# Patient Record
Sex: FEMALE | Race: WHITE | NOT HISPANIC OR LATINO | Employment: OTHER | URBAN - METROPOLITAN AREA
[De-identification: names, ages, dates, MRNs, and addresses within clinical notes are randomized per-mention and may not be internally consistent; named-entity substitution may affect disease eponyms.]

---

## 2017-04-24 ENCOUNTER — APPOINTMENT (OUTPATIENT)
Dept: LAB | Facility: CLINIC | Age: 77
End: 2017-04-24
Payer: MEDICARE

## 2017-04-24 ENCOUNTER — ALLSCRIPTS OFFICE VISIT (OUTPATIENT)
Dept: OTHER | Facility: OTHER | Age: 77
End: 2017-04-24

## 2017-04-24 DIAGNOSIS — E11.65 TYPE 2 DIABETES MELLITUS WITH HYPERGLYCEMIA (HCC): ICD-10-CM

## 2017-04-24 LAB
ANION GAP SERPL CALCULATED.3IONS-SCNC: 6 MMOL/L (ref 4–13)
BUN SERPL-MCNC: 22 MG/DL (ref 5–25)
CALCIUM SERPL-MCNC: 9.6 MG/DL (ref 8.3–10.1)
CHLORIDE SERPL-SCNC: 106 MMOL/L (ref 100–108)
CO2 SERPL-SCNC: 30 MMOL/L (ref 21–32)
CREAT SERPL-MCNC: 0.86 MG/DL (ref 0.6–1.3)
CREAT UR-MCNC: 65.3 MG/DL
EST. AVERAGE GLUCOSE BLD GHB EST-MCNC: 157 MG/DL
GFR SERPL CREATININE-BSD FRML MDRD: >60 ML/MIN/1.73SQ M
GLUCOSE P FAST SERPL-MCNC: 133 MG/DL (ref 65–99)
HBA1C MFR BLD: 7.1 % (ref 4.2–6.3)
LDLC SERPL DIRECT ASSAY-MCNC: 76 MG/DL (ref 0–100)
MICROALBUMIN UR-MCNC: 19.7 MG/L (ref 0–20)
MICROALBUMIN/CREAT 24H UR: 30 MG/G CREATININE (ref 0–30)
POTASSIUM SERPL-SCNC: 4.4 MMOL/L (ref 3.5–5.3)
SODIUM SERPL-SCNC: 142 MMOL/L (ref 136–145)

## 2017-04-24 PROCEDURE — 82043 UR ALBUMIN QUANTITATIVE: CPT

## 2017-04-24 PROCEDURE — 36415 COLL VENOUS BLD VENIPUNCTURE: CPT

## 2017-04-24 PROCEDURE — 82570 ASSAY OF URINE CREATININE: CPT

## 2017-04-24 PROCEDURE — 80048 BASIC METABOLIC PNL TOTAL CA: CPT

## 2017-04-24 PROCEDURE — 83036 HEMOGLOBIN GLYCOSYLATED A1C: CPT

## 2017-04-24 PROCEDURE — 83721 ASSAY OF BLOOD LIPOPROTEIN: CPT

## 2017-10-23 ENCOUNTER — ALLSCRIPTS OFFICE VISIT (OUTPATIENT)
Dept: OTHER | Facility: OTHER | Age: 77
End: 2017-10-23

## 2017-10-23 ENCOUNTER — APPOINTMENT (OUTPATIENT)
Dept: LAB | Facility: CLINIC | Age: 77
End: 2017-10-23
Payer: MEDICARE

## 2017-10-23 DIAGNOSIS — E11.65 TYPE 2 DIABETES MELLITUS WITH HYPERGLYCEMIA (HCC): ICD-10-CM

## 2017-10-23 DIAGNOSIS — I65.29 OCCLUSION AND STENOSIS OF UNSPECIFIED CAROTID ARTERY: ICD-10-CM

## 2017-10-23 LAB
ALBUMIN SERPL BCP-MCNC: 3.6 G/DL (ref 3.5–5)
ALP SERPL-CCNC: 113 U/L (ref 46–116)
ALT SERPL W P-5'-P-CCNC: 22 U/L (ref 12–78)
ANION GAP SERPL CALCULATED.3IONS-SCNC: 5 MMOL/L (ref 4–13)
AST SERPL W P-5'-P-CCNC: 19 U/L (ref 5–45)
BASOPHILS # BLD AUTO: 0.04 THOUSANDS/ΜL (ref 0–0.1)
BASOPHILS NFR BLD AUTO: 0 % (ref 0–1)
BILIRUB SERPL-MCNC: 0.47 MG/DL (ref 0.2–1)
BUN SERPL-MCNC: 19 MG/DL (ref 5–25)
CALCIUM SERPL-MCNC: 9.5 MG/DL (ref 8.3–10.1)
CHLORIDE SERPL-SCNC: 105 MMOL/L (ref 100–108)
CHOLEST SERPL-MCNC: 148 MG/DL (ref 50–200)
CO2 SERPL-SCNC: 30 MMOL/L (ref 21–32)
CREAT SERPL-MCNC: 0.96 MG/DL (ref 0.6–1.3)
EOSINOPHIL # BLD AUTO: 0.44 THOUSAND/ΜL (ref 0–0.61)
EOSINOPHIL NFR BLD AUTO: 4 % (ref 0–6)
ERYTHROCYTE [DISTWIDTH] IN BLOOD BY AUTOMATED COUNT: 14.4 % (ref 11.6–15.1)
EST. AVERAGE GLUCOSE BLD GHB EST-MCNC: 166 MG/DL
GFR SERPL CREATININE-BSD FRML MDRD: 56 ML/MIN/1.73SQ M
GLUCOSE P FAST SERPL-MCNC: 153 MG/DL (ref 65–99)
HBA1C MFR BLD HPLC: 7.4 %
HBA1C MFR BLD: 7.4 % (ref 4.2–6.3)
HCT VFR BLD AUTO: 43 % (ref 34.8–46.1)
HDLC SERPL-MCNC: 69 MG/DL (ref 40–60)
HGB BLD-MCNC: 14.1 G/DL (ref 11.5–15.4)
LYMPHOCYTES # BLD AUTO: 2.76 THOUSANDS/ΜL (ref 0.6–4.47)
LYMPHOCYTES NFR BLD AUTO: 24 % (ref 14–44)
MCH RBC QN AUTO: 28.7 PG (ref 26.8–34.3)
MCHC RBC AUTO-ENTMCNC: 32.8 G/DL (ref 31.4–37.4)
MCV RBC AUTO: 88 FL (ref 82–98)
MONOCYTES # BLD AUTO: 0.85 THOUSAND/ΜL (ref 0.17–1.22)
MONOCYTES NFR BLD AUTO: 7 % (ref 4–12)
NEUTROPHILS # BLD AUTO: 7.64 THOUSANDS/ΜL (ref 1.85–7.62)
NEUTS SEG NFR BLD AUTO: 65 % (ref 43–75)
NONHDLC SERPL-MCNC: 79 MG/DL
NRBC BLD AUTO-RTO: 0 /100 WBCS
PLATELET # BLD AUTO: 318 THOUSANDS/UL (ref 149–390)
PMV BLD AUTO: 10.8 FL (ref 8.9–12.7)
POTASSIUM SERPL-SCNC: 4.6 MMOL/L (ref 3.5–5.3)
PROT SERPL-MCNC: 7.1 G/DL (ref 6.4–8.2)
RBC # BLD AUTO: 4.91 MILLION/UL (ref 3.81–5.12)
SODIUM SERPL-SCNC: 140 MMOL/L (ref 136–145)
TSH SERPL DL<=0.05 MIU/L-ACNC: 1.32 UIU/ML (ref 0.36–3.74)
WBC # BLD AUTO: 11.76 THOUSAND/UL (ref 4.31–10.16)

## 2017-10-23 PROCEDURE — 36415 COLL VENOUS BLD VENIPUNCTURE: CPT

## 2017-10-23 PROCEDURE — 83718 ASSAY OF LIPOPROTEIN: CPT

## 2017-10-23 PROCEDURE — 84443 ASSAY THYROID STIM HORMONE: CPT

## 2017-10-23 PROCEDURE — 82465 ASSAY BLD/SERUM CHOLESTEROL: CPT

## 2017-10-23 PROCEDURE — 85025 COMPLETE CBC W/AUTO DIFF WBC: CPT

## 2017-10-23 PROCEDURE — 83036 HEMOGLOBIN GLYCOSYLATED A1C: CPT

## 2017-10-23 PROCEDURE — 80053 COMPREHEN METABOLIC PANEL: CPT

## 2017-11-13 ENCOUNTER — TRANSCRIBE ORDERS (OUTPATIENT)
Dept: MRI IMAGING | Facility: CLINIC | Age: 77
End: 2017-11-13

## 2017-11-13 ENCOUNTER — APPOINTMENT (OUTPATIENT)
Dept: LAB | Facility: CLINIC | Age: 77
End: 2017-11-13
Payer: MEDICARE

## 2017-11-13 ENCOUNTER — HOSPITAL ENCOUNTER (OUTPATIENT)
Dept: NON INVASIVE DIAGNOSTICS | Facility: CLINIC | Age: 77
Discharge: HOME | End: 2017-11-13
Payer: MEDICARE

## 2017-11-13 DIAGNOSIS — E11.00 UNCONTROLLED TYPE 2 DIABETES MELLITUS WITH HYPEROSMOLARITY WITHOUT COMA, WITHOUT LONG-TERM CURRENT USE OF INSULIN (HCC): Primary | ICD-10-CM

## 2017-11-13 DIAGNOSIS — I65.29 OCCLUSION AND STENOSIS OF UNSPECIFIED CAROTID ARTERY: ICD-10-CM

## 2017-11-13 LAB
BACTERIA UR QL AUTO: ABNORMAL /HPF
BILIRUB UR QL STRIP: NEGATIVE
CLARITY UR: ABNORMAL
COLOR UR: YELLOW
CREAT UR-MCNC: 69.8 MG/DL
GLUCOSE UR STRIP-MCNC: NEGATIVE MG/DL
HGB UR QL STRIP.AUTO: NEGATIVE
HYALINE CASTS #/AREA URNS LPF: ABNORMAL /LPF
KETONES UR STRIP-MCNC: NEGATIVE MG/DL
LEUKOCYTE ESTERASE UR QL STRIP: ABNORMAL
MICROALBUMIN UR-MCNC: 13.3 MG/L (ref 0–20)
MICROALBUMIN/CREAT 24H UR: 19 MG/G CREATININE (ref 0–30)
NITRITE UR QL STRIP: POSITIVE
NON-SQ EPI CELLS URNS QL MICRO: ABNORMAL /HPF
PH UR STRIP.AUTO: 6 [PH] (ref 4.5–8)
PROT UR STRIP-MCNC: NEGATIVE MG/DL
RBC #/AREA URNS AUTO: ABNORMAL /HPF
SP GR UR STRIP.AUTO: 1.02 (ref 1–1.03)
UROBILINOGEN UR QL STRIP.AUTO: 0.2 E.U./DL
WBC #/AREA URNS AUTO: ABNORMAL /HPF

## 2017-11-13 PROCEDURE — 82043 UR ALBUMIN QUANTITATIVE: CPT | Performed by: INTERNAL MEDICINE

## 2017-11-13 PROCEDURE — 81001 URINALYSIS AUTO W/SCOPE: CPT | Performed by: INTERNAL MEDICINE

## 2017-11-13 PROCEDURE — 93880 EXTRACRANIAL BILAT STUDY: CPT

## 2017-11-13 PROCEDURE — 82570 ASSAY OF URINE CREATININE: CPT | Performed by: INTERNAL MEDICINE

## 2017-12-04 ENCOUNTER — GENERIC CONVERSION - ENCOUNTER (OUTPATIENT)
Dept: INTERNAL MEDICINE CLINIC | Facility: CLINIC | Age: 77
End: 2017-12-04

## 2017-12-04 ENCOUNTER — GENERIC CONVERSION - ENCOUNTER (OUTPATIENT)
Dept: OTHER | Facility: OTHER | Age: 77
End: 2017-12-04

## 2018-01-14 VITALS
HEART RATE: 71 BPM | BODY MASS INDEX: 35.6 KG/M2 | SYSTOLIC BLOOD PRESSURE: 148 MMHG | WEIGHT: 207.38 LBS | DIASTOLIC BLOOD PRESSURE: 84 MMHG | OXYGEN SATURATION: 95 %

## 2018-01-14 NOTE — PROGRESS NOTES
Assessment    1  Encounter for preventive health examination (V70 0) (Z00 00)    Plan  Benign essential hypertension    · EKG/ECG- POC; Status:Active - Perform Order; Requested [id number];   Carotid artery plaque    · VAS CAROTID COMPLETE STUDY; SIDE:Bilateral; Status:Hold For - Scheduling;  Requested MQE:[id number];   Diabetes mellitus type 2, uncontrolled    · (1) CBC/PLT/DIFF; Status:Active; Requested LAX:[id number];    · (1) COMPREHENSIVE METABOLIC PANEL; Status:Active; Requested HWB:[id number];    · (1) HEMOGLOBIN A1C; Status:Active; Requested DJN:[id number];    · (1) LIPID PANEL, NON FASTING W/O TRIG; Status:Active; Requested CVL:[id number];    · (1) MICROALBUMIN CREATININE RATIO, RANDOM URINE; Status:Active; Requested  for:2017;    · (1) TSH WITH FT4 REFLEX; Status:Active; Requested CMQ:[id number];    · (1) URINALYSIS (will reflex a microscopy if leukocytes, occult blood, protein or nitrites  are not within normal limits); Status:Active; Requested [id number];     Discussion/Summary  Impression: Subsequent Annual Wellness Visit, with preventive exam as well as age and risk appropriate counseling completed  Cardiovascular screening and counseling: screening is current and Dx - V81 2 Screen for CV Disorder  Diabetes screening and counseling: screening not indicated  Colorectal cancer screening and counseling: the patient declines screening and Dx - V76 51 Screen for CRC  Breast cancer screening and counseling: the risks and benefits of screening were discussed and the patient declines screening  Cervical cancer screening and counseling: screening not indicated  Abdominal aortic aneurysm screening and counseling: Dx - V81 2 Screen for CV Disorder  Glaucoma screening and counseling: screening is current and Dx - V80 1 Screen for Glaucoma  HIV screening and counseling: screening not indicated   Immunizations: the patient declines the influenza vaccination, the patient declines the pneumococcal vaccination, hepatitis B vaccination series is not indicated at this time due to the patient's low risk of jv the disease, the patient declines the Zostavax vaccine, the patient declines the Td vaccine and the patient declines the Tdap vaccine  Advance Directive Planning: not complete, she was encouraged to follow-up with me to discuss her questions and/or decisions  History of Present Illness  Welcome to Medicare and Wellness Visits: The patient is being seen for the subsequent annual wellness visit  Medicare Screening and Risk Factors   Hospitalizations: no previous hospitalizations  Once per lifetime medicare screening tests: AAA screening [place] has not yet been done  Medicare Screening Tests Risk Questions   Drug and Alcohol Use: The patient is a former cigarette smoker and quit smoking 17yrs ago  The patient reports occasional alcohol use and drinking 6 drinks per week  She has never used illicit drugs  Diet and Physical Activity: Current diet includes low salt food choices, 0 servings of fruit per day, 1 servings of vegetables per day, 2 servings of meat per day, 0 servings of whole grains per day, 2 servings of simple carbohydrates per day, 0 servings of dairy products per day, 1 cups of coffee per day, 0 cups of tea per day, 0 cans of regular soda per day and 1 cans of diet soda per day  She exercises infrequently  Mood Disorder and Cognitive Impairment Screening: PHQ-9 Depression Scale   Over the past 2 weeks, how often have you been bothered by the following problems? 1 ) Little interest or pleasure in doing things? Not at all    2 ) Feeling down, depressed or hopeless? Not at all    3 ) Trouble falling asleep or sleeping too much? Several days  4 ) Feeling tired or having little energy? Not at all    5 ) Poor appetite or overeating? Several days  6 ) Feeling bad about yourself, or that you are a failure, or have let yourself or your family down?  Not at all    7 ) Trouble concentrating on things, such as reading a newspaper or watching television? Not at all    8 ) Moving or speaking so slowly that other people could have noticed, or the opposite, moving or speaking faster than usual? Not at all    9 ) Thoughts that you would be off dead or of hurting yourself in some way? Not at all  TOTAL SCORE: 2  She denies feeling down, depressed, or hopeless over the past two weeks  She denies feeling little interest or pleasure in doing things over the past two weeks  Cognitive impairment screening: denies difficulty learning/retaining new information, denies difficulty handling complex tasks, denies difficulty with reasoning, denies difficulty with spatial ability and orientation, denies difficulty with language and denies difficulty with behavior  Advance Directives: Advance directives: no living will, no durable power of  for health care directives and no advance directives  Co-Managers and Medical Equipment/Suppliers: See Patient Care Team       Preventive Quality Program 65 and Older: Falls Risk: The patient fell 0 times in the past 12 months  Urinary Incontinence Symptoms includes: no urinary incontinence        Review of Systems    Over the past 2 weeks, how often have you been bothered by the following problems? 1 ) Little interest or pleasure in doing things? Not at all    3 ) Trouble falling asleep or sleeping too much? Several days  4 ) Feeling tired or having little energy? Not at all    5 ) Poor appetite or overeating? Several days  6 ) Feeling bad about yourself, or that you are a failure, or have let yourself or your family down? Not at all    7 ) Trouble concentrating on things, such as reading a newspaper or watching television?  Not at all    8 ) Moving or speaking so slowly that other people could have noticed, or the opposite, moving or speaking faster than usual? Not at all    9 ) Thoughts that you would be better off dead or of hurting yourself in some way? Not at all  Score 2      Active Problems    1  Benign essential hypertension (401 1) (I10)   2  Carotid artery plaque (433 10) (I65 29)   3  Chronic obstructive pulmonary disease (496) (J44 9)   4  Colon cancer screening (V76 51) (Z12 11)   5  Diabetes mellitus type 2, uncontrolled (250 02) (E11 65)   6  Flu vaccine need (V04 81) (Z23)   7  Hyperlipidemia (272 4) (E78 5)   8  Hypothyroidism (244 9) (E03 9)   9  No advance directive on file (V49 89) (Z78 9)   10  Osteoarthrosis (715 90) (M19 90)   11  Screening for genitourinary condition (V81 6) (Z13 89)    Past Medical History    · History of Acute maxillary sinusitis (461 0) (J01 00)   · History of Cellulitis of leg (682 6) (L03 119)   · History of Congestive heart failure (428 0) (I50 9)   · History of acute bronchitis (V12 69) (Z87 09)   · History of cataract (V12 49) (Z86 69)   · History of chest pain (V13 89) (J16 013)   · History of edema (V13 89) (A21 653)   · History of leukocytosis (V12 3) (Z86 2)   · History of shortness of breath (V13 89) (Z87 898)   · History of Limb pain (729 5) (M79 609)   · History of Meralgia paresthetica (355 1) (G57 10)   · History of Refusing Treatment   · History of Thyrotoxicosis (242 90)   · History of Urinary Tract Infection (V13 02)    Surgical History    · History of Anterior Colporrhaphy, Repair Of Cystocele   · History of Appendectomy   · History of Total Abdominal Hysterectomy   · History of Treatment Of Ankle Fracture    Family History  Mother    · No pertinent family history  Father    · No pertinent family history    Social History    · Denied: History of Alcohol Use (History)   · Former Smoker Cigarettes (V15 82)   · QUIT 2000   · Living Independently Alone (V60 3)   · Never Used Drugs   · No advance directive on file (V49 89) (Z78 9)   · Occupation:   ·     Current Meds   1  Atorvastatin Calcium 80 MG Oral Tablet; take 1 tablet every day;    Therapy: 00IVI7471 to (Evaluate:37Aog6417) Requested for: 99DFU2056; Last   Rx:01Mar2017 Ordered   2  BD Pen Needle Mini U/F 31G X 5 MM Miscellaneous; USE 1 TIMES DAILY; Therapy: 54APT5307 to (Last Rx:08Apr2015)  Requested for: 08Apr2015 Ordered   3  Combivent Respimat  MCG/ACT Inhalation Aerosol Solution; INHALE 1 PUFF 4   TIMES DAILY AS NEEDED; Therapy: 19YQK0760 to (Evaluate:19Jan2018)  Requested for: 06LFN9629; Last   Rx:31Kez0834 Ordered   4  Flovent  MCG/ACT Inhalation Aerosol; INHALE TWO PUFFS DAILY; Therapy: 16ZNW5769 to ([phone])  Requested for: 71XKY8744; Last   Rx:03Nov2014 Ordered   5  GlyBURIDE 5 MG Oral Tablet; TAKE 1 TABLET TWICE DAILY; Therapy: 13XQS5588 to (Percilla Babinski)  Requested for: 31Ywj4519; Last   Rx:73Nde5658 Ordered   6  Ibuprofen 600 MG Oral Tablet; TAKE 1 TABLET BY MOUTH 3 TIMES A DAY AS NEEDED   FOR PAIN;   Therapy: 23IDT3670 to (Sam Austin)  Requested for: 48JVS5643; Last   Rx:80Mij8655 Ordered   7  Levothyroxine Sodium 125 MCG Oral Tablet; TAKE 1 TABLET DAILY AS DIRECTED; Therapy: 46POC5802 to (EOTJKOUT:36GUQ2471)  Requested for: 26Jun2017; Last   FF:50KYA1260 Ordered   8  LORazepam 0 5 MG Oral Tablet; TAKE 1/2-1 TAB PO QD PRN; Therapy: 68SKF5387 to Recorded   9  MetFORMIN HCl  MG Oral Tablet Extended Release 24 Hour; take 2 tablet daily; Therapy: 77VFF8224 to (Evaluate:18Jio1158)  Requested for: 72UAF2607; Last   Rx:02Oct2017 Ordered   10  OneTouch Ultra Blue In Vitro Strip; TEST 3 TIMES DAILY; Therapy: 50POX3977 to (Evaluate:75Lug3149)  Requested for: 78EHW6714; Last    Rx:10Jun2016 Ordered   11  Valsartan-Hydrochlorothiazide 160-12 5 MG Oral Tablet; TAKE 1 TABLET DAILY; Therapy: 19OOZ9412 to ([phone])  Requested for: 75SLB1650; Last    ZJ:73OVT7308 Ordered    Allergies    1  Erythromycin Derivatives   2   Sulfa Drugs    Immunizations   1 2 3    Influenza  27-Oct-2008 19-Oct-2015 pt dont want    PCV  19-Oct-2015      Tdap  dont remember      Zoster never       Vitals  Signs    Heart Rate: 28  Systolic: 539  Diastolic: 68  Height: 5 ft 2 in  Weight: 202 lb 4 oz  BMI Calculated: 36 99  BSA Calculated: 1 92  O2 Saturation: 89    Results/Data  PHQ-9 Adult Depression Screening 82GQY8553 09:57AM User, [employer]     Test Name Result Flag Reference   PHQ-9 Adult Depression Score 0     Over the last two weeks, how often have you been bothered by any of the following problems? Little interest or pleasure in doing things: Not at all - 0  Feeling down, depressed, or hopeless: Not at all - 0  Trouble falling or staying asleep, or sleeping too much: Not at all - 0  Feeling tired or having little energy: Not at all - 0  Poor appetite or over eating: Not at all - 0  Feeling bad about yourself - or that you are a failure or have let yourself or your family down: Not at all - 0  Trouble concentrating on things, such as reading the newspaper or watching television: Not at all - 0  Moving or speaking so slowly that other people could have noticed   Or the opposite -  being so fidgety or restless that you have been moving around a lot more than usual: Not at all - 0  Thoughts that you would be better off dead, or of hurting yourself in some way: Not at all - 0   PHQ-9 Adult Depression Screening Negative     PHQ-9 Difficulty Level Not difficult at all     PHQ-9 Severity No Depression       Falls Risk Assessment (Dx Z13 89 Screen for Neurologic Disorder) 23Oct2017 09:57AM User, [employer]     Test Name Result Flag Reference   Falls Risk      No falls in the past year       Signatures   Electronically signed by : PETER Parker ; Oct 23 2017 10:39AM EST                       (Author)

## 2018-01-22 VITALS
HEART RATE: 28 BPM | DIASTOLIC BLOOD PRESSURE: 68 MMHG | OXYGEN SATURATION: 89 % | SYSTOLIC BLOOD PRESSURE: 144 MMHG | BODY MASS INDEX: 37.22 KG/M2 | WEIGHT: 202.25 LBS | HEIGHT: 62 IN

## 2018-04-19 RX ORDER — LORAZEPAM 0.5 MG/1
TABLET ORAL
COMMUNITY
Start: 2014-03-14 | End: 2021-01-01 | Stop reason: HOSPADM

## 2018-04-19 RX ORDER — GLYBURIDE 5 MG/1
1 TABLET ORAL 2 TIMES DAILY
COMMUNITY
Start: 2014-03-14 | End: 2018-04-23 | Stop reason: SDUPTHER

## 2018-04-19 RX ORDER — VALSARTAN AND HYDROCHLOROTHIAZIDE 160; 12.5 MG/1; MG/1
1 TABLET, FILM COATED ORAL DAILY
COMMUNITY
Start: 2014-03-14 | End: 2018-05-31 | Stop reason: SDUPTHER

## 2018-04-19 RX ORDER — FLUTICASONE PROPIONATE 110 UG/1
AEROSOL, METERED RESPIRATORY (INHALATION)
COMMUNITY
Start: 2014-03-10 | End: 2020-01-01 | Stop reason: ALTCHOICE

## 2018-04-19 RX ORDER — ATORVASTATIN CALCIUM 80 MG/1
1 TABLET, FILM COATED ORAL DAILY
COMMUNITY
Start: 2014-05-12 | End: 2018-08-17 | Stop reason: SDUPTHER

## 2018-04-19 RX ORDER — METFORMIN HYDROCHLORIDE 500 MG/1
2 TABLET, EXTENDED RELEASE ORAL DAILY
COMMUNITY
Start: 2014-03-14 | End: 2018-09-27 | Stop reason: SDUPTHER

## 2018-04-19 RX ORDER — LEVOTHYROXINE SODIUM 0.12 MG/1
1 TABLET ORAL DAILY
COMMUNITY
Start: 2014-03-14 | End: 2018-09-11 | Stop reason: SDUPTHER

## 2018-04-19 RX ORDER — IBUPROFEN 600 MG/1
TABLET ORAL EVERY 8 HOURS
COMMUNITY
Start: 2014-03-14 | End: 2018-04-23 | Stop reason: SDUPTHER

## 2018-04-23 ENCOUNTER — APPOINTMENT (OUTPATIENT)
Dept: LAB | Facility: CLINIC | Age: 78
End: 2018-04-23
Payer: MEDICARE

## 2018-04-23 ENCOUNTER — OFFICE VISIT (OUTPATIENT)
Dept: INTERNAL MEDICINE CLINIC | Facility: CLINIC | Age: 78
End: 2018-04-23
Payer: MEDICARE

## 2018-04-23 VITALS
HEIGHT: 64 IN | SYSTOLIC BLOOD PRESSURE: 138 MMHG | DIASTOLIC BLOOD PRESSURE: 82 MMHG | WEIGHT: 206 LBS | BODY MASS INDEX: 35.17 KG/M2 | RESPIRATION RATE: 18 BRPM

## 2018-04-23 DIAGNOSIS — E03.9 ACQUIRED HYPOTHYROIDISM: ICD-10-CM

## 2018-04-23 DIAGNOSIS — IMO0002 UNCONTROLLED TYPE 2 DIABETES MELLITUS WITH STAGE 3 CHRONIC KIDNEY DISEASE, WITHOUT LONG-TERM CURRENT USE OF INSULIN: ICD-10-CM

## 2018-04-23 DIAGNOSIS — M15.9 PRIMARY OSTEOARTHRITIS INVOLVING MULTIPLE JOINTS: ICD-10-CM

## 2018-04-23 DIAGNOSIS — J41.0 SIMPLE CHRONIC BRONCHITIS (HCC): ICD-10-CM

## 2018-04-23 DIAGNOSIS — I65.23 ATHEROSCLEROSIS OF BOTH CAROTID ARTERIES: ICD-10-CM

## 2018-04-23 DIAGNOSIS — IMO0002 UNCONTROLLED TYPE 2 DIABETES MELLITUS WITH STAGE 3 CHRONIC KIDNEY DISEASE, WITHOUT LONG-TERM CURRENT USE OF INSULIN: Primary | ICD-10-CM

## 2018-04-23 LAB
ANION GAP SERPL CALCULATED.3IONS-SCNC: 4 MMOL/L (ref 4–13)
BACTERIA UR QL AUTO: ABNORMAL /HPF
BASOPHILS # BLD AUTO: 0.04 THOUSANDS/ΜL (ref 0–0.1)
BASOPHILS NFR BLD AUTO: 0 % (ref 0–1)
BILIRUB UR QL STRIP: NEGATIVE
BUN SERPL-MCNC: 23 MG/DL (ref 5–25)
CALCIUM SERPL-MCNC: 9.6 MG/DL (ref 8.3–10.1)
CHLORIDE SERPL-SCNC: 106 MMOL/L (ref 100–108)
CLARITY UR: CLEAR
CO2 SERPL-SCNC: 30 MMOL/L (ref 21–32)
COLOR UR: YELLOW
CREAT SERPL-MCNC: 0.91 MG/DL (ref 0.6–1.3)
CREAT UR-MCNC: 36.9 MG/DL
EOSINOPHIL # BLD AUTO: 0.4 THOUSAND/ΜL (ref 0–0.61)
EOSINOPHIL NFR BLD AUTO: 4 % (ref 0–6)
ERYTHROCYTE [DISTWIDTH] IN BLOOD BY AUTOMATED COUNT: 14.5 % (ref 11.6–15.1)
EST. AVERAGE GLUCOSE BLD GHB EST-MCNC: 160 MG/DL
GFR SERPL CREATININE-BSD FRML MDRD: 60 ML/MIN/1.73SQ M
GLUCOSE P FAST SERPL-MCNC: 107 MG/DL (ref 65–99)
GLUCOSE UR STRIP-MCNC: NEGATIVE MG/DL
HBA1C MFR BLD: 7.2 % (ref 4.2–6.3)
HCT VFR BLD AUTO: 40.3 % (ref 34.8–46.1)
HGB BLD-MCNC: 13.2 G/DL (ref 11.5–15.4)
HGB UR QL STRIP.AUTO: NEGATIVE
HYALINE CASTS #/AREA URNS LPF: ABNORMAL /LPF
KETONES UR STRIP-MCNC: NEGATIVE MG/DL
LEUKOCYTE ESTERASE UR QL STRIP: ABNORMAL
LYMPHOCYTES # BLD AUTO: 3.47 THOUSANDS/ΜL (ref 0.6–4.47)
LYMPHOCYTES NFR BLD AUTO: 32 % (ref 14–44)
MCH RBC QN AUTO: 28.5 PG (ref 26.8–34.3)
MCHC RBC AUTO-ENTMCNC: 32.8 G/DL (ref 31.4–37.4)
MCV RBC AUTO: 87 FL (ref 82–98)
MICROALBUMIN UR-MCNC: 7.3 MG/L (ref 0–20)
MICROALBUMIN/CREAT 24H UR: 20 MG/G CREATININE (ref 0–30)
MONOCYTES # BLD AUTO: 0.65 THOUSAND/ΜL (ref 0.17–1.22)
MONOCYTES NFR BLD AUTO: 6 % (ref 4–12)
NEUTROPHILS # BLD AUTO: 6.43 THOUSANDS/ΜL (ref 1.85–7.62)
NEUTS SEG NFR BLD AUTO: 58 % (ref 43–75)
NITRITE UR QL STRIP: POSITIVE
NON-SQ EPI CELLS URNS QL MICRO: ABNORMAL /HPF
NRBC BLD AUTO-RTO: 0 /100 WBCS
PH UR STRIP.AUTO: 6 [PH] (ref 4.5–8)
PLATELET # BLD AUTO: 295 THOUSANDS/UL (ref 149–390)
PMV BLD AUTO: 10.5 FL (ref 8.9–12.7)
POTASSIUM SERPL-SCNC: 4.1 MMOL/L (ref 3.5–5.3)
PROT UR STRIP-MCNC: NEGATIVE MG/DL
RBC # BLD AUTO: 4.63 MILLION/UL (ref 3.81–5.12)
RBC #/AREA URNS AUTO: ABNORMAL /HPF
SODIUM SERPL-SCNC: 140 MMOL/L (ref 136–145)
SP GR UR STRIP.AUTO: 1.01 (ref 1–1.03)
UROBILINOGEN UR QL STRIP.AUTO: 0.2 E.U./DL
WBC # BLD AUTO: 11.02 THOUSAND/UL (ref 4.31–10.16)
WBC #/AREA URNS AUTO: ABNORMAL /HPF

## 2018-04-23 PROCEDURE — 81001 URINALYSIS AUTO W/SCOPE: CPT | Performed by: INTERNAL MEDICINE

## 2018-04-23 PROCEDURE — 82570 ASSAY OF URINE CREATININE: CPT | Performed by: INTERNAL MEDICINE

## 2018-04-23 PROCEDURE — 82043 UR ALBUMIN QUANTITATIVE: CPT | Performed by: INTERNAL MEDICINE

## 2018-04-23 PROCEDURE — 83036 HEMOGLOBIN GLYCOSYLATED A1C: CPT

## 2018-04-23 PROCEDURE — 36415 COLL VENOUS BLD VENIPUNCTURE: CPT

## 2018-04-23 PROCEDURE — 99214 OFFICE O/P EST MOD 30 MIN: CPT | Performed by: INTERNAL MEDICINE

## 2018-04-23 PROCEDURE — 85025 COMPLETE CBC W/AUTO DIFF WBC: CPT

## 2018-04-23 PROCEDURE — 80048 BASIC METABOLIC PNL TOTAL CA: CPT

## 2018-04-23 RX ORDER — GLYBURIDE 5 MG/1
5 TABLET ORAL 2 TIMES DAILY
Qty: 180 TABLET | Refills: 3 | Status: SHIPPED | OUTPATIENT
Start: 2018-04-23 | End: 2019-04-22 | Stop reason: SDUPTHER

## 2018-04-23 RX ORDER — OLOPATADINE HYDROCHLORIDE 7 MG/ML
SOLUTION OPHTHALMIC
Refills: 4 | COMMUNITY
Start: 2018-03-01

## 2018-04-23 RX ORDER — IBUPROFEN 600 MG/1
600 TABLET ORAL EVERY 8 HOURS PRN
Qty: 90 TABLET | Refills: 3 | Status: SHIPPED | OUTPATIENT
Start: 2018-04-23 | End: 2018-09-06 | Stop reason: SDUPTHER

## 2018-04-23 NOTE — PATIENT INSTRUCTIONS
Multiple chronic problems are noted but are stable  Please do some routine laboratory testing today  Plan to see me again in October; call if any problems develop

## 2018-04-23 NOTE — PROGRESS NOTES
Assessment/Plan:       Diagnoses and all orders for this visit:    Uncontrolled type 2 diabetes mellitus with stage 3 chronic kidney disease, without long-term current use of insulin (HCC)    Acquired hypothyroidism    Simple chronic bronchitis (HCC)    Atherosclerosis of both carotid arteries    Primary osteoarthritis involving multiple joints    Other orders  -     atorvastatin (LIPITOR) 80 mg tablet; Take 1 tablet by mouth daily  -     Insulin Pen Needle (B-D UF III MINI PEN NEEDLES) 31G X 5 MM MISC; by Does not apply route  -     ipratropium-albuterol (COMBIVENT RESPIMAT) inhaler; Inhale  -     fluticasone (FLOVENT HFA) 110 MCG/ACT inhaler; Inhale  -     glyBURIDE (DIABETA) 5 mg tablet; Take 1 tablet by mouth 2 (two) times a day  -     ibuprofen (MOTRIN) 600 mg tablet; Take by mouth every 8 (eight) hours  -     levothyroxine 125 mcg tablet; Take 1 tablet by mouth daily  -     LORazepam (ATIVAN) 0 5 mg tablet; Take by mouth  -     metFORMIN (GLUCOPHAGE-XR) 500 mg 24 hr tablet; Take 2 tablets by mouth daily  -     glucose blood (ONE TOUCH ULTRA TEST) test strip; by In Vitro route 3 (three) times a day  -     valsartan-hydrochlorothiazide (DIOVAN-HCT) 160-12 5 MG per tablet; Take 1 tablet by mouth daily  -     HEMOGLOBIN A1C W/ EAG ESTIMATION  -     PAZEO 0 7 % SOLN; INSTILL 1 DROP INTO BOTH EYES EVERY DAY              Subjective:      Patient ID: Ary Orr is a 76 y o  female  A patient with chronic obstructive lung disease  Heterogeneous carotid plaque with less than 50% stenosis documented on serial ultrasounds  Sinus rhythm with PACs noted on both pulse examination and electrocardiography  Essential hypertension treated with valsartan hydrochlorothiazide    Type 2 diabetes treated with metformin and glyburide; most recent hemoglobin A1c 7 4%    Hyperlipidemia treated with atorvastatin 80 mg daily    Rhinitis noted      The patient has moderate osteoarthrosis    She takes ibuprofen 600 mg tablets intermittently; the prescription is written 3 times a day but she never takes any where near that  Refuses both colonoscopy and mammogram          The following portions of the patient's history were reviewed and updated as appropriate:   She has a past medical history of Cataract; Chest pain; CHF (congestive heart failure) ([address] ); Leukocytosis; and Meralgia paresthetica  ,   does not have any pertinent problems on file  ,   has a past surgical history that includes Cystocele repair; Appendectomy; Total abdominal hysterectomy; and ORIF ankle fracture (Left, 1979)  ,  family history includes No Known Problems in her father and mother  ,   reports that she has quit smoking  She has never used smokeless tobacco  She reports that she does not drink alcohol or use drugs  ,  is allergic to erythromycin and sulfa antibiotics     Current Outpatient Prescriptions   Medication Sig Dispense Refill    atorvastatin (LIPITOR) 80 mg tablet Take 1 tablet by mouth daily      fluticasone (FLOVENT HFA) 110 MCG/ACT inhaler Inhale      glucose blood (ONE TOUCH ULTRA TEST) test strip by In Vitro route 3 (three) times a day      glyBURIDE (DIABETA) 5 mg tablet Take 1 tablet by mouth 2 (two) times a day      ibuprofen (MOTRIN) 600 mg tablet Take by mouth every 8 (eight) hours      Insulin Pen Needle (B-D UF III MINI PEN NEEDLES) 31G X 5 MM MISC by Does not apply route      ipratropium-albuterol (COMBIVENT RESPIMAT) inhaler Inhale      levothyroxine 125 mcg tablet Take 1 tablet by mouth daily      metFORMIN (GLUCOPHAGE-XR) 500 mg 24 hr tablet Take 2 tablets by mouth daily      PAZEO 0 7 % SOLN INSTILL 1 DROP INTO BOTH EYES EVERY DAY  4    valsartan-hydrochlorothiazide (DIOVAN-HCT) 160-12 5 MG per tablet Take 1 tablet by mouth daily      LORazepam (ATIVAN) 0 5 mg tablet Take by mouth       No current facility-administered medications for this visit          Review of Systems      Objective:  Vitals:    04/23/18 1027   BP: 138/82 Resp: 18      Physical Exam

## 2018-05-19 DIAGNOSIS — J45.909 UNCOMPLICATED ASTHMA, UNSPECIFIED ASTHMA SEVERITY, UNSPECIFIED WHETHER PERSISTENT: Primary | ICD-10-CM

## 2018-05-19 RX ORDER — IPRATROPIUM/ALBUTEROL SULFATE 20-100 MCG
MIST INHALER (GRAM) INHALATION
Qty: 1 INHALER | Refills: 3 | Status: SHIPPED | OUTPATIENT
Start: 2018-05-19 | End: 2018-09-12 | Stop reason: SDUPTHER

## 2018-05-31 DIAGNOSIS — I10 ESSENTIAL HYPERTENSION: Primary | ICD-10-CM

## 2018-05-31 RX ORDER — VALSARTAN AND HYDROCHLOROTHIAZIDE 160; 12.5 MG/1; MG/1
TABLET, FILM COATED ORAL
Qty: 90 TABLET | Refills: 3 | Status: SHIPPED | OUTPATIENT
Start: 2018-05-31 | End: 2019-03-01 | Stop reason: SDUPTHER

## 2018-08-17 DIAGNOSIS — E78.5 HYPERLIPIDEMIA, UNSPECIFIED HYPERLIPIDEMIA TYPE: Primary | ICD-10-CM

## 2018-08-17 RX ORDER — ATORVASTATIN CALCIUM 80 MG/1
TABLET, FILM COATED ORAL
Qty: 90 TABLET | Refills: 2 | Status: SHIPPED | OUTPATIENT
Start: 2018-08-17 | End: 2018-11-15 | Stop reason: SDUPTHER

## 2018-09-05 ENCOUNTER — TELEPHONE (OUTPATIENT)
Dept: INTERNAL MEDICINE CLINIC | Facility: CLINIC | Age: 78
End: 2018-09-05

## 2018-09-06 DIAGNOSIS — M15.9 PRIMARY OSTEOARTHRITIS INVOLVING MULTIPLE JOINTS: ICD-10-CM

## 2018-09-06 RX ORDER — IBUPROFEN 600 MG/1
600 TABLET ORAL EVERY 8 HOURS PRN
Qty: 90 TABLET | Refills: 0 | OUTPATIENT
Start: 2018-09-06 | End: 2018-10-06

## 2018-09-06 RX ORDER — IBUPROFEN 600 MG/1
600 TABLET ORAL EVERY 8 HOURS PRN
Qty: 90 TABLET | Refills: 0 | Status: SHIPPED | OUTPATIENT
Start: 2018-09-06 | End: 2018-10-13 | Stop reason: SDUPTHER

## 2018-09-11 DIAGNOSIS — E03.9 HYPOTHYROIDISM, UNSPECIFIED TYPE: Primary | ICD-10-CM

## 2018-09-11 RX ORDER — LEVOTHYROXINE SODIUM 0.12 MG/1
TABLET ORAL
Qty: 90 TABLET | Refills: 3 | Status: SHIPPED | OUTPATIENT
Start: 2018-09-11 | End: 2018-12-05 | Stop reason: SDUPTHER

## 2018-09-12 DIAGNOSIS — J45.909 UNCOMPLICATED ASTHMA, UNSPECIFIED ASTHMA SEVERITY, UNSPECIFIED WHETHER PERSISTENT: ICD-10-CM

## 2018-09-12 RX ORDER — IPRATROPIUM/ALBUTEROL SULFATE 20-100 MCG
MIST INHALER (GRAM) INHALATION
Qty: 1 INHALER | Refills: 3 | Status: SHIPPED | OUTPATIENT
Start: 2018-09-12 | End: 2019-01-09 | Stop reason: SDUPTHER

## 2018-09-27 DIAGNOSIS — E11.8 TYPE 2 DIABETES MELLITUS WITH COMPLICATION, UNSPECIFIED WHETHER LONG TERM INSULIN USE: Primary | ICD-10-CM

## 2018-09-27 RX ORDER — METFORMIN HYDROCHLORIDE 500 MG/1
TABLET, EXTENDED RELEASE ORAL
Qty: 180 TABLET | Refills: 0 | Status: SHIPPED | OUTPATIENT
Start: 2018-09-27 | End: 2018-12-20 | Stop reason: SDUPTHER

## 2018-10-13 DIAGNOSIS — M15.9 PRIMARY OSTEOARTHRITIS INVOLVING MULTIPLE JOINTS: ICD-10-CM

## 2018-10-15 RX ORDER — IBUPROFEN 600 MG/1
600 TABLET ORAL EVERY 8 HOURS PRN
Qty: 90 TABLET | Refills: 0 | Status: SHIPPED | OUTPATIENT
Start: 2018-10-15 | End: 2020-01-01

## 2018-10-19 ENCOUNTER — OFFICE VISIT (OUTPATIENT)
Dept: INTERNAL MEDICINE CLINIC | Facility: CLINIC | Age: 78
End: 2018-10-19
Payer: MEDICARE

## 2018-10-19 ENCOUNTER — TRANSCRIBE ORDERS (OUTPATIENT)
Dept: LAB | Facility: CLINIC | Age: 78
End: 2018-10-19

## 2018-10-19 ENCOUNTER — APPOINTMENT (OUTPATIENT)
Dept: LAB | Facility: CLINIC | Age: 78
End: 2018-10-19
Payer: MEDICARE

## 2018-10-19 VITALS
HEIGHT: 64 IN | BODY MASS INDEX: 34.21 KG/M2 | HEART RATE: 73 BPM | SYSTOLIC BLOOD PRESSURE: 128 MMHG | OXYGEN SATURATION: 91 % | WEIGHT: 200.4 LBS | DIASTOLIC BLOOD PRESSURE: 62 MMHG

## 2018-10-19 DIAGNOSIS — E03.9 ACQUIRED HYPOTHYROIDISM: ICD-10-CM

## 2018-10-19 DIAGNOSIS — J41.0 SIMPLE CHRONIC BRONCHITIS (HCC): ICD-10-CM

## 2018-10-19 DIAGNOSIS — IMO0002 UNCONTROLLED TYPE 2 DIABETES MELLITUS, WITHOUT LONG-TERM CURRENT USE OF INSULIN: ICD-10-CM

## 2018-10-19 DIAGNOSIS — I65.23 ATHEROSCLEROSIS OF BOTH CAROTID ARTERIES: ICD-10-CM

## 2018-10-19 DIAGNOSIS — E78.2 MIXED HYPERLIPIDEMIA: ICD-10-CM

## 2018-10-19 DIAGNOSIS — IMO0002 UNCONTROLLED TYPE 2 DIABETES MELLITUS, WITHOUT LONG-TERM CURRENT USE OF INSULIN: Primary | ICD-10-CM

## 2018-10-19 DIAGNOSIS — E11.649 UNCONTROLLED TYPE 2 DIABETES MELLITUS WITH HYPOGLYCEMIA, UNSPECIFIED HYPOGLYCEMIA COMA STATUS (HCC): Primary | ICD-10-CM

## 2018-10-19 LAB
ANION GAP SERPL CALCULATED.3IONS-SCNC: 5 MMOL/L (ref 4–13)
BACTERIA UR QL AUTO: ABNORMAL /HPF
BASOPHILS # BLD AUTO: 0.06 THOUSANDS/ΜL (ref 0–0.1)
BASOPHILS NFR BLD AUTO: 1 % (ref 0–1)
BILIRUB UR QL STRIP: NEGATIVE
BUN SERPL-MCNC: 26 MG/DL (ref 5–25)
CALCIUM SERPL-MCNC: 9.5 MG/DL (ref 8.3–10.1)
CHLORIDE SERPL-SCNC: 105 MMOL/L (ref 100–108)
CLARITY UR: ABNORMAL
CO2 SERPL-SCNC: 29 MMOL/L (ref 21–32)
COLOR UR: YELLOW
CREAT SERPL-MCNC: 1.28 MG/DL (ref 0.6–1.3)
CREAT UR-MCNC: 104 MG/DL
EOSINOPHIL # BLD AUTO: 0.58 THOUSAND/ΜL (ref 0–0.61)
EOSINOPHIL NFR BLD AUTO: 6 % (ref 0–6)
ERYTHROCYTE [DISTWIDTH] IN BLOOD BY AUTOMATED COUNT: 14.1 % (ref 11.6–15.1)
EST. AVERAGE GLUCOSE BLD GHB EST-MCNC: 160 MG/DL
GFR SERPL CREATININE-BSD FRML MDRD: 40 ML/MIN/1.73SQ M
GLUCOSE P FAST SERPL-MCNC: 117 MG/DL (ref 65–99)
GLUCOSE UR STRIP-MCNC: NEGATIVE MG/DL
HBA1C MFR BLD: 7.2 % (ref 4.2–6.3)
HCT VFR BLD AUTO: 41.1 % (ref 34.8–46.1)
HGB BLD-MCNC: 13 G/DL (ref 11.5–15.4)
HGB UR QL STRIP.AUTO: ABNORMAL
IMM GRANULOCYTES # BLD AUTO: 0.02 THOUSAND/UL (ref 0–0.2)
IMM GRANULOCYTES NFR BLD AUTO: 0 % (ref 0–2)
KETONES UR STRIP-MCNC: NEGATIVE MG/DL
LEUKOCYTE ESTERASE UR QL STRIP: ABNORMAL
LYMPHOCYTES # BLD AUTO: 2.89 THOUSANDS/ΜL (ref 0.6–4.47)
LYMPHOCYTES NFR BLD AUTO: 30 % (ref 14–44)
MCH RBC QN AUTO: 28 PG (ref 26.8–34.3)
MCHC RBC AUTO-ENTMCNC: 31.6 G/DL (ref 31.4–37.4)
MCV RBC AUTO: 89 FL (ref 82–98)
MICROALBUMIN UR-MCNC: 22.5 MG/L (ref 0–20)
MICROALBUMIN/CREAT 24H UR: 22 MG/G CREATININE (ref 0–30)
MONOCYTES # BLD AUTO: 0.75 THOUSAND/ΜL (ref 0.17–1.22)
MONOCYTES NFR BLD AUTO: 8 % (ref 4–12)
NEUTROPHILS # BLD AUTO: 5.49 THOUSANDS/ΜL (ref 1.85–7.62)
NEUTS SEG NFR BLD AUTO: 55 % (ref 43–75)
NITRITE UR QL STRIP: POSITIVE
NON-SQ EPI CELLS URNS QL MICRO: ABNORMAL /HPF
NRBC BLD AUTO-RTO: 0 /100 WBCS
PH UR STRIP.AUTO: 6 [PH] (ref 4.5–8)
PLATELET # BLD AUTO: 335 THOUSANDS/UL (ref 149–390)
PMV BLD AUTO: 10.5 FL (ref 8.9–12.7)
POTASSIUM SERPL-SCNC: 4.3 MMOL/L (ref 3.5–5.3)
PROT UR STRIP-MCNC: NEGATIVE MG/DL
RBC # BLD AUTO: 4.64 MILLION/UL (ref 3.81–5.12)
RBC #/AREA URNS AUTO: ABNORMAL /HPF
SODIUM SERPL-SCNC: 139 MMOL/L (ref 136–145)
SP GR UR STRIP.AUTO: 1.01 (ref 1–1.03)
TSH SERPL DL<=0.05 MIU/L-ACNC: 1.03 UIU/ML (ref 0.36–3.74)
UROBILINOGEN UR QL STRIP.AUTO: 0.2 E.U./DL
WBC # BLD AUTO: 9.79 THOUSAND/UL (ref 4.31–10.16)
WBC #/AREA URNS AUTO: ABNORMAL /HPF

## 2018-10-19 PROCEDURE — 36415 COLL VENOUS BLD VENIPUNCTURE: CPT

## 2018-10-19 PROCEDURE — 81001 URINALYSIS AUTO W/SCOPE: CPT

## 2018-10-19 PROCEDURE — 80048 BASIC METABOLIC PNL TOTAL CA: CPT

## 2018-10-19 PROCEDURE — 83036 HEMOGLOBIN GLYCOSYLATED A1C: CPT

## 2018-10-19 PROCEDURE — 82570 ASSAY OF URINE CREATININE: CPT

## 2018-10-19 PROCEDURE — 82043 UR ALBUMIN QUANTITATIVE: CPT

## 2018-10-19 PROCEDURE — 85025 COMPLETE CBC W/AUTO DIFF WBC: CPT

## 2018-10-19 PROCEDURE — 99214 OFFICE O/P EST MOD 30 MIN: CPT | Performed by: INTERNAL MEDICINE

## 2018-10-19 PROCEDURE — 84443 ASSAY THYROID STIM HORMONE: CPT

## 2018-10-19 NOTE — PATIENT INSTRUCTIONS
An [de-identified]year-old with above chronic diagnoses who was well and functional   Please recheck hemoglobin A1c and thyroid profile  Six-month follow-up  Call if problems develop

## 2018-10-19 NOTE — PROGRESS NOTES
Assessment/Plan:       Diagnoses and all orders for this visit:    Uncontrolled type 2 diabetes mellitus, without long-term current use of insulin (HCC)  -     TSH, 3rd generation with Free T4 reflex; Future  -     Hemoglobin A1C; Future  -     Basic metabolic panel; Future  -     CBC and differential; Future    Acquired hypothyroidism  -     TSH, 3rd generation with Free T4 reflex; Future  -     Hemoglobin A1C; Future  -     Basic metabolic panel; Future  -     CBC and differential; Future    Simple chronic bronchitis (HCC)    Atherosclerosis of both carotid arteries    Mixed hyperlipidemia          Patient Instructions   An [de-identified]year-old with above chronic diagnoses who was well and functional   Please recheck hemoglobin A1c and thyroid profile  Six-month follow-up  Call if problems develop  Subjective:      Patient ID: Amy Warren is a [de-identified] y o  female  A patient with chronic obstructive lung disease  Heterogeneous carotid plaque with less than 50% stenosis documented on serial ultrasounds  Sinus rhythm with PACs noted on both pulse examination and electrocardiography  Essential hypertension treated with valsartan hydrochlorothiazide    Type 2 diabetes treated with metformin and glyburide; most recent hemoglobin A1c 7 4%    Hyperlipidemia treated with atorvastatin 80 mg daily    Rhinitis noted      The patient has moderate osteoarthrosis  She takes ibuprofen 600 mg tablets intermittently; the prescription is written 3 times a day but she never takes any where near that  Refuses both colonoscopy and mammogram  Refuses further carotid studies        The following portions of the patient's history were reviewed and updated as appropriate:   She has a past medical history of Cataract; CHF (congestive heart failure) ([address] ); Leukocytosis; and Meralgia paresthetica  ,   does not have any pertinent problems on file  ,   has a past surgical history that includes Cystocele repair; Appendectomy;  Total abdominal hysterectomy; and ORIF ankle fracture (Left, 1979)  ,  family history includes No Known Problems in her father and mother  ,   reports that she has quit smoking  She has never used smokeless tobacco  She reports that she does not drink alcohol or use drugs  ,  is allergic to erythromycin and sulfa antibiotics     Current Outpatient Prescriptions   Medication Sig Dispense Refill    atorvastatin (LIPITOR) 80 mg tablet TAKE 1 TABLET EVERY DAY 90 tablet 2    COMBIVENT RESPIMAT inhaler INHALE 1 PUFF 4 TIMES DAILY AS NEEDED 1 Inhaler 3    fluticasone (FLOVENT HFA) 110 MCG/ACT inhaler Inhale      glucose blood (ONE TOUCH ULTRA TEST) test strip by In Vitro route 3 (three) times a day      glyBURIDE (DIABETA) 5 mg tablet Take 1 tablet (5 mg total) by mouth 2 (two) times a day 180 tablet 3    ibuprofen (MOTRIN) 600 mg tablet TAKE 1 TABLET (600 MG TOTAL) BY MOUTH EVERY 8 (EIGHT) HOURS AS NEEDED FOR MILD PAIN 90 tablet 0    Insulin Pen Needle (B-D UF III MINI PEN NEEDLES) 31G X 5 MM MISC by Does not apply route      levothyroxine 125 mcg tablet TAKE 1 TABLET DAILY AS DIRECTED  90 tablet 3    LORazepam (ATIVAN) 0 5 mg tablet Take by mouth      metFORMIN (GLUCOPHAGE-XR) 500 mg 24 hr tablet TAKE 2 TABLETS BY MOUTH EVERY  tablet 0    PAZEO 0 7 % SOLN INSTILL 1 DROP INTO BOTH EYES EVERY DAY  4    valsartan-hydrochlorothiazide (DIOVAN-HCT) 160-12 5 MG per tablet TAKE 1 TABLET DAILY  90 tablet 3     No current facility-administered medications for this visit  Review of Systems   Constitutional: Negative for chills and fever  HENT: Negative for sore throat and trouble swallowing  Eyes: Negative for pain  Respiratory: Positive for shortness of breath  Negative for cough and wheezing  Cardiovascular: Negative for chest pain and leg swelling  Gastrointestinal: Negative for abdominal pain, diarrhea, nausea and vomiting  Endocrine: Negative for cold intolerance and heat intolerance  Genitourinary: Negative for dysuria, frequency and pelvic pain  Musculoskeletal: Positive for arthralgias and back pain  Negative for joint swelling  Skin: Negative for rash and wound  Allergic/Immunologic: Negative for immunocompromised state  Neurological: Negative for dizziness, seizures, syncope and headaches  Psychiatric/Behavioral: Negative for dysphoric mood  The patient is not nervous/anxious  Objective:  Vitals:    10/19/18 1044   BP: 128/62   Pulse: 73   SpO2: 91%      Physical Exam   Constitutional: She is oriented to person, place, and time  She appears well-developed and well-nourished  No distress  A significantly overweight female patient who appears to be stated age but in no distress   HENT:   Head: Normocephalic and atraumatic  Eyes: Pupils are equal, round, and reactive to light  EOM are normal    Neck: Normal range of motion  Neck supple  No tracheal deviation present  No thyromegaly present  Cardiovascular: Normal rate, regular rhythm and normal heart sounds  Exam reveals no gallop  No murmur heard  Pulmonary/Chest: No respiratory distress  She has decreased breath sounds  She has no wheezes  She has rhonchi  She has no rales  Increased AP diameter  Globally diminished breath sounds  Minimal scattered rhonchi noted bilaterally through lung fields without respiratory distress   Abdominal: Soft  Bowel sounds are normal  There is no tenderness  Musculoskeletal: Normal range of motion  She exhibits no tenderness or deformity  Neurological: She is alert and oriented to person, place, and time  Coordination normal    Skin: Skin is warm  Rash noted  Brown discoloration of the lower 3rd left calf indicative of venous insufficiency and venous stasis  Some associated scaling    Right leg does not have brown discoloration; there is slight scale noted  Psychiatric: She has a normal mood and affect   Judgment normal

## 2018-10-22 ENCOUNTER — TELEPHONE (OUTPATIENT)
Dept: INTERNAL MEDICINE CLINIC | Facility: CLINIC | Age: 78
End: 2018-10-22

## 2018-10-22 DIAGNOSIS — R82.81 PYURIA: Primary | ICD-10-CM

## 2018-10-22 NOTE — TELEPHONE ENCOUNTER
----- Message from Bradley Glez MD sent at 10/22/2018  1:22 PM EDT -----  Abnormal urinalysis    Needs a culture plus kidney ultrasound

## 2018-10-26 ENCOUNTER — APPOINTMENT (OUTPATIENT)
Dept: LAB | Facility: CLINIC | Age: 78
End: 2018-10-26
Payer: MEDICARE

## 2018-10-26 DIAGNOSIS — R82.81 PYURIA: ICD-10-CM

## 2018-10-26 PROCEDURE — 87077 CULTURE AEROBIC IDENTIFY: CPT

## 2018-10-26 PROCEDURE — 87086 URINE CULTURE/COLONY COUNT: CPT

## 2018-10-26 PROCEDURE — 87186 SC STD MICRODIL/AGAR DIL: CPT

## 2018-10-29 LAB — BACTERIA UR CULT: ABNORMAL

## 2018-10-30 ENCOUNTER — TELEPHONE (OUTPATIENT)
Dept: INTERNAL MEDICINE CLINIC | Facility: CLINIC | Age: 78
End: 2018-10-30

## 2018-10-30 NOTE — TELEPHONE ENCOUNTER
----- Message from Maximino Piedra MD sent at 10/30/2018  7:40 AM EDT -----  Please call the patient to find out if she is having any symptoms that suggest a UTI

## 2018-11-07 ENCOUNTER — HOSPITAL ENCOUNTER (OUTPATIENT)
Dept: ULTRASOUND IMAGING | Facility: CLINIC | Age: 78
Discharge: HOME | End: 2018-11-07
Payer: MEDICARE

## 2018-11-07 DIAGNOSIS — R82.81 PYURIA: ICD-10-CM

## 2018-11-07 PROCEDURE — 76770 US EXAM ABDO BACK WALL COMP: CPT

## 2018-11-15 DIAGNOSIS — E78.5 HYPERLIPIDEMIA, UNSPECIFIED HYPERLIPIDEMIA TYPE: ICD-10-CM

## 2018-11-15 RX ORDER — ATORVASTATIN CALCIUM 80 MG/1
80 TABLET, FILM COATED ORAL DAILY
Qty: 90 TABLET | Refills: 3 | Status: SHIPPED | OUTPATIENT
Start: 2018-11-15 | End: 2020-02-06

## 2018-11-23 ENCOUNTER — TELEPHONE (OUTPATIENT)
Dept: INTERNAL MEDICINE CLINIC | Facility: CLINIC | Age: 78
End: 2018-11-23

## 2018-11-23 DIAGNOSIS — I10 ESSENTIAL HYPERTENSION: ICD-10-CM

## 2018-12-05 DIAGNOSIS — E03.9 HYPOTHYROIDISM, UNSPECIFIED TYPE: ICD-10-CM

## 2018-12-05 RX ORDER — LEVOTHYROXINE SODIUM 0.12 MG/1
125 TABLET ORAL DAILY
Qty: 90 TABLET | Refills: 3 | Status: SHIPPED | OUTPATIENT
Start: 2018-12-05 | End: 2019-04-22 | Stop reason: SDUPTHER

## 2018-12-20 DIAGNOSIS — E11.8 TYPE 2 DIABETES MELLITUS WITH COMPLICATION, UNSPECIFIED WHETHER LONG TERM INSULIN USE: ICD-10-CM

## 2018-12-20 RX ORDER — METFORMIN HYDROCHLORIDE 500 MG/1
1000 TABLET, EXTENDED RELEASE ORAL DAILY
Qty: 180 TABLET | Refills: 0 | Status: SHIPPED | OUTPATIENT
Start: 2018-12-20 | End: 2019-03-17 | Stop reason: SDUPTHER

## 2019-01-09 DIAGNOSIS — J45.909 UNCOMPLICATED ASTHMA, UNSPECIFIED ASTHMA SEVERITY, UNSPECIFIED WHETHER PERSISTENT: ICD-10-CM

## 2019-02-11 ENCOUNTER — OFFICE VISIT (OUTPATIENT)
Dept: INTERNAL MEDICINE CLINIC | Facility: CLINIC | Age: 79
End: 2019-02-11
Payer: MEDICARE

## 2019-02-11 ENCOUNTER — APPOINTMENT (OUTPATIENT)
Dept: LAB | Facility: CLINIC | Age: 79
End: 2019-02-11
Payer: MEDICARE

## 2019-02-11 VITALS
DIASTOLIC BLOOD PRESSURE: 70 MMHG | WEIGHT: 185.4 LBS | SYSTOLIC BLOOD PRESSURE: 140 MMHG | HEART RATE: 61 BPM | OXYGEN SATURATION: 93 % | BODY MASS INDEX: 31.82 KG/M2

## 2019-02-11 DIAGNOSIS — E03.9 ACQUIRED HYPOTHYROIDISM: ICD-10-CM

## 2019-02-11 DIAGNOSIS — I50.32 CHRONIC DIASTOLIC CONGESTIVE HEART FAILURE (HCC): ICD-10-CM

## 2019-02-11 DIAGNOSIS — E78.2 MIXED HYPERLIPIDEMIA: ICD-10-CM

## 2019-02-11 DIAGNOSIS — IMO0002 UNCONTROLLED TYPE 2 DIABETES MELLITUS, WITHOUT LONG-TERM CURRENT USE OF INSULIN: ICD-10-CM

## 2019-02-11 DIAGNOSIS — E11.8 TYPE 2 DIABETES MELLITUS WITH COMPLICATION, UNSPECIFIED WHETHER LONG TERM INSULIN USE: ICD-10-CM

## 2019-02-11 DIAGNOSIS — J41.0 SIMPLE CHRONIC BRONCHITIS (HCC): Primary | ICD-10-CM

## 2019-02-11 PROBLEM — I50.9 CHF (CONGESTIVE HEART FAILURE) (HCC): Status: ACTIVE | Noted: 2019-02-11

## 2019-02-11 LAB
ALBUMIN SERPL BCP-MCNC: 3.4 G/DL (ref 3.5–5)
ALP SERPL-CCNC: 91 U/L (ref 46–116)
ALT SERPL W P-5'-P-CCNC: 24 U/L (ref 12–78)
ANION GAP SERPL CALCULATED.3IONS-SCNC: 6 MMOL/L (ref 4–13)
ANISOCYTOSIS BLD QL SMEAR: PRESENT
AST SERPL W P-5'-P-CCNC: 12 U/L (ref 5–45)
BASOPHILS # BLD MANUAL: 0 THOUSAND/UL (ref 0–0.1)
BASOPHILS NFR MAR MANUAL: 0 % (ref 0–1)
BILIRUB SERPL-MCNC: 0.36 MG/DL (ref 0.2–1)
BUN SERPL-MCNC: 37 MG/DL (ref 5–25)
CALCIUM SERPL-MCNC: 9.7 MG/DL (ref 8.3–10.1)
CHLORIDE SERPL-SCNC: 106 MMOL/L (ref 100–108)
CO2 SERPL-SCNC: 26 MMOL/L (ref 21–32)
CREAT SERPL-MCNC: 1.3 MG/DL (ref 0.6–1.3)
EOSINOPHIL # BLD MANUAL: 0.2 THOUSAND/UL (ref 0–0.4)
EOSINOPHIL NFR BLD MANUAL: 1 % (ref 0–6)
ERYTHROCYTE [DISTWIDTH] IN BLOOD BY AUTOMATED COUNT: 14.6 % (ref 11.6–15.1)
EST. AVERAGE GLUCOSE BLD GHB EST-MCNC: 166 MG/DL
GFR SERPL CREATININE-BSD FRML MDRD: 39 ML/MIN/1.73SQ M
GLUCOSE SERPL-MCNC: 139 MG/DL (ref 65–140)
HBA1C MFR BLD: 7.4 % (ref 4.2–6.3)
HCT VFR BLD AUTO: 41.7 % (ref 34.8–46.1)
HGB BLD-MCNC: 13.5 G/DL (ref 11.5–15.4)
LYMPHOCYTES # BLD AUTO: 0.78 THOUSAND/UL (ref 0.6–4.47)
LYMPHOCYTES # BLD AUTO: 4 % (ref 14–44)
MCH RBC QN AUTO: 27.9 PG (ref 26.8–34.3)
MCHC RBC AUTO-ENTMCNC: 32.4 G/DL (ref 31.4–37.4)
MCV RBC AUTO: 86 FL (ref 82–98)
MICROCYTES BLD QL AUTO: PRESENT
MONOCYTES # BLD AUTO: 1.37 THOUSAND/UL (ref 0–1.22)
MONOCYTES NFR BLD: 7 % (ref 4–12)
NEUTROPHILS # BLD MANUAL: 15.84 THOUSAND/UL (ref 1.85–7.62)
NEUTS SEG NFR BLD AUTO: 81 % (ref 43–75)
NRBC BLD AUTO-RTO: 0 /100 WBCS
NT-PROBNP SERPL-MCNC: 999 PG/ML
PLATELET # BLD AUTO: 387 THOUSANDS/UL (ref 149–390)
PLATELET BLD QL SMEAR: ABNORMAL
PMV BLD AUTO: 10.4 FL (ref 8.9–12.7)
POIKILOCYTOSIS BLD QL SMEAR: PRESENT
POLYCHROMASIA BLD QL SMEAR: PRESENT
POTASSIUM SERPL-SCNC: 4.7 MMOL/L (ref 3.5–5.3)
PROT SERPL-MCNC: 6.7 G/DL (ref 6.4–8.2)
RBC # BLD AUTO: 4.84 MILLION/UL (ref 3.81–5.12)
RBC MORPH BLD: PRESENT
SODIUM SERPL-SCNC: 138 MMOL/L (ref 136–145)
TSH SERPL DL<=0.05 MIU/L-ACNC: 0.42 UIU/ML (ref 0.36–3.74)
VARIANT LYMPHS # BLD AUTO: 7 %
WBC # BLD AUTO: 19.55 THOUSAND/UL (ref 4.31–10.16)

## 2019-02-11 PROCEDURE — 36415 COLL VENOUS BLD VENIPUNCTURE: CPT

## 2019-02-11 PROCEDURE — 84443 ASSAY THYROID STIM HORMONE: CPT

## 2019-02-11 PROCEDURE — 80053 COMPREHEN METABOLIC PANEL: CPT

## 2019-02-11 PROCEDURE — 99213 OFFICE O/P EST LOW 20 MIN: CPT | Performed by: INTERNAL MEDICINE

## 2019-02-11 PROCEDURE — 83036 HEMOGLOBIN GLYCOSYLATED A1C: CPT

## 2019-02-11 PROCEDURE — 83880 ASSAY OF NATRIURETIC PEPTIDE: CPT

## 2019-02-11 PROCEDURE — 85007 BL SMEAR W/DIFF WBC COUNT: CPT

## 2019-02-11 PROCEDURE — 85027 COMPLETE CBC AUTOMATED: CPT

## 2019-02-11 RX ORDER — IPRATROPIUM BROMIDE AND ALBUTEROL SULFATE 2.5; .5 MG/3ML; MG/3ML
3 SOLUTION RESPIRATORY (INHALATION) 4 TIMES DAILY
Qty: 120 VIAL | Refills: 3 | Status: SHIPPED | OUTPATIENT
Start: 2019-02-11 | End: 2019-12-16 | Stop reason: SDUPTHER

## 2019-02-11 RX ORDER — BUDESONIDE 0.25 MG/2ML
0.25 INHALANT ORAL 2 TIMES DAILY
Qty: 125 VIAL | Refills: 3 | Status: SHIPPED | OUTPATIENT
Start: 2019-02-11 | End: 2019-03-01 | Stop reason: SDUPTHER

## 2019-02-11 RX ORDER — IPRATROPIUM BROMIDE AND ALBUTEROL SULFATE 2.5; .5 MG/3ML; MG/3ML
3 SOLUTION RESPIRATORY (INHALATION) 4 TIMES DAILY
Qty: 120 VIAL | Refills: 3 | Status: SHIPPED | OUTPATIENT
Start: 2019-02-11 | End: 2019-03-01 | Stop reason: CLARIF

## 2019-02-11 NOTE — PROGRESS NOTES
Assessment/Plan:       Diagnoses and all orders for this visit:    Simple chronic bronchitis ([facility] )    Type 2 diabetes mellitus with complication, unspecified whether long term insulin use (HCC)  -     CBC and differential; Future  -     Hemoglobin A1C; Future  -     Comprehensive metabolic panel; Future  -     TSH, 3rd generation with Free T4 reflex; Future  -     Urinalysis with reflex to microscopic  -     Echo complete with contrast if indicated; Future  -     NT-BNP PRO; Future    Uncontrolled type 2 diabetes mellitus, without long-term current use of insulin (HCC)    Acquired hypothyroidism  -     TSH, 3rd generation with Free T4 reflex; Future    Chronic diastolic congestive heart failure ([facility] )  -     Echo complete with contrast if indicated; Future  -     NT-BNP PRO; Future    Mixed hyperlipidemia          There are no Patient Instructions on file for this visit  Subjective:      Patient ID: Alicia Ferraro is a [de-identified] y o  female  Follows up after admission to [facility] for exacerbation of COPD  Went to the emergency room with persistent coughing  It admitted  Treated with antibiotic and steroid  Apparently had a PE workup which was negative  Discharged to home with Zithromax and3 a taper course of steroids along with the medication nebulizer  Returns today  Still coughing but not as severe  Furosemide was discontinued and HCT 25 initiated while glyburide 5 daily was continued so was levothyroxine 125 and simvastatin 80 mg  The following portions of the patient's history were reviewed and updated as appropriate:   She has a past medical history of Cataract, CHF (congestive heart failure) ([facility] ), Leukocytosis, and Meralgia paresthetica  ,  does not have any pertinent problems on file  ,   has a past surgical history that includes Cystocele repair; Appendectomy; Total abdominal hysterectomy; and ORIF ankle fracture (Left, 1979)  ,  family history includes No Known Problems in her father and mother  ,   reports that she has quit smoking  She has never used smokeless tobacco  She reports that she does not drink alcohol or use drugs  ,  is allergic to erythromycin and sulfa antibiotics     Current Outpatient Medications   Medication Sig Dispense Refill    atorvastatin (LIPITOR) 80 mg tablet Take 1 tablet (80 mg total) by mouth daily 90 tablet 3    fluticasone (FLOVENT HFA) 110 MCG/ACT inhaler Inhale      glucose blood (ONE TOUCH ULTRA TEST) test strip by In Vitro route 3 (three) times a day      glyBURIDE (DIABETA) 5 mg tablet Take 1 tablet (5 mg total) by mouth 2 (two) times a day 180 tablet 3    ibuprofen (MOTRIN) 600 mg tablet TAKE 1 TABLET (600 MG TOTAL) BY MOUTH EVERY 8 (EIGHT) HOURS AS NEEDED FOR MILD PAIN 90 tablet 0    Insulin Pen Needle (B-D UF III MINI PEN NEEDLES) 31G X 5 MM MISC by Does not apply route      ipratropium-albuterol (COMBIVENT RESPIMAT) inhaler Inhale 1 puff 3 (three) times a day 1 Inhaler 5    levothyroxine 125 mcg tablet Take 1 tablet (125 mcg total) by mouth daily 90 tablet 3    LORazepam (ATIVAN) 0 5 mg tablet Take by mouth      metFORMIN (GLUCOPHAGE-XR) 500 mg 24 hr tablet Take 2 tablets (1,000 mg total) by mouth daily 180 tablet 0    PAZEO 0 7 % SOLN INSTILL 1 DROP INTO BOTH EYES EVERY DAY  4    valsartan-hydrochlorothiazide (DIOVAN-HCT) 160-12 5 MG per tablet TAKE 1 TABLET DAILY  90 tablet 3     No current facility-administered medications for this visit  Review of Systems      Objective:  Vitals:    02/11/19 1510   BP: 140/70   Pulse: 61   SpO2: 93%      Physical Exam   Constitutional: She is oriented to person, place, and time  She appears well-developed and well-nourished  HENT:   Head: Normocephalic and atraumatic  Eyes: Pupils are equal, round, and reactive to light  EOM are normal    Neck: Normal range of motion  Neck supple  No tracheal deviation present  No thyromegaly present     Cardiovascular: Normal rate, regular rhythm and normal heart sounds  Exam reveals no gallop  No murmur heard  Pulmonary/Chest: No accessory muscle usage  No tachypnea  No respiratory distress  She has decreased breath sounds  She has no wheezes  She has rhonchi in the right lower field  She has rales in the right lower field  Abdominal: Soft  Bowel sounds are normal  There is no tenderness  Musculoskeletal: Normal range of motion  She exhibits no tenderness or deformity  Neurological: She is alert and oriented to person, place, and time  Coordination normal    Skin: Skin is warm  Psychiatric: She has a normal mood and affect   Judgment normal

## 2019-02-11 NOTE — PATIENT INSTRUCTIONS
I have sent in 2 different prescriptions tear pharmacy  They will be 3 medicines to go and a nebulizer as 2 different vials mixed together    Ipratropium and albuterol together in 1 vial 4 times a day  Budesonide as a vial in the nebulizer but this 1 only twice a day    See me back here again in 48 hours for recheck of your breathing status

## 2019-02-14 ENCOUNTER — APPOINTMENT (OUTPATIENT)
Dept: LAB | Facility: CLINIC | Age: 79
End: 2019-02-14
Payer: MEDICARE

## 2019-02-14 ENCOUNTER — OFFICE VISIT (OUTPATIENT)
Dept: INTERNAL MEDICINE CLINIC | Facility: CLINIC | Age: 79
End: 2019-02-14
Payer: MEDICARE

## 2019-02-14 VITALS
HEIGHT: 64 IN | BODY MASS INDEX: 30.87 KG/M2 | TEMPERATURE: 97.7 F | OXYGEN SATURATION: 91 % | DIASTOLIC BLOOD PRESSURE: 72 MMHG | WEIGHT: 180.8 LBS | HEART RATE: 75 BPM | SYSTOLIC BLOOD PRESSURE: 132 MMHG

## 2019-02-14 DIAGNOSIS — E11.8 TYPE 2 DIABETES MELLITUS WITH COMPLICATION, UNSPECIFIED WHETHER LONG TERM INSULIN USE: ICD-10-CM

## 2019-02-14 DIAGNOSIS — I10 ESSENTIAL HYPERTENSION: ICD-10-CM

## 2019-02-14 DIAGNOSIS — J41.0 SIMPLE CHRONIC BRONCHITIS (HCC): ICD-10-CM

## 2019-02-14 DIAGNOSIS — I50.32 CHRONIC DIASTOLIC CONGESTIVE HEART FAILURE (HCC): ICD-10-CM

## 2019-02-14 DIAGNOSIS — J41.0 SIMPLE CHRONIC BRONCHITIS (HCC): Primary | ICD-10-CM

## 2019-02-14 DIAGNOSIS — E78.5 HYPERLIPIDEMIA, UNSPECIFIED HYPERLIPIDEMIA TYPE: ICD-10-CM

## 2019-02-14 LAB
ANION GAP SERPL CALCULATED.3IONS-SCNC: 5 MMOL/L (ref 4–13)
BACTERIA UR QL AUTO: ABNORMAL /HPF
BASOPHILS # BLD AUTO: 0.05 THOUSANDS/ΜL (ref 0–0.1)
BASOPHILS NFR BLD AUTO: 0 % (ref 0–1)
BILIRUB UR QL STRIP: NEGATIVE
BUN SERPL-MCNC: 33 MG/DL (ref 5–25)
CALCIUM SERPL-MCNC: 9.6 MG/DL (ref 8.3–10.1)
CHLORIDE SERPL-SCNC: 106 MMOL/L (ref 100–108)
CLARITY UR: ABNORMAL
CO2 SERPL-SCNC: 28 MMOL/L (ref 21–32)
COLOR UR: YELLOW
CREAT SERPL-MCNC: 1.05 MG/DL (ref 0.6–1.3)
EOSINOPHIL # BLD AUTO: 0.02 THOUSAND/ΜL (ref 0–0.61)
EOSINOPHIL NFR BLD AUTO: 0 % (ref 0–6)
ERYTHROCYTE [DISTWIDTH] IN BLOOD BY AUTOMATED COUNT: 14.6 % (ref 11.6–15.1)
GFR SERPL CREATININE-BSD FRML MDRD: 50 ML/MIN/1.73SQ M
GLUCOSE P FAST SERPL-MCNC: 107 MG/DL (ref 65–99)
GLUCOSE UR STRIP-MCNC: NEGATIVE MG/DL
HCT VFR BLD AUTO: 44.2 % (ref 34.8–46.1)
HGB BLD-MCNC: 14 G/DL (ref 11.5–15.4)
HGB UR QL STRIP.AUTO: NEGATIVE
HYALINE CASTS #/AREA URNS LPF: ABNORMAL /LPF
IMM GRANULOCYTES # BLD AUTO: 0.33 THOUSAND/UL (ref 0–0.2)
IMM GRANULOCYTES NFR BLD AUTO: 1 % (ref 0–2)
KETONES UR STRIP-MCNC: NEGATIVE MG/DL
LEUKOCYTE ESTERASE UR QL STRIP: ABNORMAL
LYMPHOCYTES # BLD AUTO: 2.31 THOUSANDS/ΜL (ref 0.6–4.47)
LYMPHOCYTES NFR BLD AUTO: 9 % (ref 14–44)
MCH RBC QN AUTO: 27.6 PG (ref 26.8–34.3)
MCHC RBC AUTO-ENTMCNC: 31.7 G/DL (ref 31.4–37.4)
MCV RBC AUTO: 87 FL (ref 82–98)
MONOCYTES # BLD AUTO: 1.16 THOUSAND/ΜL (ref 0.17–1.22)
MONOCYTES NFR BLD AUTO: 4 % (ref 4–12)
NEUTROPHILS # BLD AUTO: 22.55 THOUSANDS/ΜL (ref 1.85–7.62)
NEUTS SEG NFR BLD AUTO: 86 % (ref 43–75)
NITRITE UR QL STRIP: POSITIVE
NON-SQ EPI CELLS URNS QL MICRO: ABNORMAL /HPF
NRBC BLD AUTO-RTO: 0 /100 WBCS
PH UR STRIP.AUTO: 6 [PH] (ref 4.5–8)
PLATELET # BLD AUTO: 354 THOUSANDS/UL (ref 149–390)
PMV BLD AUTO: 10.1 FL (ref 8.9–12.7)
POTASSIUM SERPL-SCNC: 4.9 MMOL/L (ref 3.5–5.3)
PROT UR STRIP-MCNC: NEGATIVE MG/DL
RBC # BLD AUTO: 5.08 MILLION/UL (ref 3.81–5.12)
RBC #/AREA URNS AUTO: ABNORMAL /HPF
SODIUM SERPL-SCNC: 139 MMOL/L (ref 136–145)
SP GR UR STRIP.AUTO: 1.02 (ref 1–1.03)
UROBILINOGEN UR QL STRIP.AUTO: 0.2 E.U./DL
WBC # BLD AUTO: 26.42 THOUSAND/UL (ref 4.31–10.16)
WBC #/AREA URNS AUTO: ABNORMAL /HPF

## 2019-02-14 PROCEDURE — 85025 COMPLETE CBC W/AUTO DIFF WBC: CPT

## 2019-02-14 PROCEDURE — 80048 BASIC METABOLIC PNL TOTAL CA: CPT

## 2019-02-14 PROCEDURE — 99214 OFFICE O/P EST MOD 30 MIN: CPT | Performed by: PHYSICIAN ASSISTANT

## 2019-02-14 PROCEDURE — 36415 COLL VENOUS BLD VENIPUNCTURE: CPT

## 2019-02-14 PROCEDURE — 81001 URINALYSIS AUTO W/SCOPE: CPT | Performed by: INTERNAL MEDICINE

## 2019-02-14 NOTE — PROGRESS NOTES
Assessment/Plan:  Still some coughing but overall she feels much better  She has lost 10 lb over the last 2 weeks  BNP is elevated  She has completed her antibiotics and steroids  Will leave her medications the same she will continue with her nebulizer and medications as listed will check chemistry CBC and follow-up in 2 weeks sooner if she worsens       Diagnoses and all orders for this visit:    Simple chronic bronchitis ([address] )  -     CBC and differential; Future  -     Basic metabolic panel; Future    Type 2 diabetes mellitus with complication, unspecified whether long term insulin use (HCC)  -     CBC and differential; Future  -     Basic metabolic panel; Future    Chronic diastolic congestive heart failure (HCC)  -     CBC and differential; Future  -     Basic metabolic panel; Future    Essential hypertension  -     CBC and differential; Future  -     Basic metabolic panel; Future    Hyperlipidemia, unspecified hyperlipidemia type        No problem-specific Assessment & Plan notes found for this encounter  Subjective:      Patient ID: Sandra Rae is a [de-identified] y o  female  She was hospitalized 2 weeks ago because of shortness of breath and weakness  She was treated for decompensated COPD and cor pulmonale  She was seen 4 days ago her diuretic was decreased  She is still coughing sometimes she feels like the nebulizer makes her cough more  She has a irritated tickly cough during the day which sounds wet very little phlegm production no fever chest pain palpitations dizziness  She denies orthopnea or PND  Known history of COPD her shortness of breath has been stable  BNP was 900 her echo is pending to be done at the end of March she would rather not move that appointment sooner        The following portions of the patient's history were reviewed and updated as appropriate:   She has a past medical history of Cataract, CHF (congestive heart failure) ([facility] ), Leukocytosis, and Meralgia paresthetica  ,  does not have any pertinent problems on file  ,   has a past surgical history that includes Cystocele repair; Appendectomy; Total abdominal hysterectomy; and ORIF ankle fracture (Left, 1979)  ,  family history includes No Known Problems in her father and mother  ,   reports that she has quit smoking  She has never used smokeless tobacco  She reports that she does not drink alcohol or use drugs  ,  is allergic to erythromycin and sulfa antibiotics     Current Outpatient Medications   Medication Sig Dispense Refill    atorvastatin (LIPITOR) 80 mg tablet Take 1 tablet (80 mg total) by mouth daily 90 tablet 3    budesonide (PULMICORT) 0 25 mg/2 mL nebulizer solution Take 1 vial (0 25 mg total) by nebulization 2 (two) times a day Rinse mouth after use   125 vial 3    fluticasone (FLOVENT HFA) 110 MCG/ACT inhaler Inhale      glucose blood (ONE TOUCH ULTRA TEST) test strip by In Vitro route 3 (three) times a day      glyBURIDE (DIABETA) 5 mg tablet Take 1 tablet (5 mg total) by mouth 2 (two) times a day 180 tablet 3    ibuprofen (MOTRIN) 600 mg tablet TAKE 1 TABLET (600 MG TOTAL) BY MOUTH EVERY 8 (EIGHT) HOURS AS NEEDED FOR MILD PAIN 90 tablet 0    Insulin Pen Needle (B-D UF III MINI PEN NEEDLES) 31G X 5 MM MISC by Does not apply route      ipratropium-albuterol (DUO-NEB) 0 5-2 5 mg/3 mL nebulizer solution Take 1 vial (3 mL total) by nebulization 4 (four) times a day 120 vial 3    ipratropium-albuterol (DUO-NEB) 0 5-2 5 mg/3 mL nebulizer solution Take 1 vial (3 mL total) by nebulization 4 (four) times a day 120 vial 3    levothyroxine 125 mcg tablet Take 1 tablet (125 mcg total) by mouth daily 90 tablet 3    LORazepam (ATIVAN) 0 5 mg tablet Take by mouth      metFORMIN (GLUCOPHAGE-XR) 500 mg 24 hr tablet Take 2 tablets (1,000 mg total) by mouth daily 180 tablet 0    PAZEO 0 7 % SOLN INSTILL 1 DROP INTO BOTH EYES EVERY DAY  4    valsartan-hydrochlorothiazide (DIOVAN-HCT) 160-12 5 MG per tablet TAKE 1 TABLET DAILY  90 tablet 3     No current facility-administered medications for this visit  Review of Systems   Constitutional: Negative for activity change, appetite change, chills, diaphoresis, fatigue, fever and unexpected weight change  HENT: Negative for congestion, dental problem, drooling, ear discharge, ear pain, facial swelling, hearing loss, nosebleeds, postnasal drip, rhinorrhea, sinus pressure, sinus pain, sneezing, sore throat, tinnitus, trouble swallowing and voice change  Eyes: Negative for photophobia, pain, discharge, redness, itching and visual disturbance  Respiratory: Positive for choking  Negative for apnea, cough, chest tightness, shortness of breath, wheezing and stridor  Cardiovascular: Negative for chest pain, palpitations and leg swelling  Gastrointestinal: Negative for abdominal distention, abdominal pain, anal bleeding, blood in stool, constipation, diarrhea, nausea, rectal pain and vomiting  Endocrine: Negative for cold intolerance, heat intolerance, polydipsia, polyphagia and polyuria  Genitourinary: Negative for decreased urine volume, difficulty urinating, dysuria, enuresis, flank pain, frequency, genital sores, hematuria and urgency  Musculoskeletal: Negative for arthralgias, back pain, gait problem, joint swelling, myalgias, neck pain and neck stiffness  Skin: Negative for color change, pallor, rash and wound  Neurological: Negative for dizziness, tremors, seizures, syncope, facial asymmetry, speech difficulty, weakness, light-headedness, numbness and headaches  Hematological: Negative for adenopathy  Does not bruise/bleed easily  Psychiatric/Behavioral: Negative for agitation, behavioral problems, confusion, decreased concentration, dysphoric mood, hallucinations, self-injury, sleep disturbance and suicidal ideas  The patient is not nervous/anxious and is not hyperactive            Objective:  Vitals:    02/14/19 1253   BP: 132/72   BP Location: Left arm   Patient Position: Sitting   Pulse: 75   Temp: 97 7 °F (36 5 °C)   TempSrc: Oral   SpO2: 91%   Weight: 82 kg (180 lb 12 8 oz)   Height: 5' 4" (1 626 m)     Body mass index is 31 03 kg/m²  Physical Exam   Constitutional: She is oriented to person, place, and time  She appears well-developed  HENT:   Head: Normocephalic  Right Ear: External ear normal    Left Ear: External ear normal    Mouth/Throat: Oropharynx is clear and moist    Eyes: Pupils are equal, round, and reactive to light  Conjunctivae are normal    Neck: Neck supple  No JVD present  No thyromegaly present  Cardiovascular: Normal rate, regular rhythm, normal heart sounds and intact distal pulses  Exam reveals no gallop and no friction rub  No murmur heard  Pulmonary/Chest: Effort normal and breath sounds normal  No stridor  No respiratory distress  She has no wheezes  She has no rales  She exhibits no tenderness  A wet sound a cough during the interview   Abdominal: Soft  Bowel sounds are normal    Musculoskeletal: Normal range of motion  She exhibits no edema  Neurological: She is alert and oriented to person, place, and time  She has normal reflexes  Skin: Skin is warm and dry

## 2019-03-01 ENCOUNTER — OFFICE VISIT (OUTPATIENT)
Dept: INTERNAL MEDICINE CLINIC | Facility: CLINIC | Age: 79
End: 2019-03-01
Payer: MEDICARE

## 2019-03-01 VITALS
HEART RATE: 76 BPM | BODY MASS INDEX: 31.38 KG/M2 | DIASTOLIC BLOOD PRESSURE: 70 MMHG | WEIGHT: 183.8 LBS | OXYGEN SATURATION: 96 % | SYSTOLIC BLOOD PRESSURE: 120 MMHG | TEMPERATURE: 97.9 F | HEIGHT: 64 IN

## 2019-03-01 DIAGNOSIS — I10 ESSENTIAL HYPERTENSION: ICD-10-CM

## 2019-03-01 DIAGNOSIS — J41.0 SIMPLE CHRONIC BRONCHITIS (HCC): Primary | ICD-10-CM

## 2019-03-01 DIAGNOSIS — E11.8 TYPE 2 DIABETES MELLITUS WITH COMPLICATION, UNSPECIFIED WHETHER LONG TERM INSULIN USE: ICD-10-CM

## 2019-03-01 DIAGNOSIS — E78.5 HYPERLIPIDEMIA, UNSPECIFIED HYPERLIPIDEMIA TYPE: ICD-10-CM

## 2019-03-01 DIAGNOSIS — E03.9 ACQUIRED HYPOTHYROIDISM: ICD-10-CM

## 2019-03-01 PROCEDURE — 99214 OFFICE O/P EST MOD 30 MIN: CPT | Performed by: PHYSICIAN ASSISTANT

## 2019-03-01 RX ORDER — BUDESONIDE 0.25 MG/2ML
0.25 INHALANT ORAL 2 TIMES DAILY
Qty: 125 VIAL | Refills: 3 | Status: SHIPPED | OUTPATIENT
Start: 2019-03-01 | End: 2020-01-01 | Stop reason: ALTCHOICE

## 2019-03-01 RX ORDER — VALSARTAN AND HYDROCHLOROTHIAZIDE 160; 12.5 MG/1; MG/1
1 TABLET, FILM COATED ORAL DAILY
Qty: 90 TABLET | Refills: 3 | Status: SHIPPED | OUTPATIENT
Start: 2019-03-01 | End: 2019-04-22 | Stop reason: SDUPTHER

## 2019-03-01 NOTE — PROGRESS NOTES
Assessment/Plan:  She is doing well she is back to work pulmonary status back to baseline her weight is stable  She would rather not do blood work today  Will follow up her leukocytosis and slight azotemia on her follow-up visit in April  Meanwhile will stay on the same medication and return if she worsens       Diagnoses and all orders for this visit:    [facility]  -     CBC and differential; Future  -     Basic metabolic panel; Future  -     budesonide (PULMICORT) 0 25 mg/2 mL nebulizer solution; Take 1 vial (0 25 mg total) by nebulization 2 (two) times a day Rinse mouth after use  Essential hypertension  -     valsartan-hydrochlorothiazide (DIOVAN-HCT) 160-12 5 MG per tablet; Take 1 tablet by mouth daily  -     CBC and differential; Future  -     Basic metabolic panel; Future    Type 2 diabetes mellitus with complication, unspecified whether long term insulin use (HCC)  -     CBC and differential; Future  -     Basic metabolic panel; Future    Hyperlipidemia, unspecified hyperlipidemia type  -     CBC and differential; Future  -     Basic metabolic panel; Future    Acquired hypothyroidism  -     CBC and differential; Future  -     Basic metabolic panel; Future        No problem-specific Assessment & Plan notes found for this encounter  Subjective:      Patient ID: Tiffany Hernandez is a [de-identified] y o  female     She is back for follow-up after being hospitalized with decompensated COPD bronchitis and cor pulmonale  She has feeling well she is back to work her weight is stable  She has a chronic cough back to the baseline  She is eating well sleeping well  Diabetic on oral agents controlled with medication      The following portions of the patient's history were reviewed and updated as appropriate:   She has a past medical history of Cataract, CHF (congestive heart failure) ([facility] ), Leukocytosis, and Meralgia paresthetica  ,  does not have any pertinent problems on file  ,   has a past surgical history that includes Cystocele repair; Appendectomy; Total abdominal hysterectomy; and ORIF ankle fracture (Left, 1979)  ,  family history includes No Known Problems in her father and mother  ,   reports that she quit smoking about 19 years ago  Her smoking use included cigarettes  She has a 96 00 pack-year smoking history  She has never used smokeless tobacco  She reports that she drinks about 2 4 oz of alcohol per week  She reports that she does not use drugs  ,  is allergic to erythromycin and sulfa antibiotics     Current Outpatient Medications   Medication Sig Dispense Refill    atorvastatin (LIPITOR) 80 mg tablet Take 1 tablet (80 mg total) by mouth daily 90 tablet 3    budesonide (PULMICORT) 0 25 mg/2 mL nebulizer solution Take 1 vial (0 25 mg total) by nebulization 2 (two) times a day Rinse mouth after use   125 vial 3    glucose blood (ONE TOUCH ULTRA TEST) test strip by In Vitro route 3 (three) times a day      glyBURIDE (DIABETA) 5 mg tablet Take 1 tablet (5 mg total) by mouth 2 (two) times a day 180 tablet 3    ibuprofen (MOTRIN) 600 mg tablet TAKE 1 TABLET (600 MG TOTAL) BY MOUTH EVERY 8 (EIGHT) HOURS AS NEEDED FOR MILD PAIN 90 tablet 0    ipratropium-albuterol (DUO-NEB) 0 5-2 5 mg/3 mL nebulizer solution Take 1 vial (3 mL total) by nebulization 4 (four) times a day 120 vial 3    levothyroxine 125 mcg tablet Take 1 tablet (125 mcg total) by mouth daily 90 tablet 3    metFORMIN (GLUCOPHAGE-XR) 500 mg 24 hr tablet Take 2 tablets (1,000 mg total) by mouth daily 180 tablet 0    PAZEO 0 7 % SOLN INSTILL 1 DROP INTO BOTH EYES EVERY DAY  4    valsartan-hydrochlorothiazide (DIOVAN-HCT) 160-12 5 MG per tablet Take 1 tablet by mouth daily 90 tablet 3    fluticasone (FLOVENT HFA) 110 MCG/ACT inhaler Inhale      Insulin Pen Needle (B-D UF III MINI PEN NEEDLES) 31G X 5 MM MISC by Does not apply route      LORazepam (ATIVAN) 0 5 mg tablet Take by mouth       No current facility-administered medications for this visit  Review of Systems   Constitutional: Negative for activity change, appetite change, chills, diaphoresis, fatigue, fever and unexpected weight change  HENT: Negative for congestion, dental problem, drooling, ear discharge, ear pain, facial swelling, hearing loss, nosebleeds, postnasal drip, rhinorrhea, sinus pressure, sinus pain, sneezing, sore throat, tinnitus, trouble swallowing and voice change  Eyes: Negative for photophobia, pain, discharge, redness, itching and visual disturbance  Respiratory: Positive for cough  Negative for apnea, choking, chest tightness, shortness of breath, wheezing and stridor  Cardiovascular: Negative for chest pain, palpitations and leg swelling  Gastrointestinal: Negative for abdominal distention, abdominal pain, anal bleeding, blood in stool, constipation, diarrhea, nausea, rectal pain and vomiting  Endocrine: Negative for cold intolerance, heat intolerance, polydipsia, polyphagia and polyuria  Genitourinary: Negative for decreased urine volume, difficulty urinating, dysuria, enuresis, flank pain, frequency, genital sores, hematuria and urgency  Musculoskeletal: Negative for arthralgias, back pain, gait problem, joint swelling, myalgias, neck pain and neck stiffness  Skin: Negative for color change, pallor, rash and wound  Neurological: Negative for dizziness, tremors, seizures, syncope, facial asymmetry, speech difficulty, weakness, light-headedness, numbness and headaches  Hematological: Negative for adenopathy  Does not bruise/bleed easily  Psychiatric/Behavioral: Negative for agitation, behavioral problems, confusion, decreased concentration, dysphoric mood, hallucinations, self-injury, sleep disturbance and suicidal ideas  The patient is not nervous/anxious and is not hyperactive            Objective:  Vitals:    03/01/19 1111   BP: 120/70   BP Location: Left arm   Patient Position: Sitting   Pulse: 76   Temp: 97 9 °F (36 6 °C)   SpO2: 96%   Weight: 83 4 kg (183 lb 12 8 oz)   Height: 5' 4" (1 626 m)     Body mass index is 31 55 kg/m²  Physical Exam   Constitutional: She is oriented to person, place, and time  She appears well-developed  HENT:   Head: Normocephalic  Right Ear: External ear normal    Left Ear: External ear normal    Mouth/Throat: Oropharynx is clear and moist    Eyes: Pupils are equal, round, and reactive to light  Conjunctivae are normal    Neck: Neck supple  No JVD present  No thyromegaly present  Cardiovascular: Normal rate, regular rhythm, normal heart sounds and intact distal pulses  Exam reveals no gallop and no friction rub  No murmur heard  Pulmonary/Chest: Effort normal  She has rhonchi  Abdominal: Soft  Bowel sounds are normal    Musculoskeletal: Normal range of motion  She exhibits no edema  Neurological: She is alert and oriented to person, place, and time  She has normal reflexes  Skin: Skin is warm and dry

## 2019-03-17 DIAGNOSIS — E11.8 TYPE 2 DIABETES MELLITUS WITH COMPLICATION, UNSPECIFIED WHETHER LONG TERM INSULIN USE: ICD-10-CM

## 2019-03-18 RX ORDER — METFORMIN HYDROCHLORIDE 500 MG/1
TABLET, EXTENDED RELEASE ORAL
Qty: 180 TABLET | Refills: 0 | Status: SHIPPED | OUTPATIENT
Start: 2019-03-18 | End: 2019-06-09 | Stop reason: SDUPTHER

## 2019-03-25 DIAGNOSIS — I10 ESSENTIAL HYPERTENSION: Primary | ICD-10-CM

## 2019-03-25 RX ORDER — HYDROCHLOROTHIAZIDE 25 MG/1
TABLET ORAL
Qty: 90 TABLET | Refills: 3 | Status: SHIPPED | OUTPATIENT
Start: 2019-03-25 | End: 2019-04-22

## 2019-03-27 ENCOUNTER — HOSPITAL ENCOUNTER (OUTPATIENT)
Dept: NON INVASIVE DIAGNOSTICS | Facility: CLINIC | Age: 79
Discharge: HOME | End: 2019-03-27
Payer: MEDICARE

## 2019-03-27 DIAGNOSIS — E11.8 TYPE 2 DIABETES MELLITUS WITH COMPLICATION, UNSPECIFIED WHETHER LONG TERM INSULIN USE: ICD-10-CM

## 2019-03-27 DIAGNOSIS — I50.32 CHRONIC DIASTOLIC CONGESTIVE HEART FAILURE (HCC): ICD-10-CM

## 2019-03-27 PROCEDURE — 93306 TTE W/DOPPLER COMPLETE: CPT

## 2019-03-27 PROCEDURE — 93306 TTE W/DOPPLER COMPLETE: CPT | Performed by: INTERNAL MEDICINE

## 2019-04-22 ENCOUNTER — OFFICE VISIT (OUTPATIENT)
Dept: INTERNAL MEDICINE CLINIC | Facility: CLINIC | Age: 79
End: 2019-04-22
Payer: MEDICARE

## 2019-04-22 VITALS
SYSTOLIC BLOOD PRESSURE: 140 MMHG | WEIGHT: 184.4 LBS | DIASTOLIC BLOOD PRESSURE: 70 MMHG | HEART RATE: 70 BPM | BODY MASS INDEX: 31.65 KG/M2 | OXYGEN SATURATION: 94 %

## 2019-04-22 DIAGNOSIS — I10 ESSENTIAL HYPERTENSION: ICD-10-CM

## 2019-04-22 DIAGNOSIS — IMO0002 UNCONTROLLED TYPE 2 DIABETES MELLITUS WITH STAGE 3 CHRONIC KIDNEY DISEASE, WITHOUT LONG-TERM CURRENT USE OF INSULIN: ICD-10-CM

## 2019-04-22 DIAGNOSIS — E03.9 HYPOTHYROIDISM, UNSPECIFIED TYPE: ICD-10-CM

## 2019-04-22 PROCEDURE — 99214 OFFICE O/P EST MOD 30 MIN: CPT | Performed by: INTERNAL MEDICINE

## 2019-04-22 RX ORDER — VALSARTAN AND HYDROCHLOROTHIAZIDE 160; 12.5 MG/1; MG/1
1 TABLET, FILM COATED ORAL DAILY
Qty: 90 TABLET | Refills: 3 | Status: SHIPPED | OUTPATIENT
Start: 2019-04-22 | End: 2020-01-01

## 2019-04-22 RX ORDER — GLYBURIDE 5 MG/1
5 TABLET ORAL 2 TIMES DAILY
Qty: 180 TABLET | Refills: 3 | Status: SHIPPED | OUTPATIENT
Start: 2019-04-22 | End: 2020-01-01

## 2019-04-22 RX ORDER — LEVOTHYROXINE SODIUM 0.12 MG/1
125 TABLET ORAL DAILY
Qty: 90 TABLET | Refills: 3 | Status: SHIPPED | OUTPATIENT
Start: 2019-04-22 | End: 2020-01-01

## 2019-06-09 DIAGNOSIS — E11.8 TYPE 2 DIABETES MELLITUS WITH COMPLICATION, UNSPECIFIED WHETHER LONG TERM INSULIN USE: ICD-10-CM

## 2019-06-10 RX ORDER — METFORMIN HYDROCHLORIDE 500 MG/1
TABLET, EXTENDED RELEASE ORAL
Qty: 180 TABLET | Refills: 0 | Status: SHIPPED | OUTPATIENT
Start: 2019-06-10 | End: 2019-09-07 | Stop reason: SDUPTHER

## 2019-06-21 DIAGNOSIS — M15.9 PRIMARY OSTEOARTHRITIS INVOLVING MULTIPLE JOINTS: ICD-10-CM

## 2019-06-22 RX ORDER — IBUPROFEN 600 MG/1
600 TABLET ORAL EVERY 8 HOURS PRN
Qty: 90 TABLET | Refills: 3 | Status: SHIPPED | OUTPATIENT
Start: 2019-06-22 | End: 2019-10-07 | Stop reason: SDUPTHER

## 2019-09-06 DIAGNOSIS — J45.909 UNCOMPLICATED ASTHMA, UNSPECIFIED ASTHMA SEVERITY, UNSPECIFIED WHETHER PERSISTENT: ICD-10-CM

## 2019-09-06 RX ORDER — IPRATROPIUM/ALBUTEROL SULFATE 20-100 MCG
MIST INHALER (GRAM) INHALATION
Qty: 1 INHALER | Refills: 1 | Status: SHIPPED | OUTPATIENT
Start: 2019-09-06 | End: 2019-11-06 | Stop reason: SDUPTHER

## 2019-09-07 DIAGNOSIS — E11.8 TYPE 2 DIABETES MELLITUS WITH COMPLICATION, UNSPECIFIED WHETHER LONG TERM INSULIN USE: ICD-10-CM

## 2019-09-09 RX ORDER — METFORMIN HYDROCHLORIDE 500 MG/1
TABLET, EXTENDED RELEASE ORAL
Qty: 180 TABLET | Refills: 0 | Status: SHIPPED | OUTPATIENT
Start: 2019-09-09 | End: 2019-09-11 | Stop reason: SDUPTHER

## 2019-09-11 DIAGNOSIS — E11.8 TYPE 2 DIABETES MELLITUS WITH COMPLICATION, UNSPECIFIED WHETHER LONG TERM INSULIN USE: ICD-10-CM

## 2019-09-11 RX ORDER — METFORMIN HYDROCHLORIDE 500 MG/1
TABLET, EXTENDED RELEASE ORAL
Qty: 180 TABLET | Refills: 0 | Status: SHIPPED | OUTPATIENT
Start: 2019-09-11 | End: 2020-01-01

## 2019-10-01 DIAGNOSIS — J45.909 UNCOMPLICATED ASTHMA, UNSPECIFIED ASTHMA SEVERITY, UNSPECIFIED WHETHER PERSISTENT: ICD-10-CM

## 2019-10-01 RX ORDER — IPRATROPIUM/ALBUTEROL SULFATE 20-100 MCG
MIST INHALER (GRAM) INHALATION
Refills: 1 | OUTPATIENT
Start: 2019-10-01

## 2019-10-07 ENCOUNTER — OFFICE VISIT (OUTPATIENT)
Dept: INTERNAL MEDICINE CLINIC | Facility: CLINIC | Age: 79
End: 2019-10-07
Payer: MEDICARE

## 2019-10-07 ENCOUNTER — APPOINTMENT (OUTPATIENT)
Dept: LAB | Facility: CLINIC | Age: 79
End: 2019-10-07
Payer: MEDICARE

## 2019-10-07 VITALS
OXYGEN SATURATION: 94 % | SYSTOLIC BLOOD PRESSURE: 138 MMHG | BODY MASS INDEX: 32.47 KG/M2 | HEIGHT: 64 IN | HEART RATE: 75 BPM | WEIGHT: 190.2 LBS | DIASTOLIC BLOOD PRESSURE: 72 MMHG

## 2019-10-07 DIAGNOSIS — E03.9 ACQUIRED HYPOTHYROIDISM: ICD-10-CM

## 2019-10-07 DIAGNOSIS — J41.0 SIMPLE CHRONIC BRONCHITIS (HCC): ICD-10-CM

## 2019-10-07 DIAGNOSIS — E78.5 HYPERLIPIDEMIA, UNSPECIFIED HYPERLIPIDEMIA TYPE: ICD-10-CM

## 2019-10-07 DIAGNOSIS — E11.8 TYPE 2 DIABETES MELLITUS WITH COMPLICATION, WITHOUT LONG-TERM CURRENT USE OF INSULIN (HCC): Primary | ICD-10-CM

## 2019-10-07 DIAGNOSIS — E11.8 TYPE 2 DIABETES MELLITUS WITH COMPLICATION (HCC): ICD-10-CM

## 2019-10-07 DIAGNOSIS — I50.32 CHRONIC DIASTOLIC CONGESTIVE HEART FAILURE (HCC): ICD-10-CM

## 2019-10-07 DIAGNOSIS — I65.23 ATHEROSCLEROSIS OF BOTH CAROTID ARTERIES: ICD-10-CM

## 2019-10-07 DIAGNOSIS — E11.8 TYPE 2 DIABETES MELLITUS WITH COMPLICATION, WITHOUT LONG-TERM CURRENT USE OF INSULIN (HCC): ICD-10-CM

## 2019-10-07 DIAGNOSIS — I10 ESSENTIAL HYPERTENSION: ICD-10-CM

## 2019-10-07 LAB
ALBUMIN SERPL BCP-MCNC: 3.7 G/DL (ref 3.5–5)
ALP SERPL-CCNC: 118 U/L (ref 46–116)
ALT SERPL W P-5'-P-CCNC: 23 U/L (ref 12–78)
ANION GAP SERPL CALCULATED.3IONS-SCNC: 3 MMOL/L (ref 4–13)
AST SERPL W P-5'-P-CCNC: 16 U/L (ref 5–45)
BACTERIA UR QL AUTO: ABNORMAL /HPF
BASOPHILS # BLD AUTO: 0.07 THOUSANDS/ΜL (ref 0–0.1)
BASOPHILS NFR BLD AUTO: 1 % (ref 0–1)
BILIRUB SERPL-MCNC: 0.56 MG/DL (ref 0.2–1)
BILIRUB UR QL STRIP: NEGATIVE
BUN SERPL-MCNC: 26 MG/DL (ref 5–25)
CALCIUM SERPL-MCNC: 10 MG/DL (ref 8.3–10.1)
CHLORIDE SERPL-SCNC: 110 MMOL/L (ref 100–108)
CHOLEST SERPL-MCNC: 147 MG/DL (ref 50–200)
CLARITY UR: ABNORMAL
CO2 SERPL-SCNC: 30 MMOL/L (ref 21–32)
COLOR UR: YELLOW
CREAT SERPL-MCNC: 0.9 MG/DL (ref 0.6–1.3)
CREAT UR-MCNC: 53.4 MG/DL
EOSINOPHIL # BLD AUTO: 0.54 THOUSAND/ΜL (ref 0–0.61)
EOSINOPHIL NFR BLD AUTO: 6 % (ref 0–6)
ERYTHROCYTE [DISTWIDTH] IN BLOOD BY AUTOMATED COUNT: 14.1 % (ref 11.6–15.1)
EST. AVERAGE GLUCOSE BLD GHB EST-MCNC: 134 MG/DL
GFR SERPL CREATININE-BSD FRML MDRD: 60 ML/MIN/1.73SQ M
GLUCOSE P FAST SERPL-MCNC: 110 MG/DL (ref 65–99)
GLUCOSE UR STRIP-MCNC: NEGATIVE MG/DL
HBA1C MFR BLD: 6.3 % (ref 4.2–6.3)
HCT VFR BLD AUTO: 39.1 % (ref 34.8–46.1)
HDLC SERPL-MCNC: 71 MG/DL (ref 40–60)
HGB BLD-MCNC: 12.1 G/DL (ref 11.5–15.4)
HGB UR QL STRIP.AUTO: NEGATIVE
HYALINE CASTS #/AREA URNS LPF: ABNORMAL /LPF
IMM GRANULOCYTES # BLD AUTO: 0.02 THOUSAND/UL (ref 0–0.2)
IMM GRANULOCYTES NFR BLD AUTO: 0 % (ref 0–2)
KETONES UR STRIP-MCNC: NEGATIVE MG/DL
LDLC SERPL CALC-MCNC: 56 MG/DL (ref 0–100)
LEUKOCYTE ESTERASE UR QL STRIP: ABNORMAL
LYMPHOCYTES # BLD AUTO: 2.37 THOUSANDS/ΜL (ref 0.6–4.47)
LYMPHOCYTES NFR BLD AUTO: 25 % (ref 14–44)
MCH RBC QN AUTO: 28.5 PG (ref 26.8–34.3)
MCHC RBC AUTO-ENTMCNC: 30.9 G/DL (ref 31.4–37.4)
MCV RBC AUTO: 92 FL (ref 82–98)
MICROALBUMIN UR-MCNC: 30.8 MG/L (ref 0–20)
MICROALBUMIN/CREAT 24H UR: 58 MG/G CREATININE (ref 0–30)
MONOCYTES # BLD AUTO: 0.61 THOUSAND/ΜL (ref 0.17–1.22)
MONOCYTES NFR BLD AUTO: 6 % (ref 4–12)
NEUTROPHILS # BLD AUTO: 5.95 THOUSANDS/ΜL (ref 1.85–7.62)
NEUTS SEG NFR BLD AUTO: 62 % (ref 43–75)
NITRITE UR QL STRIP: POSITIVE
NON-SQ EPI CELLS URNS QL MICRO: ABNORMAL /HPF
NRBC BLD AUTO-RTO: 0 /100 WBCS
PH UR STRIP.AUTO: 6 [PH]
PLATELET # BLD AUTO: 250 THOUSANDS/UL (ref 149–390)
PMV BLD AUTO: 10.9 FL (ref 8.9–12.7)
POTASSIUM SERPL-SCNC: 4.5 MMOL/L (ref 3.5–5.3)
PROT SERPL-MCNC: 6.7 G/DL (ref 6.4–8.2)
PROT UR STRIP-MCNC: NEGATIVE MG/DL
RBC # BLD AUTO: 4.25 MILLION/UL (ref 3.81–5.12)
RBC #/AREA URNS AUTO: ABNORMAL /HPF
SODIUM SERPL-SCNC: 143 MMOL/L (ref 136–145)
SP GR UR STRIP.AUTO: 1.02 (ref 1–1.03)
TRIGL SERPL-MCNC: 101 MG/DL
TSH SERPL DL<=0.05 MIU/L-ACNC: 0.45 UIU/ML (ref 0.36–3.74)
UROBILINOGEN UR QL STRIP.AUTO: 0.2 E.U./DL
WBC # BLD AUTO: 9.56 THOUSAND/UL (ref 4.31–10.16)
WBC #/AREA URNS AUTO: ABNORMAL /HPF

## 2019-10-07 PROCEDURE — 80053 COMPREHEN METABOLIC PANEL: CPT

## 2019-10-07 PROCEDURE — 85025 COMPLETE CBC W/AUTO DIFF WBC: CPT

## 2019-10-07 PROCEDURE — G0439 PPPS, SUBSEQ VISIT: HCPCS | Performed by: INTERNAL MEDICINE

## 2019-10-07 PROCEDURE — 81001 URINALYSIS AUTO W/SCOPE: CPT | Performed by: INTERNAL MEDICINE

## 2019-10-07 PROCEDURE — 83036 HEMOGLOBIN GLYCOSYLATED A1C: CPT

## 2019-10-07 PROCEDURE — 36415 COLL VENOUS BLD VENIPUNCTURE: CPT

## 2019-10-07 PROCEDURE — 82043 UR ALBUMIN QUANTITATIVE: CPT | Performed by: INTERNAL MEDICINE

## 2019-10-07 PROCEDURE — 80061 LIPID PANEL: CPT

## 2019-10-07 PROCEDURE — 84443 ASSAY THYROID STIM HORMONE: CPT

## 2019-10-07 PROCEDURE — 82570 ASSAY OF URINE CREATININE: CPT | Performed by: INTERNAL MEDICINE

## 2019-10-07 PROCEDURE — 99213 OFFICE O/P EST LOW 20 MIN: CPT | Performed by: INTERNAL MEDICINE

## 2019-10-07 NOTE — PROGRESS NOTES
Assessment and Plan:     Problem List Items Addressed This Visit     None        BMI Counseling: Body mass index is 32 65 kg/m²  Follow-up plan was not completed due to elderly patient (70 years old) where weight reduction/weight gain would complicate underlying health condition such as: illness or physical disability  Preventive health issues were discussed with patient, and age appropriate screening tests were ordered as noted in patient's After Visit Summary  Personalized health advice and appropriate referrals for health education or preventive services given if needed, as noted in patient's After Visit Summary  History of Present Illness:     Patient presents for Medicare Annual Wellness visit    Patient Care Team:  Angelica Langston MD as PCP - General     Problem List:     Patient Active Problem List   Diagnosis    Carotid artery plaque    Chronic obstructive pulmonary disease ([place] [address] )    Type 2 diabetes mellitus with complication, without long-term current use of insulin (HCC)    Hyperlipidemia    Hypothyroidism    Osteoarthrosis    CHF (congestive heart failure) ([place] [address] )      Past Medical and Surgical History:     Past Medical History:   Diagnosis Date    Cataract     CHF (congestive heart failure) ([place] [address] )     Leukocytosis      Past Surgical History:   Procedure Laterality Date    APPENDECTOMY      CYSTOCELE REPAIR      ANTERIOR COLPORRHAPHY     ORIF ANKLE FRACTURE Left 1979    TOTAL ABDOMINAL HYSTERECTOMY        Family History:     Family History   Problem Relation Age of Onset    No Known Problems Mother     No Known Problems Father       Social History:     Social History     Socioeconomic History    Marital status:       Spouse name: None    Number of children: None    Years of education: None    Highest education level: None   Occupational History    Occupation: [employer]    Social Needs    Financial resource strain: None    Food insecurity:     Worry: None Inability: None    Transportation needs:     Medical: None     Non-medical: None   Tobacco Use    Smoking status: Former Smoker     Packs/day: 2 00     Years: 48 00     Pack years: 96 00     Types: Cigarettes     Last attempt to quit: 1999     Years since quittin 0    Smokeless tobacco: Never Used   Substance and Sexual Activity    Alcohol use: Yes     Alcohol/week: 4 0 standard drinks     Types: 4 Cans of beer per week     Frequency: 2-3 times a week     Drinks per session: 1 or 2     Binge frequency: Never    Drug use: No    Sexual activity: Not Currently   Lifestyle    Physical activity:     Days per week: 3 days     Minutes per session: 150+ min    Stress: To some extent   Relationships    Social connections:     Talks on phone: None     Gets together: None     Attends Judaism service: None     Active member of club or organization: None     Attends meetings of clubs or organizations: None     Relationship status: None    Intimate partner violence:     Fear of current or ex partner: None     Emotionally abused: None     Physically abused: None     Forced sexual activity: None   Other Topics Concern    None   Social History Narrative    LIVING INDEPENDENTLY ALONE     NO ADVANCED DIRECTIVES ON FILE        Medications and Allergies:     Current Outpatient Medications   Medication Sig Dispense Refill    atorvastatin (LIPITOR) 80 mg tablet Take 1 tablet (80 mg total) by mouth daily 90 tablet 3    budesonide (PULMICORT) 0 25 mg/2 mL nebulizer solution Take 1 vial (0 25 mg total) by nebulization 2 (two) times a day Rinse mouth after use   125 vial 3    COMBIVENT RESPIMAT inhaler INHALE 1 PUFF 3 (THREE) TIMES A DAY 1 Inhaler 1    glucose blood (ONE TOUCH ULTRA TEST) test strip by In Vitro route 3 (three) times a day      glyBURIDE (DIABETA) 5 mg tablet Take 1 tablet (5 mg total) by mouth 2 (two) times a day 180 tablet 3    ibuprofen (MOTRIN) 600 mg tablet TAKE 1 TABLET (600 MG TOTAL) BY MOUTH EVERY 8 (EIGHT) HOURS AS NEEDED FOR MILD PAIN 90 tablet 0    Insulin Pen Needle (B-D UF III MINI PEN NEEDLES) 31G X 5 MM MISC by Does not apply route      levothyroxine 125 mcg tablet Take 1 tablet (125 mcg total) by mouth daily 90 tablet 3    metFORMIN (GLUCOPHAGE-XR) 500 mg 24 hr tablet TAKE 2 TABLETS BY MOUTH EVERY  tablet 0    PAZEO 0 7 % SOLN INSTILL 1 DROP INTO BOTH EYES EVERY DAY  4    valsartan-hydrochlorothiazide (DIOVAN-HCT) 160-12 5 MG per tablet Take 1 tablet by mouth daily 90 tablet 3    fluticasone (FLOVENT HFA) 110 MCG/ACT inhaler Inhale      ipratropium-albuterol (DUO-NEB) 0 5-2 5 mg/3 mL nebulizer solution Take 1 vial (3 mL total) by nebulization 4 (four) times a day (Patient not taking: Reported on 10/7/2019) 120 vial 3    LORazepam (ATIVAN) 0 5 mg tablet Take by mouth       No current facility-administered medications for this visit  Allergies   Allergen Reactions    Erythromycin     Sulfa Antibiotics       Immunizations:     Immunization History   Administered Date(s) Administered    INFLUENZA 10/27/2008, 10/19/2015    Influenza Split High Dose Preservative Free IM 10/19/2015    Influenza TIV (IM) 1938, 10/27/2008    Pneumococcal Conjugate 13-Valent 10/19/2015    Tdap 1938    Zoster 1938      Health Maintenance: There are no preventive care reminders to display for this patient  Topic Date Due    HEPATITIS B VACCINES (1 of 3 - Risk 3-dose series) [birth year]/1957    DTaP,Tdap,and Td Vaccines (1 - Tdap) 08/03/1959    Pneumococcal Vaccine: 65+ Years (2 of 2 - PPSV23) 10/19/2016    INFLUENZA VACCINE  07/01/2019      Medicare Health Risk Assessment:     /72 (BP Location: Left arm, Patient Position: Sitting, Cuff Size: Large)   Pulse 75   Ht 5' 4" (1 626 m)   Wt 86 3 kg (190 lb 3 2 oz)   SpO2 94%   BMI 32 65 kg/m²      Arnel Chavez is here for her Subsequent Wellness visit  Health Risk Assessment:   Patient rates overall health as good  Patient feels that their physical health rating is same  Eyesight was rated as same  Hearing was rated as same  Patient feels that their emotional and mental health rating is same  Pain experienced in the last 7 days has been some  Patient's pain rating has been 6/10  Patient states that she has experienced no weight loss or gain in last 6 months  Fall Risk Screening: In the past year, patient has experienced: no history of falling in past year      Urinary Incontinence Screening:   Patient has not leaked urine accidently in the last six months  Home Safety:  Patient has trouble with stairs inside or outside of their home  Patient has working smoke alarms and has no working carbon monoxide detector  Home safety hazards include: none  Nutrition:   Current diet is Diabetic and Regular  Medications:   Patient is currently taking over-the-counter supplements  OTC medications include: see medication list  Patient is able to manage medications  Activities of Daily Living (ADLs)/Instrumental Activities of Daily Living (IADLs):   Walk and transfer into and out of bed and chair?: Yes  Dress and groom yourself?: Yes    Bathe or shower yourself?: Yes    Feed yourself?  Yes  Do your laundry/housekeeping?: Yes  Manage your money, pay your bills and track your expenses?: Yes  Make your own meals?: Yes    Do your own shopping?: Yes    Previous Hospitalizations:   Any hospitalizations or ED visits within the last 12 months?: Yes    How many hospitalizations have you had in the last year?: 1-2    Hospitalization Comments: Shortness of breath    Advance Care Planning:   Living will: No    Durable POA for healthcare: No    Advanced directive: No    Advanced directive counseling given: Yes    Five wishes given: Yes    Patient declined ACP directive: Yes    End of Life Decisions reviewed with patient: Yes    Provider agrees with end of life decisions: Yes      PREVENTIVE SCREENINGS      Cardiovascular Screening: General: Screening Not Indicated and History Lipid Disorder      Diabetes Screening:     General: Screening Not Indicated and History Diabetes      Cervical Cancer Screening:    General: Screening Not Indicated      Lung Cancer Screening:     General: Screening Not Indicated      Isis Lozano MD

## 2019-10-07 NOTE — PROGRESS NOTES
Assessment/Plan:       Diagnoses and all orders for this visit:    Type 2 diabetes mellitus with complication, without long-term current use of insulin (HCC)  -     CBC and differential; Future  -     Lipid Panel with Direct LDL reflex; Future  -     Comprehensive metabolic panel; Future  -     TSH, 3rd generation with Free T4 reflex; Future  -     Urinalysis with reflex to microscopic  -     Hemoglobin A1C; Future  -     Microalbumin / creatinine urine ratio    Simple chronic bronchitis (HCC)    Acquired hypothyroidism  -     CBC and differential; Future  -     Lipid Panel with Direct LDL reflex; Future  -     Comprehensive metabolic panel; Future  -     TSH, 3rd generation with Free T4 reflex; Future  -     Urinalysis with reflex to microscopic  -     Hemoglobin A1C; Future  -     Microalbumin / creatinine urine ratio    Atherosclerosis of both carotid arteries    Chronic diastolic congestive heart failure (HCC)                Subjective:      Patient ID: Lorraine Montes De Oca is a 78 y o  female  Follow-up visit of an 40-year-old female patient who has diabetes, COPD  She feels fairly well  She does have chronic shortness of breath on exertion but she has had no recent exacerbations  She does want a Zithromax pack and a Medrol Dosepak keep in the house      refuses most "wellness "parameters  Refuses colonoscopy, mammogram, influenza vaccine  Complaint of occasional symptoms suggesting restless legs but I think in her case to treatment this still worse than disease  The following portions of the patient's history were reviewed and updated as appropriate:   She has a past medical history of Cataract, CHF (congestive heart failure) ([address] ), and Leukocytosis  ,  does not have any pertinent problems on file  ,   has a past surgical history that includes Cystocele repair; Appendectomy; Total abdominal hysterectomy; and ORIF ankle fracture (Left, 1979)  ,  family history includes No Known Problems in her father and mother  ,   reports that she quit smoking about 20 years ago  Her smoking use included cigarettes  She has a 96 00 pack-year smoking history  She has never used smokeless tobacco  She reports that she drinks about 4 0 standard drinks of alcohol per week  She reports that she does not use drugs  ,  is allergic to erythromycin and sulfa antibiotics     Current Outpatient Medications   Medication Sig Dispense Refill    atorvastatin (LIPITOR) 80 mg tablet Take 1 tablet (80 mg total) by mouth daily 90 tablet 3    budesonide (PULMICORT) 0 25 mg/2 mL nebulizer solution Take 1 vial (0 25 mg total) by nebulization 2 (two) times a day Rinse mouth after use  125 vial 3    COMBIVENT RESPIMAT inhaler INHALE 1 PUFF 3 (THREE) TIMES A DAY 1 Inhaler 1    glucose blood (ONE TOUCH ULTRA TEST) test strip by In Vitro route 3 (three) times a day      glyBURIDE (DIABETA) 5 mg tablet Take 1 tablet (5 mg total) by mouth 2 (two) times a day 180 tablet 3    ibuprofen (MOTRIN) 600 mg tablet TAKE 1 TABLET (600 MG TOTAL) BY MOUTH EVERY 8 (EIGHT) HOURS AS NEEDED FOR MILD PAIN 90 tablet 0    Insulin Pen Needle (B-D UF III MINI PEN NEEDLES) 31G X 5 MM MISC by Does not apply route      levothyroxine 125 mcg tablet Take 1 tablet (125 mcg total) by mouth daily 90 tablet 3    metFORMIN (GLUCOPHAGE-XR) 500 mg 24 hr tablet TAKE 2 TABLETS BY MOUTH EVERY  tablet 0    PAZEO 0 7 % SOLN INSTILL 1 DROP INTO BOTH EYES EVERY DAY  4    valsartan-hydrochlorothiazide (DIOVAN-HCT) 160-12 5 MG per tablet Take 1 tablet by mouth daily 90 tablet 3    fluticasone (FLOVENT HFA) 110 MCG/ACT inhaler Inhale      ipratropium-albuterol (DUO-NEB) 0 5-2 5 mg/3 mL nebulizer solution Take 1 vial (3 mL total) by nebulization 4 (four) times a day (Patient not taking: Reported on 10/7/2019) 120 vial 3    LORazepam (ATIVAN) 0 5 mg tablet Take by mouth       No current facility-administered medications for this visit          Review of Systems Constitutional: Negative for chills and fever  HENT: Negative for sore throat and trouble swallowing  Eyes: Negative for pain  Respiratory: Positive for shortness of breath  Negative for cough and wheezing  Cardiovascular: Negative for chest pain and leg swelling  Gastrointestinal: Negative for abdominal pain, diarrhea, nausea and vomiting  Endocrine: Negative for cold intolerance and heat intolerance  Genitourinary: Negative for dysuria, frequency and pelvic pain  Musculoskeletal: Positive for arthralgias and back pain  Negative for joint swelling  Skin: Negative for rash and wound  Allergic/Immunologic: Negative for immunocompromised state  Neurological: Negative for dizziness, seizures, syncope and headaches  Psychiatric/Behavioral: Negative for dysphoric mood  The patient is not nervous/anxious  Objective:  Vitals:    10/07/19 0953   BP: 138/72   Pulse: 75   SpO2: 94%      Physical Exam   Constitutional: She is oriented to person, place, and time  She appears well-developed and well-nourished  No distress  A significantly overweight female patient who appears to be stated age but in no distress   HENT:   Head: Normocephalic and atraumatic  Eyes: Pupils are equal, round, and reactive to light  EOM are normal    Neck: Normal range of motion  Neck supple  No tracheal deviation present  No thyromegaly present  Cardiovascular: Normal rate, regular rhythm and normal heart sounds  Exam reveals no gallop  No murmur heard  Pulmonary/Chest: No respiratory distress  She has decreased breath sounds  She has no wheezes  She has rhonchi  She has no rales  Increased AP diameter  Globally diminished breath sounds  Minimal scattered rhonchi noted bilaterally through lung fields without respiratory distress   Abdominal: Soft  Bowel sounds are normal  There is no tenderness  Musculoskeletal: Normal range of motion  She exhibits no tenderness or deformity     Neurological: She is alert and oriented to person, place, and time  Coordination normal    Skin: Skin is warm  Rash noted  Brown discoloration of the lower 3rd left calf indicative of venous insufficiency and venous stasis  Some associated scaling    Right leg does not have brown discoloration; there is slight scale noted  Psychiatric: She has a normal mood and affect  Judgment normal          Patient Instructions     A patient who although she appears stated age and has chronic diagnoses is at a stable baseline  Recheck A1c and other metabolic parameters  Refuses vaccines  Refuses mammogram   Refuses colon screening    Six-month follow-up

## 2019-10-07 NOTE — PATIENT INSTRUCTIONS
A patient who although she appears stated age and has chronic diagnoses is at a stable baseline  Recheck A1c and other metabolic parameters  Refuses vaccines  Refuses mammogram   Refuses colon screening    Six-month follow-up

## 2019-10-31 DIAGNOSIS — M15.9 PRIMARY OSTEOARTHRITIS INVOLVING MULTIPLE JOINTS: ICD-10-CM

## 2019-10-31 RX ORDER — IBUPROFEN 600 MG/1
600 TABLET ORAL EVERY 8 HOURS PRN
Qty: 270 TABLET | Refills: 1 | OUTPATIENT
Start: 2019-10-31

## 2019-11-05 DIAGNOSIS — J45.909 UNCOMPLICATED ASTHMA, UNSPECIFIED ASTHMA SEVERITY, UNSPECIFIED WHETHER PERSISTENT: ICD-10-CM

## 2019-11-05 RX ORDER — IPRATROPIUM/ALBUTEROL SULFATE 20-100 MCG
MIST INHALER (GRAM) INHALATION
Refills: 1 | OUTPATIENT
Start: 2019-11-05

## 2019-11-06 DIAGNOSIS — J45.909 UNCOMPLICATED ASTHMA, UNSPECIFIED ASTHMA SEVERITY, UNSPECIFIED WHETHER PERSISTENT: ICD-10-CM

## 2019-12-16 DIAGNOSIS — J41.0 SIMPLE CHRONIC BRONCHITIS (HCC): ICD-10-CM

## 2019-12-16 RX ORDER — IPRATROPIUM BROMIDE AND ALBUTEROL SULFATE 2.5; .5 MG/3ML; MG/3ML
3 SOLUTION RESPIRATORY (INHALATION) 4 TIMES DAILY
Qty: 360 ML | Refills: 3 | Status: SHIPPED | OUTPATIENT
Start: 2019-12-16 | End: 2020-01-01

## 2020-01-01 ENCOUNTER — TELEPHONE (OUTPATIENT)
Dept: PULMONOLOGY | Facility: CLINIC | Age: 80
End: 2020-01-01

## 2020-01-01 ENCOUNTER — OFFICE VISIT (OUTPATIENT)
Dept: INTERNAL MEDICINE CLINIC | Facility: CLINIC | Age: 80
End: 2020-01-01
Payer: MEDICARE

## 2020-01-01 ENCOUNTER — TELEPHONE (OUTPATIENT)
Dept: INTERNAL MEDICINE CLINIC | Facility: CLINIC | Age: 80
End: 2020-01-01

## 2020-01-01 ENCOUNTER — CONSULT (OUTPATIENT)
Dept: PULMONOLOGY | Facility: CLINIC | Age: 80
End: 2020-01-01
Payer: MEDICARE

## 2020-01-01 ENCOUNTER — APPOINTMENT (OUTPATIENT)
Dept: RADIOLOGY | Facility: CLINIC | Age: 80
End: 2020-01-01
Payer: MEDICARE

## 2020-01-01 ENCOUNTER — TELEMEDICINE (OUTPATIENT)
Dept: INTERNAL MEDICINE CLINIC | Facility: CLINIC | Age: 80
End: 2020-01-01
Payer: MEDICARE

## 2020-01-01 ENCOUNTER — LAB (OUTPATIENT)
Dept: LAB | Facility: CLINIC | Age: 80
End: 2020-01-01
Payer: MEDICARE

## 2020-01-01 ENCOUNTER — OFFICE VISIT (OUTPATIENT)
Dept: PULMONOLOGY | Facility: CLINIC | Age: 80
End: 2020-01-01
Payer: MEDICARE

## 2020-01-01 ENCOUNTER — HOSPITAL ENCOUNTER (OUTPATIENT)
Dept: NON INVASIVE DIAGNOSTICS | Facility: CLINIC | Age: 80
Discharge: HOME | End: 2020-10-27
Payer: MEDICARE

## 2020-01-01 VITALS
TEMPERATURE: 97.5 F | OXYGEN SATURATION: 93 % | WEIGHT: 184.8 LBS | HEART RATE: 84 BPM | SYSTOLIC BLOOD PRESSURE: 122 MMHG | DIASTOLIC BLOOD PRESSURE: 62 MMHG | BODY MASS INDEX: 34.01 KG/M2 | HEIGHT: 62 IN

## 2020-01-01 VITALS
WEIGHT: 182.4 LBS | BODY MASS INDEX: 33.57 KG/M2 | TEMPERATURE: 97.6 F | OXYGEN SATURATION: 92 % | HEIGHT: 62 IN | DIASTOLIC BLOOD PRESSURE: 80 MMHG | SYSTOLIC BLOOD PRESSURE: 128 MMHG | HEART RATE: 91 BPM

## 2020-01-01 VITALS
TEMPERATURE: 97.8 F | DIASTOLIC BLOOD PRESSURE: 84 MMHG | WEIGHT: 179 LBS | HEART RATE: 80 BPM | BODY MASS INDEX: 32.94 KG/M2 | HEIGHT: 62 IN | OXYGEN SATURATION: 90 % | SYSTOLIC BLOOD PRESSURE: 120 MMHG

## 2020-01-01 VITALS
DIASTOLIC BLOOD PRESSURE: 84 MMHG | BODY MASS INDEX: 32.74 KG/M2 | TEMPERATURE: 97.7 F | HEART RATE: 97 BPM | OXYGEN SATURATION: 95 % | HEIGHT: 62 IN | SYSTOLIC BLOOD PRESSURE: 120 MMHG

## 2020-01-01 VITALS
OXYGEN SATURATION: 95 % | DIASTOLIC BLOOD PRESSURE: 76 MMHG | SYSTOLIC BLOOD PRESSURE: 124 MMHG | HEART RATE: 80 BPM | HEIGHT: 62 IN | BODY MASS INDEX: 32.74 KG/M2 | TEMPERATURE: 97.5 F

## 2020-01-01 VITALS — WEIGHT: 183 LBS | BODY MASS INDEX: 31.41 KG/M2

## 2020-01-01 VITALS
DIASTOLIC BLOOD PRESSURE: 70 MMHG | BODY MASS INDEX: 35.33 KG/M2 | OXYGEN SATURATION: 97 % | WEIGHT: 192 LBS | TEMPERATURE: 98 F | SYSTOLIC BLOOD PRESSURE: 148 MMHG | HEIGHT: 62 IN | HEART RATE: 86 BPM

## 2020-01-01 DIAGNOSIS — E03.9 ACQUIRED HYPOTHYROIDISM: ICD-10-CM

## 2020-01-01 DIAGNOSIS — E78.2 MIXED HYPERLIPIDEMIA: Primary | ICD-10-CM

## 2020-01-01 DIAGNOSIS — R06.2 WHEEZING: ICD-10-CM

## 2020-01-01 DIAGNOSIS — E78.2 MIXED HYPERLIPIDEMIA: ICD-10-CM

## 2020-01-01 DIAGNOSIS — J41.0 SIMPLE CHRONIC BRONCHITIS (HCC): ICD-10-CM

## 2020-01-01 DIAGNOSIS — R09.02 HYPOXIA: ICD-10-CM

## 2020-01-01 DIAGNOSIS — E11.8 TYPE 2 DIABETES MELLITUS WITH COMPLICATION, WITHOUT LONG-TERM CURRENT USE OF INSULIN (HCC): ICD-10-CM

## 2020-01-01 DIAGNOSIS — R82.81 PYURIA: ICD-10-CM

## 2020-01-01 DIAGNOSIS — I10 ESSENTIAL HYPERTENSION: ICD-10-CM

## 2020-01-01 DIAGNOSIS — J40 BRONCHITIS: ICD-10-CM

## 2020-01-01 DIAGNOSIS — J41.0 SIMPLE CHRONIC BRONCHITIS (HCC): Primary | ICD-10-CM

## 2020-01-01 DIAGNOSIS — E03.9 HYPOTHYROIDISM, UNSPECIFIED TYPE: ICD-10-CM

## 2020-01-01 DIAGNOSIS — I50.32 CHRONIC DIASTOLIC CONGESTIVE HEART FAILURE (HCC): Primary | ICD-10-CM

## 2020-01-01 DIAGNOSIS — I70.0 ATHEROSCLEROSIS OF ABDOMINAL AORTA (HCC): ICD-10-CM

## 2020-01-01 DIAGNOSIS — E78.5 HYPERLIPIDEMIA, UNSPECIFIED HYPERLIPIDEMIA TYPE: ICD-10-CM

## 2020-01-01 DIAGNOSIS — J45.909 UNCOMPLICATED ASTHMA, UNSPECIFIED ASTHMA SEVERITY, UNSPECIFIED WHETHER PERSISTENT: ICD-10-CM

## 2020-01-01 DIAGNOSIS — J45.41 MODERATE PERSISTENT ASTHMA WITH ACUTE EXACERBATION: Primary | ICD-10-CM

## 2020-01-01 DIAGNOSIS — E11.8 TYPE 2 DIABETES MELLITUS WITH COMPLICATION, WITHOUT LONG-TERM CURRENT USE OF INSULIN (HCC): Primary | ICD-10-CM

## 2020-01-01 DIAGNOSIS — I50.32 CHRONIC DIASTOLIC CONGESTIVE HEART FAILURE (HCC): ICD-10-CM

## 2020-01-01 DIAGNOSIS — IMO0002 UNCONTROLLED TYPE 2 DIABETES MELLITUS WITH STAGE 3 CHRONIC KIDNEY DISEASE, WITHOUT LONG-TERM CURRENT USE OF INSULIN: ICD-10-CM

## 2020-01-01 DIAGNOSIS — N17.9 ACUTE RENAL FAILURE, UNSPECIFIED ACUTE RENAL FAILURE TYPE (HCC): ICD-10-CM

## 2020-01-01 DIAGNOSIS — J45.41 MODERATE PERSISTENT ASTHMA WITH ACUTE EXACERBATION: ICD-10-CM

## 2020-01-01 LAB
ANION GAP SERPL CALCULATED.3IONS-SCNC: 0 MMOL/L (ref 4–13)
ANION GAP SERPL CALCULATED.3IONS-SCNC: 2 MMOL/L (ref 4–13)
BACTERIA UR CULT: ABNORMAL
BACTERIA UR QL AUTO: ABNORMAL /HPF
BACTERIA UR QL AUTO: ABNORMAL /HPF
BASOPHILS # BLD AUTO: 0.08 THOUSANDS/ΜL (ref 0–0.1)
BASOPHILS # BLD AUTO: 0.08 THOUSANDS/ΜL (ref 0–0.1)
BASOPHILS NFR BLD AUTO: 1 % (ref 0–1)
BASOPHILS NFR BLD AUTO: 1 % (ref 0–1)
BILIRUB UR QL STRIP: NEGATIVE
BILIRUB UR QL STRIP: NEGATIVE
BUN SERPL-MCNC: 15 MG/DL (ref 5–25)
BUN SERPL-MCNC: 24 MG/DL (ref 5–25)
CALCIUM SERPL-MCNC: 10 MG/DL (ref 8.3–10.1)
CALCIUM SERPL-MCNC: 9.6 MG/DL (ref 8.3–10.1)
CHLORIDE SERPL-SCNC: 105 MMOL/L (ref 100–108)
CHLORIDE SERPL-SCNC: 110 MMOL/L (ref 100–108)
CLARITY UR: ABNORMAL
CLARITY UR: ABNORMAL
CO2 SERPL-SCNC: 32 MMOL/L (ref 21–32)
CO2 SERPL-SCNC: 33 MMOL/L (ref 21–32)
COLOR UR: YELLOW
COLOR UR: YELLOW
CREAT SERPL-MCNC: 1 MG/DL (ref 0.6–1.3)
CREAT SERPL-MCNC: 1.04 MG/DL (ref 0.6–1.3)
EOSINOPHIL # BLD AUTO: 0.78 THOUSAND/ΜL (ref 0–0.61)
EOSINOPHIL # BLD AUTO: 0.99 THOUSAND/ΜL (ref 0–0.61)
EOSINOPHIL NFR BLD AUTO: 8 % (ref 0–6)
EOSINOPHIL NFR BLD AUTO: 9 % (ref 0–6)
ERYTHROCYTE [DISTWIDTH] IN BLOOD BY AUTOMATED COUNT: 13.6 % (ref 11.6–15.1)
ERYTHROCYTE [DISTWIDTH] IN BLOOD BY AUTOMATED COUNT: 14.1 % (ref 11.6–15.1)
EST. AVERAGE GLUCOSE BLD GHB EST-MCNC: 157 MG/DL
GFR SERPL CREATININE-BSD FRML MDRD: 50 ML/MIN/1.73SQ M
GFR SERPL CREATININE-BSD FRML MDRD: 53 ML/MIN/1.73SQ M
GLUCOSE P FAST SERPL-MCNC: 158 MG/DL (ref 65–99)
GLUCOSE P FAST SERPL-MCNC: 223 MG/DL (ref 65–99)
GLUCOSE UR STRIP-MCNC: NEGATIVE MG/DL
GLUCOSE UR STRIP-MCNC: NEGATIVE MG/DL
HBA1C MFR BLD: 7.1 %
HCT VFR BLD AUTO: 43.2 % (ref 34.8–46.1)
HCT VFR BLD AUTO: 44 % (ref 34.8–46.1)
HGB BLD-MCNC: 13.2 G/DL (ref 11.5–15.4)
HGB BLD-MCNC: 13.6 G/DL (ref 11.5–15.4)
HGB UR QL STRIP.AUTO: NEGATIVE
HGB UR QL STRIP.AUTO: NEGATIVE
HYALINE CASTS #/AREA URNS LPF: ABNORMAL /LPF
HYALINE CASTS #/AREA URNS LPF: ABNORMAL /LPF
IMM GRANULOCYTES # BLD AUTO: 0.03 THOUSAND/UL (ref 0–0.2)
IMM GRANULOCYTES # BLD AUTO: 0.04 THOUSAND/UL (ref 0–0.2)
IMM GRANULOCYTES NFR BLD AUTO: 0 % (ref 0–2)
IMM GRANULOCYTES NFR BLD AUTO: 0 % (ref 0–2)
KETONES UR STRIP-MCNC: NEGATIVE MG/DL
KETONES UR STRIP-MCNC: NEGATIVE MG/DL
LEFT EYE DIABETIC RETINOPATHY: NORMAL
LEUKOCYTE ESTERASE UR QL STRIP: ABNORMAL
LEUKOCYTE ESTERASE UR QL STRIP: ABNORMAL
LYMPHOCYTES # BLD AUTO: 2.17 THOUSANDS/ΜL (ref 0.6–4.47)
LYMPHOCYTES # BLD AUTO: 3.06 THOUSANDS/ΜL (ref 0.6–4.47)
LYMPHOCYTES NFR BLD AUTO: 23 % (ref 14–44)
LYMPHOCYTES NFR BLD AUTO: 27 % (ref 14–44)
MCH RBC QN AUTO: 27.9 PG (ref 26.8–34.3)
MCH RBC QN AUTO: 28.6 PG (ref 26.8–34.3)
MCHC RBC AUTO-ENTMCNC: 30.6 G/DL (ref 31.4–37.4)
MCHC RBC AUTO-ENTMCNC: 30.9 G/DL (ref 31.4–37.4)
MCV RBC AUTO: 91 FL (ref 82–98)
MCV RBC AUTO: 93 FL (ref 82–98)
MONOCYTES # BLD AUTO: 0.46 THOUSAND/ΜL (ref 0.17–1.22)
MONOCYTES # BLD AUTO: 0.68 THOUSAND/ΜL (ref 0.17–1.22)
MONOCYTES NFR BLD AUTO: 5 % (ref 4–12)
MONOCYTES NFR BLD AUTO: 6 % (ref 4–12)
NEUTROPHILS # BLD AUTO: 5.8 THOUSANDS/ΜL (ref 1.85–7.62)
NEUTROPHILS # BLD AUTO: 6.7 THOUSANDS/ΜL (ref 1.85–7.62)
NEUTS SEG NFR BLD AUTO: 57 % (ref 43–75)
NEUTS SEG NFR BLD AUTO: 63 % (ref 43–75)
NITRITE UR QL STRIP: NEGATIVE
NITRITE UR QL STRIP: POSITIVE
NON-SQ EPI CELLS URNS QL MICRO: ABNORMAL /HPF
NON-SQ EPI CELLS URNS QL MICRO: ABNORMAL /HPF
NRBC BLD AUTO-RTO: 0 /100 WBCS
NRBC BLD AUTO-RTO: 0 /100 WBCS
NT-PROBNP SERPL-MCNC: 315 PG/ML
PH UR STRIP.AUTO: 6 [PH]
PH UR STRIP.AUTO: 7 [PH]
PLATELET # BLD AUTO: 241 THOUSANDS/UL (ref 149–390)
PLATELET # BLD AUTO: 255 THOUSANDS/UL (ref 149–390)
PMV BLD AUTO: 10.4 FL (ref 8.9–12.7)
PMV BLD AUTO: 10.7 FL (ref 8.9–12.7)
POTASSIUM SERPL-SCNC: 4.6 MMOL/L (ref 3.5–5.3)
POTASSIUM SERPL-SCNC: 5.2 MMOL/L (ref 3.5–5.3)
PROT UR STRIP-MCNC: NEGATIVE MG/DL
PROT UR STRIP-MCNC: NEGATIVE MG/DL
RBC # BLD AUTO: 4.73 MILLION/UL (ref 3.81–5.12)
RBC # BLD AUTO: 4.75 MILLION/UL (ref 3.81–5.12)
RBC #/AREA URNS AUTO: ABNORMAL /HPF
RBC #/AREA URNS AUTO: ABNORMAL /HPF
RIGHT EYE DIABETIC RETINOPATHY: NORMAL
SODIUM SERPL-SCNC: 140 MMOL/L (ref 136–145)
SODIUM SERPL-SCNC: 142 MMOL/L (ref 136–145)
SP GR UR STRIP.AUTO: 1.01 (ref 1–1.03)
SP GR UR STRIP.AUTO: 1.02 (ref 1–1.03)
TSH SERPL DL<=0.05 MIU/L-ACNC: 0.62 UIU/ML (ref 0.36–3.74)
UROBILINOGEN UR QL STRIP.AUTO: 0.2 E.U./DL
UROBILINOGEN UR QL STRIP.AUTO: 0.2 E.U./DL
WBC # BLD AUTO: 11.55 THOUSAND/UL (ref 4.31–10.16)
WBC # BLD AUTO: 9.32 THOUSAND/UL (ref 4.31–10.16)
WBC #/AREA URNS AUTO: ABNORMAL /HPF
WBC #/AREA URNS AUTO: ABNORMAL /HPF

## 2020-01-01 PROCEDURE — 99441 PR PHYS/QHP TELEPHONE EVALUATION 5-10 MIN: CPT | Performed by: INTERNAL MEDICINE

## 2020-01-01 PROCEDURE — 99204 OFFICE O/P NEW MOD 45 MIN: CPT | Performed by: INTERNAL MEDICINE

## 2020-01-01 PROCEDURE — 99214 OFFICE O/P EST MOD 30 MIN: CPT | Performed by: PHYSICIAN ASSISTANT

## 2020-01-01 PROCEDURE — 85025 COMPLETE CBC W/AUTO DIFF WBC: CPT

## 2020-01-01 PROCEDURE — 93306 TTE W/DOPPLER COMPLETE: CPT

## 2020-01-01 PROCEDURE — 36415 COLL VENOUS BLD VENIPUNCTURE: CPT

## 2020-01-01 PROCEDURE — 99214 OFFICE O/P EST MOD 30 MIN: CPT | Performed by: INTERNAL MEDICINE

## 2020-01-01 PROCEDURE — 87186 SC STD MICRODIL/AGAR DIL: CPT | Performed by: PHYSICIAN ASSISTANT

## 2020-01-01 PROCEDURE — 87077 CULTURE AEROBIC IDENTIFY: CPT | Performed by: PHYSICIAN ASSISTANT

## 2020-01-01 PROCEDURE — 80048 BASIC METABOLIC PNL TOTAL CA: CPT

## 2020-01-01 PROCEDURE — 83036 HEMOGLOBIN GLYCOSYLATED A1C: CPT

## 2020-01-01 PROCEDURE — 83880 ASSAY OF NATRIURETIC PEPTIDE: CPT

## 2020-01-01 PROCEDURE — 99213 OFFICE O/P EST LOW 20 MIN: CPT | Performed by: INTERNAL MEDICINE

## 2020-01-01 PROCEDURE — 81001 URINALYSIS AUTO W/SCOPE: CPT | Performed by: PHYSICIAN ASSISTANT

## 2020-01-01 PROCEDURE — G0439 PPPS, SUBSEQ VISIT: HCPCS | Performed by: PHYSICIAN ASSISTANT

## 2020-01-01 PROCEDURE — 93306 TTE W/DOPPLER COMPLETE: CPT | Performed by: INTERNAL MEDICINE

## 2020-01-01 PROCEDURE — 81001 URINALYSIS AUTO W/SCOPE: CPT | Performed by: INTERNAL MEDICINE

## 2020-01-01 PROCEDURE — 71046 X-RAY EXAM CHEST 2 VIEWS: CPT

## 2020-01-01 PROCEDURE — 87086 URINE CULTURE/COLONY COUNT: CPT | Performed by: PHYSICIAN ASSISTANT

## 2020-01-01 PROCEDURE — 84443 ASSAY THYROID STIM HORMONE: CPT

## 2020-01-01 RX ORDER — IPRATROPIUM BROMIDE AND ALBUTEROL SULFATE 2.5; .5 MG/3ML; MG/3ML
3 SOLUTION RESPIRATORY (INHALATION) 4 TIMES DAILY
Qty: 120 VIAL | Refills: 3 | Status: SHIPPED | OUTPATIENT
Start: 2020-01-01 | End: 2020-01-01 | Stop reason: ALTCHOICE

## 2020-01-01 RX ORDER — TORSEMIDE 10 MG/1
10 TABLET ORAL DAILY
Qty: 30 TABLET | Refills: 3 | Status: ON HOLD | OUTPATIENT
Start: 2020-01-01 | End: 2021-01-01

## 2020-01-01 RX ORDER — FLUOROMETHOLONE 2.5 MG/ML
SUSPENSION/ DROPS OPHTHALMIC
COMMUNITY
Start: 2020-03-25

## 2020-01-01 RX ORDER — IPRATROPIUM BROMIDE AND ALBUTEROL SULFATE 2.5; .5 MG/3ML; MG/3ML
3 SOLUTION RESPIRATORY (INHALATION) 4 TIMES DAILY
Qty: 360 ML | Refills: 3 | Status: SHIPPED | OUTPATIENT
Start: 2020-01-01 | End: 2020-01-01

## 2020-01-01 RX ORDER — GLYBURIDE 5 MG/1
TABLET ORAL
Qty: 60 TABLET | Refills: 11 | Status: SHIPPED | OUTPATIENT
Start: 2020-01-01 | End: 2020-01-01

## 2020-01-01 RX ORDER — ATORVASTATIN CALCIUM 80 MG/1
TABLET, FILM COATED ORAL
Qty: 90 TABLET | Refills: 0 | Status: SHIPPED | OUTPATIENT
Start: 2020-01-01 | End: 2021-01-01 | Stop reason: HOSPADM

## 2020-01-01 RX ORDER — IPRATROPIUM/ALBUTEROL SULFATE 20-100 MCG
MIST INHALER (GRAM) INHALATION
Qty: 4 G | Refills: 0 | OUTPATIENT
Start: 2020-01-01

## 2020-01-01 RX ORDER — ATORVASTATIN CALCIUM 80 MG/1
TABLET, FILM COATED ORAL
Qty: 90 TABLET | Refills: 0 | Status: SHIPPED | OUTPATIENT
Start: 2020-01-01 | End: 2020-01-01

## 2020-01-01 RX ORDER — IPRATROPIUM BROMIDE AND ALBUTEROL SULFATE 2.5; .5 MG/3ML; MG/3ML
3 SOLUTION RESPIRATORY (INHALATION)
Qty: 150 VIAL | Refills: 1 | Status: SHIPPED | OUTPATIENT
Start: 2020-01-01 | End: 2021-01-01 | Stop reason: HOSPADM

## 2020-01-01 RX ORDER — LEVOTHYROXINE SODIUM 0.12 MG/1
TABLET ORAL
Qty: 90 TABLET | Refills: 3 | Status: SHIPPED | OUTPATIENT
Start: 2020-01-01 | End: 2021-01-01 | Stop reason: HOSPADM

## 2020-01-01 RX ORDER — IPRATROPIUM/ALBUTEROL SULFATE 20-100 MCG
MIST INHALER (GRAM) INHALATION
Qty: 4 G | Refills: 0 | Status: SHIPPED | OUTPATIENT
Start: 2020-01-01 | End: 2020-01-01 | Stop reason: SDUPTHER

## 2020-01-01 RX ORDER — IPRATROPIUM/ALBUTEROL SULFATE 20-100 MCG
1 MIST INHALER (GRAM) INHALATION 3 TIMES DAILY
Qty: 4 G | Refills: 0 | Status: SHIPPED | OUTPATIENT
Start: 2020-01-01 | End: 2020-01-01

## 2020-01-01 RX ORDER — AMOXICILLIN 500 MG/1
CAPSULE ORAL
COMMUNITY
Start: 1998-10-02 | End: 2020-01-01

## 2020-01-01 RX ORDER — VALSARTAN 160 MG/1
160 TABLET ORAL DAILY
Qty: 30 TABLET | Refills: 3 | Status: SHIPPED | OUTPATIENT
Start: 2020-01-01 | End: 2021-01-01

## 2020-01-01 RX ORDER — FLUTICASONE FUROATE AND VILANTEROL 100; 25 UG/1; UG/1
1 POWDER RESPIRATORY (INHALATION) DAILY
Qty: 1 INHALER | Refills: 0 | Status: SHIPPED | COMMUNITY
Start: 2020-01-01 | End: 2021-01-01 | Stop reason: SDUPTHER

## 2020-01-01 RX ORDER — IPRATROPIUM BROMIDE AND ALBUTEROL SULFATE 2.5; .5 MG/3ML; MG/3ML
3 SOLUTION RESPIRATORY (INHALATION) 4 TIMES DAILY
Qty: 360 ML | Refills: 3 | Status: SHIPPED | OUTPATIENT
Start: 2020-01-01 | End: 2020-01-01 | Stop reason: SDUPTHER

## 2020-01-01 RX ORDER — TORSEMIDE 20 MG/1
20 TABLET ORAL DAILY
Qty: 30 TABLET | Refills: 3 | Status: SHIPPED | OUTPATIENT
Start: 2020-01-01 | End: 2020-01-01

## 2020-01-01 RX ORDER — GLYBURIDE 5 MG/1
TABLET ORAL
Qty: 60 TABLET | Refills: 11 | Status: SHIPPED | OUTPATIENT
Start: 2020-01-01 | End: 2021-01-01 | Stop reason: HOSPADM

## 2020-01-01 RX ORDER — ALBUTEROL SULFATE 90 UG/1
2 AEROSOL, METERED RESPIRATORY (INHALATION) EVERY 6 HOURS PRN
Qty: 18 G | Refills: 1 | Status: SHIPPED | OUTPATIENT
Start: 2020-01-01 | End: 2021-01-01 | Stop reason: HOSPADM

## 2020-01-01 RX ORDER — VALSARTAN AND HYDROCHLOROTHIAZIDE 160; 12.5 MG/1; MG/1
TABLET, FILM COATED ORAL
Qty: 90 TABLET | Refills: 3 | Status: SHIPPED | OUTPATIENT
Start: 2020-01-01 | End: 2020-01-01

## 2020-01-01 RX ORDER — PREDNISONE 20 MG/1
20 TABLET ORAL DAILY
Qty: 18 TABLET | Refills: 0 | Status: SHIPPED | OUTPATIENT
Start: 2020-01-01 | End: 2020-01-01 | Stop reason: SDUPTHER

## 2020-01-01 RX ORDER — CEPHALEXIN 250 MG/1
250 CAPSULE ORAL EVERY 12 HOURS SCHEDULED
Qty: 14 CAPSULE | Refills: 0 | Status: SHIPPED | OUTPATIENT
Start: 2020-01-01 | End: 2020-01-01 | Stop reason: ALTCHOICE

## 2020-01-01 RX ORDER — IPRATROPIUM BROMIDE AND ALBUTEROL SULFATE 2.5; .5 MG/3ML; MG/3ML
3 SOLUTION RESPIRATORY (INHALATION) 4 TIMES DAILY
Qty: 360 ML | Refills: 3 | Status: SHIPPED | OUTPATIENT
Start: 2020-01-01 | End: 2020-01-01 | Stop reason: ALTCHOICE

## 2020-01-01 RX ORDER — IPRATROPIUM/ALBUTEROL SULFATE 20-100 MCG
1 MIST INHALER (GRAM) INHALATION 3 TIMES DAILY
Qty: 4 G | Refills: 6 | Status: SHIPPED | OUTPATIENT
Start: 2020-01-01 | End: 2021-01-01 | Stop reason: HOSPADM

## 2020-01-01 RX ORDER — PREDNISONE 20 MG/1
20 TABLET ORAL DAILY
Qty: 18 TABLET | Refills: 0 | Status: SHIPPED | OUTPATIENT
Start: 2020-01-01 | End: 2021-01-01

## 2020-01-07 ENCOUNTER — TELEPHONE (OUTPATIENT)
Dept: INTERNAL MEDICINE CLINIC | Facility: CLINIC | Age: 80
End: 2020-01-07

## 2020-01-07 DIAGNOSIS — J45.909 UNCOMPLICATED ASTHMA, UNSPECIFIED ASTHMA SEVERITY, UNSPECIFIED WHETHER PERSISTENT: ICD-10-CM

## 2020-01-20 LAB
LEFT EYE DIABETIC RETINOPATHY: NORMAL
RIGHT EYE DIABETIC RETINOPATHY: NORMAL

## 2020-02-06 DIAGNOSIS — E78.5 HYPERLIPIDEMIA, UNSPECIFIED HYPERLIPIDEMIA TYPE: ICD-10-CM

## 2020-02-06 RX ORDER — ATORVASTATIN CALCIUM 80 MG/1
TABLET, FILM COATED ORAL
Qty: 90 TABLET | Refills: 0 | Status: SHIPPED | OUTPATIENT
Start: 2020-02-06 | End: 2020-01-01

## 2020-03-03 DIAGNOSIS — J45.909 UNCOMPLICATED ASTHMA, UNSPECIFIED ASTHMA SEVERITY, UNSPECIFIED WHETHER PERSISTENT: ICD-10-CM

## 2020-03-24 DIAGNOSIS — J45.909 UNCOMPLICATED ASTHMA, UNSPECIFIED ASTHMA SEVERITY, UNSPECIFIED WHETHER PERSISTENT: ICD-10-CM

## 2020-07-17 NOTE — TELEPHONE ENCOUNTER
ipratropium-albuterol (COMBIVENT RESPIMAT) inhaler     Please send to [facility] in [place] home     Patient has only one day left   3 month supply

## 2020-12-01 PROBLEM — I70.0 ATHEROSCLEROSIS OF ABDOMINAL AORTA (HCC): Status: ACTIVE | Noted: 2020-01-01

## 2021-01-01 ENCOUNTER — TELEPHONE (OUTPATIENT)
Dept: INTERNAL MEDICINE CLINIC | Facility: CLINIC | Age: 81
End: 2021-01-01

## 2021-01-01 ENCOUNTER — APPOINTMENT (OUTPATIENT)
Dept: LAB | Facility: CLINIC | Age: 81
End: 2021-01-01
Payer: MEDICARE

## 2021-01-01 ENCOUNTER — DOCUMENTATION (OUTPATIENT)
Dept: PULMONOLOGY | Facility: CLINIC | Age: 81
End: 2021-01-01
Payer: MEDICARE

## 2021-01-01 ENCOUNTER — APPOINTMENT (EMERGENCY)
Dept: RADIOLOGY | Facility: HOSPITAL | Age: 81
DRG: 190 | End: 2021-01-01
Payer: MEDICARE

## 2021-01-01 ENCOUNTER — HOSPITAL ENCOUNTER (OUTPATIENT)
Dept: PULMONOLOGY | Facility: HOSPITAL | Age: 81
Discharge: HOME | End: 2021-01-25
Payer: MEDICARE

## 2021-01-01 ENCOUNTER — NURSING HOME VISIT (OUTPATIENT)
Dept: GERIATRICS | Facility: OTHER | Age: 81
End: 2021-01-01
Payer: MEDICARE

## 2021-01-01 ENCOUNTER — HOSPITAL ENCOUNTER (OUTPATIENT)
Dept: RADIOLOGY | Facility: HOSPITAL | Age: 81
Discharge: HOME | End: 2021-01-29
Payer: MEDICARE

## 2021-01-01 ENCOUNTER — TRANSITIONAL CARE MANAGEMENT (OUTPATIENT)
Dept: INTERNAL MEDICINE CLINIC | Facility: CLINIC | Age: 81
End: 2021-01-01

## 2021-01-01 ENCOUNTER — TELEPHONE (OUTPATIENT)
Dept: OBGYN CLINIC | Facility: HOSPITAL | Age: 81
End: 2021-01-01

## 2021-01-01 ENCOUNTER — HOSPITAL ENCOUNTER (INPATIENT)
Facility: HOSPITAL | Age: 81
LOS: 6 days | DRG: 551 | End: 2021-02-24
Attending: EMERGENCY MEDICINE | Admitting: INTERNAL MEDICINE
Payer: MEDICARE

## 2021-01-01 ENCOUNTER — APPOINTMENT (INPATIENT)
Dept: RADIOLOGY | Facility: HOSPITAL | Age: 81
DRG: 190 | End: 2021-01-01
Payer: MEDICARE

## 2021-01-01 ENCOUNTER — OFFICE VISIT (OUTPATIENT)
Dept: INTERNAL MEDICINE CLINIC | Facility: CLINIC | Age: 81
End: 2021-01-01
Payer: MEDICARE

## 2021-01-01 ENCOUNTER — APPOINTMENT (EMERGENCY)
Dept: RADIOLOGY | Facility: HOSPITAL | Age: 81
DRG: 551 | End: 2021-01-01
Payer: MEDICARE

## 2021-01-01 ENCOUNTER — OFFICE VISIT (OUTPATIENT)
Dept: PULMONOLOGY | Facility: CLINIC | Age: 81
End: 2021-01-01
Payer: MEDICARE

## 2021-01-01 ENCOUNTER — APPOINTMENT (INPATIENT)
Dept: MRI IMAGING | Facility: HOSPITAL | Age: 81
DRG: 551 | End: 2021-01-01
Payer: MEDICARE

## 2021-01-01 ENCOUNTER — APPOINTMENT (INPATIENT)
Dept: CT IMAGING | Facility: HOSPITAL | Age: 81
DRG: 551 | End: 2021-01-01
Payer: MEDICARE

## 2021-01-01 ENCOUNTER — HOSPITAL ENCOUNTER (INPATIENT)
Facility: HOSPITAL | Age: 81
LOS: 2 days | DRG: 190 | End: 2021-03-30
Admitting: INTERNAL MEDICINE
Payer: MEDICARE

## 2021-01-01 ENCOUNTER — APPOINTMENT (INPATIENT)
Dept: NON INVASIVE DIAGNOSTICS | Facility: HOSPITAL | Age: 81
DRG: 190 | End: 2021-01-01
Payer: MEDICARE

## 2021-01-01 ENCOUNTER — TELEPHONE (OUTPATIENT)
Dept: PULMONOLOGY | Facility: CLINIC | Age: 81
End: 2021-01-01

## 2021-01-01 ENCOUNTER — OFFICE VISIT (OUTPATIENT)
Dept: CARDIOLOGY CLINIC | Facility: CLINIC | Age: 81
End: 2021-01-01
Payer: MEDICARE

## 2021-01-01 ENCOUNTER — HOSPITAL ENCOUNTER (INPATIENT)
Facility: HOSPITAL | Age: 81
LOS: 15 days | Discharge: NURSING, REHAB OR LONG-TERM CARE | DRG: 559 | End: 2021-03-11
Attending: PHYSICAL MEDICINE & REHABILITATION | Admitting: PHYSICAL MEDICINE & REHABILITATION
Payer: MEDICARE

## 2021-01-01 ENCOUNTER — APPOINTMENT (INPATIENT)
Dept: RADIOLOGY | Facility: HOSPITAL | Age: 81
DRG: 559 | End: 2021-01-01
Payer: MEDICARE

## 2021-01-01 ENCOUNTER — APPOINTMENT (EMERGENCY)
Dept: CT IMAGING | Facility: HOSPITAL | Age: 81
DRG: 551 | End: 2021-01-01
Payer: MEDICARE

## 2021-01-01 ENCOUNTER — VBI (OUTPATIENT)
Dept: ADMINISTRATIVE | Facility: OTHER | Age: 81
End: 2021-01-01

## 2021-01-01 ENCOUNTER — TELEPHONE (OUTPATIENT)
Dept: NEPHROLOGY | Facility: CLINIC | Age: 81
End: 2021-01-01

## 2021-01-01 ENCOUNTER — TELEPHONE (OUTPATIENT)
Dept: OTHER | Facility: OTHER | Age: 81
End: 2021-01-01

## 2021-01-01 VITALS
SYSTOLIC BLOOD PRESSURE: 100 MMHG | OXYGEN SATURATION: 99 % | BODY MASS INDEX: 31.43 KG/M2 | HEIGHT: 62 IN | DIASTOLIC BLOOD PRESSURE: 64 MMHG | WEIGHT: 170.8 LBS | TEMPERATURE: 97.4 F | HEART RATE: 78 BPM

## 2021-01-01 VITALS
HEIGHT: 62 IN | DIASTOLIC BLOOD PRESSURE: 51 MMHG | SYSTOLIC BLOOD PRESSURE: 106 MMHG | OXYGEN SATURATION: 93 % | RESPIRATION RATE: 20 BRPM | WEIGHT: 139.4 LBS | HEART RATE: 84 BPM | BODY MASS INDEX: 25.65 KG/M2 | TEMPERATURE: 98.2 F

## 2021-01-01 VITALS
HEART RATE: 88 BPM | OXYGEN SATURATION: 94 % | WEIGHT: 152.4 LBS | BODY MASS INDEX: 28.05 KG/M2 | TEMPERATURE: 98.4 F | RESPIRATION RATE: 19 BRPM | HEIGHT: 62 IN | DIASTOLIC BLOOD PRESSURE: 58 MMHG | SYSTOLIC BLOOD PRESSURE: 122 MMHG

## 2021-01-01 VITALS
TEMPERATURE: 97.5 F | OXYGEN SATURATION: 96 % | RESPIRATION RATE: 18 BRPM | WEIGHT: 152 LBS | HEART RATE: 80 BPM | SYSTOLIC BLOOD PRESSURE: 134 MMHG | DIASTOLIC BLOOD PRESSURE: 74 MMHG | BODY MASS INDEX: 27.8 KG/M2

## 2021-01-01 VITALS
OXYGEN SATURATION: 97 % | HEART RATE: 71 BPM | DIASTOLIC BLOOD PRESSURE: 64 MMHG | HEIGHT: 62 IN | BODY MASS INDEX: 31.47 KG/M2 | WEIGHT: 171 LBS | TEMPERATURE: 97.7 F | SYSTOLIC BLOOD PRESSURE: 124 MMHG

## 2021-01-01 VITALS
OXYGEN SATURATION: 94 % | RESPIRATION RATE: 24 BRPM | BODY MASS INDEX: 28.03 KG/M2 | DIASTOLIC BLOOD PRESSURE: 56 MMHG | SYSTOLIC BLOOD PRESSURE: 126 MMHG | HEIGHT: 62 IN | HEART RATE: 95 BPM | TEMPERATURE: 99.3 F | WEIGHT: 152.34 LBS

## 2021-01-01 VITALS
OXYGEN SATURATION: 99 % | DIASTOLIC BLOOD PRESSURE: 70 MMHG | BODY MASS INDEX: 31.28 KG/M2 | WEIGHT: 170 LBS | SYSTOLIC BLOOD PRESSURE: 122 MMHG | HEART RATE: 74 BPM | HEIGHT: 62 IN

## 2021-01-01 VITALS
SYSTOLIC BLOOD PRESSURE: 138 MMHG | BODY MASS INDEX: 27.44 KG/M2 | OXYGEN SATURATION: 96 % | DIASTOLIC BLOOD PRESSURE: 74 MMHG | TEMPERATURE: 97.2 F | WEIGHT: 150 LBS | RESPIRATION RATE: 18 BRPM | HEART RATE: 86 BPM

## 2021-01-01 VITALS
RESPIRATION RATE: 16 BRPM | OXYGEN SATURATION: 94 % | HEART RATE: 88 BPM | TEMPERATURE: 97.8 F | DIASTOLIC BLOOD PRESSURE: 64 MMHG | WEIGHT: 182.98 LBS | BODY MASS INDEX: 33.67 KG/M2 | SYSTOLIC BLOOD PRESSURE: 136 MMHG | HEIGHT: 62 IN

## 2021-01-01 DIAGNOSIS — Z23 ENCOUNTER FOR IMMUNIZATION: ICD-10-CM

## 2021-01-01 DIAGNOSIS — I50.9 CONGESTIVE HEART FAILURE, UNSPECIFIED HF CHRONICITY, UNSPECIFIED HEART FAILURE TYPE (HCC): ICD-10-CM

## 2021-01-01 DIAGNOSIS — E03.9 ACQUIRED HYPOTHYROIDISM: ICD-10-CM

## 2021-01-01 DIAGNOSIS — E87.5 HYPERKALEMIA: ICD-10-CM

## 2021-01-01 DIAGNOSIS — J44.1 COPD EXACERBATION (HCC): ICD-10-CM

## 2021-01-01 DIAGNOSIS — E11.8 TYPE 2 DIABETES MELLITUS WITH COMPLICATION, WITHOUT LONG-TERM CURRENT USE OF INSULIN (HCC): ICD-10-CM

## 2021-01-01 DIAGNOSIS — R52 ACUTE PAIN: ICD-10-CM

## 2021-01-01 DIAGNOSIS — I50.20 SYSTOLIC CONGESTIVE HEART FAILURE, UNSPECIFIED HF CHRONICITY (HCC): ICD-10-CM

## 2021-01-01 DIAGNOSIS — S22.000A COMPRESSION FRACTURE OF BODY OF THORACIC VERTEBRA (HCC): ICD-10-CM

## 2021-01-01 DIAGNOSIS — M54.6 CHRONIC BILATERAL THORACIC BACK PAIN: ICD-10-CM

## 2021-01-01 DIAGNOSIS — R21 RASH: ICD-10-CM

## 2021-01-01 DIAGNOSIS — E78.2 MIXED HYPERLIPIDEMIA: ICD-10-CM

## 2021-01-01 DIAGNOSIS — N18.31 STAGE 3A CHRONIC KIDNEY DISEASE (HCC): ICD-10-CM

## 2021-01-01 DIAGNOSIS — E11.8 TYPE 2 DIABETES MELLITUS WITH COMPLICATION, WITHOUT LONG-TERM CURRENT USE OF INSULIN (HCC): Primary | ICD-10-CM

## 2021-01-01 DIAGNOSIS — E78.5 HYPERLIPIDEMIA, UNSPECIFIED HYPERLIPIDEMIA TYPE: ICD-10-CM

## 2021-01-01 DIAGNOSIS — E44.0 MODERATE PROTEIN-CALORIE MALNUTRITION (HCC): ICD-10-CM

## 2021-01-01 DIAGNOSIS — J96.11 CHRONIC RESPIRATORY FAILURE WITH HYPOXIA (HCC): ICD-10-CM

## 2021-01-01 DIAGNOSIS — J41.0 SIMPLE CHRONIC BRONCHITIS (HCC): ICD-10-CM

## 2021-01-01 DIAGNOSIS — J96.11 CHRONIC HYPOXEMIC RESPIRATORY FAILURE (HCC): Primary | ICD-10-CM

## 2021-01-01 DIAGNOSIS — E83.52 HYPERCALCEMIA: ICD-10-CM

## 2021-01-01 DIAGNOSIS — J44.9 ASTHMA WITH COPD (HCC): ICD-10-CM

## 2021-01-01 DIAGNOSIS — S22.000A THORACIC COMPRESSION FRACTURE (HCC): ICD-10-CM

## 2021-01-01 DIAGNOSIS — R09.02 HYPOXIA: ICD-10-CM

## 2021-01-01 DIAGNOSIS — N39.0 URINARY TRACT INFECTION WITHOUT HEMATURIA, SITE UNSPECIFIED: ICD-10-CM

## 2021-01-01 DIAGNOSIS — K12.1 MOUTH ULCER: ICD-10-CM

## 2021-01-01 DIAGNOSIS — E86.0 DEHYDRATION: ICD-10-CM

## 2021-01-01 DIAGNOSIS — I48.91 RAPID ATRIAL FIBRILLATION (HCC): Primary | ICD-10-CM

## 2021-01-01 DIAGNOSIS — R06.02 SOB (SHORTNESS OF BREATH): Primary | ICD-10-CM

## 2021-01-01 DIAGNOSIS — R06.02 SOB (SHORTNESS OF BREATH): ICD-10-CM

## 2021-01-01 DIAGNOSIS — Z29.9 DVT PROPHYLAXIS: ICD-10-CM

## 2021-01-01 DIAGNOSIS — L89.90 PRESSURE ULCER: ICD-10-CM

## 2021-01-01 DIAGNOSIS — E03.9 HYPOTHYROIDISM, UNSPECIFIED TYPE: ICD-10-CM

## 2021-01-01 DIAGNOSIS — S22.050A COMPRESSION FRACTURE OF T5 VERTEBRA, INITIAL ENCOUNTER (HCC): ICD-10-CM

## 2021-01-01 DIAGNOSIS — I50.9 CHF (CONGESTIVE HEART FAILURE) (HCC): ICD-10-CM

## 2021-01-01 DIAGNOSIS — L89.93 PRESSURE INJURY, STAGE 3, UNSPECIFIED LOCATION (HCC): ICD-10-CM

## 2021-01-01 DIAGNOSIS — J44.1 COPD EXACERBATION (HCC): Primary | ICD-10-CM

## 2021-01-01 DIAGNOSIS — I50.32 CHRONIC DIASTOLIC CONGESTIVE HEART FAILURE (HCC): Primary | ICD-10-CM

## 2021-01-01 DIAGNOSIS — R06.02 SHORTNESS OF BREATH ON EXERTION: Primary | ICD-10-CM

## 2021-01-01 DIAGNOSIS — G89.29 CHRONIC BILATERAL THORACIC BACK PAIN: ICD-10-CM

## 2021-01-01 DIAGNOSIS — N17.9 AKI (ACUTE KIDNEY INJURY) (HCC): ICD-10-CM

## 2021-01-01 DIAGNOSIS — E03.9 HYPOTHYROIDISM: ICD-10-CM

## 2021-01-01 DIAGNOSIS — I50.32 CHRONIC DIASTOLIC CONGESTIVE HEART FAILURE (HCC): ICD-10-CM

## 2021-01-01 DIAGNOSIS — IMO0002 UNCONTROLLED TYPE 2 DIABETES MELLITUS WITH STAGE 3 CHRONIC KIDNEY DISEASE, WITHOUT LONG-TERM CURRENT USE OF INSULIN: ICD-10-CM

## 2021-01-01 DIAGNOSIS — E78.5 HYPERLIPIDEMIA: ICD-10-CM

## 2021-01-01 DIAGNOSIS — I51.89 DIASTOLIC DYSFUNCTION: ICD-10-CM

## 2021-01-01 DIAGNOSIS — R06.02 SHORTNESS OF BREATH ON EXERTION: ICD-10-CM

## 2021-01-01 DIAGNOSIS — R06.00 DYSPNEA: Primary | ICD-10-CM

## 2021-01-01 DIAGNOSIS — I10 ESSENTIAL HYPERTENSION: ICD-10-CM

## 2021-01-01 DIAGNOSIS — I48.91 ATRIAL FIBRILLATION WITH RAPID VENTRICULAR RESPONSE (HCC): ICD-10-CM

## 2021-01-01 DIAGNOSIS — M54.9 BACK PAIN: ICD-10-CM

## 2021-01-01 LAB
25(OH)D2 SERPL-MCNC: <1 NG/ML
25(OH)D3 SERPL-MCNC: 13 NG/ML
25(OH)D3 SERPL-MCNC: 14.6 NG/ML (ref 30–100)
25(OH)D3+25(OH)D2 SERPL-MCNC: 13 NG/ML
ACE SERPL-CCNC: 20 U/L (ref 14–82)
ALBUMIN SERPL BCP-MCNC: 2.7 G/DL (ref 3.5–5)
ALBUMIN SERPL BCP-MCNC: 2.9 G/DL (ref 3.5–5)
ALBUMIN SERPL BCP-MCNC: 3.4 G/DL (ref 3.5–5)
ALBUMIN SERPL BCP-MCNC: 3.6 G/DL (ref 3.4–4.8)
ALBUMIN SERPL ELPH-MCNC: 3.2 G/DL (ref 3.5–5)
ALBUMIN SERPL ELPH-MCNC: 58.2 % (ref 52–65)
ALBUMIN UR ELPH-MCNC: 100 %
ALP SERPL-CCNC: 133 U/L (ref 46–116)
ALP SERPL-CCNC: 184 U/L (ref 46–116)
ALP SERPL-CCNC: 189.3 U/L (ref 35–140)
ALP SERPL-CCNC: 213 U/L (ref 46–116)
ALPHA1 GLOB MFR UR ELPH: 0 %
ALPHA1 GLOB SERPL ELPH-MCNC: 0.42 G/DL (ref 0.1–0.4)
ALPHA1 GLOB SERPL ELPH-MCNC: 7.7 % (ref 2.5–5)
ALPHA2 GLOB MFR UR ELPH: 0 %
ALPHA2 GLOB SERPL ELPH-MCNC: 1.06 G/DL (ref 0.4–1.2)
ALPHA2 GLOB SERPL ELPH-MCNC: 19.2 % (ref 7–13)
ALT SERPL W P-5'-P-CCNC: 16 U/L (ref 5–54)
ALT SERPL W P-5'-P-CCNC: 26 U/L (ref 12–78)
ALT SERPL W P-5'-P-CCNC: 35 U/L (ref 12–78)
ALT SERPL W P-5'-P-CCNC: 52 U/L (ref 12–78)
ANION GAP SERPL CALCULATED.3IONS-SCNC: -1 MMOL/L (ref 4–13)
ANION GAP SERPL CALCULATED.3IONS-SCNC: -2 MMOL/L (ref 4–13)
ANION GAP SERPL CALCULATED.3IONS-SCNC: -2 MMOL/L (ref 4–13)
ANION GAP SERPL CALCULATED.3IONS-SCNC: 0 MMOL/L (ref 4–13)
ANION GAP SERPL CALCULATED.3IONS-SCNC: 0 MMOL/L (ref 4–13)
ANION GAP SERPL CALCULATED.3IONS-SCNC: 1 MMOL/L (ref 4–13)
ANION GAP SERPL CALCULATED.3IONS-SCNC: 10 MMOL/L (ref 4–13)
ANION GAP SERPL CALCULATED.3IONS-SCNC: 3 MMOL/L (ref 4–13)
ANION GAP SERPL CALCULATED.3IONS-SCNC: 3 MMOL/L (ref 4–13)
ANION GAP SERPL CALCULATED.3IONS-SCNC: 4 MMOL/L (ref 4–13)
ANION GAP SERPL CALCULATED.3IONS-SCNC: 4 MMOL/L (ref 4–13)
ANION GAP SERPL CALCULATED.3IONS-SCNC: 5 MMOL/L (ref 4–13)
ANION GAP SERPL CALCULATED.3IONS-SCNC: 5 MMOL/L (ref 4–13)
ANION GAP SERPL CALCULATED.3IONS-SCNC: 6 MMOL/L (ref 4–13)
ANION GAP SERPL CALCULATED.3IONS-SCNC: 7 MMOL/L (ref 4–13)
ANION GAP SERPL CALCULATED.3IONS-SCNC: 8 MMOL/L (ref 4–13)
ANISOCYTOSIS BLD QL SMEAR: PRESENT
APTT PPP: 34 SECONDS (ref 23–31)
ARTERIAL PATENCY WRIST A: YES
AST SERPL W P-5'-P-CCNC: 14 U/L (ref 5–45)
AST SERPL W P-5'-P-CCNC: 17 U/L (ref 15–41)
AST SERPL W P-5'-P-CCNC: 29 U/L (ref 5–45)
AST SERPL W P-5'-P-CCNC: 33 U/L (ref 5–45)
ATRIAL RATE: 83 BPM
B-GLOBULIN MFR UR ELPH: 0 %
BACTERIA UR CULT: ABNORMAL
BACTERIA UR QL AUTO: ABNORMAL /HPF
BASE EX.OXY STD BLDV CALC-SCNC: 95.2 % (ref 60–80)
BASE EX.OXY STD BLDV CALC-SCNC: 96.6 % (ref 60–80)
BASE EX.OXY STD BLDV CALC-SCNC: 96.7 % (ref 60–80)
BASE EX.OXY STD BLDV CALC-SCNC: 96.9 % (ref 60–80)
BASE EX.OXY STD BLDV CALC-SCNC: 97.3 % (ref 60–80)
BASE EXCESS BLDA CALC-SCNC: 10 MMOL/L (ref -2–3)
BASE EXCESS BLDA CALC-SCNC: 12 MMOL/L (ref -2–3)
BASE EXCESS BLDA CALC-SCNC: 3.7 MMOL/L
BASE EXCESS BLDV CALC-SCNC: 2.8 MMOL/L
BASE EXCESS BLDV CALC-SCNC: 4.2 MMOL/L
BASE EXCESS BLDV CALC-SCNC: 4.7 MMOL/L
BASE EXCESS BLDV CALC-SCNC: 5.5 MMOL/L
BASE EXCESS BLDV CALC-SCNC: 7.2 MMOL/L
BASOPHILS # BLD AUTO: 0.01 THOUSANDS/ΜL (ref 0–0.1)
BASOPHILS # BLD AUTO: 0.01 THOUSANDS/ΜL (ref 0–0.1)
BASOPHILS # BLD AUTO: 0.02 THOUSANDS/ΜL (ref 0–0.1)
BASOPHILS # BLD AUTO: 0.02 THOUSANDS/ΜL (ref 0–0.1)
BASOPHILS # BLD AUTO: 0.03 THOUSANDS/ΜL (ref 0–0.1)
BASOPHILS # BLD AUTO: 0.03 THOUSANDS/ΜL (ref 0–0.1)
BASOPHILS # BLD AUTO: 0.04 THOUSANDS/ΜL (ref 0–0.1)
BASOPHILS # BLD AUTO: 0.05 THOUSANDS/ΜL (ref 0–0.1)
BASOPHILS # BLD AUTO: 0.05 THOUSANDS/ΜL (ref 0–0.1)
BASOPHILS # BLD MANUAL: 0 THOUSAND/UL (ref 0–0.1)
BASOPHILS NFR BLD AUTO: 0 % (ref 0–1)
BASOPHILS NFR MAR MANUAL: 0 % (ref 0–1)
BETA GLOB ABNORMAL SERPL ELPH-MCNC: 0.29 G/DL (ref 0.4–0.8)
BETA1 GLOB SERPL ELPH-MCNC: 5.2 % (ref 5–13)
BETA2 GLOB SERPL ELPH-MCNC: 4.7 % (ref 2–8)
BETA2+GAMMA GLOB SERPL ELPH-MCNC: 0.26 G/DL (ref 0.2–0.5)
BILIRUB DIRECT SERPL-MCNC: 0.11 MG/DL (ref 0–0.2)
BILIRUB SERPL-MCNC: 0.3 MG/DL (ref 0.2–1)
BILIRUB SERPL-MCNC: 0.32 MG/DL (ref 0.2–1)
BILIRUB SERPL-MCNC: 0.4 MG/DL (ref 0.2–1)
BILIRUB SERPL-MCNC: 0.41 MG/DL (ref 0.3–1.2)
BILIRUB UR QL STRIP: NEGATIVE
BNP SERPL-MCNC: 431.1 PG/ML (ref 1–100)
BODY TEMPERATURE: 98.1 DEGREES FEHRENHEIT
BUN SERPL-MCNC: 25 MG/DL (ref 5–25)
BUN SERPL-MCNC: 27 MG/DL (ref 5–25)
BUN SERPL-MCNC: 28 MG/DL (ref 5–25)
BUN SERPL-MCNC: 30 MG/DL (ref 5–25)
BUN SERPL-MCNC: 33 MG/DL (ref 5–25)
BUN SERPL-MCNC: 33 MG/DL (ref 5–25)
BUN SERPL-MCNC: 35 MG/DL (ref 5–25)
BUN SERPL-MCNC: 39 MG/DL (ref 5–25)
BUN SERPL-MCNC: 41 MG/DL (ref 5–25)
BUN SERPL-MCNC: 43 MG/DL (ref 5–25)
BUN SERPL-MCNC: 45 MG/DL (ref 5–25)
BUN SERPL-MCNC: 54 MG/DL (ref 5–25)
BUN SERPL-MCNC: 56 MG/DL (ref 6–20)
BUN SERPL-MCNC: 59 MG/DL (ref 6–20)
BUN SERPL-MCNC: 61 MG/DL (ref 6–20)
BUN SERPL-MCNC: 67 MG/DL (ref 6–20)
CA-I BLD-SCNC: 1.27 MMOL/L (ref 1.12–1.32)
CA-I BLD-SCNC: 1.39 MMOL/L (ref 1.12–1.32)
CA-I BLD-SCNC: 1.42 MMOL/L (ref 1.12–1.32)
CALCIUM ALBUM COR SERPL-MCNC: 11.8 MG/DL (ref 8.3–10.1)
CALCIUM ALBUM COR SERPL-MCNC: 11.9 MG/DL (ref 8.3–10.1)
CALCIUM SERPL-MCNC: 10.2 MG/DL (ref 8.3–10.1)
CALCIUM SERPL-MCNC: 10.3 MG/DL (ref 8.3–10.1)
CALCIUM SERPL-MCNC: 10.3 MG/DL (ref 8.3–10.1)
CALCIUM SERPL-MCNC: 10.4 MG/DL (ref 8.3–10.1)
CALCIUM SERPL-MCNC: 10.6 MG/DL (ref 8.3–10.1)
CALCIUM SERPL-MCNC: 10.8 MG/DL (ref 8.3–10.1)
CALCIUM SERPL-MCNC: 10.8 MG/DL (ref 8.3–10.1)
CALCIUM SERPL-MCNC: 10.9 MG/DL (ref 8.3–10.1)
CALCIUM SERPL-MCNC: 11 MG/DL (ref 8.3–10.1)
CALCIUM SERPL-MCNC: 11.1 MG/DL (ref 8.3–10.1)
CALCIUM SERPL-MCNC: 11.3 MG/DL (ref 8.3–10.1)
CALCIUM SERPL-MCNC: 11.8 MG/DL (ref 8.3–10.1)
CALCIUM SERPL-MCNC: 13.7 MG/DL (ref 8.4–10.2)
CALCIUM SERPL-MCNC: 13.8 MG/DL (ref 8.4–10.2)
CALCIUM SERPL-MCNC: 13.8 MG/DL (ref 8.4–10.2)
CALCIUM SERPL-MCNC: 14.1 MG/DL (ref 8.4–10.2)
CHLORIDE SERPL-SCNC: 100 MMOL/L (ref 100–108)
CHLORIDE SERPL-SCNC: 100 MMOL/L (ref 96–108)
CHLORIDE SERPL-SCNC: 102 MMOL/L (ref 100–108)
CHLORIDE SERPL-SCNC: 102 MMOL/L (ref 100–108)
CHLORIDE SERPL-SCNC: 103 MMOL/L (ref 96–108)
CHLORIDE SERPL-SCNC: 103 MMOL/L (ref 96–108)
CHLORIDE SERPL-SCNC: 104 MMOL/L (ref 100–108)
CHLORIDE SERPL-SCNC: 104 MMOL/L (ref 100–108)
CHLORIDE SERPL-SCNC: 105 MMOL/L (ref 96–108)
CHLORIDE SERPL-SCNC: 94 MMOL/L (ref 100–108)
CHLORIDE SERPL-SCNC: 94 MMOL/L (ref 100–108)
CHLORIDE SERPL-SCNC: 95 MMOL/L (ref 100–108)
CHLORIDE SERPL-SCNC: 95 MMOL/L (ref 100–108)
CHLORIDE SERPL-SCNC: 96 MMOL/L (ref 100–108)
CHLORIDE SERPL-SCNC: 97 MMOL/L (ref 100–108)
CHLORIDE SERPL-SCNC: 99 MMOL/L (ref 100–108)
CHLORIDE SERPL-SCNC: 99 MMOL/L (ref 100–108)
CLARITY UR: ABNORMAL
CO2 SERPL-SCNC: 31 MMOL/L (ref 21–32)
CO2 SERPL-SCNC: 32 MMOL/L (ref 21–32)
CO2 SERPL-SCNC: 32 MMOL/L (ref 21–32)
CO2 SERPL-SCNC: 33 MMOL/L (ref 21–32)
CO2 SERPL-SCNC: 34 MMOL/L (ref 21–32)
CO2 SERPL-SCNC: 34 MMOL/L (ref 22–33)
CO2 SERPL-SCNC: 34 MMOL/L (ref 22–33)
CO2 SERPL-SCNC: 35 MMOL/L (ref 21–32)
CO2 SERPL-SCNC: 35 MMOL/L (ref 21–32)
CO2 SERPL-SCNC: 36 MMOL/L (ref 21–32)
CO2 SERPL-SCNC: 36 MMOL/L (ref 22–33)
CO2 SERPL-SCNC: 37 MMOL/L (ref 21–32)
CO2 SERPL-SCNC: 37 MMOL/L (ref 21–32)
CO2 SERPL-SCNC: 37 MMOL/L (ref 22–33)
CO2 SERPL-SCNC: 39 MMOL/L (ref 21–32)
CO2 SERPL-SCNC: 40 MMOL/L (ref 21–32)
COLOR UR: YELLOW
CREAT SERPL-MCNC: 0.86 MG/DL (ref 0.6–1.3)
CREAT SERPL-MCNC: 0.9 MG/DL (ref 0.6–1.3)
CREAT SERPL-MCNC: 0.9 MG/DL (ref 0.6–1.3)
CREAT SERPL-MCNC: 0.91 MG/DL (ref 0.6–1.3)
CREAT SERPL-MCNC: 0.94 MG/DL (ref 0.6–1.3)
CREAT SERPL-MCNC: 0.94 MG/DL (ref 0.6–1.3)
CREAT SERPL-MCNC: 0.95 MG/DL (ref 0.4–1.1)
CREAT SERPL-MCNC: 0.96 MG/DL (ref 0.4–1.1)
CREAT SERPL-MCNC: 0.96 MG/DL (ref 0.6–1.3)
CREAT SERPL-MCNC: 0.99 MG/DL (ref 0.6–1.3)
CREAT SERPL-MCNC: 1 MG/DL (ref 0.6–1.3)
CREAT SERPL-MCNC: 1 MG/DL (ref 0.6–1.3)
CREAT SERPL-MCNC: 1.03 MG/DL (ref 0.4–1.1)
CREAT SERPL-MCNC: 1.09 MG/DL (ref 0.6–1.3)
CREAT SERPL-MCNC: 1.1 MG/DL (ref 0.6–1.3)
CREAT SERPL-MCNC: 1.1 MG/DL (ref 0.6–1.3)
CREAT SERPL-MCNC: 1.14 MG/DL (ref 0.4–1.1)
CREAT SERPL-MCNC: 1.3 MG/DL (ref 0.6–1.3)
CREAT SERPL-MCNC: 1.36 MG/DL (ref 0.6–1.3)
CREAT SERPL-MCNC: 1.39 MG/DL (ref 0.6–1.3)
D DIMER PPP FEU-MCNC: 2.42 UG/ML FEU
EOSINOPHIL # BLD AUTO: 0.01 THOUSAND/ΜL (ref 0–0.61)
EOSINOPHIL # BLD AUTO: 0.02 THOUSAND/ΜL (ref 0–0.61)
EOSINOPHIL # BLD AUTO: 0.02 THOUSAND/ΜL (ref 0–0.61)
EOSINOPHIL # BLD AUTO: 0.15 THOUSAND/ΜL (ref 0–0.61)
EOSINOPHIL # BLD AUTO: 0.19 THOUSAND/ΜL (ref 0–0.61)
EOSINOPHIL # BLD AUTO: 0.2 THOUSAND/ΜL (ref 0–0.61)
EOSINOPHIL # BLD AUTO: 0.21 THOUSAND/ΜL (ref 0–0.61)
EOSINOPHIL # BLD AUTO: 0.22 THOUSAND/ΜL (ref 0–0.61)
EOSINOPHIL # BLD AUTO: 0.24 THOUSAND/ΜL (ref 0–0.61)
EOSINOPHIL # BLD MANUAL: 0 THOUSAND/UL (ref 0–0.4)
EOSINOPHIL NFR BLD AUTO: 0 % (ref 0–6)
EOSINOPHIL NFR BLD AUTO: 2 % (ref 0–6)
EOSINOPHIL NFR BLD MANUAL: 0 % (ref 0–6)
ERYTHROCYTE [DISTWIDTH] IN BLOOD BY AUTOMATED COUNT: 16.6 % (ref 11.6–15.1)
ERYTHROCYTE [DISTWIDTH] IN BLOOD BY AUTOMATED COUNT: 16.8 % (ref 11.6–15.1)
ERYTHROCYTE [DISTWIDTH] IN BLOOD BY AUTOMATED COUNT: 16.9 % (ref 11.6–15.1)
ERYTHROCYTE [DISTWIDTH] IN BLOOD BY AUTOMATED COUNT: 17.1 % (ref 11.6–15.1)
ERYTHROCYTE [DISTWIDTH] IN BLOOD BY AUTOMATED COUNT: 17.2 % (ref 11.6–15.1)
ERYTHROCYTE [DISTWIDTH] IN BLOOD BY AUTOMATED COUNT: 17.4 % (ref 11.6–15.1)
ERYTHROCYTE [DISTWIDTH] IN BLOOD BY AUTOMATED COUNT: 17.7 % (ref 11.6–15.1)
ERYTHROCYTE [DISTWIDTH] IN BLOOD BY AUTOMATED COUNT: 18.5 % (ref 11.6–15.1)
ERYTHROCYTE [DISTWIDTH] IN BLOOD BY AUTOMATED COUNT: 18.5 % (ref 11.6–15.1)
EST. AVERAGE GLUCOSE BLD GHB EST-MCNC: 140 MG/DL
FIO2 GAS DIL.REBREATH: 45 L
FIO2 GAS DIL.REBREATH: 50 L
FLUAV RNA RESP QL NAA+PROBE: NEGATIVE
FLUBV RNA RESP QL NAA+PROBE: NEGATIVE
GAMMA GLOB ABNORMAL SERPL ELPH-MCNC: 0.28 G/DL (ref 0.5–1.6)
GAMMA GLOB MFR UR ELPH: 0 %
GAMMA GLOB SERPL ELPH-MCNC: 5 % (ref 12–22)
GFR SERPL CREATININE-BSD FRML MDRD: 35 ML/MIN/1.73SQ M
GFR SERPL CREATININE-BSD FRML MDRD: 36 ML/MIN/1.73SQ M
GFR SERPL CREATININE-BSD FRML MDRD: 38 ML/MIN/1.73SQ M
GFR SERPL CREATININE-BSD FRML MDRD: 45 ML/MIN/1.73SQ M
GFR SERPL CREATININE-BSD FRML MDRD: 47 ML/MIN/1.73SQ M
GFR SERPL CREATININE-BSD FRML MDRD: 51 ML/MIN/1.73SQ M
GFR SERPL CREATININE-BSD FRML MDRD: 53 ML/MIN/1.73SQ M
GFR SERPL CREATININE-BSD FRML MDRD: 55 ML/MIN/1.73SQ M
GFR SERPL CREATININE-BSD FRML MDRD: 55 ML/MIN/1.73SQ M
GFR SERPL CREATININE-BSD FRML MDRD: 56 ML/MIN/1.73SQ M
GFR SERPL CREATININE-BSD FRML MDRD: 57 ML/MIN/1.73SQ M
GFR SERPL CREATININE-BSD FRML MDRD: 57 ML/MIN/1.73SQ M
GFR SERPL CREATININE-BSD FRML MDRD: 59 ML/MIN/1.73SQ M
GFR SERPL CREATININE-BSD FRML MDRD: 60 ML/MIN/1.73SQ M
GFR SERPL CREATININE-BSD FRML MDRD: 60 ML/MIN/1.73SQ M
GFR SERPL CREATININE-BSD FRML MDRD: 63 ML/MIN/1.73SQ M
GLUCOSE P FAST SERPL-MCNC: 138 MG/DL (ref 65–99)
GLUCOSE P FAST SERPL-MCNC: 171 MG/DL (ref 65–99)
GLUCOSE SERPL-MCNC: 100 MG/DL (ref 65–140)
GLUCOSE SERPL-MCNC: 100 MG/DL (ref 65–140)
GLUCOSE SERPL-MCNC: 103 MG/DL (ref 65–140)
GLUCOSE SERPL-MCNC: 104 MG/DL (ref 65–140)
GLUCOSE SERPL-MCNC: 105 MG/DL (ref 65–140)
GLUCOSE SERPL-MCNC: 107 MG/DL (ref 65–140)
GLUCOSE SERPL-MCNC: 110 MG/DL (ref 65–140)
GLUCOSE SERPL-MCNC: 110 MG/DL (ref 65–140)
GLUCOSE SERPL-MCNC: 111 MG/DL (ref 65–140)
GLUCOSE SERPL-MCNC: 112 MG/DL (ref 65–140)
GLUCOSE SERPL-MCNC: 114 MG/DL (ref 65–140)
GLUCOSE SERPL-MCNC: 115 MG/DL (ref 65–140)
GLUCOSE SERPL-MCNC: 118 MG/DL (ref 65–140)
GLUCOSE SERPL-MCNC: 123 MG/DL (ref 65–140)
GLUCOSE SERPL-MCNC: 124 MG/DL (ref 65–140)
GLUCOSE SERPL-MCNC: 126 MG/DL (ref 65–140)
GLUCOSE SERPL-MCNC: 127 MG/DL (ref 65–140)
GLUCOSE SERPL-MCNC: 127 MG/DL (ref 65–140)
GLUCOSE SERPL-MCNC: 128 MG/DL (ref 65–140)
GLUCOSE SERPL-MCNC: 131 MG/DL (ref 65–140)
GLUCOSE SERPL-MCNC: 133 MG/DL (ref 65–140)
GLUCOSE SERPL-MCNC: 135 MG/DL (ref 65–140)
GLUCOSE SERPL-MCNC: 136 MG/DL (ref 65–140)
GLUCOSE SERPL-MCNC: 138 MG/DL (ref 65–140)
GLUCOSE SERPL-MCNC: 138 MG/DL (ref 65–140)
GLUCOSE SERPL-MCNC: 142 MG/DL (ref 65–140)
GLUCOSE SERPL-MCNC: 143 MG/DL (ref 65–140)
GLUCOSE SERPL-MCNC: 144 MG/DL (ref 65–140)
GLUCOSE SERPL-MCNC: 144 MG/DL (ref 65–140)
GLUCOSE SERPL-MCNC: 156 MG/DL (ref 65–140)
GLUCOSE SERPL-MCNC: 157 MG/DL (ref 65–140)
GLUCOSE SERPL-MCNC: 161 MG/DL (ref 65–140)
GLUCOSE SERPL-MCNC: 161 MG/DL (ref 65–140)
GLUCOSE SERPL-MCNC: 162 MG/DL (ref 65–140)
GLUCOSE SERPL-MCNC: 162 MG/DL (ref 65–140)
GLUCOSE SERPL-MCNC: 165 MG/DL (ref 65–140)
GLUCOSE SERPL-MCNC: 168 MG/DL (ref 65–140)
GLUCOSE SERPL-MCNC: 171 MG/DL (ref 65–140)
GLUCOSE SERPL-MCNC: 171 MG/DL (ref 65–140)
GLUCOSE SERPL-MCNC: 174 MG/DL (ref 65–140)
GLUCOSE SERPL-MCNC: 175 MG/DL (ref 65–140)
GLUCOSE SERPL-MCNC: 176 MG/DL (ref 65–140)
GLUCOSE SERPL-MCNC: 176 MG/DL (ref 65–140)
GLUCOSE SERPL-MCNC: 180 MG/DL (ref 65–140)
GLUCOSE SERPL-MCNC: 183 MG/DL (ref 65–140)
GLUCOSE SERPL-MCNC: 184 MG/DL (ref 65–140)
GLUCOSE SERPL-MCNC: 185 MG/DL (ref 65–140)
GLUCOSE SERPL-MCNC: 187 MG/DL (ref 65–140)
GLUCOSE SERPL-MCNC: 190 MG/DL (ref 65–140)
GLUCOSE SERPL-MCNC: 191 MG/DL (ref 65–140)
GLUCOSE SERPL-MCNC: 192 MG/DL (ref 65–140)
GLUCOSE SERPL-MCNC: 193 MG/DL (ref 65–140)
GLUCOSE SERPL-MCNC: 194 MG/DL (ref 65–140)
GLUCOSE SERPL-MCNC: 194 MG/DL (ref 65–140)
GLUCOSE SERPL-MCNC: 195 MG/DL (ref 65–140)
GLUCOSE SERPL-MCNC: 197 MG/DL (ref 65–140)
GLUCOSE SERPL-MCNC: 203 MG/DL (ref 65–140)
GLUCOSE SERPL-MCNC: 206 MG/DL (ref 65–140)
GLUCOSE SERPL-MCNC: 208 MG/DL (ref 65–140)
GLUCOSE SERPL-MCNC: 211 MG/DL (ref 65–140)
GLUCOSE SERPL-MCNC: 214 MG/DL (ref 65–140)
GLUCOSE SERPL-MCNC: 216 MG/DL (ref 65–140)
GLUCOSE SERPL-MCNC: 217 MG/DL (ref 65–140)
GLUCOSE SERPL-MCNC: 218 MG/DL (ref 65–140)
GLUCOSE SERPL-MCNC: 220 MG/DL (ref 65–140)
GLUCOSE SERPL-MCNC: 224 MG/DL (ref 65–140)
GLUCOSE SERPL-MCNC: 225 MG/DL (ref 65–140)
GLUCOSE SERPL-MCNC: 227 MG/DL (ref 65–140)
GLUCOSE SERPL-MCNC: 228 MG/DL (ref 65–140)
GLUCOSE SERPL-MCNC: 233 MG/DL (ref 65–140)
GLUCOSE SERPL-MCNC: 233 MG/DL (ref 65–140)
GLUCOSE SERPL-MCNC: 235 MG/DL (ref 65–140)
GLUCOSE SERPL-MCNC: 252 MG/DL (ref 65–140)
GLUCOSE SERPL-MCNC: 254 MG/DL (ref 65–140)
GLUCOSE SERPL-MCNC: 255 MG/DL (ref 65–140)
GLUCOSE SERPL-MCNC: 258 MG/DL (ref 65–140)
GLUCOSE SERPL-MCNC: 266 MG/DL (ref 65–140)
GLUCOSE SERPL-MCNC: 268 MG/DL (ref 65–140)
GLUCOSE SERPL-MCNC: 283 MG/DL (ref 65–140)
GLUCOSE SERPL-MCNC: 336 MG/DL (ref 65–140)
GLUCOSE SERPL-MCNC: 40 MG/DL (ref 65–140)
GLUCOSE SERPL-MCNC: 52 MG/DL (ref 65–140)
GLUCOSE SERPL-MCNC: 53 MG/DL (ref 65–140)
GLUCOSE SERPL-MCNC: 54 MG/DL (ref 65–140)
GLUCOSE SERPL-MCNC: 56 MG/DL (ref 65–140)
GLUCOSE SERPL-MCNC: 58 MG/DL (ref 65–140)
GLUCOSE SERPL-MCNC: 59 MG/DL (ref 65–140)
GLUCOSE SERPL-MCNC: 72 MG/DL (ref 65–140)
GLUCOSE SERPL-MCNC: 74 MG/DL (ref 65–140)
GLUCOSE SERPL-MCNC: 75 MG/DL (ref 65–140)
GLUCOSE SERPL-MCNC: 80 MG/DL (ref 65–140)
GLUCOSE SERPL-MCNC: 81 MG/DL (ref 65–140)
GLUCOSE SERPL-MCNC: 81 MG/DL (ref 65–140)
GLUCOSE SERPL-MCNC: 83 MG/DL (ref 65–140)
GLUCOSE SERPL-MCNC: 83 MG/DL (ref 65–140)
GLUCOSE SERPL-MCNC: 84 MG/DL (ref 65–140)
GLUCOSE SERPL-MCNC: 85 MG/DL (ref 65–140)
GLUCOSE SERPL-MCNC: 85 MG/DL (ref 65–140)
GLUCOSE SERPL-MCNC: 87 MG/DL (ref 65–140)
GLUCOSE SERPL-MCNC: 89 MG/DL (ref 65–140)
GLUCOSE SERPL-MCNC: 89 MG/DL (ref 65–140)
GLUCOSE SERPL-MCNC: 90 MG/DL (ref 65–140)
GLUCOSE SERPL-MCNC: 92 MG/DL (ref 65–140)
GLUCOSE SERPL-MCNC: 93 MG/DL (ref 65–140)
GLUCOSE SERPL-MCNC: 94 MG/DL (ref 65–140)
GLUCOSE SERPL-MCNC: 96 MG/DL (ref 65–140)
GLUCOSE SERPL-MCNC: 96 MG/DL (ref 65–140)
GLUCOSE SERPL-MCNC: 98 MG/DL (ref 65–140)
GLUCOSE SERPL-MCNC: 99 MG/DL (ref 65–140)
GLUCOSE UR STRIP-MCNC: NEGATIVE MG/DL
HBA1C MFR BLD: 6.5 %
HCO3 BLDA-SCNC: 32.4 MMOL/L (ref 22–28)
HCO3 BLDA-SCNC: 38.3 MMOL/L (ref 22–28)
HCO3 BLDA-SCNC: 39.6 MMOL/L (ref 22–28)
HCO3 BLDV-SCNC: 30.4 MMOL/L (ref 24–30)
HCO3 BLDV-SCNC: 30.5 MMOL/L (ref 24–30)
HCO3 BLDV-SCNC: 30.8 MMOL/L (ref 24–30)
HCO3 BLDV-SCNC: 33.5 MMOL/L (ref 24–30)
HCO3 BLDV-SCNC: 35.5 MMOL/L (ref 24–30)
HCT VFR BLD AUTO: 30 % (ref 34.8–46.1)
HCT VFR BLD AUTO: 30.9 % (ref 34.8–46.1)
HCT VFR BLD AUTO: 31.2 % (ref 34.8–46.1)
HCT VFR BLD AUTO: 31.8 % (ref 34.8–46.1)
HCT VFR BLD AUTO: 32.4 % (ref 34.8–46.1)
HCT VFR BLD AUTO: 32.6 % (ref 34.8–46.1)
HCT VFR BLD AUTO: 32.9 % (ref 34.8–46.1)
HCT VFR BLD AUTO: 33.8 % (ref 34.8–46.1)
HCT VFR BLD AUTO: 34.7 % (ref 34.8–46.1)
HCT VFR BLD AUTO: 36.6 % (ref 34.8–46.1)
HCT VFR BLD AUTO: 37.4 % (ref 34.8–46.1)
HCT VFR BLD AUTO: 38.2 % (ref 34.8–46.1)
HCT VFR BLD AUTO: 38.2 % (ref 34.8–46.1)
HCT VFR BLD CALC: 32 % (ref 34.8–46.1)
HCT VFR BLD CALC: 33 % (ref 34.8–46.1)
HGB BLD-MCNC: 10 G/DL (ref 11.5–15.4)
HGB BLD-MCNC: 10.5 G/DL (ref 11.5–15.4)
HGB BLD-MCNC: 10.5 G/DL (ref 11.5–15.4)
HGB BLD-MCNC: 10.6 G/DL (ref 11.5–15.4)
HGB BLD-MCNC: 10.7 G/DL (ref 11.5–15.4)
HGB BLD-MCNC: 10.9 G/DL (ref 11.5–15.4)
HGB BLD-MCNC: 11.3 G/DL (ref 11.5–15.4)
HGB BLD-MCNC: 11.9 G/DL (ref 11.5–15.4)
HGB BLD-MCNC: 12.2 G/DL (ref 11.5–15.4)
HGB BLD-MCNC: 12.3 G/DL (ref 11.5–15.4)
HGB BLD-MCNC: 9.6 G/DL (ref 11.5–15.4)
HGB BLD-MCNC: 9.7 G/DL (ref 11.5–15.4)
HGB BLD-MCNC: 9.9 G/DL (ref 11.5–15.4)
HGB BLDA-MCNC: 10.9 G/DL (ref 11.5–15.4)
HGB BLDA-MCNC: 11.2 G/DL (ref 11.5–15.4)
HGB UR QL STRIP.AUTO: NEGATIVE
IGG/ALB SER: 1.39 {RATIO} (ref 1.1–1.8)
IMM GRANULOCYTES # BLD AUTO: 0.05 THOUSAND/UL (ref 0–0.2)
IMM GRANULOCYTES # BLD AUTO: 0.07 THOUSAND/UL (ref 0–0.2)
IMM GRANULOCYTES # BLD AUTO: 0.08 THOUSAND/UL (ref 0–0.2)
IMM GRANULOCYTES # BLD AUTO: 0.08 THOUSAND/UL (ref 0–0.2)
IMM GRANULOCYTES # BLD AUTO: 0.12 THOUSAND/UL (ref 0–0.2)
IMM GRANULOCYTES # BLD AUTO: 0.13 THOUSAND/UL (ref 0–0.2)
IMM GRANULOCYTES # BLD AUTO: 0.14 THOUSAND/UL (ref 0–0.2)
IMM GRANULOCYTES # BLD AUTO: 0.14 THOUSAND/UL (ref 0–0.2)
IMM GRANULOCYTES # BLD AUTO: 0.17 THOUSAND/UL (ref 0–0.2)
IMM GRANULOCYTES # BLD AUTO: 0.17 THOUSAND/UL (ref 0–0.2)
IMM GRANULOCYTES # BLD AUTO: 0.19 THOUSAND/UL (ref 0–0.2)
IMM GRANULOCYTES NFR BLD AUTO: 1 % (ref 0–2)
IMM GRANULOCYTES NFR BLD AUTO: 2 % (ref 0–2)
INR PPP: 1.01 (ref 0.9–1.1)
INTERPRETATION UR IFE-IMP: NORMAL
KETONES UR STRIP-MCNC: NEGATIVE MG/DL
LACTATE SERPL-SCNC: 1.9 MMOL/L (ref 0–2)
LEUKOCYTE ESTERASE UR QL STRIP: ABNORMAL
LYMPHOCYTES # BLD AUTO: 2.34 THOUSANDS/ΜL (ref 0.6–4.47)
LYMPHOCYTES # BLD AUTO: 2.5 THOUSANDS/ΜL (ref 0.6–4.47)
LYMPHOCYTES # BLD AUTO: 2.63 THOUSANDS/ΜL (ref 0.6–4.47)
LYMPHOCYTES # BLD AUTO: 2.64 THOUSANDS/ΜL (ref 0.6–4.47)
LYMPHOCYTES # BLD AUTO: 2.91 THOUSANDS/ΜL (ref 0.6–4.47)
LYMPHOCYTES # BLD AUTO: 25 % (ref 14–44)
LYMPHOCYTES # BLD AUTO: 3.03 THOUSANDS/ΜL (ref 0.6–4.47)
LYMPHOCYTES # BLD AUTO: 3.2 THOUSANDS/ΜL (ref 0.6–4.47)
LYMPHOCYTES # BLD AUTO: 3.4 THOUSANDS/ΜL (ref 0.6–4.47)
LYMPHOCYTES # BLD AUTO: 3.5 THOUSANDS/ΜL (ref 0.6–4.47)
LYMPHOCYTES # BLD AUTO: 3.52 THOUSANDS/ΜL (ref 0.6–4.47)
LYMPHOCYTES # BLD AUTO: 3.81 THOUSANDS/ΜL (ref 0.6–4.47)
LYMPHOCYTES # BLD AUTO: 5.07 THOUSAND/UL (ref 0.6–4.47)
LYMPHOCYTES NFR BLD AUTO: 22 % (ref 14–44)
LYMPHOCYTES NFR BLD AUTO: 23 % (ref 14–44)
LYMPHOCYTES NFR BLD AUTO: 24 % (ref 14–44)
LYMPHOCYTES NFR BLD AUTO: 25 % (ref 14–44)
LYMPHOCYTES NFR BLD AUTO: 25 % (ref 14–44)
LYMPHOCYTES NFR BLD AUTO: 26 % (ref 14–44)
LYMPHOCYTES NFR BLD AUTO: 28 % (ref 14–44)
LYMPHOCYTES NFR BLD AUTO: 29 % (ref 14–44)
LYMPHOCYTES NFR BLD AUTO: 31 % (ref 14–44)
LYMPHOCYTES NFR BLD AUTO: 32 % (ref 14–44)
LYMPHOCYTES NFR BLD AUTO: 33 % (ref 14–44)
MAGNESIUM SERPL-MCNC: 1.6 MG/DL (ref 1.6–2.6)
MAGNESIUM SERPL-MCNC: 1.7 MG/DL (ref 1.6–2.6)
MAGNESIUM SERPL-MCNC: 1.9 MG/DL (ref 1.6–2.6)
MCH RBC QN AUTO: 28.6 PG (ref 26.8–34.3)
MCH RBC QN AUTO: 29 PG (ref 26.8–34.3)
MCH RBC QN AUTO: 29.2 PG (ref 26.8–34.3)
MCH RBC QN AUTO: 29.3 PG (ref 26.8–34.3)
MCH RBC QN AUTO: 29.6 PG (ref 26.8–34.3)
MCH RBC QN AUTO: 29.7 PG (ref 26.8–34.3)
MCH RBC QN AUTO: 29.9 PG (ref 26.8–34.3)
MCH RBC QN AUTO: 29.9 PG (ref 26.8–34.3)
MCH RBC QN AUTO: 30.2 PG (ref 26.8–34.3)
MCH RBC QN AUTO: 30.6 PG (ref 26.8–34.3)
MCH RBC QN AUTO: 30.6 PG (ref 26.8–34.3)
MCHC RBC AUTO-ENTMCNC: 29.9 G/DL (ref 31.4–37.4)
MCHC RBC AUTO-ENTMCNC: 30.9 G/DL (ref 31.4–37.4)
MCHC RBC AUTO-ENTMCNC: 31.4 G/DL (ref 31.4–37.4)
MCHC RBC AUTO-ENTMCNC: 31.7 G/DL (ref 31.4–37.4)
MCHC RBC AUTO-ENTMCNC: 31.8 G/DL (ref 31.4–37.4)
MCHC RBC AUTO-ENTMCNC: 31.9 G/DL (ref 31.4–37.4)
MCHC RBC AUTO-ENTMCNC: 31.9 G/DL (ref 31.4–37.4)
MCHC RBC AUTO-ENTMCNC: 32 G/DL (ref 31.4–37.4)
MCHC RBC AUTO-ENTMCNC: 32 G/DL (ref 31.4–37.4)
MCHC RBC AUTO-ENTMCNC: 32.1 G/DL (ref 31.4–37.4)
MCHC RBC AUTO-ENTMCNC: 32.2 G/DL (ref 31.4–37.4)
MCHC RBC AUTO-ENTMCNC: 32.5 G/DL (ref 31.4–37.4)
MCHC RBC AUTO-ENTMCNC: 33 G/DL (ref 31.4–37.4)
MCV RBC AUTO: 102 FL (ref 82–98)
MCV RBC AUTO: 90 FL (ref 82–98)
MCV RBC AUTO: 90 FL (ref 82–98)
MCV RBC AUTO: 91 FL (ref 82–98)
MCV RBC AUTO: 92 FL (ref 82–98)
MCV RBC AUTO: 93 FL (ref 82–98)
MCV RBC AUTO: 94 FL (ref 82–98)
MCV RBC AUTO: 99 FL (ref 82–98)
METAMYELOCYTES NFR BLD MANUAL: 2 % (ref 0–1)
MONOCYTES # BLD AUTO: 0.61 THOUSAND/UL (ref 0–1.22)
MONOCYTES # BLD AUTO: 0.78 THOUSAND/ΜL (ref 0.17–1.22)
MONOCYTES # BLD AUTO: 0.97 THOUSAND/ΜL (ref 0.17–1.22)
MONOCYTES # BLD AUTO: 1.01 THOUSAND/ΜL (ref 0.17–1.22)
MONOCYTES # BLD AUTO: 1.07 THOUSAND/ΜL (ref 0.17–1.22)
MONOCYTES # BLD AUTO: 1.18 THOUSAND/ΜL (ref 0.17–1.22)
MONOCYTES # BLD AUTO: 1.21 THOUSAND/ΜL (ref 0.17–1.22)
MONOCYTES # BLD AUTO: 1.22 THOUSAND/ΜL (ref 0.17–1.22)
MONOCYTES # BLD AUTO: 1.24 THOUSAND/ΜL (ref 0.17–1.22)
MONOCYTES # BLD AUTO: 1.28 THOUSAND/ΜL (ref 0.17–1.22)
MONOCYTES # BLD AUTO: 1.3 THOUSAND/ΜL (ref 0.17–1.22)
MONOCYTES # BLD AUTO: 1.38 THOUSAND/ΜL (ref 0.17–1.22)
MONOCYTES NFR BLD AUTO: 10 % (ref 4–12)
MONOCYTES NFR BLD AUTO: 12 % (ref 4–12)
MONOCYTES NFR BLD AUTO: 8 % (ref 4–12)
MONOCYTES NFR BLD AUTO: 8 % (ref 4–12)
MONOCYTES NFR BLD AUTO: 9 % (ref 4–12)
MONOCYTES NFR BLD: 3 % (ref 4–12)
NEUTROPHILS # BLD AUTO: 5.66 THOUSANDS/ΜL (ref 1.85–7.62)
NEUTROPHILS # BLD AUTO: 5.94 THOUSANDS/ΜL (ref 1.85–7.62)
NEUTROPHILS # BLD AUTO: 5.95 THOUSANDS/ΜL (ref 1.85–7.62)
NEUTROPHILS # BLD AUTO: 6.12 THOUSANDS/ΜL (ref 1.85–7.62)
NEUTROPHILS # BLD AUTO: 6.21 THOUSANDS/ΜL (ref 1.85–7.62)
NEUTROPHILS # BLD AUTO: 6.83 THOUSANDS/ΜL (ref 1.85–7.62)
NEUTROPHILS # BLD AUTO: 7.16 THOUSANDS/ΜL (ref 1.85–7.62)
NEUTROPHILS # BLD AUTO: 7.3 THOUSANDS/ΜL (ref 1.85–7.62)
NEUTROPHILS # BLD AUTO: 7.35 THOUSANDS/ΜL (ref 1.85–7.62)
NEUTROPHILS # BLD AUTO: 7.59 THOUSANDS/ΜL (ref 1.85–7.62)
NEUTROPHILS # BLD AUTO: 8.75 THOUSANDS/ΜL (ref 1.85–7.62)
NEUTROPHILS # BLD MANUAL: 14.2 THOUSAND/UL (ref 1.85–7.62)
NEUTS BAND NFR BLD MANUAL: 8 % (ref 0–8)
NEUTS SEG NFR BLD AUTO: 53 % (ref 43–75)
NEUTS SEG NFR BLD AUTO: 53 % (ref 43–75)
NEUTS SEG NFR BLD AUTO: 54 % (ref 43–75)
NEUTS SEG NFR BLD AUTO: 57 % (ref 43–75)
NEUTS SEG NFR BLD AUTO: 58 % (ref 43–75)
NEUTS SEG NFR BLD AUTO: 61 % (ref 43–75)
NEUTS SEG NFR BLD AUTO: 62 % (ref 43–75)
NEUTS SEG NFR BLD AUTO: 64 % (ref 43–75)
NEUTS SEG NFR BLD AUTO: 64 % (ref 43–75)
NEUTS SEG NFR BLD AUTO: 65 % (ref 43–75)
NEUTS SEG NFR BLD AUTO: 65 % (ref 43–75)
NEUTS SEG NFR BLD AUTO: 67 % (ref 43–75)
NITRITE UR QL STRIP: NEGATIVE
NON-SQ EPI CELLS URNS QL MICRO: ABNORMAL /HPF
NRBC BLD AUTO-RTO: 0 /100 WBCS
NT-PROBNP SERPL-MCNC: 1204 PG/ML
NT-PROBNP SERPL-MCNC: 1836 PG/ML
NT-PROBNP SERPL-MCNC: 668 PG/ML
O2 CT BLDA-SCNC: 16.3 ML/DL (ref 16–23)
O2 CT BLDV-SCNC: 14.6 ML/DL
O2 CT BLDV-SCNC: 14.7 ML/DL
O2 CT BLDV-SCNC: 14.9 ML/DL
O2 CT BLDV-SCNC: 15.6 ML/DL
O2 CT BLDV-SCNC: 8.8 ML/DL
OVALOCYTES BLD QL SMEAR: PRESENT
OXYHGB MFR BLDA: 96.4 % (ref 94–97)
P AXIS: 70 DEGREES
PCO2 BLD: 41 MMOL/L (ref 21–32)
PCO2 BLD: 42 MMOL/L (ref 21–32)
PCO2 BLD: 66.2 MM HG (ref 36–44)
PCO2 BLD: 76.9 MM HG (ref 36–44)
PCO2 BLD: 83 MM HG
PCO2 BLDA: 66.3 MM HG
PCO2 BLDA: 71.3 MM HG (ref 36–44)
PCO2 BLDV: 52.6 MM HG (ref 42–50)
PCO2 BLDV: 56.9 MM HG (ref 42–50)
PCO2 BLDV: 64 MM HG (ref 42–50)
PCO2 BLDV: 69.1 MM HG (ref 42–50)
PCO2 BLDV: 72.8 MM HG (ref 42–50)
PH BLD: 7.3 [PH] (ref 7.35–7.45)
PH BLD: 7.38 [PH]
PH BLD: 7.38 [PH] (ref 7.35–7.45)
PH BLDA: 7.28 [PH] (ref 7.35–7.45)
PH BLDV: 7.3 [PH] (ref 7.3–7.4)
PH BLDV: 7.3 [PH] (ref 7.3–7.4)
PH BLDV: 7.31 [PH] (ref 7.3–7.4)
PH BLDV: 7.34 [PH] (ref 7.3–7.4)
PH BLDV: 7.38 [PH] (ref 7.3–7.4)
PH UR STRIP.AUTO: 5.5 [PH]
PHOSPHATE SERPL-MCNC: 2.6 MG/DL (ref 2.3–4.1)
PHOSPHATE SERPL-MCNC: 3.3 MG/DL (ref 2.5–5)
PLATELET # BLD AUTO: 165 THOUSANDS/UL (ref 149–390)
PLATELET # BLD AUTO: 173 THOUSANDS/UL (ref 149–390)
PLATELET # BLD AUTO: 177 THOUSANDS/UL (ref 149–390)
PLATELET # BLD AUTO: 178 THOUSANDS/UL (ref 149–390)
PLATELET # BLD AUTO: 183 THOUSANDS/UL (ref 149–390)
PLATELET # BLD AUTO: 183 THOUSANDS/UL (ref 149–390)
PLATELET # BLD AUTO: 186 THOUSANDS/UL (ref 149–390)
PLATELET # BLD AUTO: 188 THOUSANDS/UL (ref 149–390)
PLATELET # BLD AUTO: 191 THOUSANDS/UL (ref 149–390)
PLATELET # BLD AUTO: 204 THOUSANDS/UL (ref 149–390)
PLATELET # BLD AUTO: 208 THOUSANDS/UL (ref 149–390)
PLATELET # BLD AUTO: 215 THOUSANDS/UL (ref 149–390)
PLATELET # BLD AUTO: 223 THOUSANDS/UL (ref 149–390)
PLATELET # BLD AUTO: 268 THOUSANDS/UL (ref 149–390)
PLATELET BLD QL SMEAR: ADEQUATE
PMV BLD AUTO: 10 FL (ref 8.9–12.7)
PMV BLD AUTO: 10.1 FL (ref 8.9–12.7)
PMV BLD AUTO: 10.4 FL (ref 8.9–12.7)
PMV BLD AUTO: 9.3 FL (ref 8.9–12.7)
PMV BLD AUTO: 9.4 FL (ref 8.9–12.7)
PMV BLD AUTO: 9.5 FL (ref 8.9–12.7)
PMV BLD AUTO: 9.5 FL (ref 8.9–12.7)
PMV BLD AUTO: 9.6 FL (ref 8.9–12.7)
PMV BLD AUTO: 9.6 FL (ref 8.9–12.7)
PMV BLD AUTO: 9.7 FL (ref 8.9–12.7)
PMV BLD AUTO: 9.7 FL (ref 8.9–12.7)
PMV BLD AUTO: 9.8 FL (ref 8.9–12.7)
PMV BLD AUTO: 9.8 FL (ref 8.9–12.7)
PMV BLD AUTO: 9.9 FL (ref 8.9–12.7)
PO2 BLD: 83 MM HG (ref 75–129)
PO2 BLD: 98 MM HG (ref 75–129)
PO2 BLDA: 100.9 MM HG (ref 75–129)
PO2 BLDV: 136.2 MM HG (ref 35–45)
PO2 BLDV: 145.5 MM HG (ref 35–45)
PO2 BLDV: 148.4 MM HG (ref 35–45)
PO2 BLDV: 174.9 MM HG (ref 35–45)
PO2 BLDV: 97 MM HG (ref 35–45)
POIKILOCYTOSIS BLD QL SMEAR: PRESENT
POLYCHROMASIA BLD QL SMEAR: PRESENT
POTASSIUM BLD-SCNC: 4 MMOL/L (ref 3.5–5.3)
POTASSIUM BLD-SCNC: 4.4 MMOL/L (ref 3.5–5.3)
POTASSIUM SERPL-SCNC: 4 MMOL/L (ref 3.5–5)
POTASSIUM SERPL-SCNC: 4.1 MMOL/L (ref 3.5–5)
POTASSIUM SERPL-SCNC: 4.1 MMOL/L (ref 3.5–5.3)
POTASSIUM SERPL-SCNC: 4.2 MMOL/L (ref 3.5–5.3)
POTASSIUM SERPL-SCNC: 4.4 MMOL/L (ref 3.5–5)
POTASSIUM SERPL-SCNC: 4.4 MMOL/L (ref 3.5–5.3)
POTASSIUM SERPL-SCNC: 4.4 MMOL/L (ref 3.5–5.3)
POTASSIUM SERPL-SCNC: 4.5 MMOL/L (ref 3.5–5)
POTASSIUM SERPL-SCNC: 4.6 MMOL/L (ref 3.5–5.3)
POTASSIUM SERPL-SCNC: 4.6 MMOL/L (ref 3.5–5.3)
POTASSIUM SERPL-SCNC: 4.8 MMOL/L (ref 3.5–5.3)
POTASSIUM SERPL-SCNC: 5 MMOL/L (ref 3.5–5.3)
POTASSIUM SERPL-SCNC: 5.2 MMOL/L (ref 3.5–5.3)
POTASSIUM SERPL-SCNC: 5.3 MMOL/L (ref 3.5–5.3)
POTASSIUM SERPL-SCNC: 5.4 MMOL/L (ref 3.5–5.3)
POTASSIUM SERPL-SCNC: 5.7 MMOL/L (ref 3.5–5.3)
PR INTERVAL: 160 MS
PROCALCITONIN SERPL-MCNC: 0.18 NG/ML
PROCALCITONIN SERPL-MCNC: 0.22 NG/ML
PROT PATTERN SERPL ELPH-IMP: ABNORMAL
PROT PATTERN UR ELPH-IMP: NORMAL
PROT SERPL-MCNC: 5.5 G/DL (ref 6.4–8.2)
PROT SERPL-MCNC: 5.5 G/DL (ref 6.4–8.2)
PROT SERPL-MCNC: 5.7 G/DL (ref 6.4–8.2)
PROT SERPL-MCNC: 6.7 G/DL (ref 6.4–8.2)
PROT SERPL-MCNC: 6.7 G/DL (ref 6.4–8.3)
PROT UR STRIP-MCNC: NEGATIVE MG/DL
PROT UR-MCNC: 8 MG/DL
PROTHROMBIN TIME: 11.4 SECONDS (ref 9.5–12.1)
PTH RELATED PROT SERPL-SCNC: <2 PMOL/L
PTH-INTACT SERPL-MCNC: 36.9 PG/ML (ref 18.4–80.1)
PTH-INTACT SERPL-MCNC: 55.7 PG/ML (ref 18.4–80.1)
QRS AXIS: 58 DEGREES
QRSD INTERVAL: 72 MS
QT INTERVAL: 352 MS
QTC INTERVAL: 413 MS
RBC # BLD AUTO: 3.17 MILLION/UL (ref 3.81–5.12)
RBC # BLD AUTO: 3.18 MILLION/UL (ref 3.81–5.12)
RBC # BLD AUTO: 3.33 MILLION/UL (ref 3.81–5.12)
RBC # BLD AUTO: 3.35 MILLION/UL (ref 3.81–5.12)
RBC # BLD AUTO: 3.54 MILLION/UL (ref 3.81–5.12)
RBC # BLD AUTO: 3.54 MILLION/UL (ref 3.81–5.12)
RBC # BLD AUTO: 3.62 MILLION/UL (ref 3.81–5.12)
RBC # BLD AUTO: 3.66 MILLION/UL (ref 3.81–5.12)
RBC # BLD AUTO: 3.69 MILLION/UL (ref 3.81–5.12)
RBC # BLD AUTO: 3.72 MILLION/UL (ref 3.81–5.12)
RBC # BLD AUTO: 4.02 MILLION/UL (ref 3.81–5.12)
RBC # BLD AUTO: 4.14 MILLION/UL (ref 3.81–5.12)
RBC # BLD AUTO: 4.26 MILLION/UL (ref 3.81–5.12)
RBC #/AREA URNS AUTO: ABNORMAL /HPF
RBC MORPH BLD: PRESENT
RSV RNA RESP QL NAA+PROBE: NEGATIVE
SAO2 % BLD FROM PO2: 95 % (ref 60–85)
SAO2 % BLD FROM PO2: 96 % (ref 60–85)
SARS-COV-2 RNA RESP QL NAA+PROBE: NEGATIVE
SODIUM BLD-SCNC: 141 MMOL/L (ref 136–145)
SODIUM BLD-SCNC: 142 MMOL/L (ref 136–145)
SODIUM SERPL-SCNC: 129 MMOL/L (ref 136–145)
SODIUM SERPL-SCNC: 132 MMOL/L (ref 136–145)
SODIUM SERPL-SCNC: 132 MMOL/L (ref 136–145)
SODIUM SERPL-SCNC: 133 MMOL/L (ref 136–145)
SODIUM SERPL-SCNC: 134 MMOL/L (ref 136–145)
SODIUM SERPL-SCNC: 135 MMOL/L (ref 136–145)
SODIUM SERPL-SCNC: 136 MMOL/L (ref 136–145)
SODIUM SERPL-SCNC: 137 MMOL/L (ref 136–145)
SODIUM SERPL-SCNC: 138 MMOL/L (ref 136–145)
SODIUM SERPL-SCNC: 138 MMOL/L (ref 136–145)
SODIUM SERPL-SCNC: 139 MMOL/L (ref 136–145)
SODIUM SERPL-SCNC: 140 MMOL/L (ref 136–145)
SODIUM SERPL-SCNC: 141 MMOL/L (ref 136–145)
SODIUM SERPL-SCNC: 141 MMOL/L (ref 136–145)
SODIUM SERPL-SCNC: 144 MMOL/L (ref 133–145)
SODIUM SERPL-SCNC: 145 MMOL/L (ref 133–145)
SODIUM SERPL-SCNC: 147 MMOL/L (ref 133–145)
SODIUM SERPL-SCNC: 148 MMOL/L (ref 133–145)
SP GR UR STRIP.AUTO: 1.02 (ref 1–1.03)
SPECIMEN SOURCE: ABNORMAL
T WAVE AXIS: 75 DEGREES
T4 FREE SERPL-MCNC: 1.85 NG/DL (ref 0.76–1.46)
TOTAL CELLS COUNTED SPEC: 100
TROPONIN I SERPL-MCNC: 0.03 NG/ML
TROPONIN I SERPL-MCNC: 0.08 NG/ML (ref 0–0.07)
TROPONIN I SERPL-MCNC: 0.09 NG/ML (ref 0–0.07)
TROPONIN I SERPL-MCNC: 0.09 NG/ML (ref 0–0.07)
TROPONIN I SERPL-MCNC: 0.1 NG/ML (ref 0–0.07)
TSH SERPL DL<=0.05 MIU/L-ACNC: 0.28 UIU/ML (ref 0.34–5.6)
UROBILINOGEN UR QL STRIP.AUTO: 0.2 E.U./DL
VENTRICULAR RATE: 83 BPM
WBC # BLD AUTO: 10.15 THOUSAND/UL (ref 4.31–10.16)
WBC # BLD AUTO: 10.34 THOUSAND/UL (ref 4.31–10.16)
WBC # BLD AUTO: 10.4 THOUSAND/UL (ref 4.31–10.16)
WBC # BLD AUTO: 10.49 THOUSAND/UL (ref 4.31–10.16)
WBC # BLD AUTO: 10.67 THOUSAND/UL (ref 4.31–10.16)
WBC # BLD AUTO: 11.12 THOUSAND/UL (ref 4.31–10.16)
WBC # BLD AUTO: 11.55 THOUSAND/UL (ref 4.31–10.16)
WBC # BLD AUTO: 11.6 THOUSAND/UL (ref 4.31–10.16)
WBC # BLD AUTO: 11.61 THOUSAND/UL (ref 4.31–10.16)
WBC # BLD AUTO: 12.44 THOUSAND/UL (ref 4.31–10.16)
WBC # BLD AUTO: 13.57 THOUSAND/UL (ref 4.31–10.16)
WBC # BLD AUTO: 17.48 THOUSAND/UL (ref 4.31–10.16)
WBC # BLD AUTO: 20.28 THOUSAND/UL (ref 4.31–10.16)
WBC #/AREA URNS AUTO: ABNORMAL /HPF

## 2021-01-01 PROCEDURE — 94618 PULMONARY STRESS TESTING: CPT | Performed by: INTERNAL MEDICINE

## 2021-01-01 PROCEDURE — 94761 N-INVAS EAR/PLS OXIMETRY MLT: CPT

## 2021-01-01 PROCEDURE — 97163 PT EVAL HIGH COMPLEX 45 MIN: CPT

## 2021-01-01 PROCEDURE — 97530 THERAPEUTIC ACTIVITIES: CPT

## 2021-01-01 PROCEDURE — 97760 ORTHOTIC MGMT&TRAING 1ST ENC: CPT

## 2021-01-01 PROCEDURE — 83735 ASSAY OF MAGNESIUM: CPT | Performed by: STUDENT IN AN ORGANIZED HEALTH CARE EDUCATION/TRAINING PROGRAM

## 2021-01-01 PROCEDURE — 94760 N-INVAS EAR/PLS OXIMETRY 1: CPT

## 2021-01-01 PROCEDURE — 36600 WITHDRAWAL OF ARTERIAL BLOOD: CPT

## 2021-01-01 PROCEDURE — 97110 THERAPEUTIC EXERCISES: CPT

## 2021-01-01 PROCEDURE — 92526 ORAL FUNCTION THERAPY: CPT

## 2021-01-01 PROCEDURE — 82948 REAGENT STRIP/BLOOD GLUCOSE: CPT

## 2021-01-01 PROCEDURE — 80076 HEPATIC FUNCTION PANEL: CPT | Performed by: EMERGENCY MEDICINE

## 2021-01-01 PROCEDURE — 94664 DEMO&/EVAL PT USE INHALER: CPT

## 2021-01-01 PROCEDURE — 94640 AIRWAY INHALATION TREATMENT: CPT

## 2021-01-01 PROCEDURE — 99233 SBSQ HOSP IP/OBS HIGH 50: CPT | Performed by: STUDENT IN AN ORGANIZED HEALTH CARE EDUCATION/TRAINING PROGRAM

## 2021-01-01 PROCEDURE — 82397 CHEMILUMINESCENT ASSAY: CPT | Performed by: INTERNAL MEDICINE

## 2021-01-01 PROCEDURE — 71275 CT ANGIOGRAPHY CHEST: CPT

## 2021-01-01 PROCEDURE — 82805 BLOOD GASES W/O2 SATURATION: CPT | Performed by: INTERNAL MEDICINE

## 2021-01-01 PROCEDURE — 80048 BASIC METABOLIC PNL TOTAL CA: CPT | Performed by: PHYSICIAN ASSISTANT

## 2021-01-01 PROCEDURE — 94060 EVALUATION OF WHEEZING: CPT

## 2021-01-01 PROCEDURE — NC001 PR NO CHARGE

## 2021-01-01 PROCEDURE — 80048 BASIC METABOLIC PNL TOTAL CA: CPT | Performed by: INTERNAL MEDICINE

## 2021-01-01 PROCEDURE — 84100 ASSAY OF PHOSPHORUS: CPT | Performed by: INTERNAL MEDICINE

## 2021-01-01 PROCEDURE — 85025 COMPLETE CBC W/AUTO DIFF WBC: CPT | Performed by: INTERNAL MEDICINE

## 2021-01-01 PROCEDURE — 99223 1ST HOSP IP/OBS HIGH 75: CPT | Performed by: PHYSICAL MEDICINE & REHABILITATION

## 2021-01-01 PROCEDURE — 99222 1ST HOSP IP/OBS MODERATE 55: CPT | Performed by: INTERNAL MEDICINE

## 2021-01-01 PROCEDURE — 82947 ASSAY GLUCOSE BLOOD QUANT: CPT

## 2021-01-01 PROCEDURE — 92610 EVALUATE SWALLOWING FUNCTION: CPT

## 2021-01-01 PROCEDURE — 99232 SBSQ HOSP IP/OBS MODERATE 35: CPT | Performed by: PHYSICAL MEDICINE & REHABILITATION

## 2021-01-01 PROCEDURE — 99239 HOSP IP/OBS DSCHRG MGMT >30: CPT | Performed by: STUDENT IN AN ORGANIZED HEALTH CARE EDUCATION/TRAINING PROGRAM

## 2021-01-01 PROCEDURE — 82306 VITAMIN D 25 HYDROXY: CPT | Performed by: INTERNAL MEDICINE

## 2021-01-01 PROCEDURE — 85610 PROTHROMBIN TIME: CPT

## 2021-01-01 PROCEDURE — 99304 1ST NF CARE SF/LOW MDM 25: CPT | Performed by: INTERNAL MEDICINE

## 2021-01-01 PROCEDURE — 99220 PR INITIAL OBSERVATION CARE/DAY 70 MINUTES: CPT | Performed by: PHYSICIAN ASSISTANT

## 2021-01-01 PROCEDURE — 84484 ASSAY OF TROPONIN QUANT: CPT | Performed by: INTERNAL MEDICINE

## 2021-01-01 PROCEDURE — 71045 X-RAY EXAM CHEST 1 VIEW: CPT

## 2021-01-01 PROCEDURE — 85007 BL SMEAR W/DIFF WBC COUNT: CPT

## 2021-01-01 PROCEDURE — 96374 THER/PROPH/DIAG INJ IV PUSH: CPT

## 2021-01-01 PROCEDURE — 94668 MNPJ CHEST WALL SBSQ: CPT

## 2021-01-01 PROCEDURE — 83735 ASSAY OF MAGNESIUM: CPT | Performed by: INTERNAL MEDICINE

## 2021-01-01 PROCEDURE — 97116 GAIT TRAINING THERAPY: CPT

## 2021-01-01 PROCEDURE — 93321 DOPPLER ECHO F-UP/LMTD STD: CPT | Performed by: INTERNAL MEDICINE

## 2021-01-01 PROCEDURE — 97535 SELF CARE MNGMENT TRAINING: CPT

## 2021-01-01 PROCEDURE — 99222 1ST HOSP IP/OBS MODERATE 55: CPT | Performed by: NURSE PRACTITIONER

## 2021-01-01 PROCEDURE — 0241U HB NFCT DS VIR RESP RNA 4 TRGT: CPT

## 2021-01-01 PROCEDURE — 85025 COMPLETE CBC W/AUTO DIFF WBC: CPT | Performed by: EMERGENCY MEDICINE

## 2021-01-01 PROCEDURE — 87086 URINE CULTURE/COLONY COUNT: CPT | Performed by: INTERNAL MEDICINE

## 2021-01-01 PROCEDURE — 0241U HB NFCT DS VIR RESP RNA 4 TRGT: CPT | Performed by: PHYSICAL MEDICINE & REHABILITATION

## 2021-01-01 PROCEDURE — 99232 SBSQ HOSP IP/OBS MODERATE 35: CPT | Performed by: INTERNAL MEDICINE

## 2021-01-01 PROCEDURE — 80053 COMPREHEN METABOLIC PANEL: CPT | Performed by: INTERNAL MEDICINE

## 2021-01-01 PROCEDURE — 84439 ASSAY OF FREE THYROXINE: CPT | Performed by: INTERNAL MEDICINE

## 2021-01-01 PROCEDURE — 99233 SBSQ HOSP IP/OBS HIGH 50: CPT | Performed by: INTERNAL MEDICINE

## 2021-01-01 PROCEDURE — G1004 CDSM NDSC: HCPCS

## 2021-01-01 PROCEDURE — 93005 ELECTROCARDIOGRAM TRACING: CPT

## 2021-01-01 PROCEDURE — 94003 VENT MGMT INPAT SUBQ DAY: CPT

## 2021-01-01 PROCEDURE — 85027 COMPLETE CBC AUTOMATED: CPT

## 2021-01-01 PROCEDURE — 99309 SBSQ NF CARE MODERATE MDM 30: CPT | Performed by: INTERNAL MEDICINE

## 2021-01-01 PROCEDURE — 99231 SBSQ HOSP IP/OBS SF/LOW 25: CPT | Performed by: PHYSICAL MEDICINE & REHABILITATION

## 2021-01-01 PROCEDURE — 84132 ASSAY OF SERUM POTASSIUM: CPT

## 2021-01-01 PROCEDURE — 83880 ASSAY OF NATRIURETIC PEPTIDE: CPT

## 2021-01-01 PROCEDURE — 81001 URINALYSIS AUTO W/SCOPE: CPT | Performed by: EMERGENCY MEDICINE

## 2021-01-01 PROCEDURE — 80053 COMPREHEN METABOLIC PANEL: CPT | Performed by: STUDENT IN AN ORGANIZED HEALTH CARE EDUCATION/TRAINING PROGRAM

## 2021-01-01 PROCEDURE — 94729 DIFFUSING CAPACITY: CPT

## 2021-01-01 PROCEDURE — 97530 THERAPEUTIC ACTIVITIES: CPT | Performed by: PHYSICAL THERAPIST

## 2021-01-01 PROCEDURE — 82803 BLOOD GASES ANY COMBINATION: CPT

## 2021-01-01 PROCEDURE — 85730 THROMBOPLASTIN TIME PARTIAL: CPT

## 2021-01-01 PROCEDURE — 94640 AIRWAY INHALATION TREATMENT: CPT | Performed by: SOCIAL WORKER

## 2021-01-01 PROCEDURE — 99233 SBSQ HOSP IP/OBS HIGH 50: CPT | Performed by: PHYSICAL MEDICINE & REHABILITATION

## 2021-01-01 PROCEDURE — 93308 TTE F-UP OR LMTD: CPT | Performed by: INTERNAL MEDICINE

## 2021-01-01 PROCEDURE — 99233 SBSQ HOSP IP/OBS HIGH 50: CPT | Performed by: PHYSICIAN ASSISTANT

## 2021-01-01 PROCEDURE — 36415 COLL VENOUS BLD VENIPUNCTURE: CPT | Performed by: EMERGENCY MEDICINE

## 2021-01-01 PROCEDURE — 0241U HB NFCT DS VIR RESP RNA 4 TRGT: CPT | Performed by: STUDENT IN AN ORGANIZED HEALTH CARE EDUCATION/TRAINING PROGRAM

## 2021-01-01 PROCEDURE — 85049 AUTOMATED PLATELET COUNT: CPT | Performed by: PHYSICIAN ASSISTANT

## 2021-01-01 PROCEDURE — 85025 COMPLETE CBC W/AUTO DIFF WBC: CPT | Performed by: NURSE PRACTITIONER

## 2021-01-01 PROCEDURE — 99305 1ST NF CARE MODERATE MDM 35: CPT | Performed by: INTERNAL MEDICINE

## 2021-01-01 PROCEDURE — 72128 CT CHEST SPINE W/O DYE: CPT

## 2021-01-01 PROCEDURE — 87040 BLOOD CULTURE FOR BACTERIA: CPT

## 2021-01-01 PROCEDURE — 74018 RADEX ABDOMEN 1 VIEW: CPT

## 2021-01-01 PROCEDURE — 84443 ASSAY THYROID STIM HORMONE: CPT | Performed by: INTERNAL MEDICINE

## 2021-01-01 PROCEDURE — 99285 EMERGENCY DEPT VISIT HI MDM: CPT | Performed by: EMERGENCY MEDICINE

## 2021-01-01 PROCEDURE — 80048 BASIC METABOLIC PNL TOTAL CA: CPT | Performed by: NURSE PRACTITIONER

## 2021-01-01 PROCEDURE — 99223 1ST HOSP IP/OBS HIGH 75: CPT | Performed by: INTERNAL MEDICINE

## 2021-01-01 PROCEDURE — 85379 FIBRIN DEGRADATION QUANT: CPT | Performed by: EMERGENCY MEDICINE

## 2021-01-01 PROCEDURE — 99222 1ST HOSP IP/OBS MODERATE 55: CPT | Performed by: ORTHOPAEDIC SURGERY

## 2021-01-01 PROCEDURE — 83605 ASSAY OF LACTIC ACID: CPT

## 2021-01-01 PROCEDURE — 84484 ASSAY OF TROPONIN QUANT: CPT | Performed by: EMERGENCY MEDICINE

## 2021-01-01 PROCEDURE — 97116 GAIT TRAINING THERAPY: CPT | Performed by: PHYSICAL THERAPIST

## 2021-01-01 PROCEDURE — 96376 TX/PRO/DX INJ SAME DRUG ADON: CPT

## 2021-01-01 PROCEDURE — 86334 IMMUNOFIX E-PHORESIS SERUM: CPT | Performed by: PATHOLOGY

## 2021-01-01 PROCEDURE — 99231 SBSQ HOSP IP/OBS SF/LOW 25: CPT | Performed by: PHYSICIAN ASSISTANT

## 2021-01-01 PROCEDURE — 99214 OFFICE O/P EST MOD 30 MIN: CPT | Performed by: INTERNAL MEDICINE

## 2021-01-01 PROCEDURE — 82164 ANGIOTENSIN I ENZYME TEST: CPT | Performed by: INTERNAL MEDICINE

## 2021-01-01 PROCEDURE — 74177 CT ABD & PELVIS W/CONTRAST: CPT

## 2021-01-01 PROCEDURE — 99239 HOSP IP/OBS DSCHRG MGMT >30: CPT | Performed by: PHYSICAL MEDICINE & REHABILITATION

## 2021-01-01 PROCEDURE — 80048 BASIC METABOLIC PNL TOTAL CA: CPT | Performed by: EMERGENCY MEDICINE

## 2021-01-01 PROCEDURE — 36415 COLL VENOUS BLD VENIPUNCTURE: CPT

## 2021-01-01 PROCEDURE — 83970 ASSAY OF PARATHORMONE: CPT | Performed by: INTERNAL MEDICINE

## 2021-01-01 PROCEDURE — 94727 GAS DIL/WSHOT DETER LNG VOL: CPT | Performed by: INTERNAL MEDICINE

## 2021-01-01 PROCEDURE — 97167 OT EVAL HIGH COMPLEX 60 MIN: CPT

## 2021-01-01 PROCEDURE — 82330 ASSAY OF CALCIUM: CPT | Performed by: INTERNAL MEDICINE

## 2021-01-01 PROCEDURE — 99285 EMERGENCY DEPT VISIT HI MDM: CPT

## 2021-01-01 PROCEDURE — 96361 HYDRATE IV INFUSION ADD-ON: CPT

## 2021-01-01 PROCEDURE — 83880 ASSAY OF NATRIURETIC PEPTIDE: CPT | Performed by: INTERNAL MEDICINE

## 2021-01-01 PROCEDURE — 83036 HEMOGLOBIN GLYCOSYLATED A1C: CPT | Performed by: INTERNAL MEDICINE

## 2021-01-01 PROCEDURE — 84165 PROTEIN E-PHORESIS SERUM: CPT | Performed by: INTERNAL MEDICINE

## 2021-01-01 PROCEDURE — 72146 MRI CHEST SPINE W/O DYE: CPT

## 2021-01-01 PROCEDURE — 99308 SBSQ NF CARE LOW MDM 20: CPT | Performed by: INTERNAL MEDICINE

## 2021-01-01 PROCEDURE — 80053 COMPREHEN METABOLIC PANEL: CPT

## 2021-01-01 PROCEDURE — 71046 X-RAY EXAM CHEST 2 VIEWS: CPT

## 2021-01-01 PROCEDURE — 93010 ELECTROCARDIOGRAM REPORT: CPT | Performed by: INTERNAL MEDICINE

## 2021-01-01 PROCEDURE — 84484 ASSAY OF TROPONIN QUANT: CPT

## 2021-01-01 PROCEDURE — 96375 TX/PRO/DX INJ NEW DRUG ADDON: CPT

## 2021-01-01 PROCEDURE — 84166 PROTEIN E-PHORESIS/URINE/CSF: CPT | Performed by: INTERNAL MEDICINE

## 2021-01-01 PROCEDURE — 85025 COMPLETE CBC W/AUTO DIFF WBC: CPT | Performed by: STUDENT IN AN ORGANIZED HEALTH CARE EDUCATION/TRAINING PROGRAM

## 2021-01-01 PROCEDURE — 84295 ASSAY OF SERUM SODIUM: CPT

## 2021-01-01 PROCEDURE — 87077 CULTURE AEROBIC IDENTIFY: CPT | Performed by: INTERNAL MEDICINE

## 2021-01-01 PROCEDURE — 84145 PROCALCITONIN (PCT): CPT | Performed by: PHYSICIAN ASSISTANT

## 2021-01-01 PROCEDURE — 0241U HB NFCT DS VIR RESP RNA 4 TRGT: CPT | Performed by: EMERGENCY MEDICINE

## 2021-01-01 PROCEDURE — 94644 CONT INHLJ TX 1ST HOUR: CPT

## 2021-01-01 PROCEDURE — 84166 PROTEIN E-PHORESIS/URINE/CSF: CPT | Performed by: PATHOLOGY

## 2021-01-01 PROCEDURE — 99214 OFFICE O/P EST MOD 30 MIN: CPT | Performed by: PHYSICIAN ASSISTANT

## 2021-01-01 PROCEDURE — 94729 DIFFUSING CAPACITY: CPT | Performed by: INTERNAL MEDICINE

## 2021-01-01 PROCEDURE — 94002 VENT MGMT INPAT INIT DAY: CPT

## 2021-01-01 PROCEDURE — 86334 IMMUNOFIX E-PHORESIS SERUM: CPT | Performed by: INTERNAL MEDICINE

## 2021-01-01 PROCEDURE — 93306 TTE W/DOPPLER COMPLETE: CPT

## 2021-01-01 PROCEDURE — 94060 EVALUATION OF WHEEZING: CPT | Performed by: INTERNAL MEDICINE

## 2021-01-01 PROCEDURE — 93325 DOPPLER ECHO COLOR FLOW MAPG: CPT | Performed by: INTERNAL MEDICINE

## 2021-01-01 PROCEDURE — 94727 GAS DIL/WSHOT DETER LNG VOL: CPT

## 2021-01-01 PROCEDURE — 85014 HEMATOCRIT: CPT

## 2021-01-01 PROCEDURE — 97129 THER IVNTJ 1ST 15 MIN: CPT

## 2021-01-01 PROCEDURE — 84165 PROTEIN E-PHORESIS SERUM: CPT | Performed by: PATHOLOGY

## 2021-01-01 PROCEDURE — 87186 SC STD MICRODIL/AGAR DIL: CPT | Performed by: INTERNAL MEDICINE

## 2021-01-01 PROCEDURE — 72131 CT LUMBAR SPINE W/O DYE: CPT

## 2021-01-01 PROCEDURE — NC001 PR NO CHARGE: Performed by: INTERNAL MEDICINE

## 2021-01-01 PROCEDURE — 85027 COMPLETE CBC AUTOMATED: CPT | Performed by: INTERNAL MEDICINE

## 2021-01-01 RX ORDER — SODIUM CHLORIDE 9 MG/ML
75 INJECTION, SOLUTION INTRAVENOUS CONTINUOUS
Status: DISCONTINUED | OUTPATIENT
Start: 2021-01-01 | End: 2021-01-01

## 2021-01-01 RX ORDER — GLYBURIDE 1.25 MG/1
1.25 TABLET ORAL
Status: DISCONTINUED | OUTPATIENT
Start: 2021-01-01 | End: 2021-01-01

## 2021-01-01 RX ORDER — SODIUM CHLORIDE 9 MG/ML
500 INJECTION, SOLUTION INTRAVENOUS ONCE
Status: COMPLETED | OUTPATIENT
Start: 2021-01-01 | End: 2021-01-01

## 2021-01-01 RX ORDER — IPRATROPIUM BROMIDE AND ALBUTEROL SULFATE 2.5; .5 MG/3ML; MG/3ML
3 SOLUTION RESPIRATORY (INHALATION)
Status: DISCONTINUED | OUTPATIENT
Start: 2021-01-01 | End: 2021-01-01

## 2021-01-01 RX ORDER — LORAZEPAM 2 MG/ML
0.5 INJECTION INTRAMUSCULAR
Status: DISCONTINUED | OUTPATIENT
Start: 2021-01-01 | End: 2021-03-30 | Stop reason: HOSPADM

## 2021-01-01 RX ORDER — METHYLPREDNISOLONE SODIUM SUCCINATE 40 MG/ML
40 INJECTION, POWDER, LYOPHILIZED, FOR SOLUTION INTRAMUSCULAR; INTRAVENOUS DAILY
Status: CANCELLED | OUTPATIENT
Start: 2021-01-01

## 2021-01-01 RX ORDER — METHOCARBAMOL 500 MG/1
500 TABLET, FILM COATED ORAL EVERY 6 HOURS PRN
Status: DISCONTINUED | OUTPATIENT
Start: 2021-01-01 | End: 2021-01-01 | Stop reason: HOSPADM

## 2021-01-01 RX ORDER — ALBUMIN, HUMAN INJ 5% 5 %
25 SOLUTION INTRAVENOUS ONCE
Status: COMPLETED | OUTPATIENT
Start: 2021-01-01 | End: 2021-01-01

## 2021-01-01 RX ORDER — MAGNESIUM HYDROXIDE/ALUMINUM HYDROXICE/SIMETHICONE 120; 1200; 1200 MG/30ML; MG/30ML; MG/30ML
30 SUSPENSION ORAL EVERY 6 HOURS PRN
Status: DISCONTINUED | OUTPATIENT
Start: 2021-01-01 | End: 2021-01-01 | Stop reason: HOSPADM

## 2021-01-01 RX ORDER — HEPARIN SODIUM 5000 [USP'U]/ML
5000 INJECTION, SOLUTION INTRAVENOUS; SUBCUTANEOUS EVERY 8 HOURS SCHEDULED
Qty: 1 ML | Refills: 0
Start: 2021-01-01

## 2021-01-01 RX ORDER — GLYBURIDE 1.25 MG/1
2.5 TABLET ORAL
Status: DISCONTINUED | OUTPATIENT
Start: 2021-01-01 | End: 2021-01-01 | Stop reason: HOSPADM

## 2021-01-01 RX ORDER — MAGNESIUM SULFATE 1 G/100ML
1 INJECTION INTRAVENOUS ONCE
Status: COMPLETED | OUTPATIENT
Start: 2021-01-01 | End: 2021-01-01

## 2021-01-01 RX ORDER — PREDNISONE 20 MG/1
40 TABLET ORAL DAILY
Status: CANCELLED | OUTPATIENT
Start: 2021-01-01 | End: 2021-01-01

## 2021-01-01 RX ORDER — ALBUTEROL SULFATE 90 UG/1
2 AEROSOL, METERED RESPIRATORY (INHALATION) EVERY 6 HOURS PRN
Status: DISCONTINUED | OUTPATIENT
Start: 2021-01-01 | End: 2021-01-01

## 2021-01-01 RX ORDER — MONTELUKAST SODIUM 10 MG/1
10 TABLET ORAL
Status: DISCONTINUED | OUTPATIENT
Start: 2021-01-01 | End: 2021-01-01 | Stop reason: HOSPADM

## 2021-01-01 RX ORDER — GUAIFENESIN 600 MG
600 TABLET, EXTENDED RELEASE 12 HR ORAL EVERY 12 HOURS SCHEDULED
Status: DISCONTINUED | OUTPATIENT
Start: 2021-01-01 | End: 2021-01-01 | Stop reason: HOSPADM

## 2021-01-01 RX ORDER — LIDOCAINE 50 MG/G
2 PATCH TOPICAL DAILY
Status: DISCONTINUED | OUTPATIENT
Start: 2021-01-01 | End: 2021-03-30 | Stop reason: HOSPADM

## 2021-01-01 RX ORDER — GLYBURIDE 5 MG/1
5 TABLET ORAL 2 TIMES DAILY WITH MEALS
Status: DISCONTINUED | OUTPATIENT
Start: 2021-01-01 | End: 2021-01-01

## 2021-01-01 RX ORDER — TRAMADOL HYDROCHLORIDE 50 MG/1
50 TABLET ORAL EVERY 6 HOURS PRN
Status: DISCONTINUED | OUTPATIENT
Start: 2021-01-01 | End: 2021-01-01

## 2021-01-01 RX ORDER — GUAIFENESIN 600 MG
600 TABLET, EXTENDED RELEASE 12 HR ORAL EVERY 12 HOURS SCHEDULED
Refills: 0
Start: 2021-01-01 | End: 2021-01-01

## 2021-01-01 RX ORDER — SACCHAROMYCES BOULARDII 250 MG
250 CAPSULE ORAL 2 TIMES DAILY
Status: DISCONTINUED | OUTPATIENT
Start: 2021-01-01 | End: 2021-01-01 | Stop reason: HOSPADM

## 2021-01-01 RX ORDER — ALBUTEROL SULFATE 90 UG/1
2 AEROSOL, METERED RESPIRATORY (INHALATION) EVERY 6 HOURS PRN
Status: CANCELLED | OUTPATIENT
Start: 2021-01-01

## 2021-01-01 RX ORDER — ATORVASTATIN CALCIUM 40 MG/1
80 TABLET, FILM COATED ORAL
Status: DISCONTINUED | OUTPATIENT
Start: 2021-01-01 | End: 2021-01-01

## 2021-01-01 RX ORDER — LIDOCAINE 50 MG/G
2 PATCH TOPICAL DAILY
Status: DISCONTINUED | OUTPATIENT
Start: 2021-01-01 | End: 2021-01-01 | Stop reason: HOSPADM

## 2021-01-01 RX ORDER — NYSTATIN 100000 U/G
CREAM TOPICAL 2 TIMES DAILY
Status: DISCONTINUED | OUTPATIENT
Start: 2021-01-01 | End: 2021-03-30 | Stop reason: HOSPADM

## 2021-01-01 RX ORDER — PREDNISONE 20 MG/1
40 TABLET ORAL DAILY
Status: DISCONTINUED | OUTPATIENT
Start: 2021-01-01 | End: 2021-01-01

## 2021-01-01 RX ORDER — FLUTICASONE FUROATE AND VILANTEROL 100; 25 UG/1; UG/1
1 POWDER RESPIRATORY (INHALATION) DAILY
Status: DISCONTINUED | OUTPATIENT
Start: 2021-01-01 | End: 2021-01-01

## 2021-01-01 RX ORDER — IPRATROPIUM BROMIDE AND ALBUTEROL SULFATE 2.5; .5 MG/3ML; MG/3ML
3 SOLUTION RESPIRATORY (INHALATION)
Status: CANCELLED | OUTPATIENT
Start: 2021-01-01

## 2021-01-01 RX ORDER — MONTELUKAST SODIUM 10 MG/1
10 TABLET ORAL
Refills: 0
Start: 2021-01-01

## 2021-01-01 RX ORDER — FLUTICASONE FUROATE AND VILANTEROL 100; 25 UG/1; UG/1
1 POWDER RESPIRATORY (INHALATION) DAILY
Qty: 1 INHALER | Refills: 3 | Status: SHIPPED | OUTPATIENT
Start: 2021-01-01 | End: 2021-01-01 | Stop reason: HOSPADM

## 2021-01-01 RX ORDER — METOPROLOL TARTRATE 5 MG/5ML
2.5 INJECTION INTRAVENOUS ONCE
Status: COMPLETED | OUTPATIENT
Start: 2021-01-01 | End: 2021-01-01

## 2021-01-01 RX ORDER — BENZONATATE 100 MG/1
100 CAPSULE ORAL 3 TIMES DAILY PRN
Status: CANCELLED | OUTPATIENT
Start: 2021-01-01

## 2021-01-01 RX ORDER — METHYLPREDNISOLONE SODIUM SUCCINATE 40 MG/ML
40 INJECTION, POWDER, LYOPHILIZED, FOR SOLUTION INTRAMUSCULAR; INTRAVENOUS DAILY
Status: DISCONTINUED | OUTPATIENT
Start: 2021-01-01 | End: 2021-01-01

## 2021-01-01 RX ORDER — ONDANSETRON 4 MG/1
4 TABLET, ORALLY DISINTEGRATING ORAL EVERY 6 HOURS PRN
Status: DISCONTINUED | OUTPATIENT
Start: 2021-01-01 | End: 2021-01-01 | Stop reason: HOSPADM

## 2021-01-01 RX ORDER — LANOLIN ALCOHOL/MO/W.PET/CERES
6 CREAM (GRAM) TOPICAL
Status: DISCONTINUED | OUTPATIENT
Start: 2021-01-01 | End: 2021-01-01

## 2021-01-01 RX ORDER — LANOLIN ALCOHOL/MO/W.PET/CERES
6 CREAM (GRAM) TOPICAL
Refills: 0
Start: 2021-01-01

## 2021-01-01 RX ORDER — FLUTICASONE FUROATE AND VILANTEROL 100; 25 UG/1; UG/1
1 POWDER RESPIRATORY (INHALATION) DAILY
Status: DISCONTINUED | OUTPATIENT
Start: 2021-01-01 | End: 2021-01-01 | Stop reason: HOSPADM

## 2021-01-01 RX ORDER — GLYBURIDE 2.5 MG/1
2.5 TABLET ORAL
Refills: 0
Start: 2021-01-01 | End: 2021-01-01

## 2021-01-01 RX ORDER — PANTOPRAZOLE SODIUM 40 MG/1
40 TABLET, DELAYED RELEASE ORAL
Status: CANCELLED | OUTPATIENT
Start: 2021-01-01

## 2021-01-01 RX ORDER — OXYCODONE HYDROCHLORIDE 5 MG/1
5 TABLET ORAL EVERY 6 HOURS PRN
Status: CANCELLED | OUTPATIENT
Start: 2021-01-01

## 2021-01-01 RX ORDER — MONTELUKAST SODIUM 10 MG/1
10 TABLET ORAL
Status: DISCONTINUED | OUTPATIENT
Start: 2021-01-01 | End: 2021-01-01

## 2021-01-01 RX ORDER — SENNOSIDES 8.6 MG
1 TABLET ORAL
Status: CANCELLED | OUTPATIENT
Start: 2021-01-01

## 2021-01-01 RX ORDER — FLUTICASONE FUROATE AND VILANTEROL 100; 25 UG/1; UG/1
1 POWDER RESPIRATORY (INHALATION) DAILY
Refills: 0
Start: 2021-01-01

## 2021-01-01 RX ORDER — FUROSEMIDE 10 MG/ML
20 INJECTION INTRAMUSCULAR; INTRAVENOUS ONCE
Status: COMPLETED | OUTPATIENT
Start: 2021-01-01 | End: 2021-01-01

## 2021-01-01 RX ORDER — ACETAMINOPHEN 325 MG/1
650 TABLET ORAL EVERY 6 HOURS SCHEDULED
Status: DISCONTINUED | OUTPATIENT
Start: 2021-01-01 | End: 2021-01-01

## 2021-01-01 RX ORDER — TORSEMIDE 10 MG/1
TABLET ORAL
Qty: 90 TABLET | Refills: 1 | Status: SHIPPED | OUTPATIENT
Start: 2021-01-01 | End: 2021-01-01 | Stop reason: HOSPADM

## 2021-01-01 RX ORDER — FUROSEMIDE 20 MG/1
20 TABLET ORAL DAILY
Status: DISCONTINUED | OUTPATIENT
Start: 2021-01-01 | End: 2021-01-01 | Stop reason: HOSPADM

## 2021-01-01 RX ORDER — AZITHROMYCIN 250 MG/1
500 TABLET, FILM COATED ORAL EVERY 24 HOURS
Status: DISCONTINUED | OUTPATIENT
Start: 2021-01-01 | End: 2021-01-01

## 2021-01-01 RX ORDER — POLYETHYLENE GLYCOL 3350 17 G/17G
17 POWDER, FOR SOLUTION ORAL DAILY
Status: DISCONTINUED | OUTPATIENT
Start: 2021-01-01 | End: 2021-01-01 | Stop reason: HOSPADM

## 2021-01-01 RX ORDER — METOPROLOL TARTRATE 5 MG/5ML
5 INJECTION INTRAVENOUS EVERY 6 HOURS PRN
Status: DISCONTINUED | OUTPATIENT
Start: 2021-01-01 | End: 2021-01-01

## 2021-01-01 RX ORDER — HYDROMORPHONE HCL/PF 1 MG/ML
0.5 SYRINGE (ML) INJECTION EVERY 4 HOURS PRN
Status: DISCONTINUED | OUTPATIENT
Start: 2021-01-01 | End: 2021-01-01 | Stop reason: HOSPADM

## 2021-01-01 RX ORDER — PREDNISONE 20 MG/1
20 TABLET ORAL DAILY
Status: COMPLETED | OUTPATIENT
Start: 2021-01-01 | End: 2021-01-01

## 2021-01-01 RX ORDER — PREDNISONE 20 MG/1
40 TABLET ORAL DAILY
Status: DISCONTINUED | OUTPATIENT
Start: 2021-01-01 | End: 2021-01-01 | Stop reason: HOSPADM

## 2021-01-01 RX ORDER — ALPRAZOLAM 0.25 MG/1
0.25 TABLET ORAL 3 TIMES DAILY PRN
Status: DISCONTINUED | OUTPATIENT
Start: 2021-01-01 | End: 2021-01-01 | Stop reason: HOSPADM

## 2021-01-01 RX ORDER — ALPRAZOLAM 0.25 MG/1
0.25 TABLET ORAL 3 TIMES DAILY PRN
Status: CANCELLED | OUTPATIENT
Start: 2021-01-01

## 2021-01-01 RX ORDER — PREDNISONE 10 MG/1
10 TABLET ORAL DAILY
Status: COMPLETED | OUTPATIENT
Start: 2021-01-01 | End: 2021-01-01

## 2021-01-01 RX ORDER — FUROSEMIDE 10 MG/ML
40 INJECTION INTRAMUSCULAR; INTRAVENOUS ONCE
Status: DISCONTINUED | OUTPATIENT
Start: 2021-01-01 | End: 2021-01-01

## 2021-01-01 RX ORDER — LEVOTHYROXINE SODIUM 0.12 MG/1
125 TABLET ORAL
Refills: 0
Start: 2021-01-01

## 2021-01-01 RX ORDER — ALBUTEROL SULFATE 90 UG/1
2 AEROSOL, METERED RESPIRATORY (INHALATION) 3 TIMES DAILY
Status: DISCONTINUED | OUTPATIENT
Start: 2021-01-01 | End: 2021-01-01

## 2021-01-01 RX ORDER — TRAMADOL HYDROCHLORIDE 50 MG/1
50 TABLET ORAL EVERY 6 HOURS PRN
Qty: 20 TABLET | Refills: 0 | Status: SHIPPED | OUTPATIENT
Start: 2021-01-01 | End: 2021-01-01

## 2021-01-01 RX ORDER — LANOLIN ALCOHOL/MO/W.PET/CERES
CREAM (GRAM) TOPICAL 3 TIMES DAILY PRN
Status: DISCONTINUED | OUTPATIENT
Start: 2021-01-01 | End: 2021-01-01 | Stop reason: HOSPADM

## 2021-01-01 RX ORDER — TRAMADOL HYDROCHLORIDE 50 MG/1
50 TABLET ORAL EVERY 6 HOURS PRN
Status: CANCELLED | OUTPATIENT
Start: 2021-01-01

## 2021-01-01 RX ORDER — LEVOTHYROXINE SODIUM 0.1 MG/1
100 TABLET ORAL
Status: DISCONTINUED | OUTPATIENT
Start: 2021-01-01 | End: 2021-01-01

## 2021-01-01 RX ORDER — ALBUTEROL SULFATE 90 UG/1
2 AEROSOL, METERED RESPIRATORY (INHALATION) EVERY 6 HOURS PRN
Status: DISCONTINUED | OUTPATIENT
Start: 2021-01-01 | End: 2021-01-01 | Stop reason: HOSPADM

## 2021-01-01 RX ORDER — ALBUTEROL SULFATE 2.5 MG/3ML
2.5 SOLUTION RESPIRATORY (INHALATION) EVERY 6 HOURS PRN
Status: DISCONTINUED | OUTPATIENT
Start: 2021-01-01 | End: 2021-01-01

## 2021-01-01 RX ORDER — NYSTATIN 100000 [USP'U]/G
POWDER TOPICAL 2 TIMES DAILY
Qty: 15 G | Refills: 0
Start: 2021-01-01

## 2021-01-01 RX ORDER — LANOLIN ALCOHOL/MO/W.PET/CERES
6 CREAM (GRAM) TOPICAL
Status: DISCONTINUED | OUTPATIENT
Start: 2021-01-01 | End: 2021-01-01 | Stop reason: HOSPADM

## 2021-01-01 RX ORDER — OXYCODONE HYDROCHLORIDE 5 MG/1
5 TABLET ORAL EVERY 6 HOURS PRN
Status: DISCONTINUED | OUTPATIENT
Start: 2021-01-01 | End: 2021-01-01 | Stop reason: HOSPADM

## 2021-01-01 RX ORDER — CEFTRIAXONE 1 G/50ML
1000 INJECTION, SOLUTION INTRAVENOUS EVERY 24 HOURS
Status: DISCONTINUED | OUTPATIENT
Start: 2021-01-01 | End: 2021-01-01

## 2021-01-01 RX ORDER — CALCITONIN SALMON 200 [USP'U]/ML
4 INJECTION, SOLUTION INTRAMUSCULAR; SUBCUTANEOUS ONCE
Status: COMPLETED | OUTPATIENT
Start: 2021-01-01 | End: 2021-01-01

## 2021-01-01 RX ORDER — TORSEMIDE 10 MG/1
10 TABLET ORAL DAILY
Status: DISCONTINUED | OUTPATIENT
Start: 2021-01-01 | End: 2021-01-01

## 2021-01-01 RX ORDER — LEVOTHYROXINE SODIUM 0.12 MG/1
125 TABLET ORAL DAILY
Status: DISCONTINUED | OUTPATIENT
Start: 2021-01-01 | End: 2021-01-01 | Stop reason: HOSPADM

## 2021-01-01 RX ORDER — DILTIAZEM HYDROCHLORIDE 5 MG/ML
10 INJECTION INTRAVENOUS EVERY 6 HOURS
Status: DISCONTINUED | OUTPATIENT
Start: 2021-01-01 | End: 2021-01-01

## 2021-01-01 RX ORDER — PREDNISONE 20 MG/1
40 TABLET ORAL DAILY
Status: COMPLETED | OUTPATIENT
Start: 2021-01-01 | End: 2021-01-01

## 2021-01-01 RX ORDER — LEVOTHYROXINE SODIUM 0.12 MG/1
125 TABLET ORAL DAILY
Status: CANCELLED | OUTPATIENT
Start: 2021-01-01

## 2021-01-01 RX ORDER — IPRATROPIUM BROMIDE AND ALBUTEROL SULFATE 2.5; .5 MG/3ML; MG/3ML
SOLUTION RESPIRATORY (INHALATION)
Status: COMPLETED
Start: 2021-01-01 | End: 2021-01-01

## 2021-01-01 RX ORDER — LEVALBUTEROL 1.25 MG/.5ML
1.25 SOLUTION, CONCENTRATE RESPIRATORY (INHALATION)
Status: DISCONTINUED | OUTPATIENT
Start: 2021-01-01 | End: 2021-01-01

## 2021-01-01 RX ORDER — SENNOSIDES 8.6 MG
1 TABLET ORAL
Status: DISCONTINUED | OUTPATIENT
Start: 2021-01-01 | End: 2021-01-01 | Stop reason: HOSPADM

## 2021-01-01 RX ORDER — FUROSEMIDE 10 MG/ML
40 INJECTION INTRAMUSCULAR; INTRAVENOUS ONCE
Status: COMPLETED | OUTPATIENT
Start: 2021-01-01 | End: 2021-01-01

## 2021-01-01 RX ORDER — FENTANYL CITRATE 50 UG/ML
50 INJECTION, SOLUTION INTRAMUSCULAR; INTRAVENOUS ONCE
Status: COMPLETED | OUTPATIENT
Start: 2021-01-01 | End: 2021-01-01

## 2021-01-01 RX ORDER — FUROSEMIDE 20 MG/1
20 TABLET ORAL DAILY
Status: CANCELLED | OUTPATIENT
Start: 2021-01-01

## 2021-01-01 RX ORDER — ATORVASTATIN CALCIUM 40 MG/1
80 TABLET, FILM COATED ORAL DAILY
Status: DISCONTINUED | OUTPATIENT
Start: 2021-01-01 | End: 2021-01-01 | Stop reason: HOSPADM

## 2021-01-01 RX ORDER — METHYLPREDNISOLONE SODIUM SUCCINATE 125 MG/2ML
125 INJECTION, POWDER, LYOPHILIZED, FOR SOLUTION INTRAMUSCULAR; INTRAVENOUS ONCE
Status: COMPLETED | OUTPATIENT
Start: 2021-01-01 | End: 2021-01-01

## 2021-01-01 RX ORDER — ACETAMINOPHEN 325 MG/1
650 TABLET ORAL EVERY 6 HOURS SCHEDULED
Refills: 0
Start: 2021-01-01

## 2021-01-01 RX ORDER — METHYLPREDNISOLONE SODIUM SUCCINATE 40 MG/ML
40 INJECTION, POWDER, LYOPHILIZED, FOR SOLUTION INTRAMUSCULAR; INTRAVENOUS EVERY 8 HOURS
Status: DISCONTINUED | OUTPATIENT
Start: 2021-01-01 | End: 2021-01-01

## 2021-01-01 RX ORDER — PANTOPRAZOLE SODIUM 40 MG/1
40 TABLET, DELAYED RELEASE ORAL
Refills: 0
Start: 2021-01-01

## 2021-01-01 RX ORDER — GLIPIZIDE 10 MG/1
2.5 TABLET ORAL DAILY
COMMUNITY

## 2021-01-01 RX ORDER — BENZONATATE 100 MG/1
100 CAPSULE ORAL 3 TIMES DAILY PRN
Status: DISCONTINUED | OUTPATIENT
Start: 2021-01-01 | End: 2021-01-01 | Stop reason: HOSPADM

## 2021-01-01 RX ORDER — HEPARIN SODIUM 5000 [USP'U]/ML
5000 INJECTION, SOLUTION INTRAVENOUS; SUBCUTANEOUS EVERY 8 HOURS SCHEDULED
Status: DISCONTINUED | OUTPATIENT
Start: 2021-01-01 | End: 2021-01-01 | Stop reason: HOSPADM

## 2021-01-01 RX ORDER — ATORVASTATIN CALCIUM 80 MG/1
80 TABLET, FILM COATED ORAL
Refills: 0
Start: 2021-01-01

## 2021-01-01 RX ORDER — BENZONATATE 100 MG/1
100 CAPSULE ORAL 3 TIMES DAILY PRN
Status: DISCONTINUED | OUTPATIENT
Start: 2021-01-01 | End: 2021-01-01

## 2021-01-01 RX ORDER — IPRATROPIUM BROMIDE AND ALBUTEROL SULFATE 2.5; .5 MG/3ML; MG/3ML
3 SOLUTION RESPIRATORY (INHALATION)
Status: DISCONTINUED | OUTPATIENT
Start: 2021-01-01 | End: 2021-01-01 | Stop reason: HOSPADM

## 2021-01-01 RX ORDER — ATORVASTATIN CALCIUM 80 MG/1
80 TABLET, FILM COATED ORAL
Status: DISCONTINUED | OUTPATIENT
Start: 2021-01-01 | End: 2021-01-01 | Stop reason: HOSPADM

## 2021-01-01 RX ORDER — ALBUTEROL SULFATE 90 UG/1
2 AEROSOL, METERED RESPIRATORY (INHALATION)
Status: DISCONTINUED | OUTPATIENT
Start: 2021-01-01 | End: 2021-01-01

## 2021-01-01 RX ORDER — BENZONATATE 100 MG/1
100 CAPSULE ORAL 3 TIMES DAILY PRN
Qty: 20 CAPSULE | Refills: 0
Start: 2021-01-01

## 2021-01-01 RX ORDER — LACTULOSE 20 G/30ML
20 SOLUTION ORAL ONCE
Status: COMPLETED | OUTPATIENT
Start: 2021-01-01 | End: 2021-01-01

## 2021-01-01 RX ORDER — SACCHAROMYCES BOULARDII 250 MG
250 CAPSULE ORAL 2 TIMES DAILY
Status: CANCELLED | OUTPATIENT
Start: 2021-01-01

## 2021-01-01 RX ORDER — MAGNESIUM HYDROXIDE/ALUMINUM HYDROXICE/SIMETHICONE 120; 1200; 1200 MG/30ML; MG/30ML; MG/30ML
30 SUSPENSION ORAL EVERY 6 HOURS PRN
Status: CANCELLED | OUTPATIENT
Start: 2021-01-01

## 2021-01-01 RX ORDER — METHOCARBAMOL 500 MG/1
500 TABLET, FILM COATED ORAL EVERY 6 HOURS PRN
Status: CANCELLED | OUTPATIENT
Start: 2021-01-01

## 2021-01-01 RX ORDER — METOPROLOL TARTRATE 5 MG/5ML
5 INJECTION INTRAVENOUS ONCE
Status: DISCONTINUED | OUTPATIENT
Start: 2021-01-01 | End: 2021-01-01

## 2021-01-01 RX ORDER — LEVALBUTEROL 1.25 MG/.5ML
1.25 SOLUTION, CONCENTRATE RESPIRATORY (INHALATION)
Status: CANCELLED | OUTPATIENT
Start: 2021-01-01

## 2021-01-01 RX ORDER — NYSTATIN 100000 [USP'U]/G
POWDER TOPICAL 2 TIMES DAILY
Status: DISCONTINUED | OUTPATIENT
Start: 2021-01-01 | End: 2021-01-01 | Stop reason: HOSPADM

## 2021-01-01 RX ORDER — MONTELUKAST SODIUM 10 MG/1
10 TABLET ORAL
Status: CANCELLED | OUTPATIENT
Start: 2021-01-01

## 2021-01-01 RX ORDER — LIDOCAINE 50 MG/G
2 PATCH TOPICAL DAILY
Refills: 0
Start: 2021-01-01

## 2021-01-01 RX ORDER — LEVOTHYROXINE SODIUM 0.12 MG/1
125 TABLET ORAL
Status: DISCONTINUED | OUTPATIENT
Start: 2021-01-01 | End: 2021-01-01 | Stop reason: HOSPADM

## 2021-01-01 RX ORDER — IPRATROPIUM BROMIDE AND ALBUTEROL SULFATE 2.5; .5 MG/3ML; MG/3ML
3 SOLUTION RESPIRATORY (INHALATION) 3 TIMES DAILY
Status: DISCONTINUED | OUTPATIENT
Start: 2021-01-01 | End: 2021-01-01

## 2021-01-01 RX ORDER — NYSTATIN 100000 U/G
CREAM TOPICAL 2 TIMES DAILY
Qty: 30 G | Refills: 0
Start: 2021-01-01

## 2021-01-01 RX ORDER — ACETAMINOPHEN 325 MG/1
650 TABLET ORAL EVERY 6 HOURS SCHEDULED
Status: DISCONTINUED | OUTPATIENT
Start: 2021-01-01 | End: 2021-01-01 | Stop reason: HOSPADM

## 2021-01-01 RX ORDER — ALBUTEROL SULFATE 90 UG/1
2 AEROSOL, METERED RESPIRATORY (INHALATION) EVERY 6 HOURS PRN
Refills: 0
Start: 2021-01-01

## 2021-01-01 RX ORDER — METHYLPREDNISOLONE SODIUM SUCCINATE 40 MG/ML
40 INJECTION, POWDER, LYOPHILIZED, FOR SOLUTION INTRAMUSCULAR; INTRAVENOUS EVERY 12 HOURS SCHEDULED
Status: DISCONTINUED | OUTPATIENT
Start: 2021-01-01 | End: 2021-01-01

## 2021-01-01 RX ORDER — LIDOCAINE 50 MG/G
2 PATCH TOPICAL DAILY
Status: CANCELLED | OUTPATIENT
Start: 2021-01-01

## 2021-01-01 RX ORDER — HEPARIN SODIUM 5000 [USP'U]/ML
5000 INJECTION, SOLUTION INTRAVENOUS; SUBCUTANEOUS EVERY 8 HOURS SCHEDULED
Status: CANCELLED | OUTPATIENT
Start: 2021-01-01

## 2021-01-01 RX ORDER — TRAMADOL HYDROCHLORIDE 50 MG/1
50 TABLET ORAL EVERY 6 HOURS PRN
Status: DISCONTINUED | OUTPATIENT
Start: 2021-01-01 | End: 2021-01-01 | Stop reason: HOSPADM

## 2021-01-01 RX ORDER — ALBUTEROL SULFATE 2.5 MG/3ML
2.5 SOLUTION RESPIRATORY (INHALATION) ONCE
Status: COMPLETED | OUTPATIENT
Start: 2021-01-01 | End: 2021-01-01

## 2021-01-01 RX ORDER — NYSTATIN 100000 [USP'U]/G
POWDER TOPICAL 2 TIMES DAILY
Status: DISCONTINUED | OUTPATIENT
Start: 2021-01-01 | End: 2021-03-30 | Stop reason: HOSPADM

## 2021-01-01 RX ORDER — PANTOPRAZOLE SODIUM 40 MG/1
40 TABLET, DELAYED RELEASE ORAL
Status: DISCONTINUED | OUTPATIENT
Start: 2021-01-01 | End: 2021-01-01 | Stop reason: HOSPADM

## 2021-01-01 RX ORDER — LEVOTHYROXINE SODIUM 0.12 MG/1
125 TABLET ORAL
Status: DISCONTINUED | OUTPATIENT
Start: 2021-01-01 | End: 2021-01-01

## 2021-01-01 RX ORDER — PANTOPRAZOLE SODIUM 40 MG/1
40 TABLET, DELAYED RELEASE ORAL
Status: DISCONTINUED | OUTPATIENT
Start: 2021-01-01 | End: 2021-01-01

## 2021-01-01 RX ORDER — ATORVASTATIN CALCIUM 40 MG/1
80 TABLET, FILM COATED ORAL DAILY
Status: CANCELLED | OUTPATIENT
Start: 2021-01-01

## 2021-01-01 RX ORDER — MORPHINE SULFATE 4 MG/ML
4 INJECTION, SOLUTION INTRAMUSCULAR; INTRAVENOUS ONCE
Status: COMPLETED | OUTPATIENT
Start: 2021-01-01 | End: 2021-01-01

## 2021-01-01 RX ORDER — METHOCARBAMOL 500 MG/1
500 TABLET, FILM COATED ORAL EVERY 6 HOURS PRN
Status: DISCONTINUED | OUTPATIENT
Start: 2021-01-01 | End: 2021-01-01

## 2021-01-01 RX ORDER — IPRATROPIUM BROMIDE AND ALBUTEROL SULFATE 2.5; .5 MG/3ML; MG/3ML
3 SOLUTION RESPIRATORY (INHALATION) 4 TIMES DAILY
Status: DISCONTINUED | OUTPATIENT
Start: 2021-01-01 | End: 2021-01-01

## 2021-01-01 RX ORDER — TRAMADOL HYDROCHLORIDE 50 MG/1
TABLET ORAL
COMMUNITY
Start: 2021-01-01 | End: 2021-01-01 | Stop reason: HOSPADM

## 2021-01-01 RX ORDER — FLUTICASONE FUROATE AND VILANTEROL 100; 25 UG/1; UG/1
1 POWDER RESPIRATORY (INHALATION) DAILY
Status: CANCELLED | OUTPATIENT
Start: 2021-01-01

## 2021-01-01 RX ORDER — HYDROCODONE BITARTRATE AND ACETAMINOPHEN 7.5; 325 MG/1; MG/1
1 TABLET ORAL EVERY 8 HOURS PRN
COMMUNITY
Start: 2021-01-01

## 2021-01-01 RX ORDER — LANOLIN ALCOHOL/MO/W.PET/CERES
6 CREAM (GRAM) TOPICAL
Status: CANCELLED | OUTPATIENT
Start: 2021-01-01

## 2021-01-01 RX ORDER — MUSCLE RUB CREAM 100; 150 MG/G; MG/G
CREAM TOPICAL 3 TIMES DAILY
Status: DISCONTINUED | OUTPATIENT
Start: 2021-01-01 | End: 2021-01-01

## 2021-01-01 RX ORDER — ACETAMINOPHEN 325 MG/1
975 TABLET ORAL EVERY 6 HOURS SCHEDULED
Status: DISCONTINUED | OUTPATIENT
Start: 2021-01-01 | End: 2021-01-01

## 2021-01-01 RX ORDER — HYDROMORPHONE HCL/PF 1 MG/ML
0.5 SYRINGE (ML) INJECTION EVERY 4 HOURS PRN
Status: CANCELLED | OUTPATIENT
Start: 2021-01-01

## 2021-01-01 RX ORDER — IPRATROPIUM BROMIDE AND ALBUTEROL SULFATE 2.5; .5 MG/3ML; MG/3ML
3 SOLUTION RESPIRATORY (INHALATION)
Refills: 0
Start: 2021-01-01

## 2021-01-01 RX ORDER — ONDANSETRON 2 MG/ML
4 INJECTION INTRAMUSCULAR; INTRAVENOUS EVERY 6 HOURS PRN
Status: DISCONTINUED | OUTPATIENT
Start: 2021-01-01 | End: 2021-01-01

## 2021-01-01 RX ORDER — METHOCARBAMOL 500 MG/1
500 TABLET, FILM COATED ORAL EVERY 6 HOURS PRN
Refills: 0
Start: 2021-01-01

## 2021-01-01 RX ORDER — DOCUSATE SODIUM 100 MG/1
100 CAPSULE, LIQUID FILLED ORAL 2 TIMES DAILY
Status: DISCONTINUED | OUTPATIENT
Start: 2021-01-01 | End: 2021-01-01

## 2021-01-01 RX ORDER — SODIUM CHLORIDE 9 MG/ML
75 INJECTION, SOLUTION INTRAVENOUS CONTINUOUS
Status: DISPENSED | OUTPATIENT
Start: 2021-01-01 | End: 2021-01-01

## 2021-01-01 RX ORDER — ACETAMINOPHEN 325 MG/1
975 TABLET ORAL EVERY 6 HOURS SCHEDULED
Status: CANCELLED | OUTPATIENT
Start: 2021-01-01

## 2021-01-01 RX ORDER — METOPROLOL TARTRATE 5 MG/5ML
2.5 INJECTION INTRAVENOUS ONCE
Status: DISCONTINUED | OUTPATIENT
Start: 2021-01-01 | End: 2021-01-01

## 2021-01-01 RX ORDER — SODIUM CHLORIDE 9 MG/ML
50 INJECTION, SOLUTION INTRAVENOUS CONTINUOUS
Status: DISCONTINUED | OUTPATIENT
Start: 2021-01-01 | End: 2021-01-01

## 2021-01-01 RX ORDER — ACETAMINOPHEN 325 MG/1
975 TABLET ORAL EVERY 6 HOURS SCHEDULED
Status: DISCONTINUED | OUTPATIENT
Start: 2021-01-01 | End: 2021-01-01 | Stop reason: HOSPADM

## 2021-01-01 RX ORDER — FUROSEMIDE 20 MG/1
20 TABLET ORAL DAILY
Refills: 0
Start: 2021-01-01

## 2021-01-01 RX ORDER — POLYETHYLENE GLYCOL 3350 17 G/17G
17 POWDER, FOR SOLUTION ORAL DAILY
Status: CANCELLED | OUTPATIENT
Start: 2021-01-01

## 2021-01-01 RX ORDER — BISACODYL 10 MG
10 SUPPOSITORY, RECTAL RECTAL DAILY PRN
Status: DISCONTINUED | OUTPATIENT
Start: 2021-01-01 | End: 2021-01-01 | Stop reason: HOSPADM

## 2021-01-01 RX ORDER — LEVALBUTEROL 1.25 MG/.5ML
1.25 SOLUTION, CONCENTRATE RESPIRATORY (INHALATION) EVERY 6 HOURS
Status: DISCONTINUED | OUTPATIENT
Start: 2021-01-01 | End: 2021-01-01

## 2021-01-01 RX ORDER — VALSARTAN 160 MG/1
TABLET ORAL
Qty: 90 TABLET | Refills: 1 | Status: SHIPPED | OUTPATIENT
Start: 2021-01-01 | End: 2021-01-01 | Stop reason: HOSPADM

## 2021-01-01 RX ORDER — LACTULOSE 20 G/30ML
20 SOLUTION ORAL DAILY
Status: DISCONTINUED | OUTPATIENT
Start: 2021-01-01 | End: 2021-01-01

## 2021-01-01 RX ORDER — GUAIFENESIN 600 MG
600 TABLET, EXTENDED RELEASE 12 HR ORAL EVERY 12 HOURS SCHEDULED
Status: CANCELLED | OUTPATIENT
Start: 2021-01-01

## 2021-01-01 RX ORDER — HYDROMORPHONE HCL/PF 1 MG/ML
0.2 SYRINGE (ML) INJECTION
Status: DISCONTINUED | OUTPATIENT
Start: 2021-01-01 | End: 2021-01-01

## 2021-01-01 RX ORDER — SODIUM CHLORIDE 450 MG/100ML
75 INJECTION, SOLUTION INTRAVENOUS CONTINUOUS
Status: DISCONTINUED | OUTPATIENT
Start: 2021-01-01 | End: 2021-01-01

## 2021-01-01 RX ORDER — NYSTATIN 100000 U/G
CREAM TOPICAL 2 TIMES DAILY
Status: DISCONTINUED | OUTPATIENT
Start: 2021-01-01 | End: 2021-01-01 | Stop reason: HOSPADM

## 2021-01-01 RX ADMIN — STANDARDIZED SENNA CONCENTRATE 8.6 MG: 8.6 TABLET ORAL at 21:25

## 2021-01-01 RX ADMIN — INSULIN LISPRO 1 UNITS: 100 INJECTION, SOLUTION INTRAVENOUS; SUBCUTANEOUS at 21:26

## 2021-01-01 RX ADMIN — MELATONIN 6 MG: at 22:50

## 2021-01-01 RX ADMIN — Medication: at 12:43

## 2021-01-01 RX ADMIN — ACETAMINOPHEN 650 MG: 325 TABLET ORAL at 11:15

## 2021-01-01 RX ADMIN — METOPROLOL TARTRATE 25 MG: 25 TABLET, FILM COATED ORAL at 17:44

## 2021-01-01 RX ADMIN — TRAMADOL HYDROCHLORIDE 50 MG: 50 TABLET, FILM COATED ORAL at 05:54

## 2021-01-01 RX ADMIN — INSULIN LISPRO 3 UNITS: 100 INJECTION, SOLUTION INTRAVENOUS; SUBCUTANEOUS at 17:39

## 2021-01-01 RX ADMIN — CEFTRIAXONE 1000 MG: 1 INJECTION, SOLUTION INTRAVENOUS at 08:08

## 2021-01-01 RX ADMIN — NYSTATIN 1 APPLICATION: 100000 POWDER TOPICAL at 09:03

## 2021-01-01 RX ADMIN — Medication 250 MG: at 09:18

## 2021-01-01 RX ADMIN — TRAMADOL HYDROCHLORIDE 50 MG: 50 TABLET, FILM COATED ORAL at 07:43

## 2021-01-01 RX ADMIN — FUROSEMIDE 20 MG: 20 TABLET ORAL at 08:39

## 2021-01-01 RX ADMIN — HEPARIN SODIUM 5000 UNITS: 5000 INJECTION INTRAVENOUS; SUBCUTANEOUS at 16:57

## 2021-01-01 RX ADMIN — HEPARIN SODIUM 5000 UNITS: 5000 INJECTION INTRAVENOUS; SUBCUTANEOUS at 21:25

## 2021-01-01 RX ADMIN — NYSTATIN 1 APPLICATION: 100000 CREAM TOPICAL at 18:11

## 2021-01-01 RX ADMIN — IPRATROPIUM BROMIDE AND ALBUTEROL SULFATE 3 ML: 2.5; .5 SOLUTION RESPIRATORY (INHALATION) at 19:55

## 2021-01-01 RX ADMIN — LEVALBUTEROL HYDROCHLORIDE 1.25 MG: 1.25 SOLUTION, CONCENTRATE RESPIRATORY (INHALATION) at 19:20

## 2021-01-01 RX ADMIN — HEPARIN SODIUM 5000 UNITS: 5000 INJECTION INTRAVENOUS; SUBCUTANEOUS at 05:52

## 2021-01-01 RX ADMIN — LEVOTHYROXINE SODIUM 125 MCG: 125 TABLET ORAL at 05:21

## 2021-01-01 RX ADMIN — Medication: at 22:18

## 2021-01-01 RX ADMIN — LIDOCAINE 2 PATCH: 50 PATCH TOPICAL at 08:07

## 2021-01-01 RX ADMIN — ALBUTEROL SULFATE 2 PUFF: 90 AEROSOL, METERED RESPIRATORY (INHALATION) at 18:31

## 2021-01-01 RX ADMIN — POLYETHYLENE GLYCOL 3350 17 G: 17 POWDER, FOR SOLUTION ORAL at 08:41

## 2021-01-01 RX ADMIN — NYSTATIN 1 APPLICATION: 100000 CREAM TOPICAL at 09:15

## 2021-01-01 RX ADMIN — ALBUTEROL SULFATE 2 PUFF: 90 AEROSOL, METERED RESPIRATORY (INHALATION) at 16:46

## 2021-01-01 RX ADMIN — LEVALBUTEROL HYDROCHLORIDE 1.25 MG: 1.25 SOLUTION, CONCENTRATE RESPIRATORY (INHALATION) at 13:08

## 2021-01-01 RX ADMIN — HEPARIN SODIUM 5000 UNITS: 5000 INJECTION INTRAVENOUS; SUBCUTANEOUS at 13:45

## 2021-01-01 RX ADMIN — GUAIFENESIN 600 MG: 600 TABLET, EXTENDED RELEASE ORAL at 21:55

## 2021-01-01 RX ADMIN — GUAIFENESIN 600 MG: 600 TABLET, EXTENDED RELEASE ORAL at 22:31

## 2021-01-01 RX ADMIN — INSULIN LISPRO 1 UNITS: 100 INJECTION, SOLUTION INTRAVENOUS; SUBCUTANEOUS at 11:54

## 2021-01-01 RX ADMIN — SENNOSIDES 8.6 MG: 8.6 TABLET, FILM COATED ORAL at 21:24

## 2021-01-01 RX ADMIN — GUAIFENESIN 600 MG: 600 TABLET, EXTENDED RELEASE ORAL at 21:49

## 2021-01-01 RX ADMIN — MONTELUKAST SODIUM 10 MG: 10 TABLET, FILM COATED ORAL at 21:25

## 2021-01-01 RX ADMIN — GUAIFENESIN 600 MG: 600 TABLET, EXTENDED RELEASE ORAL at 08:41

## 2021-01-01 RX ADMIN — LEVALBUTEROL HYDROCHLORIDE 1.25 MG: 1.25 SOLUTION, CONCENTRATE RESPIRATORY (INHALATION) at 14:19

## 2021-01-01 RX ADMIN — HEPARIN SODIUM 5000 UNITS: 5000 INJECTION INTRAVENOUS; SUBCUTANEOUS at 07:08

## 2021-01-01 RX ADMIN — LEVOTHYROXINE SODIUM 125 MCG: 125 TABLET ORAL at 05:54

## 2021-01-01 RX ADMIN — IPRATROPIUM BROMIDE 0.5 MG: 0.5 SOLUTION RESPIRATORY (INHALATION) at 01:35

## 2021-01-01 RX ADMIN — LEVOTHYROXINE SODIUM 125 MCG: 125 TABLET ORAL at 06:39

## 2021-01-01 RX ADMIN — Medication 250 MG: at 17:22

## 2021-01-01 RX ADMIN — GUAIFENESIN 600 MG: 600 TABLET ORAL at 09:09

## 2021-01-01 RX ADMIN — HEPARIN SODIUM 5000 UNITS: 5000 INJECTION INTRAVENOUS; SUBCUTANEOUS at 05:45

## 2021-01-01 RX ADMIN — ALPRAZOLAM 0.25 MG: 0.25 TABLET ORAL at 21:59

## 2021-01-01 RX ADMIN — GLYBURIDE 2.5 MG: 1.25 TABLET ORAL at 08:12

## 2021-01-01 RX ADMIN — TRAMADOL HYDROCHLORIDE 50 MG: 50 TABLET, FILM COATED ORAL at 09:08

## 2021-01-01 RX ADMIN — LIDOCAINE HYDROCHLORIDE 10 ML: 20 SOLUTION ORAL; TOPICAL at 11:27

## 2021-01-01 RX ADMIN — NYSTATIN: 100000 CREAM TOPICAL at 16:37

## 2021-01-01 RX ADMIN — INSULIN LISPRO 1 UNITS: 100 INJECTION, SOLUTION INTRAVENOUS; SUBCUTANEOUS at 11:39

## 2021-01-01 RX ADMIN — Medication 250 MG: at 09:38

## 2021-01-01 RX ADMIN — OXYCODONE HYDROCHLORIDE 5 MG: 5 TABLET ORAL at 21:57

## 2021-01-01 RX ADMIN — INSULIN LISPRO 1 UNITS: 100 INJECTION, SOLUTION INTRAVENOUS; SUBCUTANEOUS at 11:13

## 2021-01-01 RX ADMIN — ACETAMINOPHEN 650 MG: 325 TABLET ORAL at 05:27

## 2021-01-01 RX ADMIN — IPRATROPIUM BROMIDE AND ALBUTEROL SULFATE 3 ML: 2.5; .5 SOLUTION RESPIRATORY (INHALATION) at 08:20

## 2021-01-01 RX ADMIN — INSULIN LISPRO 3 UNITS: 100 INJECTION, SOLUTION INTRAVENOUS; SUBCUTANEOUS at 16:57

## 2021-01-01 RX ADMIN — Medication 250 MG: at 09:37

## 2021-01-01 RX ADMIN — FUROSEMIDE 20 MG: 10 INJECTION, SOLUTION INTRAVENOUS at 14:36

## 2021-01-01 RX ADMIN — MONTELUKAST SODIUM 10 MG: 10 TABLET, FILM COATED ORAL at 21:38

## 2021-01-01 RX ADMIN — IPRATROPIUM BROMIDE 0.5 MG: 0.5 SOLUTION RESPIRATORY (INHALATION) at 08:39

## 2021-01-01 RX ADMIN — ATORVASTATIN CALCIUM 80 MG: 40 TABLET, FILM COATED ORAL at 17:46

## 2021-01-01 RX ADMIN — LIDOCAINE HYDROCHLORIDE 10 ML: 20 SOLUTION ORAL; TOPICAL at 16:09

## 2021-01-01 RX ADMIN — OXYCODONE HYDROCHLORIDE 5 MG: 5 TABLET ORAL at 09:29

## 2021-01-01 RX ADMIN — IPRATROPIUM BROMIDE 0.5 MG: 0.5 SOLUTION RESPIRATORY (INHALATION) at 19:20

## 2021-01-01 RX ADMIN — POLYETHYLENE GLYCOL 3350 17 G: 17 POWDER, FOR SOLUTION ORAL at 08:34

## 2021-01-01 RX ADMIN — INSULIN LISPRO 3 UNITS: 100 INJECTION, SOLUTION INTRAVENOUS; SUBCUTANEOUS at 16:12

## 2021-01-01 RX ADMIN — ACETAMINOPHEN 650 MG: 325 TABLET ORAL at 05:38

## 2021-01-01 RX ADMIN — TRAMADOL HYDROCHLORIDE 50 MG: 50 TABLET, FILM COATED ORAL at 13:46

## 2021-01-01 RX ADMIN — INSULIN LISPRO 4 UNITS: 100 INJECTION, SOLUTION INTRAVENOUS; SUBCUTANEOUS at 17:47

## 2021-01-01 RX ADMIN — STANDARDIZED SENNA CONCENTRATE 8.6 MG: 8.6 TABLET ORAL at 21:32

## 2021-01-01 RX ADMIN — PANTOPRAZOLE SODIUM 40 MG: 40 TABLET, DELAYED RELEASE ORAL at 05:48

## 2021-01-01 RX ADMIN — TRAMADOL HYDROCHLORIDE 50 MG: 50 TABLET, FILM COATED ORAL at 09:19

## 2021-01-01 RX ADMIN — ATORVASTATIN CALCIUM 80 MG: 40 TABLET, FILM COATED ORAL at 18:03

## 2021-01-01 RX ADMIN — ACETAMINOPHEN 975 MG: 325 TABLET ORAL at 06:05

## 2021-01-01 RX ADMIN — ACETAMINOPHEN 650 MG: 325 TABLET ORAL at 23:06

## 2021-01-01 RX ADMIN — ACETAMINOPHEN 650 MG: 325 TABLET ORAL at 11:30

## 2021-01-01 RX ADMIN — INSULIN LISPRO 1 UNITS: 100 INJECTION, SOLUTION INTRAVENOUS; SUBCUTANEOUS at 11:55

## 2021-01-01 RX ADMIN — ALPRAZOLAM 0.25 MG: 0.25 TABLET ORAL at 21:29

## 2021-01-01 RX ADMIN — TRAMADOL HYDROCHLORIDE 50 MG: 50 TABLET, FILM COATED ORAL at 08:22

## 2021-01-01 RX ADMIN — NYSTATIN: 100000 CREAM TOPICAL at 17:45

## 2021-01-01 RX ADMIN — ALPRAZOLAM 0.25 MG: 0.25 TABLET ORAL at 13:41

## 2021-01-01 RX ADMIN — NYSTATIN: 100000 POWDER TOPICAL at 22:36

## 2021-01-01 RX ADMIN — IPRATROPIUM BROMIDE AND ALBUTEROL SULFATE 3 ML: 2.5; .5 SOLUTION RESPIRATORY (INHALATION) at 19:13

## 2021-01-01 RX ADMIN — LIDOCAINE 5% 2 PATCH: 700 PATCH TOPICAL at 08:57

## 2021-01-01 RX ADMIN — LEVOTHYROXINE SODIUM 125 MCG: 125 TABLET ORAL at 06:26

## 2021-01-01 RX ADMIN — MENTHOL, METHYL SALICYLATE 1 APPLICATION: 10; 15 CREAM TOPICAL at 17:41

## 2021-01-01 RX ADMIN — ACETAMINOPHEN 650 MG: 325 TABLET ORAL at 05:54

## 2021-01-01 RX ADMIN — GLYBURIDE 5 MG: 5 TABLET ORAL at 20:19

## 2021-01-01 RX ADMIN — Medication 250 MG: at 19:30

## 2021-01-01 RX ADMIN — GUAIFENESIN 600 MG: 600 TABLET ORAL at 09:38

## 2021-01-01 RX ADMIN — ACETAMINOPHEN 650 MG: 325 TABLET ORAL at 18:33

## 2021-01-01 RX ADMIN — POLYETHYLENE GLYCOL 3350 17 G: 17 POWDER, FOR SOLUTION ORAL at 08:11

## 2021-01-01 RX ADMIN — NYSTATIN 1 APPLICATION: 100000 POWDER TOPICAL at 08:18

## 2021-01-01 RX ADMIN — MONTELUKAST SODIUM 10 MG: 10 TABLET, FILM COATED ORAL at 21:49

## 2021-01-01 RX ADMIN — MONTELUKAST SODIUM 10 MG: 10 TABLET, FILM COATED ORAL at 21:48

## 2021-01-01 RX ADMIN — ATORVASTATIN CALCIUM 80 MG: 80 TABLET, FILM COATED ORAL at 16:56

## 2021-01-01 RX ADMIN — INSULIN LISPRO 2 UNITS: 100 INJECTION, SOLUTION INTRAVENOUS; SUBCUTANEOUS at 22:18

## 2021-01-01 RX ADMIN — ACETAMINOPHEN 975 MG: 325 TABLET ORAL at 13:35

## 2021-01-01 RX ADMIN — ATORVASTATIN CALCIUM 80 MG: 80 TABLET, FILM COATED ORAL at 17:14

## 2021-01-01 RX ADMIN — ACETAMINOPHEN 650 MG: 325 TABLET ORAL at 17:23

## 2021-01-01 RX ADMIN — CEFTRIAXONE SODIUM 1000 MG: 10 INJECTION, POWDER, FOR SOLUTION INTRAVENOUS at 09:38

## 2021-01-01 RX ADMIN — IPRATROPIUM BROMIDE 0.5 MG: 0.5 SOLUTION RESPIRATORY (INHALATION) at 19:33

## 2021-01-01 RX ADMIN — INSULIN LISPRO 1 UNITS: 100 INJECTION, SOLUTION INTRAVENOUS; SUBCUTANEOUS at 11:14

## 2021-01-01 RX ADMIN — ACETAMINOPHEN 975 MG: 325 TABLET ORAL at 18:04

## 2021-01-01 RX ADMIN — SENNOSIDES 8.6 MG: 8.6 TABLET, FILM COATED ORAL at 21:38

## 2021-01-01 RX ADMIN — TRAMADOL HYDROCHLORIDE 50 MG: 50 TABLET, FILM COATED ORAL at 14:42

## 2021-01-01 RX ADMIN — STANDARDIZED SENNA CONCENTRATE 8.6 MG: 8.6 TABLET ORAL at 21:51

## 2021-01-01 RX ADMIN — TRAMADOL HYDROCHLORIDE 50 MG: 50 TABLET, FILM COATED ORAL at 16:56

## 2021-01-01 RX ADMIN — OXYCODONE HYDROCHLORIDE 5 MG: 5 TABLET ORAL at 22:17

## 2021-01-01 RX ADMIN — PREDNISONE 20 MG: 20 TABLET ORAL at 08:58

## 2021-01-01 RX ADMIN — LEVALBUTEROL HYDROCHLORIDE 1.25 MG: 1.25 SOLUTION, CONCENTRATE RESPIRATORY (INHALATION) at 13:26

## 2021-01-01 RX ADMIN — MONTELUKAST SODIUM 10 MG: 10 TABLET, FILM COATED ORAL at 21:24

## 2021-01-01 RX ADMIN — HEPARIN SODIUM 5000 UNITS: 5000 INJECTION INTRAVENOUS; SUBCUTANEOUS at 09:39

## 2021-01-01 RX ADMIN — HEPARIN SODIUM 5000 UNITS: 5000 INJECTION INTRAVENOUS; SUBCUTANEOUS at 13:41

## 2021-01-01 RX ADMIN — GUAIFENESIN 600 MG: 600 TABLET, EXTENDED RELEASE ORAL at 08:16

## 2021-01-01 RX ADMIN — Medication 250 MG: at 08:35

## 2021-01-01 RX ADMIN — HEPARIN SODIUM 5000 UNITS: 5000 INJECTION INTRAVENOUS; SUBCUTANEOUS at 21:57

## 2021-01-01 RX ADMIN — HEPARIN SODIUM 5000 UNITS: 5000 INJECTION INTRAVENOUS; SUBCUTANEOUS at 14:11

## 2021-01-01 RX ADMIN — IPRATROPIUM BROMIDE AND ALBUTEROL SULFATE 3 ML: 2.5; .5 SOLUTION RESPIRATORY (INHALATION) at 08:22

## 2021-01-01 RX ADMIN — GLYBURIDE 2.5 MG: 1.25 TABLET ORAL at 07:28

## 2021-01-01 RX ADMIN — LEVALBUTEROL HYDROCHLORIDE 1.25 MG: 1.25 SOLUTION, CONCENTRATE RESPIRATORY (INHALATION) at 19:33

## 2021-01-01 RX ADMIN — IPRATROPIUM BROMIDE 0.5 MG: 0.5 SOLUTION RESPIRATORY (INHALATION) at 07:33

## 2021-01-01 RX ADMIN — HEPARIN SODIUM 5000 UNITS: 5000 INJECTION INTRAVENOUS; SUBCUTANEOUS at 13:32

## 2021-01-01 RX ADMIN — HEPARIN SODIUM 5000 UNITS: 5000 INJECTION INTRAVENOUS; SUBCUTANEOUS at 06:27

## 2021-01-01 RX ADMIN — GUAIFENESIN 600 MG: 600 TABLET, EXTENDED RELEASE ORAL at 21:25

## 2021-01-01 RX ADMIN — ACETAMINOPHEN 650 MG: 325 TABLET ORAL at 23:38

## 2021-01-01 RX ADMIN — NYSTATIN 1 APPLICATION: 100000 CREAM TOPICAL at 08:51

## 2021-01-01 RX ADMIN — NYSTATIN 1 APPLICATION: 100000 POWDER TOPICAL at 08:39

## 2021-01-01 RX ADMIN — FLUTICASONE FUROATE AND VILANTEROL TRIFENATATE 1 PUFF: 100; 25 POWDER RESPIRATORY (INHALATION) at 08:38

## 2021-01-01 RX ADMIN — NYSTATIN 1 APPLICATION: 100000 POWDER TOPICAL at 09:15

## 2021-01-01 RX ADMIN — HEPARIN SODIUM 5000 UNITS: 5000 INJECTION INTRAVENOUS; SUBCUTANEOUS at 13:12

## 2021-01-01 RX ADMIN — POLYETHYLENE GLYCOL 3350 17 G: 17 POWDER, FOR SOLUTION ORAL at 08:08

## 2021-01-01 RX ADMIN — ALBUTEROL SULFATE 2 PUFF: 90 AEROSOL, METERED RESPIRATORY (INHALATION) at 08:40

## 2021-01-01 RX ADMIN — ALPRAZOLAM 0.25 MG: 0.25 TABLET ORAL at 22:57

## 2021-01-01 RX ADMIN — IPRATROPIUM BROMIDE AND ALBUTEROL SULFATE 3 ML: 2.5; .5 SOLUTION RESPIRATORY (INHALATION) at 07:00

## 2021-01-01 RX ADMIN — ENOXAPARIN SODIUM 70 MG: 80 INJECTION SUBCUTANEOUS at 21:05

## 2021-01-01 RX ADMIN — IPRATROPIUM BROMIDE 0.5 MG: 0.5 SOLUTION RESPIRATORY (INHALATION) at 19:37

## 2021-01-01 RX ADMIN — IPRATROPIUM BROMIDE AND ALBUTEROL SULFATE 3 ML: 2.5; .5 SOLUTION RESPIRATORY (INHALATION) at 14:31

## 2021-01-01 RX ADMIN — ACETAMINOPHEN 975 MG: 325 TABLET ORAL at 09:33

## 2021-01-01 RX ADMIN — GUAIFENESIN 600 MG: 600 TABLET ORAL at 08:14

## 2021-01-01 RX ADMIN — HEPARIN SODIUM 5000 UNITS: 5000 INJECTION INTRAVENOUS; SUBCUTANEOUS at 13:56

## 2021-01-01 RX ADMIN — GUAIFENESIN 600 MG: 600 TABLET, EXTENDED RELEASE ORAL at 21:47

## 2021-01-01 RX ADMIN — HEPARIN SODIUM 5000 UNITS: 5000 INJECTION INTRAVENOUS; SUBCUTANEOUS at 13:44

## 2021-01-01 RX ADMIN — FLUTICASONE FUROATE AND VILANTEROL TRIFENATATE 1 PUFF: 100; 25 POWDER RESPIRATORY (INHALATION) at 08:58

## 2021-01-01 RX ADMIN — CEFTRIAXONE SODIUM 1000 MG: 10 INJECTION, POWDER, FOR SOLUTION INTRAVENOUS at 08:57

## 2021-01-01 RX ADMIN — OXYCODONE HYDROCHLORIDE 5 MG: 5 TABLET ORAL at 12:03

## 2021-01-01 RX ADMIN — GUAIFENESIN 600 MG: 600 TABLET, EXTENDED RELEASE ORAL at 21:48

## 2021-01-01 RX ADMIN — GUAIFENESIN 600 MG: 600 TABLET ORAL at 21:37

## 2021-01-01 RX ADMIN — TRAMADOL HYDROCHLORIDE 50 MG: 50 TABLET, FILM COATED ORAL at 18:14

## 2021-01-01 RX ADMIN — TORSEMIDE 10 MG: 10 TABLET ORAL at 08:20

## 2021-01-01 RX ADMIN — HEPARIN SODIUM 5000 UNITS: 5000 INJECTION INTRAVENOUS; SUBCUTANEOUS at 06:40

## 2021-01-01 RX ADMIN — HEPARIN SODIUM 5000 UNITS: 5000 INJECTION INTRAVENOUS; SUBCUTANEOUS at 05:44

## 2021-01-01 RX ADMIN — ALBUMIN (HUMAN) 12.5 G: 12.5 INJECTION, SOLUTION INTRAVENOUS at 20:38

## 2021-01-01 RX ADMIN — ACETAMINOPHEN 975 MG: 325 TABLET ORAL at 00:08

## 2021-01-01 RX ADMIN — HEPARIN SODIUM 5000 UNITS: 5000 INJECTION INTRAVENOUS; SUBCUTANEOUS at 21:49

## 2021-01-01 RX ADMIN — IPRATROPIUM BROMIDE AND ALBUTEROL SULFATE 3 ML: 2.5; .5 SOLUTION RESPIRATORY (INHALATION) at 13:42

## 2021-01-01 RX ADMIN — METOPROLOL TARTRATE 2.5 MG: 5 INJECTION INTRAVENOUS at 14:03

## 2021-01-01 RX ADMIN — STANDARDIZED SENNA CONCENTRATE 8.6 MG: 8.6 TABLET ORAL at 22:30

## 2021-01-01 RX ADMIN — FUROSEMIDE 20 MG: 20 TABLET ORAL at 08:35

## 2021-01-01 RX ADMIN — ACETAMINOPHEN 975 MG: 325 TABLET ORAL at 17:36

## 2021-01-01 RX ADMIN — IPRATROPIUM BROMIDE AND ALBUTEROL SULFATE 3 ML: 2.5; .5 SOLUTION RESPIRATORY (INHALATION) at 14:03

## 2021-01-01 RX ADMIN — LIDOCAINE 5% 2 PATCH: 700 PATCH TOPICAL at 12:01

## 2021-01-01 RX ADMIN — Medication 250 MG: at 08:20

## 2021-01-01 RX ADMIN — Medication 250 MG: at 17:46

## 2021-01-01 RX ADMIN — TRAMADOL HYDROCHLORIDE 50 MG: 50 TABLET, FILM COATED ORAL at 17:56

## 2021-01-01 RX ADMIN — IPRATROPIUM BROMIDE AND ALBUTEROL SULFATE 3 ML: 2.5; .5 SOLUTION RESPIRATORY (INHALATION) at 07:03

## 2021-01-01 RX ADMIN — ACETAMINOPHEN 650 MG: 325 TABLET ORAL at 17:11

## 2021-01-01 RX ADMIN — Medication 250 MG: at 08:16

## 2021-01-01 RX ADMIN — MAGNESIUM SULFATE HEPTAHYDRATE 1 G: 1 INJECTION, SOLUTION INTRAVENOUS at 09:04

## 2021-01-01 RX ADMIN — ACETAMINOPHEN 650 MG: 325 TABLET ORAL at 11:52

## 2021-01-01 RX ADMIN — INSULIN LISPRO 1 UNITS: 100 INJECTION, SOLUTION INTRAVENOUS; SUBCUTANEOUS at 11:58

## 2021-01-01 RX ADMIN — FENTANYL CITRATE 50 MCG: 50 INJECTION, SOLUTION INTRAMUSCULAR; INTRAVENOUS at 01:34

## 2021-01-01 RX ADMIN — POLYETHYLENE GLYCOL 3350 17 G: 17 POWDER, FOR SOLUTION ORAL at 08:58

## 2021-01-01 RX ADMIN — PANTOPRAZOLE SODIUM 40 MG: 40 TABLET, DELAYED RELEASE ORAL at 05:45

## 2021-01-01 RX ADMIN — NYSTATIN: 100000 POWDER TOPICAL at 17:11

## 2021-01-01 RX ADMIN — FUROSEMIDE 20 MG: 20 TABLET ORAL at 08:41

## 2021-01-01 RX ADMIN — FLUTICASONE FUROATE AND VILANTEROL TRIFENATATE 1 PUFF: 100; 25 POWDER RESPIRATORY (INHALATION) at 08:13

## 2021-01-01 RX ADMIN — NYSTATIN: 100000 CREAM TOPICAL at 19:32

## 2021-01-01 RX ADMIN — ALBUTEROL SULFATE 2 PUFF: 90 AEROSOL, METERED RESPIRATORY (INHALATION) at 08:34

## 2021-01-01 RX ADMIN — OXYCODONE HYDROCHLORIDE 5 MG: 5 TABLET ORAL at 23:11

## 2021-01-01 RX ADMIN — Medication 250 MG: at 18:14

## 2021-01-01 RX ADMIN — Medication 250 MG: at 09:02

## 2021-01-01 RX ADMIN — OXYCODONE HYDROCHLORIDE 5 MG: 5 TABLET ORAL at 09:17

## 2021-01-01 RX ADMIN — METHYLPREDNISOLONE SODIUM SUCCINATE 40 MG: 40 INJECTION, POWDER, FOR SOLUTION INTRAMUSCULAR; INTRAVENOUS at 08:20

## 2021-01-01 RX ADMIN — IPRATROPIUM BROMIDE AND ALBUTEROL SULFATE 3 ML: 2.5; .5 SOLUTION RESPIRATORY (INHALATION) at 11:45

## 2021-01-01 RX ADMIN — ALPRAZOLAM 0.25 MG: 0.25 TABLET ORAL at 22:15

## 2021-01-01 RX ADMIN — GUAIFENESIN 600 MG: 600 TABLET, EXTENDED RELEASE ORAL at 21:51

## 2021-01-01 RX ADMIN — STANDARDIZED SENNA CONCENTRATE 8.6 MG: 8.6 TABLET ORAL at 22:15

## 2021-01-01 RX ADMIN — NYSTATIN 1 APPLICATION: 100000 POWDER TOPICAL at 17:20

## 2021-01-01 RX ADMIN — Medication 250 MG: at 08:11

## 2021-01-01 RX ADMIN — NYSTATIN 1 APPLICATION: 100000 CREAM TOPICAL at 17:59

## 2021-01-01 RX ADMIN — IPRATROPIUM BROMIDE AND ALBUTEROL SULFATE 3 ML: 2.5; .5 SOLUTION RESPIRATORY (INHALATION) at 19:09

## 2021-01-01 RX ADMIN — LIDOCAINE 2 PATCH: 50 PATCH TOPICAL at 08:35

## 2021-01-01 RX ADMIN — GUAIFENESIN 600 MG: 600 TABLET, EXTENDED RELEASE ORAL at 20:59

## 2021-01-01 RX ADMIN — ACETAMINOPHEN 975 MG: 325 TABLET ORAL at 05:22

## 2021-01-01 RX ADMIN — Medication 250 MG: at 17:43

## 2021-01-01 RX ADMIN — IPRATROPIUM BROMIDE AND ALBUTEROL SULFATE 3 ML: 2.5; .5 SOLUTION RESPIRATORY (INHALATION) at 19:51

## 2021-01-01 RX ADMIN — NYSTATIN 1 APPLICATION: 100000 CREAM TOPICAL at 08:39

## 2021-01-01 RX ADMIN — AZITHROMYCIN MONOHYDRATE 500 MG: 250 TABLET ORAL at 06:27

## 2021-01-01 RX ADMIN — HEPARIN SODIUM 5000 UNITS: 5000 INJECTION INTRAVENOUS; SUBCUTANEOUS at 06:06

## 2021-01-01 RX ADMIN — ALPRAZOLAM 0.25 MG: 0.25 TABLET ORAL at 21:51

## 2021-01-01 RX ADMIN — GUAIFENESIN 600 MG: 600 TABLET ORAL at 08:20

## 2021-01-01 RX ADMIN — FUROSEMIDE 20 MG: 20 TABLET ORAL at 08:17

## 2021-01-01 RX ADMIN — GUAIFENESIN 600 MG: 600 TABLET, EXTENDED RELEASE ORAL at 08:35

## 2021-01-01 RX ADMIN — IPRATROPIUM BROMIDE 0.5 MG: 0.5 SOLUTION RESPIRATORY (INHALATION) at 07:43

## 2021-01-01 RX ADMIN — INSULIN LISPRO 2 UNITS: 100 INJECTION, SOLUTION INTRAVENOUS; SUBCUTANEOUS at 22:16

## 2021-01-01 RX ADMIN — STANDARDIZED SENNA CONCENTRATE 8.6 MG: 8.6 TABLET ORAL at 21:23

## 2021-01-01 RX ADMIN — NYSTATIN 1 APPLICATION: 100000 CREAM TOPICAL at 08:18

## 2021-01-01 RX ADMIN — ALBUTEROL SULFATE 2 PUFF: 90 AEROSOL, METERED RESPIRATORY (INHALATION) at 20:58

## 2021-01-01 RX ADMIN — ATORVASTATIN CALCIUM 80 MG: 40 TABLET, FILM COATED ORAL at 17:37

## 2021-01-01 RX ADMIN — LEVOTHYROXINE SODIUM 125 MCG: 125 TABLET ORAL at 05:14

## 2021-01-01 RX ADMIN — GUAIFENESIN 600 MG: 600 TABLET ORAL at 09:34

## 2021-01-01 RX ADMIN — FUROSEMIDE 20 MG: 20 TABLET ORAL at 12:00

## 2021-01-01 RX ADMIN — LEVOTHYROXINE SODIUM 125 MCG: 125 TABLET ORAL at 09:34

## 2021-01-01 RX ADMIN — IPRATROPIUM BROMIDE AND ALBUTEROL SULFATE 3 ML: 2.5; .5 SOLUTION RESPIRATORY (INHALATION) at 16:10

## 2021-01-01 RX ADMIN — LEVALBUTEROL HYDROCHLORIDE 1.25 MG: 1.25 SOLUTION, CONCENTRATE RESPIRATORY (INHALATION) at 14:26

## 2021-01-01 RX ADMIN — ACETAMINOPHEN 975 MG: 325 TABLET ORAL at 05:14

## 2021-01-01 RX ADMIN — IPRATROPIUM BROMIDE AND ALBUTEROL SULFATE 3 ML: 2.5; .5 SOLUTION RESPIRATORY (INHALATION) at 07:09

## 2021-01-01 RX ADMIN — OXYCODONE HYDROCHLORIDE 5 MG: 5 TABLET ORAL at 21:17

## 2021-01-01 RX ADMIN — GUAIFENESIN 600 MG: 600 TABLET ORAL at 08:56

## 2021-01-01 RX ADMIN — TRAMADOL HYDROCHLORIDE 50 MG: 50 TABLET, FILM COATED ORAL at 09:49

## 2021-01-01 RX ADMIN — Medication 250 MG: at 17:11

## 2021-01-01 RX ADMIN — ACETAMINOPHEN 650 MG: 325 TABLET ORAL at 05:52

## 2021-01-01 RX ADMIN — NYSTATIN: 100000 POWDER TOPICAL at 08:21

## 2021-01-01 RX ADMIN — ACETAMINOPHEN 975 MG: 325 TABLET ORAL at 17:46

## 2021-01-01 RX ADMIN — IPRATROPIUM BROMIDE 0.5 MG: 0.5 SOLUTION RESPIRATORY (INHALATION) at 14:41

## 2021-01-01 RX ADMIN — PANTOPRAZOLE SODIUM 40 MG: 40 TABLET, DELAYED RELEASE ORAL at 05:27

## 2021-01-01 RX ADMIN — ACETAMINOPHEN 975 MG: 325 TABLET ORAL at 11:32

## 2021-01-01 RX ADMIN — METHOCARBAMOL 500 MG: 500 TABLET, FILM COATED ORAL at 14:41

## 2021-01-01 RX ADMIN — FLUTICASONE FUROATE AND VILANTEROL TRIFENATATE 1 PUFF: 100; 25 POWDER RESPIRATORY (INHALATION) at 09:03

## 2021-01-01 RX ADMIN — ACETAMINOPHEN 650 MG: 325 TABLET ORAL at 12:07

## 2021-01-01 RX ADMIN — HEPARIN SODIUM 5000 UNITS: 5000 INJECTION INTRAVENOUS; SUBCUTANEOUS at 13:30

## 2021-01-01 RX ADMIN — PREDNISONE 10 MG: 10 TABLET ORAL at 08:35

## 2021-01-01 RX ADMIN — SODIUM CHLORIDE 50 ML/HR: 0.9 INJECTION, SOLUTION INTRAVENOUS at 01:30

## 2021-01-01 RX ADMIN — NYSTATIN 1 APPLICATION: 100000 POWDER TOPICAL at 17:49

## 2021-01-01 RX ADMIN — LIDOCAINE HYDROCHLORIDE 10 ML: 20 SOLUTION ORAL; TOPICAL at 09:14

## 2021-01-01 RX ADMIN — LIDOCAINE HYDROCHLORIDE 10 ML: 20 SOLUTION ORAL; TOPICAL at 16:54

## 2021-01-01 RX ADMIN — NYSTATIN: 100000 POWDER TOPICAL at 18:27

## 2021-01-01 RX ADMIN — IPRATROPIUM BROMIDE AND ALBUTEROL SULFATE 3 ML: 2.5; .5 SOLUTION RESPIRATORY (INHALATION) at 08:32

## 2021-01-01 RX ADMIN — IPRATROPIUM BROMIDE AND ALBUTEROL SULFATE 3 ML: 2.5; .5 SOLUTION RESPIRATORY (INHALATION) at 19:54

## 2021-01-01 RX ADMIN — Medication 250 MG: at 17:45

## 2021-01-01 RX ADMIN — Medication 250 MG: at 18:33

## 2021-01-01 RX ADMIN — ACETAMINOPHEN 650 MG: 325 TABLET ORAL at 17:50

## 2021-01-01 RX ADMIN — LEVALBUTEROL HYDROCHLORIDE 1.25 MG: 1.25 SOLUTION, CONCENTRATE RESPIRATORY (INHALATION) at 20:10

## 2021-01-01 RX ADMIN — FLUTICASONE FUROATE AND VILANTEROL TRIFENATATE 1 PUFF: 100; 25 POWDER RESPIRATORY (INHALATION) at 08:19

## 2021-01-01 RX ADMIN — IPRATROPIUM BROMIDE AND ALBUTEROL SULFATE 3 ML: 2.5; .5 SOLUTION RESPIRATORY (INHALATION) at 07:57

## 2021-01-01 RX ADMIN — NYSTATIN 1 APPLICATION: 100000 POWDER TOPICAL at 09:05

## 2021-01-01 RX ADMIN — METHOCARBAMOL TABLETS 500 MG: 500 TABLET, COATED ORAL at 17:22

## 2021-01-01 RX ADMIN — ALBUTEROL SULFATE 10 MG: 2.5 SOLUTION RESPIRATORY (INHALATION) at 18:13

## 2021-01-01 RX ADMIN — Medication 250 MG: at 21:49

## 2021-01-01 RX ADMIN — MELATONIN TAB 3 MG 6 MG: 3 TAB at 21:49

## 2021-01-01 RX ADMIN — LEVOTHYROXINE SODIUM 125 MCG: 125 TABLET ORAL at 05:45

## 2021-01-01 RX ADMIN — MELATONIN TAB 3 MG 6 MG: 3 TAB at 21:17

## 2021-01-01 RX ADMIN — ATORVASTATIN CALCIUM 80 MG: 80 TABLET, FILM COATED ORAL at 16:45

## 2021-01-01 RX ADMIN — NYSTATIN: 100000 POWDER TOPICAL at 17:51

## 2021-01-01 RX ADMIN — LIDOCAINE HYDROCHLORIDE 10 ML: 20 SOLUTION ORAL; TOPICAL at 17:19

## 2021-01-01 RX ADMIN — NYSTATIN 1 APPLICATION: 100000 POWDER TOPICAL at 08:07

## 2021-01-01 RX ADMIN — INSULIN LISPRO 3 UNITS: 100 INJECTION, SOLUTION INTRAVENOUS; SUBCUTANEOUS at 21:38

## 2021-01-01 RX ADMIN — ACETAMINOPHEN 650 MG: 325 TABLET ORAL at 11:11

## 2021-01-01 RX ADMIN — ATORVASTATIN CALCIUM 80 MG: 80 TABLET, FILM COATED ORAL at 17:48

## 2021-01-01 RX ADMIN — ALPRAZOLAM 0.25 MG: 0.25 TABLET ORAL at 20:58

## 2021-01-01 RX ADMIN — ALBUTEROL SULFATE 2 PUFF: 90 AEROSOL, METERED RESPIRATORY (INHALATION) at 08:31

## 2021-01-01 RX ADMIN — IPRATROPIUM BROMIDE AND ALBUTEROL SULFATE 3 ML: 2.5; .5 SOLUTION RESPIRATORY (INHALATION) at 21:27

## 2021-01-01 RX ADMIN — ACETAMINOPHEN 650 MG: 325 TABLET ORAL at 11:49

## 2021-01-01 RX ADMIN — OXYCODONE HYDROCHLORIDE 5 MG: 5 TABLET ORAL at 22:31

## 2021-01-01 RX ADMIN — HEPARIN SODIUM 5000 UNITS: 5000 INJECTION INTRAVENOUS; SUBCUTANEOUS at 21:03

## 2021-01-01 RX ADMIN — STANDARDIZED SENNA CONCENTRATE 8.6 MG: 8.6 TABLET ORAL at 21:04

## 2021-01-01 RX ADMIN — HEPARIN SODIUM 5000 UNITS: 5000 INJECTION INTRAVENOUS; SUBCUTANEOUS at 05:30

## 2021-01-01 RX ADMIN — IPRATROPIUM BROMIDE 0.5 MG: 0.5 SOLUTION RESPIRATORY (INHALATION) at 07:29

## 2021-01-01 RX ADMIN — ALPRAZOLAM 0.25 MG: 0.25 TABLET ORAL at 21:49

## 2021-01-01 RX ADMIN — ACETAMINOPHEN 650 MG: 325 TABLET ORAL at 18:10

## 2021-01-01 RX ADMIN — OXYCODONE HYDROCHLORIDE 5 MG: 5 TABLET ORAL at 22:57

## 2021-01-01 RX ADMIN — PANTOPRAZOLE SODIUM 40 MG: 40 TABLET, DELAYED RELEASE ORAL at 05:52

## 2021-01-01 RX ADMIN — LIDOCAINE 5% 2 PATCH: 700 PATCH TOPICAL at 11:33

## 2021-01-01 RX ADMIN — IPRATROPIUM BROMIDE AND ALBUTEROL SULFATE 3 ML: 2.5; .5 SOLUTION RESPIRATORY (INHALATION) at 22:15

## 2021-01-01 RX ADMIN — IPRATROPIUM BROMIDE 0.5 MG: 0.5 SOLUTION RESPIRATORY (INHALATION) at 14:26

## 2021-01-01 RX ADMIN — INSULIN LISPRO 1 UNITS: 100 INJECTION, SOLUTION INTRAVENOUS; SUBCUTANEOUS at 17:05

## 2021-01-01 RX ADMIN — IPRATROPIUM BROMIDE 0.5 MG: 0.5 SOLUTION RESPIRATORY (INHALATION) at 13:02

## 2021-01-01 RX ADMIN — ACETAMINOPHEN 650 MG: 325 TABLET ORAL at 18:14

## 2021-01-01 RX ADMIN — IPRATROPIUM BROMIDE 0.5 MG: 0.5 SOLUTION RESPIRATORY (INHALATION) at 07:44

## 2021-01-01 RX ADMIN — HEPARIN SODIUM 5000 UNITS: 5000 INJECTION INTRAVENOUS; SUBCUTANEOUS at 14:56

## 2021-01-01 RX ADMIN — Medication 250 MG: at 08:14

## 2021-01-01 RX ADMIN — OXYCODONE HYDROCHLORIDE 5 MG: 5 TABLET ORAL at 21:51

## 2021-01-01 RX ADMIN — ACETAMINOPHEN 650 MG: 325 TABLET ORAL at 00:08

## 2021-01-01 RX ADMIN — ACETAMINOPHEN 650 MG: 325 TABLET ORAL at 23:02

## 2021-01-01 RX ADMIN — OXYCODONE HYDROCHLORIDE 5 MG: 5 TABLET ORAL at 14:07

## 2021-01-01 RX ADMIN — HEPARIN SODIUM 5000 UNITS: 5000 INJECTION INTRAVENOUS; SUBCUTANEOUS at 21:35

## 2021-01-01 RX ADMIN — HEPARIN SODIUM 5000 UNITS: 5000 INJECTION INTRAVENOUS; SUBCUTANEOUS at 05:33

## 2021-01-01 RX ADMIN — PREDNISONE 40 MG: 20 TABLET ORAL at 09:15

## 2021-01-01 RX ADMIN — Medication 250 MG: at 17:51

## 2021-01-01 RX ADMIN — DILTIAZEM HYDROCHLORIDE 10 MG: 5 INJECTION, SOLUTION INTRAVENOUS at 08:08

## 2021-01-01 RX ADMIN — METHYLPREDNISOLONE SODIUM SUCCINATE 125 MG: 125 INJECTION, POWDER, FOR SOLUTION INTRAMUSCULAR; INTRAVENOUS at 17:44

## 2021-01-01 RX ADMIN — FLUTICASONE FUROATE AND VILANTEROL TRIFENATATE 1 PUFF: 100; 25 POWDER RESPIRATORY (INHALATION) at 13:06

## 2021-01-01 RX ADMIN — Medication 250 MG: at 08:36

## 2021-01-01 RX ADMIN — NYSTATIN: 100000 CREAM TOPICAL at 08:21

## 2021-01-01 RX ADMIN — LEVALBUTEROL HYDROCHLORIDE 1.25 MG: 1.25 SOLUTION, CONCENTRATE RESPIRATORY (INHALATION) at 08:39

## 2021-01-01 RX ADMIN — GUAIFENESIN 600 MG: 600 TABLET, EXTENDED RELEASE ORAL at 09:03

## 2021-01-01 RX ADMIN — ACETAMINOPHEN 650 MG: 325 TABLET ORAL at 23:32

## 2021-01-01 RX ADMIN — LEVALBUTEROL HYDROCHLORIDE 1.25 MG: 1.25 SOLUTION, CONCENTRATE RESPIRATORY (INHALATION) at 07:32

## 2021-01-01 RX ADMIN — GUAIFENESIN 600 MG: 600 TABLET, EXTENDED RELEASE ORAL at 09:38

## 2021-01-01 RX ADMIN — ACETAMINOPHEN 650 MG: 325 TABLET ORAL at 05:16

## 2021-01-01 RX ADMIN — MELATONIN TAB 3 MG 6 MG: 3 TAB at 22:05

## 2021-01-01 RX ADMIN — FLUTICASONE FUROATE AND VILANTEROL TRIFENATATE 1 PUFF: 100; 25 POWDER RESPIRATORY (INHALATION) at 08:35

## 2021-01-01 RX ADMIN — IPRATROPIUM BROMIDE AND ALBUTEROL SULFATE 3 ML: 2.5; .5 SOLUTION RESPIRATORY (INHALATION) at 16:03

## 2021-01-01 RX ADMIN — POLYETHYLENE GLYCOL 3350 17 G: 17 POWDER, FOR SOLUTION ORAL at 11:43

## 2021-01-01 RX ADMIN — GUAIFENESIN 600 MG: 600 TABLET ORAL at 20:36

## 2021-01-01 RX ADMIN — Medication 250 MG: at 17:00

## 2021-01-01 RX ADMIN — ACETAMINOPHEN 650 MG: 325 TABLET ORAL at 17:57

## 2021-01-01 RX ADMIN — LEVOTHYROXINE SODIUM 125 MCG: 125 TABLET ORAL at 07:07

## 2021-01-01 RX ADMIN — IPRATROPIUM BROMIDE AND ALBUTEROL SULFATE 3 ML: 2.5; .5 SOLUTION RESPIRATORY (INHALATION) at 07:49

## 2021-01-01 RX ADMIN — SENNOSIDES 8.6 MG: 8.6 TABLET, FILM COATED ORAL at 21:57

## 2021-01-01 RX ADMIN — METHYLPREDNISOLONE SODIUM SUCCINATE 40 MG: 40 INJECTION, POWDER, FOR SOLUTION INTRAMUSCULAR; INTRAVENOUS at 16:29

## 2021-01-01 RX ADMIN — IPRATROPIUM BROMIDE 0.5 MG: 0.5 SOLUTION RESPIRATORY (INHALATION) at 20:08

## 2021-01-01 RX ADMIN — MONTELUKAST SODIUM 10 MG: 10 TABLET, FILM COATED ORAL at 21:57

## 2021-01-01 RX ADMIN — GUAIFENESIN 600 MG: 600 TABLET, EXTENDED RELEASE ORAL at 08:19

## 2021-01-01 RX ADMIN — FUROSEMIDE 20 MG: 20 TABLET ORAL at 09:11

## 2021-01-01 RX ADMIN — Medication 250 MG: at 17:48

## 2021-01-01 RX ADMIN — ACETAMINOPHEN 975 MG: 325 TABLET ORAL at 23:48

## 2021-01-01 RX ADMIN — HEPARIN SODIUM 5000 UNITS: 5000 INJECTION INTRAVENOUS; SUBCUTANEOUS at 21:32

## 2021-01-01 RX ADMIN — NYSTATIN: 100000 POWDER TOPICAL at 18:46

## 2021-01-01 RX ADMIN — IPRATROPIUM BROMIDE AND ALBUTEROL SULFATE 3 ML: 2.5; .5 SOLUTION RESPIRATORY (INHALATION) at 12:01

## 2021-01-01 RX ADMIN — ACETAMINOPHEN 650 MG: 325 TABLET ORAL at 00:02

## 2021-01-01 RX ADMIN — HEPARIN SODIUM 5000 UNITS: 5000 INJECTION INTRAVENOUS; SUBCUTANEOUS at 06:05

## 2021-01-01 RX ADMIN — MONTELUKAST SODIUM 10 MG: 10 TABLET, FILM COATED ORAL at 21:35

## 2021-01-01 RX ADMIN — HEPARIN SODIUM 5000 UNITS: 5000 INJECTION INTRAVENOUS; SUBCUTANEOUS at 13:59

## 2021-01-01 RX ADMIN — LEVOTHYROXINE SODIUM 125 MCG: 125 TABLET ORAL at 05:55

## 2021-01-01 RX ADMIN — ACETAMINOPHEN 650 MG: 325 TABLET ORAL at 23:33

## 2021-01-01 RX ADMIN — LIDOCAINE HYDROCHLORIDE 10 ML: 20 SOLUTION ORAL; TOPICAL at 11:18

## 2021-01-01 RX ADMIN — Medication 250 MG: at 17:57

## 2021-01-01 RX ADMIN — DILTIAZEM HYDROCHLORIDE 10 MG: 5 INJECTION, SOLUTION INTRAVENOUS at 21:05

## 2021-01-01 RX ADMIN — PREDNISONE 40 MG: 20 TABLET ORAL at 08:35

## 2021-01-01 RX ADMIN — NYSTATIN: 100000 CREAM TOPICAL at 08:35

## 2021-01-01 RX ADMIN — NYSTATIN: 100000 CREAM TOPICAL at 18:27

## 2021-01-01 RX ADMIN — Medication 250 MG: at 18:04

## 2021-01-01 RX ADMIN — NYSTATIN 1 APPLICATION: 100000 POWDER TOPICAL at 22:05

## 2021-01-01 RX ADMIN — Medication 250 MG: at 17:36

## 2021-01-01 RX ADMIN — OXYCODONE HYDROCHLORIDE 5 MG: 5 TABLET ORAL at 10:05

## 2021-01-01 RX ADMIN — IPRATROPIUM BROMIDE AND ALBUTEROL SULFATE 3 ML: 2.5; .5 SOLUTION RESPIRATORY (INHALATION) at 13:10

## 2021-01-01 RX ADMIN — TRAMADOL HYDROCHLORIDE 50 MG: 50 TABLET, FILM COATED ORAL at 08:42

## 2021-01-01 RX ADMIN — MORPHINE SULFATE 2 MG: 2 INJECTION, SOLUTION INTRAMUSCULAR; INTRAVENOUS at 16:29

## 2021-01-01 RX ADMIN — SENNOSIDES 8.6 MG: 8.6 TABLET, FILM COATED ORAL at 21:50

## 2021-01-01 RX ADMIN — DILTIAZEM HYDROCHLORIDE 10 MG: 5 INJECTION, SOLUTION INTRAVENOUS at 13:57

## 2021-01-01 RX ADMIN — ALPRAZOLAM 0.25 MG: 0.25 TABLET ORAL at 21:54

## 2021-01-01 RX ADMIN — ACETAMINOPHEN 650 MG: 325 TABLET ORAL at 11:29

## 2021-01-01 RX ADMIN — IPRATROPIUM BROMIDE 0.5 MG: 0.5 SOLUTION RESPIRATORY (INHALATION) at 13:05

## 2021-01-01 RX ADMIN — ATORVASTATIN CALCIUM 80 MG: 40 TABLET, FILM COATED ORAL at 18:21

## 2021-01-01 RX ADMIN — STANDARDIZED SENNA CONCENTRATE 8.6 MG: 8.6 TABLET ORAL at 21:48

## 2021-01-01 RX ADMIN — ACETAMINOPHEN 650 MG: 325 TABLET ORAL at 05:53

## 2021-01-01 RX ADMIN — HEPARIN SODIUM 5000 UNITS: 5000 INJECTION INTRAVENOUS; SUBCUTANEOUS at 06:39

## 2021-01-01 RX ADMIN — PANTOPRAZOLE SODIUM 40 MG: 40 TABLET, DELAYED RELEASE ORAL at 05:44

## 2021-01-01 RX ADMIN — Medication 250 MG: at 09:09

## 2021-01-01 RX ADMIN — Medication 250 MG: at 09:16

## 2021-01-01 RX ADMIN — ACETAMINOPHEN 975 MG: 325 TABLET ORAL at 18:20

## 2021-01-01 RX ADMIN — ATORVASTATIN CALCIUM 80 MG: 40 TABLET, FILM COATED ORAL at 17:36

## 2021-01-01 RX ADMIN — ACETAMINOPHEN 975 MG: 325 TABLET ORAL at 18:39

## 2021-01-01 RX ADMIN — NYSTATIN 1 APPLICATION: 100000 CREAM TOPICAL at 09:40

## 2021-01-01 RX ADMIN — PREDNISONE 10 MG: 10 TABLET ORAL at 09:02

## 2021-01-01 RX ADMIN — LEVOTHYROXINE SODIUM 125 MCG: 125 TABLET ORAL at 06:08

## 2021-01-01 RX ADMIN — LIDOCAINE HYDROCHLORIDE 10 ML: 20 SOLUTION ORAL; TOPICAL at 07:11

## 2021-01-01 RX ADMIN — FLUTICASONE FUROATE AND VILANTEROL TRIFENATATE 1 PUFF: 100; 25 POWDER RESPIRATORY (INHALATION) at 09:05

## 2021-01-01 RX ADMIN — TRAMADOL HYDROCHLORIDE 50 MG: 50 TABLET, FILM COATED ORAL at 09:37

## 2021-01-01 RX ADMIN — SODIUM CHLORIDE: 234 INJECTION, SOLUTION, CONCENTRATE INTRAVENOUS at 15:54

## 2021-01-01 RX ADMIN — LIDOCAINE 5% 2 PATCH: 700 PATCH TOPICAL at 08:20

## 2021-01-01 RX ADMIN — NYSTATIN: 100000 POWDER TOPICAL at 17:44

## 2021-01-01 RX ADMIN — FLUTICASONE FUROATE AND VILANTEROL TRIFENATATE 1 PUFF: 100; 25 POWDER RESPIRATORY (INHALATION) at 09:16

## 2021-01-01 RX ADMIN — ACETAMINOPHEN 650 MG: 325 TABLET ORAL at 00:17

## 2021-01-01 RX ADMIN — CALCITONIN SALMON 276 UNITS: 200 INJECTION, SOLUTION INTRAMUSCULAR; SUBCUTANEOUS at 16:44

## 2021-01-01 RX ADMIN — PREDNISONE 40 MG: 20 TABLET ORAL at 09:34

## 2021-01-01 RX ADMIN — HEPARIN SODIUM 5000 UNITS: 5000 INJECTION INTRAVENOUS; SUBCUTANEOUS at 14:23

## 2021-01-01 RX ADMIN — Medication 250 MG: at 08:56

## 2021-01-01 RX ADMIN — ALBUTEROL SULFATE 2 PUFF: 90 AEROSOL, METERED RESPIRATORY (INHALATION) at 07:06

## 2021-01-01 RX ADMIN — HEPARIN SODIUM 5000 UNITS: 5000 INJECTION INTRAVENOUS; SUBCUTANEOUS at 13:31

## 2021-01-01 RX ADMIN — FUROSEMIDE 20 MG: 20 TABLET ORAL at 09:16

## 2021-01-01 RX ADMIN — POLYETHYLENE GLYCOL 3350 17 G: 17 POWDER, FOR SOLUTION ORAL at 09:03

## 2021-01-01 RX ADMIN — ATORVASTATIN CALCIUM 80 MG: 40 TABLET, FILM COATED ORAL at 17:22

## 2021-01-01 RX ADMIN — TRAMADOL HYDROCHLORIDE 50 MG: 50 TABLET, FILM COATED ORAL at 17:24

## 2021-01-01 RX ADMIN — IPRATROPIUM BROMIDE 0.5 MG: 0.5 SOLUTION RESPIRATORY (INHALATION) at 14:19

## 2021-01-01 RX ADMIN — INSULIN LISPRO 2 UNITS: 100 INJECTION, SOLUTION INTRAVENOUS; SUBCUTANEOUS at 16:27

## 2021-01-01 RX ADMIN — LIDOCAINE 2 PATCH: 50 PATCH TOPICAL at 08:58

## 2021-01-01 RX ADMIN — MENTHOL, METHYL SALICYLATE 1 APPLICATION: 10; 15 CREAM TOPICAL at 16:08

## 2021-01-01 RX ADMIN — ALBUTEROL SULFATE 2 PUFF: 90 AEROSOL, METERED RESPIRATORY (INHALATION) at 00:32

## 2021-01-01 RX ADMIN — ACETAMINOPHEN 975 MG: 325 TABLET ORAL at 00:03

## 2021-01-01 RX ADMIN — FLUTICASONE FUROATE AND VILANTEROL TRIFENATATE 1 PUFF: 100; 25 POWDER RESPIRATORY (INHALATION) at 09:15

## 2021-01-01 RX ADMIN — ACETAMINOPHEN 650 MG: 325 TABLET ORAL at 05:44

## 2021-01-01 RX ADMIN — IPRATROPIUM BROMIDE AND ALBUTEROL SULFATE 3 ML: 2.5; .5 SOLUTION RESPIRATORY (INHALATION) at 13:41

## 2021-01-01 RX ADMIN — PANTOPRAZOLE SODIUM 40 MG: 40 TABLET, DELAYED RELEASE ORAL at 05:16

## 2021-01-01 RX ADMIN — METHYLPREDNISOLONE SODIUM SUCCINATE 40 MG: 40 INJECTION, POWDER, FOR SOLUTION INTRAMUSCULAR; INTRAVENOUS at 08:08

## 2021-01-01 RX ADMIN — FUROSEMIDE 20 MG: 20 TABLET ORAL at 09:03

## 2021-01-01 RX ADMIN — ACETAMINOPHEN 650 MG: 325 TABLET ORAL at 17:49

## 2021-01-01 RX ADMIN — SENNOSIDES 8.6 MG: 8.6 TABLET, FILM COATED ORAL at 21:29

## 2021-01-01 RX ADMIN — GUAIFENESIN 600 MG: 600 TABLET, EXTENDED RELEASE ORAL at 08:06

## 2021-01-01 RX ADMIN — MELATONIN 6 MG: at 21:29

## 2021-01-01 RX ADMIN — NYSTATIN: 100000 POWDER TOPICAL at 08:13

## 2021-01-01 RX ADMIN — ALPRAZOLAM 0.25 MG: 0.25 TABLET ORAL at 09:13

## 2021-01-01 RX ADMIN — PANTOPRAZOLE SODIUM 40 MG: 40 TABLET, DELAYED RELEASE ORAL at 05:21

## 2021-01-01 RX ADMIN — LEVALBUTEROL HYDROCHLORIDE 1.25 MG: 1.25 SOLUTION, CONCENTRATE RESPIRATORY (INHALATION) at 07:33

## 2021-01-01 RX ADMIN — ALPRAZOLAM 0.25 MG: 0.25 TABLET ORAL at 21:47

## 2021-01-01 RX ADMIN — INSULIN LISPRO 1 UNITS: 100 INJECTION, SOLUTION INTRAVENOUS; SUBCUTANEOUS at 16:07

## 2021-01-01 RX ADMIN — HEPARIN SODIUM 5000 UNITS: 5000 INJECTION INTRAVENOUS; SUBCUTANEOUS at 13:40

## 2021-01-01 RX ADMIN — GUAIFENESIN 600 MG: 600 TABLET, EXTENDED RELEASE ORAL at 09:02

## 2021-01-01 RX ADMIN — ACETAMINOPHEN 975 MG: 325 TABLET ORAL at 00:54

## 2021-01-01 RX ADMIN — ACETAMINOPHEN 650 MG: 325 TABLET ORAL at 06:06

## 2021-01-01 RX ADMIN — GUAIFENESIN 600 MG: 600 TABLET ORAL at 21:57

## 2021-01-01 RX ADMIN — TRAMADOL HYDROCHLORIDE 50 MG: 50 TABLET, FILM COATED ORAL at 14:14

## 2021-01-01 RX ADMIN — FLUTICASONE FUROATE AND VILANTEROL TRIFENATATE 1 PUFF: 100; 25 POWDER RESPIRATORY (INHALATION) at 08:48

## 2021-01-01 RX ADMIN — MONTELUKAST SODIUM 10 MG: 10 TABLET, FILM COATED ORAL at 22:15

## 2021-01-01 RX ADMIN — LIDOCAINE 2 PATCH: 50 PATCH TOPICAL at 09:04

## 2021-01-01 RX ADMIN — ONDANSETRON 4 MG: 2 INJECTION INTRAMUSCULAR; INTRAVENOUS at 10:11

## 2021-01-01 RX ADMIN — METOPROLOL TARTRATE 2.5 MG: 5 INJECTION INTRAVENOUS at 13:26

## 2021-01-01 RX ADMIN — LIDOCAINE 2 PATCH: 50 PATCH TOPICAL at 08:21

## 2021-01-01 RX ADMIN — LIDOCAINE HYDROCHLORIDE 10 ML: 20 SOLUTION ORAL; TOPICAL at 11:06

## 2021-01-01 RX ADMIN — TRAMADOL HYDROCHLORIDE 50 MG: 50 TABLET, FILM COATED ORAL at 17:50

## 2021-01-01 RX ADMIN — MONTELUKAST SODIUM 10 MG: 10 TABLET, FILM COATED ORAL at 21:51

## 2021-01-01 RX ADMIN — HEPARIN SODIUM 5000 UNITS: 5000 INJECTION INTRAVENOUS; SUBCUTANEOUS at 05:14

## 2021-01-01 RX ADMIN — GUAIFENESIN 600 MG: 600 TABLET, EXTENDED RELEASE ORAL at 22:15

## 2021-01-01 RX ADMIN — ACETAMINOPHEN 650 MG: 325 TABLET ORAL at 17:01

## 2021-01-01 RX ADMIN — POLYETHYLENE GLYCOL 3350 17 G: 17 POWDER, FOR SOLUTION ORAL at 09:01

## 2021-01-01 RX ADMIN — HEPARIN SODIUM 5000 UNITS: 5000 INJECTION INTRAVENOUS; SUBCUTANEOUS at 14:33

## 2021-01-01 RX ADMIN — LEVOTHYROXINE SODIUM 125 MCG: 125 TABLET ORAL at 05:44

## 2021-01-01 RX ADMIN — GUAIFENESIN 600 MG: 600 TABLET, EXTENDED RELEASE ORAL at 08:58

## 2021-01-01 RX ADMIN — FUROSEMIDE 40 MG: 10 INJECTION, SOLUTION INTRAVENOUS at 08:40

## 2021-01-01 RX ADMIN — NYSTATIN: 100000 CREAM TOPICAL at 08:13

## 2021-01-01 RX ADMIN — Medication 250 MG: at 17:04

## 2021-01-01 RX ADMIN — ACETAMINOPHEN 650 MG: 325 TABLET ORAL at 11:55

## 2021-01-01 RX ADMIN — IPRATROPIUM BROMIDE 0.5 MG: 0.5 SOLUTION RESPIRATORY (INHALATION) at 07:37

## 2021-01-01 RX ADMIN — IPRATROPIUM BROMIDE AND ALBUTEROL SULFATE 3 ML: 2.5; .5 SOLUTION RESPIRATORY (INHALATION) at 19:04

## 2021-01-01 RX ADMIN — LEVOTHYROXINE SODIUM 125 MCG: 125 TABLET ORAL at 06:06

## 2021-01-01 RX ADMIN — NYSTATIN: 100000 CREAM TOPICAL at 10:09

## 2021-01-01 RX ADMIN — GLYBURIDE 1.25 MG: 1.25 TABLET ORAL at 08:34

## 2021-01-01 RX ADMIN — NYSTATIN 1 APPLICATION: 100000 CREAM TOPICAL at 08:07

## 2021-01-01 RX ADMIN — NYSTATIN 1 APPLICATION: 100000 POWDER TOPICAL at 17:59

## 2021-01-01 RX ADMIN — INSULIN LISPRO 1 UNITS: 100 INJECTION, SOLUTION INTRAVENOUS; SUBCUTANEOUS at 17:45

## 2021-01-01 RX ADMIN — HEPARIN SODIUM 5000 UNITS: 5000 INJECTION INTRAVENOUS; SUBCUTANEOUS at 18:06

## 2021-01-01 RX ADMIN — IPRATROPIUM BROMIDE AND ALBUTEROL SULFATE 3 ML: 2.5; .5 SOLUTION RESPIRATORY (INHALATION) at 08:14

## 2021-01-01 RX ADMIN — INSULIN LISPRO 1 UNITS: 100 INJECTION, SOLUTION INTRAVENOUS; SUBCUTANEOUS at 22:24

## 2021-01-01 RX ADMIN — HEPARIN SODIUM 5000 UNITS: 5000 INJECTION INTRAVENOUS; SUBCUTANEOUS at 21:23

## 2021-01-01 RX ADMIN — INSULIN LISPRO 1 UNITS: 100 INJECTION, SOLUTION INTRAVENOUS; SUBCUTANEOUS at 11:30

## 2021-01-01 RX ADMIN — MONTELUKAST SODIUM 10 MG: 10 TABLET, FILM COATED ORAL at 22:31

## 2021-01-01 RX ADMIN — ACETAMINOPHEN 975 MG: 325 TABLET ORAL at 02:25

## 2021-01-01 RX ADMIN — Medication 250 MG: at 08:41

## 2021-01-01 RX ADMIN — HEPARIN SODIUM 5000 UNITS: 5000 INJECTION INTRAVENOUS; SUBCUTANEOUS at 22:33

## 2021-01-01 RX ADMIN — HEPARIN SODIUM 5000 UNITS: 5000 INJECTION INTRAVENOUS; SUBCUTANEOUS at 13:42

## 2021-01-01 RX ADMIN — TRAMADOL HYDROCHLORIDE 50 MG: 50 TABLET, FILM COATED ORAL at 06:45

## 2021-01-01 RX ADMIN — GUAIFENESIN 600 MG: 600 TABLET ORAL at 21:24

## 2021-01-01 RX ADMIN — OXYCODONE HYDROCHLORIDE 5 MG: 5 TABLET ORAL at 21:29

## 2021-01-01 RX ADMIN — POLYETHYLENE GLYCOL 3350 17 G: 17 POWDER, FOR SOLUTION ORAL at 08:19

## 2021-01-01 RX ADMIN — ACETAMINOPHEN 650 MG: 325 TABLET ORAL at 11:27

## 2021-01-01 RX ADMIN — HEPARIN SODIUM 5000 UNITS: 5000 INJECTION INTRAVENOUS; SUBCUTANEOUS at 06:44

## 2021-01-01 RX ADMIN — IPRATROPIUM BROMIDE AND ALBUTEROL SULFATE 3 ML: 2.5; .5 SOLUTION RESPIRATORY (INHALATION) at 08:29

## 2021-01-01 RX ADMIN — NYSTATIN 1 APPLICATION: 100000 POWDER TOPICAL at 08:51

## 2021-01-01 RX ADMIN — MONTELUKAST SODIUM 10 MG: 10 TABLET, FILM COATED ORAL at 00:08

## 2021-01-01 RX ADMIN — GUAIFENESIN 600 MG: 600 TABLET, EXTENDED RELEASE ORAL at 22:00

## 2021-01-01 RX ADMIN — NYSTATIN 1 APPLICATION: 100000 CREAM TOPICAL at 08:53

## 2021-01-01 RX ADMIN — ALPRAZOLAM 0.25 MG: 0.25 TABLET ORAL at 00:08

## 2021-01-01 RX ADMIN — HEPARIN SODIUM 5000 UNITS: 5000 INJECTION INTRAVENOUS; SUBCUTANEOUS at 13:54

## 2021-01-01 RX ADMIN — ALBUTEROL SULFATE 2 PUFF: 90 AEROSOL, METERED RESPIRATORY (INHALATION) at 13:38

## 2021-01-01 RX ADMIN — NYSTATIN 1 APPLICATION: 100000 POWDER TOPICAL at 09:40

## 2021-01-01 RX ADMIN — POLYETHYLENE GLYCOL 3350 17 G: 17 POWDER, FOR SOLUTION ORAL at 08:35

## 2021-01-01 RX ADMIN — PANTOPRAZOLE SODIUM 40 MG: 40 TABLET, DELAYED RELEASE ORAL at 06:40

## 2021-01-01 RX ADMIN — PANTOPRAZOLE SODIUM 40 MG: 40 TABLET, DELAYED RELEASE ORAL at 05:30

## 2021-01-01 RX ADMIN — FLUTICASONE FUROATE AND VILANTEROL TRIFENATATE 1 PUFF: 100; 25 POWDER RESPIRATORY (INHALATION) at 09:10

## 2021-01-01 RX ADMIN — LEVALBUTEROL HYDROCHLORIDE 1.25 MG: 1.25 SOLUTION, CONCENTRATE RESPIRATORY (INHALATION) at 13:40

## 2021-01-01 RX ADMIN — CEFTRIAXONE SODIUM 1000 MG: 10 INJECTION, POWDER, FOR SOLUTION INTRAVENOUS at 12:45

## 2021-01-01 RX ADMIN — IPRATROPIUM BROMIDE AND ALBUTEROL SULFATE 3 ML: 2.5; .5 SOLUTION RESPIRATORY (INHALATION) at 19:14

## 2021-01-01 RX ADMIN — FUROSEMIDE 20 MG: 20 TABLET ORAL at 08:06

## 2021-01-01 RX ADMIN — ATORVASTATIN CALCIUM 80 MG: 40 TABLET, FILM COATED ORAL at 16:55

## 2021-01-01 RX ADMIN — IPRATROPIUM BROMIDE 0.5 MG: 0.5 SOLUTION RESPIRATORY (INHALATION) at 13:40

## 2021-01-01 RX ADMIN — TRAMADOL HYDROCHLORIDE 50 MG: 50 TABLET, FILM COATED ORAL at 08:56

## 2021-01-01 RX ADMIN — ALBUTEROL SULFATE 2.5 MG: 2.5 SOLUTION RESPIRATORY (INHALATION) at 16:59

## 2021-01-01 RX ADMIN — AZITHROMYCIN MONOHYDRATE 500 MG: 250 TABLET ORAL at 09:34

## 2021-01-01 RX ADMIN — IPRATROPIUM BROMIDE 0.5 MG: 0.5 SOLUTION RESPIRATORY (INHALATION) at 19:47

## 2021-01-01 RX ADMIN — ACETAMINOPHEN 975 MG: 325 TABLET ORAL at 23:11

## 2021-01-01 RX ADMIN — MELATONIN TAB 3 MG 6 MG: 3 TAB at 21:38

## 2021-01-01 RX ADMIN — METHOCARBAMOL 500 MG: 500 TABLET, FILM COATED ORAL at 21:59

## 2021-01-01 RX ADMIN — HEPARIN SODIUM 5000 UNITS: 5000 INJECTION INTRAVENOUS; SUBCUTANEOUS at 21:47

## 2021-01-01 RX ADMIN — HEPARIN SODIUM 5000 UNITS: 5000 INJECTION INTRAVENOUS; SUBCUTANEOUS at 22:00

## 2021-01-01 RX ADMIN — INSULIN LISPRO 1 UNITS: 100 INJECTION, SOLUTION INTRAVENOUS; SUBCUTANEOUS at 21:45

## 2021-01-01 RX ADMIN — OXYCODONE HYDROCHLORIDE 5 MG: 5 TABLET ORAL at 18:06

## 2021-01-01 RX ADMIN — HEPARIN SODIUM 5000 UNITS: 5000 INJECTION INTRAVENOUS; SUBCUTANEOUS at 21:55

## 2021-01-01 RX ADMIN — FUROSEMIDE 20 MG: 20 TABLET ORAL at 08:18

## 2021-01-01 RX ADMIN — LEVOTHYROXINE SODIUM 125 MCG: 125 TABLET ORAL at 06:34

## 2021-01-01 RX ADMIN — LIDOCAINE 2 PATCH: 50 PATCH TOPICAL at 09:02

## 2021-01-01 RX ADMIN — NYSTATIN 1 APPLICATION: 100000 POWDER TOPICAL at 08:20

## 2021-01-01 RX ADMIN — HEPARIN SODIUM 5000 UNITS: 5000 INJECTION INTRAVENOUS; SUBCUTANEOUS at 06:35

## 2021-01-01 RX ADMIN — INSULIN LISPRO 2 UNITS: 100 INJECTION, SOLUTION INTRAVENOUS; SUBCUTANEOUS at 17:53

## 2021-01-01 RX ADMIN — ACETAMINOPHEN 650 MG: 325 TABLET ORAL at 11:10

## 2021-01-01 RX ADMIN — TRAMADOL HYDROCHLORIDE 50 MG: 50 TABLET, FILM COATED ORAL at 03:17

## 2021-01-01 RX ADMIN — OXYCODONE HYDROCHLORIDE 5 MG: 5 TABLET ORAL at 20:36

## 2021-01-01 RX ADMIN — NYSTATIN: 100000 CREAM TOPICAL at 22:35

## 2021-01-01 RX ADMIN — POLYETHYLENE GLYCOL 3350 17 G: 17 POWDER, FOR SOLUTION ORAL at 09:38

## 2021-01-01 RX ADMIN — LEVALBUTEROL HYDROCHLORIDE 1.25 MG: 1.25 SOLUTION, CONCENTRATE RESPIRATORY (INHALATION) at 07:18

## 2021-01-01 RX ADMIN — NYSTATIN 1 APPLICATION: 100000 CREAM TOPICAL at 08:20

## 2021-01-01 RX ADMIN — LEVALBUTEROL HYDROCHLORIDE 1.25 MG: 1.25 SOLUTION, CONCENTRATE RESPIRATORY (INHALATION) at 07:43

## 2021-01-01 RX ADMIN — IPRATROPIUM BROMIDE AND ALBUTEROL SULFATE 3 ML: 2.5; .5 SOLUTION RESPIRATORY (INHALATION) at 16:11

## 2021-01-01 RX ADMIN — TRAMADOL HYDROCHLORIDE 50 MG: 50 TABLET, FILM COATED ORAL at 21:37

## 2021-01-01 RX ADMIN — MORPHINE SULFATE 2 MG: 2 INJECTION, SOLUTION INTRAMUSCULAR; INTRAVENOUS at 23:03

## 2021-01-01 RX ADMIN — PANTOPRAZOLE SODIUM 40 MG: 40 TABLET, DELAYED RELEASE ORAL at 12:07

## 2021-01-01 RX ADMIN — GUAIFENESIN 600 MG: 600 TABLET, EXTENDED RELEASE ORAL at 10:01

## 2021-01-01 RX ADMIN — METHYLPREDNISOLONE SODIUM SUCCINATE 40 MG: 40 INJECTION, POWDER, FOR SOLUTION INTRAMUSCULAR; INTRAVENOUS at 08:14

## 2021-01-01 RX ADMIN — NYSTATIN: 100000 CREAM TOPICAL at 09:16

## 2021-01-01 RX ADMIN — MELATONIN TAB 3 MG 6 MG: 3 TAB at 21:03

## 2021-01-01 RX ADMIN — LEVOTHYROXINE SODIUM 125 MCG: 125 TABLET ORAL at 05:16

## 2021-01-01 RX ADMIN — ALBUTEROL SULFATE 2 PUFF: 90 AEROSOL, METERED RESPIRATORY (INHALATION) at 21:47

## 2021-01-01 RX ADMIN — NYSTATIN 1 APPLICATION: 100000 CREAM TOPICAL at 22:04

## 2021-01-01 RX ADMIN — PANTOPRAZOLE SODIUM 40 MG: 40 TABLET, DELAYED RELEASE ORAL at 05:54

## 2021-01-01 RX ADMIN — MENTHOL, METHYL SALICYLATE: 10; 15 CREAM TOPICAL at 22:34

## 2021-01-01 RX ADMIN — LEVOTHYROXINE SODIUM 125 MCG: 125 TABLET ORAL at 05:53

## 2021-01-01 RX ADMIN — PANTOPRAZOLE SODIUM 40 MG: 40 TABLET, DELAYED RELEASE ORAL at 05:38

## 2021-01-01 RX ADMIN — INSULIN LISPRO 3 UNITS: 100 INJECTION, SOLUTION INTRAVENOUS; SUBCUTANEOUS at 21:58

## 2021-01-01 RX ADMIN — HEPARIN SODIUM 5000 UNITS: 5000 INJECTION INTRAVENOUS; SUBCUTANEOUS at 00:09

## 2021-01-01 RX ADMIN — OXYCODONE HYDROCHLORIDE 5 MG: 5 TABLET ORAL at 21:27

## 2021-01-01 RX ADMIN — MENTHOL, METHYL SALICYLATE 1 APPLICATION: 10; 15 CREAM TOPICAL at 22:04

## 2021-01-01 RX ADMIN — ACETAMINOPHEN 650 MG: 325 TABLET ORAL at 05:29

## 2021-01-01 RX ADMIN — ALBUTEROL SULFATE 2 PUFF: 90 AEROSOL, METERED RESPIRATORY (INHALATION) at 05:24

## 2021-01-01 RX ADMIN — FUROSEMIDE 20 MG: 10 INJECTION, SOLUTION INTRAVENOUS at 20:00

## 2021-01-01 RX ADMIN — HEPARIN SODIUM 5000 UNITS: 5000 INJECTION INTRAVENOUS; SUBCUTANEOUS at 21:29

## 2021-01-01 RX ADMIN — NYSTATIN 1 APPLICATION: 100000 CREAM TOPICAL at 20:19

## 2021-01-01 RX ADMIN — ACETAMINOPHEN 650 MG: 325 TABLET ORAL at 23:42

## 2021-01-01 RX ADMIN — LEVALBUTEROL HYDROCHLORIDE 1.25 MG: 1.25 SOLUTION, CONCENTRATE RESPIRATORY (INHALATION) at 20:03

## 2021-01-01 RX ADMIN — HEPARIN SODIUM 5000 UNITS: 5000 INJECTION INTRAVENOUS; SUBCUTANEOUS at 05:27

## 2021-01-01 RX ADMIN — MONTELUKAST SODIUM 10 MG: 10 TABLET, FILM COATED ORAL at 22:02

## 2021-01-01 RX ADMIN — LIDOCAINE 2 PATCH: 50 PATCH TOPICAL at 09:38

## 2021-01-01 RX ADMIN — ACETAMINOPHEN 975 MG: 325 TABLET ORAL at 13:06

## 2021-01-01 RX ADMIN — HEPARIN SODIUM 5000 UNITS: 5000 INJECTION INTRAVENOUS; SUBCUTANEOUS at 05:48

## 2021-01-01 RX ADMIN — GUAIFENESIN 600 MG: 600 TABLET ORAL at 09:18

## 2021-01-01 RX ADMIN — INSULIN LISPRO 2 UNITS: 100 INJECTION, SOLUTION INTRAVENOUS; SUBCUTANEOUS at 16:55

## 2021-01-01 RX ADMIN — IPRATROPIUM BROMIDE AND ALBUTEROL SULFATE 3 ML: 2.5; .5 SOLUTION RESPIRATORY (INHALATION) at 19:10

## 2021-01-01 RX ADMIN — LEVALBUTEROL HYDROCHLORIDE 1.25 MG: 1.25 SOLUTION, CONCENTRATE RESPIRATORY (INHALATION) at 19:34

## 2021-01-01 RX ADMIN — INSULIN LISPRO 1 UNITS: 100 INJECTION, SOLUTION INTRAVENOUS; SUBCUTANEOUS at 12:01

## 2021-01-01 RX ADMIN — HEPARIN SODIUM 5000 UNITS: 5000 INJECTION INTRAVENOUS; SUBCUTANEOUS at 21:28

## 2021-01-01 RX ADMIN — OXYCODONE HYDROCHLORIDE 5 MG: 5 TABLET ORAL at 04:32

## 2021-01-01 RX ADMIN — NYSTATIN 1 APPLICATION: 100000 POWDER TOPICAL at 08:41

## 2021-01-01 RX ADMIN — OXYCODONE HYDROCHLORIDE 5 MG: 5 TABLET ORAL at 21:32

## 2021-01-01 RX ADMIN — NYSTATIN 1 APPLICATION: 100000 CREAM TOPICAL at 09:05

## 2021-01-01 RX ADMIN — METOPROLOL TARTRATE 25 MG: 25 TABLET, FILM COATED ORAL at 22:18

## 2021-01-01 RX ADMIN — ACETAMINOPHEN 650 MG: 325 TABLET ORAL at 17:48

## 2021-01-01 RX ADMIN — NYSTATIN: 100000 CREAM TOPICAL at 17:11

## 2021-01-01 RX ADMIN — LIDOCAINE HYDROCHLORIDE 10 ML: 20 SOLUTION ORAL; TOPICAL at 06:39

## 2021-01-01 RX ADMIN — GLYBURIDE 2.5 MG: 1.25 TABLET ORAL at 08:42

## 2021-01-01 RX ADMIN — LEVALBUTEROL HYDROCHLORIDE 1.25 MG: 1.25 SOLUTION, CONCENTRATE RESPIRATORY (INHALATION) at 07:29

## 2021-01-01 RX ADMIN — LEVOTHYROXINE SODIUM 125 MCG: 125 TABLET ORAL at 06:05

## 2021-01-01 RX ADMIN — INSULIN LISPRO 1 UNITS: 100 INJECTION, SOLUTION INTRAVENOUS; SUBCUTANEOUS at 16:12

## 2021-01-01 RX ADMIN — ACETAMINOPHEN 975 MG: 325 TABLET ORAL at 06:26

## 2021-01-01 RX ADMIN — ATORVASTATIN CALCIUM 80 MG: 80 TABLET, FILM COATED ORAL at 16:57

## 2021-01-01 RX ADMIN — Medication 250 MG: at 17:49

## 2021-01-01 RX ADMIN — Medication 250 MG: at 08:05

## 2021-01-01 RX ADMIN — IPRATROPIUM BROMIDE 0.5 MG: 0.5 SOLUTION RESPIRATORY (INHALATION) at 07:18

## 2021-01-01 RX ADMIN — IPRATROPIUM BROMIDE 0.5 MG: 0.5 SOLUTION RESPIRATORY (INHALATION) at 20:10

## 2021-01-01 RX ADMIN — ALBUTEROL SULFATE 2 PUFF: 90 AEROSOL, METERED RESPIRATORY (INHALATION) at 17:44

## 2021-01-01 RX ADMIN — FUROSEMIDE 20 MG: 20 TABLET ORAL at 10:01

## 2021-01-01 RX ADMIN — ACETAMINOPHEN 975 MG: 325 TABLET ORAL at 18:48

## 2021-01-01 RX ADMIN — LEVALBUTEROL HYDROCHLORIDE 1.25 MG: 1.25 SOLUTION, CONCENTRATE RESPIRATORY (INHALATION) at 07:44

## 2021-01-01 RX ADMIN — IPRATROPIUM BROMIDE AND ALBUTEROL SULFATE 3 ML: 2.5; .5 SOLUTION RESPIRATORY (INHALATION) at 13:47

## 2021-01-01 RX ADMIN — OXYCODONE HYDROCHLORIDE 5 MG: 5 TABLET ORAL at 21:49

## 2021-01-01 RX ADMIN — HEPARIN SODIUM 5000 UNITS: 5000 INJECTION INTRAVENOUS; SUBCUTANEOUS at 05:55

## 2021-01-01 RX ADMIN — NYSTATIN 1 APPLICATION: 100000 CREAM TOPICAL at 09:03

## 2021-01-01 RX ADMIN — SODIUM CHLORIDE 75 ML/HR: 0.45 INJECTION, SOLUTION INTRAVENOUS at 10:31

## 2021-01-01 RX ADMIN — STANDARDIZED SENNA CONCENTRATE 8.6 MG: 8.6 TABLET ORAL at 21:47

## 2021-01-01 RX ADMIN — ALPRAZOLAM 0.25 MG: 0.25 TABLET ORAL at 22:05

## 2021-01-01 RX ADMIN — FLUTICASONE FUROATE AND VILANTEROL TRIFENATATE 1 PUFF: 100; 25 POWDER RESPIRATORY (INHALATION) at 09:41

## 2021-01-01 RX ADMIN — HEPARIN SODIUM 5000 UNITS: 5000 INJECTION INTRAVENOUS; SUBCUTANEOUS at 13:37

## 2021-01-01 RX ADMIN — TRAMADOL HYDROCHLORIDE 50 MG: 50 TABLET, FILM COATED ORAL at 14:12

## 2021-01-01 RX ADMIN — MONTELUKAST SODIUM 10 MG: 10 TABLET, FILM COATED ORAL at 21:04

## 2021-01-01 RX ADMIN — ALBUTEROL SULFATE 2 PUFF: 90 AEROSOL, METERED RESPIRATORY (INHALATION) at 12:55

## 2021-01-01 RX ADMIN — NYSTATIN: 100000 POWDER TOPICAL at 09:16

## 2021-01-01 RX ADMIN — GLYBURIDE 2.5 MG: 1.25 TABLET ORAL at 07:51

## 2021-01-01 RX ADMIN — GUAIFENESIN 600 MG: 600 TABLET, EXTENDED RELEASE ORAL at 21:23

## 2021-01-01 RX ADMIN — ACETAMINOPHEN 650 MG: 325 TABLET ORAL at 05:48

## 2021-01-01 RX ADMIN — NYSTATIN: 100000 POWDER TOPICAL at 08:35

## 2021-01-01 RX ADMIN — NYSTATIN: 100000 CREAM TOPICAL at 17:20

## 2021-01-01 RX ADMIN — GUAIFENESIN 600 MG: 600 TABLET, EXTENDED RELEASE ORAL at 21:35

## 2021-01-01 RX ADMIN — LEVOTHYROXINE SODIUM 125 MCG: 125 TABLET ORAL at 05:52

## 2021-01-01 RX ADMIN — SODIUM CHLORIDE 75 ML/HR: 0.9 INJECTION, SOLUTION INTRAVENOUS at 17:54

## 2021-01-01 RX ADMIN — NYSTATIN 1 APPLICATION: 100000 CREAM TOPICAL at 17:49

## 2021-01-01 RX ADMIN — ALBUTEROL SULFATE 2 PUFF: 90 AEROSOL, METERED RESPIRATORY (INHALATION) at 09:15

## 2021-01-01 RX ADMIN — INSULIN LISPRO 3 UNITS: 100 INJECTION, SOLUTION INTRAVENOUS; SUBCUTANEOUS at 16:48

## 2021-01-01 RX ADMIN — SODIUM CHLORIDE 500 ML/HR: 0.9 INJECTION, SOLUTION INTRAVENOUS at 14:36

## 2021-01-01 RX ADMIN — FUROSEMIDE 20 MG: 20 TABLET ORAL at 09:40

## 2021-01-01 RX ADMIN — IPRATROPIUM BROMIDE AND ALBUTEROL SULFATE 3 ML: .5; 3 SOLUTION RESPIRATORY (INHALATION) at 13:46

## 2021-01-01 RX ADMIN — BISACODYL 10 MG: 10 SUPPOSITORY RECTAL at 18:02

## 2021-01-01 RX ADMIN — TRAMADOL HYDROCHLORIDE 50 MG: 50 TABLET, FILM COATED ORAL at 08:52

## 2021-01-01 RX ADMIN — GUAIFENESIN 600 MG: 600 TABLET, EXTENDED RELEASE ORAL at 09:10

## 2021-01-01 RX ADMIN — TRAMADOL HYDROCHLORIDE 50 MG: 50 TABLET, FILM COATED ORAL at 08:55

## 2021-01-01 RX ADMIN — GLYBURIDE 2.5 MG: 1.25 TABLET ORAL at 08:13

## 2021-01-01 RX ADMIN — GUAIFENESIN 600 MG: 600 TABLET, EXTENDED RELEASE ORAL at 21:33

## 2021-01-01 RX ADMIN — IPRATROPIUM BROMIDE 0.5 MG: 0.5 SOLUTION RESPIRATORY (INHALATION) at 19:34

## 2021-01-01 RX ADMIN — ATORVASTATIN CALCIUM 80 MG: 80 TABLET, FILM COATED ORAL at 15:26

## 2021-01-01 RX ADMIN — TRAMADOL HYDROCHLORIDE 50 MG: 50 TABLET, FILM COATED ORAL at 17:26

## 2021-01-01 RX ADMIN — LIDOCAINE 2 PATCH: 50 PATCH TOPICAL at 08:39

## 2021-01-01 RX ADMIN — POLYETHYLENE GLYCOL 3350 17 G: 17 POWDER, FOR SOLUTION ORAL at 11:34

## 2021-01-01 RX ADMIN — GUAIFENESIN 600 MG: 600 TABLET ORAL at 21:50

## 2021-01-01 RX ADMIN — FUROSEMIDE 20 MG: 20 TABLET ORAL at 08:12

## 2021-01-01 RX ADMIN — INSULIN LISPRO 2 UNITS: 100 INJECTION, SOLUTION INTRAVENOUS; SUBCUTANEOUS at 11:21

## 2021-01-01 RX ADMIN — GUAIFENESIN 600 MG: 600 TABLET, EXTENDED RELEASE ORAL at 09:16

## 2021-01-01 RX ADMIN — LACTULOSE 20 G: 20 SOLUTION ORAL at 15:28

## 2021-01-01 RX ADMIN — LEVOTHYROXINE SODIUM 125 MCG: 125 TABLET ORAL at 05:30

## 2021-01-01 RX ADMIN — NYSTATIN 1 APPLICATION: 100000 POWDER TOPICAL at 18:11

## 2021-01-01 RX ADMIN — Medication 250 MG: at 18:10

## 2021-01-01 RX ADMIN — STANDARDIZED SENNA CONCENTRATE 8.6 MG: 8.6 TABLET ORAL at 21:55

## 2021-01-01 RX ADMIN — ACETAMINOPHEN 975 MG: 325 TABLET ORAL at 12:03

## 2021-01-01 RX ADMIN — IPRATROPIUM BROMIDE 0.5 MG: 0.5 SOLUTION RESPIRATORY (INHALATION) at 13:26

## 2021-01-01 RX ADMIN — TORSEMIDE 10 MG: 10 TABLET ORAL at 15:02

## 2021-01-01 RX ADMIN — ALPRAZOLAM 0.25 MG: 0.25 TABLET ORAL at 21:35

## 2021-01-01 RX ADMIN — INSULIN LISPRO 3 UNITS: 100 INJECTION, SOLUTION INTRAVENOUS; SUBCUTANEOUS at 14:12

## 2021-01-01 RX ADMIN — ACETAMINOPHEN 650 MG: 325 TABLET ORAL at 11:46

## 2021-01-01 RX ADMIN — GLYBURIDE 2.5 MG: 1.25 TABLET ORAL at 08:35

## 2021-01-01 RX ADMIN — INSULIN LISPRO 1 UNITS: 100 INJECTION, SOLUTION INTRAVENOUS; SUBCUTANEOUS at 22:30

## 2021-01-01 RX ADMIN — METHYLPREDNISOLONE SODIUM SUCCINATE 40 MG: 40 INJECTION, POWDER, FOR SOLUTION INTRAMUSCULAR; INTRAVENOUS at 12:08

## 2021-01-01 RX ADMIN — LEVOTHYROXINE SODIUM 125 MCG: 125 TABLET ORAL at 05:48

## 2021-01-01 RX ADMIN — ATORVASTATIN CALCIUM 80 MG: 80 TABLET, FILM COATED ORAL at 17:11

## 2021-01-01 RX ADMIN — LEVALBUTEROL HYDROCHLORIDE 1.25 MG: 1.25 SOLUTION, CONCENTRATE RESPIRATORY (INHALATION) at 19:47

## 2021-01-01 RX ADMIN — ATORVASTATIN CALCIUM 80 MG: 80 TABLET, FILM COATED ORAL at 15:22

## 2021-01-01 RX ADMIN — ACETAMINOPHEN 650 MG: 325 TABLET ORAL at 17:45

## 2021-01-01 RX ADMIN — FLUTICASONE FUROATE AND VILANTEROL TRIFENATATE 1 PUFF: 100; 25 POWDER RESPIRATORY (INHALATION) at 08:07

## 2021-01-01 RX ADMIN — SODIUM CHLORIDE 75 ML/HR: 0.9 INJECTION, SOLUTION INTRAVENOUS at 00:18

## 2021-01-01 RX ADMIN — LIDOCAINE 5% 2 PATCH: 700 PATCH TOPICAL at 08:14

## 2021-01-01 RX ADMIN — MONTELUKAST SODIUM 10 MG: 10 TABLET, FILM COATED ORAL at 21:55

## 2021-01-01 RX ADMIN — FLUTICASONE FUROATE AND VILANTEROL TRIFENATATE 1 PUFF: 100; 25 POWDER RESPIRATORY (INHALATION) at 10:02

## 2021-01-01 RX ADMIN — IPRATROPIUM BROMIDE AND ALBUTEROL SULFATE 3 ML: 2.5; .5 SOLUTION RESPIRATORY (INHALATION) at 07:21

## 2021-01-01 RX ADMIN — GLYBURIDE 2.5 MG: 1.25 TABLET ORAL at 07:25

## 2021-01-01 RX ADMIN — MONTELUKAST SODIUM 10 MG: 10 TABLET, FILM COATED ORAL at 22:18

## 2021-01-01 RX ADMIN — IPRATROPIUM BROMIDE AND ALBUTEROL SULFATE 3 ML: 2.5; .5 SOLUTION RESPIRATORY (INHALATION) at 13:51

## 2021-01-01 RX ADMIN — HEPARIN SODIUM 5000 UNITS: 5000 INJECTION INTRAVENOUS; SUBCUTANEOUS at 13:08

## 2021-01-01 RX ADMIN — ACETAMINOPHEN 650 MG: 325 TABLET ORAL at 11:21

## 2021-01-01 RX ADMIN — ENOXAPARIN SODIUM 70 MG: 80 INJECTION SUBCUTANEOUS at 17:55

## 2021-01-01 RX ADMIN — OXYCODONE HYDROCHLORIDE 5 MG: 5 TABLET ORAL at 21:58

## 2021-01-01 RX ADMIN — NYSTATIN: 100000 POWDER TOPICAL at 16:37

## 2021-01-01 RX ADMIN — FUROSEMIDE 20 MG: 20 TABLET ORAL at 09:17

## 2021-01-01 RX ADMIN — METHYLPREDNISOLONE SODIUM SUCCINATE 40 MG: 40 INJECTION, POWDER, FOR SOLUTION INTRAMUSCULAR; INTRAVENOUS at 09:18

## 2021-01-01 RX ADMIN — SENNOSIDES 8.6 MG: 8.6 TABLET, FILM COATED ORAL at 00:09

## 2021-01-01 RX ADMIN — NYSTATIN: 100000 POWDER TOPICAL at 10:08

## 2021-01-01 RX ADMIN — STANDARDIZED SENNA CONCENTRATE 8.6 MG: 8.6 TABLET ORAL at 21:13

## 2021-01-01 RX ADMIN — ALPRAZOLAM 0.25 MG: 0.25 TABLET ORAL at 21:32

## 2021-01-01 RX ADMIN — ACETAMINOPHEN 975 MG: 325 TABLET ORAL at 12:58

## 2021-01-01 RX ADMIN — LEVOTHYROXINE SODIUM 125 MCG: 125 TABLET ORAL at 05:38

## 2021-01-01 RX ADMIN — HEPARIN SODIUM 5000 UNITS: 5000 INJECTION INTRAVENOUS; SUBCUTANEOUS at 14:09

## 2021-01-01 RX ADMIN — MELATONIN 6 MG: at 00:09

## 2021-01-01 RX ADMIN — NYSTATIN: 100000 CREAM TOPICAL at 17:05

## 2021-01-01 RX ADMIN — Medication 250 MG: at 18:20

## 2021-01-01 RX ADMIN — ACETAMINOPHEN 650 MG: 325 TABLET ORAL at 23:00

## 2021-01-01 RX ADMIN — MONTELUKAST SODIUM 10 MG: 10 TABLET, FILM COATED ORAL at 21:23

## 2021-01-01 RX ADMIN — SENNOSIDES 8.6 MG: 8.6 TABLET, FILM COATED ORAL at 22:02

## 2021-01-01 RX ADMIN — ACETAMINOPHEN 650 MG: 325 TABLET ORAL at 06:40

## 2021-01-01 RX ADMIN — ACETAMINOPHEN 650 MG: 325 TABLET ORAL at 06:42

## 2021-01-01 RX ADMIN — ACETAMINOPHEN 650 MG: 325 TABLET ORAL at 17:43

## 2021-01-01 RX ADMIN — LIDOCAINE 2 PATCH: 50 PATCH TOPICAL at 08:11

## 2021-01-01 RX ADMIN — LEVOTHYROXINE SODIUM 125 MCG: 125 TABLET ORAL at 05:27

## 2021-01-01 RX ADMIN — GUAIFENESIN 600 MG: 600 TABLET ORAL at 22:02

## 2021-01-01 RX ADMIN — IPRATROPIUM BROMIDE AND ALBUTEROL SULFATE 3 ML: 2.5; .5 SOLUTION RESPIRATORY (INHALATION) at 20:02

## 2021-01-01 RX ADMIN — ACETAMINOPHEN 650 MG: 325 TABLET ORAL at 23:09

## 2021-01-01 RX ADMIN — PANTOPRAZOLE SODIUM 40 MG: 40 TABLET, DELAYED RELEASE ORAL at 06:05

## 2021-01-01 RX ADMIN — ACETAMINOPHEN 650 MG: 325 TABLET ORAL at 05:34

## 2021-01-01 RX ADMIN — ACETAMINOPHEN 650 MG: 325 TABLET ORAL at 11:41

## 2021-01-01 RX ADMIN — FUROSEMIDE 20 MG: 20 TABLET ORAL at 08:58

## 2021-01-01 RX ADMIN — INSULIN LISPRO 2 UNITS: 100 INJECTION, SOLUTION INTRAVENOUS; SUBCUTANEOUS at 17:07

## 2021-01-01 RX ADMIN — INSULIN LISPRO 3 UNITS: 100 INJECTION, SOLUTION INTRAVENOUS; SUBCUTANEOUS at 21:04

## 2021-01-01 RX ADMIN — MONTELUKAST SODIUM 10 MG: 10 TABLET, FILM COATED ORAL at 21:33

## 2021-01-01 RX ADMIN — ALPRAZOLAM 0.25 MG: 0.25 TABLET ORAL at 21:27

## 2021-01-01 RX ADMIN — GUAIFENESIN 600 MG: 600 TABLET ORAL at 20:49

## 2021-01-01 RX ADMIN — NYSTATIN 1 APPLICATION: 100000 POWDER TOPICAL at 09:04

## 2021-01-01 RX ADMIN — POLYETHYLENE GLYCOL 3350 17 G: 17 POWDER, FOR SOLUTION ORAL at 08:16

## 2021-01-01 RX ADMIN — ATORVASTATIN CALCIUM 80 MG: 80 TABLET, FILM COATED ORAL at 16:59

## 2021-01-01 RX ADMIN — IPRATROPIUM BROMIDE AND ALBUTEROL SULFATE 3 ML: 2.5; .5 SOLUTION RESPIRATORY (INHALATION) at 12:08

## 2021-01-01 RX ADMIN — MORPHINE SULFATE 4 MG: 4 INJECTION INTRAVENOUS at 00:56

## 2021-01-01 RX ADMIN — SODIUM CHLORIDE 75 ML/HR: 0.9 INJECTION, SOLUTION INTRAVENOUS at 11:34

## 2021-01-01 RX ADMIN — IPRATROPIUM BROMIDE AND ALBUTEROL SULFATE 3 ML: 2.5; .5 SOLUTION RESPIRATORY (INHALATION) at 13:03

## 2021-01-01 RX ADMIN — ALBUTEROL SULFATE 2.5 MG: 2.5 SOLUTION RESPIRATORY (INHALATION) at 12:16

## 2021-01-01 RX ADMIN — NYSTATIN: 100000 CREAM TOPICAL at 18:46

## 2021-01-01 RX ADMIN — MONTELUKAST SODIUM 10 MG: 10 TABLET, FILM COATED ORAL at 21:13

## 2021-01-01 RX ADMIN — ATORVASTATIN CALCIUM 80 MG: 80 TABLET, FILM COATED ORAL at 16:10

## 2021-01-01 RX ADMIN — TORSEMIDE 10 MG: 10 TABLET ORAL at 09:09

## 2021-01-01 RX ADMIN — ACETAMINOPHEN 650 MG: 325 TABLET ORAL at 11:22

## 2021-01-01 RX ADMIN — FLUTICASONE FUROATE AND VILANTEROL TRIFENATATE 1 PUFF: 100; 25 POWDER RESPIRATORY (INHALATION) at 09:38

## 2021-01-01 RX ADMIN — IPRATROPIUM BROMIDE AND ALBUTEROL SULFATE 3 ML: 2.5; .5 SOLUTION RESPIRATORY (INHALATION) at 14:07

## 2021-01-01 RX ADMIN — TRAMADOL HYDROCHLORIDE 50 MG: 50 TABLET, FILM COATED ORAL at 11:46

## 2021-01-01 RX ADMIN — HEPARIN SODIUM 5000 UNITS: 5000 INJECTION INTRAVENOUS; SUBCUTANEOUS at 05:53

## 2021-01-01 RX ADMIN — HEPARIN SODIUM 5000 UNITS: 5000 INJECTION INTRAVENOUS; SUBCUTANEOUS at 22:15

## 2021-01-01 RX ADMIN — NYSTATIN: 100000 CREAM TOPICAL at 17:51

## 2021-01-01 RX ADMIN — ACETAMINOPHEN 650 MG: 325 TABLET ORAL at 17:04

## 2021-01-01 RX ADMIN — ACETAMINOPHEN 975 MG: 325 TABLET ORAL at 11:58

## 2021-01-01 RX ADMIN — Medication 250 MG: at 08:58

## 2021-01-01 RX ADMIN — TRAMADOL HYDROCHLORIDE 50 MG: 50 TABLET, FILM COATED ORAL at 08:00

## 2021-01-01 RX ADMIN — ACETAMINOPHEN 650 MG: 325 TABLET ORAL at 17:14

## 2021-01-01 RX ADMIN — TRAMADOL HYDROCHLORIDE 50 MG: 50 TABLET, FILM COATED ORAL at 05:57

## 2021-01-01 RX ADMIN — ALBUTEROL SULFATE 2 PUFF: 90 AEROSOL, METERED RESPIRATORY (INHALATION) at 21:46

## 2021-01-01 RX ADMIN — IPRATROPIUM BROMIDE 0.5 MG: 0.5 SOLUTION RESPIRATORY (INHALATION) at 13:46

## 2021-01-01 RX ADMIN — GLYBURIDE 2.5 MG: 1.25 TABLET ORAL at 08:20

## 2021-01-01 RX ADMIN — LIDOCAINE HYDROCHLORIDE 10 ML: 20 SOLUTION ORAL; TOPICAL at 11:43

## 2021-01-01 RX ADMIN — ACETAMINOPHEN 975 MG: 325 TABLET ORAL at 12:50

## 2021-01-01 RX ADMIN — OXYCODONE HYDROCHLORIDE 5 MG: 5 TABLET ORAL at 20:58

## 2021-01-01 RX ADMIN — LEVALBUTEROL HYDROCHLORIDE 1.25 MG: 1.25 SOLUTION, CONCENTRATE RESPIRATORY (INHALATION) at 20:08

## 2021-01-01 RX ADMIN — MENTHOL, METHYL SALICYLATE: 10; 15 CREAM TOPICAL at 22:00

## 2021-01-01 RX ADMIN — LEVALBUTEROL HYDROCHLORIDE 1.25 MG: 1.25 SOLUTION, CONCENTRATE RESPIRATORY (INHALATION) at 13:05

## 2021-01-01 RX ADMIN — ACETAMINOPHEN 975 MG: 325 TABLET ORAL at 12:00

## 2021-01-01 RX ADMIN — FLUTICASONE FUROATE AND VILANTEROL TRIFENATATE 1 PUFF: 100; 25 POWDER RESPIRATORY (INHALATION) at 08:37

## 2021-01-01 RX ADMIN — TRAMADOL HYDROCHLORIDE 50 MG: 50 TABLET, FILM COATED ORAL at 08:28

## 2021-01-01 RX ADMIN — HEPARIN SODIUM 5000 UNITS: 5000 INJECTION INTRAVENOUS; SUBCUTANEOUS at 21:13

## 2021-01-01 RX ADMIN — LIDOCAINE 5% 2 PATCH: 700 PATCH TOPICAL at 09:34

## 2021-01-01 RX ADMIN — ATORVASTATIN CALCIUM 80 MG: 80 TABLET, FILM COATED ORAL at 17:00

## 2021-01-01 RX ADMIN — PANTOPRAZOLE SODIUM 40 MG: 40 TABLET, DELAYED RELEASE ORAL at 06:39

## 2021-01-01 RX ADMIN — ACETAMINOPHEN 650 MG: 325 TABLET ORAL at 17:00

## 2021-01-01 RX ADMIN — ALBUTEROL SULFATE 2 PUFF: 90 AEROSOL, METERED RESPIRATORY (INHALATION) at 03:52

## 2021-01-01 RX ADMIN — ALBUTEROL SULFATE 2 PUFF: 90 AEROSOL, METERED RESPIRATORY (INHALATION) at 08:51

## 2021-01-01 RX ADMIN — FLUTICASONE FUROATE AND VILANTEROL TRIFENATATE 1 PUFF: 100; 25 POWDER RESPIRATORY (INHALATION) at 09:39

## 2021-01-01 RX ADMIN — OXYCODONE HYDROCHLORIDE 5 MG: 5 TABLET ORAL at 09:18

## 2021-01-01 RX ADMIN — LIDOCAINE 2 PATCH: 50 PATCH TOPICAL at 09:11

## 2021-01-01 RX ADMIN — PANTOPRAZOLE SODIUM 40 MG: 40 TABLET, DELAYED RELEASE ORAL at 06:08

## 2021-01-01 RX ADMIN — ALPRAZOLAM 0.25 MG: 0.25 TABLET ORAL at 23:11

## 2021-01-01 RX ADMIN — FLUTICASONE FUROATE AND VILANTEROL TRIFENATATE 1 PUFF: 100; 25 POWDER RESPIRATORY (INHALATION) at 09:04

## 2021-01-01 RX ADMIN — INSULIN LISPRO 2 UNITS: 100 INJECTION, SOLUTION INTRAVENOUS; SUBCUTANEOUS at 21:34

## 2021-01-01 RX ADMIN — LEVALBUTEROL HYDROCHLORIDE 1.25 MG: 1.25 SOLUTION, CONCENTRATE RESPIRATORY (INHALATION) at 13:02

## 2021-01-01 RX ADMIN — FLUTICASONE FUROATE AND VILANTEROL TRIFENATATE 1 PUFF: 100; 25 POWDER RESPIRATORY (INHALATION) at 08:21

## 2021-01-01 RX ADMIN — GUAIFENESIN 600 MG: 600 TABLET, EXTENDED RELEASE ORAL at 08:12

## 2021-01-01 RX ADMIN — ACETAMINOPHEN 650 MG: 325 TABLET ORAL at 23:08

## 2021-01-01 RX ADMIN — PANTOPRAZOLE SODIUM 40 MG: 40 TABLET, DELAYED RELEASE ORAL at 05:55

## 2021-01-01 RX ADMIN — LIDOCAINE HYDROCHLORIDE 10 ML: 20 SOLUTION ORAL; TOPICAL at 11:30

## 2021-01-01 RX ADMIN — HEPARIN SODIUM 5000 UNITS: 5000 INJECTION INTRAVENOUS; SUBCUTANEOUS at 05:16

## 2021-01-01 RX ADMIN — MENTHOL, METHYL SALICYLATE: 10; 15 CREAM TOPICAL at 21:53

## 2021-01-01 RX ADMIN — TRAMADOL HYDROCHLORIDE 50 MG: 50 TABLET, FILM COATED ORAL at 15:44

## 2021-01-01 RX ADMIN — LIDOCAINE HYDROCHLORIDE 10 ML: 20 SOLUTION ORAL; TOPICAL at 06:20

## 2021-01-01 RX ADMIN — INSULIN LISPRO 2 UNITS: 100 INJECTION, SOLUTION INTRAVENOUS; SUBCUTANEOUS at 21:24

## 2021-01-01 RX ADMIN — GUAIFENESIN 600 MG: 600 TABLET, EXTENDED RELEASE ORAL at 21:13

## 2021-01-01 RX ADMIN — LIDOCAINE HYDROCHLORIDE 10 ML: 20 SOLUTION ORAL; TOPICAL at 15:26

## 2021-01-01 RX ADMIN — LIDOCAINE HYDROCHLORIDE 10 ML: 20 SOLUTION ORAL; TOPICAL at 15:47

## 2021-01-01 RX ADMIN — Medication 250 MG: at 17:14

## 2021-01-01 RX ADMIN — ALPRAZOLAM 0.25 MG: 0.25 TABLET ORAL at 11:17

## 2021-01-01 RX ADMIN — MELATONIN 6 MG: at 21:57

## 2021-01-01 RX ADMIN — FLUTICASONE FUROATE AND VILANTEROL TRIFENATATE 1 PUFF: 100; 25 POWDER RESPIRATORY (INHALATION) at 13:43

## 2021-01-01 RX ADMIN — Medication 250 MG: at 16:55

## 2021-01-01 RX ADMIN — LIDOCAINE 2 PATCH: 50 PATCH TOPICAL at 08:20

## 2021-01-01 RX ADMIN — PANTOPRAZOLE SODIUM 40 MG: 40 TABLET, DELAYED RELEASE ORAL at 06:06

## 2021-01-01 RX ADMIN — FLUTICASONE FUROATE AND VILANTEROL TRIFENATATE 1 PUFF: 100; 25 POWDER RESPIRATORY (INHALATION) at 08:05

## 2021-01-01 RX ADMIN — INSULIN LISPRO 2 UNITS: 100 INJECTION, SOLUTION INTRAVENOUS; SUBCUTANEOUS at 21:48

## 2021-01-01 RX ADMIN — HEPARIN SODIUM 5000 UNITS: 5000 INJECTION INTRAVENOUS; SUBCUTANEOUS at 21:51

## 2021-01-01 RX ADMIN — PREDNISONE 20 MG: 20 TABLET ORAL at 09:10

## 2021-01-01 RX ADMIN — METHYLPREDNISOLONE SODIUM SUCCINATE 40 MG: 40 INJECTION, POWDER, FOR SOLUTION INTRAMUSCULAR; INTRAVENOUS at 22:18

## 2021-01-01 RX ADMIN — IPRATROPIUM BROMIDE AND ALBUTEROL SULFATE 3 ML: 2.5; .5 SOLUTION RESPIRATORY (INHALATION) at 20:30

## 2021-01-01 RX ADMIN — INSULIN LISPRO 1 UNITS: 100 INJECTION, SOLUTION INTRAVENOUS; SUBCUTANEOUS at 11:41

## 2021-01-01 RX ADMIN — SODIUM CHLORIDE 75 ML/HR: 0.9 INJECTION, SOLUTION INTRAVENOUS at 07:44

## 2021-01-01 RX ADMIN — TORSEMIDE 10 MG: 10 TABLET ORAL at 08:14

## 2021-01-01 RX ADMIN — NYSTATIN 1 APPLICATION: 100000 POWDER TOPICAL at 20:19

## 2021-01-01 RX ADMIN — ALPRAZOLAM 0.25 MG: 0.25 TABLET ORAL at 21:17

## 2021-01-01 RX ADMIN — LIDOCAINE 2 PATCH: 50 PATCH TOPICAL at 10:01

## 2021-01-01 RX ADMIN — LEVALBUTEROL HYDROCHLORIDE 1.25 MG: 1.25 SOLUTION, CONCENTRATE RESPIRATORY (INHALATION) at 13:46

## 2021-01-01 RX ADMIN — HEPARIN SODIUM 5000 UNITS: 5000 INJECTION INTRAVENOUS; SUBCUTANEOUS at 22:02

## 2021-01-01 RX ADMIN — LIDOCAINE 2 PATCH: 50 PATCH TOPICAL at 09:13

## 2021-01-01 RX ADMIN — GLYBURIDE 2.5 MG: 1.25 TABLET ORAL at 07:26

## 2021-01-01 RX ADMIN — HEPARIN SODIUM 5000 UNITS: 5000 INJECTION INTRAVENOUS; SUBCUTANEOUS at 05:38

## 2021-01-01 RX ADMIN — ACETAMINOPHEN 975 MG: 325 TABLET ORAL at 20:49

## 2021-01-01 RX ADMIN — LEVOTHYROXINE SODIUM 125 MCG: 125 TABLET ORAL at 05:34

## 2021-01-01 RX ADMIN — ATORVASTATIN CALCIUM 80 MG: 80 TABLET, FILM COATED ORAL at 16:35

## 2021-01-01 RX ADMIN — NYSTATIN: 100000 POWDER TOPICAL at 19:31

## 2021-01-01 RX ADMIN — MONTELUKAST SODIUM 10 MG: 10 TABLET, FILM COATED ORAL at 21:29

## 2021-01-01 RX ADMIN — IOHEXOL 100 ML: 350 INJECTION, SOLUTION INTRAVENOUS at 02:28

## 2021-01-01 RX ADMIN — OXYCODONE HYDROCHLORIDE 5 MG: 5 TABLET ORAL at 22:05

## 2021-01-01 RX ADMIN — ONDANSETRON 4 MG: 2 INJECTION INTRAMUSCULAR; INTRAVENOUS at 19:01

## 2021-01-01 RX ADMIN — ACETAMINOPHEN 975 MG: 325 TABLET ORAL at 06:07

## 2021-01-01 RX ADMIN — TRAMADOL HYDROCHLORIDE 50 MG: 50 TABLET, FILM COATED ORAL at 16:57

## 2021-01-01 RX ADMIN — Medication 250 MG: at 09:10

## 2021-01-01 RX ADMIN — OXYCODONE HYDROCHLORIDE 5 MG: 5 TABLET ORAL at 21:35

## 2021-01-01 RX ADMIN — Medication 250 MG: at 10:01

## 2021-01-01 RX ADMIN — ALBUTEROL SULFATE 2 PUFF: 90 AEROSOL, METERED RESPIRATORY (INHALATION) at 21:33

## 2021-01-01 RX ADMIN — ENOXAPARIN SODIUM 70 MG: 80 INJECTION SUBCUTANEOUS at 08:08

## 2021-01-01 RX ADMIN — LIDOCAINE 5% 2 PATCH: 700 PATCH TOPICAL at 09:07

## 2021-01-01 RX ADMIN — IPRATROPIUM BROMIDE 0.5 MG: 0.5 SOLUTION RESPIRATORY (INHALATION) at 13:08

## 2021-01-01 RX ADMIN — LORAZEPAM 0.5 MG: 2 INJECTION INTRAMUSCULAR; INTRAVENOUS at 21:36

## 2021-01-01 RX ADMIN — ALPRAZOLAM 0.25 MG: 0.25 TABLET ORAL at 22:31

## 2021-01-01 RX ADMIN — LIDOCAINE 2 PATCH: 50 PATCH TOPICAL at 08:41

## 2021-01-01 RX ADMIN — MELATONIN TAB 3 MG 6 MG: 3 TAB at 21:53

## 2021-01-01 RX ADMIN — PANTOPRAZOLE SODIUM 40 MG: 40 TABLET, DELAYED RELEASE ORAL at 05:34

## 2021-01-01 RX ADMIN — HEPARIN SODIUM 5000 UNITS: 5000 INJECTION INTRAVENOUS; SUBCUTANEOUS at 05:22

## 2021-01-01 RX ADMIN — GUAIFENESIN 600 MG: 600 TABLET, EXTENDED RELEASE ORAL at 08:36

## 2021-01-01 RX ADMIN — IPRATROPIUM BROMIDE 0.5 MG: 0.5 SOLUTION RESPIRATORY (INHALATION) at 20:03

## 2021-01-01 RX ADMIN — HEPARIN SODIUM 5000 UNITS: 5000 INJECTION INTRAVENOUS; SUBCUTANEOUS at 15:14

## 2021-01-01 RX ADMIN — TRAMADOL HYDROCHLORIDE 50 MG: 50 TABLET, FILM COATED ORAL at 09:32

## 2021-01-01 RX ADMIN — ACETAMINOPHEN 975 MG: 325 TABLET ORAL at 06:34

## 2021-01-01 RX ADMIN — ATORVASTATIN CALCIUM 80 MG: 80 TABLET, FILM COATED ORAL at 17:49

## 2021-01-01 RX ADMIN — IPRATROPIUM BROMIDE AND ALBUTEROL SULFATE 3 ML: 2.5; .5 SOLUTION RESPIRATORY (INHALATION) at 16:31

## 2021-01-01 RX ADMIN — ALBUTEROL SULFATE 2 PUFF: 90 AEROSOL, METERED RESPIRATORY (INHALATION) at 08:04

## 2021-01-01 RX ADMIN — TRAMADOL HYDROCHLORIDE 50 MG: 50 TABLET, FILM COATED ORAL at 09:13

## 2021-01-01 RX ADMIN — LIDOCAINE 5% 2 PATCH: 700 PATCH TOPICAL at 08:56

## 2021-01-01 RX ADMIN — SODIUM CHLORIDE 1000 ML: 0.9 INJECTION, SOLUTION INTRAVENOUS at 01:04

## 2021-01-01 RX ADMIN — ACETAMINOPHEN 975 MG: 325 TABLET ORAL at 07:06

## 2021-01-07 NOTE — TELEPHONE ENCOUNTER
Could she just schedule the PFT for the afternoon instead? To get an accurate lung function she would need to follow those instructions

## 2021-01-07 NOTE — PROGRESS NOTES
Assessment/Plan:   Diagnoses and all orders for this visit:    Chronic hypoxemic respiratory failure (HCC)    Simple chronic bronchitis (HCC)  -     Complete PFT with post Bronchodilator and Six Minute walk; Future    Asthma with COPD ([address] )  -     fluticasone-vilanterol (BREO ELLIPTA) 100-25 mcg/inh inhaler; Inhale 1 puff daily Rinse mouth after use  Patient is here today for follow-up  She has improved from her last visit during which she was given a prednisone taper  She does continue to have some dyspnea on exertion and does need to take breaks with significant activity  She continues with her Gabby Narayan, uses her nebulizer with DuoNeb at least once per day  Did tell her she can use it up to 4 times per day as needed, she also has Combivent to use outside of the house  She had a recent chest x-ray which was clear  Will order her a complete PFT along with a 6 minutes walk  She does use 2 L continuous which she has been on for some time  She does feel that sometimes her O2 sats drop with exertion  She will follow-up with us in 3 months or sooner if necessary  Return in about 3 months (around 4/7/2021)  All questions are answered to the patient's satisfaction and understanding  She verbalizes understanding  She is encouraged to call with any further questions or concerns  Portions of the record may have been created with voice recognition software  Occasional wrong word or "sound a like" substitutions may have occurred due to the inherent limitations of voice recognition software  Read the chart carefully and recognize, using context, where substitutions have occurred      Electronically Signed by Dara Gatica PA-C    ______________________________________________________________________    Chief Complaint:   Chief Complaint   Patient presents with    Follow-up       Patient ID: Fidel Gonzalez is a 78 y o  y o  female has a past medical history of Cataract, CHF (congestive heart failure) ([facility] [address] ), and Leukocytosis  1/7/2021  Patient presents today for follow-up visit  Patient is an 78-year-old female with former smoker with significant smoking history with past medical history of asthma/COPD  She is here today for follow-up  She continues on Breo, uses her nebulizer every morning, sometimes will use it later on in the day if she feels she needs it  Does find that the weather changes do affect her breathing especially the cold air  She was given a prednisone taper at her last visit which she completed  She continues to have some dyspnea on exertion, does note that she needs to take breaks when she is walking or doing a lot of physical exertion  Review of Systems   Constitutional: Negative  HENT: Negative  Respiratory: Positive for shortness of breath  Cardiovascular: Negative  Gastrointestinal: Negative  Genitourinary: Negative  Musculoskeletal: Negative  Skin: Negative  Allergic/Immunologic: Negative  Neurological: Negative  Psychiatric/Behavioral: Negative  Smoking history: She reports that she quit smoking about 21 years ago  Her smoking use included cigarettes  She has a 96 00 pack-year smoking history   She has never used smokeless tobacco     The following portions of the patient's history were reviewed and updated as appropriate: allergies, current medications, past family history, past medical history, past social history, past surgical history and problem list     Immunization History   Administered Date(s) Administered    INFLUENZA 10/27/2008, 10/19/2015    Influenza Split High Dose Preservative Free IM 10/19/2015    Influenza, seasonal, injectable 1938, 10/27/2008    Pneumococcal Conjugate 13-Valent 10/19/2015    Tdap 1938    Zoster 1938     Current Outpatient Medications   Medication Sig Dispense Refill    albuterol (Ventolin HFA) 90 mcg/act inhaler Inhale 2 puffs every 6 (six) hours as needed for wheezing 18 g 1    atorvastatin (LIPITOR) 80 mg tablet TAKE 1 TABLET BY MOUTH EVERY DAY 90 tablet 0    Combivent Respimat inhaler INHALE 1 PUFF 3 (THREE) TIMES A DAY 4 g 6    fluticasone-vilanterol (BREO ELLIPTA) 100-25 mcg/inh inhaler Inhale 1 puff daily Rinse mouth after use  1 Inhaler 3    FML FORTE 0 25 % ophthalmic suspension INSTILL 1 DROP INTO BOTH EYES EVERY DAY      glucose blood (ONE TOUCH ULTRA TEST) test strip by In Vitro route 3 (three) times a day      glyBURIDE (DIABETA) 5 mg tablet TAKE 1 TABLET BY MOUTH TWICE A DAY 60 tablet 11    Insulin Pen Needle (B-D UF III MINI PEN NEEDLES) 31G X 5 MM MISC by Does not apply route      ipratropium-albuterol (DUO-NEB) 0 5-2 5 mg/3 mL nebulizer solution Take 1 vial (3 mL total) by nebulization 5 (five) times a day 150 vial 1    levothyroxine 125 mcg tablet TAKE 1 TABLET BY MOUTH EVERY DAY 90 tablet 3    LORazepam (ATIVAN) 0 5 mg tablet Take by mouth      PAZEO 0 7 % SOLN INSTILL 1 DROP INTO BOTH EYES EVERY DAY  4    valsartan (DIOVAN) 160 mg tablet TAKE 1 TABLET BY MOUTH EVERY DAY 90 tablet 1    predniSONE 20 mg tablet Take 1 tablet (20 mg total) by mouth daily Use 2 tab for 5 days and one tab for 5 dasy and half a tab for 5 days (Patient not taking: Reported on 1/7/2021) 18 tablet 0    torsemide (DEMADEX) 10 mg tablet Take 1 tablet (10 mg total) by mouth daily 30 tablet 3     No current facility-administered medications for this visit  Allergies: Erythromycin and Sulfa antibiotics    Objective:  Vitals:    01/07/21 0925   BP: 124/64   Pulse: 71   Temp: 97 7 °F (36 5 °C)   SpO2: 97%   Weight: 77 6 kg (171 lb)   Height: 5' 2" (1 575 m)   Oxygen Therapy  SpO2: 97 %(with 2 lts of oxygen)    Wt Readings from Last 3 Encounters:   01/07/21 77 6 kg (171 lb)   12/01/20 81 2 kg (179 lb)   11/13/20 83 8 kg (184 lb 12 8 oz)     Body mass index is 31 28 kg/m²  Physical Exam  Constitutional:       General: She is not in acute distress  Appearance: Normal appearance  She is well-developed  HENT:      Head: Normocephalic and atraumatic  Eyes:      Pupils: Pupils are equal, round, and reactive to light  Neck:      Musculoskeletal: Normal range of motion  Cardiovascular:      Rate and Rhythm: Normal rate and regular rhythm  Heart sounds: Normal heart sounds  No murmur  Pulmonary:      Effort: Pulmonary effort is normal  No accessory muscle usage or respiratory distress  Breath sounds: Normal breath sounds  No decreased breath sounds, wheezing, rhonchi or rales  Abdominal:      Palpations: Abdomen is soft  Tenderness: There is no abdominal tenderness  Musculoskeletal: Normal range of motion  Right lower leg: Edema (trace) present  Left lower leg: Edema (trace) present  Skin:     General: Skin is warm and dry  Comments: Venous stasis changes bilateral LEs   Neurological:      Mental Status: She is alert and oriented to person, place, and time  Psychiatric:         Mood and Affect: Mood normal          Behavior: Behavior normal          Lab Review:   Lab Results   Component Value Date     10/19/2015    K 4 6 11/04/2020    K 4 1 10/19/2015     (H) 11/04/2020     10/19/2015    CO2 32 11/04/2020    CO2 27 10/19/2015    BUN 15 11/04/2020    BUN 24 10/19/2015    CREATININE 1 00 11/04/2020    CREATININE 0 92 10/19/2015    GLUCOSE 145 (H) 10/19/2015    CALCIUM 10 0 11/04/2020    CALCIUM 9 6 10/19/2015     Lab Results   Component Value Date    WBC 9 32 11/04/2020    WBC 13 86 (H) 10/19/2015    HGB 13 2 11/04/2020    HGB 13 4 10/19/2015    HCT 43 2 11/04/2020    HCT 41 7 10/19/2015    MCV 91 11/04/2020    MCV 87 10/19/2015     11/04/2020     10/19/2015       Diagnostics:  I have personally reviewed pertinent reports  Reviewed prior chest x-ray  Office Spirometry Results:     ESS:    No results found

## 2021-01-07 NOTE — TELEPHONE ENCOUNTER
Patients daughter went to schedule the pft and they told her they want her free of inhalers 6 hours prior to her 9:45 appointment  She states she needs the inhaler has soon as she gets up so she is not sure how this is going to be possible to have the test done  Can you please advise and I will inform the patients daughter? Thank you

## 2021-01-12 NOTE — PROGRESS NOTES
Assessment/Plan: she is stable doing well I reviewed her recent labs with her  She will continue with her medication as listed below follow-up in April       Diagnoses and all orders for this visit:    Chronic diastolic congestive heart failure ([address] )    Uncontrolled type 2 diabetes mellitus with stage 3 chronic kidney disease, without long-term current use of insulin (HCC)    Mixed hyperlipidemia    Type 2 diabetes mellitus with complication, without long-term current use of insulin (HCC)    Hypoxia    Hypothyroidism, unspecified type    Hyperlipidemia, unspecified hyperlipidemia type        No problem-specific Assessment & Plan notes found for this encounter  Subjective:      Patient ID: Rosalva Morales is a 78 y o  female  She is here for follow-up  Diuretics increase 2 months ago because of swelling increasing shortness of breath  She has been followed by Pulmonary she has had a 10 lb weight loss and her respiratory status has improved chronically on oxygen  Renal function has been stable with a change in medicine  History of severe COPD following with Pulmonary  History of diabetes she is being careful with her diet  Her diabetes has been pretty well controlled A1c 3 months ago was 7  Hypertension hypothyroid well controlled with meds      The following portions of the patient's history were reviewed and updated as appropriate:   She has a past medical history of Cataract, CHF (congestive heart failure) ([facility] ), and Leukocytosis  ,  does not have any pertinent problems on file  ,   has a past surgical history that includes Cystocele repair; Appendectomy; Total abdominal hysterectomy; and ORIF ankle fracture (Left, 1979)  ,  family history includes No Known Problems in her father and mother  ,   reports that she quit smoking about 21 years ago  Her smoking use included cigarettes  She has a 96 00 pack-year smoking history   She has never used smokeless tobacco  She reports current alcohol use of about 4 0 standard drinks of alcohol per week  She reports that she does not use drugs  ,  is allergic to erythromycin and sulfa antibiotics     Current Outpatient Medications   Medication Sig Dispense Refill    albuterol (Ventolin HFA) 90 mcg/act inhaler Inhale 2 puffs every 6 (six) hours as needed for wheezing 18 g 1    atorvastatin (LIPITOR) 80 mg tablet TAKE 1 TABLET BY MOUTH EVERY DAY 90 tablet 0    Combivent Respimat inhaler INHALE 1 PUFF 3 (THREE) TIMES A DAY 4 g 6    fluticasone-vilanterol (BREO ELLIPTA) 100-25 mcg/inh inhaler Inhale 1 puff daily Rinse mouth after use  1 Inhaler 3    FML FORTE 0 25 % ophthalmic suspension INSTILL 1 DROP INTO BOTH EYES EVERY DAY      glucose blood (ONE TOUCH ULTRA TEST) test strip by In Vitro route 3 (three) times a day      glyBURIDE (DIABETA) 5 mg tablet TAKE 1 TABLET BY MOUTH TWICE A DAY 60 tablet 11    Insulin Pen Needle (B-D UF III MINI PEN NEEDLES) 31G X 5 MM MISC by Does not apply route      ipratropium-albuterol (DUO-NEB) 0 5-2 5 mg/3 mL nebulizer solution Take 1 vial (3 mL total) by nebulization 5 (five) times a day 150 vial 1    levothyroxine 125 mcg tablet TAKE 1 TABLET BY MOUTH EVERY DAY 90 tablet 3    LORazepam (ATIVAN) 0 5 mg tablet Take by mouth      PAZEO 0 7 % SOLN INSTILL 1 DROP INTO BOTH EYES EVERY DAY  4    valsartan (DIOVAN) 160 mg tablet TAKE 1 TABLET BY MOUTH EVERY DAY 90 tablet 1    torsemide (DEMADEX) 10 mg tablet Take 1 tablet (10 mg total) by mouth daily 30 tablet 3     No current facility-administered medications for this visit  Review of Systems   Constitutional: Negative for chills and fever  HENT: Negative for ear pain and sore throat  Eyes: Negative for pain and visual disturbance  Respiratory: Positive for shortness of breath  Negative for cough  Cardiovascular: Negative for chest pain and palpitations  Gastrointestinal: Negative for abdominal pain and vomiting     Genitourinary: Negative for dysuria and hematuria  Musculoskeletal: Negative for arthralgias and back pain  Skin: Negative for color change and rash  Neurological: Positive for weakness  Negative for seizures and syncope  All other systems reviewed and are negative  Objective:  Vitals:    01/12/21 0937   BP: 100/64   Pulse: 78   Temp: (!) 97 4 °F (36 3 °C)   SpO2: 99%   Weight: 77 5 kg (170 lb 12 8 oz)   Height: 5' 2" (1 575 m)     Body mass index is 31 24 kg/m²  Physical Exam  Vitals signs reviewed  Constitutional:       Appearance: She is well-developed  She is obese  Comments: On nasal oxygen   HENT:      Head: Normocephalic  Right Ear: Tympanic membrane and external ear normal       Left Ear: Tympanic membrane and external ear normal       Nose: Nose normal       Mouth/Throat:      Mouth: Mucous membranes are moist    Eyes:      Extraocular Movements: Extraocular movements intact  Conjunctiva/sclera: Conjunctivae normal       Pupils: Pupils are equal, round, and reactive to light  Neck:      Musculoskeletal: Normal range of motion and neck supple  Thyroid: No thyromegaly  Cardiovascular:      Rate and Rhythm: Normal rate and regular rhythm  Pulses: Normal pulses  Heart sounds: Normal heart sounds  No murmur  Pulmonary:      Effort: Pulmonary effort is normal  No respiratory distress  Breath sounds: No stridor  No wheezing or rhonchi  Rales:  scattered coarse  Comments:  Markedly decreased breath sounds over  Abdominal:      General: Abdomen is flat  Bowel sounds are normal       Palpations: Abdomen is soft  Musculoskeletal: Normal range of motion  General: No swelling or deformity  Skin:     General: Skin is warm and dry  Neurological:      General: No focal deficit present  Mental Status: She is alert and oriented to person, place, and time  Cranial Nerves: No cranial nerve deficit  Motor: No weakness        Gait: Gait normal       Deep Tendon Reflexes: Reflexes are normal and symmetric  Psychiatric:         Mood and Affect: Mood normal          Thought Content:  Thought content normal          Judgment: Judgment normal

## 2021-01-27 NOTE — RESPIRATORY THERAPY NOTE
Pt  Arrived for an outpatient Complete PFT with post bronchodilator  Instructed pt  on the use of a spacer with her inhalers for home use  Pt  performed technique well and understood use

## 2021-01-29 NOTE — TELEPHONE ENCOUNTER
Brittnee-Wingate    Pt has been complaining of thoracic pain and shortness of breath  Her breathing sounds decreased, can hear a little wheezing when checked her lungs  After walking, she became very short of breath, O2 sat dropped to 82  Would like a chest xray ordered  Her daughter can take her today to have done        Call Lynnette Segovia when order in  [phone]

## 2021-01-30 NOTE — TELEPHONE ENCOUNTER
Laurel Hughes called about Alva's x-ray results   No final result in chart  Don't know if Eliane Pfeiffer is on the pt's communication consent form    Please call Genny Vivar; when the results are in

## 2021-02-08 NOTE — TELEPHONE ENCOUNTER
She can take more of the senna but she should not use the anti-inflammatory drug due to kidney problem  Actually, she was to come in to be seen by me at some point in the relatively near future

## 2021-02-08 NOTE — TELEPHONE ENCOUNTER
Patient has seen orthopedic specialist for pain  She has been given injection for the pain and is on Tramadol  Patient is complaining of constipation and needs to know if she can take more of the Senna and use Ibuprofen to help with the pain  Corrinne Coaster is her daughter-in-law who helps her with all of her medications and appointments  Please call her and let her know what Josh Kerr can take    # [phone]

## 2021-02-11 NOTE — PROGRESS NOTES
CARDIOLOGY OFFICE VISIT  [facility]  [address], [facility] [place]  Tel: [phone]      NAME: Halle Adjutant  AGE: 78 y o  SEX: female  : 1940   MRN: [id number]      Chief Complaint:  CHF    History of Present Illness:   Patient referred for shortness of breath with diastolic CHF  78-year-old female with hypertension, diastolic dysfunction who has been short of breath for a while  She is on inhalers which she takes regularly  2D echocardiogram showed diastolic dysfunction  Chest x-ray (21)  showed no acute cardiopulmonary disease  NT proBNP in 2020 was normal   Will repeat again today  Pt denies chest pain, palpitations, lightheadedness, syncope, swelling feet, orthopnea, PND  Patient is wheelchair bound    HTN -  Has been hypertensive for many years  Taking medications regularly  Denies lightheadedness, headache, medication side effects  Past Medical History:  Past Medical History:   Diagnosis Date    Cataract     CHF (congestive heart failure) (HCC)     Leukocytosis          Past Surgical History:  Past Surgical History:   Procedure Laterality Date    APPENDECTOMY      CYSTOCELE REPAIR      ANTERIOR COLPORRHAPHY     ORIF ANKLE FRACTURE Left     TOTAL ABDOMINAL HYSTERECTOMY           Family History:  Family History   Problem Relation Age of Onset    No Known Problems Mother     No Known Problems Father          Social History:  Social History     Socioeconomic History    Marital status:       Spouse name: None    Number of children: None    Years of education: None    Highest education level: None   Occupational History    Occupation:     Social Needs    Financial resource strain: None    Food insecurity     Worry: None     Inability: None    Transportation needs     Medical: None     Non-medical: None   Tobacco Use    Smoking status: Former Smoker     Packs/day: 2 00     Years: 48 00     Pack years: 96 00     Types: Cigarettes     Quit date: 1999     Years since quittin 3    Smokeless tobacco: Never Used   Substance and Sexual Activity    Alcohol use: Yes     Alcohol/week: 4 0 standard drinks     Types: 4 Cans of beer per week     Frequency: 2-3 times a week     Drinks per session: 1 or 2     Binge frequency: Never    Drug use: No    Sexual activity: Not Currently   Lifestyle    Physical activity     Days per week: 0 days     Minutes per session: 0 min    Stress: To some extent   Relationships    Social connections     Talks on phone: None     Gets together: None     Attends [employer] service: None     Active member of club or organization: None     Attends meetings of clubs or organizations: None     Relationship status: None    Intimate partner violence     Fear of current or ex partner: None     Emotionally abused: None     Physically abused: None     Forced sexual activity: None   Other Topics Concern    None   Social History Narrative    LIVING INDEPENDENTLY ALONE     NO ADVANCED DIRECTIVES ON FILE          Active Problems:  Patient Active Problem List   Diagnosis    Carotid artery plaque    Chronic obstructive pulmonary disease ([facility] )    Type 2 diabetes mellitus with complication, without long-term current use of insulin ([facility] )    Hyperlipidemia    Hypothyroidism    Osteoarthrosis    CHF (congestive heart failure) ([facility] )    Atherosclerosis of abdominal aorta ([facility] )         The following portions of the patient's history were reviewed and updated as appropriate: past medical history, past surgical history, past family history,  past social history, current medications, allergies and problem list       Review of Systems:  Back pain+  12 system Review of Systems was essentially negative other than what is mentioned above        Vitals:  Vitals:    21 1539   BP: 122/70   Pulse: 74   SpO2: 99%       Body mass index is 31 09 kg/m²  Weight (last 2 days)     Date/Time   Weight    02/11/21 1539   77 1 (170)                Physical Examination:  General:  Examined in a wheelchair  Mildly dyspneic  Awake, alert  Responding to commands  Head: Normocephalic  Atraumatic  Eyes: Both pupils normal sized, round  Nonicteric  ENT: Normal external ear canals  Neck: Supple  JVP not raised  Trachea central  No thyromegaly  Lungs: Bilateral minimal wheezing  Chest wall: No tenderness  Cardiovascular: RRR  S1 and S2 normal  No murmur, rub or gallop  Gastrointestinal: Abdomen soft, nontender  No guarding or rigidity  Liver and spleen not palpable  Bowel sounds present  Neurologic: Patient is awake, alert  Responding to command  Moving all extremities  Integumentary:  No skin rash  Lymphatic: No cervical lymphadenopathy  Back: Symmetric   No CVA tenderness  Extremities: No clubbing, cyanosis or edema      Laboratory Results:  CBC with diff:   Lab Results   Component Value Date    WBC 9 32 11/04/2020    WBC 13 86 (H) 10/19/2015    RBC 4 73 11/04/2020    RBC 4 77 10/19/2015    HGB 13 2 11/04/2020    HGB 13 4 10/19/2015    HCT 43 2 11/04/2020    HCT 41 7 10/19/2015    MCV 91 11/04/2020    MCV 87 10/19/2015    MCH 27 9 11/04/2020    MCH 28 1 10/19/2015    RDW 14 1 11/04/2020    RDW 13 9 10/19/2015     11/04/2020     10/19/2015       CMP:  Lab Results   Component Value Date    CREATININE 1 00 11/04/2020    CREATININE 0 92 10/19/2015    BUN 15 11/04/2020    BUN 24 10/19/2015     10/19/2015    K 4 6 11/04/2020    K 4 1 10/19/2015     (H) 11/04/2020     10/19/2015    CO2 32 11/04/2020    CO2 27 10/19/2015    GLUCOSE 145 (H) 10/19/2015    PROT 7 0 10/19/2015    ALKPHOS 118 (H) 10/07/2019    ALKPHOS 114 10/19/2015    ALT 23 10/07/2019    ALT 27 10/19/2015    AST 16 10/07/2019    AST 18 10/19/2015       Lab Results   Component Value Date    HGBA1C 7 1 (H) 10/12/2020    HGBA1C 7 5 (H) 10/19/2015    MG 1 8 10/20/2014       No results found for: TROPONINI, CKMB, CKTOTAL    Lipid Profile:   Lab Results   Component Value Date    CHOL 206 10/20/2014    CHOL 182 2014     Lab Results   Component Value Date    HDL 71 (H) 10/07/2019    HDL 69 (H) 10/23/2017    HDL 62 (H) 10/17/2016     Lab Results   Component Value Date    LDLCALC 56 10/07/2019    LDLCALC 107 (H) 10/20/2014    LDLCALC 99 2014     Lab Results   Component Value Date    TRIG 101 10/07/2019    TRIG 151 (H) 10/17/2016    TRIG 177 10/20/2014       Cardiac testing:   Results for orders placed during the hospital encounter of 10/27/20   Echo complete with contrast if indicated    Narrative [place] [address], [address] [facility]  [phone]    Transthoracic Echocardiogram  2D, M-mode, Doppler, and Color Doppler    Study date:  27-Oct-2020    Patient: Tee Valdez  MR number: [id number]  Account number: [de-identified]  : 1940  Age: 78 years  Gender: Female  Status: Outpatient  Location: [facility]  Height: 62 in  Weight: 191 6 lb  BP: 148/ 70 mmHg    Indications: Congestive heart failure  Diagnoses: I50 32 - Chronic diastolic (congestive) heart failure    Sonographer:  CAREY Frederick  Primary Physician:  Dory Faustin MD  Referring Physician:  Dory Faustin MD  Group:  [facility]  Interpreting Physician:  Jeannine Pastrana MD    SUMMARY    LEFT VENTRICLE:  Ejection fraction was estimated to be 60 %  There were no regional wall motion abnormalities  There was mild concentric hypertrophy  Doppler parameters were consistent with abnormal left ventricular relaxation (grade 1 diastolic dysfunction)  RIGHT VENTRICLE:  Estimated peak pressure was at least 40 mmHg  MITRAL VALVE:  There was mild to moderate annular calcification  There was trace regurgitation  TRICUSPID VALVE:  There was mild regurgitation      HISTORY: PRIOR HISTORY: Bilateral leg edema, CHF, Hyperlipidemia, Diabetes Mellitus II, COPD, former smoker; quit more than a month ago  PROCEDURE: The study was performed in the [address] [place] [address]  This was a routine study  The transthoracic approach was used  The study included complete 2D imaging, M-mode, complete spectral Doppler, and color Doppler  The  heart rate was 85 bpm, at the start of the study  Images were obtained from the parasternal, apical, subcostal, and suprasternal notch acoustic windows  Image quality was adequate  LEFT VENTRICLE: Size was normal  Ejection fraction was estimated to be 60 %  There were no regional wall motion abnormalities  There was mild concentric hypertrophy  DOPPLER: Doppler parameters were consistent with abnormal left  ventricular relaxation (grade 1 diastolic dysfunction)  RIGHT VENTRICLE: The size was normal  Systolic function was normal  Wall thickness was normal  DOPPLER: Estimated peak pressure was at least 40 mmHg  LEFT ATRIUM: Size was normal     RIGHT ATRIUM: Size was normal     MITRAL VALVE: There was mild to moderate annular calcification  DOPPLER: There was trace regurgitation  AORTIC VALVE: The valve was not well visualized  DOPPLER: There was no evidence for stenosis  TRICUSPID VALVE: The valve structure was normal  There was normal leaflet separation  DOPPLER: The transtricuspid velocity was within the normal range  There was no evidence for stenosis  There was mild regurgitation  PULMONIC VALVE: Leaflets exhibited normal thickness, no calcification, and normal cuspal separation  DOPPLER: The transpulmonic velocity was within the normal range  There was no regurgitation  PERICARDIUM: There was no pericardial effusion  The pericardium was normal in appearance  AORTA: The root exhibited normal size      SYSTEM MEASUREMENT TABLES    2D  %FS: 36 73 %  Ao Diam: 3 58 cm  EDV(Teich): 79 66 ml  EF(Teich): 66 92 %  ESV(Teich): 26 35 ml  IVSd: 1 16 cm  LA Area: 11 34 cm2  LA Diam: 2 99 cm  LVEDV MOD A4C: 67 58 ml  LVEF MOD A4C: 65 09 %  LVESV MOD A4C: 23 59 ml  LVIDd: 4 22 cm  LVIDs: 2 67 cm  LVLd A4C: 7 33 cm  LVLs A4C: 5 92 cm  LVPWd: 0 88 cm  RA Area: 9 55 cm2  RVIDd: 2 88 cm  SV MOD A4C: 43 99 ml  SV(Teich): 53 31 ml    CW  AV Env  Ti: 270 22 ms  AV MaxP 75 mmHg  AV VTI: 31 99 cm  AV Vmax: 1 79 m/s  AV Vmean: 1 18 m/s  AV meanP 38 mmHg  PV Vmax: 1 66 m/s  PV maxP 06 mmHg  TR MaxP 32 mmHg  TR Vmax: 3 17 m/s    MM  TAPSE: 2 33 cm    PW  LVOT Env  Ti: 281 64 ms  LVOT VTI: 32 31 cm  LVOT Vmax: 1 66 m/s  LVOT Vmean: 1 15 m/s  LVOT maxPG: 10 98 mmHg  LVOT meanP 06 mmHg  MV A Jamal: 0 93 m/s  MV Dec Hans[place]: 3 88 m/s2  MV DecT: 156 66 ms  MV E Jamal: 0 61 m/s  MV E/A Ratio: 0 66  MV PHT: 45 43 ms  MVA By PHT: 4 84 cm2    Inters\[facility]\"" Commission Accredited Echocardiography Laboratory    Prepared and electronically signed by    Nia Stephens MD  Signed 27-Oct-2020 15:47:17         Medications:    Current Outpatient Medications:     albuterol (Ventolin HFA) 90 mcg/act inhaler, Inhale 2 puffs every 6 (six) hours as needed for wheezing, Disp: 18 g, Rfl: 1    atorvastatin (LIPITOR) 80 mg tablet, TAKE 1 TABLET BY MOUTH EVERY DAY, Disp: 90 tablet, Rfl: 0    Combivent Respimat inhaler, INHALE 1 PUFF 3 (THREE) TIMES A DAY, Disp: 4 g, Rfl: 6    fluticasone-vilanterol (BREO ELLIPTA) 100-25 mcg/inh inhaler, Inhale 1 puff daily Rinse mouth after use , Disp: 1 Inhaler, Rfl: 3    FML FORTE 0 25 % ophthalmic suspension, INSTILL 1 DROP INTO BOTH EYES EVERY DAY, Disp: , Rfl:     glyBURIDE (DIABETA) 5 mg tablet, TAKE 1 TABLET BY MOUTH TWICE A DAY, Disp: 60 tablet, Rfl: 11    ipratropium-albuterol (DUO-NEB) 0 5-2 5 mg/3 mL nebulizer solution, Take 1 vial (3 mL total) by nebulization 5 (five) times a day, Disp: 150 vial, Rfl: 1    levothyroxine 125 mcg tablet, TAKE 1 TABLET BY MOUTH EVERY DAY, Disp: 90 tablet, Rfl: 3    LORazepam (ATIVAN) 0 5 mg tablet, Take by mouth, Disp: , Rfl:     PAZEO 0 7 % SOLN, INSTILL 1 DROP INTO BOTH EYES EVERY DAY, Disp: , Rfl: 4    torsemide (DEMADEX) 10 mg tablet, Take 1 tablet (10 mg total) by mouth daily, Disp: 30 tablet, Rfl: 3    traMADol (ULTRAM) 50 mg tablet, , Disp: , Rfl:     valsartan (DIOVAN) 160 mg tablet, TAKE 1 TABLET BY MOUTH EVERY DAY, Disp: 90 tablet, Rfl: 1    glucose blood (ONE TOUCH ULTRA TEST) test strip, by In Vitro route 3 (three) times a day, Disp: , Rfl:     Insulin Pen Needle (B-D UF III MINI PEN NEEDLES) 31G X 5 MM MISC, by Does not apply route, Disp: , Rfl:       Allergies: Allergies   Allergen Reactions    Erythromycin     Sulfa Antibiotics          Assessment and Plan:  1  Shortness of breath on exertion - acute diastolic HF   Will check NT proBNP to confirm  will increase torsemide to 20 mg daily  Continue ARB   low-salt diet  Daily weights  2  Essential hypertension   BP stable  Continue current medications  Recommend aggressive risk factor modification and therapeutic lifestyle changes  Low-salt, low-calorie, low-fat, low-cholesterol diet with regular exercise and to optimize weight  I will defer the ordering and monitoring of necessity lab studies to you, but I am available and happy to review and manage any of the data at your request in the future  Discussed concepts of atherosclerosis, including signs and symptoms of cardiac disease  Previous studies were reviewed  Safety measures were reviewed  Questions were entertained and answered  Patient was advised to report any problems requiring medical attention  Follow-up with PCP and appropriate specialist and lab work as discussed  Return for follow up visit as scheduled or earlier, if needed  Thank you for allowing me to participate in the care and evaluation of your patient  Should you have any questions, please feel free to contact me        Eugenio Erazo MD  2/63/3875,3:86 PM

## 2021-02-15 NOTE — TELEPHONE ENCOUNTER
I spoke to the daughter  I am not changing her pain pills    If her pain is out of control she needs to get in touch with her pain management doctor or go to the ER

## 2021-02-15 NOTE — TELEPHONE ENCOUNTER
Shaq Palmer from [facility] calling  Ruy García is in extreme back pain  8 on scale of 10 for pain  traMADol (ULTRAM) 50 mg tablet    Is not working  Should she increase the tramadol? ?Please advise  Call her daughter in law Jaswinder Spencer [phone]  Cell [phone]

## 2021-02-17 PROBLEM — M54.6 CHRONIC BILATERAL THORACIC BACK PAIN: Status: ACTIVE | Noted: 2021-01-01

## 2021-02-17 PROBLEM — R06.02 SOB (SHORTNESS OF BREATH): Status: ACTIVE | Noted: 2021-01-01

## 2021-02-17 PROBLEM — G89.29 CHRONIC BILATERAL THORACIC BACK PAIN: Status: ACTIVE | Noted: 2021-01-01

## 2021-02-17 PROBLEM — N17.9 AKI (ACUTE KIDNEY INJURY) (HCC): Status: ACTIVE | Noted: 2021-01-01

## 2021-02-17 PROBLEM — I50.32 CHRONIC DIASTOLIC HEART FAILURE (HCC): Status: ACTIVE | Noted: 2021-01-01

## 2021-02-17 NOTE — PLAN OF CARE
Problem: PAIN - ADULT  Goal: Verbalizes/displays adequate comfort level or baseline comfort level  Description: Interventions:  - Encourage patient to monitor pain and request assistance  - Assess pain using appropriate pain scale  - Administer analgesics based on type and severity of pain and evaluate response  - Implement non-pharmacological measures as appropriate and evaluate response  - Consider cultural and social influences on pain and pain management  - Notify physician/advanced practitioner if interventions unsuccessful or patient reports new pain  Outcome: Progressing     Problem: INFECTION - ADULT  Goal: Absence or prevention of progression during hospitalization  Description: INTERVENTIONS:  - Assess and monitor for signs and symptoms of infection  - Monitor lab/diagnostic results  - Monitor all insertion sites, i e  indwelling lines, tubes, and drains  - Monitor endotracheal if appropriate and nasal secretions for changes in amount and color  - [place] appropriate cooling/warming therapies per order  - Administer medications as ordered  - Instruct and encourage patient and family to use good hand hygiene technique  - Identify and instruct in appropriate isolation precautions for identified infection/condition  Outcome: Progressing  Goal: Absence of fever/infection during neutropenic period  Description: INTERVENTIONS:  - Monitor WBC    Outcome: Progressing     Problem: SAFETY ADULT  Goal: Patient will remain free of falls  Description: INTERVENTIONS:  - Assess patient frequently for physical needs  -  Identify cognitive and physical deficits and behaviors that affect risk of falls    -  [place] fall precautions as indicated by assessment   - Educate patient/family on patient safety including physical limitations  - Instruct patient to call for assistance with activity based on assessment  - Modify environment to reduce risk of injury  - Consider OT/PT consult to assist with strengthening/mobility  Outcome: Progressing  Goal: Maintain or return to baseline ADL function  Description: INTERVENTIONS:  -  Assess patient's ability to carry out ADLs; assess patient's baseline for ADL function and identify physical deficits which impact ability to perform ADLs (bathing, care of mouth/teeth, toileting, grooming, dressing, etc )  - Assess/evaluate cause of self-care deficits   - Assess range of motion  - Assess patient's mobility; develop plan if impaired  - Assess patient's need for assistive devices and provide as appropriate  - Encourage maximum independence but intervene and supervise when necessary  - Involve family in performance of ADLs  - Assess for home care needs following discharge   - Consider OT consult to assist with ADL evaluation and planning for discharge  - Provide patient education as appropriate  Outcome: Progressing  Goal: Maintain or return mobility status to optimal level  Description: INTERVENTIONS:  - Assess patient's baseline mobility status (ambulation, transfers, stairs, etc )    - Identify cognitive and physical deficits and behaviors that affect mobility  - Identify mobility aids required to assist with transfers and/or ambulation (gait belt, sit-to-stand, lift, walker, cane, etc )  - [place] fall precautions as indicated by assessment  - Record patient progress and toleration of activity level on Mobility SBAR; progress patient to next Phase/Stage  - Instruct patient to call for assistance with activity based on assessment  - Consider rehabilitation consult to assist with strengthening/weightbearing, etc   Outcome: Progressing     Problem: DISCHARGE PLANNING  Goal: Discharge to home or other facility with appropriate resources  Description: INTERVENTIONS:  - Identify barriers to discharge w/patient and caregiver  - Arrange for needed discharge resources and transportation as appropriate  - Identify discharge learning needs (meds, wound care, etc )  - Arrange for interpretive services to assist at discharge as needed  - Refer to Case Management Department for coordinating discharge planning if the patient needs post-hospital services based on physician/advanced practitioner order or complex needs related to functional status, cognitive ability, or social support system  Outcome: Progressing     Problem: Knowledge Deficit  Goal: Patient/family/caregiver demonstrates understanding of disease process, treatment plan, medications, and discharge instructions  Description: Complete learning assessment and assess knowledge base  Interventions:  - Provide teaching at level of understanding  - Provide teaching via preferred learning methods  Outcome: Progressing     Problem: Nutrition/Hydration-ADULT  Goal: Nutrient/Hydration intake appropriate for improving, restoring or maintaining nutritional needs  Description: Monitor and assess patient's nutrition/hydration status for malnutrition  Collaborate with interdisciplinary team and initiate plan and interventions as ordered  Monitor patient's weight and dietary intake as ordered or per policy  Utilize nutrition screening tool and intervene as necessary  Determine patient's food preferences and provide high-protein, high-caloric foods as appropriate       INTERVENTIONS:  - Monitor oral intake, urinary output, labs, and treatment plans  - Assess nutrition and hydration status and recommend course of action  - Evaluate amount of meals eaten  - Assist patient with eating if necessary   - Allow adequate time for meals  - Recommend/ encourage appropriate diets, oral nutritional supplements, and vitamin/mineral supplements  - Order, calculate, and assess calorie counts as needed  - Recommend, monitor, and adjust tube feedings and TPN/PPN based on assessed needs  - Assess need for intravenous fluids  - Provide specific nutrition/hydration education as appropriate  - Include patient/family/caregiver in decisions related to nutrition  Outcome: Progressing

## 2021-02-17 NOTE — OCCUPATIONAL THERAPY NOTE
Occupational Therapy Evaluation        Patient Name: Krystle Hewitt  IOSEQ'J Date: 2/17/2021 02/17/21 1014   OT Last Visit   OT Visit Date 02/17/21   Note Type   Note type Evaluation   Restrictions/Precautions   Weight Bearing Precautions Per Order No   Other Precautions Chair Alarm; Bed Alarm; Fall Risk;Pain  (O2 at home )   Pain Assessment   Pain Assessment Tool 0-10   Pain Score 6   Pain Location/Orientation Orientation: Lower; Location: Back;Orientation: Bilateral   Hospital Pain Intervention(s) Repositioned; Ambulation/increased activity   Multiple Pain Sites No   Home Living   Type of [address] One level;Stairs to enter with rails  (5 [address]; sleeps in recliner chair)   Bathroom Shower/Tub Tub/shower unit   Bathroom Toilet Raised  (commode frame over toilet)   Bathroom Equipment Grab bars in shower; Shower chair   Bathroom Accessibility Accessible   Home Equipment Walker;Cane   Additional Comments ambulatory with no AD, walks with RW occasionally , but always outdoors   Prior Function   Level of [place] Independent with ADLs and functional mobility; Needs assistance with IADLs   Lives With Alone   Receives Help From Family   ADL Assistance Independent   IADLs Needs assistance   Falls in the last 6 months 0   Vocational Retired   [address] [place]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/ [facility] 1x week   Lifestyle   Autonomy Patient was independnet with ADLs, ambulatory with RW, does not drive  Patient lives alone in a one story house, [address], family lives close by and provide assistance  Patient has a personal care aid to assist with showers 1x week, family assists with transportation and shopping      Reciprocal Relationships Supportive Family   Service to Others Retired  at [employer] week until March 2020   Intrinsic Gratification crochetting, watching TV   Psychosocial   Psychosocial (WDL) WDL   ADL   Eating Assistance 7  Independent   Grooming Assistance 5 Supervision/Setup   UB Bathing Assistance 4  Minimal Assistance   LB Bathing Assistance 3  Moderate Assistance   UB Dressing Assistance 4  Minimal [address] 3  Moderate Assistance   Toileting Assistance  3  Moderate Assistance   Functional Assistance 3  Moderate Assistance   Bed Mobility   Rolling R 5  Supervision   Additional items Assist x 1; Increased time required;Verbal cues;HOB elevated; Bedrails  (log roll technique)   Supine to Sit 4  Minimal assistance   Additional items Assist x 1;HOB elevated;Leg ;Verbal cues   Transfers   Sit to Stand 4  Minimal assistance   Additional items Assist x 1; Increased time required;Verbal cues   Stand to Sit 4  Minimal assistance   Additional items Assist x 1; Increased time required;Verbal cues   Functional Mobility   Functional Mobility 4  Minimal assistance   Additional Comments increased exertion/ short distances/ frequent rest periods   Additional items Rolling walker   Balance   Static Sitting Fair +   Dynamic Sitting Fair   Static Standing Fair   Dynamic Standing Fair -   Activity Tolerance   Activity Tolerance Patient limited by fatigue   Nurse Made Aware DORA Escamilla verbalized patient appropriate for therapy  Patient was positioned OOB to [address], chair pad alarm applied and working  RUE Assessment   RUE Assessment X  (AROM WFL, strength deficit)   RUE Strength   RUE Overall Strength Deficits  (3+/5)   LUE Assessment   LUE Assessment X  (AROM WFL, strength deficit)   LUE Strength   LUE Overall Strength Deficits  (3+/5)   Hand Function   Gross Motor Coordination Impaired   Fine Motor Coordination Functional   Sensation   Light Touch No apparent deficits  (BUEs)   Vision-Basic Assessment   Current Vision Wears glasses all the time   Patient Visual Report Other (Comment)  (No significant chnages reported)   Cognition   Overall Cognitive Status WFL   Arousal/Participation Alert; Responsive; Cooperative   Attention Within functional limits   Orientation Level Oriented X4   Memory Within functional limits   Following Commands Follows all commands and directions without difficulty   Assessment   Limitation Decreased ADL status; Decreased UE strength;Decreased endurance;Decreased self-care trans;Decreased high-level ADLs   Prognosis Good   Assessment Patient is a 78 y o  female seen for OT evaluation s/p admit to [id number] [facility] on 2/17/2021 w/SOB (shortness of breath)  Commorbidities affecting patient's functional performance at time of assessment include: VICKY (acute kidney injury), Chronic diastolic heart failure, chronic bilateral thoracic back pain, COPD exacerbation  Orders placed for OT evaluation and treatment  Performed at least two patient identifiers during session including name and wristband  Prior to admission, Patient was independnet with ADLs, ambulatory with RW, does not drive  Patient lives alone in a one story house, [address], family lives close by and provide assistance  Patient has a personal care aid to assist with showers 1x week, family assists with transportation and shopping  Personal factors affecting patient at time of initial evaluation include: steps to enter, decreased initiation and engagement, difficulty performing ADLs and difficulty performing IADLs  Upon evaluation, patient requires minimal  assist for UB ADLs, moderate and maximal assist for LB ADLs, transfers and functional ambulation in room and bathroom with minimal  assist, with the use of Rolling Walker  Patient is alert and oriented x 4  Occupational performance is affected by the following deficits: decreased muscle strength, dynamic sit/ stand balance deficit with poor standing tolerance time for self care and functional mobility and decreased activity tolerance    Patient to benefit from continued Occupational Therapy treatment while in the hospital to address deficits as defined above and maximize level of functional independence with ADLs and functional mobility  Occupational Performance areas to address include: bathing/ shower, dressing, toilet hygiene, transfer to all surfaces, functional mobility, emergency response, health maintenance, IADLs: safety procedures and Leisure Participation  From OT standpoint, recommendation at time of d/c would be Home with family support, [address] and Home OT  Goals   Patient Goals to return home independently   Plan   Treatment Interventions ADL retraining;Functional transfer training;UE strengthening/ROM; Endurance training;Equipment evaluation/education;Patient/family training; Compensatory technique education;Continued evaluation; Energy conservation; Activityengagement   OT Frequency 3-5x/wk   Recommendation   OT Discharge Recommendation Home with skilled therapy   AM-PAC Daily Activity Inpatient   Lower Body Dressing 2   Bathing 2   Toileting 3   Upper Body Dressing 3   Grooming 4   Eating 4   Daily Activity Raw Score 18   Daily Activity Standardized Score (Calc for Raw Score >=11) 38 66   AM-PAC Applied Cognition Inpatient   Following a Speech/Presentation 4   Understanding Ordinary Conversation 4   Taking Medications 4   Remembering Where Things Are Placed or Put Away 4   Remembering List of 4-5 Errands 4   Taking Care of Complicated Tasks 4   Applied Cognition Raw Score 24   Applied Cognition Standardized Score 62 21   Barthel Index   Feeding 10   Bathing 0   Grooming Score 5   Dressing Score 5   Bladder Score 10   Bowels Score 10   Toilet Use Score 5   Transfers (Bed/Chair) Score 10   Mobility (Level Surface) Score 0   Stairs Score 5   Barthel Index Score 60   Modified John Scale   Modified John Scale 3       1 - Patient will verbalize and demonstrate use of energy conservation/ deep breathing technique and work simplification skills during functional activity with no verbal cues      2 - Patient will verbalize and demonstrate good body mechanics and joint protection techniques during  ADLs/ IADLs with no verbal cues  3 - Patient will increase OOB/ sitting tolerance to 2-4 hours per day for increased participation in self care and leisure tasks with no s/s of exertion  4 - Patient will increase standing tolerance time to 5  minutes with unilateral UE support to complete sink level ADLs@ mod I level  5 - Patient will increase sitting tolerance at edge of bed to 20 minutes to complete UB ADLs @ set up assist level  6 - Patient will transfer bed to Chair / toilet at Set up assist level with AD as indicated  7 - Patient will complete UB ADLs with set up assist      8 - Patient will complete LB ADLs with min assist with the use of adaptive equipment       9 - Patient will complete toileting hygiene with set up assist/ supervision for thoroughness    10 - Patient/ Family  will demonstrate competency with UE Home Exercise Program

## 2021-02-17 NOTE — ED PROVIDER NOTES
History  Chief Complaint   Patient presents with    Shortness of Breath     Been bothering her for months, worsened yesterday into today  68-year-old female presents complaining of acute on chronic shortness of breath and severe thoracic and lumbar back pain  She has had some similar symptoms for the past 2 to 3 months but they have been getting much worse over the past two days  Patient lives alone and states that she now [address] not breathe and has severe pain with inspiration  She apparently has also been losing weight unintentionally over the past 1 to 2 months as well  She reports losing 10 lb in the last one month  History provided by:  Patient   used: No    Shortness of Breath  Severity:  Moderate  Onset quality:  Gradual  Timing:  Constant  Progression:  Worsening  Chronicity:  New  Context: activity    Relieved by:  Rest  Worsened by:  Exertion  Ineffective treatments:  Oxygen  Associated symptoms: chest pain    Associated symptoms: no abdominal pain, no cough, no ear pain, no fever, no neck pain, no rash, no sore throat and no vomiting        Prior to Admission Medications   Prescriptions Last Dose Informant Patient Reported? Taking?    Combivent Respimat inhaler Past Week at Unknown time Self No Yes   Sig: INHALE 1 PUFF 3 (THREE) TIMES A DAY   FML FORTE 0 25 % ophthalmic suspension Unknown at Unknown time Self Yes No   Sig: INSTILL 1 DROP INTO BOTH EYES EVERY DAY   Insulin Pen Needle (B-D UF III MINI PEN NEEDLES) 31G X 5 MM MISC Unknown at Unknown time Self Yes No   Sig: by Does not apply route   LORazepam (ATIVAN) 0 5 mg tablet Past Month at Unknown time Self Yes Yes   Sig: Take by mouth   PAZEO 0 7 % SOLN Past Week at Unknown time Self Yes Yes   Sig: INSTILL 1 DROP INTO BOTH EYES EVERY DAY   albuterol (Ventolin HFA) 90 mcg/act inhaler Past Week at Unknown time Self No Yes   Sig: Inhale 2 puffs every 6 (six) hours as needed for wheezing   atorvastatin (LIPITOR) 80 mg tablet 2021 at Unknown time Self No Yes   Sig: TAKE 1 TABLET BY MOUTH EVERY DAY   fluticasone-vilanterol (BREO ELLIPTA) 100-25 mcg/inh inhaler 2021 at Unknown time Self No Yes   Sig: Inhale 1 puff daily Rinse mouth after use  glucose blood (ONE TOUCH ULTRA TEST) test strip Unknown at Unknown time Self Yes No   Sig: by In Vitro route 3 (three) times a day   glyBURIDE (DIABETA) 5 mg tablet 2021 at Unknown time Self No Yes   Sig: TAKE 1 TABLET BY MOUTH TWICE A DAY   ipratropium-albuterol (DUO-NEB) 0 5-2 5 mg/3 mL nebulizer solution Unknown at Unknown time Self No No   Sig: Take 1 vial (3 mL total) by nebulization 5 (five) times a day   levothyroxine 125 mcg tablet 2021 at Unknown time Self No Yes   Sig: TAKE 1 TABLET BY MOUTH EVERY DAY   torsemide (DEMADEX) 10 mg tablet   No No   Sig: TAKE 1 TABLET BY MOUTH EVERY DAY   traMADol (ULTRAM) 50 mg tablet Past Week at Unknown time  Yes Yes   valsartan (DIOVAN) 160 mg tablet Past Week at Unknown time Self No Yes   Sig: TAKE 1 TABLET BY MOUTH EVERY DAY      Facility-Administered Medications: None       Past Medical History:   Diagnosis Date    Cataract     CHF (congestive heart failure) (HCC)     Leukocytosis        Past Surgical History:   Procedure Laterality Date    APPENDECTOMY      CYSTOCELE REPAIR      ANTERIOR COLPORRHAPHY     ORIF ANKLE FRACTURE Left     TOTAL ABDOMINAL HYSTERECTOMY         Family History   Problem Relation Age of Onset    No Known Problems Mother     No Known Problems Father      I have reviewed and agree with the history as documented      E-Cigarette/Vaping    E-Cigarette Use Never User      E-Cigarette/Vaping Substances    Nicotine No     THC No     CBD No     Flavoring No     Other No     Unknown No      Social History     Tobacco Use    Smoking status: Former Smoker     Packs/day: 2 00     Years: 48 00     Pack years: 96 00     Types: Cigarettes     Quit date: 1999     Years since quittin 4    Smokeless tobacco: Never Used   Substance Use Topics    Alcohol use: Not Currently     Alcohol/week: 1 0 standard drinks     Types: 1 Cans of beer per week     Frequency: Monthly or less     Drinks per session: 1 or 2     Binge frequency: Never    Drug use: No       Review of Systems   Constitutional: Negative for chills and fever  HENT: Negative for ear pain, sore throat and voice change  Eyes: Negative for pain and visual disturbance  Respiratory: Positive for shortness of breath  Negative for cough  Cardiovascular: Positive for chest pain  Negative for palpitations  Gastrointestinal: Negative for abdominal pain, nausea and vomiting  Genitourinary: Negative for dysuria and hematuria  Musculoskeletal: Positive for back pain and gait problem  Negative for arthralgias, neck pain and neck stiffness  Skin: Negative for color change and rash  Neurological: Negative for seizures and syncope  All other systems reviewed and are negative  Physical Exam  Physical Exam  Constitutional:       Appearance: Normal appearance  She is not toxic-appearing  HENT:      Head: Normocephalic and atraumatic  Nose: Nose normal       Mouth/Throat:      Mouth: Mucous membranes are moist       Pharynx: Oropharynx is clear  Eyes:      Extraocular Movements: Extraocular movements intact  Conjunctiva/sclera: Conjunctivae normal       Pupils: Pupils are equal, round, and reactive to light  Neck:      Musculoskeletal: Normal range of motion and neck supple  No neck rigidity  Cardiovascular:      Rate and Rhythm: Normal rate and regular rhythm  Pulses: Normal pulses  Pulmonary:      Effort: Pulmonary effort is normal  No respiratory distress  Breath sounds: Normal breath sounds  Abdominal:      General: There is no distension  Palpations: Abdomen is soft  Tenderness: There is no abdominal tenderness  Musculoskeletal: Normal range of motion           General: No swelling, tenderness or signs of injury  Skin:     General: Skin is warm and dry  Capillary Refill: Capillary refill takes less than 2 seconds  Findings: No rash  Neurological:      General: No focal deficit present  Mental Status: She is alert and oriented to person, place, and time  Psychiatric:         Mood and Affect: Mood normal          Thought Content:  Thought content normal          Vital Signs  ED Triage Vitals   Temperature Pulse Respirations Blood Pressure SpO2   02/17/21 0035 02/17/21 0035 02/17/21 0035 02/17/21 0035 02/17/21 0035   97 8 °F (36 6 °C) (!) 107 20 144/77 95 %      Temp Source Heart Rate Source Patient Position - Orthostatic VS BP Location FiO2 (%)   02/17/21 0035 02/17/21 0035 02/17/21 0725 02/17/21 0035 --   Oral Monitor Lying Right arm       Pain Score       02/17/21 0056       Worst Possible Pain           Vitals:    02/22/21 2352 02/23/21 0727 02/23/21 2203 02/24/21 0742   BP: 143/65 130/53 133/54 136/64   Pulse: 88      Patient Position - Orthostatic VS: Lying            Visual Acuity      ED Medications  Medications   sodium chloride 0 9 % infusion (0 mL/hr Intravenous Stopped 2/17/21 2358)   sodium chloride 0 9 % infusion (0 mL/hr Intravenous Stopped 2/23/21 2330)   sodium chloride 0 9 % bolus 1,000 mL (0 mL Intravenous Stopped 2/17/21 0430)   morphine (PF) 4 mg/mL injection 4 mg (4 mg Intravenous Given 2/17/21 0056)   fentanyl citrate (PF) 100 MCG/2ML 50 mcg (50 mcg Intravenous Given 2/17/21 0134)   iohexol (OMNIPAQUE) 350 MG/ML injection (MULTI-DOSE) 100 mL (100 mL Intravenous Given 2/17/21 0228)   cefTRIAXone (ROCEPHIN) 1,000 mg in dextrose 5 % 50 mL IVPB (0 mg Intravenous Stopped 2/19/21 2149)       Diagnostic Studies  Results Reviewed     Procedure Component Value Units Date/Time    NT-BNP PRO [[id number]]  (Abnormal) Collected: 02/17/21 0056    Lab Status: Final result Specimen: Blood from Arm, Left Updated: 02/17/21 1137     NT-proBNP 1,204 pg/mL     COVID19, Influenza A/B, RSV PCR, [facility] [[id number]]  (Normal) Collected: 02/17/21 0109    Lab Status: Final result Specimen: Nares from Nose Updated: 02/17/21 0159     SARS-CoV-2 Negative     INFLUENZA A PCR Negative     INFLUENZA B PCR Negative     RSV PCR Negative    Narrative: This test has been authorized by FDA under an EUA (Emergency Use Assay) for use by authorized laboratories  Clinical caution and judgement should be used with the interpretation of these results with consideration of the clinical impression and other laboratory testing  Testing reported as "Positive" or "Negative" has been proven to be accurate according to standard laboratory validation requirements  All testing is performed with control materials showing appropriate reactivity at standard intervals      Urine Microscopic [[id number]]  (Abnormal) Collected: 02/17/21 0127    Lab Status: Final result Specimen: Urine, Other Updated: 02/17/21 0141     RBC, UA 1-2 /hpf      WBC, UA 30-50 /hpf      Epithelial Cells Occasional /hpf      Bacteria, UA Innumerable /hpf     UA (URINE) with reflex to Scope [[id number]]  (Abnormal) Collected: 02/17/21 0127    Lab Status: Final result Specimen: Urine, Other Updated: 02/17/21 0135     Color, UA Yellow     Clarity, UA Slightly Cloudy     Specific [place], UA 1 020     pH, UA 5 5     Leukocytes, UA Moderate     Nitrite, UA Negative     Protein, UA Negative mg/dl      Glucose, UA Negative mg/dl      Ketones, UA Negative mg/dl      Urobilinogen, UA 0 2 E U /dl      Bilirubin, UA Negative     Blood, UA Negative    Troponin I [[id number]]  (Normal) Collected: 02/17/21 0056    Lab Status: Final result Specimen: Blood from Arm, Left Updated: 02/17/21 0121     Troponin I 0 03 ng/mL     Basic metabolic panel [[id number]]  (Abnormal) Collected: 02/17/21 0056    Lab Status: Final result Specimen: Blood from Arm, Left Updated: 02/17/21 0119     Sodium 137 mmol/L      Potassium 4 2 mmol/L      Chloride 95 mmol/L      CO2 34 mmol/L      ANION GAP 8 mmol/L      BUN 30 mg/dL      Creatinine 1 39 mg/dL      Glucose 112 mg/dL      Calcium 10 6 mg/dL      eGFR 35 ml/min/1 73sq m     Narrative:      [place] guidelines for Chronic Kidney Disease (CKD):     Stage 1 with normal or high GFR (GFR > 90 mL/min/1 73 square meters)    Stage 2 Mild CKD (GFR = 60-89 mL/min/1 73 square meters)    Stage 3A Moderate CKD (GFR = 45-59 mL/min/1 73 square meters)    Stage 3B Moderate CKD (GFR = 30-44 mL/min/1 73 square meters)    Stage 4 Severe CKD (GFR = 15-29 mL/min/1 73 square meters)    Stage 5 End Stage CKD (GFR <15 mL/min/1 73 square meters)  Note: GFR calculation is accurate only with a steady state creatinine    Hepatic function panel [[id number]]  (Abnormal) Collected: 02/17/21 0056    Lab Status: Final result Specimen: Blood from Arm, Left Updated: 02/17/21 0119     Total Bilirubin 0 40 mg/dL      Bilirubin, Direct 0 11 mg/dL      Alkaline Phosphatase 133 U/L      AST 29 U/L      ALT 26 U/L      Total Protein 6 7 g/dL      Albumin 3 4 g/dL     D-Dimer [[id number]]  (Abnormal) Collected: 02/17/21 0056    Lab Status: Final result Specimen: Blood from Arm, Left Updated: 02/17/21 0117     D-Dimer, Quant 2 42 ug/ml FEU     CBC and differential [[id number]]  (Abnormal) Collected: 02/17/21 0056    Lab Status: Final result Specimen: Blood from Arm, Left Updated: 02/17/21 0103     WBC 11 61 Thousand/uL      RBC 4 26 Million/uL      Hemoglobin 12 2 g/dL      Hematocrit 38 2 %      MCV 90 fL      MCH 28 6 pg      MCHC 31 9 g/dL      RDW 16 9 %      MPV 9 9 fL      Platelets 087 Thousands/uL      nRBC 0 /100 WBCs      Neutrophils Relative 65 %      Immat GRANS % 1 %      Lymphocytes Relative 23 %      Monocytes Relative 9 %      Eosinophils Relative 2 %      Basophils Relative 0 %      Neutrophils Absolute 7 59 Thousands/µL      Immature Grans Absolute 0 08 Thousand/uL      Lymphocytes Absolute 2 64 Thousands/µL      Monocytes Absolute 1 07 Thousand/µL      Eosinophils Absolute 0 19 Thousand/µL      Basophils Absolute 0 04 Thousands/µL                  MRI thoracic spine wo contrast   Final Result by Mi Reynoso MD (02/20 2012)      Compression fracture of T5 (33% height loss approximately) and T6 (approximately 25% height loss  )  There is also superior endplate T2 compression fracture with approximately 10%-20 percent height loss at the superior endplate  These were previously    identified on recent CT February 17, 2021  No evidence of bony retropulsion  Multilevel thoracolumbar degenerative changes without obvious spinal canal stenosis  Workstation performed: [id number]         CT spine thoracic & lumbar wo contrast   Final Result by Nathan Ellsworth MD (02/18 1204)   Thoracic compression fractures from T5 to T7 without bony retropulsion  Fractures could be acute/subacute  Degenerative spondylosis as below  Workstation performed: [id number]         PE Study with CT Abdomen and Pelvis with contrast   Final Result by Dharmesh Sharpe DO (02/17 0310)      Moderate pulmonary emphysematous changes are noted bilaterally  No pulmonary embolus or acute pulmonary process is seen  Prominence of the pulmonary arteries bilaterally may suggest a component of pulmonary arterial hypertension  Age-indeterminate compression fracture of T5 with approximately 33% loss of height and of T6 with approximately 25% loss of height  Spinal alignment appears maintained  Correlation with the patient's symptoms recommended  Coronary atherosclerosis, left adrenal nodule, right renal cyst, duplicated left renal collecting system, colonic diverticulosis without evidence of acute diverticulitis, and other findings as above  Workstation performed: [id number]         XR chest 1 view portable   Final Result by Adrian Hernandez MD (02/17 8961)      No acute cardiopulmonary disease        Continued mild elevation of the right hemidiaphragm  Mild relative lucency of the right lung may be related to technique  There is no pneumothorax evident  Workstation performed: [id number]                    Procedures  Procedures         ED Course                             SBIRT 20yo+      Most Recent Value   SBIRT (25 yo +)   In order to provide better care to our patients, we are screening all of our patients for alcohol and drug use  Would it be okay to ask you these screening questions? No Filed at: 02/17/2021 0143   Initial Alcohol Screen: US AUDIT-C    3b  FEMALE Any Age, or MALE 65+: How often do you have 4 or more drinks on one occassion? 0 Filed at: 02/17/2021 0143   Audit-C Score  0 Filed at: 02/17/2021 8868   BLANCA: How many times in the past year have you    Used an illegal drug or used a prescription medication for non-medical reasons? Never Filed at: 02/17/2021 0143                    MDM  Number of Diagnoses or Management Options  Back pain: new and requires workup  Dyspnea: new and requires workup  Diagnosis management comments: Her 64-year-old female complaining of shortness of breath and back pain  Initial laboratory imaging studies are largely unremarkable, however, patient has multiple comorbidities is having difficulty executing activities of daily living  She will require admission for PT/OT eval and possible placement at the very least   Patient was signed out to the oncoming provider pending results of the CT PE study         Amount and/or Complexity of Data Reviewed  Clinical lab tests: reviewed and ordered  Tests in the radiology section of CPT®: reviewed and ordered  Review and summarize past medical records: yes  Independent visualization of images, tracings, or specimens: yes        Disposition  Final diagnoses:   Dyspnea   Back pain     Time reflects when diagnosis was documented in both MDM as applicable and the Disposition within this note     Time User Action Codes Description Comment    2/17/2021  1:08 AM Johnathan SILVERIO Add [R06 00] Dyspnea     2/17/2021  1:09 AM Christian Sosa Add [M54 9] Back pain     2/17/2021  4:54 AM Radu GREEN Add [J44 1] COPD exacerbation ([facility] )     2/18/2021 12:17 PM Kelsi Bear Add [S22 000A] Compression fracture of body of thoracic vertebra ([facility] )     2/22/2021  8:02 AM Halina Cobb Add [E83 52] Hypercalcemia       ED Disposition     ED Disposition Condition Date/Time Comment    Admit Stable Wed Feb 17, 2021  3:21 AM Case was discussed with preeti and the patient's admission status was agreed to be Admission Status: observation status to the service of Dr Aishwarya Juarez MD Documentation      Most Recent Value   Accepting Facility Name, [facility]      RN Documentation      Most Recent Value   Accepting Facility Name, [address] [place] [address]      Follow-up Information     Follow up With Specialties Details Why Contact Info    Anali Mcgowan MD Internal Medicine Schedule an appointment as soon as possible for a visit  [address] [place] [address] [place] [facility]  Follow up RN please fax DCI to [phone] [address]  [phone]          Discharge Medication List as of 2/24/2021  2:59 PM      START taking these medications    Details   torsemide (DEMADEX) 10 mg tablet TAKE 1 TABLET BY MOUTH EVERY DAY, Normal         CONTINUE these medications which have NOT CHANGED    Details   albuterol (Ventolin HFA) 90 mcg/act inhaler Inhale 2 puffs every 6 (six) hours as needed for wheezing, Starting Tue 12/15/2020, Normal      atorvastatin (LIPITOR) 80 mg tablet TAKE 1 TABLET BY MOUTH EVERY DAY, Normal      Combivent Respimat inhaler INHALE 1 PUFF 3 (THREE) TIMES A DAY, Starting Thu 11/5/2020, Normal      fluticasone-vilanterol (BREO ELLIPTA) 100-25 mcg/inh inhaler Inhale 1 puff daily Rinse mouth after use , Starting Thu 1/7/2021, Normal      glyBURIDE (DIABETA) 5 mg tablet TAKE 1 TABLET BY MOUTH TWICE A DAY, Normal      levothyroxine 125 mcg tablet TAKE 1 TABLET BY MOUTH EVERY DAY, Normal      LORazepam (ATIVAN) 0 5 mg tablet Take by mouth, Starting Fri 3/14/2014, Historical Med      PAZEO 0 7 % SOLN INSTILL 1 DROP INTO BOTH EYES EVERY DAY, Historical Med      traMADol (ULTRAM) 50 mg tablet Starting Fri 2/5/2021, Historical Med      valsartan (DIOVAN) 160 mg tablet TAKE 1 TABLET BY MOUTH EVERY DAY, Normal      FML FORTE 0 25 % ophthalmic suspension INSTILL 1 DROP INTO BOTH EYES EVERY DAY, Historical Med      glucose blood (ONE TOUCH ULTRA TEST) test strip by In Vitro route 3 (three) times a day, Starting Fri 5/29/2015, Historical Med      Insulin Pen Needle (B-D UF III MINI PEN NEEDLES) 31G X 5 MM MISC by Does not apply route, Starting Wed 4/8/2015, Historical Med      ipratropium-albuterol (DUO-NEB) 0 5-2 5 mg/3 mL nebulizer solution Take 1 vial (3 mL total) by nebulization 5 (five) times a day, Starting Tue 12/15/2020, Normal           No discharge procedures on file      PDMP Review     None          ED Provider  Electronically Signed by           Donald Perkins MD  03/03/21 0932

## 2021-02-17 NOTE — ASSESSMENT & PLAN NOTE
· Creatinine 1 39 on admission   Baseline 0 9-1 05  · Likely pre-renal 2/2 reported poor oral intake/dehydration  · Avoid hypotension and nephrotoxic agents  · Hold home dose Demadex and Valsartan  · Received 1L NS in ED  · NS @ 75mL/hr x 500mL  · Check BMP at 1100

## 2021-02-17 NOTE — PLAN OF CARE
Problem: OCCUPATIONAL THERAPY ADULT  Goal: Performs self-care activities at highest level of function for planned discharge setting  See evaluation for individualized goals  Description: Treatment Interventions: ADL retraining, Functional transfer training, UE strengthening/ROM, Endurance training, Equipment evaluation/education, Patient/family training, Compensatory technique education, Continued evaluation, Energy conservation, Activityengagement          See flowsheet documentation for full assessment, interventions and recommendations  Note: Limitation: Decreased ADL status, Decreased UE strength, Decreased endurance, Decreased self-care trans, Decreased high-level ADLs  Prognosis: Good  Assessment: Patient is a 78 y o  female seen for OT evaluation s/p admit to [id number] [place] [address] on 2/17/2021 w/SOB (shortness of breath)  Commorbidities affecting patient's functional performance at time of assessment include: VICKY (acute kidney injury), Chronic diastolic heart failure, chronic bilateral thoracic back pain, COPD exacerbation  Orders placed for OT evaluation and treatment  Performed at least two patient identifiers during session including name and wristband  Prior to admission, Patient was independnet with ADLs, ambulatory with RW, does not drive  Patient lives alone in a one story house, [address], family lives close by and provide assistance  Patient has a personal care aid to assist with showers 1x week, family assists with transportation and shopping  Personal factors affecting patient at time of initial evaluation include: steps to enter, decreased initiation and engagement, difficulty performing ADLs and difficulty performing IADLs  Upon evaluation, patient requires minimal  assist for UB ADLs, moderate and maximal assist for LB ADLs, transfers and functional ambulation in room and bathroom with minimal  assist, with the use of Rolling Walker  Patient is alert and oriented x 4    Occupational performance is affected by the following deficits: decreased muscle strength, dynamic sit/ stand balance deficit with poor standing tolerance time for self care and functional mobility and decreased activity tolerance  Patient to benefit from continued Occupational Therapy treatment while in the hospital to address deficits as defined above and maximize level of functional independence with ADLs and functional mobility  Occupational Performance areas to address include: bathing/ shower, dressing, toilet hygiene, transfer to all surfaces, functional mobility, emergency response, health maintenance, IADLs: safety procedures and Leisure Participation  From OT standpoint, recommendation at time of d/c would be Home with family support, [address] and Home OT       OT Discharge Recommendation: Home with skilled therapy

## 2021-02-17 NOTE — PLAN OF CARE
Problem: PAIN - ADULT  Goal: Verbalizes/displays adequate comfort level or baseline comfort level  Description: Interventions:  - Encourage patient to monitor pain and request assistance  - Assess pain using appropriate pain scale  - Administer analgesics based on type and severity of pain and evaluate response  - Implement non-pharmacological measures as appropriate and evaluate response  - Consider cultural and social influences on pain and pain management  - Notify physician/advanced practitioner if interventions unsuccessful or patient reports new pain  Outcome: Progressing     Problem: INFECTION - ADULT  Goal: Absence or prevention of progression during hospitalization  Description: INTERVENTIONS:  - Assess and monitor for signs and symptoms of infection  - Monitor lab/diagnostic results  - Monitor all insertion sites, i e  indwelling lines, tubes, and drains  - Monitor endotracheal if appropriate and nasal secretions for changes in amount and color  - [place] appropriate cooling/warming therapies per order  - Administer medications as ordered  - Instruct and encourage patient and family to use good hand hygiene technique  - Identify and instruct in appropriate isolation precautions for identified infection/condition  Outcome: Progressing     Problem: SAFETY ADULT  Goal: Patient will remain free of falls  Description: INTERVENTIONS:  - Assess patient frequently for physical needs  -  Identify cognitive and physical deficits and behaviors that affect risk of falls    -  [place] fall precautions as indicated by assessment   - Educate patient/family on patient safety including physical limitations  - Instruct patient to call for assistance with activity based on assessment  - Modify environment to reduce risk of injury  - Consider OT/PT consult to assist with strengthening/mobility  Outcome: Progressing

## 2021-02-17 NOTE — ASSESSMENT & PLAN NOTE
· Reports back pain that migrates  States it is mostly in mid thoracic area with some lower back pain at times  Movement causes pain to increase  Currently seeing pain management and did receive an injection in the area of the Rt scapula a couple weeks ago with (+) relief  States due to have injection on Lt side on the 19th  · CT chest revealed age-in determinant compression fracture of T5 and T6   · Pain control: Tylenol OTC, Lidoderm patch, Aqua K   Continue home dose Ultram PRN  · Has home PT currently

## 2021-02-17 NOTE — PHYSICAL THERAPY NOTE
Physical Therapy Evaluation     Patient's Name: Jg Erickson    Admitting Diagnosis  Back pain [M54 9]  Dyspnea [R06 00]  SOB (shortness of breath) [R06 02]  COPD exacerbation ([facility] ) [J44 1]    Problem List  Patient Active Problem List   Diagnosis    Carotid artery plaque    COPD exacerbation (HCC)    Type 2 diabetes mellitus with complication, without long-term current use of insulin (HCC)    Hyperlipidemia    Hypothyroidism    Osteoarthrosis    [facility]    Atherosclerosis of abdominal aorta ([facility])    SOB (shortness of breath)    Chronic bilateral thoracic back pain    Chronic diastolic heart failure ([facility])    VICKY (acute kidney injury) ([facility] )       Past Medical History  Past Medical History:   Diagnosis Date    Cataract     CHF (congestive heart failure) ([facility])     Leukocytosis        Past Surgical History  Past Surgical History:   Procedure Laterality Date    APPENDECTOMY      CYSTOCELE REPAIR      ANTERIOR COLPORRHAPHY     ORIF ANKLE FRACTURE Left 1979    TOTAL ABDOMINAL HYSTERECTOMY            02/17/21 1044   PT Last Visit   PT Visit Date 02/17/21   Note Type   Note type Evaluation   Pain Assessment   Pain Assessment Tool 0-10   Pain Score 6   Pain Location/Orientation Orientation: Lower; Location: Back;Orientation: Bilateral   Hospital Pain Intervention(s) Repositioned; Ambulation/increased activity   Multiple Pain Sites No   Home Living   Type of [address] One level;Stairs to enter with rails  (5 ALBINO; sleeps in recliner chair)   Bathroom Shower/Tub Tub/shower unit   Bathroom Toilet Raised  (commode frame over toilet)   Bathroom Equipment Grab bars in shower; Shower chair   Bathroom Accessibility Accessible via walker   Home Equipment Walker;Cane   Prior Function   Level of [place] Independent with ADLs and functional mobility; Needs assistance with IADLs  (Needs assistance with ADLs)   Lives With Alone   Receives Help From Family;Home health;Personal care attendant  (family or home health aide assists with stair navigation)   ADL Assistance Needs assistance  (home health aide 1x/wk for showers)   IADLs Needs assistance   Falls in the last 6 months 0   Vocational Retired   Restrictions/Precautions   [place] Bearing Precautions Per Order No   Other Precautions Chair Alarm; Bed Alarm; Fall Risk;Pain;O2;Multiple lines  (3L O2 NC; back safety precautions; log roll)   General   Family/Caregiver Present No   Cognition   Overall Cognitive Status WFL   Arousal/Participation Cooperative   Orientation Level Oriented X4   Memory Within functional limits   Following Commands Follows all commands and directions without difficulty   Comments pt agreeable to PT eval   RUE Assessment   RUE Assessment WFL  (based on functional assessment)   LUE Assessment   LUE Assessment WFL  (based on functional assessment)   RLE Assessment   RLE Assessment WFL  (grossly assessed with functional mobility: at least 3+/5)   LLE Assessment   LLE Assessment WFL  (grossly assessed with functional mobility: at least 3+/5)   Coordination   Movements are Fluid and Coordinated 1   Sensation [facility] [place]   Light Touch   RLE Light Touch Grossly intact   LLE Light Touch Grossly intact   Bed Mobility   Rolling R 5  Supervision  (SBA)   Additional items Assist x 1; Increased time required;Verbal cues;HOB elevated; Bedrails  (log roll technique)   Supine to Sit 4  Minimal assistance  (CGA)   Additional items Assist x 1; Increased time required;Verbal cues;HOB elevated  (log roll technique)   Transfers   Sit to Stand 4  Minimal assistance  (CGA)   Additional items Assist x 1; Increased time required;Verbal cues   Stand to Sit 4  Minimal assistance  (CGA)   Additional items Assist x 1; Increased time required;Verbal cues   Ambulation/Elevation   Gait pattern Excessively slow; Short stride;Decreased foot clearance   Gait Assistance 5  Supervision  (SBA; no LOB identified)   Additional items Assist x 1;Verbal cues Assistive Device Rolling walker   Distance 60 ft     SpO2 maintained 93-95% on 3L O2 NC during ambulation trial   Stair Management Assistance 4  Minimal assist  (CGA)   Additional items Assist x 1;Verbal cues; Increased time required   Stair Management Technique Step to pattern; With walker   Number of Stairs 5  (at 6" practice step with B UE support on walker)   Balance   Static Sitting Fair +   Dynamic Sitting Fair   Static Standing Fair   Dynamic Standing Fair -   Ambulatory Fair -   Endurance Deficit   Endurance Deficit Yes   Activity Tolerance   Activity Tolerance Patient limited by fatigue   Medical Staff Made Aware ALESSANDRO Maldonado   Nurse Made Aware DORA Masterson   Assessment   Prognosis Good   Problem List Decreased strength;Decreased endurance; Impaired balance;Decreased mobility;Pain   Assessment Pt is 78 y o  female seen for PT evaluation s/p admit to [facility] on 2021 w/ SOB (shortness of breath)  PT consulted to assess pt's functional mobility and d/c needs  Order placed for PT eval and tx, w/ up w/ A order  Performed at least 2 patient identifiers during session: Name and   Comorbidities affecting pt's physical performance at time of assessment include: VICKY (acute kidney injury), Chronic diastolic heart failure, chronic bilateral thoracic back pain, COPD exacerbation  PTA, pt was independent w/ all functional mobility w/ RW (on occasion), requires assistance for ADLs and IADLs, ambulates household distances, has 5 ALBINO, lives alone in one level house and retired  Personal factors affecting pt at time of IE include: ambulating w/ assistive device, stairs to enter home, inability to perform IADLs and inability to perform ADLs   Please find objective findings from PT assessment regarding body systems outlined above with impairments and limitations including weakness, impaired balance, decreased endurance, gait deviations, decreased activity tolerance, decreased functional mobility tolerance and fall risk  The following objective measures performed on IE also reveal limitations: Barthel Index: 60/100 and Modified John: 3 (moderate disability)  Pt's clinical presentation is currently unstable/unpredictable seen in pt's presentation of ongoing medical management/monitoring, fatigue impacting overall mobility status and need for input for mobility technique/safety  Pt to benefit from continued PT tx to address deficits as defined above and maximize level of functional independent mobility and consistency  From PT/mobility standpoint, recommendation at time of d/c would be Home PT with family support pending progress in order to facilitate return to PLOF  Barriers to Discharge None   Goals   Patient Goals to return home independently   STG Expiration Date 02/27/21   Short Term Goal #1 In 7-10 days: Increase bilateral LE strength 1/2 grade to facilitate independent mobility, Perform all bed mobility tasks modified independent to decrease caregiver burden, Perform all transfers modified independent to improve independence, Ambulate > 50 ft  X 2 trials with RW modified independent w/o LOB and w/ normalized gait pattern 100% of the time, Navigate 5 stairs with SBA with unilateral handrail to facilitate return to previous living environment, Increase all balance 1 grade to decrease risk for falls and Complete exercise program independently   PT Treatment Day 0   Plan   Treatment/Interventions Functional transfer training;LE strengthening/ROM; Elevations; Therapeutic exercise; Endurance training;Patient/family training;Equipment eval/education; Bed mobility;Gait training;Spoke to nursing;OT   PT Frequency Other (Comment)  (3-5x/wk)   Recommendation   PT Discharge Recommendation Home with skilled therapy  (Home PT with family support)   PT - OK to Discharge Yes  (when medically cleared)   AM-PAC Basic Mobility Inpatient   Turning in Bed Without Bedrails 3   Lying on Back to Sitting on Edge of Flat Bed 3   Moving Bed to Chair 3   Standing Up From Chair 3   Walk in Room 3   Climb 3-5 Stairs 3   Basic Mobility Inpatient Raw Score 18   Basic Mobility Standardized Score 41 05   Modified [place] Scale   Modified John Scale 3   Barthel Index   Feeding 10   Bathing 0   Grooming Score 5   Dressing Score 5   Bladder Score 10   Bowels Score 10   Toilet Use Score 5   Transfers (Bed/Chair) Score 10   Mobility (Level Surface) Score 0   Stairs Score 5   Barthel Index Score 60         Urvashi Aas, PT, DPT

## 2021-02-17 NOTE — ASSESSMENT & PLAN NOTE
· Reports increasing SOB over the past 2-3 months  Has seen both PCP and cardiology, but reports continues to worsen  States now SOB at rest as well  Sleeps in recliner but states has been doing so for > 1 year  Denies significant cough  Denies fever/chills  Does have acute/chronic thoracic back pain  Wears O2 2L NC at home  · Currently O2 sat 94% on 2L NC  Occasional wheezes noted on exam  · CT chest revealed moderate emphysema  No PE or acute pulmonary process  Finding consistent with possible pulmonary artery hypertension  · Echocardiogram completed on 10/27/20 revealed LVEF 60% with grade 1 diastolic dysfunction  No aortic stenosis  No evidence of pulmonary artery HTN    · Continue home dose duoneb QID, Breo ellipta and PRN Albuterol MDI  · Prednisone 40 PO daily  · Zithromax PO daily  · Mucinex BID  · Consult pulmonology  · Incentive spirometry

## 2021-02-17 NOTE — ASSESSMENT & PLAN NOTE
Lab Results   Component Value Date    HGBA1C 7 1 (H) 10/12/2020       No results for input(s): POCGLU in the last 72 hours      Blood Sugar Average: Last 72 hrs:     · Hold home dose Glyburide while inpt  · FSBS AC & HS w/ SSI  · Diabetic diet  · A1C pending

## 2021-02-17 NOTE — RESPIRATORY THERAPY NOTE
RT Protocol Note  Ramsey Miller 78 y o  female MRN: [id number]  Unit/Bed#: MS 36-1 Encounter: [id number]    Assessment    Principal Problem:    SOB (shortness of breath)  Active Problems:    COPD exacerbation (HCC)    Type 2 diabetes mellitus with complication, without long-term current use of insulin (HCC)    Hyperlipidemia    Hypothyroidism    Chronic bilateral thoracic back pain    Chronic diastolic heart failure (HCC)    VICKY (acute kidney injury) ([facility] )      Home Pulmonary Medications:  Duoneb 5 times/day  Combivent TID  Breo    Home Devices/Therapy: (P) Home O2    Past Medical History:   Diagnosis Date    Cataract     [facility]     Leukocytosis      Social History     Socioeconomic History    Marital status:      Spouse name: None    Number of children: None    Years of education: None    Highest education level: None   Occupational History    Occupation: [employer]    Social Needs    Financial resource strain: None    Food insecurity     Worry: None     Inability: None    Transportation needs     Medical: None     Non-medical: None   Tobacco Use    Smoking status: Former Smoker     Packs/day: 2 00     Years: 48 00     Pack years: 96 00     Types: Cigarettes     Quit date: 1999     Years since quittin 4    Smokeless tobacco: Never Used   Substance and Sexual Activity    Alcohol use: Not Currently     Alcohol/week: 4 0 standard drinks     Types: 4 Cans of beer per week     Frequency: 2-3 times a week     Drinks per session: 1 or 2     Binge frequency: Never    Drug use: No    Sexual activity: Not Currently   Lifestyle    Physical activity     Days per week: 0 days     Minutes per session: 0 min    Stress:  To some extent   Relationships    Social connections     Talks on phone: None     Gets together: None     Attends Jew service: None     Active member of club or organization: None     Attends meetings of clubs or organizations: None Relationship status: None    Intimate partner violence     Fear of current or ex partner: None     Emotionally abused: None     Physically abused: None     Forced sexual activity: None   Other Topics Concern    None   Social History Narrative    LIVING INDEPENDENTLY ALONE     NO ADVANCED DIRECTIVES ON FILE        Subjective         Objective    Physical Exam:   Assessment Type: (P) Pre-treatment  General Appearance: (P) Alert, Awake  Respiratory Pattern: (P) Labored, Grunting  Chest Assessment: (P) Chest expansion symmetrical  Bilateral Breath Sounds: (P) Coarse    Vitals:  Blood pressure 147/99, pulse 75, temperature 98 8 °F (37 1 °C), temperature source Oral, resp  rate 19, height 5' 2" (1 575 m), weight 77 1 kg (169 lb 15 6 oz), SpO2 90 %  Imaging and other studies: I have personally reviewed pertinent reports  Plan    Respiratory Plan: (P) Home Bronchodilator Patient pathway        Resp Comments: (P) Pt entered due to SOB, diagnosed w/COPD  Uses home 02 24/7  At home Duoneb q6 along w/inhaler  Also uses Breo  Breath sounds course, 90% saturation on 2L  Given X/A TID, albuterol inhaler Q4  Allyson Dinh (MD)  Cardiovascular Disease  [address], [address]  [place] [address]  Phone: [phone]  Fax: [phone]  Follow Up Time:

## 2021-02-17 NOTE — H&P
H&P- Jerry Vee 1940, 78 y o  female MRN: [id number]    Unit/Bed#: -01 Encounter: [id number]    Primary Care Provider: Brandy Krause MD   Date and time admitted to hospital: 2/17/2021 12:29 AM        * SOB (shortness of breath)  Assessment & Plan  · Reports increasing SOB over the past 2-3 months  Has seen both PCP and cardiology, but reports continues to worsen  States now SOB at rest as well  Sleeps in recliner but states has been doing so for > 1 year  Denies significant cough  Denies fever/chills  Does have acute/chronic thoracic back pain  Wears O2 2L NC at home  · Currently O2 sat 94% on 2L NC  Occasional wheezes noted on exam  · CT chest revealed moderate emphysema  No PE or acute pulmonary process  Finding consistent with possible pulmonary artery hypertension  · Echocardiogram completed on 10/27/20 revealed LVEF 60% with grade 1 diastolic dysfunction  No aortic stenosis  No evidence of pulmonary artery HTN  · Continue home dose duoneb QID, Breo ellipta and PRN Albuterol MDI  · Prednisone 40 PO daily  · Zithromax PO daily  · Mucinex BID  · Consult pulmonology  · Incentive spirometry    Chronic bilateral thoracic back pain  Assessment & Plan  · Reports back pain that migrates  States it is mostly in mid thoracic area with some lower back pain at times  Movement causes pain to increase  Currently seeing pain management and did receive an injection in the area of the Rt scapula a couple weeks ago with (+) relief  States due to have injection on Lt side on the 19th  · CT chest revealed age-in determinant compression fracture of T5 and T6   · Pain control: Tylenol OTC, Lidoderm patch, Aqua K  Continue home dose Ultram PRN  · Has home PT currently    VICKY (acute kidney injury) ([facility] [address] )  Assessment & Plan  · Creatinine 1 39 on admission   Baseline 0 9-1 05  · Likely pre-renal 2/2 reported poor oral intake/dehydration  · Avoid hypotension and nephrotoxic agents  · Hold home dose Demadex and Valsartan  · Received 1L NS in ED  · NS @ 75mL/hr x 500mL  · Check BMP at 1100    COPD exacerbation (HCC)  Assessment & Plan  · See AP above    Type 2 diabetes mellitus with complication, without long-term current use of insulin (HCC)  Assessment & Plan  Lab Results   Component Value Date    HGBA1C 7 1 (H) 10/12/2020       No results for input(s): POCGLU in the last 72 hours  Blood Sugar Average: Last 72 hrs:     · Hold home dose Glyburide while inpt  · FSBS AC & HS w/ SSI  · Diabetic diet  · A1C pending    Chronic diastolic heart failure (HCC)  Assessment & Plan  Wt Readings from Last 3 Encounters:   02/17/21 77 1 kg (169 lb 15 6 oz)   02/11/21 77 1 kg (170 lb)   01/12/21 77 5 kg (170 lb 12 8 oz)     · Does not appear acutely volume overloaded on exam  · Home dose Demadex on hold 2/2 VICKY as above  · Daily weight and I/O        Hypothyroidism  Assessment & Plan  · Continue home dose Levothyroxine    Hyperlipidemia  Assessment & Plan  · Continue home dose Lipitor      VTE Prophylaxis: Heparin  / sequential compression device   Code Status: Level 1 Full Code  POLST: POLST form is not discussed and not completed at this time  Discussion with family: Son at bedside    Anticipated Length of Stay:  Patient will be admitted on an Observation basis with an anticipated length of stay of  Less than 2 midnights  Justification for Hospital Stay: See AP above    Total Time for Visit, including Counseling / Coordination of Care: 45 minutes  Greater than 50% of this total time spent on direct patient counseling and coordination of care  Chief Complaint:   SOB and back pain    History of Present Illness:    Mary Ndiaye is a 78 y o  female who presents with increasing SOB over the past 2-3 months  Has seen both PCP and cardiology, but reports continues to worsen  States now SOB at rest as well  Sleeps in recliner but states has been doing so for > 1 year  Denies significant cough  Denies fever/chills   Does have acute/chronic thoracic back pain  Wears O2 2L NC at home  Also reports back pain that migrates  States it is mostly in mid thoracic area with some lower back pain at times  Movement causes pain to increase  Currently seeing pain management and did receive an injection in the area of the Rt scapula a couple weeks ago with (+) relief  States due to have injection on Lt side on the 19th  Review of Systems:    Review of Systems   Constitutional: Positive for appetite change (decreased appetite)  Negative for chills and fever  HENT: Negative for congestion, ear pain, sinus pressure and sore throat  Eyes: Negative for visual disturbance  Respiratory: Positive for shortness of breath and wheezing  Negative for cough  Gastrointestinal: Positive for constipation and nausea  Negative for diarrhea and vomiting  Genitourinary: Negative for difficulty urinating, dysuria and frequency  Musculoskeletal: Positive for back pain and gait problem  Negative for neck pain and neck stiffness  Neurological: Negative for dizziness, syncope, light-headedness and headaches  All other systems reviewed and are negative  Past Medical and Surgical History:     Past Medical History:   Diagnosis Date    Cataract     CHF (congestive heart failure) (HCC)     Leukocytosis        Past Surgical History:   Procedure Laterality Date    APPENDECTOMY      CYSTOCELE REPAIR      ANTERIOR COLPORRHAPHY     ORIF ANKLE FRACTURE Left 1979    TOTAL ABDOMINAL HYSTERECTOMY         Meds/Allergies:    Prior to Admission medications    Medication Sig Start Date End Date Taking?  Authorizing Provider   albuterol (Ventolin HFA) 90 mcg/act inhaler Inhale 2 puffs every 6 (six) hours as needed for wheezing 12/15/20  Yes Andre Perez MD   atorvastatin (LIPITOR) 80 mg tablet TAKE 1 TABLET BY MOUTH EVERY DAY 10/21/20  Yes Froy Burris PA-C   Combivent Respimat inhaler INHALE 1 PUFF 3 (THREE) TIMES A DAY 11/5/20  Yes Froy Burris PA-C fluticasone-vilanterol (BREO ELLIPTA) 100-25 mcg/inh inhaler Inhale 1 puff daily Rinse mouth after use  1/7/21  Yes Blanco Jacobson PA-C   glyBURIDE (DIABETA) 5 mg tablet TAKE 1 TABLET BY MOUTH TWICE A DAY 12/8/20  Yes Lissa Lira MD   levothyroxine 125 mcg tablet TAKE 1 TABLET BY MOUTH EVERY DAY 7/13/20  Yes Lissa Lira MD   LORazepam (ATIVAN) 0 5 mg tablet Take by mouth 3/14/14  Yes Historical Provider, MD   PAZEO 0 7 % SOLN INSTILL 1 DROP INTO BOTH EYES EVERY DAY 3/1/18  Yes Historical Provider, MD   traMADol Robyn Pump) 50 mg tablet  2/5/21  Yes Historical Provider, MD   valsartan (DIOVAN) 160 mg tablet TAKE 1 TABLET BY MOUTH EVERY DAY 1/5/21  Yes Lissa Lira MD   FML FORTE 0 25 % ophthalmic suspension INSTILL 1 DROP INTO BOTH EYES EVERY DAY 3/25/20   Historical Provider, MD   glucose blood (ONE TOUCH ULTRA TEST) test strip by In Vitro route 3 (three) times a day 5/29/15   Historical Provider, MD   Insulin Pen Needle (B-D UF III MINI PEN NEEDLES) 31G X 5 MM MISC by Does not apply route 4/8/15   Historical Provider, MD   ipratropium-albuterol (DUO-NEB) 0 5-2 5 mg/3 mL nebulizer solution Take 1 vial (3 mL total) by nebulization 5 (five) times a day 12/15/20   Oriana Jain MD   torsemide (DEMADEX) 10 mg tablet Take 1 tablet (10 mg total) by mouth daily 12/1/20 2/11/21  Ernie Mccrary PA-C     I have reviewed home medications with patient personally  Allergies: Allergies   Allergen Reactions    Erythromycin     Sulfa Antibiotics        Social History:     Marital Status:    Patient Pre-hospital Living Situation: Lives by self  Patient Pre-hospital Level of Mobility: Ambulatory w cane  Patient Pre-hospital Diet Restrictions: Diabetic   Low salt  Substance Use History:   Social History     Substance and Sexual Activity   Alcohol Use Not Currently    Alcohol/week: 4 0 standard drinks    Types: 4 Cans of beer per week    Frequency: 2-3 times a week    Drinks per session: 1 or 2    Binge frequency: Never     Social History     Tobacco Use   Smoking Status Former Smoker    Packs/day: 2 00    Years: 48 00    Pack years: 96 00    Types: Cigarettes    Quit date: 1999    Years since quittin 4   Smokeless Tobacco Never Used     Social History     Substance and Sexual Activity   Drug Use No       Family History:    Family History   Problem Relation Age of Onset    No Known Problems Mother     No Known Problems Father        Physical Exam:     Vitals:   Blood Pressure: 134/61 (21 0400)  Pulse: 75 (21)  Temperature: 98 °F (36 7 °C) (21)  Temp Source: Oral (21)  Respirations: 20 (21)  Height: 5' 2" (157 5 cm) (21)  Weight - Scale: 77 1 kg (169 lb 15 6 oz) (21)  SpO2: 97 % (21)    Physical Exam  Vitals signs reviewed  Constitutional:       General: She is not in acute distress  Appearance: She is ill-appearing  HENT:      Head: Normocephalic and atraumatic  Mouth/Throat:      Mouth: Mucous membranes are dry  Eyes:      Extraocular Movements: Extraocular movements intact  Neck:      Musculoskeletal: Normal range of motion and neck supple  Cardiovascular:      Rate and Rhythm: Normal rate and regular rhythm  Pulses: Normal pulses  Heart sounds: Normal heart sounds  Pulmonary:      Breath sounds: Wheezing present  No rhonchi or rales  Comments: Mildly increased WOB  Abdominal:      Palpations: Abdomen is soft  Tenderness: There is no abdominal tenderness  There is no guarding or rebound  Musculoskeletal: Normal range of motion  General: No swelling or tenderness  Comments: Back pain increases with movement   Skin:     General: Skin is warm and dry  Neurological:      General: No focal deficit present  Mental Status: She is alert and oriented to person, place, and time     Psychiatric:         Mood and Affect: Mood normal          Behavior: Behavior normal          Thought Content: Thought content normal          Additional Data:     Lab Results: I have personally reviewed pertinent reports  Results from last 7 days   Lab Units 02/17/21  0056   WBC Thousand/uL 11 61*   HEMOGLOBIN g/dL 12 2   HEMATOCRIT % 38 2   PLATELETS Thousands/uL 204   NEUTROS PCT % 65   LYMPHS PCT % 23   MONOS PCT % 9   EOS PCT % 2     Results from last 7 days   Lab Units 02/17/21  0056   SODIUM mmol/L 137   POTASSIUM mmol/L 4 2   CHLORIDE mmol/L 95*   CO2 mmol/L 34*   BUN mg/dL 30*   CREATININE mg/dL 1 39*   ANION GAP mmol/L 8   CALCIUM mg/dL 10 6*   ALBUMIN g/dL 3 4*   TOTAL BILIRUBIN mg/dL 0 40   ALK PHOS U/L 133*   ALT U/L 26   AST U/L 29   GLUCOSE RANDOM mg/dL 112                       Imaging: I have personally reviewed pertinent reports  PE Study with CT Abdomen and Pelvis with contrast   Final Result by Isha Juarez DO (02/17 0310)      Moderate pulmonary emphysematous changes are noted bilaterally  No pulmonary embolus or acute pulmonary process is seen  Prominence of the pulmonary arteries bilaterally may suggest a component of pulmonary arterial hypertension  Age-indeterminate compression fracture of T5 with approximately 33% loss of height and of T6 with approximately 25% loss of height  Spinal alignment appears maintained  Correlation with the patient's symptoms recommended  Coronary atherosclerosis, left adrenal nodule, right renal cyst, duplicated left renal collecting system, colonic diverticulosis without evidence of acute diverticulitis, and other findings as above  Workstation performed: [id number]         XR chest 1 view portable    (Results Pending)       EKG, Pathology, and Other Studies Reviewed on Admission:   · EKG: NA    Allscripts / Epic Records Reviewed: Yes     ** Please Note: This note has been constructed using a voice recognition system   **

## 2021-02-17 NOTE — CASE MANAGEMENT
LOS 14 HOURS  RISK OF UNPLANNED READMISSION SCORE N/A  30 DAY READMISSION: NO  BUNDLE: NO    CM and CM alexandra met with patient bedside  Patient reports living alone in a 1-story home with 3 ALBINO  Patient states that she uses a walker for ambulation and has access to a SPC if needed  Patient wears O2 chronically as supplied through [employer] who she states her son works for  Patient states she is able to complete all ADLs independently at baseline, however states that her DIL provides assistance if needed  [facility] [place]  Patient stating she is currently receiving services with them, and would like to resume services with them upon discharge  CM opened [facility] referral for [facility] via [address]  No Hx STR identified  Patient states she previously worked through anxiety and depression after her 's death, however no IPBHU stays and no longer takes medication for these [address]  Patient states she quit smoking in 1999  No current SA identified  No formal POA documentation at this time, however patient states that her son Tanisha Mosley (from [facility] [place]) and her daughter, Karissa Hannah (from [place]) would make health care decisions for her if she were unable to do so  Patient states that her son or DIL will provide transportation when cleared for discharge  No other at this time

## 2021-02-17 NOTE — ASSESSMENT & PLAN NOTE
Message  PT AWARE TO CALL AND TO SCHEDULE APPOINTMENT FOR RESULTS AFTER MRA IS COMPLETED . ORDER WAS CHANGED TO MRA WO CONTRAST NO LABS NEEDED. Plan   1.  MRA HEAD WO CON; Status:Active - Perform Order; Requested CIX:[id number];     Signatures   Electronically signed by : Mike Son, ; Oct  2 2017  3:52PM CST · See AP above

## 2021-02-17 NOTE — PLAN OF CARE
Problem: PHYSICAL THERAPY ADULT  Goal: Performs mobility at highest level of function for planned discharge setting  See evaluation for individualized goals  Description: Treatment/Interventions: Functional transfer training, LE strengthening/ROM, Elevations, Therapeutic exercise, Endurance training, Patient/family training, Equipment eval/education, Bed mobility, Gait training, Spoke to nursing, OT          See flowsheet documentation for full assessment, interventions and recommendations  Note: Prognosis: Good  Problem List: Decreased strength, Decreased endurance, Impaired balance, Decreased mobility, Pain  Assessment: Pt is 78 y o  female seen for PT evaluation s/p admit to [facility] on 2021 w/ SOB (shortness of breath)  PT consulted to assess pt's functional mobility and d/c needs  Order placed for PT eval and tx, w/ up w/ A order  Performed at least 2 patient identifiers during session: Name and   Comorbidities affecting pt's physical performance at time of assessment include: VICKY (acute kidney injury), Chronic diastolic heart failure, chronic bilateral thoracic back pain, COPD exacerbation  PTA, pt was independent w/ all functional mobility w/ RW (on occasion), requires assistance for ADLs and IADLs, ambulates household distances, has 5 ALBINO, lives alone in one level house and retired  Personal factors affecting pt at time of IE include: ambulating w/ assistive device, stairs to enter home, inability to perform IADLs and inability to perform ADLs  Please find objective findings from PT assessment regarding body systems outlined above with impairments and limitations including weakness, impaired balance, decreased endurance, gait deviations, decreased activity tolerance, decreased functional mobility tolerance and fall risk  The following objective measures performed on IE also reveal limitations: Barthel Index: 60/100 and Modified John: 3 (moderate disability)   Pt's clinical presentation is currently unstable/unpredictable seen in pt's presentation of ongoing medical management/monitoring, fatigue impacting overall mobility status and need for input for mobility technique/safety  Pt to benefit from continued PT tx to address deficits as defined above and maximize level of functional independent mobility and consistency  From PT/mobility standpoint, recommendation at time of d/c would be Home PT with family support pending progress in order to facilitate return to PLOF  Barriers to Discharge: None     PT Discharge Recommendation: Home with skilled therapy(Home PT with family support)     PT - OK to Discharge: Yes(when medically cleared)    See flowsheet documentation for full assessment

## 2021-02-17 NOTE — RESPIRATORY THERAPY NOTE
Patient unable to meet goal of 500 on  IS  Patient given therapep along with flutter  Pt  Able to give 10 breaths while exhaling w/7L of flow thru the therapep  Patient then increased her IS over previous best   Continue to use therapep w/flutter

## 2021-02-17 NOTE — ASSESSMENT & PLAN NOTE
Wt Readings from Last 3 Encounters:   02/17/21 77 1 kg (169 lb 15 6 oz)   02/11/21 77 1 kg (170 lb)   01/12/21 77 5 kg (170 lb 12 8 oz)     · Does not appear acutely volume overloaded on exam  · Home dose Demadex on hold 2/2 VICKY as above  · Daily weight and I/O

## 2021-02-18 PROBLEM — S22.000A THORACIC COMPRESSION FRACTURE (HCC): Status: ACTIVE | Noted: 2021-01-01

## 2021-02-18 PROBLEM — E44.0 MODERATE PROTEIN-CALORIE MALNUTRITION (HCC): Status: ACTIVE | Noted: 2021-01-01

## 2021-02-18 PROBLEM — J96.11 CHRONIC RESPIRATORY FAILURE WITH HYPOXIA (HCC): Status: ACTIVE | Noted: 2021-01-01

## 2021-02-18 PROBLEM — N39.0 UTI (URINARY TRACT INFECTION): Status: ACTIVE | Noted: 2021-01-01

## 2021-02-18 NOTE — CASE MANAGEMENT
Cm met with patient at bedside to provide patient with HHA resources including [employer], At [facility], [place], and [employer]  Cm also provided information on [facility] and [facility] and explained both services  Cm apologized for the confusion, but reported that patient was not admitted as an inpatient, so the IMM isn't necessary  Patient understanding and reported that her granddaughter will be in later today  Patient reported that she is just concerned about having to go to rehab and would like to prevent that  Cm encouraged patient to utilize the resources provided to prevent readmission  Patient agreeable to give this information to her DIL to review  Cm called patient's DIL to report  DIL reported that she is concerned about patient's current health status and would like to speak with RIDGE for an update  DIL requested a call from SLIM and a call back from Justice with [facility]  Cm department will continue to follow patient through discharge

## 2021-02-18 NOTE — ASSESSMENT & PLAN NOTE
CT thoracolumbar spine   IMPRESSION:  Thoracic compression fractures from T5 to T7 without bony retropulsion  Fractures could be acute/subacute      Degenerative spondylosis as below  No focal neurological deficit noted  Discussed with orthopedics and neurosurgery team   Recommended brace and PT/OT      Pain management

## 2021-02-18 NOTE — ASSESSMENT & PLAN NOTE
Malnutrition Findings:           BMI Findings: Body mass index is 31 09 kg/m²  Moderate protein-calorie malnutrition, in the setting of severe back pain, COPD, and VICKY d/t poor po intake, as evidenced by 15 05 lb/8 14% weight loss in three months, requiring Carb control diet with glue Phillips supplements, monitoring of PO intake, and assistance with meals as needed    Nutritionist consult

## 2021-02-18 NOTE — ASSESSMENT & PLAN NOTE
Ongoing  Continue supplemental oxygen  At home she is on 3-4 L oxygen at baseline    Secondary to COPD

## 2021-02-18 NOTE — ASSESSMENT & PLAN NOTE
Lab Results   Component Value Date    HGBA1C 7 1 (H) 10/12/2020       Recent Labs     02/17/21  1635 02/17/21 2059 02/18/21  0714 02/18/21  1126   POCGLU 192* 193* 126 114       Blood Sugar Average: Last 72 hrs:  (P) 565 [id number]   · Hold home dose Glyburide while inpt  · FSBS AC & HS w/ SSI  · Diabetic diet  · A1C pending

## 2021-02-18 NOTE — ASSESSMENT & PLAN NOTE
· Patient was given IV Solu-Medrol initially  Continue nebulization  · Pulmonary evaluation appreciated    Solu-Medrol discontinued now

## 2021-02-18 NOTE — ASSESSMENT & PLAN NOTE
· Patient was evaluated by Pulmonary  · Lungs clear  Unlikely COPD exacerbation  · Initially started on IV Solu-Medrol which discontinued  · Monitor off steroid  · Now patient has acute thoracic vertebral compression fracture  Management for fracture and pain control    · Patient at her baseline oxygen requirement

## 2021-02-18 NOTE — CASE MANAGEMENT
Per [employer], [facility] is able to accept patient with the [facility] date of 2/19 pending the weather  Cm attempted to meet with patient at bedside  However, RN was present and patient was in the bathroom  CM left IMM at bedside and asked RN to make sure she received the IMM  Cm placed a copy in medical records and provided the patient with a copy  Cm met with patient at bedside to review IMM  Patient reported that she is not ready to be discharged today, because she doesn't feel well and going home alone makes her "depressed " Cm reported that [facility] will be able to start care tomorrow  Patient asked Cm to call her DIL to further discuss IMM and [facility]  Patient also requested HHA information after Cm reviewed these services  Cm met with RIDGE and RN to report  SLIM will see patient later today and determine if she is discharge ready  Cm to call patient's DIL and provide HHA resources  Cm department will continue to follow patient through discharge

## 2021-02-18 NOTE — UTILIZATION REVIEW
Initial Clinical Review    OBS order 2/17 0321 converted to IP on 2/18 @ 1054    Level of Care Med Surg    Estimated length of stay More than 2 Midnights    Certification I certify that inpatient services are medically necessary for this patient for a duration of greater than two midnights  See H&P and MD Progress Notes for additional information about the patient's course of treatment  ED Arrival Information     Expected Arrival Acuity Means of Arrival Escorted By Service Admission Type    - 2/17/2021 00:26 Emergent Wheelchair Family Member General Medicine Emergency    Arrival Complaint    sob         Chief Complaint   Patient presents with    Shortness of Breath     Been bothering her for months, worsened yesterday into today  Assessment/Plan:  78 y o  female who presents with increasing SOB over the past 2-3 months  Has seen both PCP and cardiology, but reports continues to worsen  States now SOB at rest as well  Sleeps in recliner but states has been doing so for > 1 year  Denies significant cough  Denies fever/chills  Does have acute/chronic thoracic back pain  Wears O2 2L NC at home  Also reports back pain that migrates  States it is mostly in mid thoracic area with some lower back pain at times  Movement causes pain to increase  Currently seeing pain management and did receive an injection in the area of the Rt scapula a couple weeks ago with (+) relief  States due to have injection on Lt side on the 19th      * SOB (shortness of breath)  Assessment & Plan  · Reports increasing SOB over the past 2-3 months  Has seen both PCP and cardiology, but reports continues to worsen  States now SOB at rest as well  Sleeps in recliner but states has been doing so for > 1 year  Denies significant cough  Denies fever/chills  Does have acute/chronic thoracic back pain  Wears O2 2L NC at home  · Currently O2 sat 94% on 2L NC  Occasional wheezes noted on exam  · CT chest revealed moderate emphysema   No PE or acute pulmonary process  Finding consistent with possible pulmonary artery hypertension  · Echocardiogram completed on 10/27/20 revealed LVEF 60% with grade 1 diastolic dysfunction  No aortic stenosis  No evidence of pulmonary artery HTN  · Continue home dose duoneb QID, Breo ellipta and PRN Albuterol MDI  · Prednisone 40 PO daily  · Zithromax PO daily  · Mucinex BID  · Consult pulmonology  · Incentive spirometry     Chronic bilateral thoracic back pain  Assessment & Plan  · Reports back pain that migrates  States it is mostly in mid thoracic area with some lower back pain at times  Movement causes pain to increase  Currently seeing pain management and did receive an injection in the area of the Rt scapula a couple weeks ago with (+) relief  States due to have injection on Lt side on the 19th  · CT chest revealed age-in determinant compression fracture of T5 and T6   · Pain control: Tylenol OTC, Lidoderm patch, Aqua K  Continue home dose Ultram PRN  · Has home PT currently     VICKY (acute kidney injury) ([facility] [address] )  Assessment & Plan  · Creatinine 1 39 on admission   Baseline 0 9-1 05  · Likely pre-renal 2/2 reported poor oral intake/dehydration  · Avoid hypotension and nephrotoxic agents  · Hold home dose Demadex and Valsartan  · Received 1L NS in ED  · NS @ 75mL/hr x 500mL  · Check BMP at 1100     COPD exacerbation (HCC)  Assessment & Plan  · See AP above     Type 2 diabetes mellitus with complication, without long-term current use of insulin (HCC)  Assessment & Plan        Lab Results   Component Value Date     HGBA1C 7 1 (H) 10/12/2020         No results for input(s): POCGLU in the last 72 hours      Blood Sugar Average: Last 72 hrs:     · Hold home dose Glyburide while inpt  · FSBS AC & HS w/ SSI  · Diabetic diet  · A1C pending     Chronic diastolic heart failure (HCC)  Assessment & Plan      Wt Readings from Last 3 Encounters:   02/17/21 77 1 kg (169 lb 15 6 oz)   02/11/21 77 1 kg (170 lb)   01/12/21 77 5 kg (170 lb 12 8 oz)      · Does not appear acutely volume overloaded on exam  · Home dose Demadex on hold 2/2 VICKY as above  · Daily weight and I/O           Hypothyroidism  Assessment & Plan  · Continue home dose Levothyroxine     Hyperlipidemia  Assessment & Plan  · Continue home dose Lipitor        VTE Prophylaxis: Heparin  / sequential compression device   Code Status: Level 1 Full Code  POLST: POLST form is not discussed and not completed at this time  Discussion with family: Son at bedside     Anticipated Length of Stay:  Patient will be admitted on an Observation basis with an anticipated length of stay of  Less than 2 midnights     Justification for Hospital Stay: See AP above       ED Triage Vitals   Temperature Pulse Respirations Blood Pressure SpO2   02/17/21 0035 02/17/21 0035 02/17/21 0035 02/17/21 0035 02/17/21 0035   97 8 °F (36 6 °C) (!) 107 20 144/77 95 %      Temp Source Heart Rate Source Patient Position - Orthostatic VS BP Location FiO2 (%)   02/17/21 0035 02/17/21 0035 02/17/21 0725 02/17/21 0035 --   Oral Monitor Lying Right arm       Pain Score       02/17/21 0056       Worst Possible Pain          Wt Readings from Last 1 Encounters:   02/17/21 77 1 kg (169 lb 15 6 oz)     Additional Vital Signs:   02/17/21 22:56:01  97 7 °F (36 5 °C)  77  18  113/50  71  94 %  --  --  Nasal cannula  Lying   02/17/21 2006  --  --  --  --  --  99 %  28  2 L/min  Nasal cannula  --   02/17/21 1951  --  --  --  --  --  98 %  28  2 L/min  Nasal cannula  --   02/17/21 1948  --  --  --  --  --  98 %  --  --  --  --   02/17/21 1630  97 8 °F (36 6 °C)  84  18  146/66  --  92 %  --  --  Nasal cannula  Lying   02/17/21 1432  --  --  --  --  --  90 %  28  2 L/min  Nasal cannula  --   02/17/21 0930  --  --  --  --  --  --  28  2 L/min  Nasal cannula  --   02/17/21 0842  --  --  --  --  --  90 %  28  2 L/min  Nasal cannula  --   02/17/21 0725  98 8 °F (37 1 °C)  --  19  147/99  --  --  --  --  --  Lying   02/17/21 0632  --  -- --  --  --  97 %  28  2 L/min  Nasal cannula  --   02/17/21 0400  98 °F (36 7 °C)  75  20  134/61  88  97 %  36  4 L/min  Nasal cannula  --   02/17/21 0200  --  62  19  138/62  89  100 %  --  --  --  --   02/17/21 0145  --  66  20  132/59  82  99 %  36  4 L/min  Nasal cannula  --   02/17/21 0143  --  --  --  --  --  --  --  --  --   --   O2 Device: 4 L at 02/17/21 0143   02/17/21 0115  --  77  20  128/68  89  97 %  --             Pertinent Labs/Diagnostic Test Results:   2/17 EKG NSR   2/17 PCXR No acute cardiopulmonary disease      Continued mild elevation of the right hemidiaphragm      Mild relative lucency of the right lung may be related to technique      There is no pneumothorax evident      2/17 CTA Moderate pulmonary emphysematous changes are noted bilaterally      No pulmonary embolus or acute pulmonary process is seen  Prominence of the pulmonary arteries bilaterally may suggest a component of pulmonary arterial hypertension      Age-indeterminate compression fracture of T5 with approximately 33% loss of height and of T6 with approximately 25% loss of height  Spinal alignment appears maintained    Correlation with the patient's symptoms recommended      Coronary atherosclerosis, left adrenal nodule, right renal cyst, duplicated left renal collecting system, colonic diverticulosis without evidence of acute diverticulitis, and other findings as above      Results from last 7 days   Lab Units 02/17/21  0109   SARS-COV-2  Negative     Results from last 7 days   Lab Units 02/17/21  1159 02/17/21  0056   WBC Thousand/uL  --  11 61*   HEMOGLOBIN g/dL  --  12 2   HEMATOCRIT %  --  38 2   PLATELETS Thousands/uL 183 204   NEUTROS ABS Thousands/µL  --  7 59     Results from last 7 days   Lab Units 02/17/21  1159 02/17/21  0056   SODIUM mmol/L 138 137   POTASSIUM mmol/L 4 8 4 2   CHLORIDE mmol/L 97* 95*   CO2 mmol/L 34* 34*   ANION GAP mmol/L 7 8   BUN mg/dL 28* 30*   CREATININE mg/dL 1 30 1 39*   EGFR ml/min/1 73sq m 38 35   CALCIUM mg/dL 10 3* 10 6*     Results from last 7 days   Lab Units 02/17/21  0056   AST U/L 29   ALT U/L 26   ALK PHOS U/L 133*   TOTAL PROTEIN g/dL 6 7   ALBUMIN g/dL 3 4*   TOTAL BILIRUBIN mg/dL 0 40   BILIRUBIN DIRECT mg/dL 0 11     Results from last 7 days   Lab Units 02/17/21  2059 02/17/21  1635 02/17/21  1057 02/17/21  0719   POC GLUCOSE mg/dl 193* 192* 104 74     Results from last 7 days   Lab Units 02/17/21  1159 02/17/21  0056   GLUCOSE RANDOM mg/dL 171* 112     Results from last 7 days   Lab Units 02/17/21  0056   TROPONIN I ng/mL 0 03     Results from last 7 days   Lab Units 02/17/21  0056   D-DIMER QUANTITATIVE ug/ml FEU 2 42*     Results from last 7 days   Lab Units 02/17/21  1159   PROCALCITONIN ng/ml 0 22     Results from last 7 days   Lab Units 02/17/21  0056 02/11/21  1645   NT-PRO BNP pg/mL 1,204* 1,836*     Results from last 7 days   Lab Units 02/17/21  0127   CLARITY UA  Slightly Cloudy   COLOR UA  Yellow   SPEC GRAV UA  1 020   PH UA  5 5   GLUCOSE UA mg/dl Negative   KETONES UA mg/dl Negative   BLOOD UA  Negative   PROTEIN UA mg/dl Negative   NITRITE UA  Negative   BILIRUBIN UA  Negative   UROBILINOGEN UA E U /dl 0 2   LEUKOCYTES UA  Moderate*   WBC UA /hpf 30-50*   RBC UA /hpf 1-2   BACTERIA UA /hpf Innumerable*   EPITHELIAL CELLS WET PREP /hpf Occasional     Results from last 7 days   Lab Units 02/17/21  0109   INFLUENZA A PCR  Negative   INFLUENZA B PCR  Negative   RSV PCR  Negative     ED Treatment:   Medication Administration from 02/17/2021 0026 to 02/17/2021 0505       Date/Time Order Dose Route Action     02/17/2021 0104 sodium chloride 0 9 % bolus 1,000 mL 1,000 mL Intravenous New Bag     02/17/2021 0056 morphine (PF) 4 mg/mL injection 4 mg 4 mg Intravenous Given     02/17/2021 0134 fentanyl citrate (PF) 100 MCG/2ML 50 mcg 50 mcg Intravenous Given        Past Medical History:   Diagnosis Date    Cataract     CHF (congestive heart failure) (HCC)     Leukocytosis Present on Admission:   SOB (shortness of breath)   Chronic bilateral thoracic back pain   Type 2 diabetes mellitus with complication, without long-term current use of insulin (HCC)   Hyperlipidemia   Hypothyroidism   Chronic diastolic heart failure (HCC)   VICKY (acute kidney injury) ([facility] [address] )   COPD exacerbation ([facility])      Admitting Diagnosis: Back pain [M54 9]  Dyspnea [R06 00]  SOB (shortness of breath) [R06 02]  COPD exacerbation ([facility]) [J44 1]  Age/Sex: 78 y o  female  Admission Orders:  Scheduled Medications:  acetaminophen, 975 mg, Oral, Q6H [address]  atorvastatin, 80 mg, Oral, Daily  azithromycin, 500 mg, Oral, Q24H  cefTRIAXone, 1,000 mg, Intravenous, Q24H  fluticasone-vilanterol, 1 puff, Inhalation, Daily  guaiFENesin, 600 mg, Oral, Q12H VIC  heparin (porcine), 5,000 Units, Subcutaneous, Q8H VIC  insulin lispro, 1-6 Units, Subcutaneous, TID AC  insulin lispro, 1-6 Units, Subcutaneous, HS  ipratropium, 0 5 mg, Nebulization, TID  levalbuterol, 1 25 mg, Nebulization, TID  levothyroxine, 125 mcg, Oral, Daily  lidocaine, 2 patch, Topical, Daily  montelukast, 10 mg, Oral, HS  polyethylene glycol, 17 g, Oral, Daily  saccharomyces boulardii, 250 mg, Oral, BID  senna, 1 tablet, Oral, HS      Continuous IV Infusions:     PRN Meds:  albuterol, 2 puff, Inhalation, Q6H PRN  aluminum-magnesium hydroxide-simethicone, 30 mL, Oral, Q6H PRN  benzonatate, 100 mg, Oral, TID PRN  HYDROmorphone, 0 2 mg, Intravenous, Q3H PRN  melatonin, 6 mg, Oral, HS PRN  methocarbamol, 500 mg, Oral, Q6H PRN  traMADol, 50 mg, Oral, Q6H PRN        IP CONSULT TO PULMONOLOGY    Network Utilization Review Department  ATTENTION: Please call with any questions or concerns to [phone] and carefully listen to the prompts so that you are directed to the right person   All voicemails are confidential   Paula Coates all requests for admission clinical reviews, approved or denied determinations and any other requests to dedicated fax number below belonging to the campus where the patient is receiving treatment   List of dedicated fax numbers for the Facilities:  [address] [place] [address] DENIALS (Administrative/Medical Necessity) [phone]   [address] (Maternity/NICU/Pediatrics) [phone] [address] Magdi Spencer 4947 (  Tammy Casas "Irene" 103) [address] [place] [address] [place] [address] [place] [address] [phone]

## 2021-02-18 NOTE — ASSESSMENT & PLAN NOTE
· Creatinine 1 39 on admission   Baseline 0 9-1 05  · Likely pre-renal 2/2 reported poor oral intake/dehydration  · Avoid hypotension and nephrotoxic agents  · Hold home dose Demadex and Valsartan  · Now improving, 1 36 today

## 2021-02-18 NOTE — PROGRESS NOTES
Progress Note - Bonita Garland 1940, 78 y o  female MRN: [id number]    Unit/Bed#: -01 Encounter: [id number]    Primary Care Provider: Garrison Berumen MD   Date and time admitted to hospital: 2/17/2021 12:29 AM        * Thoracic compression fracture [facility])  Assessment & Plan  CT thoracolumbar spine   IMPRESSION:  Thoracic compression fractures from T5 to T7 without bony retropulsion  Fractures could be acute/subacute      Degenerative spondylosis as below  No focal neurological deficit noted  Discussed with orthopedics and neurosurgery team   Recommended brace and PT/OT  Pain management    UTI (urinary tract infection)  Assessment & Plan  Continue IV antibiotics  Follow-up final urine culture  Moderate protein-calorie malnutrition ([facility] [address] )  Assessment & Plan  Malnutrition Findings:           BMI Findings: Body mass index is 31 09 kg/m²  Moderate protein-calorie malnutrition, in the setting of severe back pain, COPD, and VICKY d/t poor po intake, as evidenced by 15 05 lb/8 14% weight loss in three months, requiring Carb control diet with glue Phillips supplements, monitoring of PO intake, and assistance with meals as needed  Nutritionist consult    Chronic respiratory failure with hypoxia [facility])  Assessment & Plan  Ongoing  Continue supplemental oxygen  At home she is on 3-4 L oxygen at baseline  Secondary to COPD    VICKY (acute kidney injury) ([facility] [address] )  Assessment & Plan  · Creatinine 1 39 on admission   Baseline 0 9-1 05  · Likely pre-renal 2/2 reported poor oral intake/dehydration  · Avoid hypotension and nephrotoxic agents  · Hold home dose Demadex and Valsartan  · Now improving, 1 36 today    Chronic diastolic heart failure (HCC)  Assessment & Plan  Wt Readings from Last 3 Encounters:   02/17/21 77 1 kg (169 lb 15 6 oz)   02/11/21 77 1 kg (170 lb)   01/12/21 77 5 kg (170 lb 12 8 oz)     · Does not appear acutely volume overloaded on exam  · Home dose Demadex on hold 2/2 VICKY as above  · Daily weight and I/O        Chronic bilateral thoracic back pain  Assessment & Plan  · Now has acute fracture  See above    SOB (shortness of breath)  Assessment & Plan  · Patient was evaluated by Pulmonary  · Lungs clear  Unlikely COPD exacerbation  · Initially started on IV Solu-Medrol which discontinued  · Monitor off steroid  · Now patient has acute thoracic vertebral compression fracture  Management for fracture and pain control  · Patient at her baseline oxygen requirement    Hypothyroidism  Assessment & Plan  · Continue home dose Levothyroxine    Hyperlipidemia  Assessment & Plan  · Continue home dose Lipitor    Type 2 diabetes mellitus with complication, without long-term current use of insulin [facility])  Assessment & Plan  Lab Results   Component Value Date    HGBA1C 7 1 (H) 10/12/2020       Recent Labs     02/17/21  1635 02/17/21  2059 02/18/21  0714 02/18/21  1126   POCGLU 192* 193* 126 114       Blood Sugar Average: Last 72 hrs:  (P) 954 [id number]   · Hold home dose Glyburide while inpt  · FSBS AC & HS w/ SSI  · Diabetic diet  · A1C pending    COPD exacerbation ([facility] [address] )  Assessment & Plan  · Patient was given IV Solu-Medrol initially  Continue nebulization  · Pulmonary evaluation appreciated  Solu-Medrol discontinued now      VTE Pharmacologic Prophylaxis:   Pharmacologic: Heparin  Mechanical VTE Prophylaxis in Place: Yes    Patient Centered Rounds: I have performed bedside rounds with nursing staff today  Discussions with Specialists or Other Care Team Provider:  Case management    Education and Discussions with Family / Patient:  Patient    Time Spent for Care: More than 50% of total time spent on counseling and coordination of care as described above      Current Length of Stay: 0 day(s)    Current Patient Status: Inpatient   Certification Statement: The patient will continue to require additional inpatient hospital stay due to See above    Discharge Plan:  48 hours    Code Status: Level 1 - Full Code      Subjective:   I have seen and examined the patient bedside this morning  Patient complaining about severe back pain  No chest pain or palpitation  Shortness of breath at baseline  Afebrile  Objective:     Vitals:   Temp (24hrs), Av 7 °F (36 5 °C), Min:97 5 °F (36 4 °C), Max:97 8 °F (36 6 °C)    Temp:  [97 5 °F (36 4 °C)-97 8 °F (36 6 °C)] 97 5 °F (36 4 °C)  HR:  [77-84] 77  Resp:  [17-18] 17  BP: (109-146)/(50-77) 109/51  SpO2:  [92 %-99 %] 93 %  Body mass index is 31 09 kg/m²  Input and Output Summary (last 24 hours): Intake/Output Summary (Last 24 hours) at 2021 1537  Last data filed at 2021 1327  Gross per 24 hour   Intake 1450 ml   Output --   Net 1450 ml       Physical Exam:     Physical Exam  General- Awake, alert and oriented x 3, looks uncomfortable due to pain  HEENT- Normocephalic, atraumatic  CVS- Normal S1/ S2, Regular rate and rhythm  Respiratory system- B/L clear breath sounds  Abdomen- Soft, Non distended, no tenderness, Bowel sound- present  Musculoskeletal- severe back pain at thoracic area  CNS- No acute focal neurologic deficit noted    Additional Data:     Labs:    Results from last 7 days   Lab Units 21  0859   WBC Thousand/uL 13 57*   HEMOGLOBIN g/dL 10 5*   HEMATOCRIT % 31 8*   PLATELETS Thousands/uL 188   NEUTROS PCT % 64   LYMPHS PCT % 25   MONOS PCT % 10   EOS PCT % 0     Results from last 7 days   Lab Units 21  0859  21  0056   SODIUM mmol/L 139   < > 137   POTASSIUM mmol/L 4 4   < > 4 2   CHLORIDE mmol/L 100   < > 95*   CO2 mmol/L 31   < > 34*   BUN mg/dL 33*   < > 30*   CREATININE mg/dL 1 36*   < > 1 39*   ANION GAP mmol/L 8   < > 8   CALCIUM mg/dL 10 2*   < > 10 6*   ALBUMIN g/dL  --   --  3 4*   TOTAL BILIRUBIN mg/dL  --   --  0 40   ALK PHOS U/L  --   --  133*   ALT U/L  --   --  26   AST U/L  --   --  29   GLUCOSE RANDOM mg/dL 138   < > 112    < > = values in this interval not displayed           Results from last 7 days   Lab Units 02/18/21  1126 02/18/21  0714 02/17/21  2059 02/17/21  1635 02/17/21  1057 02/17/21  0719   POC GLUCOSE mg/dl 114 126 193* 192* 104 74         Results from last 7 days   Lab Units 02/17/21  1159   PROCALCITONIN ng/ml 0 22           * I Have Reviewed All Lab Data Listed Above  * Additional Pertinent Lab Tests Reviewed: All Labs Within Last 24 Hours Reviewed    Imaging:    Imaging Reports Reviewed Today Include:   CT thoracolumbar spine    IMPRESSION:  Thoracic compression fractures from T5 to T7 without bony retropulsion  Fractures could be acute/subacute      Degenerative spondylosis as below        Imaging Personally Reviewed by Myself Includes:      Recent Cultures (last 7 days):     Results from last 7 days   Lab Units 02/17/21  1421   URINE CULTURE  >100,000 cfu/ml Gram Negative Aime Enteric Like*       Last 24 Hours Medication List:   Current Facility-Administered Medications   Medication Dose Route Frequency Provider Last Rate    acetaminophen  975 mg Oral Q6H [facility]      albuterol  2 puff Inhalation Q6H PRN Gretchen Matthew PA-C      aluminum-magnesium hydroxide-simethicone  30 mL Oral Q6H PRN Gretchen Matthew PA-C      atorvastatin  80 mg Oral Daily Salas Hawley      benzonatate  100 mg Oral TID PRN Gretchen Matthew PA-C      cefTRIAXone  1,000 mg Intravenous Q24H John Abdi MD 1,000 mg (02/18/21 0857)   Marti fluticasone-vilanterol  1 puff Inhalation Daily Gretchen Matthew PA-C      guaiFENesin  600 mg Oral Q12H [facility] Gretchen Matthew PA-C      heparin (porcine)  5,000 Units Subcutaneous [facility] Salas Hawley      HYDROmorphone  0 5 mg Intravenous Q4H PRN John Abdi MD      insulin lispro  1-6 Units Subcutaneous TID AC Mariluz Dangelo PA-C      insulin lispro  1-6 Units Subcutaneous HS Mariluz Dangelo PA-C      ipratropium  0 5 mg Nebulization TID John Abdi MD      levalbuterol  1 25 mg Nebulization TID Ramendra Peter Beal MD      levothyroxine  125 mcg Oral Daily Baylee Oas, [place]      lidocaine  2 patch Topical Daily Baylee Oas, [place]      melatonin  6 mg Oral HS PRN Baylee Oas, PA-C      methocarbamol  500 mg Oral Q6H PRN Baylee Oas, PA-C      montelukast  10 mg Oral HS Baylee Oas, [place]      polyethylene glycol  17 g Oral Daily Baylee Oas, [place]      saccharomyces boulardii  250 mg Oral BID Milton Chance MD      senna  1 tablet Oral HS Baylee Oas, PA-C      traMADol  50 mg Oral Q6H PRN Baylee Oas, PA-C          Today, Patient Was Seen By: Daniela Gant MD    ** Please Note: Dictation voice to text software may have been used in the creation of this document   **

## 2021-02-18 NOTE — PLAN OF CARE
Problem: PAIN - ADULT  Goal: Verbalizes/displays adequate comfort level or baseline comfort level  Description: Interventions:  - Encourage patient to monitor pain and request assistance  - Assess pain using appropriate pain scale  - Administer analgesics based on type and severity of pain and evaluate response  - Implement non-pharmacological measures as appropriate and evaluate response  - Consider cultural and social influences on pain and pain management  - Notify physician/advanced practitioner if interventions unsuccessful or patient reports new pain  Outcome: Progressing     Problem: INFECTION - ADULT  Goal: Absence or prevention of progression during hospitalization  Description: INTERVENTIONS:  - Assess and monitor for signs and symptoms of infection  - Monitor lab/diagnostic results  - Monitor all insertion sites, i e  indwelling lines, tubes, and drains  - Monitor endotracheal if appropriate and nasal secretions for changes in amount and color  - [place] appropriate cooling/warming therapies per order  - Administer medications as ordered  - Instruct and encourage patient and family to use good hand hygiene technique  - Identify and instruct in appropriate isolation precautions for identified infection/condition  Outcome: Progressing  Goal: Absence of fever/infection during neutropenic period  Description: INTERVENTIONS:  - Monitor WBC    Outcome: Progressing     Problem: Nutrition/Hydration-ADULT  Goal: Nutrient/Hydration intake appropriate for improving, restoring or maintaining nutritional needs  Description: Monitor and assess patient's nutrition/hydration status for malnutrition  Collaborate with interdisciplinary team and initiate plan and interventions as ordered  Monitor patient's weight and dietary intake as ordered or per policy  Utilize nutrition screening tool and intervene as necessary  Determine patient's food preferences and provide high-protein, high-caloric foods as appropriate  INTERVENTIONS:  - Monitor oral intake, urinary output, labs, and treatment plans  - Assess nutrition and hydration status and recommend course of action  - Evaluate amount of meals eaten  - Assist patient with eating if necessary   - Allow adequate time for meals  - Recommend/ encourage appropriate diets, oral nutritional supplements, and vitamin/mineral supplements  - Order, calculate, and assess calorie counts as needed  - Recommend, monitor, and adjust tube feedings and TPN/PPN based on assessed needs  - Assess need for intravenous fluids  - Provide specific nutrition/hydration education as appropriate  - Include patient/family/caregiver in decisions related to nutrition  Outcome: Progressing

## 2021-02-18 NOTE — PLAN OF CARE
Problem: PAIN - ADULT  Goal: Verbalizes/displays adequate comfort level or baseline comfort level  Description: Interventions:  - Encourage patient to monitor pain and request assistance  - Assess pain using appropriate pain scale  - Administer analgesics based on type and severity of pain and evaluate response  - Implement non-pharmacological measures as appropriate and evaluate response  - Consider cultural and social influences on pain and pain management  - Notify physician/advanced practitioner if interventions unsuccessful or patient reports new pain  Outcome: Progressing     Problem: INFECTION - ADULT  Goal: Absence or prevention of progression during hospitalization  Description: INTERVENTIONS:  - Assess and monitor for signs and symptoms of infection  - Monitor lab/diagnostic results  - Monitor all insertion sites, i e  indwelling lines, tubes, and drains  - Monitor endotracheal if appropriate and nasal secretions for changes in amount and color  - [place] appropriate cooling/warming therapies per order  - Administer medications as ordered  - Instruct and encourage patient and family to use good hand hygiene technique  - Identify and instruct in appropriate isolation precautions for identified infection/condition  Outcome: Progressing  Goal: Absence of fever/infection during neutropenic period  Description: INTERVENTIONS:  - Monitor WBC    Outcome: Progressing     Problem: SAFETY ADULT  Goal: Patient will remain free of falls  Description: INTERVENTIONS:  - Assess patient frequently for physical needs  -  Identify cognitive and physical deficits and behaviors that affect risk of falls    -  [place] fall precautions as indicated by assessment   - Educate patient/family on patient safety including physical limitations  - Instruct patient to call for assistance with activity based on assessment  - Modify environment to reduce risk of injury  - Consider OT/PT consult to assist with strengthening/mobility  Outcome: Progressing  Goal: Maintain or return to baseline ADL function  Description: INTERVENTIONS:  -  Assess patient's ability to carry out ADLs; assess patient's baseline for ADL function and identify physical deficits which impact ability to perform ADLs (bathing, care of mouth/teeth, toileting, grooming, dressing, etc )  - Assess/evaluate cause of self-care deficits   - Assess range of motion  - Assess patient's mobility; develop plan if impaired  - Assess patient's need for assistive devices and provide as appropriate  - Encourage maximum independence but intervene and supervise when necessary  - Involve family in performance of ADLs  - Assess for home care needs following discharge   - Consider OT consult to assist with ADL evaluation and planning for discharge  - Provide patient education as appropriate  Outcome: Progressing  Goal: Maintain or return mobility status to optimal level  Description: INTERVENTIONS:  - Assess patient's baseline mobility status (ambulation, transfers, stairs, etc )    - Identify cognitive and physical deficits and behaviors that affect mobility  - Identify mobility aids required to assist with transfers and/or ambulation (gait belt, sit-to-stand, lift, walker, cane, etc )  - [place] fall precautions as indicated by assessment  - Record patient progress and toleration of activity level on Mobility SBAR; progress patient to next Phase/Stage  - Instruct patient to call for assistance with activity based on assessment  - Consider rehabilitation consult to assist with strengthening/weightbearing, etc   Outcome: Progressing     Problem: DISCHARGE PLANNING  Goal: Discharge to home or other facility with appropriate resources  Description: INTERVENTIONS:  - Identify barriers to discharge w/patient and caregiver  - Arrange for needed discharge resources and transportation as appropriate  - Identify discharge learning needs (meds, wound care, etc )  - Arrange for interpretive services to assist at discharge as needed  - Refer to Case Management Department for coordinating discharge planning if the patient needs post-hospital services based on physician/advanced practitioner order or complex needs related to functional status, cognitive ability, or social support system  Outcome: Progressing     Problem: Knowledge Deficit  Goal: Patient/family/caregiver demonstrates understanding of disease process, treatment plan, medications, and discharge instructions  Description: Complete learning assessment and assess knowledge base  Interventions:  - Provide teaching at level of understanding  - Provide teaching via preferred learning methods  Outcome: Progressing     Problem: Nutrition/Hydration-ADULT  Goal: Nutrient/Hydration intake appropriate for improving, restoring or maintaining nutritional needs  Description: Monitor and assess patient's nutrition/hydration status for malnutrition  Collaborate with interdisciplinary team and initiate plan and interventions as ordered  Monitor patient's weight and dietary intake as ordered or per policy  Utilize nutrition screening tool and intervene as necessary  Determine patient's food preferences and provide high-protein, high-caloric foods as appropriate       INTERVENTIONS:  - Monitor oral intake, urinary output, labs, and treatment plans  - Assess nutrition and hydration status and recommend course of action  - Evaluate amount of meals eaten  - Assist patient with eating if necessary   - Allow adequate time for meals  - Recommend/ encourage appropriate diets, oral nutritional supplements, and vitamin/mineral supplements  - Order, calculate, and assess calorie counts as needed  - Recommend, monitor, and adjust tube feedings and TPN/PPN based on assessed needs  - Assess need for intravenous fluids  - Provide specific nutrition/hydration education as appropriate  - Include patient/family/caregiver in decisions related to nutrition  Outcome: Progressing     Problem: Potential for Falls  Goal: Patient will remain free of falls  Description: INTERVENTIONS:  - Assess patient frequently for physical needs  -  Identify cognitive and physical deficits and behaviors that affect risk of falls    -  [place] fall precautions as indicated by assessment   - Educate patient/family on patient safety including physical limitations  - Instruct patient to call for assistance with activity based on assessment  - Modify environment to reduce risk of injury  - Consider OT/PT consult to assist with strengthening/mobility  Outcome: Progressing

## 2021-02-19 NOTE — PLAN OF CARE
Problem: PAIN - ADULT  Goal: Verbalizes/displays adequate comfort level or baseline comfort level  Description: Interventions:  - Encourage patient to monitor pain and request assistance  - Assess pain using appropriate pain scale  - Administer analgesics based on type and severity of pain and evaluate response  - Implement non-pharmacological measures as appropriate and evaluate response  - Consider cultural and social influences on pain and pain management  - Notify physician/advanced practitioner if interventions unsuccessful or patient reports new pain  Outcome: Progressing     Problem: INFECTION - ADULT  Goal: Absence or prevention of progression during hospitalization  Description: INTERVENTIONS:  - Assess and monitor for signs and symptoms of infection  - Monitor lab/diagnostic results  - Monitor all insertion sites, i e  indwelling lines, tubes, and drains  - Monitor endotracheal if appropriate and nasal secretions for changes in amount and color  - [place] appropriate cooling/warming therapies per order  - Administer medications as ordered  - Instruct and encourage patient and family to use good hand hygiene technique  - Identify and instruct in appropriate isolation precautions for identified infection/condition  Outcome: Progressing  Goal: Absence of fever/infection during neutropenic period  Description: INTERVENTIONS:  - Monitor WBC    Outcome: Progressing     Problem: SAFETY ADULT  Goal: Patient will remain free of falls  Description: INTERVENTIONS:  - Assess patient frequently for physical needs  -  Identify cognitive and physical deficits and behaviors that affect risk of falls    -  [place] fall precautions as indicated by assessment   - Educate patient/family on patient safety including physical limitations  - Instruct patient to call for assistance with activity based on assessment  - Modify environment to reduce risk of injury  - Consider OT/PT consult to assist with strengthening/mobility  Outcome: Progressing  Goal: Maintain or return to baseline ADL function  Description: INTERVENTIONS:  -  Assess patient's ability to carry out ADLs; assess patient's baseline for ADL function and identify physical deficits which impact ability to perform ADLs (bathing, care of mouth/teeth, toileting, grooming, dressing, etc )  - Assess/evaluate cause of self-care deficits   - Assess range of motion  - Assess patient's mobility; develop plan if impaired  - Assess patient's need for assistive devices and provide as appropriate  - Encourage maximum independence but intervene and supervise when necessary  - Involve family in performance of ADLs  - Assess for home care needs following discharge   - Consider OT consult to assist with ADL evaluation and planning for discharge  - Provide patient education as appropriate  Outcome: Progressing  Goal: Maintain or return mobility status to optimal level  Description: INTERVENTIONS:  - Assess patient's baseline mobility status (ambulation, transfers, stairs, etc )    - Identify cognitive and physical deficits and behaviors that affect mobility  - Identify mobility aids required to assist with transfers and/or ambulation (gait belt, sit-to-stand, lift, walker, cane, etc )  - [place] fall precautions as indicated by assessment  - Record patient progress and toleration of activity level on Mobility SBAR; progress patient to next Phase/Stage  - Instruct patient to call for assistance with activity based on assessment  - Consider rehabilitation consult to assist with strengthening/weightbearing, etc   Outcome: Progressing     Problem: DISCHARGE PLANNING  Goal: Discharge to home or other facility with appropriate resources  Description: INTERVENTIONS:  - Identify barriers to discharge w/patient and caregiver  - Arrange for needed discharge resources and transportation as appropriate  - Identify discharge learning needs (meds, wound care, etc )  - Arrange for interpretive services to assist at discharge as needed  - Refer to Case Management Department for coordinating discharge planning if the patient needs post-hospital services based on physician/advanced practitioner order or complex needs related to functional status, cognitive ability, or social support system  Outcome: Progressing     Problem: Knowledge Deficit  Goal: Patient/family/caregiver demonstrates understanding of disease process, treatment plan, medications, and discharge instructions  Description: Complete learning assessment and assess knowledge base  Interventions:  - Provide teaching at level of understanding  - Provide teaching via preferred learning methods  Outcome: Progressing     Problem: Nutrition/Hydration-ADULT  Goal: Nutrient/Hydration intake appropriate for improving, restoring or maintaining nutritional needs  Description: Monitor and assess patient's nutrition/hydration status for malnutrition  Collaborate with interdisciplinary team and initiate plan and interventions as ordered  Monitor patient's weight and dietary intake as ordered or per policy  Utilize nutrition screening tool and intervene as necessary  Determine patient's food preferences and provide high-protein, high-caloric foods as appropriate       INTERVENTIONS:  - Monitor oral intake, urinary output, labs, and treatment plans  - Assess nutrition and hydration status and recommend course of action  - Evaluate amount of meals eaten  - Assist patient with eating if necessary   - Allow adequate time for meals  - Recommend/ encourage appropriate diets, oral nutritional supplements, and vitamin/mineral supplements  - Order, calculate, and assess calorie counts as needed  - Recommend, monitor, and adjust tube feedings and TPN/PPN based on assessed needs  - Assess need for intravenous fluids  - Provide specific nutrition/hydration education as appropriate  - Include patient/family/caregiver in decisions related to nutrition  Outcome: Progressing     Problem: Potential for Falls  Goal: Patient will remain free of falls  Description: INTERVENTIONS:  - Assess patient frequently for physical needs  -  Identify cognitive and physical deficits and behaviors that affect risk of falls    -  [place] fall precautions as indicated by assessment   - Educate patient/family on patient safety including physical limitations  - Instruct patient to call for assistance with activity based on assessment  - Modify environment to reduce risk of injury  - Consider OT/PT consult to assist with strengthening/mobility  Outcome: Progressing

## 2021-02-19 NOTE — PROGRESS NOTES
Progress Note - Pulmonary   Denilson Pollock 78 y o  female MRN: [id number]  Unit/Bed#: -01 Encounter: [id number]    Assessment:  1  Shortness of breath  2  COPD/emphysema  3  Chronic hypoxemic respiratory failure  4  Back pain    Plan:  Shortness of breath likely multifactorial due in large part to her back pain/thoracic fractures as well as her COPD/emphysema  Continue off steroids, do not think she is in COPD exacerbation  Breathing improve significantly when her back pain is controlled, ortho is following and she is currently awaiting a back brace  She is currently on her baseline 2 L nasal cannula with sats in the mid 90s, does use 3 L with exertion  Continue with Analia Arellano 4 times per day which has been her home regimen  Would benefit from pulmonary rehab upon discharge  Possible discharge this weekend  We will sign off, please re-consult if necessary  She can follow up with us in the office after discharge  Subjective:   Patient resting in bed  She is currently having significant back pain  Does feel that her breathing improves when her back pain is controlled  Objective:     Vitals: Blood pressure 115/52, pulse 77, temperature 98 2 °F (36 8 °C), resp  rate 19, height 5' 2" (1 575 m), weight 77 1 kg (169 lb 15 6 oz), SpO2 93 %  ,Body mass index is 31 09 kg/m²        Intake/Output Summary (Last 24 hours) at 2/19/2021 1104  Last data filed at 2/19/2021 7812  Gross per 24 hour   Intake 240 ml   Output 1400 ml   Net -1160 ml       Invasive Devices     Peripheral Intravenous Line            Peripheral IV 02/18/21 Left Forearm 1 day                Physical Exam: /52   Pulse 77   Temp 98 2 °F (36 8 °C)   Resp 19   Ht 5' 2" (1 575 m)   Wt 77 1 kg (169 lb 15 6 oz)   SpO2 93%   BMI 31 09 kg/m²   General appearance: alert and oriented, in no acute distress  Head: Normocephalic, without obvious abnormality, atraumatic  Eyes: PERRL  Lungs: diminished breath sounds  Heart: regular rate and rhythm and S1, S2 normal  Abdomen: normal findings: soft, non-tender  Extremities: No edema  Skin: Warm and dry  Neurologic: Mental status: Alert, oriented, thought content appropriate     Labs: I have personally reviewed pertinent lab results  , CBC:   Lab Results   Component Value Date    WBC 10 34 (H) 02/19/2021    HGB 10 5 (L) 02/19/2021    HCT 32 9 (L) 02/19/2021    MCV 91 02/19/2021     02/19/2021    MCH 29 0 02/19/2021    MCHC 31 9 02/19/2021    RDW 17 2 (H) 02/19/2021    MPV 9 6 02/19/2021    NRBC 0 02/19/2021   , CMP:   Lab Results   Component Value Date    SODIUM 141 02/19/2021    K 4 1 02/19/2021     02/19/2021    CO2 33 (H) 02/19/2021    BUN 30 (H) 02/19/2021    CREATININE 1 09 02/19/2021    CALCIUM 10 3 (H) 02/19/2021    EGFR 47 02/19/2021     Imaging and other studies: I have personally reviewed pertinent reports     and I have personally reviewed pertinent films in PACS

## 2021-02-19 NOTE — CASE MANAGEMENT
Cm called patient's son Sharri Lam, but her DIL reported that he was not home  DIL reported she will discuss STR rec  DIL to follow up with patient's son and dtr  DIL prefers [facility] or [facility] if possible  Cm sent referral to determine if patient is acute appropriate  Cm department will continue to follow patient through discharge

## 2021-02-19 NOTE — ASSESSMENT & PLAN NOTE
· Creatinine 1 39 on admission   Baseline 0 9-1 05  · Likely pre-renal 2/2 reported poor oral intake/dehydration  · Avoid hypotension and nephrotoxic agents  · Hold home dose Demadex and Valsartan  · Now improving, 1 09 today

## 2021-02-19 NOTE — PLAN OF CARE
Problem: PHYSICAL THERAPY ADULT  Goal: Performs mobility at highest level of function for planned discharge setting  See evaluation for individualized goals  Description: Treatment/Interventions: Functional transfer training, LE strengthening/ROM, Elevations, Therapeutic exercise, Endurance training, Patient/family training, Equipment eval/education, Bed mobility, Gait training, Spoke to nursing, OT          See flowsheet documentation for full assessment, interventions and recommendations  Outcome: Not Progressing  Note: Prognosis: Good  Problem List: Decreased strength, Decreased endurance, Impaired balance, Decreased mobility, Pain, Orthopedic restrictions  Assessment: Pt seen for PT treatment session this date with interventions consisting of gait training w/ emphasis on improving pt's ability to ambulate level surfaces x 25 ft  with min A provided by therapist with RW and therapeutic activity consisting of training: bed mobility, supine to sit transfers and sit<>stand transfers  Pt agreeable to PT treatment session upon arrival, pt found supine in bed w/ HOB elevated, in no apparent distress, A&O x 3 and responsive  In comparison to previous session, pt with no improvements as evidenced by pt requiring min-mod A of 1 to complete all functional tasks safely; limited by ongoing back pain and shortness of breath with minimal exertion  Post session: pt returned back to recliner, chair alarm engaged, all needs in reach and RN notified of session findings/recommendations  Recommend STR at time of d/c in order to maximize pt's functional independence and safety w/ mobility  Pt continues to be functioning below baseline level, and remains limited 2* factors listed above  PT will continue to see pt while here in order to address the deficits listed above and provide interventions consistent w/ POC in effort to achieve STGs    Barriers to Discharge: Decreased caregiver support, Inaccessible home environment     PT Discharge Recommendation: Post-Acute Rehabilitation Services     PT - OK to Discharge: Yes(when medically cleared; if to STR)    See flowsheet documentation for full assessment

## 2021-02-19 NOTE — ASSESSMENT & PLAN NOTE
Lab Results   Component Value Date    HGBA1C 7 1 (H) 10/12/2020       Recent Labs     02/18/21  1606 02/18/21  2104 02/19/21  0825 02/19/21  1124   POCGLU 123 111 118 92       Blood Sugar Average: Last 72 hrs:  (P) 124 7   · Hold home dose Glyburide while inpt  · FSBS AC & HS w/ SSI  · Diabetic diet

## 2021-02-19 NOTE — CASE MANAGEMENT
Cm met with patient at bedside to discuss STR recommendation  Patient reported that she would like to discuss with her son and dtr first  Patient will provide CM with a decision tomorrow  Cm printed list of facilities within 30 mile radius and left at patient's bedside to review when he visits later today  Cm department will continue to follow patient through discharge

## 2021-02-19 NOTE — PLAN OF CARE
Problem: OCCUPATIONAL THERAPY ADULT  Goal: Performs self-care activities at highest level of function for planned discharge setting  See evaluation for individualized goals  Description: Treatment Interventions: ADL retraining, Functional transfer training, UE strengthening/ROM, Endurance training, Equipment evaluation/education, Patient/family training, Compensatory technique education, Continued evaluation, Energy conservation, Activityengagement          See flowsheet documentation for full assessment, interventions and recommendations  Note: Limitation: Decreased ADL status, Decreased UE strength, Decreased endurance, Decreased self-care trans, Decreased high-level ADLs  Prognosis: Good  Assessment: Patient participated in Skilled OT session this date with interventions consisting of ADL re training with the use of correct body mechnaics, safety awareness and fall prevention techniques,  therapeutic activities to: increase activity tolerance, increase cardiovascular endurance , increase dynamic sit/ stand balance during functional activity  and increase OOB/ sitting tolerance   Patient agreeable to OT treatment session, upon arrival patient was found supine in bed, alert and responsive   Patient completed bed mobility training with the  Use of back safety precautions, transfer training and functional ambulation in room  Patient was fitted for TLSO brace by PT, patient will require further training with donning/ doffing of brace maintaining back/ safety precautions  Patient requiring verbal cues for safety and verbal cues for correct technique  Patient continues to be functioning below baseline level, occupational performance remains limited secondary to factors listed above and increased risk for falls and injury  From OT standpoint, recommendation at time of d/c would be Short Term Rehab     Patient to benefit from continued Occupational Therapy treatment while in the hospital to address deficits as defined above and maximize level of functional independence with ADLs and functional mobility        OT Discharge Recommendation: Post-Acute Rehabilitation Services

## 2021-02-19 NOTE — ASSESSMENT & PLAN NOTE
· Patient was given IV Solu-Medrol initially  Continue nebulization  · Pulmonary evaluation appreciated    Solu-Medrol discontinued now  · Respiratory status at her baseline now

## 2021-02-19 NOTE — PHYSICAL THERAPY NOTE
Physical Therapy Treatment Note       02/19/21 1425   PT Last Visit   PT Visit Date 02/19/21   Note Type   Note Type Treatment   Pain Assessment   Pain Assessment Tool 0-10   Pain Score 6   Pain Location/Orientation Orientation: Bilateral;Orientation: Upper;Orientation: Mid;Orientation: Lower; Location: Back   Pain Onset/Description Onset: Ongoing   Patient's Stated Pain Goal No pain   Hospital Pain Intervention(s) Repositioned; Ambulation/increased activity   Multiple Pain Sites No   Restrictions/Precautions   Weight Bearing Precautions Per Order No   Braces or Orthoses TLSO    Reason for Consult:  Order placed for TLSO brace per SLIM on 2/18/2021  CT spine thoracic & lumbar wo contrast (2/18/2021): IMPRESSION: Thoracic compression fractures from T5 to T7 without bony retropulsion  Fractures could be acute/subacute  Degenerative spondylosis as below  Recommendations:  Pt sized w/ Horizon 456 TLSO brace while seated at EOB, see PT treatment for assessment findings  Pt educated on brace components, wearing schedule, donning/doffing, skin inspection  Educated that brace may need to be repositioned throughout the day  Pt able to verbalize understanding w/ all components, however, reports will require help at discharge  Other Precautions Chair Alarm; Bed Alarm; Fall Risk;Pain;O2;Multiple lines  (3L O2 NC; back safety precaution; log roll)   General   Chart Reviewed Yes   Response to Previous Treatment Patient with no complaints from previous session  Family/Caregiver Present No   Cognition   Overall Cognitive Status WFL   Arousal/Participation Alert; Responsive; Cooperative   Attention Within functional limits   Orientation Level Oriented to person;Oriented to place;Oriented to situation   Memory Within functional limits   Following Commands Follows all commands and directions without difficulty   Comments patient agreeable to PT treatment   Subjective   Subjective "My legs are weak"   Bed Mobility   Rolling L 4 Minimal assistance  (log roll technique)   Additional items Assist x 1; Increased time required;Verbal cues;LE management;HOB elevated; Bedrails   Supine to Sit 3  Moderate assistance  (log roll technique)   Additional items Assist x 1; Increased time required;Verbal cues;LE management;HOB elevated   Additional Comments provided pt education on back safety precautions   Transfers   Sit to Stand 4  Minimal assistance   Additional items Assist x 1; Increased time required;Verbal cues   Stand to Sit 4  Minimal assistance   Additional items Assist x 1; Increased time required;Verbal cues;Armrests   Ambulation/Elevation   Gait pattern Excessively slow; Step to;Short stride;Decreased foot clearance   Gait Assistance 4  Minimal assist   Additional items Assist x 1;Verbal cues   Assistive Device Rolling walker   Distance 25 ft  Stair Management Assistance Not tested   Balance   Static Sitting Fair +   Dynamic Sitting Fair   Static Standing Fair -   Dynamic Standing Poor +   Ambulatory Poor +   Endurance Deficit   Endurance Deficit Yes   Activity Tolerance   Activity Tolerance Patient limited by pain; Patient limited by fatigue   Medical Staff Made Aware OT Rheta Remedies; CM Colten; Dr Carlos Fraser confirmed patient appropriate for therapy; made aware of session outcomes   Assessment   Prognosis Good   Problem List Decreased strength;Decreased endurance; Impaired balance;Decreased mobility;Pain;Orthopedic restrictions   Assessment Pt seen for PT treatment session this date with interventions consisting of gait training w/ emphasis on improving pt's ability to ambulate level surfaces x 25 ft  with min A provided by therapist with RW and therapeutic activity consisting of training: bed mobility, supine to sit transfers and sit<>stand transfers  Pt agreeable to PT treatment session upon arrival, pt found supine in bed w/ HOB elevated, in no apparent distress, A&O x 3 and responsive   In comparison to previous session, pt with no improvements as evidenced by pt requiring min-mod A of 1 to complete all functional tasks safely; limited by ongoing back pain and shortness of breath with minimal exertion  Post session: pt returned back to recliner, chair alarm engaged, all needs in reach and RN notified of session findings/recommendations  Recommend STR at time of d/c in order to maximize pt's functional independence and safety w/ mobility  Pt continues to be functioning below baseline level, and remains limited 2* factors listed above  PT will continue to see pt while here in order to address the deficits listed above and provide interventions consistent w/ POC in effort to achieve STGs  Barriers to Discharge Decreased caregiver support; Inaccessible home environment   Goals   Patient Goals to be in less pain   STG Expiration Date 02/27/21   PT Treatment Day 1   Plan   Treatment/Interventions Functional transfer training;LE strengthening/ROM; Elevations; Therapeutic exercise; Endurance training;Patient/family training;Equipment eval/education; Bed mobility;Gait training;Spoke to MD;Spoke to nursing;Spoke to case management;OT   Progress No functional improvements   PT Frequency Other (Comment)  (3-5x/wk)   Recommendation   PT Discharge Recommendation Post-Acute Rehabilitation Services   PT - OK to Discharge Yes  (when medically cleared; if to STR)   Marcos 8 in Bed Without Bedrails 3   Lying on Back to Sitting on Edge of Flat Bed 2   Moving Bed to Chair 3   Standing Up From Chair 3   Walk in Room 3   Climb 3-5 Stairs 2   Basic Mobility Inpatient Raw Score 16   Basic Mobility Standardized Score 38 32       Galina Robert, PT, DPT    Time of PT treatment session: 13:55-14:25  30 minutes

## 2021-02-19 NOTE — ASSESSMENT & PLAN NOTE
CT thoracolumbar spine   IMPRESSION:  Thoracic compression fractures from T5 to T7 without bony retropulsion  Fractures could be acute/subacute      Degenerative spondylosis as below  No focal neurological deficit noted  Discussed with orthopedics and neurosurgery team   Recommended brace and PT/OT  Pain management  Physical therapy recommended short-term rehab  Discussed with case management  English

## 2021-02-19 NOTE — PLAN OF CARE
Problem: PAIN - ADULT  Goal: Verbalizes/displays adequate comfort level or baseline comfort level  Description: Interventions:  - Encourage patient to monitor pain and request assistance  - Assess pain using appropriate pain scale  - Administer analgesics based on type and severity of pain and evaluate response  - Implement non-pharmacological measures as appropriate and evaluate response  - Consider cultural and social influences on pain and pain management  - Notify physician/advanced practitioner if interventions unsuccessful or patient reports new pain  Outcome: Progressing     Problem: INFECTION - ADULT  Goal: Absence or prevention of progression during hospitalization  Description: INTERVENTIONS:  - Assess and monitor for signs and symptoms of infection  - Monitor lab/diagnostic results  - Monitor all insertion sites, i e  indwelling lines, tubes, and drains  - Monitor endotracheal if appropriate and nasal secretions for changes in amount and color  - [place] appropriate cooling/warming therapies per order  - Administer medications as ordered  - Instruct and encourage patient and family to use good hand hygiene technique  - Identify and instruct in appropriate isolation precautions for identified infection/condition  Outcome: Progressing     Problem: SAFETY ADULT  Goal: Patient will remain free of falls  Description: INTERVENTIONS:  - Assess patient frequently for physical needs  -  Identify cognitive and physical deficits and behaviors that affect risk of falls    -  [place] fall precautions as indicated by assessment   - Educate patient/family on patient safety including physical limitations  - Instruct patient to call for assistance with activity based on assessment  - Modify environment to reduce risk of injury  - Consider OT/PT consult to assist with strengthening/mobility  Outcome: Progressing     Problem: Knowledge Deficit  Goal: Patient/family/caregiver demonstrates understanding of disease process, treatment plan, medications, and discharge instructions  Description: Complete learning assessment and assess knowledge base  Interventions:  - Provide teaching at level of understanding  - Provide teaching via preferred learning methods  Outcome: Progressing     Problem: Potential for Falls  Goal: Patient will remain free of falls  Description: INTERVENTIONS:  - Assess patient frequently for physical needs  -  Identify cognitive and physical deficits and behaviors that affect risk of falls    -  [place] fall precautions as indicated by assessment   - Educate patient/family on patient safety including physical limitations  - Instruct patient to call for assistance with activity based on assessment  - Modify environment to reduce risk of injury  - Consider OT/PT consult to assist with strengthening/mobility  Outcome: Progressing

## 2021-02-19 NOTE — PROGRESS NOTES
Progress Note - Bing Elliott 1940, 78 y o  female MRN: [id number]    Unit/Bed#: -01 Encounter: [id number]    Primary Care Provider: Ming Hernandez MD   Date and time admitted to hospital: 2/17/2021 12:29 AM        * Thoracic compression fracture [facility])  Assessment & Plan  CT thoracolumbar spine   IMPRESSION:  Thoracic compression fractures from T5 to T7 without bony retropulsion  Fractures could be acute/subacute      Degenerative spondylosis as below  No focal neurological deficit noted  Discussed with orthopedics and neurosurgery team   Recommended brace and PT/OT  Pain management  Physical therapy recommended short-term rehab  Discussed with case management  UTI (urinary tract infection)  Assessment & Plan  Growing E coli pansensitive  Finished 3 days of IV ceftriaxone  Moderate protein-calorie malnutrition ([facility] [address] )  Assessment & Plan  Malnutrition Findings:           BMI Findings: Body mass index is 31 09 kg/m²  Moderate protein-calorie malnutrition, in the setting of severe back pain, COPD, and VICKY d/t poor po intake, as evidenced by 15 05 lb/8 14% weight loss in three months, requiring Carb control diet with glue Phillips supplements, monitoring of PO intake, and assistance with meals as needed  Nutritionist consult    Chronic respiratory failure with hypoxia [facility])  Assessment & Plan  Ongoing  Continue supplemental oxygen  At home she is on 3-4 L oxygen at baseline  Secondary to COPD    VICKY (acute kidney injury) ([facility] [address] )  Assessment & Plan  · Creatinine 1 39 on admission   Baseline 0 9-1 05  · Likely pre-renal 2/2 reported poor oral intake/dehydration  · Avoid hypotension and nephrotoxic agents  · Hold home dose Demadex and Valsartan  · Now improving, 1 09 today    Chronic diastolic heart failure (HCC)  Assessment & Plan  Wt Readings from Last 3 Encounters:   02/17/21 77 1 kg (169 lb 15 6 oz)   02/11/21 77 1 kg (170 lb)   01/12/21 77 5 kg (170 lb 12 8 oz)     · Does not appear acutely volume overloaded on exam  · Home dose Demadex on hold 2/2 VICKY as above  · Daily weight and I/O        Chronic bilateral thoracic back pain  Assessment & Plan  · Now has acute fracture  See above    SOB (shortness of breath)  Assessment & Plan  · Patient was evaluated by Pulmonary  · Lungs clear  Unlikely COPD exacerbation  · Initially started on IV Solu-Medrol which discontinued  · Monitor off steroid  · Now patient has acute thoracic vertebral compression fracture  Management for fracture and pain control  · Patient at her baseline oxygen requirement    Hypothyroidism  Assessment & Plan  · Continue home dose Levothyroxine    Hyperlipidemia  Assessment & Plan  · Continue home dose Lipitor    Type 2 diabetes mellitus with complication, without long-term current use of insulin [facility])  Assessment & Plan  Lab Results   Component Value Date    HGBA1C 7 1 (H) 10/12/2020       Recent Labs     02/18/21  1606 02/18/21  2104 02/19/21  0825 02/19/21  1124   POCGLU 123 111 118 92       Blood Sugar Average: Last 72 hrs:  (P) 124 7   · Hold home dose Glyburide while inpt  · FSBS AC & HS w/ SSI  · Diabetic diet    COPD exacerbation ([address] )  Assessment & Plan  · Patient was given IV Solu-Medrol initially  Continue nebulization  · Pulmonary evaluation appreciated  Solu-Medrol discontinued now  · Respiratory status at her baseline now        VTE Pharmacologic Prophylaxis:   Pharmacologic: Heparin  Mechanical VTE Prophylaxis in Place: Yes    Patient Centered Rounds: I have performed bedside rounds with nursing staff today  Discussions with Specialists or Other Care Team Provider:  Case management    Education and Discussions with Family / Patient:  Patient    Time Spent for Care: More than 50% of total time spent on counseling and coordination of care as described above      Current Length of Stay: 1 day(s)    Current Patient Status: Inpatient   Certification Statement: The patient will continue to require additional inpatient hospital stay due to See above    Discharge Plan:  24-48 hours, needs short-term rehab    Code Status: Level 1 - Full Code      Subjective:   I have seen and examined the patient bedside this morning  Still complaining about back pain  No weakness, numbness or tingling  No bowel or bladder incontinence  Shortness of breath at her baseline  Objective:     Vitals:   Temp (24hrs), Av 2 °F (36 8 °C), Min:98 1 °F (36 7 °C), Max:98 2 °F (36 8 °C)    Temp:  [98 1 °F (36 7 °C)-98 2 °F (36 8 °C)] 98 2 °F (36 8 °C)  Resp:  [19] 19  BP: (110-115)/(52) 115/52  SpO2:  [93 %-99 %] 95 %  Body mass index is 31 09 kg/m²  Input and Output Summary (last 24 hours):        Intake/Output Summary (Last 24 hours) at 2021 1524  Last data filed at 2021 1235  Gross per 24 hour   Intake 480 ml   Output 1400 ml   Net -920 ml       Physical Exam:     Physical Exam  General- Awake, alert and oriented x 3, looks uncomfortable due to pain  HEENT- Normocephalic, atraumatic  CVS- Normal S1/ S2, Regular rate and rhythm  Respiratory system- B/L clear breath sounds  Abdomen- Soft, Non distended, no tenderness, Bowel sound- present  Musculoskeletal- back pain  CNS- No acute focal neurologic deficit noted    Additional Data:     Labs:    Results from last 7 days   Lab Units 21  0515   WBC Thousand/uL 10 34*   HEMOGLOBIN g/dL 10 5*   HEMATOCRIT % 32 9*   PLATELETS Thousands/uL 183   NEUTROS PCT % 65   LYMPHS PCT % 24   MONOS PCT % 8   EOS PCT % 2     Results from last 7 days   Lab Units 21  0515  21  0056   SODIUM mmol/L 141   < > 137   POTASSIUM mmol/L 4 1   < > 4 2   CHLORIDE mmol/L 104   < > 95*   CO2 mmol/L 33*   < > 34*   BUN mg/dL 30*   < > 30*   CREATININE mg/dL 1 09   < > 1 39*   ANION GAP mmol/L 4   < > 8   CALCIUM mg/dL 10 3*   < > 10 6*   ALBUMIN g/dL  --   --  3 4*   TOTAL BILIRUBIN mg/dL  --   --  0 40   ALK PHOS U/L  --   --  133*   ALT U/L  --   --  26   AST U/L  --   --  29 GLUCOSE RANDOM mg/dL 83   < > 112    < > = values in this interval not displayed  Results from last 7 days   Lab Units 02/19/21  1124 02/19/21  0825 02/18/21  2104 02/18/21  1606 02/18/21  1126 02/18/21  0714 02/17/21  2059 02/17/21  1635 02/17/21  1057 02/17/21  0719   POC GLUCOSE mg/dl 92 118 111 123 114 126 193* 192* 104 74         Results from last 7 days   Lab Units 02/18/21  0859 02/17/21  1159   PROCALCITONIN ng/ml 0 18 0 22           * I Have Reviewed All Lab Data Listed Above  * Additional Pertinent Lab Tests Reviewed:  All Labs Within Last 24 Hours Reviewed    Imaging:    Imaging Reports Reviewed Today Include:   Imaging Personally Reviewed by Myself Includes:      Recent Cultures (last 7 days):     Results from last 7 days   Lab Units 02/17/21  1421   URINE CULTURE  >100,000 cfu/ml Escherichia coli*       Last 24 Hours Medication List:   Current Facility-Administered Medications   Medication Dose Route Frequency Provider Last Rate    acetaminophen  975 mg Oral Q6H [facility] Jeffery Adams PA-C      albuterol  2 puff Inhalation Q6H PRN Jeffery Adams PA-C      aluminum-magnesium hydroxide-simethicone  30 mL Oral Q6H PRN Jeffery Adams PA-C      atorvastatin  80 mg Oral Daily [facility] [place]      benzonatate  100 mg Oral TID PRN Jeffery Adams PA-C      fluticasone-vilanterol  1 puff Inhalation Daily [facility] [place]      guaiFENesin  600 mg Oral Q12H [facility] Jeffery Adams PA-C      heparin (porcine)  5,000 Units Subcutaneous [facility] [place]      HYDROmorphone  0 5 mg Intravenous Q4H PRN Leonel Brush MD      insulin lispro  1-6 Units Subcutaneous TID AC Mariluz Dangelo PA-C      insulin lispro  1-6 Units Subcutaneous HS Mariluz Dangelo PA-C      ipratropium  0 5 mg Nebulization TID Leonel Brush MD      levalbuterol  1 25 mg Nebulization TID Leonel Brush MD      levothyroxine  125 mcg Oral Daily Jeffery Adams PA-C      lidocaine  2 patch Topical Daily Salas Connors      melatonin  6 mg Oral HS PRN Ethan Poole PA-C      methocarbamol  500 mg Oral Q6H PRN Ethan Poole PA-C      montelukast  10 mg Oral HS Salas Connors      polyethylene glycol  17 g Oral Daily Salas Connors      saccharomyces boulardii  250 mg Oral BID Edi Byers MD      senna  1 tablet Oral HS Ethan Poole PA-C      traMADol  50 mg Oral Q6H PRN Ethan Poole PA-C          Today, Patient Was Seen By: Santo Cm MD    ** Please Note: Dictation voice to text software may have been used in the creation of this document   **

## 2021-02-19 NOTE — OCCUPATIONAL THERAPY NOTE
Occupational Therapy Treatment Note        Patient Name: Rosalva Morales  TTNVP'M Date: 2/19/2021 02/19/21 1355   OT Last Visit   OT Visit Date 02/19/21   Note Type   Note Type Treatment   Restrictions/Precautions   Weight Bearing Precautions Per Order No   Braces or Orthoses TLSO  (fitted for TLSO by PT this session)   Other Precautions Chair Alarm; Bed Alarm; Fall Risk;Pain;O2;Multiple lines   Pain Assessment   Pain Assessment Tool 0-10   Pain Score 6   Pain Location/Orientation Orientation: Bilateral;Orientation: Upper;Orientation: Mid;Orientation: Lower; Location: Back   Pain Onset/Description Onset: Ongoing   Patient's Stated Pain Goal No pain   Hospital Pain Intervention(s) Repositioned; Ambulation/increased activity   Multiple Pain Sites No   Bed Mobility   Rolling L 4  Minimal assistance  (log roll technique for back safety)   Additional items Assist x 1; Increased time required;Verbal cues;LE management;HOB elevated; Bedrails   Supine to Sit 3  Moderate assistance   Additional items Assist x 1; Increased time required;Verbal cues;LE management;HOB elevated   Additional Comments provided pt education on back safety precautions   Transfers   Sit to Stand 4  Minimal assistance   Additional items Assist x 1; Increased time required;Verbal cues   Stand to Sit 4  Minimal assistance   Additional items Increased time required;Verbal cues;Armrests   Additional Comments transfer training to/ from bed / functional ambulation in room  Functional Mobility   Functional Mobility 4  Minimal assistance   Additional Comments short distances/ frequent rest periods   Cognition   Overall Cognitive Status [facility]   Arousal/Participation Alert; Responsive; Cooperative   Attention Within functional limits   Orientation Level Oriented X4   Memory Within functional limits   Following Commands Follows all commands and directions without difficulty   Activity Tolerance   Activity Tolerance Patient limited by pain; Patient limited by fatigue   Assessment   Assessment Patient participated in Skilled OT session this date with interventions consisting of ADL re training with the use of correct body mechnaics, safety awareness and fall prevention techniques,  therapeutic activities to: increase activity tolerance, increase cardiovascular endurance , increase dynamic sit/ stand balance during functional activity  and increase OOB/ sitting tolerance   Patient agreeable to OT treatment session, upon arrival patient was found supine in bed, alert and responsive   Patient completed bed mobility training with the  Use of back safety precautions, transfer training and functional ambulation in room  Patient was fitted for TLSO brace by PT, patient will require further training with donning/ doffing of brace maintaining back/ safety precautions  Patient requiring verbal cues for safety and verbal cues for correct technique  Patient continues to be functioning below baseline level, occupational performance remains limited secondary to factors listed above and increased risk for falls and injury  From OT standpoint, recommendation at time of d/c would be Short Term Rehab  Patient to benefit from continued Occupational Therapy treatment while in the hospital to address deficits as defined above and maximize level of functional independence with ADLs and functional mobility  Plan   Treatment Interventions ADL retraining;Functional transfer training;UE strengthening/ROM; Endurance training;Patient/family training;Equipment evaluation/education; Compensatory technique education;Continued evaluation; Energy conservation; Activityengagement   Recommendation   OT Discharge Recommendation Post-Acute Rehabilitation Services   AM-PAC Daily Activity Inpatient   Lower Body Dressing 2   Bathing 2   Toileting 2   Upper Body Dressing 3   Grooming 3   Eating 4   Daily Activity Raw Score 16   Daily Activity Standardized Score (Calc for Raw Score >=11) 35 96 AM-PAC Applied Cognition Inpatient   Following a Speech/Presentation 4   Understanding Ordinary Conversation 4   Taking Medications 4   Remembering Where Things Are Placed or Put Away 4   Remembering List of 4-5 Errands 4   Taking Care of Complicated Tasks 4   Applied Cognition Raw Score 24   Applied Cognition Standardized Score 62 21

## 2021-02-20 NOTE — CASE MANAGEMENT
Cm met with patient's dtr, granddaughter, and great granddaughter at bedside to discuss STR recommendation  Patient reported that she would like her family to discuss  At this time, they have not decided  Dtr reported that patient's son Bharath Monzon is on his way and will have a decision then  Cm will check back when son arrives  Cm department will continue to follow patient through discharge

## 2021-02-20 NOTE — ASSESSMENT & PLAN NOTE
CT thoracolumbar spine   IMPRESSION:  Thoracic compression fractures from T5 to T7 without bony retropulsion  Fractures could be acute/subacute      Degenerative spondylosis as below  No focal neurological deficit noted  Discussed with orthopedics and neurosurgery team   Recommended brace and PT/OT  Pain management  Physical therapy recommended acute versus short-term rehab  Discussed with case management    Patient waiting for MRI now

## 2021-02-20 NOTE — ASSESSMENT & PLAN NOTE
Wt Readings from Last 3 Encounters:   02/20/21 77 2 kg (170 lb 3 1 oz)   02/20/21 77 kg (169 lb 12 1 oz)   02/11/21 77 1 kg (170 lb)     · Patient euvolemic now  · Restart torsemide home dose

## 2021-02-20 NOTE — ASSESSMENT & PLAN NOTE
Lab Results   Component Value Date    HGBA1C 7 1 (H) 10/12/2020       Recent Labs     02/19/21 1928 02/19/21 2059 02/20/21  0644 02/20/21  1137   POCGLU 183* 135 96 142*       Blood Sugar Average: Last 72 hrs:  (P) 127 8   · Hold home dose Glyburide while inpt  · FSBS AC & HS w/ SSI  · Diabetic diet

## 2021-02-20 NOTE — PROGRESS NOTES
Progress Note - Edgar Romano 1940, 78 y o  female MRN: [id number]    Unit/Bed#: -01 Encounter: [id number]    Primary Care Provider: Yakelin Monsalve MD   Date and time admitted to hospital: 2/17/2021 12:29 AM        * Thoracic compression fracture [facility])  Assessment & Plan  CT thoracolumbar spine   IMPRESSION:  Thoracic compression fractures from T5 to T7 without bony retropulsion  Fractures could be acute/subacute      Degenerative spondylosis as below  No focal neurological deficit noted  Discussed with orthopedics and neurosurgery team   Recommended brace and PT/OT  Pain management  Physical therapy recommended acute versus short-term rehab  Discussed with case management  Patient waiting for MRI now    UTI (urinary tract infection)  Assessment & Plan  Growing E coli pansensitive  Finished 3 days of IV ceftriaxone  Moderate protein-calorie malnutrition ([facility] [address] )  Assessment & Plan  Malnutrition Findings:           BMI Findings: Body mass index is 31 13 kg/m²  Moderate protein-calorie malnutrition, in the setting of severe back pain, COPD, and VICKY d/t poor po intake, as evidenced by 15 05 lb/8 14% weight loss in three months, requiring Carb control diet with glue Phillips supplements, monitoring of PO intake, and assistance with meals as needed  Nutritionist consult    Chronic respiratory failure with hypoxia [facility])  Assessment & Plan  Ongoing  Continue supplemental oxygen  At home she is on 3-4 L oxygen at baseline  Secondary to COPD    VICKY (acute kidney injury) ([facility] [address] )  Assessment & Plan  · Creatinine 1 39 on admission  Baseline 0 9-1 05  · Likely pre-renal 2/2 reported poor oral intake/dehydration  · Avoid hypotension and nephrotoxic agents  · Creatinine at baseline now    Torsemide restarted    Chronic diastolic heart failure [facility])  Assessment & Plan  Wt Readings from Last 3 Encounters:   02/20/21 77 2 kg (170 lb 3 1 oz)   02/20/21 77 kg (169 lb 12 1 oz)   02/11/21 77 1 kg (170 lb)     · Patient euvolemic now  · Restart torsemide home dose        Chronic bilateral thoracic back pain  Assessment & Plan  · Now has acute fracture  See above    SOB (shortness of breath)  Assessment & Plan  · Patient was evaluated by Pulmonary  · Lungs clear  Unlikely COPD exacerbation  · Initially started on IV Solu-Medrol which discontinued  · Monitor off steroid  · Now patient has acute thoracic vertebral compression fracture  Management for fracture and pain control  · Patient at her baseline oxygen requirement    Hypothyroidism  Assessment & Plan  · Continue home dose Levothyroxine    Hyperlipidemia  Assessment & Plan  · Continue home dose Lipitor    Type 2 diabetes mellitus with complication, without long-term current use of insulin [facility])  Assessment & Plan  Lab Results   Component Value Date    HGBA1C 7 1 (H) 10/12/2020       Recent Labs     02/19/21  1928 02/19/21  2059 02/20/21  0644 02/20/21  1137   POCGLU 183* 135 96 142*       Blood Sugar Average: Last 72 hrs:  (P) 127 8   · Hold home dose Glyburide while inpt  · FSBS AC & HS w/ SSI  · Diabetic diet    COPD exacerbation (HCC)  Assessment & Plan  · Patient was given IV Solu-Medrol initially  Continue nebulization  · Pulmonary evaluation appreciated  Solu-Medrol discontinued now  · Respiratory status at her baseline now        VTE Pharmacologic Prophylaxis:   Pharmacologic: Heparin  Mechanical VTE Prophylaxis in Place: Yes    Patient Centered Rounds: I have performed bedside rounds with nursing staff today  Discussions with Specialists or Other Care Team Provider:     Education and Discussions with Family / Patient:  Patient    Time Spent for Care: More than 50% of total time spent on counseling and coordination of care as described above      Current Length of Stay: 2 day(s)    Current Patient Status: Inpatient   Certification Statement: The patient will continue to require additional inpatient hospital stay due to See above    Discharge Plan:  Waiting for rehab    Code Status: Level 1 - Full Code      Subjective:   I have seen and examined the patient bedside this morning  Patient lying on bed  Still complaining about significant back pain  Denies any chest pain or shortness of breath  Afebrile    Objective:     Vitals:   Temp (24hrs), Av 1 °F (36 7 °C), Min:97 9 °F (36 6 °C), Max:98 4 °F (36 9 °C)    Temp:  [97 9 °F (36 6 °C)-98 4 °F (36 9 °C)] 98 1 °F (36 7 °C)  Resp:  [16-17] 16  BP: (122-152)/(54-71) 128/64  SpO2:  [94 %-99 %] 94 %  Body mass index is 31 13 kg/m²  Input and Output Summary (last 24 hours):        Intake/Output Summary (Last 24 hours) at 2021 1605  Last data filed at 2021 1525  Gross per 24 hour   Intake 240 ml   Output 1400 ml   Net -1160 ml       Physical Exam:     Physical Exam  General- Awake, alert and oriented x 3, looks uncomfortable from pain  HEENT- Normocephalic, atraumatic  CVS- Normal S1/ S2, Regular rate and rhythm  Respiratory system- B/L clear breath sounds, no wheezing  Abdomen- Soft, Non distended, no tenderness, Bowel sound- present  Musculoskeletal- thoracic back pain present  CNS-No acute focal neurologic deficit noted    Additional Data:     Labs:    Results from last 7 days   Lab Units 21  0603   WBC Thousand/uL 11 55*   HEMOGLOBIN g/dL 10 6*   HEMATOCRIT % 32 6*   PLATELETS Thousands/uL 177   NEUTROS PCT % 64   LYMPHS PCT % 25   MONOS PCT % 8   EOS PCT % 2     Results from last 7 days   Lab Units 21  0603  21  0056   SODIUM mmol/L 141   < > 137   POTASSIUM mmol/L 4 4   < > 4 2   CHLORIDE mmol/L 104   < > 95*   CO2 mmol/L 32   < > 34*   BUN mg/dL 27*   < > 30*   CREATININE mg/dL 0 99   < > 1 39*   ANION GAP mmol/L 5   < > 8   CALCIUM mg/dL 10 2*   < > 10 6*   ALBUMIN g/dL  --   --  3 4*   TOTAL BILIRUBIN mg/dL  --   --  0 40   ALK PHOS U/L  --   --  133*   ALT U/L  --   --  26   AST U/L  --   --  29   GLUCOSE RANDOM mg/dL 85   < > 112    < > = values in this interval not displayed  Results from last 7 days   Lab Units 02/20/21  1137 02/20/21  0644 02/19/21  2059 02/19/21  1928 02/19/21  1638 02/19/21  1124 02/19/21  0825 02/18/21  2104 02/18/21  1606 02/18/21  1126 02/18/21  0714 02/17/21  2059   POC GLUCOSE mg/dl 142* 96 135 183* 114 92 118 111 123 114 126 193*         Results from last 7 days   Lab Units 02/18/21  0859 02/17/21  1159   PROCALCITONIN ng/ml 0 18 0 22           * I Have Reviewed All Lab Data Listed Above  * Additional Pertinent Lab Tests Reviewed:  All Labs Within Last 24 Hours Reviewed    Imaging:    Imaging Reports Reviewed Today Include:   Imaging Personally Reviewed by Myself Includes:      Recent Cultures (last 7 days):     Results from last 7 days   Lab Units 02/17/21  1421   URINE CULTURE  >100,000 cfu/ml Escherichia coli*       Last 24 Hours Medication List:   Current Facility-Administered Medications   Medication Dose Route Frequency Provider Last Rate    acetaminophen  975 mg Oral Q6H [facility] Jeffery Adams PA-C      albuterol  2 puff Inhalation Q6H PRN Jeffery Adams PA-C      aluminum-magnesium hydroxide-simethicone  30 mL Oral Q6H PRN Jeffery Adams PA-C      atorvastatin  80 mg Oral Daily [facility] [place]      benzonatate  100 mg Oral TID PRN Jeffery Adams PA-C      fluticasone-vilanterol  1 puff Inhalation Daily [facility] [place]      guaiFENesin  600 mg Oral Q12H [facility] Jeffery Adams PA-C      heparin (porcine)  5,000 Units Subcutaneous [place], [place]      HYDROmorphone  0 5 mg Intravenous Q4H PRN Leonel Brush MD      insulin lispro  1-6 Units Subcutaneous TID AC Mariluz Dangelo PA-C      insulin lispro  1-6 Units Subcutaneous HS Mariluz Dangelo PA-C      ipratropium  0 5 mg Nebulization TID Leonel Brush MD      levalbuterol  1 25 mg Nebulization TID Leonel Brush MD      levothyroxine  125 mcg Oral Daily Jeffery Adams PA-C      lidocaine  2 patch Topical Daily Jeffery Adams PA-C      melatonin  6 mg Oral HS PRN Florie Plush, PA-DIYA      methocarbamol  500 mg Oral Q6H PRN Florie Plush, PA-DIYA      montelukast  10 mg Oral HS Florie [place], PA-C      polyethylene glycol  17 g Oral Daily [facility] [place], [place]      saccharomyces boulardii  250 mg Oral BID Shelia Frank MD      senna  1 tablet Oral HS Florie [place], RANJAN      torsemide  10 mg Oral Daily Shelia Frank MD      traMADol  50 mg Oral Q6H PRN Alec Plush, RANJAN          Today, Patient Was Seen By: Anita Girard MD    ** Please Note: Dictation voice to text software may have been used in the creation of this document   **

## 2021-02-20 NOTE — CONSULTS
Orthopedics   Jg Erickson 78 y o  female MRN: [id number]  Unit/Bed#: MO CT SCAN      Chief Complaint:   Back pain    HPI:  78 y o  female complaining of back pain  Patient reports "sneezing" a few weeks ago and gradually started to notice back pain  Patient's back pain started to become severe and was brought to the ED by her son  CT scan performed of thoracic and lumbar spine demonstrated thoracic compression fractures  Today, patient reports it is very difficutlt for her to sit up and ambulate  Patient denies mechanical fall, history of cancer, recent fevers, chills, bowel/bladder incontinence, saddle anesthesia  Review Of Systems:   · Skin: Normal  · Neuro: See HPI  · Musculoskeletal: See HPI  · 14 point review of systems negative except as stated above     Past Medical History:   Past Medical History:   Diagnosis Date    Cataract     CHF (congestive heart failure) (HCC)     Leukocytosis        Past Surgical History:   Past Surgical History:   Procedure Laterality Date    APPENDECTOMY      CYSTOCELE REPAIR      ANTERIOR COLPORRHAPHY     ORIF ANKLE FRACTURE Left     TOTAL ABDOMINAL HYSTERECTOMY         Family History:  Family history reviewed and non-contributory  Family History   Problem Relation Age of Onset    No Known Problems Mother     No Known Problems Father        Social History:  Social History     Socioeconomic History    Marital status:       Spouse name: None    Number of children: None    Years of education: None    Highest education level: None   Occupational History    Occupation:     Social Needs    Financial resource strain: None    Food insecurity     Worry: None     Inability: None    Transportation needs     Medical: None     Non-medical: None   Tobacco Use    Smoking status: Former Smoker     Packs/day: 2 00     Years: 48 00     Pack years: 96 00     Types: Cigarettes     Quit date: 1999     Years since quittin 4    Smokeless tobacco: Never Used   Substance and Sexual Activity    Alcohol use: Not Currently     Alcohol/week: 4 0 standard drinks     Types: 4 Cans of beer per week     Frequency: 2-3 times a week     Drinks per session: 1 or 2     Binge frequency: Never    Drug use: No    Sexual activity: Not Currently   Lifestyle    Physical activity     Days per week: 0 days     Minutes per session: 0 min    Stress: To some extent   Relationships    Social connections     Talks on phone: None     Gets together: None     Attends Baptism service: None     Active member of club or organization: None     Attends meetings of clubs or organizations: None     Relationship status: None    Intimate partner violence     Fear of current or ex partner: None     Emotionally abused: None     Physically abused: None     Forced sexual activity: None   Other Topics Concern    None   Social History Narrative    LIVING INDEPENDENTLY ALONE     NO ADVANCED DIRECTIVES ON FILE        Allergies:    Allergies   Allergen Reactions    Erythromycin     Sulfa Antibiotics            Labs:  0   Lab Value Date/Time    HCT 32 9 (L) 02/19/2021 0515    HCT 31 8 (L) 02/18/2021 0859    HCT 38 2 02/17/2021 0056    HCT 41 7 10/19/2015 1054    HCT 42 4 04/06/2015 1205    HCT 43 2 10/20/2014 1110    HGB 10 5 (L) 02/19/2021 0515    HGB 10 5 (L) 02/18/2021 0859    HGB 12 2 02/17/2021 0056    HGB 13 4 10/19/2015 1054    HGB 14 1 04/06/2015 1205    HGB 14 3 10/20/2014 1110    WBC 10 34 (H) 02/19/2021 0515    WBC 13 57 (H) 02/18/2021 0859    WBC 11 61 (H) 02/17/2021 0056    WBC 13 86 (H) 10/19/2015 1054    WBC 11 2 (H) 04/06/2015 1205    WBC 10 7 (H) 10/20/2014 1110       Meds:    Current Facility-Administered Medications:     acetaminophen (TYLENOL) tablet 975 mg, 975 mg, Oral, Q6H [address], NAKITA Davidson-DIYA, 975 mg at 02/19/21 1804    albuterol (PROVENTIL HFA,VENTOLIN HFA) inhaler 2 puff, 2 puff, Inhalation, Q6H PRN, NAKITA Davidson-DIYA, 2 puff at 02/19/21 1255   aluminum-magnesium hydroxide-simethicone (MYLANTA) oral suspension 30 mL, 30 mL, Oral, Q6H PRN, Laith Dixon PA-C    atorvastatin (LIPITOR) tablet 80 mg, 80 mg, Oral, Daily, Mariluz Dangelo PA-C, 80 mg at 02/19/21 1803    benzonatate (TESSALON PERLES) capsule 100 mg, 100 mg, Oral, TID PRN, Laith Dixon PA-C    fluticasone-vilanterol (BREO ELLIPTA) 100-25 mcg/inh inhaler 1 puff, 1 puff, Inhalation, Daily, Laith Dixon PA-C, 1 puff at 02/19/21 6473    guaiFENesin (MUCINEX) 12 hr tablet 600 mg, 600 mg, Oral, Q12H [facility], Laith Dixon PA-C, 600 mg at 02/19/21 4812    heparin (porcine) subcutaneous injection 5,000 Units, 5,000 Units, Subcutaneous, Q8H [facility], 5,000 Units at 02/19/21 1806 **AND** [COMPLETED] Platelet count, , , Once, Laith Dixon PA-C    HYDROmorphone (DILAUDID) injection 0 5 mg, 0 5 mg, Intravenous, Q4H PRN, Maykel Fung MD    insulin lispro (HumaLOG) 100 units/mL subcutaneous injection 1-6 Units, 1-6 Units, Subcutaneous, TID AC, 2 Units at 02/17/21 1707 **AND** Fingerstick Glucose (POCT), , , TID AC, Mariluz Dangelo PA-C    insulin lispro (HumaLOG) 100 units/mL subcutaneous injection 1-6 Units, 1-6 Units, Subcutaneous, HS, Mariluz Dangelo PA-C, 2 Units at 02/17/21 2148    ipratropium (ATROVENT) 0 02 % inhalation solution 0 5 mg, 0 5 mg, Nebulization, TID, Maykel Fung MD, 0 5 mg at 02/19/21 2008    levalbuterol (Gloria Blaise) inhalation solution 1 25 mg, 1 25 mg, Nebulization, TID, Maykel Fung MD, 1 25 mg at 02/19/21 2008    levothyroxine tablet 125 mcg, 125 mcg, Oral, Daily, Krystal Dangelo PA-C, 125 mcg at 02/19/21 0114    lidocaine (LIDODERM) 5 % patch 2 patch, 2 patch, Topical, Daily, Laith Dixon PA-C, 2 patch at 02/19/21 1133    melatonin tablet 6 mg, 6 mg, Oral, HS PRN, Laith Dixon PA-C, 6 mg at 02/18/21 2250    methocarbamol (ROBAXIN) tablet 500 mg, 500 mg, Oral, Q6H PRN, Laith Dixon PA-C    montelukast (SINGULAIR) tablet 10 mg, 10 mg, Oral, HS, Judit Aggies, PA-C, 10 mg at 02/18/21 2202    polyethylene glycol (MIRALAX) packet 17 g, 17 g, Oral, Daily, Mariluz Dangelo PA-C    saccharomyces boulardii (FLORASTOR) capsule 250 mg, 250 mg, Oral, BID, Juliana Hernandez MD, 250 mg at 02/19/21 1804    senna (SENOKOT) tablet 8 6 mg, 1 tablet, Oral, HS, Judit Rias, PA-C, 8 6 mg at 02/18/21 2202    traMADol (ULTRAM) tablet 50 mg, 50 mg, Oral, Q6H PRN, Judit Rias, PA-C, 50 mg at 02/19/21 5203    Blood Culture:   No results found for: BLOODCX    Wound Culture:   No results found for: WOUNDCULT    Ins and Outs:  I/O last 24 hours: In: 480 [P O :480]  Out: 1400 [Urine:1400]          Physical Exam:   /52   Pulse 77   Temp 98 2 °F (36 8 °C)   Resp 19   Ht 5' 2" (1 575 m)   Wt 77 1 kg (169 lb 15 6 oz)   SpO2 99%   BMI 31 09 kg/m²   Gen: Alert and oriented to person, place, time  HEENT: EOMI, eyes clear, moist mucus membranes, hearing intact  Respiratory: Bilateral chest rise  No audible wheezing found  Cardiovascular: Regular Rate and Rhythm  Abdomen: soft nontender/nondistended  Musculoskeletal:   Bilateral upper extremities   · Skin intact without evidence of skin integrity disruption  · TTP thoracic spine and thoracic paraspinals  · Able to actively range shoulders, elbows, wrists and fingers without pain or limitations  · Sensation intact to light touch axillary, medial/lateral antebrachial cutaneous, median, radial and ulnar distributions  · Strength preserved with resisted elbow flexion/extension, wrist flexion/extension, finger abduction and flexion  · 2+ rad pulse, bilateral upper extremities warm and well perfused     Tertiary: no tenderness over all other joints/long bones as except already stated  Radiology:   I personally reviewed the films  CT scan of thoracic and lumbar spine demonstrates compression fractures at T5-T7 without bony retropulsion noted on imaging   Degenerative changes noted     _*_*_*_*_*_*_*_*_*_*_*_*_*_*_*_*_*_*_*_*_*_*_*_*_*_*_*_*_*_*_*_*_*_*_*_*_*_*_*_*_*    Assessment:  78 y  o female thoracic vertebral fractures      Plan:   · Weightbearing as tolerated bilateral upper and lower extremities  Use of walker for ambulation for safety  · PT/OT  · Pain control per primary team  · DVT ppx per primary team  · Dispo: Patient complaining of thoracic spine pain and difficulty sitting upwards  Ordered MRI of thoracic spine today to r/o additional pathology  Will follow up after MRI resulted         Jose De Jesus Sullivan PA-C

## 2021-02-20 NOTE — ASSESSMENT & PLAN NOTE
· Creatinine 1 39 on admission  Baseline 0 9-1 05  · Likely pre-renal 2/2 reported poor oral intake/dehydration  · Avoid hypotension and nephrotoxic agents  · Creatinine at baseline now    Torsemide restarted

## 2021-02-20 NOTE — ASSESSMENT & PLAN NOTE
Malnutrition Findings:           BMI Findings: Body mass index is 31 13 kg/m²  Moderate protein-calorie malnutrition, in the setting of severe back pain, COPD, and VICKY d/t poor po intake, as evidenced by 15 05 lb/8 14% weight loss in three months, requiring Carb control diet with glue Phillips supplements, monitoring of PO intake, and assistance with meals as needed    Nutritionist consult

## 2021-02-20 NOTE — CASE MANAGEMENT
Cm met with patient, her son, and her dtr at bedside to discuss STR recommendation  Son reported that he would like a list of acute rehabs  Cm printed a list from [employer] of acute rehab facilities within a 30 mile radius and returned to bedside to review these options  Son reported that he prefers any of these facilities and would not like to further discuss SNF options at this time, because he does not believe she would benefit from a lower level of care  Son reported that patient is able to meet the requirements of an acute level rehab  He reported that SNFs in this area do not provide the services she would need  Justice expanded the search for acute rehab via [employer] to a 30 mile radius and resent  Acceptance pending  Family mostly prefers [facility] but is open to others  Cm department will continue to follow patient through discharge

## 2021-02-21 PROBLEM — E83.52 HYPERCALCEMIA: Status: ACTIVE | Noted: 2021-01-01

## 2021-02-21 NOTE — OCCUPATIONAL THERAPY NOTE
Occupational Therapy Cancellation Note        Patient Name: Vanesa MARIE'S Date: 2/21/2021 02/21/21 1018   OT Last Visit   OT Visit Date 02/21/21   Note Type   Note Type Treatment   Cancel Reasons Other  (patient refusal)       Patient was approached 2 times this morning for treatment session  Attempted to see patient for treatment at 10:17 am and 11:23 am  Pt refusing at both attempts; pt reported, "I just got comfortable, I'm having shortness of breath, can you keep checking in "  DORA Alexandra made  aware of complaint regarding shortness of breath  OT will  continue to follow patient and provide therapy intervention as able

## 2021-02-21 NOTE — ASSESSMENT & PLAN NOTE
Calcium 11 today  PTH 36   Likely from immobilization, nephrology consulted  Started on gentle IV hydration

## 2021-02-21 NOTE — ASSESSMENT & PLAN NOTE
· Patient was given IV Solu-Medrol initially  Continue nebulization  · Pulmonary evaluation appreciated  Solu-Medrol discontinued  · Today she was complaining more short of breath, wheezing    Started on Solu-Medrol 40 mg daily

## 2021-02-21 NOTE — PROGRESS NOTES
Progress Note - Bernice Riser 1940, 78 y o  female MRN: [id number]    Unit/Bed#: -01 Encounter: [id number]    Primary Care Provider: Ramez Lynch MD   Date and time admitted to hospital: 2/17/2021 12:29 AM        * Thoracic compression fracture [facility])  Assessment & Plan  CT thoracolumbar spine   IMPRESSION:  Thoracic compression fractures from T5 to T7 without bony retropulsion  Fractures could be acute/subacute      Degenerative spondylosis as below  No focal neurological deficit noted  Discussed with orthopedics and neurosurgery team   Recommended brace and PT/OT  Pain management  Pain not controlled with tramadol  Will add oxycodone p r n  Dilaudid for breakthrough pain  Physical therapy recommended acute versus short-term rehab  Discussed with case management  MRI thoracic spine-  IMPRESSION:     Compression fracture of T5 (33% height loss approximately) and T6 (approximately 25% height loss  )  There is also superior endplate T2 compression fracture with approximately 10%-20 percent height loss at the superior endplate  These were previously   identified on recent CT February 17, 2021  No evidence of bony retropulsion  Multilevel thoracolumbar degenerative changes without obvious spinal canal stenosis  No signs of not compression  Cleared by orthopedics for discharge  Waiting for short-term versus acute rehab now    UTI (urinary tract infection)  Assessment & Plan  Growing E coli pansensitive  Finished 3 days of IV ceftriaxone  Moderate protein-calorie malnutrition ([address] )  Assessment & Plan  Malnutrition Findings:           BMI Findings: Body mass index is 31 13 kg/m²     Moderate protein-calorie malnutrition, in the setting of severe back pain, COPD, and VICKY d/t poor po intake, as evidenced by 15 05 lb/8 14% weight loss in three months, requiring Carb control diet with glue Phillips supplements, monitoring of PO intake, and assistance with meals as needed  Nutritionist consult    Chronic respiratory failure with hypoxia [facility])  Assessment & Plan  Ongoing  Continue supplemental oxygen  At home she is on 3-4 L oxygen at baseline  Secondary to COPD    VICKY (acute kidney injury) ([facility] [address] )  Assessment & Plan  · Creatinine 1 39 on admission  Baseline 0 9-1 05  · Likely pre-renal 2/2 reported poor oral intake/dehydration  · Avoid hypotension and nephrotoxic agents  · Creatinine at baseline now  Torsemide restarted    Chronic diastolic heart failure (HCC)  Assessment & Plan  Wt Readings from Last 3 Encounters:   02/20/21 77 2 kg (170 lb 3 1 oz)   02/20/21 77 kg (169 lb 12 1 oz)   02/11/21 77 1 kg (170 lb)     · Patient euvolemic now  · Restart torsemide home dose        Chronic bilateral thoracic back pain  Assessment & Plan  · Now has acute fracture  See above    SOB (shortness of breath)  Assessment & Plan  · Patient was evaluated by Pulmonary  · Lungs clear  Unlikely COPD exacerbation  · Initially started on IV Solu-Medrol which discontinued  · Monitor off steroid  · Now patient has acute thoracic vertebral compression fracture  Management for fracture and pain control  · Patient at her baseline oxygen requirement    Hypothyroidism  Assessment & Plan  · Continue home dose Levothyroxine    Hyperlipidemia  Assessment & Plan  · Continue home dose Lipitor    Type 2 diabetes mellitus with complication, without long-term current use of insulin [facility])  Assessment & Plan  Lab Results   Component Value Date    HGBA1C 7 1 (H) 10/12/2020       Recent Labs     02/20/21  1717 02/20/21  2124 02/21/21  0649 02/21/21  1114   POCGLU 96 105 127 131       Blood Sugar Average: Last 72 hrs:  (P) 425 [id number]   · Hold home dose Glyburide while inpt  · FSBS AC & HS w/ SSI  · Diabetic diet    COPD exacerbation ([facility] )  Assessment & Plan  · Patient was given IV Solu-Medrol initially  Continue nebulization  · Pulmonary evaluation appreciated    Solu-Medrol discontinued  · Today she was complaining more short of breath, wheezing  Started on Solu-Medrol 40 mg daily        VTE Pharmacologic Prophylaxis:   Pharmacologic: Heparin  Mechanical VTE Prophylaxis in Place: Yes    Patient Centered Rounds: I have performed bedside rounds with nursing staff today  Discussions with Specialists or Other Care Team Provider:     Education and Discussions with Family / Patient:  Patient    Time Spent for Care: More than 50% of total time spent on counseling and coordination of care as described above  Current Length of Stay: 3 day(s)    Current Patient Status: Inpatient   Certification Statement: The patient will continue to require additional inpatient hospital stay due to See above    Discharge Plan:  24-48 hours, based on placement    Code Status: Level 1 - Full Code      Subjective:   I have seen and examined the patient bedside this morning  Patient still complaining about back pain  Also complaining about shortness of breath, wheezing  No chest pain or palpitation afebrile  Objective:     Vitals:   Temp (24hrs), Av 7 °F (36 5 °C), Min:97 5 °F (36 4 °C), Max:97 8 °F (36 6 °C)    Temp:  [97 5 °F (36 4 °C)-97 8 °F (36 6 °C)] 97 8 °F (36 6 °C)  HR:  [70] 70  Resp:  [17-19] 19  BP: (126-131)/(59-60) 131/60  SpO2:  [94 %-98 %] 94 %  Body mass index is 31 13 kg/m²  Input and Output Summary (last 24 hours):        Intake/Output Summary (Last 24 hours) at 2021 1531  Last data filed at 2021 1300  Gross per 24 hour   Intake 240 ml   Output 900 ml   Net -660 ml       Physical Exam:     Physical Exam  General- Awake, alert and oriented x 3, looks comfortable  HEENT- Normocephalic, atraumatic  CVS- Normal S1/ S2, Regular rate and rhythm  Respiratory system- B/L clear breath sounds, mild wheezing present  Abdomen- Soft, Non distended, no tenderness, Bowel sound- present  CNS- No acute focal neurologic deficit noted    Additional Data:     Labs:    Results from last 7 days   Lab Units 02/20/21  0603   WBC Thousand/uL 11 55*   HEMOGLOBIN g/dL 10 6*   HEMATOCRIT % 32 6*   PLATELETS Thousands/uL 177   NEUTROS PCT % 64   LYMPHS PCT % 25   MONOS PCT % 8   EOS PCT % 2     Results from last 7 days   Lab Units 02/21/21  1002  02/17/21  0056   SODIUM mmol/L 140   < > 137   POTASSIUM mmol/L 5 3   < > 4 2   CHLORIDE mmol/L 102   < > 95*   CO2 mmol/L 33*   < > 34*   BUN mg/dL 25   < > 30*   CREATININE mg/dL 1 10   < > 1 39*   ANION GAP mmol/L 5   < > 8   CALCIUM mg/dL 10 6*   < > 10 6*   ALBUMIN g/dL  --   --  3 4*   TOTAL BILIRUBIN mg/dL  --   --  0 40   ALK PHOS U/L  --   --  133*   ALT U/L  --   --  26   AST U/L  --   --  29   GLUCOSE RANDOM mg/dL 176*   < > 112    < > = values in this interval not displayed  Results from last 7 days   Lab Units 02/21/21  1114 02/21/21  0649 02/20/21  2124 02/20/21  1717 02/20/21  1137 02/20/21  0644 02/19/21  2059 02/19/21  1928 02/19/21  1638 02/19/21  1124 02/19/21  0825 02/18/21  2104   POC GLUCOSE mg/dl 131 127 105 96 142* 96 135 183* 114 92 118 111         Results from last 7 days   Lab Units 02/18/21  0859 02/17/21  1159   PROCALCITONIN ng/ml 0 18 0 22           * I Have Reviewed All Lab Data Listed Above  * Additional Pertinent Lab Tests Reviewed:  All Labs Within Last 24 Hours Reviewed    Imaging:    Imaging Reports Reviewed Today Include:   Imaging Personally Reviewed by Myself Includes:      Recent Cultures (last 7 days):     Results from last 7 days   Lab Units 02/17/21  1421   URINE CULTURE  >100,000 cfu/ml Escherichia coli*       Last 24 Hours Medication List:   Current Facility-Administered Medications   Medication Dose Route Frequency Provider Last Rate    acetaminophen  975 mg Oral Q6H [facility]      albuterol  2 puff Inhalation Q6H PRN Hollie Little PA-C      aluminum-magnesium hydroxide-simethicone  30 mL Oral Q6H PRN Hollie Little PA-C      atorvastatin  80 mg Oral Daily Salas Valentine      benzonatate  100 mg Oral TID PRN Juvencio Viramontes PA-C      fluticasone-vilanterol  1 puff Inhalation Daily [place], [place]      guaiFENesin  600 mg Oral Q12H [facility] [place], [place]      heparin (porcine)  5,000 Units Subcutaneous [place], [place]      HYDROmorphone  0 5 mg Intravenous Q4H PRN Yordy Calderon MD      insulin lispro  1-6 Units Subcutaneous TID AC Mariluz Dangelo PA-C      insulin lispro  1-6 Units Subcutaneous HS [facility]      ipratropium  0 5 mg Nebulization TID Yordy Calderon MD      levalbuterol  1 25 mg Nebulization TID Yordy Calderon MD      levothyroxine  125 mcg Oral Daily [facility] gaxiola      lidocaine  2 patch Topical Daily [place], [place]      melatonin  6 mg Oral HS PRN Juvencio Viramontes PA-C      methocarbamol  500 mg Oral Q6H PRN Juvencio Viramontes PA-C      methylPREDNISolone sodium succinate  40 mg Intravenous Daily Yordy Calderon MD      montelukast  10 mg Oral HS [facility]      oxyCODONE  5 mg Oral Q6H PRN Yordy Calderon MD      pantoprazole  40 mg Oral Early Morning Yordy Calderon MD      polyethylene glycol  17 g Oral Daily Mariluz Dangelo PA-C      saccharomyces boulardii  250 mg Oral BID Yordy Calderon MD      senna  1 tablet Oral HS [facility] antonio      torsemide  10 mg Oral Daily Yordy Calderon MD      traMADol  50 mg Oral Q6H PRN Yordy Calderon MD          Today, Patient Was Seen By: Leanne Mary MD    ** Please Note: Dictation voice to text software may have been used in the creation of this document   **

## 2021-02-21 NOTE — PROGRESS NOTES
Progress Note - Orthopedics   Halle Adjutant 78 y o  female MRN: [id number]  Unit/Bed#: -01      Subjective:    78 y  o female complaining of thoracic back pain  She offers no other additional complaints at this time  She still notes some discomfort in the middle of her back and some shortness of breath if she is speaking for a long period of time  She denies any mechanical falls  She denies any nausea, vomiting, diarrhea lightheadedness or dizziness        Labs:  0   Lab Value Date/Time    HCT 32 6 (L) 02/20/2021 0603    HCT 32 9 (L) 02/19/2021 0515    HCT 31 8 (L) 02/18/2021 0859    HCT 41 7 10/19/2015 1054    HCT 42 4 04/06/2015 1205    HCT 43 2 10/20/2014 1110    HGB 10 6 (L) 02/20/2021 0603    HGB 10 5 (L) 02/19/2021 0515    HGB 10 5 (L) 02/18/2021 0859    HGB 13 4 10/19/2015 1054    HGB 14 1 04/06/2015 1205    HGB 14 3 10/20/2014 1110    WBC 11 55 (H) 02/20/2021 0603    WBC 10 34 (H) 02/19/2021 0515    WBC 13 57 (H) 02/18/2021 0859    WBC 13 86 (H) 10/19/2015 1054    WBC 11 2 (H) 04/06/2015 1205    WBC 10 7 (H) 10/20/2014 1110       Meds:    Current Facility-Administered Medications:     acetaminophen (TYLENOL) tablet 975 mg, 975 mg, Oral, Q6H [address], Rhenda Fix, PA-C, 975 mg at 02/21/21 0514    albuterol (PROVENTIL HFA,VENTOLIN HFA) inhaler 2 puff, 2 puff, Inhalation, Q6H PRN, Rhenda Fix, PA-C, 2 puff at 02/20/21 2133    aluminum-magnesium hydroxide-simethicone (MYLANTA) oral suspension 30 mL, 30 mL, Oral, Q6H PRN, Rhenda Fix, PA-C    atorvastatin (LIPITOR) tablet 80 mg, 80 mg, Oral, Daily, Mariluz Dangelo PA-C, 80 mg at 02/20/21 1722    benzonatate (TESSALON PERLES) capsule 100 mg, 100 mg, Oral, TID PRN, Rhenda Fix, PA-C    fluticasone-vilanterol (BREO ELLIPTA) 100-25 mcg/inh inhaler 1 puff, 1 puff, Inhalation, Daily, Rhenda Fix, PA-C, 1 puff at 02/20/21 0858    guaiFENesin (MUCINEX) 12 hr tablet 600 mg, 600 mg, Oral, Q12H [address], Rhenda Fix, PA-C, 600 mg at 02/20/21 2049    heparin (porcine) subcutaneous injection 5,000 Units, 5,000 Units, Subcutaneous, Q8H [address], 5,000 Units at 02/21/21 0514 **AND** [COMPLETED] Platelet count, , , Once, Paige Jessica PA-C    HYDROmorphone (DILAUDID) injection 0 5 mg, 0 5 mg, Intravenous, Q4H PRN, Harsh Cunningham MD    insulin lispro (HumaLOG) 100 units/mL subcutaneous injection 1-6 Units, 1-6 Units, Subcutaneous, TID AC, 2 Units at 02/17/21 1707 **AND** Fingerstick Glucose (POCT), , , TID AC, Mariluz Dangelo PA-C    insulin lispro (HumaLOG) 100 units/mL subcutaneous injection 1-6 Units, 1-6 Units, Subcutaneous, HS, Paige Jessica PA-C, 2 Units at 02/17/21 2148    ipratropium (ATROVENT) 0 02 % inhalation solution 0 5 mg, 0 5 mg, Nebulization, TID, Harsh Cunningham MD, 0 5 mg at 02/21/21 8529    levalbuterol (Ladona Brood) inhalation solution 1 25 mg, 1 25 mg, Nebulization, TID, Harsh Cunningham MD, 1 25 mg at 02/21/21 0732    levothyroxine tablet 125 mcg, 125 mcg, Oral, Daily, Natalee Dangelo PA-C, 125 mcg at 02/21/21 0514    lidocaine (LIDODERM) 5 % patch 2 patch, 2 patch, Topical, Daily, Paige Jessica PA-C, 2 patch at 02/20/21 0856    melatonin tablet 6 mg, 6 mg, Oral, HS PRN, NAKITA Lugo-C, 6 mg at 02/20/21 2129    methocarbamol (ROBAXIN) tablet 500 mg, 500 mg, Oral, Q6H PRN, Paige Jessica PA-C, 500 mg at 02/20/21 1722    montelukast (SINGULAIR) tablet 10 mg, 10 mg, Oral, HS, Mariluz Dangelo PA-C, 10 mg at 02/20/21 2129    polyethylene glycol (MIRALAX) packet 17 g, 17 g, Oral, Daily, Mariluz Dangelo PA-C    saccharomyces boulardii (FLORASTOR) capsule 250 mg, 250 mg, Oral, BID, Brand MD Octavio, 250 mg at 02/20/21 1722    senna (SENOKOT) tablet 8 6 mg, 1 tablet, Oral, HS, Mariluz Dangelo PA-C, 8 6 mg at 02/20/21 2129    torsemide (DEMADEX) tablet 10 mg, 10 mg, Oral, Daily, Brand MD Octavio, 10 mg at 02/20/21 1502    traMADol (ULTRAM) tablet 50 mg, 50 mg, Oral, Q6H PRN, Paige Jessica, RANJAN, 50 mg at 02/20/21 1544    Blood Culture:   No results found for: BLOODCX    Wound Culture:   No results found for: WOUNDCULT    Ins and Outs:  I/O last 24 hours: In: 240 [P O :240]  Out: 1400 [Urine:1400]          Physical:  Vitals:    02/21/21 0650   BP: 131/60   Pulse:    Resp: 19   Temp: 97 8 °F (36 6 °C)   SpO2:      Musculoskeletal:  Thoracic spine  · Skin no erythema edema or ecchymosis noted  · TTP thoracic spine and paraspinal muscles  · Patient is able to actively use her shoulders, elbows, wrist and fingers without pain  · Sensory to the axillary, median, radial and ulnar nerve are intact  · Motor to the axillary, median, radial and ulnar nerve are intact  · 2+ radial pulses    Radiology:  MRI of the thoracic spine demonstrated T5 compression fracture with 33% height loss and T6 compression fracture with 25% height loss  Also superior endplate T2 compression fracture with approximately 10% height loss  Assessment:    78 y  o female with T2, T5 and T6 compression fractures     Plan:  · Weight-bearing as tolerated bilateral upper and lower extremities  · PT/OT  · Pain control - as per primary team  · DVT ppx - as per primary team  · Dispo: Jordan Romano for discharge from ortho perspective   · It was discussed with the patient her MRI results  It was discussed that she can follow up as an outpatient with Spine and Pain Management for conservative treatment of these compression fractures  Patient would like to avoid surgical intervention if at all possible  There was no evidence of foraminal narrowing or central canal stenosis on the MRI  No further orthopedic intervention is needed at this time  If there are any questions, please reach out  Leticia Veliz PA-C

## 2021-02-21 NOTE — PLAN OF CARE
Problem: PAIN - ADULT  Goal: Verbalizes/displays adequate comfort level or baseline comfort level  Description: Interventions:  - Encourage patient to monitor pain and request assistance  - Assess pain using appropriate pain scale  - Administer analgesics based on type and severity of pain and evaluate response  - Implement non-pharmacological measures as appropriate and evaluate response  - Consider cultural and social influences on pain and pain management  - Notify physician/advanced practitioner if interventions unsuccessful or patient reports new pain  Outcome: Progressing     Problem: SAFETY ADULT  Goal: Patient will remain free of falls  Description: INTERVENTIONS:  - Assess patient frequently for physical needs  -  Identify cognitive and physical deficits and behaviors that affect risk of falls    -  [place] fall precautions as indicated by assessment   - Educate patient/family on patient safety including physical limitations  - Instruct patient to call for assistance with activity based on assessment  - Modify environment to reduce risk of injury  - Consider OT/PT consult to assist with strengthening/mobility  Outcome: Progressing  Goal: Maintain or return to baseline ADL function  Description: INTERVENTIONS:  -  Assess patient's ability to carry out ADLs; assess patient's baseline for ADL function and identify physical deficits which impact ability to perform ADLs (bathing, care of mouth/teeth, toileting, grooming, dressing, etc )  - Assess/evaluate cause of self-care deficits   - Assess range of motion  - Assess patient's mobility; develop plan if impaired  - Assess patient's need for assistive devices and provide as appropriate  - Encourage maximum independence but intervene and supervise when necessary  - Involve family in performance of ADLs  - Assess for home care needs following discharge   - Consider OT consult to assist with ADL evaluation and planning for discharge  - Provide patient education as appropriate  Outcome: Progressing  Goal: Maintain or return mobility status to optimal level  Description: INTERVENTIONS:  - Assess patient's baseline mobility status (ambulation, transfers, stairs, etc )    - Identify cognitive and physical deficits and behaviors that affect mobility  - Identify mobility aids required to assist with transfers and/or ambulation (gait belt, sit-to-stand, lift, walker, cane, etc )  - [place] fall precautions as indicated by assessment  - Record patient progress and toleration of activity level on Mobility SBAR; progress patient to next Phase/Stage  - Instruct patient to call for assistance with activity based on assessment  - Consider rehabilitation consult to assist with strengthening/weightbearing, etc   Outcome: Progressing     Problem: Prexisting or High Potential for Compromised Skin Integrity  Goal: Skin integrity is maintained or improved  Description: INTERVENTIONS:  - Identify patients at risk for skin breakdown  - Assess and monitor skin integrity  - Assess and monitor nutrition and hydration status  - Monitor labs   - Assess for incontinence   - Turn and reposition patient  - Assist with mobility/ambulation  - Relieve pressure over bony prominences  - Avoid friction and shearing  - Provide appropriate hygiene as needed including keeping skin clean and dry  - Evaluate need for skin moisturizer/barrier cream  - Collaborate with interdisciplinary team   - Patient/family teaching  - Consider wound care consult   Outcome: Progressing     Problem: INFECTION - ADULT  Goal: Absence or prevention of progression during hospitalization  Description: INTERVENTIONS:  - Assess and monitor for signs and symptoms of infection  - Monitor lab/diagnostic results  - Monitor all insertion sites, i e  indwelling lines, tubes, and drains  - Monitor endotracheal if appropriate and nasal secretions for changes in amount and color  - [place] appropriate cooling/warming therapies per order  - Administer medications as ordered  - Instruct and encourage patient and family to use good hand hygiene technique  - Identify and instruct in appropriate isolation precautions for identified infection/condition  Outcome: Progressing

## 2021-02-21 NOTE — PHYSICAL THERAPY NOTE
Physical Therapy Cancellation Note    Chart review performed  Attempted to see patient for PT treatment, 2x today at 10:17 am and 11:23 am  Pt refusing at both attempts; pt reported, "I just got comfortable, I'm having shortness of breath, can you keep checking in " Pt educated on the importance of mobility, as well as the risks of immobility  Pt's RN aware of complaint regarding shortness of breath  PT to continue to follow       02/21/21 1017   PT Last Visit   PT Visit Date 02/21/21   Note Type   Note Type Treatment   Cancel Reasons Other  (pt refusal; "I just got comfortable, and I'm having shortness of breath, keep checking in")     Andrey Oneill, PT, DPT

## 2021-02-21 NOTE — ASSESSMENT & PLAN NOTE
Lab Results   Component Value Date    HGBA1C 7 1 (H) 10/12/2020       Recent Labs     02/20/21  1717 02/20/21  2124 02/21/21  0649 02/21/21  1114   POCGLU 96 105 127 131       Blood Sugar Average: Last 72 hrs:  (P) 201 [id number]   · Hold home dose Glyburide while inpt  · FSBS AC & HS w/ SSI  · Diabetic diet

## 2021-02-21 NOTE — PLAN OF CARE
Problem: PAIN - ADULT  Goal: Verbalizes/displays adequate comfort level or baseline comfort level  Description: Interventions:  - Encourage patient to monitor pain and request assistance  - Assess pain using appropriate pain scale  - Administer analgesics based on type and severity of pain and evaluate response  - Implement non-pharmacological measures as appropriate and evaluate response  - Consider cultural and social influences on pain and pain management  - Notify physician/advanced practitioner if interventions unsuccessful or patient reports new pain  Outcome: Progressing     Problem: INFECTION - ADULT  Goal: Absence or prevention of progression during hospitalization  Description: INTERVENTIONS:  - Assess and monitor for signs and symptoms of infection  - Monitor lab/diagnostic results  - Monitor all insertion sites, i e  indwelling lines, tubes, and drains  - Monitor endotracheal if appropriate and nasal secretions for changes in amount and color  - [place] appropriate cooling/warming therapies per order  - Administer medications as ordered  - Instruct and encourage patient and family to use good hand hygiene technique  - Identify and instruct in appropriate isolation precautions for identified infection/condition  Outcome: Progressing  Goal: Absence of fever/infection during neutropenic period  Description: INTERVENTIONS:  - Monitor WBC    Outcome: Progressing     Problem: SAFETY ADULT  Goal: Patient will remain free of falls  Description: INTERVENTIONS:  - Assess patient frequently for physical needs  -  Identify cognitive and physical deficits and behaviors that affect risk of falls    -  [place] fall precautions as indicated by assessment   - Educate patient/family on patient safety including physical limitations  - Instruct patient to call for assistance with activity based on assessment  - Modify environment to reduce risk of injury  - Consider OT/PT consult to assist with strengthening/mobility  Outcome: Progressing  Goal: Maintain or return to baseline ADL function  Description: INTERVENTIONS:  -  Assess patient's ability to carry out ADLs; assess patient's baseline for ADL function and identify physical deficits which impact ability to perform ADLs (bathing, care of mouth/teeth, toileting, grooming, dressing, etc )  - Assess/evaluate cause of self-care deficits   - Assess range of motion  - Assess patient's mobility; develop plan if impaired  - Assess patient's need for assistive devices and provide as appropriate  - Encourage maximum independence but intervene and supervise when necessary  - Involve family in performance of ADLs  - Assess for home care needs following discharge   - Consider OT consult to assist with ADL evaluation and planning for discharge  - Provide patient education as appropriate  Outcome: Progressing  Goal: Maintain or return mobility status to optimal level  Description: INTERVENTIONS:  - Assess patient's baseline mobility status (ambulation, transfers, stairs, etc )    - Identify cognitive and physical deficits and behaviors that affect mobility  - Identify mobility aids required to assist with transfers and/or ambulation (gait belt, sit-to-stand, lift, walker, cane, etc )  - [place] fall precautions as indicated by assessment  - Record patient progress and toleration of activity level on Mobility SBAR; progress patient to next Phase/Stage  - Instruct patient to call for assistance with activity based on assessment  - Consider rehabilitation consult to assist with strengthening/weightbearing, etc   Outcome: Progressing     Problem: DISCHARGE PLANNING  Goal: Discharge to home or other facility with appropriate resources  Description: INTERVENTIONS:  - Identify barriers to discharge w/patient and caregiver  - Arrange for needed discharge resources and transportation as appropriate  - Identify discharge learning needs (meds, wound care, etc )  - Arrange for interpretive services to assist at discharge as needed  - Refer to Case Management Department for coordinating discharge planning if the patient needs post-hospital services based on physician/advanced practitioner order or complex needs related to functional status, cognitive ability, or social support system  Outcome: Progressing     Problem: Knowledge Deficit  Goal: Patient/family/caregiver demonstrates understanding of disease process, treatment plan, medications, and discharge instructions  Description: Complete learning assessment and assess knowledge base  Interventions:  - Provide teaching at level of understanding  - Provide teaching via preferred learning methods  Outcome: Progressing     Problem: Nutrition/Hydration-ADULT  Goal: Nutrient/Hydration intake appropriate for improving, restoring or maintaining nutritional needs  Description: Monitor and assess patient's nutrition/hydration status for malnutrition  Collaborate with interdisciplinary team and initiate plan and interventions as ordered  Monitor patient's weight and dietary intake as ordered or per policy  Utilize nutrition screening tool and intervene as necessary  Determine patient's food preferences and provide high-protein, high-caloric foods as appropriate       INTERVENTIONS:  - Monitor oral intake, urinary output, labs, and treatment plans  - Assess nutrition and hydration status and recommend course of action  - Evaluate amount of meals eaten  - Assist patient with eating if necessary   - Allow adequate time for meals  - Recommend/ encourage appropriate diets, oral nutritional supplements, and vitamin/mineral supplements  - Order, calculate, and assess calorie counts as needed  - Recommend, monitor, and adjust tube feedings and TPN/PPN based on assessed needs  - Assess need for intravenous fluids  - Provide specific nutrition/hydration education as appropriate  - Include patient/family/caregiver in decisions related to nutrition  Outcome: Progressing     Problem: Potential for Falls  Goal: Patient will remain free of falls  Description: INTERVENTIONS:  - Assess patient frequently for physical needs  -  Identify cognitive and physical deficits and behaviors that affect risk of falls    -  [place] fall precautions as indicated by assessment   - Educate patient/family on patient safety including physical limitations  - Instruct patient to call for assistance with activity based on assessment  - Modify environment to reduce risk of injury  - Consider OT/PT consult to assist with strengthening/mobility  Outcome: Progressing     Problem: Prexisting or High Potential for Compromised Skin Integrity  Goal: Skin integrity is maintained or improved  Description: INTERVENTIONS:  - Identify patients at risk for skin breakdown  - Assess and monitor skin integrity  - Assess and monitor nutrition and hydration status  - Monitor labs   - Assess for incontinence   - Turn and reposition patient  - Assist with mobility/ambulation  - Relieve pressure over bony prominences  - Avoid friction and shearing  - Provide appropriate hygiene as needed including keeping skin clean and dry  - Evaluate need for skin moisturizer/barrier cream  - Collaborate with interdisciplinary team   - Patient/family teaching  - Consider wound care consult   Outcome: Progressing

## 2021-02-21 NOTE — ASSESSMENT & PLAN NOTE
CT thoracolumbar spine   IMPRESSION:  Thoracic compression fractures from T5 to T7 without bony retropulsion  Fractures could be acute/subacute      Degenerative spondylosis as below  No focal neurological deficit noted  Discussed with orthopedics and neurosurgery team   Recommended brace and PT/OT  Pain management  Pain not controlled with tramadol  Will add oxycodone p r n  Dilaudid for breakthrough pain  Physical therapy recommended acute versus short-term rehab  Discussed with case management  MRI thoracic spine-  IMPRESSION:     Compression fracture of T5 (33% height loss approximately) and T6 (approximately 25% height loss  )  There is also superior endplate T2 compression fracture with approximately 10%-20 percent height loss at the superior endplate  These were previously   identified on recent CT February 17, 2021  No evidence of bony retropulsion  Multilevel thoracolumbar degenerative changes without obvious spinal canal stenosis  No signs of not compression  Cleared by orthopedics for discharge    Waiting for short-term versus acute rehab now

## 2021-02-22 PROBLEM — L89.90 PRESSURE ULCER: Status: ACTIVE | Noted: 2021-01-01

## 2021-02-22 NOTE — ASSESSMENT & PLAN NOTE
· Patient was evaluated by Pulmonary  · Initially steroids discontinued  But now she is wheezing, restarted on IV steroid  · Continue supplemental oxygen    At home she uses 3 L oxygen

## 2021-02-22 NOTE — ASSESSMENT & PLAN NOTE
Lab Results   Component Value Date    HGBA1C 7 1 (H) 10/12/2020       Recent Labs     02/21/21  2118 02/22/21  0731 02/22/21  1107 02/22/21  1633   POCGLU 235* 110 258* 175*       Blood Sugar Average: Last 72 hrs:  (P) 825 [id number]   · Hold home dose Glyburide while inpt  · FSBS AC & HS w/ SSI  · Diabetic diet

## 2021-02-22 NOTE — ASSESSMENT & PLAN NOTE
Wt Readings from Last 3 Encounters:   02/22/21 79 3 kg (174 lb 13 2 oz)   02/20/21 77 kg (169 lb 12 1 oz)   02/11/21 77 1 kg (170 lb)     · Patient euvolemic now  · Restart torsemide home dose

## 2021-02-22 NOTE — CASE MANAGEMENT
Cm met with patient at bedside to review IMM  Patient agreeable with discharge  Cm obtained patient's signature and provided the patient with a signed copy  Cm placed original copy in medical records  Patient's son and dtr were present in room  Son and dtr asked to discuss advanced directives  Justice TT RIDGE to report  SLIM reported that she is full code, but he will meet with family shortly to review  Cm department will continue to follow patient through discharge

## 2021-02-22 NOTE — CASE MANAGEMENT
Cm called patient's son to discuss acceptance to [facility]  Son agreeable  Cm TT SLIM to report and to ask for a covid test  Cm department will continue to follow patient through discharge

## 2021-02-22 NOTE — OCCUPATIONAL THERAPY NOTE
Occupational Therapy Treatment Note      Edgar Romano    2/22/2021    Principal Problem:    Thoracic compression fracture [facility])  Active Problems:    COPD exacerbation (HCC)    Type 2 diabetes mellitus with complication, without long-term current use of insulin (HCC)    Hyperlipidemia    Hypothyroidism    SOB (shortness of breath)    Chronic bilateral thoracic back pain    Chronic diastolic heart failure (HCC)    VICKY (acute kidney injury) ([address] )    Chronic respiratory failure with hypoxia (HCC)    Moderate protein-calorie malnutrition (HCC)    UTI (urinary tract infection)    Hypercalcemia      Past Medical History:   Diagnosis Date    Cataract     CHF (congestive heart failure) (HCC)     Leukocytosis        Past Surgical History:   Procedure Laterality Date    APPENDECTOMY      CYSTOCELE REPAIR      ANTERIOR COLPORRHAPHY     ORIF ANKLE FRACTURE Left 1979    TOTAL ABDOMINAL HYSTERECTOMY        02/22/21 1009   OT Last Visit   OT Visit Date 02/22/21   Note Type   Note Type Treatment   Restrictions/Precautions   Weight Bearing Precautions Per Order Yes   RUE Weight Bearing Per Order WBAT  (per orthopedics 2/21/21)   LUE Weight Bearing Per Order WBAT  (per orthopedics 2/21/21)   RLE Weight Bearing Per Order WBAT  (per orthopedics 2/21/21)   LLE Weight Bearing Per Order WBAT  (per orthopedics 2/21/21)   Braces or Orthoses TLSO   Other Precautions Chair Alarm; Bed Alarm; Fall Risk;Pain;Multiple lines;O2;Spinal precautions  (back safety precaution; log roll; TLSO)   Lifestyle   Autonomy Patient was independnet with ADLs, ambulatory with RW, does not drive  Patient lives alone in a one story house, [address], family lives close by and provide assistance  Patient has a personal care aid to assist with showers 1x week, family assists with transportation and shopping      Reciprocal Relationships Supportive Family   Service to Others Retired  at [employer] week until March 2020   [address] crochetting, watching TV   Pain Assessment   Pain Assessment Tool 0-10   Pain Score 9   Pain Location/Orientation Orientation: Lower; Location: Back   ADL   Eating Assistance 5  Supervision/Setup   Eating Deficit Setup;Supervision/safety; Increased time to complete   Grooming Assistance 4  Minimal Assistance   Grooming Deficit Setup;Steadying;Supervision/safety;Verbal cueing; Increased time to complete   UB Bathing Assistance 4  Minimal Assistance   UB Bathing Deficit Increased time to complete;Supervision/safety;Verbal cueing;Steadying;Setup   LB Bathing Assistance 3  Moderate Assistance   LB Bathing Deficit Increased time to complete;Supervision/safety;Verbal cueing;Setup;Steadying   UB Dressing Assistance 4  Minimal Assistance   UB Dressing Deficit Increased time to complete;Supervision/safety;Verbal cueing;Steadying;Setup   LB Dressing Assistance 2  Maximal Assistance   LB Dressing Deficit Increased time to complete;Supervision/safety;Verbal cueing;Steadying;Setup   Toileting Assistance  3  Moderate Assistance   Toileting Deficit Increased time to complete;Supervison/safety;Verbal cueing;Steadying;Setup   Bed Mobility   Additional Comments Pt was oob to chair when OT arrrived   Cognition   Overall Cognitive Status [facility]   Arousal/Participation Alert; Responsive; Cooperative   Attention Within functional limits   Orientation Level Oriented X4   Memory Within functional limits   Following Commands Follows all commands and directions without difficulty   Comments Pt was agreeable to OT session   Activity Tolerance   Activity Tolerance Patient limited by pain; Patient limited by fatigue   Medical Staff Made Aware yes, spoke with pts DORA Hall who stated pt was appropriate for OT and made aware of outcomes   Assessment   Assessment Pt participated in skilled OT session today addressing the following interventions ADL retraining with proper body mechanics, Patient / Family Education, [employer] Conservation Training, back safety education & training, Activity tolerance training, amd Sitting/ standing balance task to increase EOB/ OOB jt performance tolerance  Patient agreeable to OT treatment session, upon arrival patient was found alert, responsive  and in no apparent distress  Patient demonstrated ability to completed Ind tasks with increase motivation  Pt was very breathless today and needed constant prompting and edu for breath support  Patient continues to be functioning below baseline level, occupational performance remains limited secondary to factors listed above and increased risk for falls and injury  From OT standpoint, recommendation at time of d/c would be post acute rehab services  Patient to benefit from continued Occupational Therapy treatment while in the hospital to address deficits as defined above and maximize level of functional independence with ADLs and functional mobility  Plan   Treatment Interventions ADL retraining; Endurance training;Patient/family training; Compensatory technique education;Continued evaluation;Cardiac education; Energy conservation; Activityengagement   Goal Expiration Date 03/02/21   OT Frequency 3-5x/wk   Recommendation   OT Discharge Recommendation Post-Acute Rehabilitation Services   AM-PAC Daily Activity Inpatient   Lower Body Dressing 2   Bathing 2   Toileting 2   Upper Body Dressing 3   Grooming 3   Eating 4   Daily Activity Raw Score 16   Daily Activity Standardized Score (Calc for Raw Score >=11) 35 96   AM-PAC Applied Cognition Inpatient   Following a Speech/Presentation 4   Understanding Ordinary Conversation 4   Taking Medications 4   Remembering Where Things Are Placed or Put Away 4   Remembering List of 4-5 Errands 4   Taking Care of Complicated Tasks 4   Applied Cognition Raw Score 24   Applied Cognition Standardized Score 62 21   Barthel Index   Feeding 5   Bathing 0   Grooming Score 0   Dressing Score 5   Bladder Score 0   Bowels Score 10   Toilet Use Score 5   Transfers (Bed/Chair) Score 10   Mobility (Level Surface) Score 0   Stairs Score 0   Barthel Index Score 35   Modified [place] Scale   Modified [place] Scale 4     Erinn Angeles MS OTR/L

## 2021-02-22 NOTE — ASSESSMENT & PLAN NOTE
· Patient was given IV Solu-Medrol initially  Continue nebulization  · Pulmonary evaluation appreciated  Solu-Medrol discontinued  · Yesterday she was complaining more short of breath, wheezing    Started on Solu-Medrol 40 mg daily  · Will give tapering dose of steroid on discharge

## 2021-02-22 NOTE — PLAN OF CARE
Problem: PHYSICAL THERAPY ADULT  Goal: Performs mobility at highest level of function for planned discharge setting  See evaluation for individualized goals  Description: Treatment/Interventions: Functional transfer training, LE strengthening/ROM, Elevations, Therapeutic exercise, Endurance training, Patient/family training, Equipment eval/education, Bed mobility, Gait training, Spoke to nursing, OT          See flowsheet documentation for full assessment, interventions and recommendations  Outcome: Progressing  Note: Prognosis: Good  Problem List: Decreased strength, Decreased endurance, Impaired balance, Decreased mobility, Pain, Orthopedic restrictions, Decreased skin integrity  Assessment: Pt seen for PT treatment session this date with interventions consisting of gait training w/ emphasis on improving pt's ability to ambulate level surfaces x 15 ft  with min A provided by therapist with RW and therapeutic activity consisting of training: bed mobility, supine to sit transfers and sit<>stand transfers  Pt agreeable to PT treatment session upon arrival, pt found supine in bed w/ HOB elevated, A&O x 4 alert and responsive  In comparison to previous session, pt with minimal improvements in bed mobility transitions with improving independence; however, overall treatment session limited by ongoing shortness of breath  Post session: pt returned back to recliner, chair alarm engaged, all needs in reach and RN notified of session findings/recommendations  Continue to recommend STR at time of d/c in order to maximize pt's functional independence and safety w/ mobility  Pt continues to be functioning below baseline level, and remains limited 2* factors listed above  PT will continue to see pt while here in order to address the deficits listed above and provide interventions consistent w/ POC in effort to achieve STGs    Barriers to Discharge: Decreased caregiver support, Inaccessible home environment     PT Discharge Recommendation: Post-Acute Rehabilitation Services     PT - OK to Discharge: Yes(when medically cleared; if to STR)    See flowsheet documentation for full assessment

## 2021-02-22 NOTE — PROGRESS NOTES
Progress Note - Vivian Renteria 1940, 78 y o  female MRN: [id number]    Unit/Bed#: -01 Encounter: [id number]    Primary Care Provider: Alejandra Woods MD   Date and time admitted to hospital: 2/17/2021 12:29 AM        * Thoracic compression fracture [facility])  Assessment & Plan  CT thoracolumbar spine   IMPRESSION:  Thoracic compression fractures from T5 to T7 without bony retropulsion  Fractures could be acute/subacute      Degenerative spondylosis as below  No focal neurological deficit noted  Discussed with orthopedics and neurosurgery team   Recommended brace and PT/OT  Pain management  Pain not controlled with tramadol  Will add oxycodone p r n  Dilaudid for breakthrough pain  Physical therapy recommended acute versus short-term rehab  Discussed with case management  MRI thoracic spine-  IMPRESSION:     Compression fracture of T5 (33% height loss approximately) and T6 (approximately 25% height loss  )  There is also superior endplate T2 compression fracture with approximately 10%-20 percent height loss at the superior endplate  These were previously   identified on recent CT February 17, 2021  No evidence of bony retropulsion  Multilevel thoracolumbar degenerative changes without obvious spinal canal stenosis  No signs of not compression  Cleared by orthopedics for discharge  Waiting for acute rehab now    Pressure ulcer  Assessment & Plan  Patient has a small sacral pressure ulcer  Evaluated by wound care  Change position every 2 hourly    Hypercalcemia  Assessment & Plan  Calcium 11 today  PTH 36   Likely from immobilization, nephrology consulted  Started on gentle IV hydration  UTI (urinary tract infection)  Assessment & Plan  Growing E coli pansensitive  Finished 3 days of IV ceftriaxone  Moderate protein-calorie malnutrition ([address] )  Assessment & Plan  Malnutrition Findings:           BMI Findings: Body mass index is 31 98 kg/m²     Moderate protein-calorie malnutrition, in the setting of severe back pain, COPD, and VICKY d/t poor po intake, as evidenced by 15 05 lb/8 14% weight loss in three months, requiring Carb control diet with glue Phillips supplements, monitoring of PO intake, and assistance with meals as needed  Nutritionist consult    Chronic respiratory failure with hypoxia [facility])  Assessment & Plan  Ongoing  Continue supplemental oxygen  At home she is on 3-4 L oxygen at baseline  Secondary to COPD    VICKY (acute kidney injury) ([facility] )  Assessment & Plan  · Creatinine 1 39 on admission  Baseline 0 9-1 05  · Likely pre-renal 2/2 reported poor oral intake/dehydration  · Avoid hypotension and nephrotoxic agents  · Creatinine at baseline now  Torsemide restarted    Chronic diastolic heart failure (HCC)  Assessment & Plan  Wt Readings from Last 3 Encounters:   02/22/21 79 3 kg (174 lb 13 2 oz)   02/20/21 77 kg (169 lb 12 1 oz)   02/11/21 77 1 kg (170 lb)     · Patient euvolemic now  · Restart torsemide home dose        Chronic bilateral thoracic back pain  Assessment & Plan  · Now has acute fracture  See above    SOB (shortness of breath)  Assessment & Plan  · Patient was evaluated by Pulmonary  · Initially steroids discontinued  But now she is wheezing, restarted on IV steroid  · Continue supplemental oxygen    At home she uses 3 L oxygen    Hypothyroidism  Assessment & Plan  · Continue home dose Levothyroxine    Hyperlipidemia  Assessment & Plan  · Continue home dose Lipitor    Type 2 diabetes mellitus with complication, without long-term current use of insulin [facility])  Assessment & Plan  Lab Results   Component Value Date    HGBA1C 7 1 (H) 10/12/2020       Recent Labs     02/21/21  2118 02/22/21  0731 02/22/21  1107 02/22/21  1633   POCGLU 235* 110 258* 175*       Blood Sugar Average: Last 72 hrs:  (P) 424 [id number]   · Hold home dose Glyburide while inpt  · FSBS AC & HS w/ SSI  · Diabetic diet    COPD exacerbation ([facility] )  Assessment & Plan  · Patient was given IV Solu-Medrol initially  Continue nebulization  · Pulmonary evaluation appreciated  Solu-Medrol discontinued  · Yesterday she was complaining more short of breath, wheezing  Started on Solu-Medrol 40 mg daily  · Will give tapering dose of steroid on discharge        VTE Pharmacologic Prophylaxis:   Pharmacologic: Heparin  Mechanical VTE Prophylaxis in Place: Yes    Patient Centered Rounds: I have performed bedside rounds with nursing staff today  Discussions with Specialists or Other Care Team Provider:  Nephrology    Education and Discussions with Family / Patient:  Patient, son, daughter    Time Spent for Care: More than 50% of total time spent on counseling and coordination of care as described above  Current Length of Stay: 4 day(s)    Current Patient Status: Inpatient   Certification Statement: The patient will continue to require additional inpatient hospital stay due to see above    Discharge Plan:  24-48 hours    Code Status: Level 1 - Full Code      Subjective:   I have seen and examined the patient bedside this morning  Patient still complaining about neck pain, shortness of breath  Plan to go to acute rehab  But her calcium level 11 today  I had long discussion with patient and her son/ daughter today  Code status changed to level 3 DNR  POLST form completed and in chart  Objective:     Vitals:   Temp (24hrs), Av °F (36 7 °C), Min:97 7 °F (36 5 °C), Max:98 2 °F (36 8 °C)    Temp:  [97 7 °F (36 5 °C)-98 2 °F (36 8 °C)] 98 2 °F (36 8 °C)  HR:  [77] 77  Resp:  [17-19] 18  BP: (103-135)/(57-64) 103/57  SpO2:  [94 %-97 %] 97 %  Body mass index is 31 98 kg/m²  Input and Output Summary (last 24 hours):        Intake/Output Summary (Last 24 hours) at 2021 1706  Last data filed at 2021 1328  Gross per 24 hour   Intake 240 ml   Output 1260 ml   Net -1020 ml       Physical Exam:     Physical Exam  General- Awake, alert and oriented x 3, looks uncomfortable  HEENT- Normocephalic, atraumatic  CVS- Normal S1/ S2, Regular rate and rhythm  Respiratory system- mild wheezing  Abdomen- Soft, Non distended, no tenderness, Bowel sound- present  CNS- No acute focal neurologic deficit noted    Additional Data:     Labs:    Results from last 7 days   Lab Units 02/20/21  0603   WBC Thousand/uL 11 55*   HEMOGLOBIN g/dL 10 6*   HEMATOCRIT % 32 6*   PLATELETS Thousands/uL 177   NEUTROS PCT % 64   LYMPHS PCT % 25   MONOS PCT % 8   EOS PCT % 2     Results from last 7 days   Lab Units 02/22/21  0715  02/17/21  0056   SODIUM mmol/L 137   < > 137   POTASSIUM mmol/L 5 2   < > 4 2   CHLORIDE mmol/L 99*   < > 95*   CO2 mmol/L 32   < > 34*   BUN mg/dL 30*   < > 30*   CREATININE mg/dL 0 94   < > 1 39*   ANION GAP mmol/L 6   < > 8   CALCIUM mg/dL 11 0*   < > 10 6*   ALBUMIN g/dL  --   --  3 4*   TOTAL BILIRUBIN mg/dL  --   --  0 40   ALK PHOS U/L  --   --  133*   ALT U/L  --   --  26   AST U/L  --   --  29   GLUCOSE RANDOM mg/dL 115   < > 112    < > = values in this interval not displayed  Results from last 7 days   Lab Units 02/22/21  1633 02/22/21  1107 02/22/21  0731 02/21/21  2118 02/21/21  1114 02/21/21  0649 02/20/21  2124 02/20/21  1717 02/20/21  1137 02/20/21  0644 02/19/21  2059 02/19/21  1928   POC GLUCOSE mg/dl 175* 258* 110 235* 131 127 105 96 142* 96 135 183*         Results from last 7 days   Lab Units 02/18/21  0859 02/17/21  1159   PROCALCITONIN ng/ml 0 18 0 22           * I Have Reviewed All Lab Data Listed Above  * Additional Pertinent Lab Tests Reviewed:  All Labs Within Last 24 Hours Reviewed    Imaging:    Imaging Reports Reviewed Today Include:   Imaging Personally Reviewed by Myself Includes:      Recent Cultures (last 7 days):     Results from last 7 days   Lab Units 02/17/21  1421   URINE CULTURE  >100,000 cfu/ml Escherichia coli*       Last 24 Hours Medication List:   Current Facility-Administered Medications   Medication Dose Route Frequency Provider Last Rate    acetaminophen  975 mg Oral Q6H [address] [place], [place]      albuterol  2 puff Inhalation Q6H PRN Dante RANJAN Alvarez      ALPRAZolam  0 25 mg Oral TID PRN Stella Webster MD      aluminum-magnesium hydroxide-simethicone  30 mL Oral Q6H PRN [facility], PA-C      atorvastatin  80 mg Oral Daily [place], [place]      benzonatate  100 mg Oral TID PRN [facility], PA-DIYA      fluticasone-vilanterol  1 puff Inhalation Daily [place], [place]      guaiFENesin  600 mg Oral Q12H [address] [place], [place]      heparin (porcine)  5,000 Units Subcutaneous [place], [place]      HYDROmorphone  0 5 mg Intravenous Q4H PRN Stella Webster MD      insulin lispro  1-6 Units Subcutaneous TID AC Mariluz Dangelo PA-C      insulin lispro  1-6 Units Subcutaneous HS [facility]      ipratropium  0 5 mg Nebulization TID Stella Webster MD      levalbuterol  1 25 mg Nebulization TID Stella Webster MD      levothyroxine  125 mcg Oral Daily [facility], PA-DIYA      lidocaine  2 patch Topical Daily [place], [place]      melatonin  6 mg Oral HS PRN [facility], PA-DIYA      methocarbamol  500 mg Oral Q6H PRN Dante RANJAN Alvarez      methylPREDNISolone sodium succinate  40 mg Intravenous Daily Stella Webster MD      montelukast  10 mg Oral HS [facility]      oxyCODONE  5 mg Oral Q6H PRN Stella Webster MD      pantoprazole  40 mg Oral Early Morning Stella Webster MD      polyethylene glycol  17 g Oral Daily [facility], PA-C      saccharomyces boulardii  250 mg Oral BID Stella Webster MD      senna  1 tablet Oral HS [facility], PA-DIYA      sodium chloride  75 mL/hr Intravenous Continuous Son Grady MD 75 mL/hr (02/22/21 1134)    torsemide  10 mg Oral Daily Stella Webster MD      traMADol  50 mg Oral Q6H PRN Stella Webster MD          Today, Patient Was Seen By: Martir Jimenez MD    ** Please Note: Dictation voice to text software may have been used in the creation of this document   **

## 2021-02-22 NOTE — ASSESSMENT & PLAN NOTE
CT thoracolumbar spine   IMPRESSION:  Thoracic compression fractures from T5 to T7 without bony retropulsion  Fractures could be acute/subacute      Degenerative spondylosis as below  No focal neurological deficit noted  Discussed with orthopedics and neurosurgery team   Recommended brace and PT/OT  Pain management  Pain not controlled with tramadol  Will add oxycodone p r n  Dilaudid for breakthrough pain  Physical therapy recommended acute versus short-term rehab  Discussed with case management  MRI thoracic spine-  IMPRESSION:     Compression fracture of T5 (33% height loss approximately) and T6 (approximately 25% height loss  )  There is also superior endplate T2 compression fracture with approximately 10%-20 percent height loss at the superior endplate  These were previously   identified on recent CT February 17, 2021  No evidence of bony retropulsion  Multilevel thoracolumbar degenerative changes without obvious spinal canal stenosis  No signs of not compression  Cleared by orthopedics for discharge    Waiting for acute rehab now

## 2021-02-22 NOTE — PLAN OF CARE
Problem: PAIN - ADULT  Goal: Verbalizes/displays adequate comfort level or baseline comfort level  Description: Interventions:  - Encourage patient to monitor pain and request assistance  - Assess pain using appropriate pain scale  - Administer analgesics based on type and severity of pain and evaluate response  - Implement non-pharmacological measures as appropriate and evaluate response  - Consider cultural and social influences on pain and pain management  - Notify physician/advanced practitioner if interventions unsuccessful or patient reports new pain  Outcome: Progressing     Problem: INFECTION - ADULT  Goal: Absence or prevention of progression during hospitalization  Description: INTERVENTIONS:  - Assess and monitor for signs and symptoms of infection  - Monitor lab/diagnostic results  - Monitor all insertion sites, i e  indwelling lines, tubes, and drains  - Monitor endotracheal if appropriate and nasal secretions for changes in amount and color  - [place]oles appropriate cooling/warming therapies per order  - Administer medications as ordered  - Instruct and encourage patient and family to use good hand hygiene technique  - Identify and instruct in appropriate isolation precautions for identified infection/condition  Outcome: Progressing  Goal: Absence of fever/infection during neutropenic period  Description: INTERVENTIONS:  - Monitor WBC    Outcome: Progressing     Problem: SAFETY ADULT  Goal: Patient will remain free of falls  Description: INTERVENTIONS:  - Assess patient frequently for physical needs  -  Identify cognitive and physical deficits and behaviors that affect risk of falls    -  [place]oles fall precautions as indicated by assessment   - Educate patient/family on patient safety including physical limitations  - Instruct patient to call for assistance with activity based on assessment  - Modify environment to reduce risk of injury  - Consider OT/PT consult to assist with strengthening/mobility  Outcome: Progressing  Goal: Maintain or return to baseline ADL function  Description: INTERVENTIONS:  -  Assess patient's ability to carry out ADLs; assess patient's baseline for ADL function and identify physical deficits which impact ability to perform ADLs (bathing, care of mouth/teeth, toileting, grooming, dressing, etc )  - Assess/evaluate cause of self-care deficits   - Assess range of motion  - Assess patient's mobility; develop plan if impaired  - Assess patient's need for assistive devices and provide as appropriate  - Encourage maximum independence but intervene and supervise when necessary  - Involve family in performance of ADLs  - Assess for home care needs following discharge   - Consider OT consult to assist with ADL evaluation and planning for discharge  - Provide patient education as appropriate  Outcome: Progressing  Goal: Maintain or return mobility status to optimal level  Description: INTERVENTIONS:  - Assess patient's baseline mobility status (ambulation, transfers, stairs, etc )    - Identify cognitive and physical deficits and behaviors that affect mobility  - Identify mobility aids required to assist with transfers and/or ambulation (gait belt, sit-to-stand, lift, walker, cane, etc )  - [place]ks fall precautions as indicated by assessment  - Record patient progress and toleration of activity level on Mobility SBAR; progress patient to next Phase/Stage  - Instruct patient to call for assistance with activity based on assessment  - Consider rehabilitation consult to assist with strengthening/weightbearing, etc   Outcome: Progressing     Problem: DISCHARGE PLANNING  Goal: Discharge to home or other facility with appropriate resources  Description: INTERVENTIONS:  - Identify barriers to discharge w/patient and caregiver  - Arrange for needed discharge resources and transportation as appropriate  - Identify discharge learning needs (meds, wound care, etc )  - Arrange for interpretive services to assist at discharge as needed  - Refer to Case Management Department for coordinating discharge planning if the patient needs post-hospital services based on physician/advanced practitioner order or complex needs related to functional status, cognitive ability, or social support system  Outcome: Progressing     Problem: Knowledge Deficit  Goal: Patient/family/caregiver demonstrates understanding of disease process, treatment plan, medications, and discharge instructions  Description: Complete learning assessment and assess knowledge base  Interventions:  - Provide teaching at level of understanding  - Provide teaching via preferred learning methods  Outcome: Progressing     Problem: Nutrition/Hydration-ADULT  Goal: Nutrient/Hydration intake appropriate for improving, restoring or maintaining nutritional needs  Description: Monitor and assess patient's nutrition/hydration status for malnutrition  Collaborate with interdisciplinary team and initiate plan and interventions as ordered  Monitor patient's weight and dietary intake as ordered or per policy  Utilize nutrition screening tool and intervene as necessary  Determine patient's food preferences and provide high-protein, high-caloric foods as appropriate       INTERVENTIONS:  - Monitor oral intake, urinary output, labs, and treatment plans  - Assess nutrition and hydration status and recommend course of action  - Evaluate amount of meals eaten  - Assist patient with eating if necessary   - Allow adequate time for meals  - Recommend/ encourage appropriate diets, oral nutritional supplements, and vitamin/mineral supplements  - Order, calculate, and assess calorie counts as needed  - Recommend, monitor, and adjust tube feedings and TPN/PPN based on assessed needs  - Assess need for intravenous fluids  - Provide specific nutrition/hydration education as appropriate  - Include patient/family/caregiver in decisions related to nutrition  Outcome: Progressing     Problem: Potential for Falls  Goal: Patient will remain free of falls  Description: INTERVENTIONS:  - Assess patient frequently for physical needs  -  Identify cognitive and physical deficits and behaviors that affect risk of falls    -  [place] fall precautions as indicated by assessment   - Educate patient/family on patient safety including physical limitations  - Instruct patient to call for assistance with activity based on assessment  - Modify environment to reduce risk of injury  - Consider OT/PT consult to assist with strengthening/mobility  Outcome: Progressing     Problem: Prexisting or High Potential for Compromised Skin Integrity  Goal: Skin integrity is maintained or improved  Description: INTERVENTIONS:  - Identify patients at risk for skin breakdown  - Assess and monitor skin integrity  - Assess and monitor nutrition and hydration status  - Monitor labs   - Assess for incontinence   - Turn and reposition patient  - Assist with mobility/ambulation  - Relieve pressure over bony prominences  - Avoid friction and shearing  - Provide appropriate hygiene as needed including keeping skin clean and dry  - Evaluate need for skin moisturizer/barrier cream  - Collaborate with interdisciplinary team   - Patient/family teaching  - Consider wound care consult   Outcome: Progressing

## 2021-02-22 NOTE — PLAN OF CARE
Problem: OCCUPATIONAL THERAPY ADULT  Goal: Performs self-care activities at highest level of function for planned discharge setting  See evaluation for individualized goals  Description: Treatment Interventions: ADL retraining, Functional transfer training, UE strengthening/ROM, Endurance training, Equipment evaluation/education, Patient/family training, Compensatory technique education, Continued evaluation, Energy conservation, Activityengagement          See flowsheet documentation for full assessment, interventions and recommendations  Note: Limitation: Decreased ADL status, Decreased UE strength, Decreased endurance, Decreased self-care trans, Decreased high-level ADLs  Prognosis: Good  Assessment: Pt participated in skilled OT session today addressing the following interventions ADL retraining with proper body mechanics, Patient / Family Education, Engergy Conservation Training, back safety education & training, Activity tolerance training, amd Sitting/ standing balance task to increase EOB/ OOB jt performance tolerance  Patient agreeable to OT treatment session, upon arrival patient was found alert, responsive  and in no apparent distress  Patient demonstrated ability to completed Ind tasks with increase motivation  Pt was very breathless today and needed constant prompting and edu for breath support  Patient continues to be functioning below baseline level, occupational performance remains limited secondary to factors listed above and increased risk for falls and injury  From OT standpoint, recommendation at time of d/c would be post acute rehab services  Patient to benefit from continued Occupational Therapy treatment while in the hospital to address deficits as defined above and maximize level of functional independence with ADLs and functional mobility       OT Discharge Recommendation: Post-Acute Rehabilitation Services

## 2021-02-22 NOTE — PHYSICAL THERAPY NOTE
Physical Therapy Treatment Note       02/22/21 0905   PT Last Visit   PT Visit Date 02/22/21   Note Type   Note Type Treatment   Pain Assessment   Pain Assessment Tool 0-10   Pain Score 6   Pain Location/Orientation Orientation: Lower; Location: Chest   Pain Onset/Description Onset: Ongoing;Frequency: Intermittent  ("when I can't breathe")   Hospital Pain Intervention(s) Repositioned; Ambulation/increased activity   Multiple Pain Sites Yes   Pain 2   Garcia-[place]s FACES Pain Rating 2 2   Pain Location/Orientation 2 Orientation: Mid;Location: Back   Pain Onset/Description 2 Onset: Ongoing;Frequency: Intermittent   Hospital Pain Intervention(s) 2 Repositioned   Restrictions/Precautions   Weight Bearing Precautions Per Order Yes   RUE Weight Bearing Per Order WBAT  (per orthopedics 2/21/21)   LUE Weight Bearing Per Order WBAT  (per orthopedics 2/21/21)   RLE Weight Bearing Per Order WBAT  (per orthopedics 2/21/21)   LLE Weight Bearing Per Order WBAT  (per orthopedics 2/21/21)   Braces or Orthoses TLSO   Other Precautions Chair Alarm; Bed Alarm; Fall Risk;Pain;Multiple lines;O2;Spinal precautions  (back safety precaution; log roll; TLSO)   General   Chart Reviewed Yes   Response to Previous Treatment Patient with no complaints from previous session  Family/Caregiver Present No   Cognition   Overall Cognitive Status WFL   Arousal/Participation Alert; Responsive; Cooperative   Attention Within functional limits   Orientation Level Oriented X4   Memory Within functional limits   Following Commands Follows all commands and directions without difficulty   Comments patient agreeable to PT treatment   Subjective   Subjective "I just got comfortable"   Bed Mobility   Rolling R 4  Minimal assistance  (log roll technique)   Additional items Assist x 1; Increased time required;Verbal cues;LE management;HOB elevated; Bedrails   Supine to Sit 4  Minimal assistance  (log roll technique)   Additional items Assist x 1; Increased time required;Verbal cues;LE management;HOB elevated   Transfers   Sit to Stand 4  Minimal assistance   Additional items Assist x 1; Increased time required;Verbal cues   Stand to Sit 4  Minimal assistance   Additional items Assist x 1; Increased time required;Verbal cues;Armrests   Ambulation/Elevation   Gait pattern Decreased foot clearance; Excessively slow; Short stride   Gait Assistance 4  Minimal assist   Additional items Assist x 1;Verbal cues   Assistive Device Rolling walker   Distance 15 ft  Stair Management Assistance Not tested   Balance   Static Sitting Fair +   Dynamic Sitting Fair   Static Standing Fair -   Dynamic Standing Poor +   Ambulatory Poor +   Endurance Deficit   Endurance Deficit Yes   Activity Tolerance   Activity Tolerance Patient limited by fatigue;Patient limited by pain  (SOB (shortness of breath))   Nurse Made Aware DORA Reagan made aware of session outcomes   Assessment   Prognosis Good   Problem List Decreased strength;Decreased endurance; Impaired balance;Decreased mobility;Pain;Orthopedic restrictions;Decreased skin integrity   Assessment Pt seen for PT treatment session this date with interventions consisting of gait training w/ emphasis on improving pt's ability to ambulate level surfaces x 15 ft  with min A provided by therapist with RW and therapeutic activity consisting of training: bed mobility, supine to sit transfers and sit<>stand transfers  Pt agreeable to PT treatment session upon arrival, pt found supine in bed w/ HOB elevated, A&O x 4 alert and responsive  In comparison to previous session, pt with minimal improvements in bed mobility transitions with improving independence; however, overall treatment session limited by ongoing shortness of breath  Post session: pt returned back to recliner, chair alarm engaged, all needs in reach and RN notified of session findings/recommendations   Continue to recommend STR at time of d/c in order to maximize pt's functional independence and safety w/ mobility  Pt continues to be functioning below baseline level, and remains limited 2* factors listed above  PT will continue to see pt while here in order to address the deficits listed above and provide interventions consistent w/ POC in effort to achieve STGs  Barriers to Discharge Decreased caregiver support; Inaccessible home environment   Goals   Patient Goals "to get better and go home"   STG Expiration Date 02/27/21   PT Treatment Day 2   Plan   Treatment/Interventions Functional transfer training;LE strengthening/ROM; Elevations; Therapeutic exercise; Endurance training;Patient/family training;Equipment eval/education; Bed mobility;Gait training;Spoke to nursing   Progress Slow progress, decreased activity tolerance   PT Frequency Other (Comment)  (3-5x/wk)   Recommendation   PT Discharge Recommendation Post-Acute Rehabilitation Services   PT - OK to Discharge Yes  (when medically cleared; if to STR)   [address] [place] [address] Mobility Inpatient   Turning in Bed Without Bedrails 3   Lying on Back to Sitting on Edge of Flat Bed 3   Moving Bed to Chair 3   Standing Up From Chair 3   Walk in Room 3   Climb 3-5 Stairs 2   Basic Mobility Inpatient Raw Score 17   Basic Mobility Standardized Score 39 67       Suzy Vance, PT, DPT    Time of PT treatment session: 08:40-09:05  25 minutes

## 2021-02-22 NOTE — ASSESSMENT & PLAN NOTE
Malnutrition Findings:           BMI Findings: Body mass index is 31 98 kg/m²  Moderate protein-calorie malnutrition, in the setting of severe back pain, COPD, and VICKY d/t poor po intake, as evidenced by 15 05 lb/8 14% weight loss in three months, requiring Carb control diet with glue Phillips supplements, monitoring of PO intake, and assistance with meals as needed    Nutritionist consult

## 2021-02-22 NOTE — DISCHARGE INSTR - OTHER ORDERS
1  Cleanse mid sacrum with NSS, pat dry, and apply sacral alleyvn foam dressing  Change every other day and as needed for soilage/disldogement  2  Apply skin nourishing cream the entire skin daily for moisture  3  Turn and reposition patient every  2 hours   4  Elevate heels off of bed with pillows to offload pressure   5  Apply EHOB waffle cushion to chair when OOB, if able  6  Allevyn foam to heels, fadia w/P, peel foam check skin integrity q-shift  Change q3d   7  Follow-up with the [facility] wound care center as an outpatient - please call [phone] for an appointment

## 2021-02-22 NOTE — ASSESSMENT & PLAN NOTE
Patient has a small sacral pressure ulcer  Evaluated by wound care    Change position every 2 hourly

## 2021-02-22 NOTE — WOUND OSTOMY CARE
Progress Note - Wound   Gaudencio Shelter 78 y o  female MRN: [id number]  Unit/Bed#: -01 Encounter: [id number]      Assessment:  Wound care consulted for assessment of pressure injury  Patient seen in bed, cooperative and agreeable for the assessment  Patient admitted with thoracic compression fracture  History of - VICKY, COPD, DM, and CHF  Patient is alert and oriented x 4, anxious  Patient is dependent for care  Max assist of one with turning for the assessment  Incontinent of urine - purewick in use  Incontinent of stool per notes  Foam wedges in use for repositioning  Will recommend p-500 mattress and nutrition consult for additional support  B/l heels and buttocks are intact with no redness or wounds  1  Mid sacrum - stage 3 pressure injury - GANN  Wound is oval shaped noted over a bony prominence  Wound bed is approx: 40% moist thin yellow tissue and 60% moist pink/red tissue  Edges fragile and attached, no maceration  Elin-wound is intact with blanchable pink and blanchable hyperpigmented skin  Scant serosanguineous drainage    -as incontinence is being managed via purewick will recommend foam dressing to sacrum  Educated patient on the importance of frequent offloading of pressure via turning, repositioning and weight-shifting  Verbalized understanding of plan of care  No induration, fluctuance, odor, warmth, redness, or purulence noted to the above noted wounds  New dressings applied  Patient tolerated well- denies pain to the wound  Primary nurse aware of plan of care  See flow sheets for more detailed assessment findings  Will follow along  Updated unit manager on assessment findings  Updated Dr Aleksander Mena with [facility] regarding assessment findings and plan  Plan:   1  Cleanse mid sacrum with NSS, pat dry, and apply sacral alleyvn foam dressing  Change every other day and as needed for soilage/disldogement  2  Apply skin nourishing cream the entire skin daily for moisture  3   Turn and reposition patient every  2 hours   4  Elevate heels off of bed with pillows to offload pressure   5  Apply EHOB waffle cushion to chair when OOB, if able  6  Allevyn foam to heels, fadia w/P, peel foam check skin integrity q-shift  Change q3d   7  p-500 mattress  8  Nutrition consult   9  Follow-up with the [facility] wound care center as an outpatient - please call [phone] for an appointment  10  Wound care will follow along with patient weekly, please call with questions and concerns    Objective:    Vitals: Blood pressure 135/64, pulse 70, temperature 98 2 °F (36 8 °C), resp  rate 19, height 5' 2" (1 575 m), weight 79 3 kg (174 lb 13 2 oz), SpO2 96 %, not currently breastfeeding  ,Body mass index is 31 98 kg/m²  Wound 02/21/21 Pressure Injury Sacrum (Active)   Wound Image    02/22/21 0819   Wound Description Beefy red;Pink;Yellow;Slough 02/22/21 0819   Pressure Injury Stage 3 02/22/21 0819   Elin-wound Assessment Dry; Intact; Hyperpigmented;Pink 02/22/21 0819   Wound Length (cm) 0 4 cm 02/22/21 0819   Wound Width (cm) 0 3 cm 02/22/21 0819   Wound Depth (cm) 0 2 cm 02/22/21 0819   Wound Surface Area (cm^2) 0 12 cm^2 02/22/21 0819   Wound Volume (cm^3) 0 02 cm^3 02/22/21 0819   Calculated Wound Volume (cm^3) 0 02 cm^3 02/22/21 0819   Tunneling 0 cm 02/22/21 0819   Tunneling in depth located at 0 02/22/21 0819   Undermining 0 02/22/21 0819   Undermining is depth extending from 0 02/22/21 0819   Wound Site Closure None 02/22/21 0819   Non-staged Wound Description Full thickness 02/22/21 0819   Treatments Cleansed;Irrigation with NSS;Site care;Elevated 02/22/21 0819   Dressing Foam, Silicon (eg  Allevyn, etc) 02/22/21 0819   Wound packed? No 02/22/21 0819   Packing- # removed 0 02/22/21 0819   Packing- # inserted 0 02/22/21 0819   Dressing Changed New 02/22/21 0819   Patient Tolerance Tolerated well 02/22/21 0819   Dressing Status Clean;Dry; Intact 02/22/21 0819     Recommendations written as orders  AVS updated    Ynes Drew RN BSN CWON

## 2021-02-22 NOTE — CONSULTS
NEPHROLOGY CONSULTATION NOTE    Patient: Montana Mcmillan               Sex: female          DOA: 2/17/2021 12:29 AM   Date of Birth: @        Age: @        LOS:  LOS: 4 days     REFERRING PHYSICIAN: Dr Nelda Wolff / Rachael La:  Hypercalcemia    DATE OF CONSULTATION / SERVICE: 2/22/2021    ADMISSION DIAGNOSIS: Thoracic compression fracture [facility])     Chief Complaint   Patient presents with    Shortness of Breath     Been bothering her for months, worsened yesterday into today  HPI     Ian Faulkner is a 78year old female with past medical history significant for chronic respiratory failure on 2L O2, COPD, diastolic CHF, HTN, DM2 not on insulin, and CKD stage 3 with BL creatinine of 0 9-1 05, who is admitted for worsened shortness of breath in setting of COPD exacerbation for which patient is receiving treatment with IV steroids and nebulizer  Patient was found to have VICKY on presentation which was felt to be most likely due to dehydration in setting of poor oral intake and has since resolved with fluid hydration  CTA chest abdomen pelvis on admission was negative for PE or pneumonia, but revealed compression fracture of T5 and T6  Follow up MRI was negative for cord compression  Patient is being conservatively managed with back brace, pain management and physical therapy  Imaging further revealed incidental 2 5 cm left adrenal nodule, duplicated left collection system and 3 4 cyst in the right kidney  Patient was also found to have a UTI with pansensitive E  Coli on admission for which she has completed 3 days of IV ceftriaxone  Plan is for patient to go for post acute rehab and nephrology was consulted for persistent mild hyercalcemia  Currently patient complaints of worsening shortness of breath and thoracic chest pain  Also reports poor appetite since onset of the back pain     She currently denies nausea, vomiting, headache, dizziness, abdominal pain, constipation or rash  She denies blood in stool or urine  She never had a colonoscopy   She had regular pap-smears and mammograms until age 77 which were normal      PAST MEDICAL HISTORY     Past Medical History:   Diagnosis Date    Cataract     CHF (congestive heart failure) (HCC)     Leukocytosis        PAST SURGICAL HISTORY     Past Surgical History:   Procedure Laterality Date    APPENDECTOMY      CYSTOCELE REPAIR      ANTERIOR COLPORRHAPHY     ORIF ANKLE FRACTURE Left     TOTAL ABDOMINAL HYSTERECTOMY         ALLERGIES     Allergies   Allergen Reactions    Erythromycin     Sulfa Antibiotics        SOCIAL HISTORY     Social History     Substance and Sexual Activity   Alcohol Use Not Currently    Alcohol/week: 4 0 standard drinks    Types: 4 Cans of beer per week    Frequency: 2-3 times a week    Drinks per session: 1 or 2    Binge frequency: Never     Social History     Substance and Sexual Activity   Drug Use No     Social History     Tobacco Use   Smoking Status Former Smoker    Packs/day: 2 00    Years: 48 00    Pack years: 96 00    Types: Cigarettes    Quit date: 1999    Years since quittin 4   Smokeless Tobacco Never Used       FAMILY HISTORY     Family History   Problem Relation Age of Onset    No Known Problems Mother     No Known Problems Father        CURRENT MEDICATIONS       Current Facility-Administered Medications:     acetaminophen (TYLENOL) tablet 975 mg, 975 mg, Oral, Q6H [facility], Jeffery Adams PA-C, 975 mg at 21 1498    albuterol (PROVENTIL HFA,VENTOLIN HFA) inhaler 2 puff, 2 puff, Inhalation, Q6H PRN, Jeffery Adams PA-C, 2 puff at 21 0804    aluminum-magnesium hydroxide-simethicone (MYLANTA) oral suspension 30 mL, 30 mL, Oral, Q6H PRN, Jeffery Adams PA-C    atorvastatin (LIPITOR) tablet 80 mg, 80 mg, Oral, Daily, Mariluz Dangelo PA-C, 80 mg at 21 1746    benzonatate (TESSALON PERLES) capsule 100 mg, 100 mg, Oral, TID PRN, Aditi Chauhan PA-C    fluticasone-vilanterol (BREO ELLIPTA) 100-25 mcg/inh inhaler 1 puff, 1 puff, Inhalation, Daily, Aditi Chauhan PA-C, 1 puff at 02/22/21 0805    guaiFENesin (MUCINEX) 12 hr tablet 600 mg, 600 mg, Oral, Q12H [address], Aditi Chauhan PA-C, 600 mg at 02/22/21 8197    heparin (porcine) subcutaneous injection 5,000 Units, 5,000 Units, Subcutaneous, Q8H [address], 5,000 Units at 02/22/21 0605 **AND** [COMPLETED] Platelet count, , , Once, Aditi Chauhan PA-C    HYDROmorphone (DILAUDID) injection 0 5 mg, 0 5 mg, Intravenous, Q4H PRN, Gill Crisostomo MD    insulin lispro (HumaLOG) 100 units/mL subcutaneous injection 1-6 Units, 1-6 Units, Subcutaneous, TID AC, 2 Units at 02/17/21 1707 **AND** Fingerstick Glucose (POCT), , , TID AC, Mariluz Dangelo PA-C    insulin lispro (HumaLOG) 100 units/mL subcutaneous injection 1-6 Units, 1-6 Units, Subcutaneous, HS, Aditi Chauhan PA-C, 3 Units at 02/21/21 2158    ipratropium (ATROVENT) 0 02 % inhalation solution 0 5 mg, 0 5 mg, Nebulization, TID, Gill Crisostomo MD, 0 5 mg at 02/22/21 0729    levalbuterol (Elin Harps) inhalation solution 1 25 mg, 1 25 mg, Nebulization, TID, Gill Crisostomo MD, 1 25 mg at 02/22/21 1633    levothyroxine tablet 125 mcg, 125 mcg, Oral, Daily, Talha Dangelo PA-C, 125 mcg at 02/22/21 0605    lidocaine (LIDODERM) 5 % patch 2 patch, 2 patch, Topical, Daily, Aditi Chauhan PA-C, 2 patch at 02/22/21 5343    melatonin tablet 6 mg, 6 mg, Oral, HS PRN, Aditi Chauhan PA-C, 6 mg at 02/21/21 2157    methocarbamol (ROBAXIN) tablet 500 mg, 500 mg, Oral, Q6H PRN, Aditi Chauhan PA-C, 500 mg at 02/20/21 1722    methylPREDNISolone sodium succinate (Solu-MEDROL) injection 40 mg, 40 mg, Intravenous, Daily, Gill Crisostomo MD, 40 mg at 02/22/21 0814    montelukast (SINGULAIR) tablet 10 mg, 10 mg, Oral, HS, Aditi Chauhan PA-C, 10 mg at 02/21/21 2157    oxyCODONE (ROXICODONE) IR tablet 5 mg, 5 mg, Oral, Q6H PRN, Yazmin Urbina Sunny Walker MD, 5 mg at 02/21/21 1806    pantoprazole (PROTONIX) EC tablet 40 mg, 40 mg, Oral, Early Morning, Edi Byers MD, 40 mg at 02/22/21 0605    polyethylene glycol (MIRALAX) packet 17 g, 17 g, Oral, Daily, Mariluz Dangelo PA-C    saccharomyces boulardii (FLORASTOR) capsule 250 mg, 250 mg, Oral, BID, Edi Byers MD, 250 mg at 02/22/21 7170    senna (SENOKOT) tablet 8 6 mg, 1 tablet, Oral, HS, Ethan Poole PA-C, 8 6 mg at 02/21/21 2157    torsemide (DEMADEX) tablet 10 mg, 10 mg, Oral, Daily, Edi Byers MD, 10 mg at 02/22/21 2750    traMADol (ULTRAM) tablet 50 mg, 50 mg, Oral, Q6H PRN, Edi Byers MD    REVIEW OF SYSTEMS     Complete 10 points of review of systems were obtained and discussed in length with patient today  Complete 10 points of review of systems were negative/unremarkable except mentioned in the HPI section  OBJECTIVE     Current Weight: Weight - Scale: 79 3 kg (174 lb 13 2 oz)  /64   Pulse 70   Temp 98 2 °F (36 8 °C)   Resp 19   Ht 5' 2" (1 575 m)   Wt 79 3 kg (174 lb 13 2 oz)   SpO2 96%   BMI 31 98 kg/m²   Vitals:    02/22/21 0733   BP: 135/64   Pulse:    Resp: 19   Temp: 98 2 °F (36 8 °C)   SpO2:      Body mass index is 31 98 kg/m²  Intake/Output Summary (Last 24 hours) at 2/22/2021 0936  Last data filed at 2/22/2021 6426  Gross per 24 hour   Intake 240 ml   Output 2100 ml   Net -1860 ml       PHYSICAL EXAMINATION     Physical Exam  Constitutional:       General: She is in acute distress (respiratory)  Appearance: Normal appearance  She is ill-appearing (chronically ill appearing)  HENT:      Head: Normocephalic and atraumatic  Mouth/Throat:      Mouth: Mucous membranes are moist       Pharynx: Oropharynx is clear  Comments: Mildly swollen gums lower jaw  Eyes:      General: No scleral icterus  Conjunctiva/sclera: Conjunctivae normal    Neck:      Musculoskeletal: Normal range of motion and neck supple  Cardiovascular:      Rate and Rhythm: Regular rhythm  Tachycardia present  Pulses: Normal pulses  Heart sounds: Normal heart sounds  Pulmonary:      Effort: Tachypnea and accessory muscle usage present  Breath sounds: Decreased air movement present  Abdominal:      General: Abdomen is flat  Bowel sounds are normal       Palpations: Abdomen is soft  Tenderness: There is no abdominal tenderness  Musculoskeletal:      Right lower leg: No edema  Left lower leg: No edema  Skin:     General: Skin is warm and dry  Neurological:      General: No focal deficit present  Mental Status: She is alert and oriented to person, place, and time  Psychiatric:         Attention and Perception: Attention normal          Mood and Affect: Mood is depressed           Speech: Speech normal          Behavior: Behavior normal            LAB RESULTS        Results from last 7 days   Lab Units 02/22/21  0715 02/21/21  1002 02/20/21  0603 02/19/21  0515 02/18/21  0859 02/17/21  1159 02/17/21  0056   WBC Thousand/uL  --   --  11 55* 10 34* 13 57*  --  11 61*   HEMOGLOBIN g/dL  --   --  10 6* 10 5* 10 5*  --  12 2   HEMATOCRIT %  --   --  32 6* 32 9* 31 8*  --  38 2   PLATELETS Thousands/uL  --   --  177 183 188 183 204   SODIUM mmol/L 137 140 141 141 139 138 137   POTASSIUM mmol/L 5 2 5 3 4 4 4 1 4 4 4 8 4 2   CHLORIDE mmol/L 99* 102 104 104 100 97* 95*   CO2 mmol/L 32 33* 32 33* 31 34* 34*   BUN mg/dL 30* 25 27* 30* 33* 28* 30*   CREATININE mg/dL 0 94 1 10 0 99 1 09 1 36* 1 30 1 39*   EGFR ml/min/1 73sq m 57 47 53 47 36 38 35   CALCIUM mg/dL 11 0* 10 6* 10 2* 10 3* 10 2* 10 3* 10 6*       I have personally reviewed the old medical records and patient's previously known baseline creatinine level is ~ 0 9 - 1 05    RADIOLOGY RESULTS     MRI thoracic spine wo contrast   Final Result by Chevy Kwok MD (02/20 2012)      Compression fracture of T5 (33% height loss approximately) and T6 (approximately 25% height loss  )  There is also superior endplate T2 compression fracture with approximately 10%-20 percent height loss at the superior endplate  These were previously    identified on recent CT February 17, 2021  No evidence of bony retropulsion  Multilevel thoracolumbar degenerative changes without obvious spinal canal stenosis  Workstation performed: [id number]         CT spine thoracic & lumbar wo contrast   Final Result by Da Abbott MD (02/18 1204)   Thoracic compression fractures from T5 to T7 without bony retropulsion  Fractures could be acute/subacute  Degenerative spondylosis as below  Workstation performed: [id number]         PE Study with CT Abdomen and Pelvis with contrast   Final Result by Almaz Baez DO (02/17 0310)      Moderate pulmonary emphysematous changes are noted bilaterally  No pulmonary embolus or acute pulmonary process is seen  Prominence of the pulmonary arteries bilaterally may suggest a component of pulmonary arterial hypertension  Age-indeterminate compression fracture of T5 with approximately 33% loss of height and of T6 with approximately 25% loss of height  Spinal alignment appears maintained  Correlation with the patient's symptoms recommended  Coronary atherosclerosis, left adrenal nodule, right renal cyst, duplicated left renal collecting system, colonic diverticulosis without evidence of acute diverticulitis, and other findings as above  Workstation performed: [id number]         XR chest 1 view portable   Final Result by Lewis Enrique MD (02/17 4696)      No acute cardiopulmonary disease  Continued mild elevation of the right hemidiaphragm  Mild relative lucency of the right lung may be related to technique  There is no pneumothorax evident  Workstation performed: [id number]             PLAN / RECOMMENDATIONS      1  Hypercalcemia     · Calcium has been mildly elevated since admission and trending up to 11 0 today  Calcium was normal in outpatient labs  Last measurement in November 2020 was 10 0  · Albumin on admission was mildly reduced at 3 4  · Patient is admitted with acute compression fractures of T5 and T6 and imaging shows multilevel degenerative changes  · Patient does have history of lytic lesion in 9th rib and left adrenal mass for which she follows with [facility] hem/onc  · Patient denies use of calcium or vitamin D supplements  · Patient reports significant weigth loss over past few weeks due to poor appetite which she blames on severe back pain  · PTH low-normal at 36 9  · Vitamin D panel is pending  Plan:  · Primary hyperparathyroidism not likely in setting of low-normal PTH  Most likely etiology seems immobilization 2/2 pain from thoracic compression fractures and hospitalization  There can also be a component of dehydration since patient has poor oral intake  However will get UPEP, SPEP to rule out MM and follow vitamin D panel to exclude granulomatous diseases or lymphoma  · Will give 2L IVF hydration with NS 0 9 % at 75 ml/hr  Can continue torsemide which will aid in calciuresis  · Will monitor calcium level in BMP  2  VICKY on CKD3 - resolved  · BL creatinine of 0 9-1 05   · Most likely etiology of CKD is combination of diabetic nephropathy and hypertensive nephrosclerosis  · Presented with creatinine of 1 39 which resolved back to baseline levels on 2/20 with fluid hydration  Creatinine this morning 0 94  Plan:  · Monitor in BMP  · Avoid nephrotoxic agents  3  COPD exacerbation - management per primary team      4  T5, T6 compression fractures without evidence of cord compression - management per primary team      Plan as outlined above to be discussed with attending nephrologist, Dr Ciara Magana MD  IM resident, PGY2  2/22/2021        Portions of the record may have been created with voice recognition software   Occasional wrong word or "sound a like" substitutions may have occurred due to the inherent limitations of voice recognition software  Read the chart carefully and recognize, using context, where substitutions have occurred

## 2021-02-23 PROBLEM — R32 URINARY INCONTINENCE: Status: ACTIVE | Noted: 2021-01-01

## 2021-02-23 PROBLEM — E87.5 HYPERKALEMIA: Status: ACTIVE | Noted: 2021-01-01

## 2021-02-23 NOTE — ASSESSMENT & PLAN NOTE
· Creatinine 1 39 on admission  Baseline 0 9-1 05  · Likely pre-renal 2/2 reported poor oral intake/dehydration  · Avoid hypotension and nephrotoxic agents  · Creatinine at baseline now       Results from last 7 days   Lab Units 02/23/21  0443 02/22/21  0715 02/21/21  1002 02/20/21  0603 02/19/21  0515 02/18/21  0859 02/17/21  1159   CREATININE mg/dL 1 10 0 94 1 10 0 99 1 09 1 36* 1 30

## 2021-02-23 NOTE — CASE MANAGEMENT
Cm called [facility] and spoke to [facility] to request BLS tranportation to [facility] after 12 pm but before 5 pm  [facility] to call back with transportation time and requested room number  Cm TT [facility] from [facility] to ask for the room number  [facility] is reviewing with physician now and will inform Cm when she knows  Cm completed medical necessity and placed one copy in medical records and one in patient binder  Cm department will continue to follow patient through discharge

## 2021-02-23 NOTE — ASSESSMENT & PLAN NOTE
Wt Readings from Last 3 Encounters:   02/23/21 83 kg (182 lb 15 7 oz)   02/20/21 77 kg (169 lb 12 1 oz)   02/11/21 77 1 kg (170 lb)     · Patient euvolemic now  · Was on torsemide but transitioned to lasix per nephology recommendations  · Monitor electrolytes, daily weights, intake/output

## 2021-02-23 NOTE — ASSESSMENT & PLAN NOTE
· Calcium 11 today  · PTH 36   · Likely from immobilization, nephrology consulted  · Started on gentle IV hydration   Recommended to initiate low dose lasix  · Monitor as an outpatient

## 2021-02-23 NOTE — PLAN OF CARE
Problem: PAIN - ADULT  Goal: Verbalizes/displays adequate comfort level or baseline comfort level  Description: Interventions:  - Encourage patient to monitor pain and request assistance  - Assess pain using appropriate pain scale  - Administer analgesics based on type and severity of pain and evaluate response  - Implement non-pharmacological measures as appropriate and evaluate response  - Consider cultural and social influences on pain and pain management  - Notify physician/advanced practitioner if interventions unsuccessful or patient reports new pain  Outcome: Progressing     Problem: INFECTION - ADULT  Goal: Absence or prevention of progression during hospitalization  Description: INTERVENTIONS:  - Assess and monitor for signs and symptoms of infection  - Monitor lab/diagnostic results  - Monitor all insertion sites, i e  indwelling lines, tubes, and drains  - Monitor endotracheal if appropriate and nasal secretions for changes in amount and color  - [place] appropriate cooling/warming therapies per order  - Administer medications as ordered  - Instruct and encourage patient and family to use good hand hygiene technique  - Identify and instruct in appropriate isolation precautions for identified infection/condition  Outcome: Progressing  Goal: Absence of fever/infection during neutropenic period  Description: INTERVENTIONS:  - Monitor WBC    Outcome: Progressing     Problem: SAFETY ADULT  Goal: Patient will remain free of falls  Description: INTERVENTIONS:  - Assess patient frequently for physical needs  -  Identify cognitive and physical deficits and behaviors that affect risk of falls    -  [place] fall precautions as indicated by assessment   - Educate patient/family on patient safety including physical limitations  - Instruct patient to call for assistance with activity based on assessment  - Modify environment to reduce risk of injury  - Consider OT/PT consult to assist with strengthening/mobility  Outcome: Progressing  Goal: Maintain or return to baseline ADL function  Description: INTERVENTIONS:  -  Assess patient's ability to carry out ADLs; assess patient's baseline for ADL function and identify physical deficits which impact ability to perform ADLs (bathing, care of mouth/teeth, toileting, grooming, dressing, etc )  - Assess/evaluate cause of self-care deficits   - Assess range of motion  - Assess patient's mobility; develop plan if impaired  - Assess patient's need for assistive devices and provide as appropriate  - Encourage maximum independence but intervene and supervise when necessary  - Involve family in performance of ADLs  - Assess for home care needs following discharge   - Consider OT consult to assist with ADL evaluation and planning for discharge  - Provide patient education as appropriate  Outcome: Progressing  Goal: Maintain or return mobility status to optimal level  Description: INTERVENTIONS:  - Assess patient's baseline mobility status (ambulation, transfers, stairs, etc )    - Identify cognitive and physical deficits and behaviors that affect mobility  - Identify mobility aids required to assist with transfers and/or ambulation (gait belt, sit-to-stand, lift, walker, cane, etc )  - [place] fall precautions as indicated by assessment  - Record patient progress and toleration of activity level on Mobility SBAR; progress patient to next Phase/Stage  - Instruct patient to call for assistance with activity based on assessment  - Consider rehabilitation consult to assist with strengthening/weightbearing, etc   Outcome: Progressing     Problem: DISCHARGE PLANNING  Goal: Discharge to home or other facility with appropriate resources  Description: INTERVENTIONS:  - Identify barriers to discharge w/patient and caregiver  - Arrange for needed discharge resources and transportation as appropriate  - Identify discharge learning needs (meds, wound care, etc )  - Arrange for interpretive services to assist at discharge as needed  - Refer to Case Management Department for coordinating discharge planning if the patient needs post-hospital services based on physician/advanced practitioner order or complex needs related to functional status, cognitive ability, or social support system  Outcome: Progressing

## 2021-02-23 NOTE — ASSESSMENT & PLAN NOTE
· CT thoracolumbar spine showing thoracic compression fractures from T5 to T7 with bony retropulsion- acute vs subacute   · MRI thoracic spine  Did not demonstrate retropulsion but did show similar findings to CT scan, review imaging for detailed description  · No focal neurological deficit noted  Discussed with orthopedics and neurosurgery team   · Recommended brace and PT/OT   Pending placement to acute rehab   · Continue with pain control   · Stable from orthopedic surgery perspective for discharge

## 2021-02-23 NOTE — TRANSPORTATION MEDICAL NECESSITY
Section I - General Information    Name of Patient: Angeles Klein                 : 1940    Medicare #: [id number]  Transport Date: 21 (PCS is valid for round trips on this date and for all repetitive trips in the 60-day range as noted below )  Origin: [facility]: [address]  Is the pt's stay covered under Medicare Part A (PPS/DRG)   [x]     Closest appropriate facility? If no, why is transport to more distant facility required? Yes  If hospice pt, is this transport related to pt's terminal illness? NA       Section II - Medical Necessity Questionnaire  Ambulance transportation is medically necessary only if other means of transport are contraindicated or would be potentially harmful to the patient  To meet this requirement, the patient must either be "bed confined" or suffer from a condition such that transport by means other than ambulance is contraindicated by the patient's condition  The following questions must be answered by the medical professional signing below for this form to be valid:    1)  Describe the MEDICAL CONDITION (physical and/or mental) of this patient AT [address] [place] [address] that requires the patient to be transported in an ambulance and why transport by other means is contraindicated by the patient's condition: Patient is on 3 L of O2  Patient has an unhealed compression fracture and limited by pain and fatigue  She is an assist x2 with transfers  2) Is the patient "bed confined" as defined below? No  To be "be confined" the patient must satisfy all three of the following conditions: (1) unable to get up from bed without Assistance; AND (2) unable to ambulate; AND (3) unable to sit in a chair or wheelchair      3) Can this patient safely be transported by car or wheelchair van (i e , seated during transport without a medical attendant or monitoring)? No    4) In addition to completing questions 1-3 above, please check any of the following conditions that apply*:   *Note: supporting documentation for any boxes checked must be maintained in the patient's medical records  If hosp-hosp transfer, describe services needed at 2nd facility not available at 1st facility? Non-Healed Fractures  Moderate/severe pain on movement   Medical attendant required   Requires oxygen-unable to self administer  Unable to tolerate seated position for time needed to transport       Section III - Signature of Physician or Healthcare Professional  I certify that the above information is true and correct based on my evaluation of this patient, and represent that the patient requires transport by ambulance and that other forms of transport are contraindicated  I understand that this information will be used by the Centers for Medicare and Medicaid Services (CMS) to support the determination of medical necessity for ambulance services, and I represent that I have personal knowledge of the patient's condition at time of transport  []  If this box is checked, I also certify that the patient is physically or mentally incapable of signing the ambulance service's claim and that the institution with which I am affiliated has furnished care, services, or assistance to the patient  My signature below is made on behalf of the patient pursuant to 42 CFR §424 36(b)(4)  In accordance with 42 CFR §424 37, the specific reason(s) that the patient is physically or mentally incapable of signing the claim form is as follows: N/A        Signature of Physician* or Healthcare Professional_____________________________    Date 02/23/21 (For scheduled repetitive transports, this form is not valid for transports performed more than 60 days after this date)    Printed Name & Credentials of Physician or Healthcare Professional (MD, DO, RN, etc ) JO Broderick    *Form must be signed by patient's attending physician for scheduled, repetitive transports   For non-repetitive, unscheduled ambulance transports, if unable to obtain the signature of the attending physician, any of the following may sign (choose appropriate option below)  [] Physician Assistant []  Clinical Nurse Specialist []  Registered Nurse  []  Nurse Practitioner  [x] Discharge Planner

## 2021-02-23 NOTE — PROGRESS NOTES
NEPHROLOGY PROGRESS NOTE    Patient: Montana Mcmillan               Sex: female          DOA: 2/17/2021 12:29 AM   YOB: 1940        Age:  78 y o         LOS:  LOS: 5 days   2/23/2021    REASON FOR THE CONSULTATION:      hypercalcemia    SUBJECTIVE     Patient lying in bed and complaining of urine incontinence    CURRENT MEDICATIONS       Current Facility-Administered Medications:     acetaminophen (TYLENOL) tablet 975 mg, 975 mg, Oral, Q6H [facility], Baylee Landrum PA-C, 975 mg at 02/23/21 0522    albuterol (PROVENTIL HFA,VENTOLIN HFA) inhaler 2 puff, 2 puff, Inhalation, Q6H PRN, Baylee Landrum PA-C, 2 puff at 02/23/21 0834    ALPRAZolam Dimple Sha) tablet 0 25 mg, 0 25 mg, Oral, TID PRN, Milton Chance MD, 0 25 mg at 02/23/21 0008    aluminum-magnesium hydroxide-simethicone (MYLANTA) oral suspension 30 mL, 30 mL, Oral, Q6H PRN, Mariluz Dangelo PA-C    atorvastatin (LIPITOR) tablet 80 mg, 80 mg, Oral, Daily, Mariluz Dangelo PA-C, 80 mg at 02/22/21 1737    benzonatate (TESSALON PERLES) capsule 100 mg, 100 mg, Oral, TID PRN, Baylee Landrum PA-C    fluticasone-vilanterol (BREO ELLIPTA) 100-25 mcg/inh inhaler 1 puff, 1 puff, Inhalation, Daily, Baylee Landrum PA-C, 1 puff at 02/23/21 5999    guaiFENesin (MUCINEX) 12 hr tablet 600 mg, 600 mg, Oral, Q12H [facility], Baylee Landrum PA-C, 600 mg at 02/23/21 0820    heparin (porcine) subcutaneous injection 5,000 Units, 5,000 Units, Subcutaneous, Q8H [facility], 5,000 Units at 02/23/21 0522 **AND** [COMPLETED] Platelet count, , , Once, Mariluz Dangelo PA-C    HYDROmorphone (DILAUDID) injection 0 5 mg, 0 5 mg, Intravenous, Q4H PRN, Milton Chance MD    insulin lispro (HumaLOG) 100 units/mL subcutaneous injection 1-6 Units, 1-6 Units, Subcutaneous, TID AC, 1 Units at 02/22/21 1745 **AND** Fingerstick Glucose (POCT), , , TID AC, Mariluz Dangelo PA-C    insulin lispro (HumaLOG) 100 units/mL subcutaneous injection 1-6 Units, 1-6 Units, Subcutaneous, HS, Dante Alvarez PA-C, 1 Units at 02/22/21 2230    ipratropium (ATROVENT) 0 02 % inhalation solution 0 5 mg, 0 5 mg, Nebulization, TID, Stella Webster MD, 0 5 mg at 02/22/21 1933    levalbuterol [facility]) inhalation solution 1 25 mg, 1 25 mg, Nebulization, TID, Stella Webster MD, 1 25 mg at 02/22/21 1933    levothyroxine tablet 125 mcg, 125 mcg, Oral, Daily, Mariluz Dangelo PA-C, 125 mcg at 02/23/21 0521    lidocaine (LIDODERM) 5 % patch 2 patch, 2 patch, Topical, Daily, Dante Alvarez PA-C, 2 patch at 02/23/21 0820    melatonin tablet 6 mg, 6 mg, Oral, HS PRN, Dante Alvarez PA-C, 6 mg at 02/23/21 0009    methocarbamol (ROBAXIN) tablet 500 mg, 500 mg, Oral, Q6H PRN, Dante Alvarez PA-C, 500 mg at 02/20/21 1722    methylPREDNISolone sodium succinate (Solu-MEDROL) injection 40 mg, 40 mg, Intravenous, Daily, Stella Webster MD, 40 mg at 02/23/21 0820    montelukast (SINGULAIR) tablet 10 mg, 10 mg, Oral, HS, Mariluz Dangelo PA-C, 10 mg at 02/23/21 0008    oxyCODONE (ROXICODONE) IR tablet 5 mg, 5 mg, Oral, Q6H PRN, Stella Webster MD, 5 mg at 02/22/21 2036    pantoprazole (PROTONIX) EC tablet 40 mg, 40 mg, Oral, Early Morning, Stella Webster MD, 40 mg at 02/23/21 0521    polyethylene glycol (MIRALAX) packet 17 g, 17 g, Oral, Daily, Mariluz Dangelo PA-C    saccharomyces boulardii (FLORASTOR) capsule 250 mg, 250 mg, Oral, BID, Stella Webster MD, 250 mg at 02/23/21 0820    senna (SENOKOT) tablet 8 6 mg, 1 tablet, Oral, HS, Kalpana Dangelo PA-C, 8 6 mg at 02/23/21 0009    sodium chloride 0 9 % infusion, 75 mL/hr, Intravenous, Continuous, Son Grady MD, Last Rate: 75 mL/hr at 02/23/21 0018, 75 mL/hr at 02/23/21 0018    torsemide (DEMADEX) tablet 10 mg, 10 mg, Oral, Daily, Stella Webster MD, 10 mg at 02/23/21 0820    traMADol (ULTRAM) tablet 50 mg, 50 mg, Oral, Q6H PRN, Stella Webster MD    REVIEW OF SYSTEMS     Review of Systems   Constitutional: Negative  HENT: Negative  Eyes: Negative  Respiratory: Negative  Cardiovascular: Negative  Gastrointestinal: Negative  Endocrine: Negative  Genitourinary: Negative  Musculoskeletal: Positive for back pain  Skin: Negative  Allergic/Immunologic: Negative  Neurological: Negative  Hematological: Negative  All other systems reviewed and are negative  OBJECTIVE     Current Weight: Weight - Scale: 83 kg (182 lb 15 7 oz)  Vitals:    02/23/21 0727   BP: 130/53   Pulse:    Resp: 19   Temp: 98 °F (36 7 °C)   SpO2:      Body mass index is 33 47 kg/m²  Intake/Output Summary (Last 24 hours) at 2/23/2021 0949  Last data filed at 2/23/2021 0841  Gross per 24 hour   Intake 1375 ml   Output 760 ml   Net 615 ml       PHYSICAL EXAMINATION     Physical Exam  Constitutional:       Appearance: She is well-developed  HENT:      Head: Normocephalic and atraumatic  Eyes:      Pupils: Pupils are equal, round, and reactive to light  Neck:      Musculoskeletal: Neck supple  Cardiovascular:      Rate and Rhythm: Normal rate and regular rhythm  Heart sounds: Normal heart sounds  Pulmonary:      Effort: Pulmonary effort is normal    Abdominal:      General: Bowel sounds are normal       Palpations: Abdomen is soft  Musculoskeletal: Normal range of motion  Skin:     General: Skin is warm  Neurological:      Mental Status: She is alert and oriented to person, place, and time             LAB RESULTS     Results from last 7 days   Lab Units 02/23/21  0443 02/22/21  0715 02/21/21  1002 02/20/21  0603 02/19/21  0515 02/18/21  0859 02/17/21  1159 02/17/21  0056   WBC Thousand/uL 10 67*  --   --  11 55* 10 34* 13 57*  --  11 61*   HEMOGLOBIN g/dL 10 9*  --   --  10 6* 10 5* 10 5*  --  12 2   HEMATOCRIT % 34 7*  --   --  32 6* 32 9* 31 8*  --  38 2   PLATELETS Thousands/uL 186  --   --  177 183 188 183 204   POTASSIUM mmol/L 5 7* 5 2 5 3 4 4 4 1 4 4 4 8 4 2   CHLORIDE mmol/L 100 99* 102 104 104 100 97* 95*   CO2 mmol/L 33* 32 33* 32 33* 31 34* 34*   BUN mg/dL 39* 30* 25 27* 30* 33* 28* 30*   CREATININE mg/dL 1 10 0 94 1 10 0 99 1 09 1 36* 1 30 1 39*   EGFR ml/min/1 73sq m 47 57 47 53 47 36 38 35   CALCIUM mg/dL 11 0* 11 0* 10 6* 10 2* 10 3* 10 2* 10 3* 10 6*           RADIOLOGY RESULTS      Results for orders placed during the hospital encounter of 02/17/21   XR chest 1 view portable    Narrative CHEST     INDICATION:   sob  COMPARISON:  1/29/2021; 12/3/2020    EXAM PERFORMED/VIEWS:  XR CHEST PORTABLE      FINDINGS:  Elevation of the right hemidiaphragm is unchanged  Some relative lucency in the right lung as compared to the left could be related to asymmetric emphysema or portable technique  Cardiomediastinal silhouette appears unremarkable  The lungs are clear  No pneumothorax or pleural effusion  Osseous structures appear within normal limits for patient age  Impression No acute cardiopulmonary disease  Continued mild elevation of the right hemidiaphragm  Mild relative lucency of the right lung may be related to technique  There is no pneumothorax evident  Workstation performed: [id number]           ASSESSMENT/PLAN     61-year-old female with past medical history of COPD on home oxygen, diabetes mellitus type 2, chronic kidney disease stage 3 who presents for facility with shortness breath and back pain  1  Hypercalcemia:  Noted to have progressive mild hypercalcemia that developed during hospitalization   -serum calcium level noted to be 11 today and similar as yesterday and appeared to have stabilized  -initiated on normal saline at 75 cc/hour to induce hypercalciuria   -if patient is to be discharged, recommend discharging on Lasix 20 mg once daily  2  Acute kidney injury on chronic kidney disease stage 3:  Presented with serum creatinine 1 3   -current creatinine is 1 10 and stable      3  Hyperkalemia:  Serum potassium 5 7 and elevated   -given patient is incontinent, will attempt to rule out urine retention order bladder scan with postvoid residual           Maria Elena Gauthier MD  Nephrology  2/23/2021

## 2021-02-23 NOTE — ASSESSMENT & PLAN NOTE
· Patient was evaluated by Pulmonary  · Initially steroids discontinued  But now she is wheezing, restarted on IV steroids  · Plan to transition to PO steroids   · Continue supplemental oxygen    At home she uses 3 L oxygen

## 2021-02-23 NOTE — ASSESSMENT & PLAN NOTE
Results from last 7 days   Lab Units 02/23/21  0443 02/22/21  0715 02/21/21  1002 02/20/21  0603 02/19/21  0515 02/18/21  0859 02/17/21  1159   POTASSIUM mmol/L 5 7* 5 2 5 3 4 4 4 1 4 4 4 8     · Unclear etiology, start lasix, monitor potassium

## 2021-02-23 NOTE — CASE MANAGEMENT
Cm received TT from Aleyda Singh at [facility] reporting that patient's potassium is rising and [facility] has no tele monitoring  Covid test is still pending, as well  Aleyda Singh asked that [facility] repeats BMP tomorrow morning  Cm called [facility] to cancel transport  Cm called RN to report  Cm called patient's DIL again to update  Cm department will continue to follow patient through discharge

## 2021-02-23 NOTE — CASE MANAGEMENT
Cm received TT from Tiffanie at [facility] reporting that they can transport at 11:30  Cm JEFFREY SABILLON and Kori Mercedes from [facility] to determine if this time is okay  Responses pending  Cm called patient's DIL as requested by son to report transport time  Cm left a message reporting time  Cm called RN Jeanette Phillips to report transport time  Cm department will continue to follow patient through discharge

## 2021-02-23 NOTE — ASSESSMENT & PLAN NOTE
Lab Results   Component Value Date    HGBA1C 7 1 (H) 10/12/2020       Recent Labs     02/22/21  1633 02/22/21  2032 02/23/21  0725 02/23/21  1054   POCGLU 175* 187* 98 156*       Blood Sugar Average: Last 72 hrs:  (P) 227 [id number]   · Glucose levels adequately controlled on current regimen  · Continue, monitor glucose with meals/bedtime

## 2021-02-23 NOTE — ASSESSMENT & PLAN NOTE
Malnutrition Findings:           BMI Findings: Body mass index is 33 47 kg/m²  · Moderate protein-calorie malnutrition, in the setting of severe back pain, COPD, and VICKY d/t poor po intake, as evidenced by 15 05 lb/8 14% weight loss in three months, requiring Carb control diet with glue Phillips supplements, monitoring of PO intake, and assistance with meals as needed    · Nutritionist consult

## 2021-02-23 NOTE — ASSESSMENT & PLAN NOTE
· Ongoing  Continue supplemental oxygen  · At home she is on 3-4 L oxygen at baseline    · Secondary to COPD

## 2021-02-23 NOTE — PROGRESS NOTES
Progress Note - Bonita Garland 1940, 78 y o  female MRN: [id number]    Unit/Bed#: -01 Encounter: [id number]    Primary Care Provider: Garrison Berumen MD   Date and time admitted to hospital: 2/17/2021 12:29 AM        * Thoracic compression fracture [facility])  Assessment & Plan  · CT thoracolumbar spine showing thoracic compression fractures from T5 to T7 with bony retropulsion- acute vs subacute   · MRI thoracic spine  Did not demonstrate retropulsion but did show similar findings to CT scan, review imaging for detailed description  · No focal neurological deficit noted  Discussed with orthopedics and neurosurgery team   · Recommended brace and PT/OT  Pending placement to acute rehab   · Continue with pain control   · Stable from orthopedic surgery perspective for discharge     SOB (shortness of breath)  Assessment & Plan  · Patient was evaluated by Pulmonary  · Initially steroids discontinued  But now she is wheezing, restarted on IV steroids  · Plan to transition to PO steroids   · Continue supplemental oxygen  At home she uses 3 L oxygen    Hyperkalemia  Assessment & Plan  Results from last 7 days   Lab Units 02/23/21  0443 02/22/21  0715 02/21/21  1002 02/20/21  0603 02/19/21  0515 02/18/21  0859 02/17/21  1159   POTASSIUM mmol/L 5 7* 5 2 5 3 4 4 4 1 4 4 4 8     · Unclear etiology, start lasix, monitor potassium    Hypercalcemia  Assessment & Plan  · Calcium 11 today  · PTH 36   · Likely from immobilization, nephrology consulted  · Started on gentle IV hydration  Recommended to initiate low dose lasix  · Monitor as an outpatient     Urinary incontinence  Assessment & Plan  · Check bladder scan to assess for retention    Pressure ulcer  Assessment & Plan  · Patient has a small sacral pressure ulcer  · Evaluated by wound care  Change position every 2 hourly    UTI (urinary tract infection)  Assessment & Plan  · Growing E coli pansensitive  Finished 3 days of IV ceftriaxone    · UTI resolved Moderate protein-calorie malnutrition ([facility] )  Assessment & Plan  Malnutrition Findings:           BMI Findings: Body mass index is 33 47 kg/m²  · Moderate protein-calorie malnutrition, in the setting of severe back pain, COPD, and VICKY d/t poor po intake, as evidenced by 15 05 lb/8 14% weight loss in three months, requiring Carb control diet with glue Phillips supplements, monitoring of PO intake, and assistance with meals as needed  · Nutritionist consult    Chronic respiratory failure with hypoxia ([place] [address] )  Assessment & Plan  · Ongoing  Continue supplemental oxygen  · At home she is on 3-4 L oxygen at baseline  · Secondary to COPD    VICKY (acute kidney injury) ([facility] )  Assessment & Plan  · Creatinine 1 39 on admission  Baseline 0 9-1 05  · Likely pre-renal 2/2 reported poor oral intake/dehydration  · Avoid hypotension and nephrotoxic agents  · Creatinine at baseline now  Results from last 7 days   Lab Units 02/23/21  0443 02/22/21  0715 02/21/21  1002 02/20/21  0603 02/19/21  0515 02/18/21  0859 02/17/21  1159   CREATININE mg/dL 1 10 0 94 1 10 0 99 1 09 1 36* 1 30         Chronic diastolic heart failure (HCC)  Assessment & Plan  Wt Readings from Last 3 Encounters:   02/23/21 83 kg (182 lb 15 7 oz)   02/20/21 77 kg (169 lb 12 1 oz)   02/11/21 77 1 kg (170 lb)     · Patient euvolemic now  · Was on torsemide but transitioned to lasix per nephology recommendations  · Monitor electrolytes, daily weights, intake/output    Chronic bilateral thoracic back pain  Assessment & Plan  · Now has acute fracture    See above    Hypothyroidism  Assessment & Plan  · Continue home dose Levothyroxine    Hyperlipidemia  Assessment & Plan  · Continue home dose Lipitor    Type 2 diabetes mellitus with complication, without long-term current use of insulin [facility])  Assessment & Plan  Lab Results   Component Value Date    HGBA1C 7 1 (H) 10/12/2020       Recent Labs     02/22/21  1633 02/22/21  2032 02/23/21  0725 02/23/21  1054 POCGLU 175* 187* 98 156*       Blood Sugar Average: Last 72 hrs:  (P) 181 [id number]   · Glucose levels adequately controlled on current regimen  · Continue, monitor glucose with meals/bedtime     COPD exacerbation (HCC)  Assessment & Plan  · Patient was given IV Solu-Medrol initially  Continue nebulization  · Pulmonary evaluation appreciated  Solu-Medrol discontinued  ·  she was complaining more short of breath, wheezing  Started on Solu-Medrol 40 mg daily  · Will give tapering dose of steroid on discharge    VTE Pharmacologic Prophylaxis:   Pharmacologic: Heparin  Mechanical VTE Prophylaxis in Place: Yes    Patient Centered Rounds: I have performed bedside rounds with nursing staff today  Discussions with Specialists or Other Care Team Provider: Pulmonology     Education and Discussions with Family / Patient: Spoke to daughter at bedside     Time Spent for Care: 30 minutes  More than 50% of total time spent on counseling and coordination of care as described above  Current Length of Stay: 5 day(s)    Current Patient Status: Inpatient   Certification Statement: The patient will continue to require additional inpatient hospital stay due to placement, hyperkalemia    Discharge Plan: 24-48 hours     Code Status: Level 3 - DNAR and DNI      Subjective:   Patient feels better, still having back pain, no chest pain or chest palpitations  No shortness of breath  Objective:     Vitals:   Temp (24hrs), Av °F (36 7 °C), Min:97 7 °F (36 5 °C), Max:98 2 °F (36 8 °C)    Temp:  [97 7 °F (36 5 °C)-98 2 °F (36 8 °C)] 98 °F (36 7 °C)  HR:  [77-88] 88  Resp:  [18-19] 19  BP: (103-143)/(53-65) 130/53  SpO2:  [96 %-99 %] 99 %  Body mass index is 33 47 kg/m²  Input and Output Summary (last 24 hours):        Intake/Output Summary (Last 24 hours) at 2021 1133  Last data filed at 2021 1047  Gross per 24 hour   Intake 1375 ml   Output 2260 ml   Net -885 ml       Physical Exam:     Physical Exam  Vitals signs and nursing note reviewed  Constitutional:       Appearance: Normal appearance  HENT:      Head: Normocephalic and atraumatic  Nose: Nose normal  No congestion  Mouth/Throat:      Mouth: Mucous membranes are dry  Pharynx: Oropharynx is clear  Eyes:      General:         Right eye: No discharge  Left eye: No discharge  Neck:      Musculoskeletal: Normal range of motion and neck supple  Cardiovascular:      Rate and Rhythm: Normal rate and regular rhythm  Pulmonary:      Effort: Pulmonary effort is normal  No respiratory distress  Breath sounds: Wheezing present  Abdominal:      General: Abdomen is flat  Palpations: Abdomen is soft  Musculoskeletal:      Right lower leg: No edema  Left lower leg: No edema  Skin:     General: Skin is warm and dry  Neurological:      General: No focal deficit present  Mental Status: She is alert  Mental status is at baseline  Psychiatric:         Mood and Affect: Mood normal          Behavior: Behavior normal            Additional Data:     Labs:    Results from last 7 days   Lab Units 02/23/21  0443   WBC Thousand/uL 10 67*   HEMOGLOBIN g/dL 10 9*   HEMATOCRIT % 34 7*   PLATELETS Thousands/uL 186   NEUTROS PCT % 67   LYMPHS PCT % 22   MONOS PCT % 10   EOS PCT % 0     Results from last 7 days   Lab Units 02/23/21  0443  02/17/21  0056   SODIUM mmol/L 136   < > 137   POTASSIUM mmol/L 5 7*   < > 4 2   CHLORIDE mmol/L 100   < > 95*   CO2 mmol/L 33*   < > 34*   BUN mg/dL 39*   < > 30*   CREATININE mg/dL 1 10   < > 1 39*   ANION GAP mmol/L 3*   < > 8   CALCIUM mg/dL 11 0*   < > 10 6*   ALBUMIN g/dL  --   --  3 4*   TOTAL BILIRUBIN mg/dL  --   --  0 40   ALK PHOS U/L  --   --  133*   ALT U/L  --   --  26   AST U/L  --   --  29   GLUCOSE RANDOM mg/dL 114   < > 112    < > = values in this interval not displayed           Results from last 7 days   Lab Units 02/23/21  1054 02/23/21  0725 02/22/21 2032 02/22/21  1633 02/22/21  1107 02/22/21  0731 02/21/21  2118 02/21/21  1114 02/21/21  0649 02/20/21  2124 02/20/21  1717 02/20/21  1137   POC GLUCOSE mg/dl 156* 98 187* 175* 258* 110 235* 131 127 105 96 142*         Results from last 7 days   Lab Units 02/18/21  0859 02/17/21  1159   PROCALCITONIN ng/ml 0 18 0 22           * I Have Reviewed All Lab Data Listed Above  * Additional Pertinent Lab Tests Reviewed:  Anand 64 Admission Reviewed    Imaging:    Imaging Reports Reviewed Today Include: NA  Imaging Personally Reviewed by Myself Includes:  NA    Recent Cultures (last 7 days):     Results from last 7 days   Lab Units 02/17/21  1421   URINE CULTURE  >100,000 cfu/ml Escherichia coli*       Last 24 Hours Medication List:   Current Facility-Administered Medications   Medication Dose Route Frequency Provider Last Rate    acetaminophen  975 mg Oral Q6H [address] Celestia Clarity, PA-C      albuterol  2 puff Inhalation Q6H PRN Celestia Clarity, PA-C      ALPRAZolam  0 25 mg Oral TID PRN Sanjay Sylvester MD      aluminum-magnesium hydroxide-simethicone  30 mL Oral Q6H PRN Celestia Clarity, PA-C      atorvastatin  80 mg Oral Daily Celestia [facility], [place]      benzonatate  100 mg Oral TID PRN Celestia Clarity, PA-C      fluticasone-vilanterol  1 puff Inhalation Daily [facility], [place]      furosemide  20 mg Oral Daily Joe Leiva MD      guaiFENesin  600 mg Oral Q12H [address] Celestia Clarity, PA-C      heparin (porcine)  5,000 Units Subcutaneous [facility] [place]      HYDROmorphone  0 5 mg Intravenous Q4H PRN Sanjay Sylvester MD      insulin lispro  1-6 Units Subcutaneous TID AC Mariluz Dangelo PA-C      insulin lispro  1-6 Units Subcutaneous HS Celestia Clarity, PA-C      ipratropium  0 5 mg Nebulization TID Sanjay Sylvester MD      levalbuterol  1 25 mg Nebulization TID Sanjay Sylvester MD      levothyroxine  125 mcg Oral Daily Celestia Clarity, PA-C      lidocaine  2 patch Topical Daily [place]      melatonin  6 mg Oral HS PRN Nereida Albrecht PA-C      methocarbamol  500 mg Oral Q6H PRN Nereida Albrecht PA-C      methylPREDNISolone sodium succinate  40 mg Intravenous Daily Erica Dubois MD      montelukast  10 mg Oral HS Nereida Albrecht PA-C      oxyCODONE  5 mg Oral Q6H PRN Erica Dubois MD      pantoprazole  40 mg Oral Early Morning Erica Dubois MD      polyethylene glycol  17 g Oral Daily Nereida Albrecht PA-C      saccharomyces boulardii  250 mg Oral BID Erica Dubois MD      senna  1 tablet Oral HS Nereida Albrecht PA-C      sodium chloride  75 mL/hr Intravenous Continuous Bre Hayes MD 75 mL/hr (02/23/21 0018)    traMADol  50 mg Oral Q6H PRN Erica Dubois MD          Today, Patient Was Seen By: PETER Mcelroy      ** Please Note: Dictation voice to text software may have been used in the creation of this document   **

## 2021-02-23 NOTE — ASSESSMENT & PLAN NOTE
· Patient was given IV Solu-Medrol initially  Continue nebulization  · Pulmonary evaluation appreciated  Solu-Medrol discontinued  · 2/22 she was complaining more short of breath, wheezing    Started on Solu-Medrol 40 mg daily  · Will give tapering dose of steroid on discharge

## 2021-02-23 NOTE — ASSESSMENT & PLAN NOTE
· Patient has a small sacral pressure ulcer  · Evaluated by wound care    Change position every 2 hourly

## 2021-02-24 NOTE — CASE MANAGEMENT
Per Robinson Izaguirre at [facility], they are able to accept patient today  Cm called [facility] and requested BLS transport  Cm spoke to Ed who reported that they will call back with a transportation time  Cm department will continue to follow patient through discharge

## 2021-02-24 NOTE — ASSESSMENT & PLAN NOTE
Lab Results   Component Value Date    HGBA1C 7 1 (H) 10/12/2020       Recent Labs     02/23/21  1652 02/23/21  2050 02/24/21  0740 02/24/21  1057   POCGLU 233* 191* 99 180*       Blood Sugar Average: Last 72 hrs:  (P) 965 [id number]   · Glucose levels adequately controlled on current regimen  · Continue, monitor glucose with meals/bedtime

## 2021-02-24 NOTE — ASSESSMENT & PLAN NOTE
· Check bladder scan to assess for retention  · Bladder scan did not demonstrate any retention  · Patient reports having trouble urinating when sitting  · Has large abdominal pannus which could be compressing on bladder   · No indication for chapin at this time, continue with intermittent bladder scans as needed  · Has adequate urine output with purewick in place  · Also unlikely cord compression or acute cord syndrome, evaluated by Neurosurgery and Orthopedic Surgery

## 2021-02-24 NOTE — ASSESSMENT & PLAN NOTE
· Calcium improving with initiation of IV fluids and lasix  · Monitor calcium levels as at acute rehab    Results from last 7 days   Lab Units 02/24/21  0603 02/23/21  0443 02/22/21  0715 02/21/21  1002 02/20/21  0603 02/19/21  0515 02/18/21  0859   CALCIUM mg/dL 10 8* 11 0* 11 0* 10 6* 10 2* 10 3* 10 2*

## 2021-02-24 NOTE — ASSESSMENT & PLAN NOTE
· Patient was given IV Solu-Medrol initially  Continue nebulization  · Pulmonary evaluation appreciated  Solu-Medrol discontinued  · 2/22 she was complaining more short of breath, wheezing    Started on Solu-Medrol 40 mg daily with improvement, transitioned to PO prednisone   · Will give tapering dose of steroid on discharge

## 2021-02-24 NOTE — PROGRESS NOTES
PHYSICAL MEDICINE AND REHABILITATION   PREADMISSION ASSESSMENT     Projected [facility] Diagnoses:  Impairment of mobility, safety and Activities of Daily Living (ADLs) due to Orthopedic Disorders:  08 9  Other Orthopedic    Etiologic Dx: T5 to T7 Compression Fractures  Date of Onset: 2/17/2021   Date of surgery: N/A    PATIENT INFORMATION  Name: Edgar Romano Phone #: [phone] (home)   Address: [place] [address]  YOB: 1940 Age: 78 y o  SS#   Marital Status:   Ethnicity:   Employment Status: retired  Extended Emergency Contact Information  Primary Emergency Contact: [address] [facility] Phone: [phone]  Mobile Phone: [phone]  Relation: Child  Secondary Emergency Contact: Rohan Mike Phone: [phone]  Mobile Phone: [phone]  Relation: Daughter In-Law   needed? No  Advance Directive: Level 3 DNAR/DNI - unknown advanced directive    INSURANCE/COVERAGE:     Primary Payor: MEDICARE / Plan: MEDICARE A AND B / Product Type: Medicare A & B Fee for Service /   Secondary Payer: Private Pay   Payer Contact:  Payer Contact:   Contact Phone:  Contact Phone:     Authorization #:   Coverage Dates:  LCD:   MEDICARE #: [id number]  Medicare Days: will follow-up  Medical Record #: [id number]    REFERRAL SOURCE:   Referring provider: Alban Parra MD  Referring facility: [address] [place] [address]  Room: /-01  PCP: Yakelin Monsalve MD PCP phone number: [phone]    MEDICAL INFORMATION  HPI: Patient is an 78year old female that presented to [address] [place] [address] on 2/17/2021 with complaints of mid-back pain and increased SOB over past few months  Patient does have a history of COPD, and wears 2-3L O2 via NC at baseline  Patient states she also has chronic back pain, in which she follows with Pain Management, and recently underwent injection to right scapula with some relief   However, patient states her pain is in mid-thoracic area with movement causes increase in pain  CXR was negative for acute findings  CT PE study was negative for PE or acute pulmonary process, however did show moderate pulmonary emphysematous changes bilaterally; age-indeterminate compression fracture of T5 with approximately 33% height loss and T6 with approximately 25% height loss  Case was discussed with Orthopedics and Neurosurgery, with recommendations for TLSO brace and therapy intervention  Patient was with noted VICKY, likely pre-renal related to poor PO intake, and patient was administered IV fluids, with Torsemide placed on hold  Patient was also treated for UTI with IV antibiotics  Culture grew out E coli, pansensitive, and patient completed 3 days of IV Rocephin  Pulmonology was consulted, with SOB likely multifactorial related to COPD/emphysema in combination with back pain causing difficulty taking deep breaths  Patient was continued on Breo and Duonebs QID, with no signs of COPD exacerbation  Patient continued with acute pain management, and Orthopedics was consulted for recommendations  MRI of the thoracic spine showed compression fracture of T5 (33% height loss) and T6 (25% height loss); superior endplate T2 compression fracture with approximately 10-20% height loss; no evidence of bony retropulsion, multilevel thoracolumbar degenerative changes without obvious spinal canal stenosis  Results were discussed with patient, and decision for conservative management was recommended  Oxycodone was added to patient's pain regimen, in addition to Tramadol  On 2/21, patient developed complaints of increased SOB with noted wheezing, and patient was initiated on IV Solumedrol  Nephrology was consulted regarding hypercalcemia, likely due to immobilization secondary to pain with dehydration component  Patient was initiated on IV fluids for hydration, and was cleared to restart Torsemide, as VICKY had resolved   Patient was also monitored for hyperkalemia, which was suspicious for high potassium diet etiology, and patient was continued on low-lose Lasix  Patient is overall hemodynamically stable, and medically cleared for discharge to [facility]  PT/OT therapies were consulted, and they are recommending patient for inpatient Acute Rehab  She has demonstrated that she can tolerate and participate in 3 hours of therapy per day  COVID test resulted negative on 2/17/2021 and 2/23/2021  Past Medical History:   Past Surgical History:    Allergies:     Past Medical History:   Diagnosis Date    Cataract     CHF (congestive heart failure) (HCC)     Leukocytosis     Past Surgical History:   Procedure Laterality Date    APPENDECTOMY      CYSTOCELE REPAIR      ANTERIOR COLPORRHAPHY     ORIF ANKLE FRACTURE Left 1979    TOTAL ABDOMINAL HYSTERECTOMY       Allergies   Allergen Reactions    Erythromycin     Sulfa Antibiotics          Comorbidities/Surgeries in the last 100 days: acute on chronic back pain, COPD with 2-3L O2 NC, CHF, dyspnea, VICKY, COPD exacerbation, DM, HLD, hyperkalemia, hypercalcemia, hypothyroid, UTI, sacral stage 3 pressure injury    CURRENT VITAL SIGNS:   Temp:  [97 3 °F (36 3 °C)-97 8 °F (36 6 °C)] 97 8 °F (36 6 °C)  Resp:  [16-18] 16  BP: (133-136)/(54-64) 136/64   Intake/Output Summary (Last 24 hours) at 2/24/2021 1313  Last data filed at 2/24/2021 1209  Gross per 24 hour   Intake 680 ml   Output 2000 ml   Net -1320 ml        LABORATORY RESULTS:      Lab Results   Component Value Date    HGB 10 7 (L) 02/24/2021    HGB 13 4 10/19/2015    HCT 33 8 (L) 02/24/2021    HCT 41 7 10/19/2015    WBC 10 15 02/24/2021    WBC 13 86 (H) 10/19/2015     Lab Results   Component Value Date    BUN 35 (H) 02/24/2021    BUN 24 10/19/2015     10/19/2015    K 5 4 (H) 02/24/2021    K 4 1 10/19/2015     02/24/2021     10/19/2015    GLUCOSE 145 (H) 10/19/2015    CREATININE 0 94 02/24/2021    CREATININE 0 92 10/19/2015     No results found for: PROTIME, INR     DIAGNOSTIC STUDIES:  Xr Chest 1 View Portable    Result Date: 2/17/2021  Impression: No acute cardiopulmonary disease  Continued mild elevation of the right hemidiaphragm  Mild relative lucency of the right lung may be related to technique  There is no pneumothorax evident  Workstation performed: [id number]     Mri Thoracic Spine Wo Contrast    Result Date: 2/20/2021  Impression: Compression fracture of T5 (33% height loss approximately) and T6 (approximately 25% height loss  )  There is also superior endplate T2 compression fracture with approximately 10%-20 percent height loss at the superior endplate  These were previously identified on recent CT February 17, 2021  No evidence of bony retropulsion  Multilevel thoracolumbar degenerative changes without obvious spinal canal stenosis  Workstation performed: [id number]     Pe Study With Ct Abdomen And Pelvis With Contrast    Result Date: 2/17/2021  Impression: Moderate pulmonary emphysematous changes are noted bilaterally  No pulmonary embolus or acute pulmonary process is seen  Prominence of the pulmonary arteries bilaterally may suggest a component of pulmonary arterial hypertension  Age-indeterminate compression fracture of T5 with approximately 33% loss of height and of T6 with approximately 25% loss of height  Spinal alignment appears maintained  Correlation with the patient's symptoms recommended  Coronary atherosclerosis, left adrenal nodule, right renal cyst, duplicated left renal collecting system, colonic diverticulosis without evidence of acute diverticulitis, and other findings as above  Workstation performed: [id number]     Ct Spine Thoracic & Lumbar Wo Contrast    Result Date: 2/18/2021  Impression: Thoracic compression fractures from T5 to T7 without bony retropulsion  Fractures could be acute/subacute  Degenerative spondylosis as below   Workstation performed: [id number]       PRECAUTIONS/SPECIAL NEEDS:  Tobacco:   Social History     Tobacco Use   Smoking Status Former Smoker    Packs/day: 2 00    Years: 48 00    Pack years: 96 00    Types: Cigarettes    Quit date: 1999    Years since quittin 4   Smokeless Tobacco Never Used   , Alcohol:    Social History     Substance and Sexual Activity   Alcohol Use Not Currently    Alcohol/week: 4 0 standard drinks    Types: 4 Cans of beer per week    Frequency: 2-3 times a week    Drinks per session: 1 or 2    Binge frequency: Never   , Splints/Braces: TLSO brace, Anticoagulation:  heparin, Blood Sugar Management: as per MD recommendations, Edema Management, Safety Concerns, Pain Management, Requires O2: 2-3 L/min, IV: Type: Peripheral Location: Right Forearm Reason: Medications/IVF, Dietary Restrictions: Diabetic diet, Visually Impaired, Language Preference: English, Spinal Precautions and Fall Precautions      MEDICATIONS:     Current Facility-Administered Medications:     acetaminophen (TYLENOL) tablet 975 mg, 975 mg, Oral, Q6H [facility], Glendy Ewing PA-C, 975 mg at 21 1158    albuterol (PROVENTIL HFA,VENTOLIN HFA) inhaler 2 puff, 2 puff, Inhalation, Q6H PRN, Glendy Ewing PA-C, 2 puff at 21 0851    ALPRAZolam Chaira Rasta) tablet 0 25 mg, 0 25 mg, Oral, TID PRN, Dorethea Dakins, MD, 0 25 mg at 21 2311    aluminum-magnesium hydroxide-simethicone (MYLANTA) oral suspension 30 mL, 30 mL, Oral, Q6H PRN, Glendy Ewing PA-C    atorvastatin (LIPITOR) tablet 80 mg, 80 mg, Oral, Daily, Mariluz Dangelo PA-C, 80 mg at 21 1736    benzonatate (TESSALON PERLES) capsule 100 mg, 100 mg, Oral, TID PRN, Glendy Ewing PA-C    fluticasone-vilanterol (BREO ELLIPTA) 100-25 mcg/inh inhaler 1 puff, 1 puff, Inhalation, Daily, Glendy Ewing PA-C, 1 puff at 21 2219    furosemide (LASIX) tablet 20 mg, 20 mg, Oral, Daily, Pepe Flores MD, 20 mg at 21 0917    guaiFENesin (MUCINEX) 12 hr tablet 600 mg, 600 mg, Oral, Q12H [facility], Glendy RANJAN Ewing, 600 mg at 02/24/21 0918    heparin (porcine) subcutaneous injection 5,000 Units, 5,000 Units, Subcutaneous, Q8H [facility], 5,000 Units at 02/24/21 0644 **AND** [COMPLETED] Platelet count, , , Once, Majorie Cheadle, PA-C    HYDROmorphone (DILAUDID) injection 0 5 mg, 0 5 mg, Intravenous, Q4H PRN, Lori Fox MD    insulin lispro (HumaLOG) 100 units/mL subcutaneous injection 1-6 Units, 1-6 Units, Subcutaneous, TID AC, 1 Units at 02/24/21 1158 **AND** Fingerstick Glucose (POCT), , , TID AC, Mariluz Dangelo PA-C    insulin lispro (HumaLOG) 100 units/mL subcutaneous injection 1-6 Units, 1-6 Units, Subcutaneous, HS, Mariluz Dangelo PA-C, 2 Units at 02/23/21 2124    ipratropium-albuterol (DUO-NEB) 0 5-2 5 mg/3 mL inhalation solution 3 mL, 3 mL, Nebulization, Q6H, Jose Guadalupe Rhodes MD, 3 mL at 02/24/21 1310    levothyroxine tablet 125 mcg, 125 mcg, Oral, Daily, Mariluz Dangelo PA-C, 125 mcg at 02/24/21 0608    lidocaine (LIDODERM) 5 % patch 2 patch, 2 patch, Topical, Daily, Majorie Cheadle, PA-C, 2 patch at 02/24/21 1201    melatonin tablet 6 mg, 6 mg, Oral, HS PRN, Majorie Cheadle, PA-C, 6 mg at 02/23/21 0009    methocarbamol (ROBAXIN) tablet 500 mg, 500 mg, Oral, Q6H PRN, Majorie Cheadle, PA-C, 500 mg at 02/20/21 1722    methylPREDNISolone sodium succinate (Solu-MEDROL) injection 40 mg, 40 mg, Intravenous, Daily, Lori Fox MD, 40 mg at 02/24/21 0918    montelukast (SINGULAIR) tablet 10 mg, 10 mg, Oral, HS, Mariluz Dangelo PA-C, 10 mg at 02/23/21 2124    oxyCODONE (ROXICODONE) IR tablet 5 mg, 5 mg, Oral, Q6H PRN, Lori Fox MD, 5 mg at 02/24/21 0918    pantoprazole (PROTONIX) EC tablet 40 mg, 40 mg, Oral, Early Morning, Lori Fox MD, 40 mg at 02/24/21 0608    polyethylene glycol (MIRALAX) packet 17 g, 17 g, Oral, Daily, Mariluz Dangelo PA-C    saccharomyces boulardii (FLORASTOR) capsule 250 mg, 250 mg, Oral, BID, Lori Fox MD, 250 mg at 02/24/21 0918    senna (SENOKOT) tablet 8 6 mg, 1 tablet, Oral, HS, Walker Marshall PA-C, 8 6 mg at 02/23/21 2124    traMADol (ULTRAM) tablet 50 mg, 50 mg, Oral, Q6H PRN, Alexander Dubon MD    SKIN INTEGRITY:   no rashes, erythema - breast & abdominal folds, Pressure Ulcer: sacrum stage 3 with allevyn foam dressing    PRIOR LEVEL OF FUNCTION:  She lives in a(n) single family home  Denilson Pollock is  and lives alone  Self Care: Independent, Indoor Mobility: Independent, Stairs (in/outdoor): Needed some help and Cognition: Independent  Prior to patient's admission, patient was fully Independent with ADLs and received assistance with IADLs  Patient was Independent without use of AD for mobility  Family or HHA assisted with stair navigation  HHA assisted with showers 1x/week  FALLS IN THE LAST 6 MONTHS: none    HOME ENVIRONMENT:  The living area: can live on one level  There are 5 steps to enter the home  The patient will not have 24 hour supervision/physical assistance available upon discharge  Patient's family lives nearby, and are able to assist as needed  PREVIOUS DME:  Equipment in home (previous DME): Commode, Shower Chair, Grab Bars, Rolling Walker and [address]:  Physical Therapy Occupational Therapy Speech Therapy   2/24/2021, per PT    Bed Mobility   Rolling L 4  Minimal assistance  (log roll technique)   Additional items Assist x 1; Increased time required;Verbal cues;LE management;HOB elevated; Bedrails   Supine to Sit 3  Moderate assistance  (log roll technique)   Additional items Assist x 1; Increased time required;Verbal cues;LE management;HOB elevated   Additional Comments Reviewed back safety precautions with verbal understanding   Transfers   Sit to Stand 4  Minimal assistance   Additional items Assist x 1; Increased time required;Verbal cues   Stand to Sit 4  Minimal assistance   Additional items Assist x 1; Increased time required;Verbal cues;Armrests   Ambulation/Elevation   Gait pattern Excessively slow; Short stride;Decreased foot clearance   Gait Assistance 4  Minimal assist   Additional items Assist x 1;Verbal cues   Assistive Device Rolling walker   Distance 3 ft  bed to chair   Stair Management Assistance Not tested   Balance   Static Sitting Good   Dynamic Sitting Fair +   Static Standing Fair   Dynamic Standing Fair -   Ambulatory Poor +   Endurance Deficit   Endurance Deficit Yes   Activity Tolerance   Activity Tolerance Patient limited by fatigue;Patient limited by pain  (SOB (shortness of breath))   Nurse Made Aware DORA Escamilla confirmed pt appropriate for therapy   Assessment   Prognosis Good   Problem List Decreased strength;Decreased endurance; Impaired balance;Decreased mobility;Pain;Orthopedic restrictions;Decreased skin integrity   Assessment Pt seen for PT treatment session this date with interventions consisting of gait training w/ emphasis on improving pt's ability to ambulate level surfaces x 3 ft  bed to chair with min A provided by therapist with RW and therapeutic activity consisting of training: bed mobility, supine to sit transfers and sit<>stand transfers  Pt agreeable to PT treatment session upon arrival, pt found supine in bed w/ HOB elevated, A&O x 4 and responsive  In comparison to previous session, pt with no improvements as evidenced by pt continues to require A of 1 to complete level surface ambulation using RW  Further distance gait training limited by ongoing shortness of breath, fatigue and pain  Post session: pt seated OOB in recliner, chair alarm engaged and all needs in reach  Continue to recommend STR at time of d/c in order to maximize pt's functional independence and safety w/ mobility  Pt continues to be functioning below baseline level, and remains limited 2* factors listed above   PT will continue to see pt while here in order to address the deficits listed above and provide interventions consistent w/ POC in effort to achieve STGs       2/24/2021, per OT    ADL   Where Assessed Supine, bed   Eating Assistance 6  Modified independent   Grooming Assistance 5  Supervision/Setup   Grooming Deficit Setup;Supervision/safety; Increased time to complete   UB Bathing Assistance 4  Minimal Assistance   UB Bathing Deficit Setup;Steadying; Increased time to complete   LB Bathing Assistance 2  Maximal Assistance   LB Bathing Deficit Increased time to complete;Supervision/safety;Verbal cueing   UB Dressing Assistance 4  Minimal Assistance   UB Dressing Deficit Setup;Verbal cueing; Increased time to complete   LB Dressing Assistance 2  Maximal Assistance   LB Dressing Deficit    (dependent for socks and shoes)   Toileting Assistance  3  Moderate Assistance   Toileting Deficit Increased time to complete   Therapeutic Exercise - ROM   UE-ROM Yes   ROM- Right Upper Extremities   R Shoulder AROM; Flexion;ABduction; Extension   R Elbow AROM;Elbow flexion;Elbow extension   R Weight/Reps/Sets 2 sets of 10 repetitions   ROM - Left Upper Extremities    L Shoulder AROM; Flexion;ABduction;Horizontal ABduction; Extension   L Elbow AROM;Elbow flexion;Elbow extension   L Weight/Reps/Sets 2 sets of 10 repetitions   Cognition   Overall Cognitive Status WFL   Arousal/Participation Alert; Responsive; Cooperative   Attention Within functional limits   Orientation Level Oriented X4   Memory Within functional limits   Following Commands Follows all commands and directions without difficulty   Additional Activities   Additional Activities Other (Comment)   Additional Activities Comments UE HEP and review of back safety   Activity Tolerance   Activity Tolerance Patient limited by fatigue;Patient limited by pain   Assessment   Assessment Patient participated in Skilled OT session this date with interventions consisting of ADL re training with the use of correct body mechnaics, Energy Conservation techniques, Work simplification skills , deep breathing technique, safety awareness and fall prevention techniques, therapeutic exercise to: increase functional use of BUEs, increase BUE muscle strength  and increase cardiovascular endurance    Patient agreeable to OT treatment session, upon arrival patient was found supine in bed, alert, responsive  and in no apparent distress  In comparison to previous session, patient with improvements in self performance of ADL tasks  Please refer to flow sheet for detailed performance  Patient requiring verbal cues for safety, verbal cues for correct technique, verbal cues for pacing thru activity steps and frequent rest periods  Patient continues to be functioning below baseline level, occupational performance remains limited secondary to factors listed above and increased risk for falls and injury  From OT standpoint, recommendation at time of d/c would be Short Term Rehab  Patient to benefit from continued Occupational Therapy treatment while in the hospital to address deficits as defined above and maximize level of functional independence with ADLs and functional mobility  N/A     CURRENT GAP IN FUNCTION  Prior to Admission: Functional Status: Patient was not independent with mobility/ambulation, transfers, ADL's, IADL's  Estimated length of stay: 10 to 14 days    Anticipated Post-Discharge Disposition/Treatment  Disposition: Return to previous home/apartment    Outpatient Services: Physical Therapy (PT) and Occupational Therapy (OT)    BARRIERS TO DISCHARGE  Weakness, Pain, Balance Difficulty, Fatigue, Home Accessibility, Caregiver Accessibility, Financial Resources, Equipment Needs, Resource Availability and Lives Alone    INTERVENTIONS FOR DISCHARGE  Adaptive equipment, Patient/Family/Caregiver Education, [employer], Support Group, Financial Assistance, Arrange DME needs, Medication Changes as per MD recommendations, Therapy exercises, Center of balance support  and Energy conservation education     REQUIRED THERAPY:  Patient will require PT and OT 90 minutes each per day, five days per week to achieve rehab goals  REQUIRED FUNCTIONAL AND MEDICAL MANAGEMENT FOR INPATIENT REHABILITATION:  Skin:  Pressure Ulcer: sacral stage 3 with allevyn foam dressing, breast & abdominal folds erythema, Pain Management: Overall pain is moderately controlled, Deep Vein Thrombosis (DVT) Prophylaxis:  heparin, Diabetes Management: continue sliding scale insulin, patient to do finger sticks as ordered, SLIM to continue to manage diabetes, and further IM management of additional medical conditions while on the [facility], she needs PT/OT intervention, patient/family education and training, possible Neuropsych and Nephrology consults with any other needed consults prn, nursing medication review and management of bowel/bladder function  RECOMMENDED LEVEL OF CARE:   Patient presented to [address] [place] [address] on 2/17/2021 with complaints of mid-back pain and increased SOB over past few months  Patient does have a history of COPD, and wears 2-3L O2 via NC at baseline  Patient states she also has chronic back pain, in which she follows with Pain Management, and recently underwent injection to right scapula with some relief  However, patient states her pain is in mid-thoracic area with movement causes increase in pain  CXR was negative for acute findings  CT PE study was negative for PE or acute pulmonary process, however did show moderate pulmonary emphysematous changes bilaterally; age-indeterminate compression fracture of T5 with approximately 33% height loss and T6 with approximately 25% height loss  Case was discussed with Orthopedics and Neurosurgery, with recommendations for TLSO brace and therapy intervention  Patient was with noted VICKY, likely pre-renal related to poor PO intake, and patient was administered IV fluids, with Torsemide placed on hold  Patient was also treated for UTI with IV antibiotics  Culture grew out E coli, pansensitive, and patient completed 3 days of IV Rocephin  Pulmonology was consulted, with SOB likely multifactorial related to COPD/emphysema in combination with back pain causing difficulty taking deep breaths  Patient was continued on Breo and Duonebs QID, with no signs of COPD exacerbation  Patient continued with acute pain management, and Orthopedics was consulted for recommendations  MRI of the thoracic spine showed compression fracture of T5 (33% height loss) and T6 (25% height loss); superior endplate T2 compression fracture with approximately 10-20% height loss; no evidence of bony retropulsion, multilevel thoracolumbar degenerative changes without obvious spinal canal stenosis  Results were discussed with patient, and decision for conservative management was recommended  Oxycodone was added to patient's pain regimen, in addition to Tramadol  On 2/21, patient developed complaints of increased SOB with noted wheezing, and patient was initiated on IV Solumedrol  Nephrology was consulted regarding hypercalcemia, likely due to immobilization secondary to pain with dehydration component  Patient was initiated on IV fluids for hydration, and was cleared to restart Torsemide, as VICKY had resolved  Patient was also monitored for hyperkalemia, which was suspicious for high potassium diet etiology, and patient was continued on low-lose Lasix  Prior to patient's admission, patient was fully Independent with ADLs and received assistance with IADLs  Patient was Independent without use of AD for mobility  Family or HHA assisted with stair navigation  HHA assisted with showers 1x/week  Currently, patient is Min assist with use of RW for gait and transfers, and Min assist for UB ADLs, Max assist for LB ADLs  Close medical management and PM&R management is recommended at this time while patient is on the [facility]  Inpatient acute rehab is recommended for patient to maximize overall strength and mobility to Modified Independent upon discharge to home with support of family

## 2021-02-24 NOTE — ASSESSMENT & PLAN NOTE
· Chronic hypoxic respiratory, stable  · On baseline oxygen requirements of 3 liters  · Transitioned from IV solumedrol to PO prednisone   · Continue current regimen, complete 5 day course of steroids total, on 2/27/21

## 2021-02-24 NOTE — PLAN OF CARE
Problem: PAIN - ADULT  Goal: Verbalizes/displays adequate comfort level or baseline comfort level  Description: Interventions:  - Encourage patient to monitor pain and request assistance  - Assess pain using appropriate pain scale  - Administer analgesics based on type and severity of pain and evaluate response  - Implement non-pharmacological measures as appropriate and evaluate response  - Consider cultural and social influences on pain and pain management  - Notify physician/advanced practitioner if interventions unsuccessful or patient reports new pain  Outcome: Progressing     Problem: INFECTION - ADULT  Goal: Absence or prevention of progression during hospitalization  Description: INTERVENTIONS:  - Assess and monitor for signs and symptoms of infection  - Monitor lab/diagnostic results  - Monitor all insertion sites, i e  indwelling lines, tubes, and drains  - Monitor endotracheal if appropriate and nasal secretions for changes in amount and color  - [place] appropriate cooling/warming therapies per order  - Administer medications as ordered  - Instruct and encourage patient and family to use good hand hygiene technique  - Identify and instruct in appropriate isolation precautions for identified infection/condition  Outcome: Progressing  Goal: Absence of fever/infection during neutropenic period  Description: INTERVENTIONS:  - Monitor WBC    Outcome: Progressing     Problem: SAFETY ADULT  Goal: Patient will remain free of falls  Description: INTERVENTIONS:  - Assess patient frequently for physical needs  -  Identify cognitive and physical deficits and behaviors that affect risk of falls    -  [place] fall precautions as indicated by assessment   - Educate patient/family on patient safety including physical limitations  - Instruct patient to call for assistance with activity based on assessment  - Modify environment to reduce risk of injury  - Consider OT/PT consult to assist with strengthening/mobility  Outcome: Progressing  Goal: Maintain or return to baseline ADL function  Description: INTERVENTIONS:  -  Assess patient's ability to carry out ADLs; assess patient's baseline for ADL function and identify physical deficits which impact ability to perform ADLs (bathing, care of mouth/teeth, toileting, grooming, dressing, etc )  - Assess/evaluate cause of self-care deficits   - Assess range of motion  - Assess patient's mobility; develop plan if impaired  - Assess patient's need for assistive devices and provide as appropriate  - Encourage maximum independence but intervene and supervise when necessary  - Involve family in performance of ADLs  - Assess for home care needs following discharge   - Consider OT consult to assist with ADL evaluation and planning for discharge  - Provide patient education as appropriate  Outcome: Progressing  Goal: Maintain or return mobility status to optimal level  Description: INTERVENTIONS:  - Assess patient's baseline mobility status (ambulation, transfers, stairs, etc )    - Identify cognitive and physical deficits and behaviors that affect mobility  - Identify mobility aids required to assist with transfers and/or ambulation (gait belt, sit-to-stand, lift, walker, cane, etc )  - [place] fall precautions as indicated by assessment  - Record patient progress and toleration of activity level on Mobility SBAR; progress patient to next Phase/Stage  - Instruct patient to call for assistance with activity based on assessment  - Consider rehabilitation consult to assist with strengthening/weightbearing, etc   Outcome: Progressing     Problem: DISCHARGE PLANNING  Goal: Discharge to home or other facility with appropriate resources  Description: INTERVENTIONS:  - Identify barriers to discharge w/patient and caregiver  - Arrange for needed discharge resources and transportation as appropriate  - Identify discharge learning needs (meds, wound care, etc )  - Arrange for interpretive services to assist at discharge as needed  - Refer to Case Management Department for coordinating discharge planning if the patient needs post-hospital services based on physician/advanced practitioner order or complex needs related to functional status, cognitive ability, or social support system  Outcome: Progressing     Problem: Knowledge Deficit  Goal: Patient/family/caregiver demonstrates understanding of disease process, treatment plan, medications, and discharge instructions  Description: Complete learning assessment and assess knowledge base  Interventions:  - Provide teaching at level of understanding  - Provide teaching via preferred learning methods  Outcome: Progressing     Problem: Nutrition/Hydration-ADULT  Goal: Nutrient/Hydration intake appropriate for improving, restoring or maintaining nutritional needs  Description: Monitor and assess patient's nutrition/hydration status for malnutrition  Collaborate with interdisciplinary team and initiate plan and interventions as ordered  Monitor patient's weight and dietary intake as ordered or per policy  Utilize nutrition screening tool and intervene as necessary  Determine patient's food preferences and provide high-protein, high-caloric foods as appropriate       INTERVENTIONS:  - Monitor oral intake, urinary output, labs, and treatment plans  - Assess nutrition and hydration status and recommend course of action  - Evaluate amount of meals eaten  - Assist patient with eating if necessary   - Allow adequate time for meals  - Recommend/ encourage appropriate diets, oral nutritional supplements, and vitamin/mineral supplements  - Order, calculate, and assess calorie counts as needed  - Recommend, monitor, and adjust tube feedings and TPN/PPN based on assessed needs  - Assess need for intravenous fluids  - Provide specific nutrition/hydration education as appropriate  - Include patient/family/caregiver in decisions related to nutrition  Outcome: Progressing     Problem: Potential for Falls  Goal: Patient will remain free of falls  Description: INTERVENTIONS:  - Assess patient frequently for physical needs  -  Identify cognitive and physical deficits and behaviors that affect risk of falls    -  [place] fall precautions as indicated by assessment   - Educate patient/family on patient safety including physical limitations  - Instruct patient to call for assistance with activity based on assessment  - Modify environment to reduce risk of injury  - Consider OT/PT consult to assist with strengthening/mobility  Outcome: Progressing     Problem: Prexisting or High Potential for Compromised Skin Integrity  Goal: Skin integrity is maintained or improved  Description: INTERVENTIONS:  - Identify patients at risk for skin breakdown  - Assess and monitor skin integrity  - Assess and monitor nutrition and hydration status  - Monitor labs   - Assess for incontinence   - Turn and reposition patient  - Assist with mobility/ambulation  - Relieve pressure over bony prominences  - Avoid friction and shearing  - Provide appropriate hygiene as needed including keeping skin clean and dry  - Evaluate need for skin moisturizer/barrier cream  - Collaborate with interdisciplinary team   - Patient/family teaching  - Consider wound care consult   Outcome: Progressing

## 2021-02-24 NOTE — RESPIRATORY THERAPY NOTE
RT Protocol Note  Ruba Leong 78 y o  female MRN: [id number]  Unit/Bed#: MS 36-1 Encounter: [id number]    Assessment    Principal Problem:    Thoracic compression fracture [facility])  Active Problems:    COPD exacerbation ([place] [address] )    Type 2 diabetes mellitus with complication, without long-term current use of insulin ([facility])    Hyperlipidemia    Hypothyroidism    SOB (shortness of breath)    Chronic bilateral thoracic back pain    Chronic diastolic heart failure ([facility])    VICKY (acute kidney injury) ([facility] )    Chronic respiratory failure with hypoxia ([facility])    Moderate protein-calorie malnutrition ([facility])    UTI (urinary tract infection)    Hypercalcemia    Pressure ulcer    Hyperkalemia    Urinary incontinence      Home Pulmonary Medications:  Duo Neb, Combivent  Home Devices/Therapy: Home O2    Past Medical History:   Diagnosis Date    Cataract     CHF (congestive heart failure) ([facility])     Leukocytosis      Social History     Socioeconomic History    Marital status:      Spouse name: None    Number of children: None    Years of education: None    Highest education level: None   Occupational History    Occupation:     Social Needs    Financial resource strain: None    Food insecurity     Worry: None     Inability: None    Transportation needs     Medical: None     Non-medical: None   Tobacco Use    Smoking status: Former Smoker     Packs/day: 2 00     Years: 48 00     Pack years: 96 00     Types: Cigarettes     Quit date: 1999     Years since quittin 4    Smokeless tobacco: Never Used   Substance and Sexual Activity    Alcohol use: Not Currently     Alcohol/week: 4 0 standard drinks     Types: 4 Cans of beer per week     Frequency: 2-3 times a week     Drinks per session: 1 or 2     Binge frequency: Never    Drug use: No    Sexual activity: Not Currently   Lifestyle    Physical activity     Days per week: 0 days     Minutes per session: 0 min    Stress:  To some extent Relationships    Social connections     Talks on phone: None     Gets together: None     Attends Church service: None     Active member of club or organization: None     Attends meetings of clubs or organizations: None     Relationship status: None    Intimate partner violence     Fear of current or ex partner: None     Emotionally abused: None     Physically abused: None     Forced sexual activity: None   Other Topics Concern    None   Social History Narrative    LIVING INDEPENDENTLY ALONE     NO ADVANCED DIRECTIVES ON FILE        Subjective         Objective    Physical Exam:        Vitals:  Blood pressure 136/64, pulse 88, temperature 97 8 °F (36 6 °C), resp  rate 16, height 5' 2" (1 575 m), weight 83 kg (182 lb 15 7 oz), SpO2 90 %, not currently breastfeeding  Imaging and other studies: I have personally reviewed pertinent reports  Plan    Respiratory Plan: Home Bronchodilator Patient pathway  Airway Clearance Plan: Therapep, Flutter     Resp Comments: pt just had neb tx with Xopenex/Atrovent but still is SOB  2 puffs MDI given with spacer  Pt will not be able to perform neb tx's  or MDI's on her own at home due to stregnth and finger dexterity

## 2021-02-24 NOTE — OCCUPATIONAL THERAPY NOTE
Occupational Therapy Treatment Note        Patient Name: Maulik Carty  [id number] Date: 2/24/2021 02/24/21 0707   OT Last Visit   OT Visit Date 02/24/21   Note Type   Note Type Treatment   Pain Assessment   Pain Assessment Tool Pain Assessment not indicated - pt denies pain   Pain Score No Pain  (at rest)   ADL   Where Assessed Supine, bed   Eating Assistance 6  Modified independent   Grooming Assistance 5  Supervision/Setup   Grooming Deficit Setup;Supervision/safety; Increased time to complete   UB Bathing Assistance 4  Minimal Assistance   UB Bathing Deficit Setup;Steadying; Increased time to complete   LB Bathing Assistance 2  Maximal Assistance   LB Bathing Deficit Increased time to complete;Supervision/safety;Verbal cueing   UB Dressing Assistance 4  Minimal Assistance   UB Dressing Deficit Setup;Verbal cueing; Increased time to complete   LB Dressing Assistance 2  Maximal Assistance   LB Dressing Deficit   (dependent for socks and shoes)   Toileting Assistance  3  Moderate Assistance   Toileting Deficit Increased time to complete   Therapeutic Exercise - ROM   UE-ROM Yes   ROM- Right Upper Extremities   R Shoulder AROM; Flexion;ABduction; Extension   R Elbow AROM;Elbow flexion;Elbow extension   R Weight/Reps/Sets 2 sets of 10 repetitions   ROM - Left Upper Extremities    L Shoulder AROM; Flexion;ABduction;Horizontal ABduction; Extension   L Elbow AROM;Elbow flexion;Elbow extension   L Weight/Reps/Sets 2 sets of 10 repetitions   Cognition   Overall Cognitive Status WFL   Arousal/Participation Alert; Responsive; Cooperative   Attention Within functional limits   Orientation Level Oriented X4   Memory Within functional limits   Following Commands Follows all commands and directions without difficulty   Additional Activities   Additional Activities Other (Comment)   Additional Activities Comments UE HEP and review of back safety   Activity Tolerance   Activity Tolerance Patient limited by fatigue;Patient limited by pain   Assessment   Assessment Patient participated in Skilled OT session this date with interventions consisting of ADL re training with the use of correct body mechnaics, Energy Conservation techniques, Work simplification skills , deep breathing technique, safety awareness and fall prevention techniques, therapeutic exercise to: increase functional use of BUEs, increase BUE muscle strength  and increase cardiovascular endurance    Patient agreeable to OT treatment session, upon arrival patient was found supine in bed, alert, responsive  and in no apparent distress  In comparison to previous session, patient with improvements in self performance of ADL tasks  Please refer to flow sheet for detailed performance  Patient requiring verbal cues for safety, verbal cues for correct technique, verbal cues for pacing thru activity steps and frequent rest periods  Patient continues to be functioning below baseline level, occupational performance remains limited secondary to factors listed above and increased risk for falls and injury  From OT standpoint, recommendation at time of d/c would be Short Term Rehab  Patient to benefit from continued Occupational Therapy treatment while in the hospital to address deficits as defined above and maximize level of functional independence with ADLs and functional mobility  Plan   Treatment Interventions ADL retraining;Functional transfer training;UE strengthening/ROM; Endurance training;Patient/family training; Compensatory technique education;Continued evaluation; Energy conservation; Activityengagement   Goal Expiration Date 03/03/21   OT Treatment Day 4   Recommendation   OT Discharge Recommendation Post-Acute Rehabilitation Services   AM-PAC Daily Activity Inpatient   Lower Body Dressing 2   Bathing 3   Toileting 2   Upper Body Dressing 3   Grooming 3   Eating 4   Daily Activity Raw Score 17   Daily Activity Standardized Score (Calc for Raw Score >=11) 37 26   AM-PAC Applied Cognition Inpatient   Following a Speech/Presentation 4   Understanding Ordinary Conversation 4   Taking Medications 4   Remembering Where Things Are Placed or Put Away 4   Remembering List of 4-5 Errands 4   Taking Care of Complicated Tasks 4   Applied Cognition Raw Score 24   Applied Cognition Standardized Score 62 21

## 2021-02-24 NOTE — ASSESSMENT & PLAN NOTE
· Patient has a small sacral pressure ulcer  · Evaluated by wound care    Change position every 2 hourly  · Family made aware of findings

## 2021-02-24 NOTE — ASSESSMENT & PLAN NOTE
· Creatinine 1 39 on admission  Baseline 0 9-1 05  · Likely pre-renal 2/2 reported poor oral intake/dehydration  · Avoid hypotension and nephrotoxic agents  · Creatinine at baseline now       Results from last 7 days   Lab Units 02/24/21  0603 02/23/21  0443 02/22/21  0715 02/21/21  1002 02/20/21  0603 02/19/21  0515 02/18/21  0859   CREATININE mg/dL 0 94 1 10 0 94 1 10 0 99 1 09 1 36*

## 2021-02-24 NOTE — CASE MANAGEMENT
Cm TT Krystle Nick at [facility] to determine if she is able to accept patient today  Krystle Nick is reviewing now and will follow up  Cm department will continue to follow patient through discharge

## 2021-02-24 NOTE — TREATMENT PLAN
Individualized Plan of 79 [facility]  [facility] 78 y o  female MRN: [id number]  Unit/Bed#: -01 Encounter: [id number]     PATIENT INFORMATION  ADMISSION DATE: 2/24/2021  4:02 PM KYLA[facility] CATEGORY: Thoracic compression fractures   ADMISSION DIAGNOSIS: Compression fracture of body of thoracic vertebra ([facility] [address] ) [S22 000A]  EXPECTED LOS: 2-3 weeks     MEDICAL/FUNCTIONAL PROGNOSIS  Based on my assessment of the patient's medical conditions and current functional status, the prognosis for attaining medical and functional goals or the IRF stay is:  Fair    Medical Goals: Patient will be medically stable for discharge to [facility] upon completion of rehab program and Patient will be able to manage medical conditions and comorbid conditions with medications and follow up upon completion of rehab program    [address]: Home - Assistance    ANTICIPATED FOLLOW-UP SERVICE:    Home Health Services: PT, OT and Nursing    DISCIPLINE SPECIFIC PLANS:  Required Disciplines & Services: Rehabillitation Nursing and Case Management    REQUIRED THERAPY:  Therapy Hours per Day Days per Week Total Days   Physical Therapy 1 5-6 7   Occupational Therapy 1 5-6 7   Speech/Language Therapy 1 5-6 7       ANTICIPATED FUNCTIONAL OUTCOMES:  ADL:   supervision -  Min assist   Bladder/Bowel:   mod I   Transfers:   mod I   Locomotion:   mod I   Cognitive:       DISCHARGE PLANNING NEEDS  Equipment needs: Discharge needs to be reviewed with team      REHAB ANTICIPATED PARTICIPATION RESTRICTIONS:  None

## 2021-02-24 NOTE — PLAN OF CARE
Problem: PAIN - ADULT  Goal: Verbalizes/displays adequate comfort level or baseline comfort level  Description: Interventions:  - Encourage patient to monitor pain and request assistance  - Assess pain using appropriate pain scale  - Administer analgesics based on type and severity of pain and evaluate response  - Implement non-pharmacological measures as appropriate and evaluate response  - Consider cultural and social influences on pain and pain management  - Notify physician/advanced practitioner if interventions unsuccessful or patient reports new pain  Outcome: Progressing     Problem: INFECTION - ADULT  Goal: Absence or prevention of progression during hospitalization  Description: INTERVENTIONS:  - Assess and monitor for signs and symptoms of infection  - Monitor lab/diagnostic results  - Monitor all insertion sites, i e  indwelling lines, tubes, and drains  - Monitor endotracheal if appropriate and nasal secretions for changes in amount and color  - [place] appropriate cooling/warming therapies per order  - Administer medications as ordered  - Instruct and encourage patient and family to use good hand hygiene technique  - Identify and instruct in appropriate isolation precautions for identified infection/condition  Outcome: Progressing  Goal: Absence of fever/infection during neutropenic period  Description: INTERVENTIONS:  - Monitor WBC    Outcome: Progressing     Problem: SAFETY ADULT  Goal: Patient will remain free of falls  Description: INTERVENTIONS:  - Assess patient frequently for physical needs  -  Identify cognitive and physical deficits and behaviors that affect risk of falls    -  [place] fall precautions as indicated by assessment   - Educate patient/family on patient safety including physical limitations  - Instruct patient to call for assistance with activity based on assessment  - Modify environment to reduce risk of injury  - Consider OT/PT consult to assist with strengthening/mobility  Outcome: Progressing  Goal: Maintain or return to baseline ADL function  Description: INTERVENTIONS:  -  Assess patient's ability to carry out ADLs; assess patient's baseline for ADL function and identify physical deficits which impact ability to perform ADLs (bathing, care of mouth/teeth, toileting, grooming, dressing, etc )  - Assess/evaluate cause of self-care deficits   - Assess range of motion  - Assess patient's mobility; develop plan if impaired  - Assess patient's need for assistive devices and provide as appropriate  - Encourage maximum independence but intervene and supervise when necessary  - Involve family in performance of ADLs  - Assess for home care needs following discharge   - Consider OT consult to assist with ADL evaluation and planning for discharge  - Provide patient education as appropriate  Outcome: Progressing  Goal: Maintain or return mobility status to optimal level  Description: INTERVENTIONS:  - Assess patient's baseline mobility status (ambulation, transfers, stairs, etc )    - Identify cognitive and physical deficits and behaviors that affect mobility  - Identify mobility aids required to assist with transfers and/or ambulation (gait belt, sit-to-stand, lift, walker, cane, etc )  - [place] fall precautions as indicated by assessment  - Record patient progress and toleration of activity level on Mobility SBAR; progress patient to next Phase/Stage  - Instruct patient to call for assistance with activity based on assessment  - Consider rehabilitation consult to assist with strengthening/weightbearing, etc   Outcome: Progressing     Problem: DISCHARGE PLANNING  Goal: Discharge to home or other facility with appropriate resources  Description: INTERVENTIONS:  - Identify barriers to discharge w/patient and caregiver  - Arrange for needed discharge resources and transportation as appropriate  - Identify discharge learning needs (meds, wound care, etc )  - Arrange for interpretive services to assist at discharge as needed  - Refer to Case Management Department for coordinating discharge planning if the patient needs post-hospital services based on physician/advanced practitioner order or complex needs related to functional status, cognitive ability, or social support system  Outcome: Progressing     Problem: Knowledge Deficit  Goal: Patient/family/caregiver demonstrates understanding of disease process, treatment plan, medications, and discharge instructions  Description: Complete learning assessment and assess knowledge base  Interventions:  - Provide teaching at level of understanding  - Provide teaching via preferred learning methods  Outcome: Progressing     Problem: Nutrition/Hydration-ADULT  Goal: Nutrient/Hydration intake appropriate for improving, restoring or maintaining nutritional needs  Description: Monitor and assess patient's nutrition/hydration status for malnutrition  Collaborate with interdisciplinary team and initiate plan and interventions as ordered  Monitor patient's weight and dietary intake as ordered or per policy  Utilize nutrition screening tool and intervene as necessary  Determine patient's food preferences and provide high-protein, high-caloric foods as appropriate       INTERVENTIONS:  - Monitor oral intake, urinary output, labs, and treatment plans  - Assess nutrition and hydration status and recommend course of action  - Evaluate amount of meals eaten  - Assist patient with eating if necessary   - Allow adequate time for meals  - Recommend/ encourage appropriate diets, oral nutritional supplements, and vitamin/mineral supplements  - Order, calculate, and assess calorie counts as needed  - Recommend, monitor, and adjust tube feedings and TPN/PPN based on assessed needs  - Assess need for intravenous fluids  - Provide specific nutrition/hydration education as appropriate  - Include patient/family/caregiver in decisions related to nutrition  Outcome: Progressing     Problem: Potential for Falls  Goal: Patient will remain free of falls  Description: INTERVENTIONS:  - Assess patient frequently for physical needs  -  Identify cognitive and physical deficits and behaviors that affect risk of falls    -  [place] fall precautions as indicated by assessment   - Educate patient/family on patient safety including physical limitations  - Instruct patient to call for assistance with activity based on assessment  - Modify environment to reduce risk of injury  - Consider OT/PT consult to assist with strengthening/mobility  Outcome: Progressing     Problem: Prexisting or High Potential for Compromised Skin Integrity  Goal: Skin integrity is maintained or improved  Description: INTERVENTIONS:  - Identify patients at risk for skin breakdown  - Assess and monitor skin integrity  - Assess and monitor nutrition and hydration status  - Monitor labs   - Assess for incontinence   - Turn and reposition patient  - Assist with mobility/ambulation  - Relieve pressure over bony prominences  - Avoid friction and shearing  - Provide appropriate hygiene as needed including keeping skin clean and dry  - Evaluate need for skin moisturizer/barrier cream  - Collaborate with interdisciplinary team   - Patient/family teaching  - Consider wound care consult   Outcome: Progressing

## 2021-02-24 NOTE — H&P
PHYSICAL MEDICINE AND REHABILITATION H&P/ADMISSION NOTE  Blessing Quintero 78 y o  female MRN: [id number]  Unit/Bed#: -01 Encounter: [id number]     Rehab Diagnosis: Impairment of mobility, safety and Activities of Daily Living (ADLs) due to Orthopedic Disorders:  08 9  Other Orthopedic Thoracic compression fractures    History of Present Illness:   Blessing Quintero is a 78 y o  female with COPD/ emphysema on baseline oxygen 2-3 L,  Congestive heart failure, osteoarthritis who presented to the [address] [place] [address] on 2/17/21 with back pain and shortness of breath  Pulmonary workup showing moderate pulmonary emphysematous changes bilaterally  Workup for back pain revealed Compression fracture of T5 (33% height loss approximately) and T6 (approximately 25% height loss  )  There is also superior endplate T2 compression fracture with approximately 10%-20 percent height loss at the superior endplate  Case was discussed with both Orthopedics and Neurosurgery and patient was recommended conservative treatment with TLSO brace while out of bed  Medically, patient was treated for an E coli urinary tract infection  Patient was started on steroids for COPD and emphysema and continued on her Breo and DuoNebs  Patient was seen by Nephrology  acute renal insufficiency, hypercalcemia and hyperkalemia  Patient started on Lasix by Nephrology  Patient was followed by wound care for a sacral pressure ulcer  After medical stabilization patient was found to have acute functional deficits from her baseline and therefore was admitted to [address] [place] [address]  Plan:     Rehabilitation   Functional deficits: impaired mobility, self care   Begin PT/OT/SLP  Rehabilitation goals are to achieve a  Supervision-modified independent level with mobility and  Min assistself care  Prognosis is fair  ELOS is 10-14  Estimated discharge is home       DVT prophylaxis    managed on subcutaneous heparin    SCDs    Pain   acute on chronic pain in lower to midback:  Managed on scheduled Tylenol,    Bladder plan   Continent    Bowel plan   Continent    Code Status    DNR/DNI      * Thoracic compression fracture (HCC)  Assessment & Plan  · T2, T5 and T6 compression fractures  · Treated conservatively  · TLSO brace while out of bed  · Follow-up as outpatient with Neurosurgery    Pressure ulcer  Assessment & Plan  · Located on Sacrum  · Consulted wound care to follow while at [facility]  · Continue optimization of nutrition, pressure relieving techniques, and turns  · Continue local wound care with foam dressing as recommended by wound care RN  UTI (urinary tract infection)  Assessment & Plan  · E Coli UTI  · Completed treatment     VICKY (acute kidney injury) ([place] [address] )  Assessment & Plan  · Renal function baseline 0 9 - 1 1  · Renal following hypercalcemia/hyperkaemia (hyperkalemia likely due to diet - was drinking V8s and fruity drinks)      Chronic diastolic heart failure ([facility])  Assessment & Plan  Managed on Lasix 20mg daily      Type 2 diabetes mellitus with complication, without long-term current use of insulin ([facility])  Assessment & Plan  · Managed on Glyburide and SSI  · CCD  · Im consultants following    COPD exacerbation ([place] [address] )  Assessment & Plan  · Wears oxygen 2-3 L at baseline  · Continue current inhalers  · Monitor oxygen saturations with activity  · Recent CT scan +moderate emphysema  · Continue prednisone taper            Subjective:   Patient seen face to face  Currently denies shortness of breath or cough  Having mid to lower back pain with movement  Review of Systems   Constitutional: Negative  HENT: Negative  Eyes: Negative  Respiratory: Negative  Cardiovascular: Negative  Gastrointestinal: Negative  Endocrine: Negative  Genitourinary: Negative  Musculoskeletal: Positive for back pain  Skin: Negative  Allergic/Immunologic: Negative  Neurological: Negative  Hematological: Negative  Psychiatric/Behavioral: Negative  Function:  Prior level of function and living situation:  Patient resides  alone  In 1 level home with 5 steps to enter  Her sister-in-law and brother are  Available for assistance  She also has hired assistance at baseline  PLOF:  Prior to patient's admission, patient was fully Independent with ADLs and received assistance with IADLs  Patient was Independent without use of AD for mobility  Family or HHA assisted with stair navigation  HHA assisted with showers 1x/week         Current level of function:  Physical Therapy:   Min to mod assist with transfers and ambulation  Occupational Therapy: mod to max assist with ADLs/ toileting    Physical Exam:  /58 (BP Location: Right arm)   Pulse 85   Temp 98 °F (36 7 °C) (Oral)   Resp 18   Ht 5' 2" (1 575 m)   Wt 74 8 kg (165 lb)   SpO2 94%   BMI 30 18 kg/m²        Intake/Output Summary (Last 24 hours) at 2/25/2021 1625  Last data filed at 2/25/2021 1304  Gross per 24 hour   Intake --   Output 1800 ml   Net -1800 ml       Body mass index is 30 18 kg/m²  Physical Exam  Vitals signs and nursing note reviewed  Constitutional:       General: She is not in acute distress  HENT:      Head: Normocephalic and atraumatic  Nose: Nose normal       Mouth/Throat:      Mouth: Mucous membranes are moist    Eyes:      Conjunctiva/sclera: Conjunctivae normal    Neck:      Musculoskeletal: Neck supple  Cardiovascular:      Rate and Rhythm: Normal rate and regular rhythm  Pulses: Normal pulses  Pulmonary:      Effort: Pulmonary effort is normal       Breath sounds: Wheezing present  No rales  Abdominal:      General: Bowel sounds are normal  There is no distension  Palpations: Abdomen is soft  Tenderness: There is no abdominal tenderness  Musculoskeletal:         General: No swelling  Skin:     General: Skin is warm     Neurological:      Mental Status: She is alert and oriented to person, place, and time  Comments: Motor: 3+/5 proximally and 3/5 x 4 extremities   Psychiatric:         Mood and Affect: Mood normal             Labs, medications, and imaging personally reviewed      Laboratory:    Lab Results   Component Value Date    SODIUM 136 02/25/2021    K 5 2 02/25/2021     02/25/2021    CO2 35 (H) 02/25/2021    BUN 33 (H) 02/25/2021    CREATININE 0 91 02/25/2021    GLUC 85 02/25/2021    CALCIUM 11 8 (H) 02/25/2021     Lab Results   Component Value Date    WBC 10 40 (H) 02/25/2021    HGB 11 9 02/25/2021    HCT 37 4 02/25/2021    MCV 93 02/25/2021     02/25/2021     No results found for: INR, PROTIME      Current Facility-Administered Medications:     acetaminophen (TYLENOL) tablet 650 mg, 650 mg, Oral, Q6H [facility], Penny Gerard MD, 650 mg at 02/25/21 1121    al mag oxide-diphenhydramine-lidocaine viscous (MAGIC MOUTHWASH) suspension 10 mL, 10 mL, Swish & Spit, Q4H PRN, Ani Yoo, CRNP, 10 mL at 02/25/21 1118    albuterol (PROVENTIL HFA,VENTOLIN HFA) inhaler 2 puff, 2 puff, Inhalation, TID, Penny Gerard MD    albuterol inhalation solution 2 5 mg, 2 5 mg, Nebulization, Q6H PRN, Penny Gerard MD    ALPRAZolam [facility]) tablet 0 25 mg, 0 25 mg, Oral, TID PRN, Penny Gerard MD, 0 25 mg at 02/24/21 2135    aluminum-magnesium hydroxide-simethicone (MYLANTA) oral suspension 30 mL, 30 mL, Oral, Q6H PRN, Penny Gerard MD    atorvastatin (LIPITOR) tablet 80 mg, 80 mg, Oral, Daily With Dinner, Penny Gerard MD    benzonatate (TESSALON PERLES) capsule 100 mg, 100 mg, Oral, TID PRN, Penny Gerard MD    bisacodyl (DULCOLAX) rectal suppository 10 mg, 10 mg, Rectal, Daily PRN, Penny Gerard MD    fluticasone-vilanterol (BREO ELLIPTA) 100-25 mcg/inh inhaler 1 puff, 1 puff, Inhalation, Daily, Penny Gerard MD, 1 puff at 02/25/21 0916    furosemide (LASIX) tablet 20 mg, 20 mg, Oral, Daily, Penny Gerard MD, 20 mg at 02/25/21 0916    guaiFENesin (MUCINEX) 12 hr tablet 600 mg, 600 mg, Oral, Q12H [address], King Mar MD, 600 mg at 02/25/21 0916    heparin (porcine) subcutaneous injection 5,000 Units, 5,000 Units, Subcutaneous, Q8H [address], King Mar MD, 5,000 Units at 02/25/21 1514    insulin lispro (HumaLOG) 100 units/mL subcutaneous injection 1-6 Units, 1-6 Units, Subcutaneous, HS, King Mar MD, 3 Units at 02/24/21 2138    insulin lispro (HumaLOG) 100 units/mL subcutaneous injection 1-6 Units, 1-6 Units, Subcutaneous, TID AC **AND** Fingerstick Glucose (POCT), , , TID AC, King Mar MD    levothyroxine tablet 125 mcg, 125 mcg, Oral, Early Morning, King Mar MD, 125 mcg at 02/25/21 0606    lidocaine (LIDODERM) 5 % patch 2 patch, 2 patch, Topical, Daily, King Mar MD, 2 patch at 02/25/21 0913    melatonin tablet 6 mg, 6 mg, Oral, HS PRN, King Mar MD    methocarbamol (ROBAXIN) tablet 500 mg, 500 mg, Oral, Q6H PRN, King Mar MD    montelukast (SINGULAIR) tablet 10 mg, 10 mg, Oral, HS, King Mar MD, 10 mg at 02/24/21 2135    nystatin (MYCOSTATIN) cream, , Topical, BID, King Mar MD, Given at 02/25/21 7807    nystatin (MYCOSTATIN) powder, , Topical, BID, King Mar MD, Given at 02/25/21 0916    ondansetron (ZOFRAN-ODT) dispersible tablet 4 mg, 4 mg, Oral, Q6H PRN, King Mar MD    oxyCODONE (ROXICODONE) IR tablet 5 mg, 5 mg, Oral, Q6H PRN, King Mar MD, 5 mg at 02/25/21 0917    pantoprazole (PROTONIX) EC tablet 40 mg, 40 mg, Oral, Early Morning, King Mar MD, 40 mg at 02/25/21 0606    polyethylene glycol (MIRALAX) packet 17 g, 17 g, Oral, Daily, King Mar MD    predniSONE tablet 40 mg, 40 mg, Oral, Daily, 40 mg at 02/25/21 0915 **FOLLOWED BY** [START ON 2/27/2021] predniSONE tablet 20 mg, 20 mg, Oral, Daily **FOLLOWED BY** [START ON 3/1/2021] predniSONE tablet 10 mg, 10 mg, Oral, Daily, King Mar MD    saccharomyces boulardii (FLORASTOR) capsule 250 mg, 250 mg, Oral, BID, King Mar MD, 250 mg at 02/25/21 0936    senna (SENOKOT) tablet 8 6 mg, 1 tablet, Oral, HS, Lauren Magallanes MD    traMADol (ULTRAM) tablet 50 mg, 50 mg, Oral, Q6H PRN, Lauren Magallanes MD    white petrolatum-mineral oil (EUCERIN,HYDROCERIN) cream, , Topical, TID PRN, Lauren Magallanes MD, Given at 02/25/21 1243  Allergies   Allergen Reactions    Erythromycin     Sulfa Antibiotics       Patient Active Problem List    Diagnosis Date Noted    Thoracic compression fracture ([facility] ) 02/18/2021     Priority: High    Alkalosis 02/25/2021    Hyperkalemia 02/23/2021    Urinary incontinence 02/23/2021    Pressure ulcer 02/22/2021    Hypercalcemia 02/21/2021    Chronic respiratory failure with hypoxia (HCC) 02/18/2021    Moderate protein-calorie malnutrition ([facility] ) 02/18/2021    UTI (urinary tract infection) 02/18/2021    SOB (shortness of breath) 02/17/2021    Chronic bilateral thoracic back pain 02/17/2021    Chronic diastolic heart failure ([facility] ) 02/17/2021    VICKY (acute kidney injury) ([facility] ) 02/17/2021    Atherosclerosis of abdominal aorta ([facility] ) 12/01/2020    CHF (congestive heart failure) ([place] [address] ) 02/11/2019    Type 2 diabetes mellitus with complication, without long-term current use of insulin ([place] [address] ) 04/08/2015    Carotid artery plaque 04/06/2015    Hyperlipidemia 03/14/2014    Hypothyroidism 03/14/2014    Osteoarthrosis 03/14/2014    COPD exacerbation ([facility] ) 03/10/2014     Past Medical History:   Diagnosis Date    Cataract     CHF (congestive heart failure) (HCC)     Leukocytosis      Past Surgical History:   Procedure Laterality Date    APPENDECTOMY      CYSTOCELE REPAIR      ANTERIOR COLPORRHAPHY     ORIF ANKLE FRACTURE Left 1979    TOTAL ABDOMINAL HYSTERECTOMY       Social History     Socioeconomic History    Marital status:       Spouse name: Not on file    Number of children: Not on file    Years of education: Not on file    Highest education level: Not on file   Occupational History    Occupation: [address] Needs    Financial resource strain: Not on file    Food insecurity     Worry: Not on file     Inability: Not on file    Transportation needs     Medical: Not on file     Non-medical: Not on file   Tobacco Use    Smoking status: Former Smoker     Packs/day: 2 00     Years: 48 00     Pack years: 96 00     Types: Cigarettes     Quit date: 1999     Years since quittin 4    Smokeless tobacco: Never Used   Substance and Sexual Activity    Alcohol use: Not Currently     Alcohol/week: 1 0 standard drinks     Types: 1 Cans of beer per week     Frequency: Monthly or less     Drinks per session: 1 or 2     Binge frequency: Never    Drug use: No    Sexual activity: Not Currently   Lifestyle    Physical activity     Days per week: 0 days     Minutes per session: 0 min    Stress:  To some extent   Relationships    Social connections     Talks on phone: Not on file     Gets together: Not on file     Attends Restorationism service: Not on file     Active member of club or organization: Not on file     Attends meetings of clubs or organizations: Not on file     Relationship status: Not on file    Intimate partner violence     Fear of current or ex partner: Not on file     Emotionally abused: Not on file     Physically abused: Not on file     Forced sexual activity: Not on file   Other Topics Concern    Not on file   Social History Narrative    LIVING INDEPENDENTLY ALONE     NO ADVANCED DIRECTIVES ON FILE      Social History     Tobacco Use   Smoking Status Former Smoker    Packs/day: 2 00    Years: 48 00    Pack years: 96 00    Types: Cigarettes    Quit date: 1999    Years since quittin 4   Smokeless Tobacco Never Used     Social History     Substance and Sexual Activity   Alcohol Use Not Currently    Alcohol/week: 1 0 standard drinks    Types: 1 Cans of beer per week    Frequency: Monthly or less    Drinks per session: 1 or 2    Binge frequency: Never     Family History   Problem Relation Age of Onset    No Known Problems Mother     No Known Problems Father          Medical Necessity Criteria for [facility] Admission: Electrolyte imbalance:  hyponatremia, hyperkalemia and hypercalcemia, Acute Kidney Injury, Anemia, with the following plan: monitor H/H, Diabetes requiring close blood glucose monitoring, Congestive Heart Failure (CHF) exacerbation, Pressure sore/Skin Tear and COPD  In addition, the preadmission screen, post-admission physical evaluation, overall plan of care and admissions order demonstrate a reasonable expectation that the following criteria were met at the time of admission to the [facility]  1  The patient requires active and ongoing therapeutic intervention of multiple therapy disciplines (physical therapy, occupational therapy, speech-language pathology, or prosthetics/orthotics), one of which is physical or occupational therapy  2  Patient requires an intensive rehabilitation therapy program, as defined in Chapter 1, section 110 2 2 of the CMS Medicare Policy Manual  This intensive rehabilitation therapy program will consist of at least 3 hours of therapy per day at least 5 days per week or at least 15 hours of intensive rehabilitation therapy within a 7 consecutive day period, beginning with the date of admission to the [facility]  3  The patient is reasonably expected to actively participate in, and benefit significantly from, the intensive rehabilitation therapy program as defined in Chapter 1, section 110 2 2 of the CMS Medicare Policy Manual at this time of admission to the [facility]  She can reasonably be expected to make measurable improvement (that will be of practical value to improve the patients functional capacity or adaptation to impairments) as a result of the rehabilitation treatment, as defined in section 110 3, and such improvement can be expected to be made within the prescribed period of time   As noted in the CMS Medicare Policy Manual, the patient need not be expected to achieve complete independence in the domain of self-care nor be expected to return to his or her prior level of functioning in order to meet this standard  4  The patient must require physician supervision by a rehabilitation physician  As such, a rehabilitation physician will conduct face-to-face visits with the patient at least 3 days per week throughout the patients stay in the [facility] to assess the patient both medically and functionally, as well as to modify the course of treatment as needed to maximize the patients capacity to benefit from the rehabilitation process  5  The patient requires an intensive and coordinated interdisciplinary approach to providing rehabilitation, as defined in Chapter 1, section 110 2 5 of the CMS Medicare Policy Manual  This will be achieved through periodic team conferences, conducted at least once in a 7-day period, and comprising of an interdisciplinary team of medical professionals consisting of: a rehabilitation physician, registered nurse,  and/or , and a licensed/certified therapist from each therapy discipline involved in treating the patient  Changes Since Pre-admission Assessment: None -This patient's participation in rehab continues to be reasonable, necessary and appropriate  CMS Required Post-Admission Physician Evaluation Elements  History and Physical, including medical history, functional history and active comorbidities as in above text  Post-Admission Physician Evaluation:  The patient has the potential to make improvement and is in need of physical, occupational, and/or therapy services  The patient may also need nutritional services  Given the patient's complex medical condition and risk of further medical complications, rehabilitative services cannot be safely provided at a lower level of care, such as a skilled nursing facility   I have reviewed the patient's functional and medical status at the time of the preadmission screening and they are the same as on the day of this admission  I acknowledge that I have personally performed a full physical examination on this patient within 24 hours of admission  The patient and/or family demonstrated understanding the rehabilitation program and the discharge process after we discussed them  Agree in entirety: yes  Minor adaptions: none    Major changes: none    King Mar MD    ** Please Note: Fluency Direct voice to text software may have been used in the creation of this document   **

## 2021-02-24 NOTE — CASE MANAGEMENT
Cm received TT from [place] at [facility] reporting that their physician is reaching out to renal now regarding the patient  Cm department will continue to follow patient through discharge

## 2021-02-24 NOTE — CONSULTS
Internal Medicine  Consultation Note    Patient: Amy Horne  Age/sex: 78 y o  female  Medical Record #: [id number]    Assessment/Plan    Compression fracture T5/6   No surgical intervention   Cont TLSO brace when OOB   Aggressive rehab per PMR   Pain mgmt per PMR    COPD   Wears oxygen 2-3 L continuous   Continue current inhalers   Monitor oxygen saturations   Recent CT scan +moderate emphysema    Diastolic HF   Cont furosemide 20mg daily   Monitor volume status closely   Chronic LE edema   Echo=ef 03%; diastolic dysfunction    DM II   Hgb A1c 7 1 in October   Home meds: Glyburide 5mg bid   Switch to DM diet   Add sliding scale and coverage   Will resume glyburide as needed   Monitor trend    HTN   Cont diovan    Monitor trend    CKD   Level 3   Baseline 0 9-1 1   Avoid nephrotoxic medications   Avoid hypotension in the post operative setting   Monitor bmp    Hypercalcemia   [facility]phrology w/u at [facility] secondary to volume status   Pth negative   Monitor    Hypothyroid   Cont home supplementation    Sacral decubitus   Cont off loading, nutritional supplementation   Consult wound nurse                Subjective/ HPI: Patient seen and examined  Pt is an 79 y/o female with h/o COPD, diastolic HF, DM, HLD, Hypothyroid who presented to [address] [place] [address] with worsening thoracic back pain and shortness of breath  CT scan showed acute on chronic compression fx T5 and 6  She was evaluated and felt that medical management with TLSO brace and pain medications was warranted  In regards to her SOB, she was seen by pulmonology who did not feel that at this point it was an acute exacerbation  They felt that the SOB was secondary to the acute thoracic back pain  She also developed a sacral decubitus and images were reviewed  She is now medically cleared for acute rehab and we are asked to follow along for medical management           ROS:   A 10 point ROS was performed; negative except as noted above       Medical History:  Past Medical History:   Diagnosis Date    Cataract     CHF (congestive heart failure) (HCC)     Leukocytosis      Past Surgical History:   Procedure Laterality Date    APPENDECTOMY      CYSTOCELE REPAIR      ANTERIOR COLPORRHAPHY     ORIF ANKLE FRACTURE Left 1979    TOTAL ABDOMINAL HYSTERECTOMY         Substance Use History:   Social History     Substance and Sexual Activity   Alcohol Use Not Currently    Alcohol/week: 4 0 standard drinks    Types: 4 Cans of beer per week    Frequency: 2-3 times a week    Drinks per session: 1 or 2    Binge frequency: Never     Social History     Tobacco Use   Smoking Status Former Smoker    Packs/day: 2 00    Years: 48 00    Pack years: 96 00    Types: Cigarettes    Quit date: 1999    Years since quittin 4   Smokeless Tobacco Never Used     Social History     Substance and Sexual Activity   Drug Use No       Family History:    non-contributory    Review of Scheduled Meds:  Current Facility-Administered Medications   Medication Dose Route Frequency Provider Last Rate    acetaminophen  650 mg Oral Q6H [facility] Margot Up MD      albuterol  2 puff Inhalation Q6H PRN Margot Up MD      ALPRAZolam  0 25 mg Oral TID PRN Margot Up MD      aluminum-magnesium hydroxide-simethicone  30 mL Oral Q6H PRN Margot Up MD      [START ON 2021] atorvastatin  80 mg Oral Daily With John Waters MD      benzonatate  100 mg Oral TID PRN Margot Up MD      bisacodyl  10 mg Rectal Daily PRN Margot Up MD      [START ON 2021] fluticasone-vilanterol  1 puff Inhalation Daily Margot Up MD      [START ON 2021] furosemide  20 mg Oral Daily Margot Up MD      guaiFENesin  600 mg Oral Q12H [facility] Margot Up MD      heparin (porcine)  5,000 Units Subcutaneous Q8H [facility] Margot Up MD      insulin lispro  1-6 Units Subcutaneous HS Margot Up MD      insulin lispro  1-6 Units Subcutaneous TID AC Sara Rowland MD      ipratropium-albuterol  3 mL Nebulization Q6H MD Violet Conway ON 2/25/2021] levothyroxine  125 mcg Oral Early Morning Sara Rowland MD     [facility] Bree Guadalupe ON 2/25/2021] lidocaine  2 patch Topical Daily Sara Rowland, MD      melatonin  6 mg Oral HS PRN Sara Rowland MD      [START ON 2/25/2021] metFORMIN  500 mg Oral Daily With Breakfast Dionna Lacy DO      methocarbamol  500 mg Oral Q6H PRN Sara Rowland MD      montelukast  10 mg Oral HS Sara Rowland MD      nystatin   Topical BID Sara Rowland MD      nystatin   Topical BID Sara Rowland MD      ondansetron  4 mg Oral Q6H PRN Sara Rowland MD      oxyCODONE  5 mg Oral Q6H PRN Sara Rowland MD      [START ON 2/25/2021] pantoprazole  40 mg Oral Early Morning Sara Rowland MD      [START ON 2/25/2021] polyethylene glycol  17 g Oral Daily Sara Rowland MD      [START ON 2/25/2021] predniSONE  40 mg Oral Daily Sara Rowland MD      Followed by   Willy Pastor ON 2/27/2021] predniSONE  20 mg Oral Daily Sara Rowland MD      Followed by   Willy Pastor ON 3/1/2021] predniSONE  10 mg Oral Daily Sara Rowland MD      saccharomyces boulardii  250 mg Oral BID Sara Rowland MD      senna  1 tablet Oral HS Sara Rowland MD      traMADol  50 mg Oral Q6H PRN Sara Rowland MD      white petrolatum-mineral oil   Topical TID PRN Sara Rowland MD         Labs:     Results from last 7 days   Lab Units 02/24/21  0603 02/23/21  0443   WBC Thousand/uL 10 15 10 67*   HEMOGLOBIN g/dL 10 7* 10 9*   HEMATOCRIT % 33 8* 34 7*   PLATELETS Thousands/uL 191 186     Results from last 7 days   Lab Units 02/24/21  0603 02/23/21  0443   SODIUM mmol/L 138 136   POTASSIUM mmol/L 5 4* 5 7*   CHLORIDE mmol/L 100 100   CO2 mmol/L 34* 33*   BUN mg/dL 35* 39*   CREATININE mg/dL 0 94 1 10   CALCIUM mg/dL 10 8* 11 0*                Results from last 7 days   Lab Units 02/24/21  1057 02/24/21  0740 02/23/21  2050   POC GLUCOSE mg/dl 180* 99 191*       Lab Results   Component Value Date    URINECX >100,000 cfu/ml Escherichia coli (A) 2021    URINECX >100,000 cfu/ml Escherichia coli (A) 2020    URINECX 80,000-89,000 cfu/ml Escherichia coli (A) 10/26/2018       Input and Output Summary (last 24 hours):     No intake or output data in the 24 hours ending 21 1731    Imaging:     No orders to display       *Labs /Radiology studiesLabs reviewed  *Medications reviewed and reconciled as needed  *Please refer to order section for additional ordered labs studies  *Case discussed with primary attending during morning huddle case rounds    Vitals:   Temp (24hrs), Av 8 °F (36 6 °C), Min:97 3 °F (36 3 °C), Max:98 3 °F (36 8 °C)    Temp:  [97 3 °F (36 3 °C)-98 3 °F (36 8 °C)] 98 3 °F (36 8 °C)  HR:  [80] 80  Resp:  [16-18] 16  BP: (133-137)/(54-73) 137/73  SpO2:  [90 %-99 %] 94 %  Body mass index is 33 47 kg/m²  Physical Exam:   GEN: No apparent distress, interactive  NEURO: Alert and oriented x3  HEENT: Pupils are equal and reactive, EOMI, mucous membranes are moist, face symmetrical  CV: S1 S2 regular, no MRG, trace peripheral edema noted  RESP: Lungs are clear but decreased bilaterally, no wheezes, rales or rhonchi noted, on 2L NC, respirations easy and non labored  GI: obese, soft non tender, non distended; +BS x4  : Voiding without difficulty  MUSC: Moves all extremities; +generalized deconditioning  SKIN: pink, warm and dry, normal turgor, no rashes, lesions; +sacral wound refer to images          Invasive Devices     Peripheral Intravenous Line            Peripheral IV 21 Dorsal (posterior); Right Forearm 2 days          Drain            External Urinary Catheter 3 days                   Code Status: Level 3 - DNAR and DNI  Current Length of Stay: 0 day(s)    Total floor / unit time spent today 45 minutes with more than 50% spent counseling/coordinating care   Counseling includes discussion with patient re: progress  and discussion with patient of his/her current medical state/information  Coordination of patient's care was performed in conjunction with primary service  Time invested included review of patient's labs, vitals, and management of their comorbidities with continued monitoring  In addition, this patient was discussed with medical team including physician and advanced extenders  The care of the patient was extensively discussed and appropriate treatment plan was formulated unique for this patient  ** Please Note: Fluency Direct voice to text software may have been used in the creation of this document   Audio transcription errors may occur**

## 2021-02-24 NOTE — PLAN OF CARE
Problem: PHYSICAL THERAPY ADULT  Goal: Performs mobility at highest level of function for planned discharge setting  See evaluation for individualized goals  Description: Treatment/Interventions: Functional transfer training, LE strengthening/ROM, Elevations, Therapeutic exercise, Endurance training, Patient/family training, Equipment eval/education, Bed mobility, Gait training, Spoke to nursing, OT          See flowsheet documentation for full assessment, interventions and recommendations  2/24/2021 0959 by Natalia Saldivar PT  Outcome: Not Progressing  Note: Prognosis: Good  Problem List: Decreased strength, Decreased endurance, Impaired balance, Decreased mobility, Pain, Orthopedic restrictions, Decreased skin integrity  Assessment: Pt seen for PT treatment session this date with interventions consisting of gait training w/ emphasis on improving pt's ability to ambulate level surfaces x 3 ft  bed to chair with min A provided by therapist with RW and therapeutic activity consisting of training: bed mobility, supine to sit transfers and sit<>stand transfers  Pt agreeable to PT treatment session upon arrival, pt found supine in bed w/ HOB elevated, A&O x 4 and responsive  In comparison to previous session, pt with no improvements as evidenced by pt continues to require A of 1 to complete level surface ambulation using RW  Further distance gait training limited by ongoing shortness of breath, fatigue and pain  Post session: pt seated OOB in recliner, chair alarm engaged and all needs in reach  Continue to recommend STR at time of d/c in order to maximize pt's functional independence and safety w/ mobility  Pt continues to be functioning below baseline level, and remains limited 2* factors listed above  PT will continue to see pt while here in order to address the deficits listed above and provide interventions consistent w/ POC in effort to achieve STGs    Barriers to Discharge: Decreased caregiver support, Inaccessible home environment     PT Discharge Recommendation: [phone]     PT - OK to Discharge: Yes(when medically cleared; if to STR)    See flowsheet documentation for full assessment

## 2021-02-24 NOTE — PLAN OF CARE
Problem: OCCUPATIONAL THERAPY ADULT  Goal: Performs self-care activities at highest level of function for planned discharge setting  See evaluation for individualized goals  Description: Treatment Interventions: ADL retraining, Functional transfer training, UE strengthening/ROM, Endurance training, Equipment evaluation/education, Patient/family training, Compensatory technique education, Continued evaluation, Energy conservation, Activityengagement          See flowsheet documentation for full assessment, interventions and recommendations  Note: Limitation: Decreased ADL status, Decreased UE strength, Decreased endurance, Decreased self-care trans, Decreased high-level ADLs  Prognosis: Good  Assessment: Patient participated in Skilled OT session this date with interventions consisting of ADL re training with the use of correct body mechnaics, Energy Conservation techniques, Work simplification skills , deep breathing technique, safety awareness and fall prevention techniques, therapeutic exercise to: increase functional use of BUEs, increase BUE muscle strength  and increase cardiovascular endurance    Patient agreeable to OT treatment session, upon arrival patient was found supine in bed, alert, responsive  and in no apparent distress  In comparison to previous session, patient with improvements in self performance of ADL tasks  Please refer to flow sheet for detailed performance  Patient requiring verbal cues for safety, verbal cues for correct technique, verbal cues for pacing thru activity steps and frequent rest periods  Patient continues to be functioning below baseline level, occupational performance remains limited secondary to factors listed above and increased risk for falls and injury  From OT standpoint, recommendation at time of d/c would be Short Term Rehab     Patient to benefit from continued Occupational Therapy treatment while in the hospital to address deficits as defined above and maximize level of functional independence with ADLs and functional mobility        OT Discharge Recommendation: Post-Acute Rehabilitation Services

## 2021-02-24 NOTE — CASE MANAGEMENT
Cm received TT from Sis Guadalupe at [employer] reporting that patient will be picked up at 2:45 pm by PMR  Cm TT DORA Jennings to report  Cm called patient's son Angela Farley to report  Cm TT [facility] at [facility] to report transportation time  Cm department will continue to follow patient through discharge

## 2021-02-24 NOTE — DISCHARGE SUMMARY
Discharge- Dominga Velásquez 1940, 78 y o  female MRN: [id number]    Unit/Bed#: -01 Encounter: [id number]    Primary Care Provider: Diana Cortes MD   Date and time admitted to hospital: 2/17/2021 12:29 AM        * Thoracic compression fracture [facility])  Assessment & Plan  · CT thoracolumbar spine showing thoracic compression fractures from T5 to T7 with bony retropulsion- acute vs subacute   · MRI thoracic spine  Did not demonstrate retropulsion but did show similar findings to CT scan, review imaging for detailed description  · No focal neurological deficit noted  Discussed with orthopedics and neurosurgery team   · Recommended brace and PT/OT   Pending placement to acute rehab   · Continue with pain control   · Stable from orthopedic surgery perspective for discharge     SOB (shortness of breath)  Assessment & Plan  · Chronic hypoxic respiratory, stable  · On baseline oxygen requirements of 3 liters  · Transitioned from IV solumedrol to PO prednisone   · Continue current regimen, complete 5 day course of steroids total, on 2/27/21    Hyperkalemia  Assessment & Plan  Results from last 7 days   Lab Units 02/24/21  0603 02/23/21  0443 02/22/21  0715 02/21/21  1002 02/20/21  0603 02/19/21  0515 02/18/21  0859   POTASSIUM mmol/L 5 4* 5 7* 5 2 5 3 4 4 4 1 4 4     · Suspecting Dietary supplement as cause of hyperkalemia  · Drinking V8 juice which is high in potassium  · Continue lasix, hyperkalemia should improve with discontinuation of foods high in potassium and continuation of lasix  · Monitor potassium at rehab  · Stable from nephrology perspective for discharge     Hypercalcemia  Assessment & Plan  · Calcium improving with initiation of IV fluids and lasix  · Monitor calcium levels as at acute rehab    Results from last 7 days   Lab Units 02/24/21  0603 02/23/21  0443 02/22/21  0715 02/21/21  1002 02/20/21  0603 02/19/21  0515 02/18/21  0859   CALCIUM mg/dL 10 8* 11 0* 11 0* 10 6* 10 2* 10 3* 10 2* Urinary incontinence  Assessment & Plan  · Check bladder scan to assess for retention  · Bladder scan did not demonstrate any retention  · Patient reports having trouble urinating when sitting  · Has large abdominal pannus which could be compressing on bladder   · No indication for chapin at this time, continue with intermittent bladder scans as needed  · Has adequate urine output with purewick in place  · Also unlikely cord compression or acute cord syndrome, evaluated by Neurosurgery and Orthopedic Surgery    Pressure ulcer  Assessment & Plan  · Patient has a small sacral pressure ulcer  · Evaluated by wound care  Change position every 2 hourly  · Family made aware of findings     UTI (urinary tract infection)  Assessment & Plan  · Growing E coli pansensitive  Finished 3 days of IV ceftriaxone  · UTI resolved     Moderate protein-calorie malnutrition ([facility] [address] )  Assessment & Plan  Malnutrition Findings:           BMI Findings: Body mass index is 33 47 kg/m²  · Moderate protein-calorie malnutrition, in the setting of severe back pain, COPD, and VICKY d/t poor po intake, as evidenced by 15 05 lb/8 14% weight loss in three months, requiring Carb control diet with glue Phillips supplements, monitoring of PO intake, and assistance with meals as needed  · Nutritionist consult    Chronic respiratory failure with hypoxia ([facility] [address] )  Assessment & Plan  · Ongoing  Continue supplemental oxygen  · At home she is on 3-4 L oxygen at baseline  · Secondary to COPD    VICKY (acute kidney injury) ([facility] [address] )  Assessment & Plan  · Creatinine 1 39 on admission  Baseline 0 9-1 05  · Likely pre-renal 2/2 reported poor oral intake/dehydration  · Avoid hypotension and nephrotoxic agents  · Creatinine at baseline now       Results from last 7 days   Lab Units 02/24/21  0603 02/23/21  0443 02/22/21  0715 02/21/21  1002 02/20/21  0603 02/19/21  0515 02/18/21  0859   CREATININE mg/dL 0 94 1 10 0 94 1 10 0 99 1 09 1 36*         Chronic diastolic heart failure (HCC)  Assessment & Plan  Wt Readings from Last 3 Encounters:   02/24/21 83 kg (182 lb 15 7 oz)   02/20/21 77 kg (169 lb 12 1 oz)   02/11/21 77 1 kg (170 lb)     · Patient euvolemic now  · Was on torsemide but transitioned to lasix per nephology recommendations  · Monitor electrolytes, daily weights, intake/output    Chronic bilateral thoracic back pain  Assessment & Plan  · Now has acute fracture  See above    Hypothyroidism  Assessment & Plan  · Continue home dose Levothyroxine    Hyperlipidemia  Assessment & Plan  · Continue home dose Lipitor    Type 2 diabetes mellitus with complication, without long-term current use of insulin [facility])  Assessment & Plan  Lab Results   Component Value Date    HGBA1C 7 1 (H) 10/12/2020       Recent Labs     02/23/21  1652 02/23/21  2050 02/24/21  0740 02/24/21  1057   POCGLU 233* 191* 99 180*       Blood Sugar Average: Last 72 hrs:  (P) 741 [id number]   · Glucose levels adequately controlled on current regimen  · Continue, monitor glucose with meals/bedtime     COPD exacerbation (HCC)  Assessment & Plan  · Patient was given IV Solu-Medrol initially  Continue nebulization  · Pulmonary evaluation appreciated  Solu-Medrol discontinued  · 2/22 she was complaining more short of breath, wheezing    Started on Solu-Medrol 40 mg daily with improvement, transitioned to PO prednisone   · Will give tapering dose of steroid on discharge          Discharging Physician / Practitioner: Jennifer Ruiz MD  PCP: Patria Mcdonald MD  Admission Date:   Admission Orders (From admission, onward)     Ordered        02/18/21 1054  Inpatient Admission  Once         02/17/21 0321  Place in Observation  Once                   Discharge Date: 02/24/21    Disposition:      [address]  at [facility] to [facility]:   · Not Applicable to this Patient - Not Applicable to this Patient    Reason for Admission: Shortness of breath     Discharge Diagnoses:     Please see assessment and plan section above for further details regarding discharge diagnoses  Resolved Problems  Date Reviewed: 2/17/2021    None          Consultations During Hospital Stay:  · Pulmonology, Orthopedic Surgery     Procedures Performed:   · Imaging      Medication Adjustments and Discharge Medications:  · Summary of Medication Adjustments made as a result of this hospitalization: see med rec   · Medication Dosing Tapers - Please refer to Discharge Medication List for details on any medication dosing tapers (if applicable to patient)  · Medications being temporarily held (include recommended restart time): see med rec   · Discharge Medication List: See after visit summary for reconciled discharge medications  Wound Care Recommendations:  When applicable, please see wound care section of After Visit Summary  Diet Recommendations at Discharge:  Diet -        Diet Orders   (From admission, onward)             Start     Ordered    02/17/21 1041  Dietary nutrition supplements  Once     Question Answer Comment   Select Supplement: Glucerna-Strawberry    Frequency Dinner        02/17/21 1041    02/17/21 1040  Dietary nutrition supplements  Once     Question Answer Comment   Select Supplement: Glucerna-Vanilla    Frequency Breakfast        02/17/21 1041    02/17/21 0634  Diet Erwin/CHO Controlled; Consistent Carbohydrate Diet Level 2 (5 carb servings/75 grams CHO/meal)  Diet effective now     Question Answer Comment   Diet Type Erwin/CHO Controlled    Erwin/CHO Controlled Consistent Carbohydrate Diet Level 2 (5 carb servings/75 grams CHO/meal)    RD to adjust diet per protocol?  Yes        02/17/21 0634                Instructions for any Catheters / Lines Present at Discharge (including removal date, if applicable): na    Significant Findings / Test Results:   Principal Problem:    Thoracic compression fracture (HCC)  Active Problems:    SOB (shortness of breath)    Hyperkalemia Hypercalcemia    COPD exacerbation (HCC)    Type 2 diabetes mellitus with complication, without long-term current use of insulin (HCC)    Hyperlipidemia    Hypothyroidism    Chronic bilateral thoracic back pain    Chronic diastolic heart failure (HCC)    VICKY (acute kidney injury) ([facility] [address] )    Chronic respiratory failure with hypoxia (HCC)    Moderate protein-calorie malnutrition (HCC)    UTI (urinary tract infection)    Pressure ulcer    Urinary incontinence  Resolved Problems:    * No resolved hospital problems  *  ·       Incidental Findings:   · See above      Test Results Pending at Discharge (will require follow up):   · na     Outpatient Tests Requested:  · Follow up with PCP     Complications:  na    Hospital Course:     Kamla Stafford is a 78 y o  female patient who originally presented to the hospital on 2/17/2021 due to shortness of breath and chronic back pain  Found to have thoracic compression fractures and COPD exacerbation  Later incidentally found to have hypercalcemia  Nephrology was consulted along with orthopedic surgery  Eventually medically optimized for discharge to acute rehab at [facility]  Condition at Discharge: good     Discharge Day Visit / Exam:     Subjective:  Patient feels well, has no chest pain or chest palpitations  No shortness of breath  Appetite is good  Back pain is controlled when she does not move  Vitals: Blood Pressure: 136/64 (02/24/21 0742)  Pulse: 88 (02/22/21 2352)  Temperature: 97 8 °F (36 6 °C) (02/24/21 0742)  Temp Source: Oral (02/22/21 2352)  Respirations: 16 (02/24/21 0742)  Height: 5' 2" (157 5 cm) (02/17/21 0846)  Weight - Scale: 83 kg (182 lb 15 7 oz) (02/24/21 0600)  SpO2: 94 % (02/24/21 1310)  Exam:   Physical Exam  Vitals signs and nursing note reviewed  Constitutional:       Appearance: Normal appearance  HENT:      Head: Normocephalic and atraumatic  Nose: Nose normal  No congestion  Mouth/Throat:      Mouth: Mucous membranes are dry  Pharynx: Oropharynx is clear  Eyes:      General:         Right eye: No discharge  Left eye: No discharge  Neck:      Musculoskeletal: Normal range of motion and neck supple  Cardiovascular:      Rate and Rhythm: Normal rate and regular rhythm  Pulmonary:      Effort: Pulmonary effort is normal  No respiratory distress  Comments: On 3 liters supplemental oxygen  No conversational dyspnea  Abdominal:      General: Abdomen is flat  Palpations: Abdomen is soft  Musculoskeletal:      Right lower leg: No edema  Left lower leg: No edema  Skin:     General: Skin is warm and dry  Comments: Chronic venous stasis changes of lower extremities    Neurological:      General: No focal deficit present  Mental Status: She is alert  Mental status is at baseline  Psychiatric:         Mood and Affect: Mood normal          Behavior: Behavior normal          Discussion with Family: Spoke to daughter in law Derian Every    Goals of Care Discussions:  · Code Status at Discharge: Level 3 - DNAR and DNI  · Were there any Goals of Care Discussions during Hospitalization?: No  · Results of any General Goals of Care Discussions: na   · POLST Completed: No   · If POLST Completed, Summary of POLST Agreement Provided Here: na   · OK to Rehospitalize if Needed? No    Discharge instructions/Information to patient and family:   See after visit summary section titled Discharge Instructions for information provided to patient and family  Planned Readmission: [facility] Acute rehab       Discharge Statement:  I spent 30+ minutes discharging the patient  This time was spent on the day of discharge  I had direct contact with the patient on the day of discharge  Greater than 50% of the total time was spent examining patient, answering all patient questions, arranging and discussing plan of care with patient as well as directly providing post-discharge instructions    Additional time then spent on discharge activities      ** Please Note: This note has been constructed using a voice recognition system **

## 2021-02-24 NOTE — ASSESSMENT & PLAN NOTE
Wt Readings from Last 3 Encounters:   02/24/21 83 kg (182 lb 15 7 oz)   02/20/21 77 kg (169 lb 12 1 oz)   02/11/21 77 1 kg (170 lb)     · Patient euvolemic now  · Was on torsemide but transitioned to lasix per nephology recommendations  · Monitor electrolytes, daily weights, intake/output

## 2021-02-24 NOTE — PHYSICAL THERAPY NOTE
Physical Therapy Treatment Note       02/24/21 0918   PT Last Visit   PT Visit Date 02/24/21   Note Type   Note Type Treatment   Pain Assessment   Pain Assessment Tool 0-10   Pain Score 7   Pain Location/Orientation Location: Back;Orientation: Mid   Pain Onset/Description Onset: Ongoing   Hospital Pain Intervention(s) Repositioned; Ambulation/increased activity   Multiple Pain Sites No   Restrictions/Precautions   Weight Bearing Precautions Per Order Yes   RUE Weight Bearing Per Order WBAT  (Per orthopedics 2/21/21)   LUE Weight Bearing Per Order WBAT  (Per orthopedics 2/21/21)   RLE Weight Bearing Per Order WBAT  (Per orthopedics 2/21/21)   LLE Weight Bearing Per Order WBAT  (Per orthopedics 2/21/21)   Braces or Orthoses TLSO   Other Precautions Chair Alarm; Bed Alarm;Multiple lines; Fall Risk;Pain;Spinal precautions  (back safety precautions; log roll; TLSO)   General   Chart Reviewed Yes   Response to Previous Treatment Patient with no complaints from previous session  Family/Caregiver Present No   Cognition   Overall Cognitive Status WFL   Arousal/Participation Alert; Responsive; Cooperative   Attention Within functional limits   Orientation Level Oriented X4   Memory Within functional limits   Following Commands Follows all commands and directions without difficulty   Comments pt agreeable to PT treatment   Subjective   Subjective "You want me to sit in the chair?"   Bed Mobility   Rolling L 4  Minimal assistance  (log roll technique)   Additional items Assist x 1; Increased time required;Verbal cues;LE management;HOB elevated; Bedrails   Supine to Sit 3  Moderate assistance  (log roll technique)   Additional items Assist x 1; Increased time required;Verbal cues;LE management;HOB elevated   Additional Comments Reviewed back safety precautions with verbal understanding   Transfers   Sit to Stand 4  Minimal assistance   Additional items Assist x 1; Increased time required;Verbal cues   Stand to Sit 4  Minimal assistance Additional items Assist x 1; Increased time required;Verbal cues;Armrests   Ambulation/Elevation   Gait pattern Excessively slow; Short stride;Decreased foot clearance   Gait Assistance 4  Minimal assist   Additional items Assist x 1;Verbal cues   Assistive Device Rolling walker   Distance 3 ft  bed to chair   Stair Management Assistance Not tested   Balance   Static Sitting Good   Dynamic Sitting Fair +   Static Standing Fair   Dynamic Standing Fair -   Ambulatory Poor +   Endurance Deficit   Endurance Deficit Yes   Activity Tolerance   Activity Tolerance Patient limited by fatigue;Patient limited by pain  (SOB (shortness of breath))   Nurse Made Aware DORA Hayes confirmed pt appropriate for therapy   Assessment   Prognosis Good   Problem List Decreased strength;Decreased endurance; Impaired balance;Decreased mobility;Pain;Orthopedic restrictions;Decreased skin integrity   Assessment Pt seen for PT treatment session this date with interventions consisting of gait training w/ emphasis on improving pt's ability to ambulate level surfaces x 3 ft  bed to chair with min A provided by therapist with RW and therapeutic activity consisting of training: bed mobility, supine to sit transfers and sit<>stand transfers  Pt agreeable to PT treatment session upon arrival, pt found supine in bed w/ HOB elevated, A&O x 4 and responsive  In comparison to previous session, pt with no improvements as evidenced by pt continues to require A of 1 to complete level surface ambulation using RW  Further distance gait training limited by ongoing shortness of breath, fatigue and pain  Post session: pt seated OOB in recliner, chair alarm engaged and all needs in reach  Continue to recommend STR at time of d/c in order to maximize pt's functional independence and safety w/ mobility  Pt continues to be functioning below baseline level, and remains limited 2* factors listed above   PT will continue to see pt while here in order to address the deficits listed above and provide interventions consistent w/ POC in effort to achieve STGs  Barriers to Discharge Decreased caregiver support; Inaccessible home environment   Goals   Patient Goals to get better   STG Expiration Date 02/27/21   PT Treatment Day 3   Plan   Treatment/Interventions Functional transfer training;LE strengthening/ROM; Elevations; Therapeutic exercise; Endurance training;Patient/family training;Equipment eval/education;Spoke to nursing;Gait training;Bed mobility   Progress No functional improvements   PT Frequency Other (Comment)  (3-5x/wk)   Recommendation   PT Discharge Recommendation Post-Acute Rehabilitation Services   PT - OK to Discharge Yes  (when medically cleared; if to STR)   AM-PAC Basic Mobility Inpatient   Turning in Bed Without Bedrails 3   Lying on Back to Sitting on Edge of Flat Bed 2   Moving Bed to Chair 3   Standing Up From Chair 3   Walk in Room 3   Climb 3-5 Stairs 2   Basic Mobility Inpatient Raw Score 16   Basic Mobility Standardized Score 38 32       Yessica Turner, PT, DPT    Time of PT treatment session: 09:18-09:36  18 minutes

## 2021-02-24 NOTE — ASSESSMENT & PLAN NOTE
Results from last 7 days   Lab Units 02/24/21  0603 02/23/21  0443 02/22/21  0715 02/21/21  1002 02/20/21  0603 02/19/21  0515 02/18/21  0859   POTASSIUM mmol/L 5 4* 5 7* 5 2 5 3 4 4 4 1 4 4     · Suspecting Dietary supplement as cause of hyperkalemia  · Drinking V8 juice which is high in potassium  · Continue lasix, hyperkalemia should improve with discontinuation of foods high in potassium and continuation of lasix  · Monitor potassium at rehab  · Stable from nephrology perspective for discharge

## 2021-02-25 PROBLEM — E87.3 ALKALOSIS: Status: ACTIVE | Noted: 2021-01-01

## 2021-02-25 NOTE — PROGRESS NOTES
PM&R Progress Note:    [facility] 78 [facility]  [facility] with COPD/ emphysema on baseline oxygen 2-3 L,  Congestive heart failure, osteoarthritis who presented to the [facility] on 2/17/21 with back pain and shortness of breath  Pulmonary workup showing moderate pulmonary emphysematous changes bilaterally  Workup for back pain revealed Compression fracture of T5 (33% height loss approximately) and T6 (approximately 25% height loss  )  There is also superior endplate T2 compression fracture with approximately 10%-20 percent height loss at the superior endplate  Case was discussed with both Orthopedics and Neurosurgery and patient was recommended conservative treatment with TLSO brace while out of bed  Medically, patient was treated for an E coli urinary tract infection  Patient was started on steroids for COPD and emphysema and continued on her Breo and DuoNebs  Patient was seen by Nephrology  acute renal insufficiency, hypercalcemia and hyperkalemia  Patient started on Lasix by Nephrology  Patient was followed by wound care for a sacral pressure ulcer  After medical stabilization patient was found to have acute functional deficits from her baseline and therefore was admitted to [address] [place] [address]  ASSESSMENT: Stable      PLAN:    Rehabilitation   Continue current rehabilitation plan of care to maximize function   Functional evaluations in progress - needing Mod-Max A with transfers   SLP ordered to assess swallow as patient only ordering pureed foods and broths   No funcitonal barriers identified    Medical issues   No acute concerns   Back pain: somewhat of a barrier  TLSO brace also contributing to some discomfort additionally   Subjective Mouth ulcers: not readily visible  Patient encouraged to use magic mouthwash prior to meals and as needed for soreness   Pulmonary status stable - continue oxygen at baseline and supportive care   Renal function: stable    K = 5 2  Educated to avoid high potassium foods and diet changed by Nephrology   Nutrition following for decreased oral intake   Wound Care to follow sacral ulcer - continue pressure relieving techniques   Continue current medical plan of care  Appreciate IM consultants co-management  SUBJECTIVE: Patient seen face to face  No acute issues  Back pain is mild-moderate with movement  SOB about the same, no worse with therapies today  ROS:  A ten point review of systems was completed and pertinent positives are listed in subjective section  All other systems reviewed were negative  OBJECTIVE:   /58 (BP Location: Right arm)   Pulse 85   Temp 98 °F (36 7 °C) (Oral)   Resp 18   Ht 5' 2" (1 575 m)   Wt 74 8 kg (165 lb)   SpO2 94%   BMI 30 18 kg/m²     Physical Exam  Vitals signs and nursing note reviewed  Constitutional:       General: She is not in acute distress  Appearance: She is well-developed  HENT:      Head: Normocephalic  Nose: Nose normal    Eyes:      Conjunctiva/sclera: Conjunctivae normal    Neck:      Musculoskeletal: Neck supple  Cardiovascular:      Rate and Rhythm: Normal rate  Pulmonary:      Effort: Pulmonary effort is normal       Breath sounds: Wheezing present  Comments: Oxygen via NC  Abdominal:      General: There is no distension  Palpations: Abdomen is soft  Skin:     General: Skin is warm  Neurological:      Mental Status: She is alert and oriented to person, place, and time            Lab Results   Component Value Date    WBC 10 40 (H) 02/25/2021    HGB 11 9 02/25/2021    HCT 37 4 02/25/2021    MCV 93 02/25/2021     02/25/2021     Lab Results   Component Value Date    SODIUM 136 02/25/2021    K 5 2 02/25/2021     02/25/2021    CO2 35 (H) 02/25/2021    BUN 33 (H) 02/25/2021    CREATININE 0 91 02/25/2021    GLUC 85 02/25/2021    CALCIUM 11 8 (H) 02/25/2021     No results found for: INR, PROTIME        Current Facility-Administered Medications:     acetaminophen (TYLENOL) tablet 650 mg, 650 mg, Oral, Q6H [facility], Marva Hatch MD, 650 mg at 02/25/21 1121    al mag oxide-diphenhydramine-lidocaine viscous (MAGIC MOUTHWASH) suspension 10 mL, 10 mL, Swish & Spit, Q4H PRN, ROLA Shepard, 10 mL at 02/25/21 1118    albuterol (PROVENTIL HFA,VENTOLIN HFA) inhaler 2 puff, 2 puff, Inhalation, TID, Marva Hatch MD    albuterol inhalation solution 2 5 mg, 2 5 mg, Nebulization, Q6H PRN, Marva Hatch MD    ALPRAZolam Gilda Kingston) tablet 0 25 mg, 0 25 mg, Oral, TID PRN, Marva Hatch MD, 0 25 mg at 02/24/21 2135    aluminum-magnesium hydroxide-simethicone (MYLANTA) oral suspension 30 mL, 30 mL, Oral, Q6H PRN, Marva Hatch MD    atorvastatin (LIPITOR) tablet 80 mg, 80 mg, Oral, Daily With Miguel Malloy MD    benzonatate (TESSALON PERLES) capsule 100 mg, 100 mg, Oral, TID PRN, Marva Hatch MD    bisacodyl (DULCOLAX) rectal suppository 10 mg, 10 mg, Rectal, Daily PRN, Marva Hatch MD    fluticasone-vilanterol (BREO ELLIPTA) 100-25 mcg/inh inhaler 1 puff, 1 puff, Inhalation, Daily, Marva Hatch MD, 1 puff at 02/25/21 0916    furosemide (LASIX) tablet 20 mg, 20 mg, Oral, Daily, Marva Hatch MD, 20 mg at 02/25/21 0916    guaiFENesin (MUCINEX) 12 hr tablet 600 mg, 600 mg, Oral, Q12H [facility], Marva Hatch MD, 600 mg at 02/25/21 0916    heparin (porcine) subcutaneous injection 5,000 Units, 5,000 Units, Subcutaneous, Q8H [facility], Marva Hatch MD, 5,000 Units at 02/25/21 1514    insulin lispro (HumaLOG) 100 units/mL subcutaneous injection 1-6 Units, 1-6 Units, Subcutaneous, HS, Marva Hatch MD, 3 Units at 02/24/21 2138    insulin lispro (HumaLOG) 100 units/mL subcutaneous injection 1-6 Units, 1-6 Units, Subcutaneous, TID AC **AND** Fingerstick Glucose (POCT), , , TID AC, Marva Hatch MD    levothyroxine tablet 125 mcg, 125 mcg, Oral, Early Morning, Marva Hatch MD, 125 mcg at 02/25/21 0606    lidocaine (LIDODERM) 5 % patch 2 patch, 2 patch, Topical, Daily, Paige Lindsey MD, 2 patch at 02/25/21 0913    melatonin tablet 6 mg, 6 mg, Oral, HS PRN, Paige Lindsey MD    methocarbamol (ROBAXIN) tablet 500 mg, 500 mg, Oral, Q6H PRN, Paige Lindsey MD    montelukast (SINGULAIR) tablet 10 mg, 10 mg, Oral, HS, Paige Lindsey MD, 10 mg at 02/24/21 2135    nystatin (MYCOSTATIN) cream, , Topical, BID, Paige Lindsey MD, Given at 02/25/21 2779    nystatin (MYCOSTATIN) powder, , Topical, BID, Paige Lindsey MD, Given at 02/25/21 0916    ondansetron (ZOFRAN-ODT) dispersible tablet 4 mg, 4 mg, Oral, Q6H PRN, Paige Lindsey MD    oxyCODONE (ROXICODONE) IR tablet 5 mg, 5 mg, Oral, Q6H PRN, Paige Lindsey MD, 5 mg at 02/25/21 0917    pantoprazole (PROTONIX) EC tablet 40 mg, 40 mg, Oral, Early Morning, Paige Lindsey MD, 40 mg at 02/25/21 0606    polyethylene glycol (MIRALAX) packet 17 g, 17 g, Oral, Daily, Paige Lindsey MD    predniSONE tablet 40 mg, 40 mg, Oral, Daily, 40 mg at 02/25/21 0915 **FOLLOWED BY** [START ON 2/27/2021] predniSONE tablet 20 mg, 20 mg, Oral, Daily **FOLLOWED BY** [START ON 3/1/2021] predniSONE tablet 10 mg, 10 mg, Oral, Daily, Paige Lindsey MD    saccharomyces boulardii (FLORASTOR) capsule 250 mg, 250 mg, Oral, BID, Paige Lindsey MD, 250 mg at 02/25/21 7691    senna (SENOKOT) tablet 8 6 mg, 1 tablet, Oral, HS, Paige Lindsey MD    traMADol (ULTRAM) tablet 50 mg, 50 mg, Oral, Q6H PRN, Paige Lindsey MD    white petrolatum-mineral oil (EUCERIN,HYDROCERIN) cream, , Topical, TID PRN, Paige Lindsey MD, Given at 02/25/21 1243    Past Medical History:   Diagnosis Date    Cataract     [facility]     Leukocytosis        Patient Active Problem List    Diagnosis Date Noted    Thoracic compression fracture ([facility] ) 02/18/2021     Priority: High    Alkalosis 02/25/2021    Hyperkalemia 02/23/2021    Urinary incontinence 02/23/2021    Pressure ulcer 02/22/2021    Hypercalcemia 02/21/2021    Chronic respiratory failure with hypoxia (HCC) 02/18/2021    Moderate protein-calorie malnutrition [facility] 02/18/2021    UTI (urinary tract infection) 02/18/2021    SOB (shortness of breath) 02/17/2021    Chronic bilateral thoracic back pain 02/17/2021    Chronic diastolic heart failure ([facility] ) 02/17/2021    VICKY (acute kidney injury) ([place] [address] ) 02/17/2021    Atherosclerosis of abdominal aorta ([place] [address] ) 12/01/2020    CHF (congestive heart failure) ([place] [address] ) 02/11/2019    Type 2 diabetes mellitus with complication, without long-term current use of insulin ([place] [address] ) 04/08/2015    Carotid artery plaque 04/06/2015    Hyperlipidemia 03/14/2014    Hypothyroidism 03/14/2014    Osteoarthrosis 03/14/2014    COPD exacerbation ([place] [address] ) 03/10/2014          Lainey Murphy MD

## 2021-02-25 NOTE — PLAN OF CARE
Problem: Potential for Falls  Goal: Patient will remain free of falls  Description: INTERVENTIONS:  - Assess patient frequently for physical needs  -  Identify cognitive and physical deficits and behaviors that affect risk of falls  -  [place] fall precautions as indicated by assessment   - Educate patient/family on patient safety including physical limitations  - Instruct patient to call for assistance with activity based on assessment  - Modify environment to reduce risk of injury  - Consider OT/PT consult to assist with strengthening/mobility  Outcome: Progressing     Problem: Prexisting or High Potential for Compromised Skin Integrity  Goal: Skin integrity is maintained or improved  Description: INTERVENTIONS:  - Identify patients at risk for skin breakdown  - Assess and monitor skin integrity  - Assess and monitor nutrition and hydration status  - Monitor labs   - Assess for incontinence   - Turn and reposition patient  - Assist with mobility/ambulation  - Relieve pressure over bony prominences  - Avoid friction and shearing  - Provide appropriate hygiene as needed including keeping skin clean and dry  - Evaluate need for skin moisturizer/barrier cream  - Collaborate with interdisciplinary team   - Patient/family teaching  - Consider wound care consult   Outcome: Progressing     Problem: Nutrition/Hydration-ADULT  Goal: Nutrient/Hydration intake appropriate for improving, restoring or maintaining nutritional needs  Description: Monitor and assess patient's nutrition/hydration status for malnutrition  Collaborate with interdisciplinary team and initiate plan and interventions as ordered  Monitor patient's weight and dietary intake as ordered or per policy  Utilize nutrition screening tool and intervene as necessary  Determine patient's food preferences and provide high-protein, high-caloric foods as appropriate       INTERVENTIONS:  - Monitor oral intake, urinary output, labs, and treatment plans  - Assess nutrition and hydration status and recommend course of action  - Evaluate amount of meals eaten  - Assist patient with eating if necessary   - Allow adequate time for meals  - Recommend/ encourage appropriate diets, oral nutritional supplements, and vitamin/mineral supplements  - Order, calculate, and assess calorie counts as needed  - Recommend, monitor, and adjust tube feedings and TPN/PPN based on assessed needs  - Assess need for intravenous fluids  - Provide specific nutrition/hydration education as appropriate  - Include patient/family/caregiver in decisions related to nutrition  Outcome: Progressing

## 2021-02-25 NOTE — PROGRESS NOTES
Occupational Therapy Long Term Goals      02/25/21 0900   Rehab Team Goals   ADL Team Goal Patient will require assist with ADLs with least restrictive device upon completion of rehab program   Rehab Team Interventions   OT Interventions Self Care;Energy Conservation;Patient/Family Education   Eating Goal   Eating Goal 06  Independent - Patient completes the activity by him/herself with no assistance from a helper  Status Ongoing; Target goal - two weeks   Grooming Goal   Oral Hygiene Goal 06  Independent - Patient completes the activity by him/herself with no assistance from a helper  Environment Seated at [employer]   Status Ongoing; Target goal - two weeks   Tub/Shower Transfer Goal   Method Tub Shower  [facility] )   Assist Device Grab Bar;Seat with Back   Status Ongoing; Target goal - two weeks   Bathing Goal   Shower/bathe self Goal 04  TOUCHING/ STEADYING assistance as patient completes activity  Status Ongoing; Target goal - two weeks   Upper Body Dressing Goal   Upper body dressing Goal 03  Partial/moderate assistance - Rousseau does less than half the effort  Rousseau lifts or holds trunk or limbs and provides more than half the effort  Status Ongoing; Target goal - two weeks   Lower Body Dressing Goal   Lower body dressing Goal 03  Partial/moderate assistance - [place] does less than half the effort  Rousseau lifts or holds trunk or limbs and provides more than half the effort  Putting on/taking off footwear Goal 03  Partial/moderate assistance - [place] does less than half the effort  [place]ousseau lifts or holds trunk or limbs and provides more than half the effort  Status Ongoing; Target goal - two weeks   Toileting Transfer Goal   Toilet transfer Goal 04  Supervision or touching assistance- [place] provides VERBAL CUES or supervision throughout activity  Status Ongoing; Target goal - two weeks   Toileting Goal   Toileting hygiene Goal 04   Supervision or touching assistance- [place] provides VERBAL CUES or supervision throughout activity  Status Ongoing; Target goal - two weeks   PT Transfer Goal   Roll left and right Goal 04  Supervision or touching assistance- [place] provides VERBAL CUES or supervision throughout activity  Sit to lying Goal 04  Supervision or touching assistance- [place] provides VERBAL CUES or supervision throughout activity  Lying to sitting on side of bed Goal 04  Supervision or touching assistance- [place] provides VERBAL CUES or supervision throughout activity  Sit to stand Goal 04  Supervision or touching assistance- [place] provides VERBAL CUES or supervision throughout activity  Chair/bed-to-chair transfer Goal 04  Supervision or touching assistance- [place] provides VERBAL CUES or supervision throughout activity  Comprehension Goal   Comprehension Assist Level Moderate Scott   Status Ongoing; Target goal - two weeks   Expression Goal   Expression Assist Level Independant   Status Ongoing; Target goal - two weeks   Medication Management   Assist Level Supervision   Status Ongoing; Target goal - two weeks     TLSO management: Pt will don/doff TLSO with set up  Ongoing; Target goal - two weeks

## 2021-02-25 NOTE — ASSESSMENT & PLAN NOTE
· T2, T5 and T6 compression fractures  · Treated conservatively  · TLSO brace while out of bed  · Follow-up as outpatient with Neurosurgery

## 2021-02-25 NOTE — PROGRESS NOTES
Internal Medicine Progress Note  Patient: Bonita Garland  Age/sex: 78 y o  female  Medical Record #: [id number]      ASSESSMENT/PLAN: (Interval History)  Bonita Garland is seen and examined and management for following issues:    Compression fracture T5/6  · No surgical intervention  · Cont TLSO brace when OOB  · Aggressive rehab per PMR  · Pain mgmt per PMR     COPD  · Wears oxygen 2-3 L continuous  · Continue current inhalers/nebs per home  · Recent PFT DLCO 54%  · Monitor oxygen saturations  · Currently on prednisone wean  · Recent CT scan +moderate emphysema; no PE     Diastolic HF  · Cont furosemide 20mg daily  · Monitor volume status closely  · Chronic LE edema  · Echo=ef 08%; diastolic dysfunction     DM II  · Hgb A1c 7 1 in October  · Home meds: Glyburide 5mg bid  · Add sliding scale and coverage  · Dc glyburide for now secondary to low BS  · Monitor trend     HTN  · Cont diovan   · Monitor trend     CKD  · Level 3  · Baseline 0 9-1 1  · Avoid nephrotoxic medications  · Avoid hypotension in the post operative setting  · Monitor bmp     Hypercalcemia  · Nephrology w/u at [facility]  · [address] secondary to volume status  · Pth negative  · Monitor     Hypothyroid  · Cont home supplementation     Sacral decubitus  · Cont off loading, nutritional supplementation  · Consult wound nurse per PMR    Oral ulcers  · Start magic mouthwash q4 hrs as needed          The above assessment and plan was reviewed and updated as determined by my evaluation of the patient on 2/25/2021      Labs:   Results from last 7 days   Lab Units 02/24/21  0603 02/23/21  0443   WBC Thousand/uL 10 15 10 67*   HEMOGLOBIN g/dL 10 7* 10 9*   HEMATOCRIT % 33 8* 34 7*   PLATELETS Thousands/uL 191 186     Results from last 7 days   Lab Units 02/24/21  0603 02/23/21  0443   SODIUM mmol/L 138 136   POTASSIUM mmol/L 5 4* 5 7*   CHLORIDE mmol/L 100 100   CO2 mmol/L 34* 33*   BUN mg/dL 35* 39*   CREATININE mg/dL 0 94 1 10   CALCIUM mg/dL 10 8* 11 0* Results from last 7 days   Lab Units 02/25/21  0818 02/25/21  0726 02/25/21  0646   POC GLUCOSE mg/dl 107 58* 59*       Review of Scheduled Meds:  Current Facility-Administered Medications   Medication Dose Route Frequency Provider Last Rate    acetaminophen  650 mg Oral Q6H [address] Cuauhtemoc Velez MD      albuterol  2 puff Inhalation Q6H PRN Cuauhtemoc Velez MD      ALPRAZolam  0 25 mg Oral TID PRN Cuauhtemoc Velez MD      aluminum-magnesium hydroxide-simethicone  30 mL Oral Q6H PRN Cuauhtemoc Velez MD      atorvastatin  80 mg Oral Daily With Angel Guadalupe MD      benzonatate  100 mg Oral TID PRN Cuauhtemoc Velez MD      bisacodyl  10 mg Rectal Daily PRN Cuauhtemoc Velez MD      fluticasone-vilanterol  1 puff Inhalation Daily Cuauhtemoc Velez MD      furosemide  20 mg Oral Daily Cuauhtemoc Velez MD      guaiFENesin  600 mg Oral Q12H Sushila Davidson MD      heparin (porcine)  5,000 Units Subcutaneous Q8H [address] Cuauhtemoc Velez MD      insulin lispro  1-6 Units Subcutaneous HS Cuauhtemoc Velez MD      insulin lispro  1-6 Units Subcutaneous TID AC Cuauhtemoc Velez MD      ipratropium-albuterol  3 mL Nebulization Q6H Cuauhtemoc Velez MD      levothyroxine  125 mcg Oral Early Morning Cuauhtemoc Velez MD      lidocaine  2 patch Topical Daily Cuauhtemoc Velez MD      melatonin  6 mg Oral HS PRN Cuauhtemoc Velez MD      methocarbamol  500 mg Oral Q6H PRN Cuauhtemoc Velez MD      montelukast  10 mg Oral HS Cuauhtemoc Velez MD      nystatin   Topical BID Cuauhtemoc Velez MD      nystatin   Topical BID Cuauhtemoc Velez MD      ondansetron  4 mg Oral Q6H PRN Cuauhtemoc Velez MD      oxyCODONE  5 mg Oral Q6H PRN Cuauhtemoc Velez MD      pantoprazole  40 mg Oral Early Morning Cuauhtemoc Velez MD      polyethylene glycol  17 g Oral Daily Cuauhtemoc Velez MD      predniSONE  40 mg Oral Daily Cuauhtemoc Velez MD      Followed by   Neli Ramirez ON 2/27/2021] predniSONE  20 mg Oral Daily Cuauhtemoc Velez MD      Followed by   Neli Ramirez ON 3/1/2021] predniSONE  10 mg Oral Daily Brendan Mcqueen MD      saccharomyces boulardii  250 mg Oral BID Brendan Mcqueen MD      senna  1 tablet Oral HS Brendan Mcqueen MD      traMADol  50 mg Oral Q6H PRN Brendan Mcqueen MD      white petrolatum-mineral oil   Topical TID PRN Brendan Mcqueen MD         Subjective/ HPI: Patient seen and examined  Patients overnight issues or events were reviewed with nursing or staff during rounds or morning huddle session  New or overnight issues include the following:     Pt without any overnight issues or concerns; +chronic SOB/RICO, +thoracic back pain    ROS:   A 10 point ROS was performed; negative except as noted above         Imaging:     No orders to display       *Labs /Radiology studies Reviewed  *Medications  reviewed and reconciled as needed  *Please refer to order section for additional ordered labs studies  *Case discussed with primary attending during morning huddle case rounds    Physical Examination:  Vitals:   Vitals:    02/24/21 1608 02/24/21 2028 02/25/21 0532 02/25/21 0619   BP: 137/73 132/64  139/64   BP Location: Right arm   Left arm   Pulse: 80 66 61 68   Resp: 16 20 18 18   Temp: 98 3 °F (36 8 °C) 97 8 °F (36 6 °C)  97 5 °F (36 4 °C)   TempSrc: Oral Oral  Oral   SpO2: 94% 97% 100% 97%   Weight: 74 8 kg (165 lb)      Height: 5' 2" (1 575 m)          GEN: No apparent distress, interactive  NEURO: Alert and oriented x3  HEENT: Pupils are equal and reactive, EOMI, mucous membranes are moist, face symmetrical  CV: S1 S2 regular, no MRG, no peripheral edema noted  RESP: Lungs are decreased bilaterally, +expiratory wheezes, no rales or rhonchi noted, on room air, respirations easy and non labored  GI: obese, soft non tender, non distended; +BS x4  : Voiding without difficulty  MUSC: Moves all extremities; +generalized deconditioning  SKIN: pink, warm and dry, normal turgor, no rashes, lesions      The above physical exam was reviewed and updated as determined by my evaluation of the patient on 2/25/2021  Invasive Devices     Peripheral Intravenous Line            Peripheral IV 02/22/21 Dorsal (posterior); Right Forearm 3 days          Drain            External Urinary Catheter 4 days                   VTE Pharmacologic Prophylaxis: Heparin  Code Status: Level 3 - DNAR and DNI  Current Length of Stay: 1 day(s)      Total time spent:  30 minutes with more than 50% spent counseling/coordinating care  Counseling includes discussion with patient re: progress  and discussion with patient of his/her current medical state/information  Coordination of patient's care was performed in conjunction with primary service  Time invested included review of patient's labs, vitals, and management of their comorbidities with continued monitoring  In addition, this patient was discussed with medical team including physician and advanced extenders  The care of the patient was extensively discussed and appropriate treatment plan was formulated unique for this patient  ** Please Note:  voice to text software may have been used in the creation of this document   Although proof errors in transcription or interpretation are a potential of such software**

## 2021-02-25 NOTE — RESPIRATORY THERAPY NOTE
RT Protocol Note  Lorraine Montes De Oca 78 y o  female MRN: [id number]  Unit/Bed#: -01 Encounter: [id number]    Assessment    Principal Problem:    Thoracic compression fracture [facility])      Home Pulmonary Medications:  Duoneb  Breo    Home Devices/Therapy: Home O2    Past Medical History:   Diagnosis Date    Cataract     CHF (congestive heart failure) (HCC)     Leukocytosis      Social History     Socioeconomic History    Marital status:      Spouse name: None    Number of children: None    Years of education: None    Highest education level: None   Occupational History    Occupation: [employer]    Social Needs    Financial resource strain: None    Food insecurity     Worry: None     Inability: None    Transportation needs     Medical: None     Non-medical: None   Tobacco Use    Smoking status: Former Smoker     Packs/day: 2 00     Years: 48 00     Pack years: 96 00     Types: Cigarettes     Quit date: 1999     Years since quittin 4    Smokeless tobacco: Never Used   Substance and Sexual Activity    Alcohol use: Not Currently     Alcohol/week: 1 0 standard drinks     Types: 1 Cans of beer per week     Frequency: Monthly or less     Drinks per session: 1 or 2     Binge frequency: Never    Drug use: No    Sexual activity: Not Currently   Lifestyle    Physical activity     Days per week: 0 days     Minutes per session: 0 min    Stress:  To some extent   Relationships    Social connections     Talks on phone: None     Gets together: None     Attends Mandaeism service: None     Active member of club or organization: None     Attends meetings of clubs or organizations: None     Relationship status: None    Intimate partner violence     Fear of current or ex partner: None     Emotionally abused: None     Physically abused: None     Forced sexual activity: None   Other Topics Concern    None   Social History Narrative    LIVING INDEPENDENTLY ALONE     NO ADVANCED DIRECTIVES ON FILE Subjective    Subjective Data: Home o2 @ 2 lpm    Objective    Physical Exam:   Assessment Type: Assess only  General Appearance: Alert, Awake  Respiratory Pattern: Normal  Chest Assessment: Chest expansion symmetrical  Bilateral Breath Sounds: Diminished  R Breath Sounds: Diminished  L Breath Sounds: Diminished  Cough: Non-productive, Dry  O2 Device: 2 lpm NC    Vitals:  Blood pressure 139/64, pulse 68, temperature 97 5 °F (36 4 °C), temperature source Oral, resp  rate 18, height 5' 2" (1 575 m), weight 74 8 kg (165 lb), SpO2 96 %, not currently breastfeeding  Imaging and other studies: I have personally reviewed pertinent reports  O2 Device: 2 lpm NC     Plan    Respiratory Plan: Home Bronchodilator Patient pathway  Airway Clearance Plan: Flutter     Resp Comments: Per respiratory protocol Duoneb Q6 switched to Xopenex1 25mg/ Atrovent 0 5mg TID and albuterol 2 5mg Q6 prn udn  Pt is not in exacerbation at this time

## 2021-02-25 NOTE — ASSESSMENT & PLAN NOTE
· Wears oxygen 2-3 L at baseline  · At baseline feels SOB  · Continue current inhalers reduced to TID  · Monitor oxygen saturations with activity  · Recent CT scan +moderate emphysema  · Completed prednisone taper

## 2021-02-25 NOTE — PROGRESS NOTES
OCCUPATIONAL THERAPY   ACUTE REHAB EVALUATION TAA    Active Problem List:   Patient Active Problem List   Diagnosis    Carotid artery plaque    COPD exacerbation (HCC)    Type 2 diabetes mellitus with complication, without long-term current use of insulin (HCC)    Hyperlipidemia    Hypothyroidism    Osteoarthrosis    CHF (congestive heart failure) (HCC)    Atherosclerosis of abdominal aorta (HCC)    SOB (shortness of breath)    Chronic bilateral thoracic back pain    Chronic diastolic heart failure (HCC)    VICKY (acute kidney injury) ([facility] [address] )    Chronic respiratory failure with hypoxia (HCC)    Moderate protein-calorie malnutrition (HCC)    Thoracic compression fracture (HCC)    UTI (urinary tract infection)    Hypercalcemia    Pressure ulcer    Hyperkalemia    Urinary incontinence    Alkalosis     Past Medical Hx:   Past Medical History:   Diagnosis Date    Cataract     CHF (congestive heart failure) (HCC)     Leukocytosis      Past Surgical Hx:   Past Surgical History:   Procedure Laterality Date    APPENDECTOMY      CYSTOCELE REPAIR      ANTERIOR COLPORRHAPHY     ORIF ANKLE FRACTURE Left 1979    TOTAL ABDOMINAL HYSTERECTOMY        02/25/21 0900   Patient Data   Rehab Impairment Other orhtopedic   Etiologic Diagnosis T5 to T7 compression fractures   Date of Onset 02/17/21   Support System   Name Pt reports that she lives alone with supportive local son and daughter in [facility] that check in daily  family provides assist for meals, and [facility] comes 1x week for showering  another supportive daughter lives in [place] is visiting this week  Relationship Pt reports that she enjoys crocheting and bartending  Home Setup   Type of Home Single Level   First Floor Bathroom Tub; Shower;Grab Bars   Available Equipment Bedside Commode; Shower Chair;Rollator; Wheelchair   [address] prior to Admission Physical Therapy  (home PT)   Vocation   (Retired  at [employer] week until March 2020)   Transportation Family/friends drive   Prior Level of Function   Self-Care 2  Needed Some Help - Patient needed a partial assistance from another person to complete activities  Indoor-Mobility (Ambulation) 3  Independent - Patient completed the activities by him/herself, with or without an assistive device, with no assistance from a helper  Stairs 3  Independent - Patient completed the activities by him/herself, with or without an assistive device, with no assistance from a helper  Functional Cognition 3  Independent - Patient completed the activities by him/herself, with or without an assistive device, with no assistance from a helper  Prior Assistance Needed for Driving;Household Chores/Cleaning; Shopping;Medication Management;Meal Preparation;Money Management   Falls in the Last Year   Number of falls in the past 12 months 0   Patient Preference   Nickname (Patient Preference) Dilia Li   Psychosocial   Patient Behaviors/Mood Anxious   Restrictions/Precautions   ROM Restrictions Yes  (thoracic spinal prec)   Braces or Orthoses TLSO  (Backpack)   Pain Assessment   Pain Assessment Tool 0-10   Pain Score 8   Pain Location/Orientation Location: Back; Location: Buttocks;Orientation: Lower   Hospital Pain Intervention(s) Repositioned   Eating Assessment   Type of Assistance Needed Set-up / clean-up   Eating CARE Score 5   Oral Hygiene   Type of Assistance Needed Physical assistance   Amount of Physical Assistance Provided 25%-49%   Comment req assist for management of dentures 2* fatigue  Oral Hygiene CARE Score 3   Tub/Shower Transfer   Findings Pt has tub shower at home with grab bars inside  has shower chair with long handle hose  Pt has [facility] 1x week for showering  reports assistant helps her into  out of and bathes her      Shower/Bathe Self   Type of Assistance Needed Physical assistance   Amount of Physical Assistance Provided Total assistance   Comment Ax2 for rolling in be dfor perinal care/bathing with collaboration from RN   Shower/Bathe Self CARE Score 1   Dressing/Undressing Clothing   Type of Assistance Needed Physical assistance   Amount of Physical Assistance Provided Total assistance   Comment daughter reports that pt has difficulty with [place] overhead sweatshirts  daughter reports recently did not change her shirt for a whole week  Dependent fot TLSO donning/doffing   Upper Body Dressing CARE Score 1   Type of Assistance Needed Physical assistance   Amount of Physical Assistance Provided Total assistance   Comment Pt refusing to keep clothing ontoday as she states it is too hot, and has been using the pure wick   Lower Body Dressing CARE Score 1   Putting On/Taking Off Footwear   Type of Assistance Needed Physical assistance   Amount of Physical Assistance Provided Total assistance   Comment pt has poor R ankle ROM from previous fixation   Putting On/Taking Off Footwear CARE Score 1   [address]   Type of Assistance Needed Physical assistance   Amount of Physical Assistance Provided Total assistance   Comment compelte urination on BSC at bedside  Pt demo severe RICO, SOB  difficulty rating perceived rate of exertion but states when she is ready for next activity  Pt does have clear fluid from rectum x2  RN notified      Toileting Hygiene CARE Score 1   Toilet Transfer   Surface Assessed Standard Commode   Type of Assistance Needed Physical assistance   Amount of Physical Assistance Provided 25%-49%   Comment SPT w/ RW   Toilet Transfer CARE Score 3   Sit to Lying   Amount of Physical Assistance Provided 25%-49%   Comment assit for b/l LE   Sit to Lying CARE Score 3   Lying to Sitting on Side of Bed   Type of Assistance Needed Physical assistance   Amount of Physical Assistance Provided 25%-49%   Comment assit for b/l LE   Lying to Sitting on Side of Bed CARE Score 3   Sit to Stand   Type of Assistance Needed Physical assistance   Amount of Physical Assistance Provided 25%-49% Comment slight assist to marta w/ NAMAN for controlled decent to surface  Sit to Stand CARE Score 3   RUE Assessment   RUE Assessment WFL   LUE Assessment   LUE Assessment WFL   Cognition   Overall Cognitive Status Impaired   Arousal/Participation Alert; Cooperative   Attention Attends with cues to redirect   Orientation Level Oriented X4   Memory Decreased short term memory;Decreased recall of recent events;Decreased recall of precautions   Following Commands Follows one step commands with increased time or repetition   Comments Pt does report difficulty with STM with regards to medical items  Pt does report difficulty keeping track of the days  Objective Measure   OT Measure(s) Pt engages in pulmonary Incentive Spirometer to gauge capacity  Pt only able to achieve 250 ml  Pt on 1 5 L O2 NC   SPO2 remains above 93% during transfers/ strenuous activity  95% and above during light activity  Pt does reports frequently "I cant breathe " Pt demo difficulty when asked to rate perceived exertion- will need CHRISTINE scale training  Discharge Information   Patient's Discharge Plan Home with family support and increased services   Patient's Rehab Expectations to get out of here  Barriers to Discharge Home Limited Family Support;Decreased Endurance; Safety Considerations   Impressions Pt is a 78 y o  female seen for OT evaluation following admission to [facility] for T5 to T7 Compression Fractures, managed with conservative treatment with TLSO bracing  Pt on home O2 chronically with 2-3 L  OT evaluation and assessment of strength, ADL function, and functional transfers completed  Prior to admission Pt reports ability to complete ALDS independently, but has assist for showering  Daughter stating that she was having more difficulty than she reports  Pt using a rollator for func mobilty  Pt lives with alone with supportive local family  they are able to provide intermittent physical assistance at time of discharge   Did have Home PT and RN services 2-3 times a week  Personal factors affecting the patient at this time include: co-morbidities, inaccessible home, dec caregiver support, home alone, advanced age, mutliple lines, fall risk and assist w/ ADL's  Pt is currently limited due to the following deficits impacting occupational performance, activity tolerance, endurance, standing balance/tolerance, sitting balance/tolerance, UE strength, memory, insight and safety   The patient has shown a decline from prior level of function  The patient participates in identification of personal goals to address in Occupational Therapy  Recommend skilled OT services for I/ADL retraining to support the ability to safely participate in daily occupations safely and independently in the most least restrictive way  Pt demonstrates Good rehab potential to meet LTG's, Anticipate  2week(s) length of stay   Occupational Therapy plan of care  will address the following areas: ADL re-training, [address] training, fxnl xfers, short term memory, fxnl attention, standing tolerance, standing balance, UE endurance, DME training/education, family training/education, EC techniques/education, healthy coping education, leisure pursuits, formalized cognitive assessment and core/trunk control   OT Therapy Minutes   OT Time In 0900   OT Time Out 1045   OT Total Time (minutes) 105   OT Mode of treatment - Individual (minutes) 105   OT Mode of treatment - Concurrent (minutes) 0   OT Mode of treatment - Group (minutes) 0   OT Mode of treatment - Co-treat (minutes) 0   OT Mode of Treatment - Total time(minutes) 105 minutes   OT Cumulative Minutes 105   Cumulative Minutes   Cumulative therapy minutes 105       Complete The following Objective Measure in next session:   CHRISTINE Scale training, MOCA, Func reach test

## 2021-02-25 NOTE — ASSESSMENT & PLAN NOTE
· Managed on glyburide - lower than home dose due to hypoglycemia    · CCD  · IM consultants following

## 2021-02-25 NOTE — ASSESSMENT & PLAN NOTE
· Renal function baseline 0 9 - 1 1  · Renal following hypercalcemia/hyperkaemia (hyperkalemia likely due to diet - was drinking V8s and fruity drinks)  ·  continue low-potassium diet

## 2021-02-25 NOTE — MALNUTRITION/BMI
This medical record reflects one or more clinical indicators suggestive of malnutrition  Malnutrition Findings:   Adult Malnutrition type: Acute illness(r/t condition, as evidenced by intake meeting <75% estimated needs > 7 days, and 7 8% wt loss x 3 months (12/1/20: 179#, current wt: 165#)  Treatment: PO diet/nutrition supplements)  Adult Degree of Malnutrition: Malnutrition of moderate degree  Malnutrition Characteristics: Inadequate energy, Weight loss     Body mass index is 30 18 kg/m²  See Nutrition note dated 2/25/21 for additional details  Completed nutrition assessment is viewable in the nutrition documentation

## 2021-02-25 NOTE — ASSESSMENT & PLAN NOTE
· Located on Sacrum - Stage 3  · Wound care to follow while at [facility]  1-Hydraguard to bilateral sacrum, buttock and heels BID and PRN  2-Elevate heels to offload pressure  3-Ehob cushion in chair when out of bed  4-Moisturize skin daily with skin nourishing cream   5-Turn/reposition q2h or when medically stable for pressure re-distribution on skin     · Continue optimization of nutrition, pressure relieving techniques, and turns  · Nutrition consulted

## 2021-02-25 NOTE — APP STUDENT NOTE
Progress note- Nephrology   Edgar Romano 78 y o  female MRN: [id number]  Unit/Bed#: -01 Encounter: [id number]    ASSESSMENT and PLAN:  Hypercalcemia:  - Suspect secondary to poor oral intake and sedentary lifetstyle  - Calcium 11 8 on admission 2/25/21  Corrected calcium calculated, currently 12 8   - Previously treated with NSS fluids at 75 ml/hr with continued dosing of  Lasix 20 mg - Daily during previous admission on 2/23/21  Work Up:  CT Chest/Abdomen/Pelvis: " Kidneys/Ureters: Exophytic cyst in the inferior aspect of the right kidney measures approximately 3 4 cm in size  Several sub-centimeter low-density lesions are scatter in the bilateral kidneys, too small to characterize, clinically insignificant  Duplicated left renal collecting system  Otherwise unremarkable  Urinary Bladder: Unremarkable "   PTH: 36 9  SPEP/UPEP: No monoclonal bands noted  Plan:  - Continue to monitor on Lasix 20 mg PO daily  - Will hold off on light hydration for now and will repeat lab work in the am    - Monitor I/O's and Volume status  - [place] labs  Will check Ionized calcium in the morning      Hyperkalemia:  - Suspect secondary to high potassium diet and possible component of RTA IV in the setting of CKD III and diabetes   - Serum Potassium 5 2 on admission 2/25/21  - Will place patient on Low potassium diet  - Continue to trend labs    Chronic kidney disease III:    Etiology: Suspect secondary to diabetic nephropathy, hypertensive nephrosclerosis, obesity related hypertrophy, age related nephron loss and recent VICKY  - After review medical records baseline creatinine between 0 9-1 1 with eGFR's between 47-57 dating back to Nov 2020  -Creatinine on admission: 0 91 with eGFR of 59   - Continue to monitor renal function  - Patient recently admitted with VICKY, since resolved, with new diagnosis of CKDIII     - Will need outpatient follow-up and repeat labs upon discharge      Blood pressure/hypertension:  - BP reviewed and is acceptable  - Most recent /56  - Home medications include: Valsartan 160 mg PO Daily and Torsemide 10 mg PO Daily  - Current medications: Furosemide 20 mg PO Daily  - Continue to hold Kait Kells in the setting of recent VICKY and current hypercalcemia  - Maximize hemodynamics to maintain MAP >65  - Avoid hypotension or fluctuations in blood pressure  - Will continue to trend    H&H:  - Current hemoglobin 11 9  - Transfuse if hemoglobin less than 7 0  - Per Primary team    Acid-base: Bicarb currently elevated at 34  - Will continue to trend    Volume status:  - Clinically patient appears to be euvolemic to the drier side  - BNP slightly elevated at 668  - Monitor I/O   - Monitor respiratory status, Pt wears 2L NC baseline  Other Diagnosis: Per Primary team  Diabetes:  Last HgbA1C 7 1 on 10/12/20  Accu-Checks and SSI  Need adequate blood sugar control  COPD: Continue home nebulizers  Pt on 2L NC, at baseline  Congestive heart failure:  Echocardiogram on reveals: EF 60% with regional wall abnormality and mild concentric hypertrophy, grade 1 diastolic dysfunction  Continue outpatient follow up with Cardiology  Disposition:  Continue to monitor electrolytes  Discussed a low potassium diet with patient  Discussed plan with bedside RN  SUBJECTIVE:  Patient alert and oriented time person, place, and time  Patient denies chest pain, worsening shortness of breath, nausea, vomiting or diarrhea  Patient does note back and sacral pain  Patient denies any known renal dysfunction or electrolyte abnormalities       OBJECTIVE:  Current Weight: Weight - Scale: 74 8 kg (165 lb)  Vitals:    02/25/21 0532 02/25/21 0619 02/25/21 0827 02/25/21 0904   BP:  139/64  111/56   BP Location:  Left arm  Left arm   Pulse: 61 68  80   Resp: 18 18     Temp:  97 5 °F (36 4 °C)     TempSrc:  Oral     SpO2: 100% 97% 96%    Weight:       Height:           Intake/Output Summary (Last 24 hours) at 2/25/2021 9908  Last data filed at 2/24/2021 2301  Gross per 24 hour   Intake --   Output 600 ml   Net -600 ml     General:  No acute distress, cooperative  Skin:  Warm and dry without rash  ENMT:  Mucous membranes moist, sclera anicteric, tongue is midline  Neck:  Supple without JVD  Respiratory:  Diminished and coarse on auscultation without rhonchi, wheezes  Increased work of breathing    Cardiac:  Regular rate and rhythm without rub, positive murmur  GI:  Soft, nontender, no distention, obese, active bowel sounds  Extremities:  No significant edema noted bilaterally  Neuro:  Alert oriented and awake, no gross deficits  Psych:  Appropriate affect    Medications:    Current Facility-Administered Medications:     acetaminophen (TYLENOL) tablet 650 mg, 650 mg, Oral, Q6H [address], Keturah Pappas MD, 650 mg at 02/25/21 0606    al mag oxide-diphenhydramine-lidocaine viscous (MAGIC MOUTHWASH) suspension 10 mL, 10 mL, Swish & Spit, Q4H PRN, ROLA Estes    albuterol inhalation solution 2 5 mg, 2 5 mg, Nebulization, Q6H PRN, Keturah Pappas MD    ALPRAZolam Diego Basilio) tablet 0 25 mg, 0 25 mg, Oral, TID PRN, Keturah Pappas MD, 0 25 mg at 02/24/21 2135    aluminum-magnesium hydroxide-simethicone (MYLANTA) oral suspension 30 mL, 30 mL, Oral, Q6H PRN, Keturah Pappas MD    atorvastatin (LIPITOR) tablet 80 mg, 80 mg, Oral, Daily With Ivy Iyer MD    benzonatate (TESSALON PERLES) capsule 100 mg, 100 mg, Oral, TID PRN, Keturah Pappas MD    bisacodyl (DULCOLAX) rectal suppository 10 mg, 10 mg, Rectal, Daily PRN, Keturah Pappas MD    fluticasone-vilanterol (BREO ELLIPTA) 100-25 mcg/inh inhaler 1 puff, 1 puff, Inhalation, Daily, Keturah Pappas MD, 1 puff at 02/25/21 0916    furosemide (LASIX) tablet 20 mg, 20 mg, Oral, Daily, Keturah Pappas MD, 20 mg at 02/25/21 0916    guaiFENesin (MUCINEX) 12 hr tablet 600 mg, 600 mg, Oral, Q12H [address], Keturah MD Mian, 600 mg at 02/25/21 0916    heparin (porcine) subcutaneous injection 5,000 Units, 5,000 Units, Subcutaneous, Q8H [address], Keturah Pappas MD, 5,000 Units at 02/25/21 0606    insulin lispro (HumaLOG) 100 units/mL subcutaneous injection 1-6 Units, 1-6 Units, Subcutaneous, HS, Keturah Pappas MD, 3 Units at 02/24/21 2138    insulin lispro (HumaLOG) 100 units/mL subcutaneous injection 1-6 Units, 1-6 Units, Subcutaneous, TID AC **AND** Fingerstick Glucose (POCT), , , TID AC, Keturah Pappas MD    ipratropium (ATROVENT) 0 02 % inhalation solution 0 5 mg, 0 5 mg, Nebulization, TID, Keturah Pappas MD    levalbuterol [facility]) inhalation solution 1 25 mg, 1 25 mg, Nebulization, TID, Keturah Pappas MD    levothyroxine tablet 125 mcg, 125 mcg, Oral, Early Morning, Keturah Pappas MD, 125 mcg at 02/25/21 0606    lidocaine (LIDODERM) 5 % patch 2 patch, 2 patch, Topical, Daily, Keturah Pappas MD, 2 patch at 02/25/21 0913    melatonin tablet 6 mg, 6 mg, Oral, HS PRN, Keturah Pappas MD    methocarbamol (ROBAXIN) tablet 500 mg, 500 mg, Oral, Q6H PRN, Keturah Pappas MD    montelukast (SINGULAIR) tablet 10 mg, 10 mg, Oral, HS, Keturah Pappas MD, 10 mg at 02/24/21 2135    nystatin (MYCOSTATIN) cream, , Topical, BID, Keturah Pappas MD, Given at 02/25/21 5885    nystatin (MYCOSTATIN) powder, , Topical, BID, Keturah Pappas MD, Given at 02/25/21 0916    ondansetron (ZOFRAN-ODT) dispersible tablet 4 mg, 4 mg, Oral, Q6H PRN, Keturah Pappas MD    oxyCODONE (ROXICODONE) IR tablet 5 mg, 5 mg, Oral, Q6H PRN, Keturah Pappas MD, 5 mg at 02/25/21 0917    pantoprazole (PROTONIX) EC tablet 40 mg, 40 mg, Oral, Early Morning, Keturah Pappas MD, 40 mg at 02/25/21 0606    polyethylene glycol (MIRALAX) packet 17 g, 17 g, Oral, Daily, Keturah Pappas MD    predniSONE tablet 40 mg, 40 mg, Oral, Daily, 40 mg at 02/25/21 0915 **FOLLOWED BY** [START ON 2/27/2021] predniSONE tablet 20 mg, 20 mg, Oral, Daily **FOLLOWED BY** [START ON 3/1/2021] predniSONE tablet 10 mg, 10 mg, Oral, Daily, MD Iam Farias  saccharomyces boulardii (FLORASTOR) capsule 250 mg, 250 mg, Oral, BID, Sara Dhaliwal MD, 250 mg at 02/25/21 8814    senna (SENOKOT) tablet 8 6 mg, 1 tablet, Oral, HS, Sara Dhaliwal MD    traMADol (ULTRAM) tablet 50 mg, 50 mg, Oral, Q6H PRN, Sara Dhaliwal MD    white petrolatum-mineral oil (EUCERIN,HYDROCERIN) cream, , Topical, TID PRN, Sara Dhaliwal MD    Laboratory Results:  Results from last 7 days   Lab Units 02/25/21  0859 02/24/21  0603 02/23/21  0443 02/22/21  0715 02/21/21  1002 02/20/21  0603 02/19/21  0515   WBC Thousand/uL 10 40* 10 15 10 67*  --   --  11 55* 10 34*   HEMOGLOBIN g/dL 11 9 10 7* 10 9*  --   --  10 6* 10 5*   HEMATOCRIT % 37 4 33 8* 34 7*  --   --  32 6* 32 9*   PLATELETS Thousands/uL 208 191 186  --   --  177 183   SODIUM mmol/L  --  138 136 137 140 141 141   POTASSIUM mmol/L  --  5 4* 5 7* 5 2 5 3 4 4 4 1   CHLORIDE mmol/L  --  100 100 99* 102 104 104   CO2 mmol/L  --  34* 33* 32 33* 32 33*   BUN mg/dL  --  35* 39* 30* 25 27* 30*   CREATININE mg/dL  --  0 94 1 10 0 94 1 10 0 99 1 09   CALCIUM mg/dL  --  10 8* 11 0* 11 0* 10 6* 10 2* 10 3*   MAGNESIUM mg/dL  --  1 7  --   --   --   --   --      D   Edgardo SNP

## 2021-02-25 NOTE — PROGRESS NOTES
Progress Note - Nephrology   Gaudencio Ray 78 y o  female MRN: [id number]  Unit/Bed#: -01 Encounter: [id number]      Assessment / Plan:  1  VICKY - resolved, now with CKD stage 3 in setting of hypertensive nephrosclerosis/physiologic aging of the kidney-b/l sCr < 1, at baseline    2  Hypercalcemia - corrected calcium 12 8 in light of low albumin, despite low anion gap doubt multiple myeloma as SPEP/UPEP/IF negative for monoclonal gammopathy, suspect immobility may be playing a role plus or minus diuretic use  -will check ionized calcium in the morning  -last PTH suppressed  -if elevation on ionized calcium, will pursue hypercalcemia workup including PTH, PTH related peptide, Ace level, vitamin-D panel  3  Elevated total CO2 in setting of diuretic use-monitor bicarb, on lasix, was on torsemide at home    4  Elevated BUN likely due to diuretic use    5  Hyperkalemia-will plan on low K diet, continue to hold Arb home dose, potassium 5 2, follow-up a m  BMP    6  Anemia-resolved today    7  Chronic diastolic heart failure, grade 1 diastolic dysfunction with EF 60%, continue Lasix, was on torsemide at home, monitor daily weight      Subjective: The patient complains of some shortness of breath  She is on 1 5 L oxygen via nasal cannula  She normally uses 2 L at home which carries with exertion  Objective:     Vitals: Blood pressure 127/58, pulse 85, temperature 98 °F (36 7 °C), temperature source Oral, resp  rate 18, height 5' 2" (1 575 m), weight 74 8 kg (165 lb), SpO2 94 %, not currently breastfeeding  ,Body mass index is 30 18 kg/m²  Temp (24hrs), Av 9 °F (36 6 °C), Min:97 5 °F (36 4 °C), Max:98 3 °F (36 8 °C)      Weight (last 2 days)     Date/Time   Weight    21 1608   74 8 (165)                Intake/Output Summary (Last 24 hours) at 2021 1438  Last data filed at 2021 1304  Gross per 24 hour   Intake --   Output 1800 ml   Net -1800 ml     I/O last 24 hours:   In: -   Out: 1800 [Urine:1800]        Physical Exam:   Physical Exam  Vitals signs and nursing note reviewed  Constitutional:       General: She is not in acute distress  Appearance: Normal appearance  She is well-developed  She is not diaphoretic  Comments: +temporal wasting   HENT:      Head: Normocephalic and atraumatic  Mouth/Throat:      Pharynx: No oropharyngeal exudate  Eyes:      General: No scleral icterus  Right eye: No discharge  Left eye: No discharge  Neck:      Musculoskeletal: Normal range of motion and neck supple  Thyroid: No thyromegaly  Cardiovascular:      Rate and Rhythm: Normal rate and regular rhythm  Heart sounds: No murmur  Pulmonary:      Effort: Pulmonary effort is normal  No respiratory distress  Breath sounds: Normal breath sounds  No wheezing  Abdominal:      General: Bowel sounds are normal  There is no distension  Palpations: Abdomen is soft  Musculoskeletal: Normal range of motion  General: No swelling  Skin:     General: Skin is warm and dry  Coloration: Skin is pale  Findings: No rash  Neurological:      General: No focal deficit present  Mental Status: She is alert  Motor: No abnormal muscle tone  Comments: awake   Psychiatric:         Mood and Affect: Mood normal          Behavior: Behavior normal          Invasive Devices     Peripheral Intravenous Line            Peripheral IV 02/22/21 Dorsal (posterior); Right Forearm 3 days          Drain            External Urinary Catheter 4 days                Medications:    Scheduled Meds:  Current Facility-Administered Medications   Medication Dose Route Frequency Provider Last Rate    acetaminophen  650 mg Oral Q6H [address] Sara Dhaliwal MD      al mag oxide-diphenhydramine-lidocaine viscous  10 mL Swish & Spit Q4H PRN ROLA Cox      albuterol  2 puff Inhalation TID Sara Dhaliwal MD      albuterol  2 5 mg Nebulization Q6H PRN Sara Dhaliwal MD      ALPRAZolam  0 25 mg Oral TID PRN Nuala Fairly, MD      aluminum-magnesium hydroxide-simethicone  30 mL Oral Q6H PRN Nuala Fairly, MD      atorvastatin  80 mg Oral Daily With Avis Castellon MD      benzonatate  100 mg Oral TID PRN Nuala Fairly, MD      bisacodyl  10 mg Rectal Daily PRN Nuala Fairly, MD      fluticasone-vilanterol  1 puff Inhalation Daily Nudean Fairly, MD      furosemide  20 mg Oral Daily Nuala Fairly, MD      guaiFENesin  600 mg Oral Q12H Armando Smith MD      heparin (porcine)  5,000 Units Subcutaneous Q8H Armando Smith MD      insulin lispro  1-6 Units Subcutaneous HS Nuala Fairly, MD      insulin lispro  1-6 Units Subcutaneous TID AC Nudean Fairly, MD      levothyroxine  125 mcg Oral Early Morning Nuala Fairly, MD      lidocaine  2 patch Topical Daily Nuala Fairly, MD      melatonin  6 mg Oral HS PRN Nuala Fairly, MD      methocarbamol  500 mg Oral Q6H PRN Nuala Fairly, MD      montelukast  10 mg Oral HS Nuala Fairly, MD      nystatin   Topical BID Nuala Fairly, MD      nystatin   Topical BID Nuala Fairly, MD      ondansetron  4 mg Oral Q6H PRN Nuala Fairly, MD      oxyCODONE  5 mg Oral Q6H PRN Nuala Fairly, MD      pantoprazole  40 mg Oral Early Morning Nuala Fairly, MD      polyethylene glycol  17 g Oral Daily Nuala Fairly, MD      predniSONE  40 mg Oral Daily Nuala Fairly, MD      Followed by   [facility] 2/27/2021] predniSONE  20 mg Oral Daily Nuala Fairly, MD      Followed by   [facility] [place] 3/1/2021] predniSONE  10 mg Oral Daily Nuala Fairly, MD      saccharomyces boulardii  250 mg Oral BID Nuala Fairly, MD      senna  1 tablet Oral HS Nuala Fairly, MD      traMADol  50 mg Oral Q6H PRN Nuala Fairly, MD      white petrolatum-mineral oil   Topical TID PRN Nuala Fairly, MD         PRN Meds: al mag oxide-diphenhydramine-lidocaine viscous    albuterol    ALPRAZolam    aluminum-magnesium hydroxide-simethicone   benzonatate    bisacodyl    melatonin    methocarbamol    ondansetron    oxyCODONE    traMADol    white petrolatum-mineral oil    Continuous Infusions:         LAB RESULTS:      Results from last 7 days   Lab Units 02/25/21  0859 02/24/21  0603 02/23/21  0443 02/22/21  0715 02/21/21  1002 02/20/21  0603 02/19/21  0515   WBC Thousand/uL 10 40* 10 15 10 67*  --   --  11 55* 10 34*   HEMOGLOBIN g/dL 11 9 10 7* 10 9*  --   --  10 6* 10 5*   HEMATOCRIT % 37 4 33 8* 34 7*  --   --  32 6* 32 9*   PLATELETS Thousands/uL 208 191 186  --   --  177 183   NEUTROS PCT % 57 61 67  --   --  64 65   LYMPHS PCT % 29 26 22  --   --  25 24   MONOS PCT % 12 12 10  --   --  8 8   EOS PCT % 0 0 0  --   --  2 2   POTASSIUM mmol/L 5 2 5 4* 5 7* 5 2 5 3 4 4 4 1   CHLORIDE mmol/L 100 100 100 99* 102 104 104   CO2 mmol/L 35* 34* 33* 32 33* 32 33*   BUN mg/dL 33* 35* 39* 30* 25 27* 30*   CREATININE mg/dL 0 91 0 94 1 10 0 94 1 10 0 99 1 09   CALCIUM mg/dL 11 8* 10 8* 11 0* 11 0* 10 6* 10 2* 10 3*   ALK PHOS U/L  --  213*  --   --   --   --   --    ALT U/L  --  52  --   --   --   --   --    AST U/L  --  33  --   --   --   --   --    MAGNESIUM mg/dL  --  1 7  --   --   --   --   --        CUTURES:  Lab Results   Component Value Date    URINECX >100,000 cfu/ml Escherichia coli (A) 02/17/2021    URINECX >100,000 cfu/ml Escherichia coli (A) 11/04/2020    URINECX 80,000-89,000 cfu/ml Escherichia coli (A) 10/26/2018                 Portions of the record may have been created with voice recognition software  Occasional wrong word or "sound a like" substitutions may have occurred due to the inherent limitations of voice recognition software  Read the chart carefully and recognize, using context, where substitutions have occurred  If you have any questions, please contact the dictating provider

## 2021-02-25 NOTE — PROGRESS NOTES
02/25/21 1600   Rehab Team Goals   Transfer Team Goal Patient will require assist with transfers with least restrictive device upon completion of rehab program   Locomotion Team Goal Patient will require assist with locomotion with least restrictive device upon completion of rehab program   PT Transfer Goal   Roll left and right Goal 04  TOUCHING/ STEADYING assistance as patient completes activity  Sit to lying Goal 04  TOUCHING/ STEADYING assistance as patient completes activity  Lying to sitting on side of bed Goal 04  TOUCHING/ STEADYING assistance as patient completes activity  Sit to stand Goal 04  TOUCHING/ STEADYING assistance as patient completes activity  Chair/bed-to-chair transfer Goal 04  TOUCHING/ STEADYING assistance as patient completes activity  Car Transfer Goal 04  TOUCHING/ STEADYING assistance as patient completes activity  Assistive Device   (LRAD)   Safety Precautions   (spinal precautions)   Environment Level Surface; Well Lit   Status Ongoing; Target goal - two weeks   Locomotion Goal   Primary discharge mode of locomotion Walking   Target Walk Distance 20 ft   Assist Device Roller Walker   Gait Pattern Improvement Inconsistant Lory; Excess Slow;Improper weight shift; Forward Flexion;Decreased foot clearance;Decreased R stance   Environment Level Surface; Well Lit   Walk 10 feet Goal 04  TOUCHING/ STEADYING assistance as patient completes activity  Walk 50 feet with 2 turns Goal 09  Not applicable   Walk 442 feet Goal 09  Not applicable   Walking 10 feet on uneven surface 09  Not applicable   Walking Goal Status Ongoing; Target goal - two weeks   Wheel 50 feet with 2 turns Goal 09  Not applicable   Wheel 588 feet Goal 09  Not applicable   Stairs Goal   1 step or curb goal 03  Partial/moderate assistance - [place] does less than half the effort  [place] lifts or holds trunk or limbs and provides more than half the effort  4 steps Goal 03   Partial/moderate assistance - [place] does less than half the effort  [place] lifts or holds trunk or limbs and provides more than half the effort  12 steps Goal 09  Not applicable   Assist Level Minimum Assist;Moderate Assist   Number of Stairs 5   Technique Non-reciprocal   Hand Rail Right   Status Ongoing; Target goal - two weeks   Object Retrieval Goal   Picking up object Goal 04  TOUCHING/ STEADYING assistance as patient completes activity     Assistive Device  Reacher   Small Object Picked Up Pen

## 2021-02-25 NOTE — NURSING NOTE
Pt's blood sugar before lunch again in 50's, pt asymptomatic  Pt given apple juice, PB crackers, and is eating lunch  After 1 hour, pt's blood sugar=84  Pt continuing to eat her lunch, Melani Yoo made aware of low blood sugars

## 2021-02-25 NOTE — CONSULTS
Please see formal consult note placed on 2/23/21 from [facility]  Progress note to follow

## 2021-02-25 NOTE — PLAN OF CARE
Reviewed    Problem: Potential for Falls  Goal: Patient will remain free of falls  Description: INTERVENTIONS:  - Assess patient frequently for physical needs  -  Identify cognitive and physical deficits and behaviors that affect risk of falls  -  [place] fall precautions as indicated by assessment   - Educate patient/family on patient safety including physical limitations  - Instruct patient to call for assistance with activity based on assessment  - Modify environment to reduce risk of injury  - Consider OT/PT consult to assist with strengthening/mobility  2/25/2021 1207 by Shashank Mcnamara RN  Outcome: Progressing  2/25/2021 1206 by Shashank Mcnamara RN  Outcome: Progressing     Problem: Prexisting or High Potential for Compromised Skin Integrity  Goal: Skin integrity is maintained or improved  Description: INTERVENTIONS:  - Identify patients at risk for skin breakdown  - Assess and monitor skin integrity  - Assess and monitor nutrition and hydration status  - Monitor labs   - Assess for incontinence   - Turn and reposition patient  - Assist with mobility/ambulation  - Relieve pressure over bony prominences  - Avoid friction and shearing  - Provide appropriate hygiene as needed including keeping skin clean and dry  - Evaluate need for skin moisturizer/barrier cream  - Collaborate with interdisciplinary team   - Patient/family teaching  - Consider wound care consult   2/25/2021 1207 by Shashank Mcnamara RN  Outcome: Progressing  2/25/2021 1206 by Shashank Mcnamara RN  Outcome: Progressing     Problem: Nutrition/Hydration-ADULT  Goal: Nutrient/Hydration intake appropriate for improving, restoring or maintaining nutritional needs  Description: Monitor and assess patient's nutrition/hydration status for malnutrition  Collaborate with interdisciplinary team and initiate plan and interventions as ordered  Monitor patient's weight and dietary intake as ordered or per policy   Utilize nutrition screening tool and intervene as necessary  Determine patient's food preferences and provide high-protein, high-caloric foods as appropriate       INTERVENTIONS:  - Monitor oral intake, urinary output, labs, and treatment plans  - Assess nutrition and hydration status and recommend course of action  - Evaluate amount of meals eaten  - Assist patient with eating if necessary   - Allow adequate time for meals  - Recommend/ encourage appropriate diets, oral nutritional supplements, and vitamin/mineral supplements  - Order, calculate, and assess calorie counts as needed  - Recommend, monitor, and adjust tube feedings and TPN/PPN based on assessed needs  - Assess need for intravenous fluids  - Provide specific nutrition/hydration education as appropriate  - Include patient/family/caregiver in decisions related to nutrition  2/25/2021 1207 by Adelaide Park RN  Outcome: Progressing  2/25/2021 1206 by Adelaide Park RN  Outcome: Progressing     Problem: PAIN - ADULT  Goal: Verbalizes/displays adequate comfort level or baseline comfort level  Description: Interventions:  - Encourage patient to monitor pain and request assistance  - Assess pain using appropriate pain scale  - Administer analgesics based on type and severity of pain and evaluate response  - Implement non-pharmacological measures as appropriate and evaluate response  - Consider cultural and social influences on pain and pain management  - Notify physician/advanced practitioner if interventions unsuccessful or patient reports new pain  Outcome: Progressing     Problem: INFECTION - ADULT  Goal: Absence or prevention of progression during hospitalization  Description: INTERVENTIONS:  - Assess and monitor for signs and symptoms of infection  - Monitor lab/diagnostic results  - Monitor all insertion sites, i e  indwelling lines, tubes, and drains  - Monitor endotracheal if appropriate and nasal secretions for changes in amount and color  - [place] appropriate cooling/warming therapies per order  - Administer medications as ordered  - Instruct and encourage patient and family to use good hand hygiene technique  - Identify and instruct in appropriate isolation precautions for identified infection/condition  Outcome: Progressing     Problem: SAFETY ADULT  Goal: Patient will remain free of falls  Description: INTERVENTIONS:  - Assess patient frequently for physical needs  -  Identify cognitive and physical deficits and behaviors that affect risk of falls    -  [place] fall precautions as indicated by assessment   - Educate patient/family on patient safety including physical limitations  - Instruct patient to call for assistance with activity based on assessment  - Modify environment to reduce risk of injury  - Consider OT/PT consult to assist with strengthening/mobility  Outcome: Progressing  Goal: Maintain or return to baseline ADL function  Description: INTERVENTIONS:  -  Assess patient's ability to carry out ADLs; assess patient's baseline for ADL function and identify physical deficits which impact ability to perform ADLs (bathing, care of mouth/teeth, toileting, grooming, dressing, etc )  - Assess/evaluate cause of self-care deficits   - Assess range of motion  - Assess patient's mobility; develop plan if impaired  - Assess patient's need for assistive devices and provide as appropriate  - Encourage maximum independence but intervene and supervise when necessary  - Involve family in performance of ADLs  - Assess for home care needs following discharge   - Consider OT consult to assist with ADL evaluation and planning for discharge  - Provide patient education as appropriate  Outcome: Progressing  Goal: Maintain or return mobility status to optimal level  Description: INTERVENTIONS:  - Assess patient's baseline mobility status (ambulation, transfers, stairs, etc )    - Identify cognitive and physical deficits and behaviors that affect mobility  - Identify mobility aids required to assist with transfers and/or ambulation (gait belt, sit-to-stand, lift, walker, cane, etc )  - [place] fall precautions as indicated by assessment  - Record patient progress and toleration of activity level on Mobility SBAR; progress patient to next Phase/Stage  - Instruct patient to call for assistance with activity based on assessment  - Consider rehabilitation consult to assist with strengthening/weightbearing, etc   Outcome: Progressing     Problem: DISCHARGE PLANNING  Goal: Discharge to home or other facility with appropriate resources  Description: INTERVENTIONS:  - Identify barriers to discharge w/patient and caregiver  - Arrange for needed discharge resources and transportation as appropriate  - Identify discharge learning needs (meds, wound care, etc )  - Arrange for interpretive services to assist at discharge as needed  - Refer to Case Management Department for coordinating discharge planning if the patient needs post-hospital services based on physician/advanced practitioner order or complex needs related to functional status, cognitive ability, or social support system  Outcome: Progressing

## 2021-02-25 NOTE — RESPIRATORY THERAPY NOTE
RT Protocol Note  Laure Mar 78 y o  female MRN: [id number]  Unit/Bed#: -01 Encounter: [id number]    Assessment    Principal Problem:    Thoracic compression fracture ([address] )      Home Pulmonary Medications:  Duo-Neb inhalation solution QID  Combivent Respimat TID  Breo Ellipta BID  Ventolin MDI Q4PRN    Home Devices/Therapy: Home O2    Past Medical History:   Diagnosis Date    Cataract     CHF (congestive heart failure) (HCC)     Leukocytosis      Social History     Socioeconomic History    Marital status:      Spouse name: None    Number of children: None    Years of education: None    Highest education level: None   Occupational History    Occupation: [employer]    Social Needs    Financial resource strain: None    Food insecurity     Worry: None     Inability: None    Transportation needs     Medical: None     Non-medical: None   Tobacco Use    Smoking status: Former Smoker     Packs/day: 2 00     Years: 48 00     Pack years: 96 00     Types: Cigarettes     Quit date: 1999     Years since quittin 4    Smokeless tobacco: Never Used   Substance and Sexual Activity    Alcohol use: Not Currently     Alcohol/week: 1 0 standard drinks     Types: 1 Cans of beer per week     Frequency: Monthly or less     Drinks per session: 1 or 2     Binge frequency: Never    Drug use: No    Sexual activity: Not Currently   Lifestyle    Physical activity     Days per week: 0 days     Minutes per session: 0 min    Stress:  To some extent   Relationships    Social connections     Talks on phone: None     Gets together: None     Attends [employer] service: None     Active member of club or organization: None     Attends meetings of clubs or organizations: None     Relationship status: None    Intimate partner violence     Fear of current or ex partner: None     Emotionally abused: None     Physically abused: None     Forced sexual activity: None   Other Topics Concern    None   Social History Narrative    LIVING INDEPENDENTLY ALONE     NO ADVANCED DIRECTIVES ON FILE        Subjective    Subjective Data: Home o2 @ 2 lpm    Objective    Physical Exam:   Assessment Type: Assess only  General Appearance: Drowsy  Respiratory Pattern: Spontaneous, Dyspnea with exertion  Chest Assessment: Chest expansion symmetrical, Trachea midline  Bilateral Breath Sounds: Diminished, Clear  Cough: Non-productive, Dry  O2 Device: 2 lpm NC    Vitals:  Blood pressure 132/64, pulse 61, temperature 97 8 °F (36 6 °C), temperature source Oral, resp  rate 18, height 5' 2" (1 575 m), weight 74 8 kg (165 lb), SpO2 100 %, not currently breastfeeding  Imaging and other studies: I have personally reviewed pertinent reports  O2 Device: 2 lpm NC     Plan    Respiratory Plan: Home Bronchodilator Patient pathway  Airway Clearance Plan: Flutter     Resp Comments: Pt assessed; observed resting comfortably on nasal cannula with no distress noted  Pt was offered neb tx but she declined at this time  RN admin Albuterol MDI @ [id number]  BBS clear/decreased with a dry NPC  Pt has a known pulmonary hx and uses several medications at home for symptom relief  Provider has already ordered medications while in hospital   Will continue to monitor status as per protocol

## 2021-02-25 NOTE — PROGRESS NOTES
PT INITIAL EVAL     02/25/21 2330   Patient Data   Rehab Impairment Other Orthopedic   Etiologic Diagnosis T5 to T7 compression fractures    Date of Onset 02/17/21   Support System   Name Pt reports son and daughter in law live close by and can provide phys A  Pt has HHA and home PT that come by 1x/week   Able to provide 24 hour supervision No   Able to provide physical help? Yes   Home Setup   Type of Home Single Level   Method of Entry Hand Rail Bilateral;Stairs  (Cannot reach at both times)   Number of Stairs 5   Number of Stairs in Home 0   First Floor Bathroom Tub; Shower   First Floor Setup Available Yes   Home Modifications Necessary? Yes   Home Modification Comment Ramp possibly pending progress   Available Equipment Rollator;Roller [address] prior to Admission Physical [facility] Provider Agustin   Vocation Other  (retired)   Transportation Family/friends drive   Prior Device(s) Used Rollator;Roller Walker   Prior Level of Function   Self-Care 2  Needed Some Help - Patient needed a partial assistance from another person to complete activities  Indoor-Mobility (Ambulation) 3  Independent - Patient completed the activities by him/herself, with or without an assistive device, with no assistance from a helper  Stairs 3  Independent - Patient completed the activities by him/herself, with or without an assistive device, with no assistance from a helper  Functional Cognition 3  Independent - Patient completed the activities by him/herself, with or without an assistive device, with no assistance from a helper  Prior Assistance Needed for Driving;Household Chores/Cleaning;Meal Preparation; Shopping;Money Management;Medication Management   Prior Device Used SANDHYA Hopson in the Last Year   Number of falls in the past 12 months 0   Patient Preference   Nickname (Patient Preference) Emily Stubbs   Restrictions/Precautions   Precautions Cognitive; Fall Risk;O2;Pain;Spinal precautions;Supervision on toilet/commode  (2-3LO2)   ROM Restrictions Yes  (Spinal precautions)   Braces or Orthoses TLSO  (Backpack)   Pain Assessment   Pain Assessment Tool Pain Assessment not indicated - pt denies pain   Pain Score 6   Pain Location/Orientation Location: Back   Roll Left and Right   Type of Assistance Needed Physical assistance; Adaptive equipment   Amount of Physical Assistance Provided 25%-49%   Comment modA log roll   Roll Left and Right CARE Score 3   Sit to Lying   Type of Assistance Needed Physical assistance; Adaptive equipment   Amount of Physical Assistance Provided 25%-49%   Comment A for LE   Sit to Lying CARE Score 3   Lying to Sitting on Side of Bed   Type of Assistance Needed Physical assistance; Adaptive equipment   Amount of Physical Assistance Provided 25%-49%   Comment A for LE   Lying to Sitting on Side of Bed CARE Score 3   Sit to Stand   Type of Assistance Needed Physical assistance; Adaptive equipment   Amount of Physical Assistance Provided 25%-49%   Comment modA after mult attempts, VC for controlled descent   Sit to Stand CARE Score 3   Picking Up Object   Reason if not Attempted Safety concerns   Picking Up Object CARE Score 88   Car Transfer   Reason if not Attempted Safety concerns   Car Transfer CARE Score 88   Ambulation   Primary Mode of Locomotion Prior to Admission Walk   Distance Walked (feet) 3 ft   Assist Device Roller Walker   Gait Pattern Inconsistant Lory; Slow Lory;Decreased foot clearance; Forward Flexion; Step to; Improper weight shift   Limitations Noted In Balance; Coordination;Device Management; Endurance; Heel Strike;Posture;Speed;Strength   Provided Assistance with: Balance   Walk Assist Level Moderate Assist   Findings modAx1 RW from bed > w/c   Walk 10 Feet   Comment Pt very fatigued after transfer   Reason if not Attempted Safety concerns   Walk 10 Feet CARE Score 88   Walk 50 Feet with Two Turns   Reason if not Attempted Safety concerns   Walk 50 Feet with Two Turns CARE Score 88   Walk 150 Feet   Reason if not Attempted Safety concerns   Walk 150 Feet CARE Score 88   Walking 10 Feet on Uneven Surfaces   Reason if not Attempted Safety concerns   Walking 10 Feet on Uneven Surfaces CARE Score 88   Wheel 50 Feet with Two Turns   Reason if not Attempted Activity not applicable   Wheel 50 Feet with Two Turns CARE Score 9   Wheel 150 Feet   Reason if not Attempted Activity not applicable   Wheel 031 Feet CARE Score 9   Curb or Single Stair   Reason if not Attempted Safety concerns   1 Step (Curb) CARE Score 88   4 Steps   Reason if not Attempted Safety concerns   4 Steps CARE Score 88   12 Steps   Reason if not Attempted Safety concerns   12 Steps CARE Score 88   Comprehension   QI: Comprehension 4  Undestands: Clear comprehension without cues or repetitions   Expression   QI: Expression 3  Exhibits some difficulty with expressing needs and ideas (e g , some words or finishing thoughts) or speech is not clear   RLE Assessment   RLE Assessment X   Strength RLE   RLE Overall Strength 3+/5   LLE Assessment   LLE Assessment X   Strength LLE   LLE Overall Strength 3+/5   Coordination   Movements are Fluid and Coordinated 1   Sensation   Light Touch No apparent deficits   Propioception No apparent deficits   Cognition   Overall Cognitive Status Impaired   Arousal/Participation Alert; Cooperative   Attention Attends with cues to redirect   Memory Decreased short term memory;Decreased recall of recent events;Decreased recall of precautions   Following Commands Follows one step commands without difficulty   Therapeutic Exercise   Therapeutic Exercise/Activity Educated pt and daughter on therapy expecations, rehab goals, team meetings, therapy role, etc     Discharge [address] Retired/not working   Patient's Discharge Plan Home w/ family and services   Patient's Rehab Expectations To go home   Barriers to Discharge Home Limited Family Support; Unsafe Home Setup; Decreased Cognitive Function;Decreased Strength;Decreased Endurance   Impressions Pt is an 78 y o female admitted to the [facility] w/ compression fracture of T5 and T6  Pt was seen for a high complexity eval and presents with co-morbidities which affect her functional presentation at the time of IE including VICKY, diastolic CHF, DMT2, and COPD  Pts personal barriers to physical therapy affecting pt at time of IE include 2-3LO2 NC at baseline, TLSO when OOB, spinal precautions as well as pressure sore on tailbone  Household and environmental barriers include 5 steps to enter w/ BHR  Per pt report, she currently lives alone, with her son and daughter in law 2 miles down the road and was independent before admission to hospital however req use of rollator and frequent breaks just to ambulate to bathroom  Per pt report, she was not managing O2 line well at home and was frequently getting caught in w/ no reports of LOB  On eval pt requires min--modA for functional transfers and mobility w/ use of RW and req frequent rest breaks as pt gets very SOB w/ little movement  Pt presents on IE with impairments including dec activity tolerance, poor endurance, RICO, dec mobility, global strength deficits, poor standing tolerance, dec righting reactions, and dec standing/dynamic balance all of which causing difficulty with transfers, bed mobility, community and household ambulation, stair negotiation and performing household duties  Pt is a good rehab candidate with anticipated S goals using least restrictive AD with ELOS of 2 weeks     PT Therapy Minutes   PT Time In 1330   PT Time Out 1500   PT Total Time (minutes) 90   PT Mode of treatment - Individual (minutes) 90   PT Mode of treatment - Concurrent (minutes) 0   PT Mode of treatment - Group (minutes) 0   PT Mode of treatment - Co-treat (minutes) 0   PT Mode of Treatment - Total time(minutes) 90 minutes   PT Cumulative Minutes 90   Cumulative Minutes Cumulative therapy minutes 195

## 2021-02-25 NOTE — CASE MANAGEMENT
Cm met with pt and reviewed rehab routine and cm role  Pt lives in a ranch home by herself  She has 5 ALBINO  She has tub shower with grab bars  She has a walker, a rollator, a grab bar set up on the toilet  She has not had any outpt therapy  She was using [facility] for home therapy and nursing pta and would like to continue with them at d/c  She states she uses [facility] in [place], [place] for rx needs  Her dtr in law has been making her meals and schedules her appointments, provides transport etc  Her son and dtr in law lives 2 miles away and check in on her everyday  Reviewed team meeting process, potential LOS, and IMM  Pt signed IMM  Following ot assist with w/ d/c planning needs

## 2021-02-25 NOTE — WOUND OSTOMY CARE
Consult Note - Wound   Yolanda Rose 78 y o  female MRN: [id number]  Unit/Bed#: -01 Encounter: [id number]      History and Present Illness: Patient is seen for wound care consult of the skin today   The patient is a 78year old female that is admitted to the acute rehab for rehabilitation   She is alert and pleasant for the assessment with her daughter at the bedside   She has a thoracic compression fracture   History of VICKY, COPD,DM,AND CHF   She is a Max A of 2 for rolling in the bed   Dependent for care   Incontinent of urine and has the purewick in place   No incontinence of stool at this time   Elevation of the heels , EHOB cushion and foam wedges to offload   Assessment Findings:   1  Bilateral heels dry and intact - will continue with allevyn foam for prevention   2  Mid Sacral area - stage 3 POA to the rehab unit   Noted wound bed to have 40% yellow slough and 60% pink tissue in the wound bed   Fragile attached edges   Elin wound with scattered blanchable areas with noted scattered old scarring of the elin area related to moisture   Patient does wear pose at home for incontinence   Reviewed offloading and weight - shifting   Verbalized understanding   Plan:   1  Cleanse mid sacrum with NSS, pat dry, and apply sacral alleyvn foam dressing  Change every other day and as needed for soilage/disldogement  2  Apply skin nourishing cream the entire skin daily for moisture  3  Turn and reposition patient every  2 hours   4  Elevate heels off of bed with pillows to offload pressure   5  Apply EHOB waffle cushion to chair when OOB, if able  6  Allevyn foam to heels, fadia w/P, peel foam check skin integrity q-shift  Change q3d   7  Follow-up with the [facility] wound care center as an outpatient - please call [phone] for an appointment     8  Wound care will follow along with patient weekly, please call with questions and concerns      Vitals: Blood pressure 127/58, pulse 85, temperature 98 °F (36 7 °C), temperature source Oral, resp  rate 18, height 5' 2" (1 575 m), weight 74 8 kg (165 lb), SpO2 94 %, not currently breastfeeding  ,Body mass index is 30 18 kg/m²  Wound 02/21/21 Pressure Injury Sacrum (Active)   Wound Image   02/25/21 1245   Wound Description Fragile;Pink; White 02/25/21 1245   Pressure Injury Stage 3 02/25/21 1245   Elin-wound Assessment Clean;Dry; Intact 02/25/21 1245   Wound Length (cm) 1 cm 02/25/21 1245   Wound Width (cm) 0 4 cm 02/25/21 1245   Wound Depth (cm) 0 2 cm 02/22/21 0819   Wound Surface Area (cm^2) 0 4 cm^2 02/25/21 1245   Wound Volume (cm^3) 0 02 cm^3 02/22/21 0819   Calculated Wound Volume (cm^3) 0 02 cm^3 02/22/21 0819   Tunneling 0 cm 02/22/21 0819   Tunneling in depth located at 0 02/22/21 0819   Undermining 0 02/22/21 0819   Undermining is depth extending from 0 02/22/21 0819   Wound Site Closure None 02/22/21 0819   Drainage Amount Scant 02/25/21 1245   Drainage Description Serous 02/25/21 1245   Non-staged Wound Description Full thickness 02/24/21 0917   Treatments Cleansed;Site care 02/25/21 1245   Dressing Foam, Silicon (eg  Allevyn, etc) 02/25/21 1245   Wound packed? No 02/22/21 0819   Packing- # removed 0 02/22/21 0819   Packing- # inserted 0 02/22/21 0819   Dressing Changed Other (Comment) 02/25/21 0915   Patient Tolerance Tolerated well 02/25/21 1245   Dressing Status Clean;Dry; Intact 02/25/21 0915         Vern Sharpe RN BSN Rogelio Mcclendon

## 2021-02-26 NOTE — PROGRESS NOTES
Progress Note - Nephrology   Laure Mar 78 y o  female MRN: [id number]  Unit/Bed#: -01 Encounter: [id number]      Assessment / Plan:  1  VICKY - resolved, now with CKD stage 3 in setting of hypertensive nephrosclerosis/physiologic aging of the kidney-b/l sCr < 1, at baseline    2  Hypercalcemia - corrected calcium 12 8 in light of low albumin, despite low anion gap doubt multiple myeloma as SPEP/UPEP/IF negative for monoclonal gammopathy, suspect immobility may be playing a role plus or minus diuretic use  -will check ionized calcium in the morning  -last PTH suppressed  -if elevation on ionized calcium, will pursue hypercalcemia workup including PTH, PTH related peptide, Ace level, vitamin-D panel  3  Elevated total CO2 in setting of diuretic use-monitor bicarb, on lasix, was on torsemide at home    4  Elevated BUN likely due to diuretic use    5  Hyperkalemia-will continue low K diet, continue to hold Arb home dose, potassium 5 2, follow-up a m  BMP    6  Anemia-resolved     7  Chronic diastolic heart failure, grade 1 diastolic dysfunction with EF 60%, continue Lasix, was on torsemide at home, monitor daily weight      Subjective: The patient is normally on 2 L oxygen home  She is currently on 3 L oxygen complains of back pain as well as shortness of breath  No other complaints  Objective:     Vitals: Blood pressure 124/65, pulse 72, temperature 97 6 °F (36 4 °C), resp  rate 17, height 5' 2" (1 575 m), weight 73 kg (160 lb 15 oz), SpO2 95 %, not currently breastfeeding  ,Body mass index is 29 44 kg/m²  Temp (24hrs), Av 7 °F (36 5 °C), Min:97 4 °F (36 3 °C), Max:98 °F (36 7 °C)      Weight (last 2 days)     Date/Time   Weight    21 0500   73 (160 94)    21 1608   74 8 (165)                Intake/Output Summary (Last 24 hours) at 2021 1223  Last data filed at 2021 0500  Gross per 24 hour   Intake 120 ml   Output 2100 ml   Net -1980 ml     I/O last 24 hours:   In: 120 [P O :120]  Out: 2100 [Urine:2100]        Physical Exam:   Physical Exam  Vitals signs and nursing note reviewed  Constitutional:       General: She is not in acute distress  Appearance: Normal appearance  She is well-developed  She is ill-appearing  She is not diaphoretic  HENT:      Head: Normocephalic and atraumatic  Mouth/Throat:      Pharynx: No oropharyngeal exudate  Eyes:      General: No scleral icterus  Right eye: No discharge  Left eye: No discharge  Neck:      Musculoskeletal: Normal range of motion and neck supple  Thyroid: No thyromegaly  Cardiovascular:      Rate and Rhythm: Normal rate and regular rhythm  Heart sounds: No murmur  Pulmonary:      Effort: Pulmonary effort is normal  No respiratory distress  Breath sounds: No wheezing  Comments: Decreased BS b/l  Abdominal:      General: Bowel sounds are normal  There is no distension  Palpations: Abdomen is soft  Genitourinary:     Comments: External urinary catheter  Musculoskeletal:         General: No swelling  Skin:     General: Skin is warm and dry  Findings: No rash  Neurological:      General: No focal deficit present  Mental Status: She is alert  Motor: No abnormal muscle tone  Comments: awake   Psychiatric:         Mood and Affect: Mood normal          Behavior: Behavior normal          Invasive Devices     Peripheral Intravenous Line            Peripheral IV 02/22/21 Dorsal (posterior); Right Forearm 4 days          Drain            External Urinary Catheter 5 days                Medications:    Scheduled Meds:  Current Facility-Administered Medications   Medication Dose Route Frequency Provider Last Rate    acetaminophen  650 mg Oral Q6H [address] Keturah Pappas MD      al mag oxide-diphenhydramine-lidocaine viscous  10 mL Swish & Spit Q4H PRN ROLA Estes      albuterol  2 puff Inhalation TID Keturah Pappas MD      albuterol  2 5 mg Nebulization Q6H PRN Cuauhtemoc Velez MD      ALPRAZolam  0 25 mg Oral TID PRN Cuauhtemoc Velez MD      aluminum-magnesium hydroxide-simethicone  30 mL Oral Q6H PRN Cuauhtemoc Velez MD      atorvastatin  80 mg Oral Daily With Angel Guadalupe MD      benzonatate  100 mg Oral TID PRN Cuauhtemoc Velez MD      bisacodyl  10 mg Rectal Daily PRN Cuauhtemoc Velez MD      fluticasone-vilanterol  1 puff Inhalation Daily Cuauhtemoc Velez MD      furosemide  20 mg Oral Daily Cuauhtemoc Velez MD      guaiFENesin  600 mg Oral Q12H Sushila Davidson MD      heparin (porcine)  5,000 Units Subcutaneous Q8H Sushila Davidson MD      insulin lispro  1-6 Units Subcutaneous HS Cuauhtemoc Velez MD      insulin lispro  1-6 Units Subcutaneous TID AC Cuauhtemoc Velez MD      levothyroxine  125 mcg Oral Early Morning Cuauhtemoc Velez MD      lidocaine  2 patch Topical Daily Cuauhtemoc Velez MD      melatonin  6 mg Oral HS PRN Cuauhtemoc Velez MD      methocarbamol  500 mg Oral Q6H PRN Cuauhtemoc Velez MD      montelukast  10 mg Oral HS Cuauhtemoc Velez MD      nystatin   Topical BID Cuauhtemoc Velez MD      nystatin   Topical BID Cuauhtemoc Velez MD      ondansetron  4 mg Oral Q6H PRN Cuauhtemoc Velez MD      oxyCODONE  5 mg Oral Q6H PRN Cuauhtemoc Velez MD      pantoprazole  40 mg Oral Early Morning Cuauhtemoc Velez MD      polyethylene glycol  17 g Oral Daily Cuauhtemoc Velez MD      [START ON 2/27/2021] predniSONE  20 mg Oral Daily Cuauhtemoc Velez MD      Followed by   Neli Ramirez ON 3/1/2021] predniSONE  10 mg Oral Daily Cuauhtemoc Velez MD      saccharomyces boulardii  250 mg Oral BID Cuauhteomc Velez MD      senna  1 tablet Oral HS Cuauhtemoc Velez MD      traMADol  50 mg Oral Q6H PRN Cuauhtemoc Velez MD      white petrolatum-mineral oil   Topical TID PRN Cuauhtemoc Velez MD         PRN Meds: al mag oxide-diphenhydramine-lidocaine viscous    albuterol    ALPRAZolam    aluminum-magnesium hydroxide-simethicone    benzonatate    bisacodyl    melatonin   methocarbamol    ondansetron    oxyCODONE    traMADol    white petrolatum-mineral oil    Continuous Infusions:         LAB RESULTS:      Results from last 7 days   Lab Units 02/25/21  0859 02/24/21  0603 02/23/21  0443 02/22/21  0715 02/21/21  1002 02/20/21  0603   WBC Thousand/uL 10 40* 10 15 10 67*  --   --  11 55*   HEMOGLOBIN g/dL 11 9 10 7* 10 9*  --   --  10 6*   HEMATOCRIT % 37 4 33 8* 34 7*  --   --  32 6*   PLATELETS Thousands/uL 208 191 186  --   --  177   NEUTROS PCT % 57 61 67  --   --  64   LYMPHS PCT % 29 26 22  --   --  25   MONOS PCT % 12 12 10  --   --  8   EOS PCT % 0 0 0  --   --  2   POTASSIUM mmol/L 5 2 5 4* 5 7* 5 2 5 3 4 4   CHLORIDE mmol/L 100 100 100 99* 102 104   CO2 mmol/L 35* 34* 33* 32 33* 32   BUN mg/dL 33* 35* 39* 30* 25 27*   CREATININE mg/dL 0 91 0 94 1 10 0 94 1 10 0 99   CALCIUM mg/dL 11 8* 10 8* 11 0* 11 0* 10 6* 10 2*   ALK PHOS U/L  --  213*  --   --   --   --    ALT U/L  --  52  --   --   --   --    AST U/L  --  33  --   --   --   --    MAGNESIUM mg/dL  --  1 7  --   --   --   --        CUTURES:  Lab Results   Component Value Date    URINECX >100,000 cfu/ml Escherichia coli (A) 02/17/2021    URINECX >100,000 cfu/ml Escherichia coli (A) 11/04/2020    URINECX 80,000-89,000 cfu/ml Escherichia coli (A) 10/26/2018                 Portions of the record may have been created with voice recognition software  Occasional wrong word or "sound a like" substitutions may have occurred due to the inherent limitations of voice recognition software  Read the chart carefully and recognize, using context, where substitutions have occurred  If you have any questions, please contact the dictating provider

## 2021-02-26 NOTE — PLAN OF CARE
Problem: SAFETY ADULT  Goal: Patient will remain free of falls  Description: INTERVENTIONS:  - Assess patient frequently for physical needs  -  Identify cognitive and physical deficits and behaviors that affect risk of falls    -  [place] fall precautions as indicated by assessment   - Educate patient/family on patient safety including physical limitations  - Instruct patient to call for assistance with activity based on assessment  - Modify environment to reduce risk of injury  - Consider OT/PT consult to assist with strengthening/mobility  2/26/2021 1258 by Jeff Nino RN  Outcome: Progressing  2/26/2021 1256 by Jeff Nino RN  Outcome: Progressing

## 2021-02-26 NOTE — PLAN OF CARE
Problem: Potential for Falls  Goal: Patient will remain free of falls  Description: INTERVENTIONS:  - Assess patient frequently for physical needs  -  Identify cognitive and physical deficits and behaviors that affect risk of falls  -  [place] fall precautions as indicated by assessment   - Educate patient/family on patient safety including physical limitations  - Instruct patient to call for assistance with activity based on assessment  - Modify environment to reduce risk of injury  - Consider OT/PT consult to assist with strengthening/mobility  Outcome: Progressing     Problem: Prexisting or High Potential for Compromised Skin Integrity  Goal: Skin integrity is maintained or improved  Description: INTERVENTIONS:  - Identify patients at risk for skin breakdown  - Assess and monitor skin integrity  - Assess and monitor nutrition and hydration status  - Monitor labs   - Assess for incontinence   - Turn and reposition patient  - Assist with mobility/ambulation  - Relieve pressure over bony prominences  - Avoid friction and shearing  - Provide appropriate hygiene as needed including keeping skin clean and dry  - Evaluate need for skin moisturizer/barrier cream  - Collaborate with interdisciplinary team   - Patient/family teaching  - Consider wound care consult   Outcome: Progressing     Problem: Nutrition/Hydration-ADULT  Goal: Nutrient/Hydration intake appropriate for improving, restoring or maintaining nutritional needs  Description: Monitor and assess patient's nutrition/hydration status for malnutrition  Collaborate with interdisciplinary team and initiate plan and interventions as ordered  Monitor patient's weight and dietary intake as ordered or per policy  Utilize nutrition screening tool and intervene as necessary  Determine patient's food preferences and provide high-protein, high-caloric foods as appropriate       INTERVENTIONS:  - Monitor oral intake, urinary output, labs, and treatment plans  - Assess nutrition and hydration status and recommend course of action  - Evaluate amount of meals eaten  - Assist patient with eating if necessary   - Allow adequate time for meals  - Recommend/ encourage appropriate diets, oral nutritional supplements, and vitamin/mineral supplements  - Order, calculate, and assess calorie counts as needed  - Recommend, monitor, and adjust tube feedings and TPN/PPN based on assessed needs  - Assess need for intravenous fluids  - Provide specific nutrition/hydration education as appropriate  - Include patient/family/caregiver in decisions related to nutrition  Outcome: Progressing     Problem: PAIN - ADULT  Goal: Verbalizes/displays adequate comfort level or baseline comfort level  Description: Interventions:  - Encourage patient to monitor pain and request assistance  - Assess pain using appropriate pain scale  - Administer analgesics based on type and severity of pain and evaluate response  - Implement non-pharmacological measures as appropriate and evaluate response  - Consider cultural and social influences on pain and pain management  - Notify physician/advanced practitioner if interventions unsuccessful or patient reports new pain  Outcome: Progressing     Problem: INFECTION - ADULT  Goal: Absence or prevention of progression during hospitalization  Description: INTERVENTIONS:  - Assess and monitor for signs and symptoms of infection  - Monitor lab/diagnostic results  - Monitor all insertion sites, i e  indwelling lines, tubes, and drains  - Monitor endotracheal if appropriate and nasal secretions for changes in amount and color  - [place] appropriate cooling/warming therapies per order  - Administer medications as ordered  - Instruct and encourage patient and family to use good hand hygiene technique  - Identify and instruct in appropriate isolation precautions for identified infection/condition  Outcome: Progressing     Problem: SAFETY ADULT  Goal: Patient will remain free of falls  Description: INTERVENTIONS:  - Assess patient frequently for physical needs  -  Identify cognitive and physical deficits and behaviors that affect risk of falls    -  [place] fall precautions as indicated by assessment   - Educate patient/family on patient safety including physical limitations  - Instruct patient to call for assistance with activity based on assessment  - Modify environment to reduce risk of injury  - Consider OT/PT consult to assist with strengthening/mobility  Outcome: Progressing  Goal: Maintain or return to baseline ADL function  Description: INTERVENTIONS:  -  Assess patient's ability to carry out ADLs; assess patient's baseline for ADL function and identify physical deficits which impact ability to perform ADLs (bathing, care of mouth/teeth, toileting, grooming, dressing, etc )  - Assess/evaluate cause of self-care deficits   - Assess range of motion  - Assess patient's mobility; develop plan if impaired  - Assess patient's need for assistive devices and provide as appropriate  - Encourage maximum independence but intervene and supervise when necessary  - Involve family in performance of ADLs  - Assess for home care needs following discharge   - Consider OT consult to assist with ADL evaluation and planning for discharge  - Provide patient education as appropriate  Outcome: Progressing  Goal: Maintain or return mobility status to optimal level  Description: INTERVENTIONS:  - Assess patient's baseline mobility status (ambulation, transfers, stairs, etc )    - Identify cognitive and physical deficits and behaviors that affect mobility  - Identify mobility aids required to assist with transfers and/or ambulation (gait belt, sit-to-stand, lift, walker, cane, etc )  - [place] fall precautions as indicated by assessment  - Record patient progress and toleration of activity level on Mobility SBAR; progress patient to next Phase/Stage  - Instruct patient to call for assistance with activity based on assessment  - Consider rehabilitation consult to assist with strengthening/weightbearing, etc   Outcome: Progressing     Problem: DISCHARGE PLANNING  Goal: Discharge to home or other facility with appropriate resources  Description: INTERVENTIONS:  - Identify barriers to discharge w/patient and caregiver  - Arrange for needed discharge resources and transportation as appropriate  - Identify discharge learning needs (meds, wound care, etc )  - Arrange for interpretive services to assist at discharge as needed  - Refer to Case Management Department for coordinating discharge planning if the patient needs post-hospital services based on physician/advanced practitioner order or complex needs related to functional status, cognitive ability, or social support system  Outcome: Progressing

## 2021-02-26 NOTE — PLAN OF CARE

## 2021-02-26 NOTE — PLAN OF CARE
Problem: Nutrition/Hydration-ADULT  Goal: Nutrient/Hydration intake appropriate for improving, restoring or maintaining nutritional needs  Description: Monitor and assess patient's nutrition/hydration status for malnutrition  Collaborate with interdisciplinary team and initiate plan and interventions as ordered  Monitor patient's weight and dietary intake as ordered or per policy  Utilize nutrition screening tool and intervene as necessary  Determine patient's food preferences and provide high-protein, high-caloric foods as appropriate       INTERVENTIONS:  - Monitor oral intake, urinary output, labs, and treatment plans  - Assess nutrition and hydration status and recommend course of action  - Evaluate amount of meals eaten  - Assist patient with eating if necessary   - Allow adequate time for meals  - Recommend/ encourage appropriate diets, oral nutritional supplements, and vitamin/mineral supplements  - Order, calculate, and assess calorie counts as needed  - Recommend, monitor, and adjust tube feedings and TPN/PPN based on assessed needs  - Assess need for intravenous fluids  - Provide specific nutrition/hydration education as appropriate  - Include patient/family/caregiver in decisions related to nutrition  2/26/2021 1720 by Liliana Barrios  Outcome: Progressing  2/26/2021 1527 by Liliana Barrios  Outcome: Progressing

## 2021-02-26 NOTE — MALNUTRITION/BMI
This medical record reflects one or more clinical indicators suggestive of malnutrition and/or morbid obesity  Malnutrition Findings:   Adult Malnutrition type: Acute illness  Adult Degree of Malnutrition: Malnutrition of moderate degree  Malnutrition Characteristics: Inadequate energy, Weight loss(evidenced by 10% wt loss x 3 mo, poor po intake for > than 1mo, treated w/diet and supplements)    BMI Findings: Body mass index is 29 44 kg/m²  See Nutrition note dated 02/26/21 for additional details  Completed nutrition assessment is viewable in the nutrition documentation

## 2021-02-26 NOTE — NURSING NOTE
Melani made aware of pt's low blood sugars this am (pt asymptomatic)  Pt given saloni crackers and peanut butter and apple juice   Pt now 89 and eating breakfast

## 2021-02-26 NOTE — PROGRESS NOTES
PM&R PROGRESS NOTE:  Ruba Leong 78 y o  female MRN: [id number]  Unit/Bed#: -01 Encounter: [id number]        Rehabilitation Diagnosis: Impairment of mobility, safety and Activities of Daily Living (ADLs) due to Orthopedic Disorders:  08 9  Other Orthopedic Thoracic compression fractures    HPI:   Ruba Leong is a 78 y o  female with COPD/ emphysema on baseline oxygen 2-3 L,  Congestive heart failure, osteoarthritis who presented to the [facility] on 2/17/21 with back pain and shortness of breath  Pulmonary workup showing moderate pulmonary emphysematous changes bilaterally  Workup for back pain revealed Compression fracture of T5 (33% height loss approximately) and T6 (approximately 25% height loss  )  There is also superior endplate T2 compression fracture with approximately 10%-20 percent height loss at the superior endplate  Case was discussed with both Orthopedics and Neurosurgery and patient was recommended conservative treatment with TLSO brace while out of bed  Medically, patient was treated for an E coli urinary tract infection  Patient was started on steroids for COPD and emphysema and continued on her Breo and DuoNebs  Patient was seen by Nephrology  acute renal insufficiency, hypercalcemia and hyperkalemia  Patient started on Lasix by Nephrology  Patient was followed by wound care for a sacral pressure ulcer  After medical stabilization patient was found to have acute functional deficits from her baseline and therefore was admitted to [address] [place] [address]  SUBJECTIVE: Patient seen face to face  At  Baseline patient is short of breath,  No worse than her usual state  Having moderate back pain        ASSESSMENT: Stable, progressing      PLAN:    Rehabilitation   Functional deficits:  Generalized weakness,  Reduced Endurance,  Impaired mobility, impaired self-care,  Impaired balance,  Possible impaired swallow   Continue current rehabilitation plan of care to maximize function   Functional update:   o PT: mod to max assist  o OT: mod to max assist  o SLP: Diet downgraded to pureed with thins   Estimated Discharge: To be determined      Pain  ·   Mid to lower back pain:  Managed on Tylenol,  Topical Lidoderm patch, p r n  Robaxin, tramadol and oxycodone  P r n  DVT prophylaxis    managed on subcutaneous heparin and SCDs    Bladder plan   Continent    Bowel plan   Continent      * Thoracic compression fracture (HCC)  Assessment & Plan  · T2, T5 and T6 compression fractures  · Treated conservatively  · TLSO brace while out of bed  · Follow-up as outpatient with Neurosurgery    Pressure ulcer  Assessment & Plan  · Located on Sacrum  · Consulted wound care to follow while at [facility]  · Continue optimization of nutrition, pressure relieving techniques, and turns  · Nutrition consulted  · Continue local wound care with foam dressing as recommended by wound care RN  UTI (urinary tract infection)  Assessment & Plan  · E Coli UTI  · Completed treatment     VICKY (acute kidney injury) ([facility] )  Assessment & Plan  · Renal function baseline 0 9 - 1 1  · Renal following hypercalcemia/hyperkaemia (hyperkalemia likely due to diet - was drinking V8s and fruity drinks)  ·  continue low-potassium diet      Chronic diastolic heart failure (HCC)  Assessment & Plan  Managed on Lasix 20mg daily      Type 2 diabetes mellitus with complication, without long-term current use of insulin ([facility])  Assessment & Plan  · Managed  On sliding scale insulin  · Due to hypoglycemia glyburide was discontinued  · CCD  · IM consultants following    COPD exacerbation ([facility] )  Assessment & Plan  · Wears oxygen 2-3 L at baseline  · Continue current inhalers  · Monitor oxygen saturations with activity  · Recent CT scan +moderate emphysema  · Continue prednisone taper      Appreciate IM consultants medical co-management  Labs, medications, and imaging personally reviewed        ROS:  A ten point review of systems was completed on 2/26/21 and pertinent positives are listed in subjective section  All other systems reviewed were negative  OBJECTIVE:   /65   Pulse 72   Temp 97 6 °F (36 4 °C)   Resp 17   Ht 5' 2" (1 575 m)   Wt 73 kg (160 lb 15 oz)   SpO2 95%   BMI 29 44 kg/m²     Physical Exam  Vitals signs and nursing note reviewed  Constitutional:       General: She is not in acute distress  HENT:      Head: Normocephalic and atraumatic  Nose: Nose normal       Mouth/Throat:      Mouth: Mucous membranes are moist    Eyes:      Conjunctiva/sclera: Conjunctivae normal    Neck:      Musculoskeletal: Neck supple  Cardiovascular:      Rate and Rhythm: Normal rate and regular rhythm  Pulses: Normal pulses  Pulmonary:      Effort: Pulmonary effort is normal       Breath sounds: Normal breath sounds  No wheezing or rales  Abdominal:      General: Bowel sounds are normal  There is no distension  Palpations: Abdomen is soft  Tenderness: There is no abdominal tenderness  Musculoskeletal:         General: No swelling  Comments: TLSO in place   Skin:     General: Skin is warm  Neurological:      Mental Status: She is alert and oriented to person, place, and time        Comments: Motor is 3+/5 throughout   Psychiatric:         Mood and Affect: Mood normal           Lab Results   Component Value Date    WBC 10 40 (H) 02/25/2021    HGB 11 9 02/25/2021    HCT 37 4 02/25/2021    MCV 93 02/25/2021     02/25/2021     Lab Results   Component Value Date    SODIUM 136 02/25/2021    K 5 2 02/25/2021     02/25/2021    CO2 35 (H) 02/25/2021    BUN 33 (H) 02/25/2021    CREATININE 0 91 02/25/2021    GLUC 85 02/25/2021    CALCIUM 11 8 (H) 02/25/2021     No results found for: INR, PROTIME        Current Facility-Administered Medications:     acetaminophen (TYLENOL) tablet 650 mg, 650 mg, Oral, Q6H [facility], Christopher Shelby MD, 650 mg at 02/26/21 1141    irma [place] oxide-diphenhydramine-lidocaine viscous (MAGIC MOUTHWASH) suspension 10 mL, 10 mL, Swish & Spit, Q4H PRN, ROLA Shepard, 10 mL at 02/25/21 1719    albuterol (PROVENTIL HFA,VENTOLIN HFA) inhaler 2 puff, 2 puff, Inhalation, TID, Maddi Viveros MD, 2 puff at 02/26/21 0840    albuterol inhalation solution 2 5 mg, 2 5 mg, Nebulization, Q6H PRN, Maddi Viveros MD    ALPRAZolam Arlina Shady) tablet 0 25 mg, 0 25 mg, Oral, TID PRN, Maddi Viveros MD, 0 25 mg at 02/25/21 2058    aluminum-magnesium hydroxide-simethicone (MYLANTA) oral suspension 30 mL, 30 mL, Oral, Q6H PRN, Maddi Viveros MD    atorvastatin (LIPITOR) tablet 80 mg, 80 mg, Oral, Daily With Kathy Ramsey MD, 80 mg at 02/25/21 1749    benzonatate (TESSALON PERLES) capsule 100 mg, 100 mg, Oral, TID PRN, Maddi Viveros MD    bisacodyl (DULCOLAX) rectal suppository 10 mg, 10 mg, Rectal, Daily PRN, Maddi Viveros MD    fluticasone-vilanterol (BREO ELLIPTA) 100-25 mcg/inh inhaler 1 puff, 1 puff, Inhalation, Daily, Maddi Viveros MD, 1 puff at 02/26/21 0838    furosemide (LASIX) tablet 20 mg, 20 mg, Oral, Daily, Maddi Viveros MD, 20 mg at 02/26/21 0839    guaiFENesin (MUCINEX) 12 hr tablet 600 mg, 600 mg, Oral, Q12H [address], Maddi Viveros MD, 600 mg at 02/26/21 0836    heparin (porcine) subcutaneous injection 5,000 Units, 5,000 Units, Subcutaneous, Q8H [address], Maddi Viveros MD, 5,000 Units at 02/26/21 0640    insulin lispro (HumaLOG) 100 units/mL subcutaneous injection 1-6 Units, 1-6 Units, Subcutaneous, HS, Maddi Viveros MD, 3 Units at 02/25/21 2104    insulin lispro (HumaLOG) 100 units/mL subcutaneous injection 1-6 Units, 1-6 Units, Subcutaneous, TID AC, 1 Units at 02/26/21 1139 **AND** Fingerstick Glucose (POCT), , , TID AC, Maddi Viveros MD    levothyroxine tablet 125 mcg, 125 mcg, Oral, Early Morning, Maddi Viveros MD, 125 mcg at 02/26/21 9288    lidocaine (LIDODERM) 5 % patch 2 patch, 2 patch, Topical, Daily, Maddi Viveros MD, 2 patch at 02/26/21 0835    melatonin tablet 6 mg, 6 mg, Oral, HS PRN, Aditya Allen MD, 6 mg at 02/25/21 2103    methocarbamol (ROBAXIN) tablet 500 mg, 500 mg, Oral, Q6H PRN, Aditya Allen MD    montelukast (SINGULAIR) tablet 10 mg, 10 mg, Oral, HS, Aditya Allen MD, 10 mg at 02/25/21 2104    nystatin (MYCOSTATIN) cream, , Topical, BID, Aditya Allen MD, 1 application at 03/24/88 0853    nystatin (MYCOSTATIN) powder, , Topical, BID, Aditya Allen MD, 1 application at 43/92/40 0841    ondansetron (ZOFRAN-ODT) dispersible tablet 4 mg, 4 mg, Oral, Q6H PRN, Aditya Allen MD    oxyCODONE (ROXICODONE) IR tablet 5 mg, 5 mg, Oral, Q6H PRN, Aditya Allen MD, 5 mg at 02/25/21 2058    pantoprazole (PROTONIX) EC tablet 40 mg, 40 mg, Oral, Early Morning, Aditya Allen MD, 40 mg at 02/26/21 0640    polyethylene glycol (MIRALAX) packet 17 g, 17 g, Oral, Daily, Aditya Allen MD, 17 g at 02/26/21 1134    [COMPLETED] predniSONE tablet 40 mg, 40 mg, Oral, Daily, 40 mg at 02/26/21 0835 **FOLLOWED BY** [START ON 2/27/2021] predniSONE tablet 20 mg, 20 mg, Oral, Daily **FOLLOWED BY** [START ON 3/1/2021] predniSONE tablet 10 mg, 10 mg, Oral, Daily, Aditya Allen MD    saccharomyces boulardii (FLORASTOR) capsule 250 mg, 250 mg, Oral, BID, Aditya Allen MD, 250 mg at 02/26/21 0836    senna (SENOKOT) tablet 8 6 mg, 1 tablet, Oral, HS, Aditya Allen MD, 8 6 mg at 02/25/21 2104    traMADol (ULTRAM) tablet 50 mg, 50 mg, Oral, Q6H PRN, Aditya Allen MD, 50 mg at 02/26/21 0403    white petrolatum-mineral oil (EUCERIN,HYDROCERIN) cream, , Topical, TID PRN, Aditya Allen MD, Given at 02/25/21 1243    Past Medical History:   Diagnosis Date    Cataract     CHF (congestive heart failure) (HCC)     Leukocytosis        Patient Active Problem List    Diagnosis Date Noted    Thoracic compression fracture ([facility] ) 02/18/2021     Priority: High    Alkalosis 02/25/2021    Hyperkalemia 02/23/2021    Urinary incontinence 02/23/2021    Pressure ulcer 02/22/2021    Hypercalcemia 02/21/2021    Chronic respiratory failure with hypoxia (HCC) 02/18/2021    Moderate protein-calorie malnutrition ([facility] ) 02/18/2021    UTI (urinary tract infection) 02/18/2021    SOB (shortness of breath) 02/17/2021    Chronic bilateral thoracic back pain 02/17/2021    Chronic diastolic heart failure ([place] [address] ) 02/17/2021    VICKY (acute kidney injury) ([place] [address] ) 02/17/2021    Atherosclerosis of abdominal aorta ([place] [address] ) 12/01/2020    CHF (congestive heart failure) ([place] [address] ) 02/11/2019    Type 2 diabetes mellitus with complication, without long-term current use of insulin ([place] [address] ) 04/08/2015    Carotid artery plaque 04/06/2015    Hyperlipidemia 03/14/2014    Hypothyroidism 03/14/2014    Osteoarthrosis 03/14/2014    COPD exacerbation ([place] [address] ) 03/10/2014          Eugenio Willams MD    Total time spent:  30 minutes with more than 50% spent counseling/coordinating care  Counseling includes discussion with patient re: progress and discussion with patient of his/her current medical/functional state/information  Coordination of patient's care was performed in conjunction with consulting services  Time invested included review of patient's labs, vitals, and management of their comorbidities with continued monitoring  The care of the patient was extensively discussed and appropriate treatment plan was formulated unique for this patient  ** Please Note:  voice to text software may have been used in the creation of this document   Although proof errors in transcription or interpretation are a potential of such software**

## 2021-02-26 NOTE — PROGRESS NOTES
Internal Medicine Progress Note  Patient: Montana Mcmillan  Age/sex: 78 y o  female  Medical Record #: [id number]      ASSESSMENT/PLAN: (Interval History)  Montana Mcmillan is seen and examined and management for following issues:    Compression fracture T5/6  · No surgical intervention  · Cont TLSO brace when OOB  · Aggressive rehab per PMR  · Pain mgmt per PMR     COPD  · Wears oxygen 2-3 L continuous  · Continue current inhalers/nebs per home  · Recent PFT DLCO 54%  · Monitor oxygen saturations  · Currently on prednisone wean  · Recent CT scan +moderate emphysema; no PE     Diastolic HF  · Cont furosemide 20mg daily  · Monitor volume status closely  · Chronic LE edema  · Echo=ef 58%; diastolic dysfunction     DM II  · Hgb A1c 7 1 in October  · Home meds: Glyburide 5mg bid  · Add sliding scale and coverage  · Dc glyburide for now secondary to low BS  · Monitor trend     HTN  · Cont diovan   · Monitor trend     CKD  · Level 3  · Baseline 0 9-1 1  · Avoid nephrotoxic medications  · Avoid hypotension in the post operative setting  · Monitor bmp  · Renal managing     Hypercalcemia  · Nephrology w/u at [facility]  · [address] secondary to volume status  · Renal managing     Hypothyroid  · Cont home supplementation     Sacral decubitus  · Cont off loading, nutritional supplementation  · Wound nurse following    Oral ulcers  · Cont magic mouthwash q4 hrs as needed  · Feels this helps          The above assessment and plan was reviewed and updated as determined by my evaluation of the patient on 2/26/2021      Labs:   Results from last 7 days   Lab Units 02/25/21  0859 02/24/21  0603   WBC Thousand/uL 10 40* 10 15   HEMOGLOBIN g/dL 11 9 10 7*   HEMATOCRIT % 37 4 33 8*   PLATELETS Thousands/uL 208 191     Results from last 7 days   Lab Units 02/25/21  0859 02/24/21  0603   SODIUM mmol/L 136 138   POTASSIUM mmol/L 5 2 5 4*   CHLORIDE mmol/L 100 100   CO2 mmol/L 35* 34*   BUN mg/dL 33* 35*   CREATININE mg/dL 0 91 0 94   CALCIUM mg/dL 11 8* 10 8*             Results from last 7 days   Lab Units 02/26/21  0636 02/25/21 2055 02/25/21  1551   POC GLUCOSE mg/dl 80 254* 283*       Review of Scheduled Meds:  Current Facility-Administered Medications   Medication Dose Route Frequency Provider Last Rate    acetaminophen  650 mg Oral Q6H [address] Janice Muniz MD      al mag oxide-diphenhydramine-lidocaine viscous  10 mL Swish & Spit Q4H PRN ROLA Shepard      albuterol  2 puff Inhalation TID Janice Muniz MD      albuterol  2 5 mg Nebulization Q6H PRN Janice Muniz MD      ALPRAZolam  0 25 mg Oral TID PRN Janice Muniz MD      aluminum-magnesium hydroxide-simethicone  30 mL Oral Q6H PRN Janice Muniz MD      atorvastatin  80 mg Oral Daily With Juaquin Gilliam MD      benzonatate  100 mg Oral TID PRN Janice Muniz MD      bisacodyl  10 mg Rectal Daily PRN Janice Muniz MD      fluticasone-vilanterol  1 puff Inhalation Daily Janice Muniz MD      furosemide  20 mg Oral Daily Janice Muniz MD      guaiFENesin  600 mg Oral Q12H Chasidy Rodriguez MD      heparin (porcine)  5,000 Units Subcutaneous Q8H [address] Janice Muniz MD      insulin lispro  1-6 Units Subcutaneous HS Janice Muniz MD      insulin lispro  1-6 Units Subcutaneous TID AC Janice Muniz MD      levothyroxine  125 mcg Oral Early Morning Janice Muniz MD      lidocaine  2 patch Topical Daily Janice Muniz MD      melatonin  6 mg Oral HS PRN Janice Muniz MD      methocarbamol  500 mg Oral Q6H PRN Janice Muniz MD      montelukast  10 mg Oral HS Janice Muniz MD      nystatin   Topical BID Janice Muniz MD      nystatin   Topical BID Janice Muniz MD      ondansetron  4 mg Oral Q6H PRN Janice Muniz MD      oxyCODONE  5 mg Oral Q6H PRN Janice Muniz MD      pantoprazole  40 mg Oral Early Morning Janice Muniz MD      polyethylene glycol  17 g Oral Daily Janice Muniz MD      predniSONE  40 mg Oral Daily Janice Muniz MD      Followed by  [START ON 2/27/2021] predniSONE  20 mg Oral Daily Luca Cosme MD      Followed by   Claudine Thakkar ON 3/1/2021] predniSONE  10 mg Oral Daily Luca Cosme MD      saccharomyces boulardii  250 mg Oral BID Luca Cosme MD      senna  1 tablet Oral HS Luca Cosme MD      traMADol  50 mg Oral Q6H PRN Luca Cosme MD      white petrolatum-mineral oil   Topical TID PRN Luca Cosme MD         Subjective/ HPI: Patient seen and examined  Patients overnight issues or events were reviewed with nursing or staff during rounds or morning huddle session  New or overnight issues include the following:     Pt without any overnight issues or concerns;     ROS:   A 10 point ROS was performed; negative except as noted above         Imaging:     No orders to display       *Labs /Radiology studies Reviewed  *Medications  reviewed and reconciled as needed  *Please refer to order section for additional ordered labs studies  *Case discussed with primary attending during morning huddle case rounds    Physical Examination:  Vitals:   Vitals:    02/25/21 1331 02/25/21 1348 02/25/21 2036 02/26/21 0500   BP: 127/58  133/66 124/65   BP Location: Right arm  Right arm    Pulse: 85  75 72   Resp: 18  18 17   Temp: 98 °F (36 7 °C)  (!) 97 4 °F (36 3 °C) 97 6 °F (36 4 °C)   TempSrc: Oral  Oral    SpO2: 93% 94% 98% 95%   Weight:    73 kg (160 lb 15 oz)   Height:           GEN: No apparent distress, interactive  NEURO: Alert and oriented x3  HEENT: Pupils are equal and reactive, EOMI, mucous membranes are moist, face symmetrical  CV: S1 S2 regular, no MRG, trace peripheral edema noted  RESP: Lungs are decreased bilaterally, no wheezes, rales or rhonchi noted, on 2L NC, mild dyspnea at rest and RICO  GI: obese, soft non tender, non distended; +BS x4  : External catheter in place  MUSC: Moves all extremities; +generalized deconditioning  SKIN: pink, warm and dry, normal turgor, sacral wound with dressing intact; PVD discoloration          The above physical exam was reviewed and updated as determined by my evaluation of the patient on 2/26/2021  Invasive Devices     Peripheral Intravenous Line            Peripheral IV 02/22/21 Dorsal (posterior); Right Forearm 4 days          Drain            External Urinary Catheter 5 days                   VTE Pharmacologic Prophylaxis: Heparin  Code Status: Level 3 - DNAR and DNI  Current Length of Stay: 2 day(s)      Total time spent:  30 minutes with more than 50% spent counseling/coordinating care  Counseling includes discussion with patient re: progress  and discussion with patient of his/her current medical state/information  Coordination of patient's care was performed in conjunction with primary service  Time invested included review of patient's labs, vitals, and management of their comorbidities with continued monitoring  In addition, this patient was discussed with medical team including physician and advanced extenders  The care of the patient was extensively discussed and appropriate treatment plan was formulated unique for this patient  ** Please Note:  voice to text software may have been used in the creation of this document   Although proof errors in transcription or interpretation are a potential of such software**

## 2021-02-26 NOTE — PROGRESS NOTES
SLP dysphagia assessment:       02/26/21 1130   Pain Assessment   Pain Assessment Tool Pain Assessment not indicated - pt denies pain   Pain Score No Pain   Restrictions/Precautions   Precautions Fall Risk;O2;Supervision on toilet/commode;Pain;Spinal precautions   Swallow Information   Current Risks for Dysphagia & Aspiration Respiratory compromise;Weak cough;Weak voicing;General debilitation;Positionong Issues   Current Symptoms/Concerns Clear throat;During meals; Difficulty chewing;Decreased oral intake;Weight loss   Current Diet Dysphagia advance; Thin liquid   Baseline Diet Regular; Thin liquids   Consistencies Assessed and Performance   Materials Admnistered Puree/Level 1;Regular/Solid; Thin liquid;Soft/Level 3   Oral Stage Moderate impaired;Mild impaired   Phargngeal Stage Mild impaired   Swallow Mechanics Mild delayed; Moderate delayed;Swallow initation;Weak larygneal rise;Aspiration risk   Esophageal Concerns No s/s reported   Recommendations   Risk for Aspiration Present   Recommendations Dysphagia treatment   Diet Solid Recommendation Level 1 Dysphagia/pureed   Diet Liquid Recommendation Thin liquid   Recommended Form of Meds As tolerated   General Precautions Aspiration precautions;Upright as possible for all oral intake;Remain upright for 45 mins after meals; Supervision with meals  (DISTANT supervision, OOB for all meals  )   Compensatory Swallowing Strategies External pacing;Coordinate breathing and swallowing;Voluntary throat clear/cough to clear penetration   Results Reviewed with PT/Family/Caregiver;RN;MD   Eating   Type of Assistance Needed Set-up / clean-up   Amount of Physical Assistance Provided No physical assistance   Eating CARE Score 5   Swallow Assessment   Swallow Treatment Assessment Pt awake, alert and cooperative for dysphagia assessment  Upon arrival, pt was in recliner in upright position for meal  Pt was observed for lunch as pt is currently on a level 3 diet w/ thin liquids   When engaged in pt interview, pt stated that she had no appetite for 2 weeks and had lost 15 lb of weight in 3 months  Pt also reported being SOB (since pt has hx of COPD) and that she is afraid of choking on food  She has upper/lower dentures but currently only uses upper dentures due to swollen/ painful lower gums  SLP setting up tray for pt w/ pt being able to self feed  Pt completed 25% of meal, consisting of solids (1/4 slice of peanut butter and jelly sandwich) puree (2-3 spoons of jello, 2-3 spoons of vanilla pudding) and 300cc of thin liquids (chicken broth by straw, 2 spoons of italian ice cream, and orange juice)  Pt's overall rate of slow PO intake along w/ small bolus sizes across all consistencies  Adequate lip seal around utensils along w/ good bolus retrieval across all consistencies  No anterior loss of consistencies noted  Mastication of solids was prolonged and appeared effortful w/ no pocketing or residual noted, suspect due to pt being afraid of swallowing solids/choking  Bolus manipulation/tranfer of puree/ thin liquids was noted to be prolonged  Pt alternated w/ solids/liquids independently  Swallow initiation across all consistencies was noted to be delayed  Upon palpation pt's hyolaryngeal excursion was mildly decreased w/o pt exhibiting any signs/sxs of aspiration during meal  Pt did exhibit intermittent throat clearing throughout meal but suspect due to difficulty of coordination breathing w/ swallowing  Throughout meal, pt verbalized as well as demonstrated "running out of breath" when talking, needing verbal cues to not talk while eating and educated on pacing during meals to conserve energy and decrease sensation of SOB  Discussed w  Pt at end of the meal about the different modified dysphagia diet levels, including level 3 diet which includes chopped meats/vegetables, softer food items and level 2 diet, which includes minced meat/finely chopped vegetables and some pureed items   Pt stating that those diet levels suspect will be difficult for her to manage at this time  SLP then proceeded to educate pt on pureed diet, which all items would be the consistency of apple sauce and pudding  Pt was agreeable to try this diet, stating "I need protein and vegetables"  SLP reiterating that those items will come out smooth just as pudding textures  Again, reeducating pt that pureed diet will allow PO intake along w/ able to conserve energy during meals due to lack of increased time needed for chewing  At this time, recommendation is to downgrade diet to pureed diet w/ thin liquids, continuing aspiration precautions as well as recommending DISTANT supervision during meals  Also, recommending pt to be OOB for all meals as well as encouraging of PO intake throughout meals  At this time, pt benefit from continued dysphagia treatment to establish least restrive diet which could be softer diet at time of discharge and will continue to monitor overall tolerance of current diet to prevent any oropharyngeal dysphagia/ overt signs/sxs of aspiration during meals at time of discharge  Swallow Assessment Prognosis   Prognosis Fair   Prognosis Considerations Age; Co-morbidities; Medical diagnosis; Medical prognosis; New learning ability;Ability to carry over   SLP Therapy Minutes   SLP Time In 1145   SLP Time Out 1215   SLP Total Time (minutes) 30   SLP Mode of treatment - Individual (minutes) 0   SLP Mode of treatment - Concurrent (minutes) 0   SLP Mode of treatment - Group (minutes) 0   SLP Mode of treatment - Co-treat (minutes) 0   SLP Mode of Treatment - Total time(minutes) 0 minutes   SLP Cumulative Minutes 0   Therapy Time missed   Time missed?  No

## 2021-02-26 NOTE — PROGRESS NOTES
02/26/21 1230   Pain Assessment   Pain Assessment Tool 0-10   Pain Score 7   Pain Location/Orientation Orientation: Mid;Location: Back   Pain Onset/Description Onset: Ongoing   Effect of Pain on Daily Activities limits tolerance to transfers and req max encouragement to engage this session   Hospital Pain Intervention(s) Repositioned; Rest   Restrictions/Precautions   Precautions Cognitive; Fall Risk;O2;Pain;Spinal precautions;Supervision on toilet/commode  (2-3L)   Braces or Orthoses TLSO  (backpack OOB)   Lifestyle   Autonomy "I just can't breathe"   Sit to Stand   Type of Assistance Needed Physical assistance   Amount of Physical Assistance Provided Less than 25%   Comment Marguerite   Sit to Stand CARE Score 3   Bed-Chair Transfer   Type of Assistance Needed Physical assistance   Amount of Physical Assistance Provided 25%-49%   Comment Marguerite, A necessary for O2 line management during SPt   Chair/Bed-to-Chair Transfer CARE Score 3   Toileting Hygiene   Type of Assistance Needed Physical assistance   Amount of Physical Assistance Provided Total assistance   Comment unable/unwilling to release hands from RW to attempt hygiene  Therapist provided A for thorough hygiene  Toileting Hygiene CARE Score 1   Toilet Transfer   Type of Assistance Needed Physical assistance   Amount of Physical Assistance Provided 25%-49%   Comment SPT with RW to standard BSC  Spoke to pt and family about plan to progress off purewick as appropriate to simulate fx needs for home  Family in agreement but understand that pt utilizing at night to allow improved sleep to improve tolerance to therapy during day  Toilet Transfer CARE Score 3   Exercise Tools   Other Exercise Tool 1 Educated on and engaged pt in pulmonary exercises utilizing OT Toolkit for direction  Pt performing majority of seated exercises for 2x10 repetitions to promote efficient breathing, improved cardiovascular endurance to inc fx performance   Utilized 3L during therex with SPO2/HR monitored throughout  With majority of exercises, pt maintaining 92-95% O2 on 3L (dropped to mid 80s with attempts to drop to 2L)  HR  75-80 throughout  Tolerated majority of seated breathing exercises but did eliminate any that break spinal precautions  Pt does req therapist to "breathe along" at times to ensure pt maintain appropriate technique  Cognition   Overall Cognitive Status Impaired   Arousal/Participation Alert; Cooperative   Attention Attends with cues to redirect   Orientation Level Oriented X4   Memory Decreased short term memory;Decreased recall of precautions;Decreased recall of recent events   Following Commands Follows one step commands with increased time or repetition   Activity Tolerance   Activity Tolerance Patient limited by fatigue;Patient limited by pain   Assessment   Treatment Assessment Pt req encouragement to participate in skilled OT session with focus on pulmonary rehab, fx transfers, toileting, family education  Upon entry to room, pt's request for session was to "work on breathing " Engaged in pulmonary exercise with focus on breathing as well as inc OOB tolerance  Pt performing SPTs at overall Marguerite with RW this session but is limited by O2 line management, fx endurance, pain in back req inc A and cuing for safe transfer  Family (son and DIL) present at end of session and therapist providing information on rehab plan of care, plan to improve OOB tolerance during day, goal to cont to promote endurance to dec supplemental O2 need, and inc self care indep  Family provided various masks during session for pt to trial to improve willingness/tolerance to wear when family present and when out of room  Pt reporting with all masks "I can't breathe " Utilized pulse ox as visual feedback that vitals maintaining WFLs (SPO2 96% on 2L with clothe and KN95 donned) with pt still verbalizing difficulty   Family verbalizing understanding of importance of masks for both pt and family when visiting and will cont to promote pt wearing  Cont with POC with focus on TLSO management, fx transfers, fx endurance and pulmonary rehab, toileting, dressing, med management to dec burden of care  Pending pt progress, pt may benefit from inc hired help at [facility] for bathing/dressing, IADLs within home  Prognosis Fair   Problem List Decreased strength;Decreased range of motion;Decreased endurance; Impaired balance;Decreased mobility; Decreased coordination;Decreased cognition; Impaired judgement; Impaired tone;Pain;Orthopedic restrictions   Barriers to Discharge Inaccessible home environment;Decreased caregiver support   Plan   Treatment/Interventions ADL retraining;Functional transfer training; Therapeutic exercise; Endurance training;Patient/family training;Equipment eval/education; Bed mobility   Progress Slow progress, decreased activity tolerance   Recommendation   OT Discharge Recommendation Home with skilled therapy  (pending pt progress)   OT Therapy Minutes   OT Time In 1230   OT Time Out 1400   OT Total Time (minutes) 90   OT Mode of treatment - Individual (minutes) 90   OT Mode of treatment - Concurrent (minutes) 0   OT Mode of treatment - Group (minutes) 0   OT Mode of treatment - Co-treat (minutes) 0   OT Mode of Treatment - Total time(minutes) 90 minutes   OT Cumulative Minutes 195   Therapy Time missed   Time missed?  No

## 2021-02-26 NOTE — PROGRESS NOTES
02/26/21 0830   Pain Assessment   Pain Assessment Tool 0-10   Pain Score 8   Pain Location/Orientation Orientation: Mid;Location: Back   Pain Onset/Description Onset: Ongoing;Frequency: Intermittent; Descriptor: [address] Pain Intervention(s) Ambulation/increased activity;Repositioned   Restrictions/Precautions   Precautions Cognitive; Fall Risk;O2;Pain;Spinal precautions;Supervision on toilet/commode   ROM Restrictions Yes  (spinal precautions)   Braces or Orthoses TLSO  (backpack OOB)   Cognition   Overall Cognitive Status Impaired   Arousal/Participation Alert; Cooperative   Attention Attends with cues to redirect   Orientation Level Unable to assess   Memory Decreased short term memory;Decreased recall of precautions;Decreased recall of recent events   Following Commands Follows one step commands with increased time or repetition   Subjective   Subjective "I dont know, dont ask me"  Roll Left and Right   Type of Assistance Needed Physical assistance;Verbal cues   Amount of Physical Assistance Provided 25%-49%   Comment MODA with log roll, increased time required due to poor tolerance to bed being flat   Roll Left and Right CARE Score 3   Sit to Lying   Type of Assistance Needed Physical assistance   Amount of Physical Assistance Provided 25%-49%   Comment A with LE, HOB slightly elevated due to poor tolerance to flat bed  Sit to Lying CARE Score 3   Lying to Sitting on Side of Bed   Type of Assistance Needed Physical assistance   Amount of Physical Assistance Provided 25%-49%   Comment A with LE, HOB slightly elevated due to poor tolerance to flat bed     Lying to Sitting on Side of Bed CARE Score 3   Sit to Stand   Type of Assistance Needed Physical assistance;Verbal cues   Amount of Physical Assistance Provided 25%-49%   Comment MODA to stand, NAMAN to sit, verbal cues for hand placement   Sit to Stand CARE Score 3   Bed-Chair Transfer   Type of Assistance Needed Physical assistance;Verbal cues;Adaptive equipment   Amount of Physical Assistance Provided 25%-49%   Comment SPT with RW and MODA, verbal cues for hand placement   Chair/Bed-to-Chair Transfer CARE Score 3   Transfer Bed/Chair/Wheelchair   Limitations Noted In Balance; Coordination; Endurance;Pain Management;Problem Solving;LE Strength   Adaptive Equipment Roller Walker   Car Transfer   Reason if not Attempted Safety concerns   Car Transfer CARE Score 88   Walk 10 Feet   Comment Pt was too fatigued to try after multiple transfers   Reason if not Attempted Refused to perform   Walk 10 Feet CARE Score 7   Walk 50 Feet with Two Turns   Reason if not Attempted Safety concerns   Walk 50 Feet with Two Turns CARE Score 88   Walk 150 Feet   Reason if not Attempted Safety concerns   Walk 150 Feet CARE Score 88   Walking 10 Feet on Uneven Surfaces   Reason if not Attempted Safety concerns   Walking 10 Feet on Uneven Surfaces CARE Score 88   Ambulation   Does the patient walk? 1  No, and walking goal is clinically indicated  Primary Mode of Locomotion Prior to Admission Walk   Assist Device Roller [employer] 50 Feet with Two Turns   Reason if not Attempted Activity not applicable   Wheel 50 Feet with Two Turns CARE Score 9   Wheel 150 Feet   Reason if not Attempted Activity not applicable   Wheel 667 Feet CARE Score 9   Wheelchair mobility   Does the patient use a wheelchair? 0   No   Curb or Single Stair   Reason if not Attempted Safety concerns   1 Step (Curb) CARE Score 88   4 Steps   Reason if not Attempted Safety concerns   4 Steps CARE Score 88   12 Steps   Reason if not Attempted Safety concerns   12 Steps CARE Score 88   Picking Up Object   Reason if not Attempted Safety concerns   Picking Up Object CARE Score 88   Toilet Transfer   Type of Assistance Needed Physical assistance;Verbal cues   Amount of Physical Assistance Provided 25%-49%   Comment SPT with RW to [facility]   Toilet Transfer CARE Score 3   Toilet Transfer   Surface Assessed Bedside Commode   Limitations Noted In Endurance;Problem Solving; Safety; Sequencing;LE Strength   Therapeutic Interventions   Strengthening seated LAQ and hip flex 3x10 reps BLE while seated in recliner   Assessment   Treatment Assessment Pt participated in skilled PT session with increased focus on functional transfers and LE strengthening  Pt was able to tolerate session with o2 via NC on 3li with act maintaining 93%-97% saturation  Pt was placed back to 2li at rest and maintained 96% saturation  Pt will cont to benefit from increased LE strength, increased endurance/act tolerance and increased functional transfers for discharge  Cont POC as tolerated  Problem List Decreased strength;Decreased range of motion;Decreased endurance; Impaired balance;Decreased mobility; Decreased coordination;Decreased cognition; Impaired judgement; Impaired tone;Pain;Orthopedic restrictions   Barriers to Discharge Inaccessible home environment   PT Barriers   Functional Limitation Car transfers; Ramp negotiation;Stair negotiation;Standing;Transfers; Walking   Plan   Treatment/Interventions Functional transfer training;LE strengthening/ROM; Therapeutic exercise; Endurance training;Cognitive reorientation; Bed mobility;Gait training   Progress Slow progress, decreased activity tolerance   Recommendation   PT Discharge Recommendation Home with skilled therapy   PT Therapy Minutes   PT Time In 0830   PT Time Out 1000   PT Total Time (minutes) 90   PT Mode of treatment - Individual (minutes) 90   PT Mode of treatment - Concurrent (minutes) 0   PT Mode of treatment - Group (minutes) 0   PT Mode of treatment - Co-treat (minutes) 0   PT Mode of Treatment - Total time(minutes) 90 minutes   PT Cumulative Minutes 180   Therapy Time missed   Time missed?  No

## 2021-02-26 NOTE — PROGRESS NOTES
SLP TAA:       02/26/21 1130   Patient Data   Rehab Impairment Other Othopedic Disorders   Etiologic Diagnosis T5 to T7 Compression Fractures   Date of Onset 02/17/21   Restrictions/Precautions   Precautions Fall Risk;O2;Supervision on toilet/commode;Pain;Spinal precautions   Pain Assessment   Pain Assessment Tool Pain Assessment not indicated - pt denies pain   Pain Score No Pain   Eating Assessment   Swallow Precautions Yes   Bedside Swallow Results No   VBS Study Results No   Food To Mouth Yes   Able To Cut   (n/a)   Positioning Upright;Out of Bed   Safety Needs Increase Time   Meal Assessed Lunch   QI: Swallowing/Nutritional Status Modified food consistency   Current Diet Dysphia III; Thin   Intake Mode PO;Self   Dentures Upper; Lower   Finishes Timely No   Opens Packages No   Findings Pt demonstrating mild-mod oropharyngeal dysphagia  Refer to SLP note for details  Type of Assistance Needed Set-up / clean-up   Amount of Physical Assistance Provided No physical assistance   Eating CARE Score 5   Discharge Information   Patient's Discharge Plan Home w/ family support  Patient's Rehab Expectations "To be able to breather better"   Barriers to Discharge Home Decreased Endurance;Decreased Strength;Pain; Safety Considerations   Impressions Pt is a good candidate for the acute rehab center in attempts to achieve least restrictive diet, which could be softer diet at time of discharge  Main barriers include prior hx of COPD, along w/ associated SOB and decreased coordination of breathing and swallowing  At this time, recommendation for dysphagia tx  warranted to establish least restrictive diet as well as to monitor pt's current diet w/o any oropharyngeal dysphagia or sings/sxs of aspiration during meals at time of discharge      SLP Therapy Minutes   SLP Time In 0210   SLP Time Out 1215   SLP Total Time (minutes) 30   SLP Mode of treatment - Individual (minutes) 0   SLP Mode of treatment - Concurrent (minutes) 0   SLP Mode of treatment - Group (minutes) 0   SLP Mode of treatment - Co-treat (minutes) 0   SLP Mode of Treatment - Total time(minutes) 0 minutes   SLP Cumulative Minutes 0   Cumulative Minutes   Cumulative therapy minutes 285

## 2021-02-27 PROBLEM — N17.9 AKI (ACUTE KIDNEY INJURY) (HCC): Status: RESOLVED | Noted: 2021-01-01 | Resolved: 2021-01-01

## 2021-02-27 PROBLEM — N18.31 STAGE 3A CHRONIC KIDNEY DISEASE (HCC): Status: ACTIVE | Noted: 2021-01-01

## 2021-02-27 NOTE — PLAN OF CARE
Problem: Potential for Falls  Goal: Patient will remain free of falls  Description: INTERVENTIONS:  - Assess patient frequently for physical needs  -  Identify cognitive and physical deficits and behaviors that affect risk of falls  -  [place] fall precautions as indicated by assessment   - Educate patient/family on patient safety including physical limitations  - Instruct patient to call for assistance with activity based on assessment  - Modify environment to reduce risk of injury  - Consider OT/PT consult to assist with strengthening/mobility  Outcome: Progressing     Problem: Prexisting or High Potential for Compromised Skin Integrity  Goal: Skin integrity is maintained or improved  Description: INTERVENTIONS:  - Identify patients at risk for skin breakdown  - Assess and monitor skin integrity  - Assess and monitor nutrition and hydration status  - Monitor labs   - Assess for incontinence   - Turn and reposition patient  - Assist with mobility/ambulation  - Relieve pressure over bony prominences  - Avoid friction and shearing  - Provide appropriate hygiene as needed including keeping skin clean and dry  - Evaluate need for skin moisturizer/barrier cream  - Collaborate with interdisciplinary team   - Patient/family teaching  - Consider wound care consult   Outcome: Progressing     Problem: Nutrition/Hydration-ADULT  Goal: Nutrient/Hydration intake appropriate for improving, restoring or maintaining nutritional needs  Description: Monitor and assess patient's nutrition/hydration status for malnutrition  Collaborate with interdisciplinary team and initiate plan and interventions as ordered  Monitor patient's weight and dietary intake as ordered or per policy  Utilize nutrition screening tool and intervene as necessary  Determine patient's food preferences and provide high-protein, high-caloric foods as appropriate       INTERVENTIONS:  - Monitor oral intake, urinary output, labs, and treatment plans  - Assess nutrition and hydration status and recommend course of action  - Evaluate amount of meals eaten  - Assist patient with eating if necessary   - Allow adequate time for meals  - Recommend/ encourage appropriate diets, oral nutritional supplements, and vitamin/mineral supplements  - Order, calculate, and assess calorie counts as needed  - Recommend, monitor, and adjust tube feedings and TPN/PPN based on assessed needs  - Assess need for intravenous fluids  - Provide specific nutrition/hydration education as appropriate  - Include patient/family/caregiver in decisions related to nutrition  Outcome: Progressing     Problem: PAIN - ADULT  Goal: Verbalizes/displays adequate comfort level or baseline comfort level  Description: Interventions:  - Encourage patient to monitor pain and request assistance  - Assess pain using appropriate pain scale  - Administer analgesics based on type and severity of pain and evaluate response  - Implement non-pharmacological measures as appropriate and evaluate response  - Consider cultural and social influences on pain and pain management  - Notify physician/advanced practitioner if interventions unsuccessful or patient reports new pain  Outcome: Progressing     Problem: INFECTION - ADULT  Goal: Absence or prevention of progression during hospitalization  Description: INTERVENTIONS:  - Assess and monitor for signs and symptoms of infection  - Monitor lab/diagnostic results  - Monitor all insertion sites, i e  indwelling lines, tubes, and drains  - Monitor endotracheal if appropriate and nasal secretions for changes in amount and color  - [place] appropriate cooling/warming therapies per order  - Administer medications as ordered  - Instruct and encourage patient and family to use good hand hygiene technique  - Identify and instruct in appropriate isolation precautions for identified infection/condition  Outcome: Progressing     Problem: SAFETY ADULT  Goal: Patient will remain free of falls  Description: INTERVENTIONS:  - Assess patient frequently for physical needs  -  Identify cognitive and physical deficits and behaviors that affect risk of falls    -  [place] fall precautions as indicated by assessment   - Educate patient/family on patient safety including physical limitations  - Instruct patient to call for assistance with activity based on assessment  - Modify environment to reduce risk of injury  - Consider OT/PT consult to assist with strengthening/mobility  Outcome: Progressing  Goal: Maintain or return to baseline ADL function  Description: INTERVENTIONS:  -  Assess patient's ability to carry out ADLs; assess patient's baseline for ADL function and identify physical deficits which impact ability to perform ADLs (bathing, care of mouth/teeth, toileting, grooming, dressing, etc )  - Assess/evaluate cause of self-care deficits   - Assess range of motion  - Assess patient's mobility; develop plan if impaired  - Assess patient's need for assistive devices and provide as appropriate  - Encourage maximum independence but intervene and supervise when necessary  - Involve family in performance of ADLs  - Assess for home care needs following discharge   - Consider OT consult to assist with ADL evaluation and planning for discharge  - Provide patient education as appropriate  Outcome: Progressing  Goal: Maintain or return mobility status to optimal level  Description: INTERVENTIONS:  - Assess patient's baseline mobility status (ambulation, transfers, stairs, etc )    - Identify cognitive and physical deficits and behaviors that affect mobility  - Identify mobility aids required to assist with transfers and/or ambulation (gait belt, sit-to-stand, lift, walker, cane, etc )  - [place] fall precautions as indicated by assessment  - Record patient progress and toleration of activity level on Mobility SBAR; progress patient to next Phase/Stage  - Instruct patient to call for assistance with activity based on assessment  - Consider rehabilitation consult to assist with strengthening/weightbearing, etc   Outcome: Progressing     Problem: DISCHARGE PLANNING  Goal: Discharge to home or other facility with appropriate resources  Description: INTERVENTIONS:  - Identify barriers to discharge w/patient and caregiver  - Arrange for needed discharge resources and transportation as appropriate  - Identify discharge learning needs (meds, wound care, etc )  - Arrange for interpretive services to assist at discharge as needed  - Refer to Case Management Department for coordinating discharge planning if the patient needs post-hospital services based on physician/advanced practitioner order or complex needs related to functional status, cognitive ability, or social support system  Outcome: Progressing

## 2021-02-27 NOTE — PROGRESS NOTES
02/27/21 1030   Pain Assessment   Pain Assessment Tool 0-10   Pain Score   (did not rate, with c/o pain in her ribs and butt)   Restrictions/Precautions   Precautions Fall Risk;Aspiration;O2   Braces or Orthoses TLSO   Subjective   Subjective Pt supine in bed upon arrival   WIth complaints of fatigue, wanted to take a nap  Required encouragement to participate  Roll Left and Right   Type of Assistance Needed Physical assistance   Amount of Physical Assistance Provided 50%-74%   Roll Left and Right CARE Score 2   Sit to Lying   Type of Assistance Needed Physical assistance   Amount of Physical Assistance Provided 50%-74%   Sit to Lying CARE Score 2   Lying to Sitting on Side of Bed   Type of Assistance Needed Physical assistance   Amount of Physical Assistance Provided 50%-74%   Lying to Sitting on Side of Bed CARE Score 2   Sit to Stand   Type of Assistance Needed Physical assistance   Amount of Physical Assistance Provided 25%-49%   Sit to Stand CARE Score 3   Bed-Chair Transfer   Type of Assistance Needed Physical assistance   Amount of Physical Assistance Provided 25%-49%   Chair/Bed-to-Chair Transfer CARE Score 3   Transfer Bed/Chair/Wheelchair   Limitations Noted In Balance; Endurance; Sequencing;UE Strength;LE Strength   Adaptive Equipment Roller Walker   Stand Pivot Minimal Assist;Assist x 1   Sit to Stand Minimal Assist;Assist x 1   Stand to Sit Minimal Assist;Assist x 1   Supine to Sit Moderate Assist;Assist x 1   Walk 10 Feet   Type of Assistance Needed Incidental touching   Amount of Physical Assistance Provided No physical assistance   Walk 10 Feet CARE Score 4   Walk 50 Feet with Two Turns   Type of Assistance Needed Incidental touching   Amount of Physical Assistance Provided No physical assistance   Walk 50 Feet with Two Turns CARE Score 4   Walk 150 Feet   Reason if not Attempted Safety concerns   Walk 150 Feet CARE Score 88   Ambulation   Does the patient walk? 2   Yes   Primary Mode of Locomotion Prior to Admission Walk   Distance Walked (feet) 65 ft   Assist Device Roller Walker   Gait Pattern Slow Lory;Decreased foot clearance;Narrow BRYCE   Limitations Noted In Balance; Endurance; Heel Strike;Posture; Safety;Speed;Strength;Swing   Provided Assistance with: Balance   Walk Assist Level Contact Guard   Findings 65', 55', 55'  72'   Wheelchair mobility   Does the patient use a wheelchair? 0  No   Curb or Single Stair   Style negotiated Single stair   Type of Assistance Needed Physical assistance   Amount of Physical Assistance Provided 25%-49%   1 Step (Curb) CARE Score 3   4 Steps   Reason if not Attempted Safety concerns   4 Steps CARE Score 88   12 Steps   Reason if not Attempted Safety concerns   12 Steps CARE Score 88   Stairs   Type Stairs   # of Steps 2   Assist Devices Bilateral Rail   Findings 2, 6" & 3, 4" both hands on R rail   Assessment   Treatment Assessment Pt with improved tolerance for therapy today as seen by increased gait distances  Continues to require frequent rest breaks due to SOB  O2 at 3L per nurse and at 93% after gait  Continue to progress gait distance as able  Problem List Decreased strength;Decreased range of motion;Decreased endurance; Impaired balance;Decreased mobility; Decreased coordination;Decreased cognition; Impaired judgement; Impaired tone;Pain;Orthopedic restrictions   Barriers to Discharge Inaccessible home environment;Decreased caregiver support   PT Barriers   Physical Impairment Decreased strength;Decreased endurance; Impaired balance;Decreased mobility; Decreased safety awareness   Functional Limitation Car transfers; Ramp negotiation;Stair negotiation;Standing;Transfers; Walking   Plan   Treatment/Interventions Functional transfer training;LE strengthening/ROM; Elevations; Therapeutic exercise; Endurance training;Patient/family training;Bed mobility;Gait training   Progress Progressing toward goals   Recommendation   PT Discharge Recommendation Home with skilled therapy   PT Therapy Minutes   PT Time In 1030   PT Time Out 1130   PT Total Time (minutes) 60   PT Mode of treatment - Individual (minutes) 60   PT Mode of treatment - Concurrent (minutes) 0   PT Mode of treatment - Group (minutes) 0   PT Mode of treatment - Co-treat (minutes) 0   PT Mode of Treatment - Total time(minutes) 60 minutes   PT Cumulative Minutes 240   Therapy Time missed   Time missed?  Yes

## 2021-02-27 NOTE — PROGRESS NOTES
02/27/21 1230   Pain Assessment   Pain Assessment Tool 0-10   Pain Score 4   Pain Location/Orientation Orientation: Mid;Location: Back; Location: Buttocks   Restrictions/Precautions   Precautions Bed/chair alarms;Cognitive; Fall Risk;O2;Pain;Spinal precautions;Supervision on toilet/commode   ROM Restrictions Yes  (Spinal precautions)   Braces or Orthoses TLSO  (backpack OOB)   Picking Up Object   Type of Assistance Needed Adaptive equipment   Amount of Physical Assistance Provided Less than 25%   Comment Min A with reacher to access items off the floor  Picking Up Object CARE Score 3   Cognition   Overall Cognitive Status Impaired   Arousal/Participation Alert; Cooperative   Attention Attends with cues to redirect   Orientation Level Oriented X4   Memory Decreased recall of precautions   Following Commands Follows one step commands with increased time or repetition   Assessment   Treatment Assessment Pt participated in 45 min OT session with fair-/poor activity tolerance with focus on therapeutic activities  Pt fatigued and out of breath following PT session today with pt limited in self-feeding  MD stating to nursing and therapist to decrease therapy minutes to allow pt rest and opportunity to recover from session in AM  Pt seated in w/c at start of therapy session  Pt willing to participate in deep breathing exercises with pt able to complete with mod verbal cues  Pt easily distracted requiring cues to breath while talking with therapist  Education provided to pt regarding weight shifting while up and seated in w/c for skin integrity with pt requiring cues  Nursing educated on need for cueing pt with nursing to continue to monitor and assess following end of therapy session  Pt participated in [facility] training to access items off floor while seated in w/c with pt requiring Min A secondary to difficulties sequencing squeezing handle to access items  Pt requires continued training at this time   Pt educated on importance of utilizing [address] [place] secondary to back precautions with pt recalling without cueing at this time  Pt following training stated she needed to rest with therapy session ending per MD instructions  OTR offered to transfer pt back to bed, however pt refused wanting to sit in w/c  Pt seated in w/c at end of therapy session with all needs within reach and alarm on  Pt would benefit from continued OT services to progress pt through [address] to meet established OT goals  Problem List Decreased strength;Decreased endurance;Decreased mobility; Impaired balance;Decreased cognition;Decreased safety awareness;Orthopedic restrictions;Pain   Barriers to Discharge Inaccessible home environment;Decreased caregiver support   Plan   Treatment/Interventions ADL retraining;Functional transfer training; Therapeutic exercise; Endurance training;Patient/family training; Compensatory technique education   OT Therapy Minutes   OT Time In 1230   OT Time Out 1315   OT Total Time (minutes) 45   OT Mode of treatment - Individual (minutes) 45   OT Mode of treatment - Concurrent (minutes) 0   OT Mode of treatment - Group (minutes) 0   OT Mode of treatment - Co-treat (minutes) 0   OT Mode of Treatment - Total time(minutes) 45 minutes   OT Cumulative Minutes 240   Therapy Time missed   Time missed?  Yes   Amount of time missed 45   Reason for time missed Extreme fatigue

## 2021-02-27 NOTE — PROGRESS NOTES
PM&R Coverage Progress Note:    HPI: Andrea Henderson is a 78 y  o  female with COPD/ emphysema on baseline oxygen 2-3 L,  Congestive heart failure, osteoarthritis who presented to the [address] [place] [address] 2/17/21 with back pain and shortness of breath  Pulmonary workup showing moderate pulmonary emphysematous changes bilaterally  Workup for back pain revealed Compression fracture of T5 (33% height loss approximately) and T6 (approximately 25% height loss  )  There is also superior endplate T2 compression fracture with approximately 10%-20 percent height loss at the superior endplate  Case was discussed with both Orthopedics and Neurosurgery and patient was recommended conservative treatment with TLSO brace while out of bed   Medically, patient was treated for an E coli urinary tract infection   Patient was started on steroids for COPD and emphysema and continued on her Breo and DuoNebs  Patient was seen by Nephrology  acute renal insufficiency, hypercalcemia and hyperkalemia    Patient started on Lasix by Nephrology  George Stevenson was followed by wound care for a sacral pressure ulcer     After medical stabilization patient was found to have acute functional deficits from her baseline and therefore was admitted to [facility]      ASSESSMENT: Stable    PLAN:    Rehabilitation   Continue current rehabilitation plan of care to maximize function   No funcitonal barriers identified   Had a good day in physical therapy working on transfers ambulation and stairs needing Min to mod assist   Due to poor endurance and consistent persistent shortness of breath, patient to be changed to new plan of care in therapies to include 900 minutes over 7 days    Medical issues   No acute concerns   Continue TLSO and thoracic precautions   COPD and persistent shortness of breath:  DuoNebs ordered as she does at home 5 times daily  Continue Breo, Mucinex  Rescue inhaler p r n     Pain:  Managed on current regimen   Bowels:  Per nursing patient having cleared jelly like stools  Will monitor  Unclear etiology  Patient without abdominal pain or complaints  Have asked Nursing to save the next sample in order for visualization   VICKY on CKD:  Nephrology following  Continue low-potassium diet for hyperkalemia K 5 3  Mild hyponatremia additionally Na 132  BMP again on Monday   Continue current medical plan of care  Appreciate IM consultants co-management  SUBJECTIVE: Patient seen face to face  No acute issues per patient or RN  Progressing as expected in rehabilitation program      Review of Systems   Constitutional: Negative  HENT: Negative  Eyes: Negative  Respiratory: Negative  Cardiovascular: Negative  Gastrointestinal: Negative  Endocrine: Negative  Genitourinary: Negative  Musculoskeletal: Negative  Skin: Negative  Allergic/Immunologic: Negative  Neurological: Negative  Hematological: Negative  Psychiatric/Behavioral: Negative  OBJECTIVE:   /78 (BP Location: Right arm)   Pulse 71   Temp 97 9 °F (36 6 °C) (Oral)   Resp 18   Ht 5' 2" (1 575 m)   Wt 73 kg (160 lb 15 oz)   SpO2 95%   BMI 29 44 kg/m²     Physical Exam  Vitals signs and nursing note reviewed  Constitutional:       General: She is not in acute distress  HENT:      Head: Normocephalic and atraumatic  Nose: Nose normal       Mouth/Throat:      Mouth: Mucous membranes are moist    Eyes:      Conjunctiva/sclera: Conjunctivae normal    Neck:      Musculoskeletal: Neck supple  Cardiovascular:      Rate and Rhythm: Normal rate and regular rhythm  Pulses: Normal pulses  Pulmonary:      Effort: Pulmonary effort is normal       Breath sounds: Normal breath sounds  No wheezing or rales  Abdominal:      General: Bowel sounds are normal  There is no distension  Palpations: Abdomen is soft  Tenderness: There is no abdominal tenderness     Musculoskeletal: General: No swelling  Comments: TLSO in place   Skin:     General: Skin is warm  Neurological:      Mental Status: She is alert and oriented to person, place, and time        Comments: Seen doing a sit to stand transfer requiring minimal assistance and able to stand at her rolling walker for at least 3 minutes   Psychiatric:         Mood and Affect: Mood normal           Lab Results   Component Value Date    WBC 11 60 (H) 02/27/2021    HGB 12 3 02/27/2021    HCT 38 2 02/27/2021    MCV 92 02/27/2021     02/27/2021     Lab Results   Component Value Date    SODIUM 132 (L) 02/27/2021    K 5 3 02/27/2021    CL 94 (L) 02/27/2021    CO2 40 (H) 02/27/2021    BUN 39 (H) 02/27/2021    CREATININE 0 86 02/27/2021    GLUC 98 02/27/2021    CALCIUM 11 3 (H) 02/27/2021     No results found for: INR, PROTIME        Current Facility-Administered Medications:     acetaminophen (TYLENOL) tablet 650 mg, 650 mg, Oral, Q6H [address], Margot Up MD, 650 mg at 02/27/21 0516    al mag oxide-diphenhydramine-lidocaine viscous (MAGIC MOUTHWASH) suspension 10 mL, 10 mL, Swish & Spit, Q4H PRN, ROLA Shepard, 10 mL at 02/27/21 0914    albuterol (PROVENTIL HFA,VENTOLIN HFA) inhaler 2 puff, 2 puff, Inhalation, TID, Margot Up MD, 2 puff at 02/27/21 0915    albuterol inhalation solution 2 5 mg, 2 5 mg, Nebulization, Q6H PRN, Margot Up MD    ALPRAZolam Larina Chi) tablet 0 25 mg, 0 25 mg, Oral, TID PRN, Margot Up MD, 0 25 mg at 02/27/21 0913    aluminum-magnesium hydroxide-simethicone (MYLANTA) oral suspension 30 mL, 30 mL, Oral, Q6H PRN, Margot Up MD    atorvastatin (LIPITOR) tablet 80 mg, 80 mg, Oral, Daily With Pablo Morales MD, 80 mg at 02/26/21 1645    benzonatate (TESSALON PERLES) capsule 100 mg, 100 mg, Oral, TID PRN, Margot Up MD    bisacodyl (DULCOLAX) rectal suppository 10 mg, 10 mg, Rectal, Daily PRN, Margot Up MD    fluticasone-vilanterol (BREO ELLIPTA) 100-25 mcg/inh inhaler 1 puff, 1 puff, Inhalation, Daily, Aditya Allen MD, 1 puff at 02/27/21 0915    furosemide (LASIX) tablet 20 mg, 20 mg, Oral, Daily, Aditya Allen MD, 20 mg at 02/27/21 0911    guaiFENesin (MUCINEX) 12 hr tablet 600 mg, 600 mg, Oral, Q12H [address], Aditya Allen MD, 600 mg at 02/27/21 0910    heparin (porcine) subcutaneous injection 5,000 Units, 5,000 Units, Subcutaneous, Q8H [address], Aditya Allen MD, 5,000 Units at 02/27/21 0516    insulin lispro (HumaLOG) 100 units/mL subcutaneous injection 1-6 Units, 1-6 Units, Subcutaneous, HS, Aditya Allen MD, 1 Units at 02/26/21 2145    insulin lispro (HumaLOG) 100 units/mL subcutaneous injection 1-6 Units, 1-6 Units, Subcutaneous, TID AC, 3 Units at 02/26/21 1648 **AND** Fingerstick Glucose (POCT), , , TID AC, Aditya Allen MD    levothyroxine tablet 125 mcg, 125 mcg, Oral, Early Morning, Aditya Allen MD, 125 mcg at 02/27/21 0516    lidocaine (LIDODERM) 5 % patch 2 patch, 2 patch, Topical, Daily, Aditya Allen MD, 2 patch at 02/27/21 0911    melatonin tablet 6 mg, 6 mg, Oral, HS PRN, Aditya Allen MD, 6 mg at 02/25/21 2103    methocarbamol (ROBAXIN) tablet 500 mg, 500 mg, Oral, Q6H PRN, Aditya Allen MD    montelukast (SINGULAIR) tablet 10 mg, 10 mg, Oral, HS, Aditya Allen MD, 10 mg at 02/26/21 2148    nystatin (MYCOSTATIN) cream, , Topical, BID, Aditya Allen MD, 1 application at 55/76/53 0915    nystatin (MYCOSTATIN) powder, , Topical, BID, Aditya Allen MD, 1 application at 88/94/56 0915    ondansetron (ZOFRAN-ODT) dispersible tablet 4 mg, 4 mg, Oral, Q6H PRN, Aditya Allen MD    oxyCODONE (ROXICODONE) IR tablet 5 mg, 5 mg, Oral, Q6H PRN, Aditya Allen MD, 5 mg at 02/26/21 2151    pantoprazole (PROTONIX) EC tablet 40 mg, 40 mg, Oral, Early Morning, Aditya Allen MD, 40 mg at 02/27/21 0516    polyethylene glycol (MIRALAX) packet 17 g, 17 g, Oral, Daily, Aditya Allen MD, 17 g at 02/26/21 1134    [COMPLETED] predniSONE tablet 40 mg, 40 mg, Oral, Daily, 40 mg at 02/26/21 0835 **FOLLOWED BY** predniSONE tablet 20 mg, 20 mg, Oral, Daily, 20 mg at 02/27/21 0910 **FOLLOWED BY** [START ON 3/1/2021] predniSONE tablet 10 mg, 10 mg, Oral, Daily, Keturah Pappas MD    saccharomyces boulardii (FLORASTOR) capsule 250 mg, 250 mg, Oral, BID, Keturah Pappas MD, 250 mg at 02/27/21 0910    senna (SENOKOT) tablet 8 6 mg, 1 tablet, Oral, HS, Keturah Pappas MD, 8 6 mg at 02/26/21 2148    traMADol (ULTRAM) tablet 50 mg, 50 mg, Oral, Q6H PRN, Keturah Pappas MD, 50 mg at 02/27/21 0913    white petrolatum-mineral oil (EUCERIN,HYDROCERIN) cream, , Topical, TID PRN, Keturah Pappas MD, Given at 02/25/21 1243    Past Medical History:   Diagnosis Date    Cataract     CHF (congestive heart failure) ([place] [id number] )     Leukocytosis        Patient Active Problem List    Diagnosis Date Noted    Thoracic compression fracture ([place] [id number] ) 02/18/2021     Priority: High    COPD exacerbation ([place] [address] ) 03/10/2014     Priority: Medium    Alkalosis 02/25/2021    Hyperkalemia 02/23/2021    Urinary incontinence 02/23/2021    Pressure ulcer 02/22/2021    Hypercalcemia 02/21/2021    Chronic respiratory failure with hypoxia ([facility] ) 02/18/2021    Moderate protein-calorie malnutrition ([place] [address] ) 02/18/2021    UTI (urinary tract infection) 02/18/2021    SOB (shortness of breath) 02/17/2021    Chronic bilateral thoracic back pain 02/17/2021    Chronic diastolic heart failure ([facility] ) 02/17/2021    VICKY (acute kidney injury) ([facility] ) 02/17/2021    Atherosclerosis of abdominal aorta ([place] [address] ) 12/01/2020    CHF (congestive heart failure) ([place] [id number] ) 02/11/2019    Type 2 diabetes mellitus with complication, without long-term current use of insulin ([place] [id number] ) 04/08/2015    Carotid artery plaque 04/06/2015    Hyperlipidemia 03/14/2014    Hypothyroidism 03/14/2014    Osteoarthrosis 03/14/2014          Keturah Pappas MD

## 2021-02-27 NOTE — PROGRESS NOTES
Progress Note - Nephrology   Nick Polanco 78 y o  female MRN: [id number]  Unit/Bed#: -01 Encounter: [id number]      Assessment / Plan:  1  VICKY - resolved, now with CKD stage 3a in setting of hypertensive nephrosclerosis/physiologic aging of the kidney-b/l sCr < 1, at baseline    2  Hypercalcemia - corrected calcium 12 8 in light of low albumin, despite low anion gap doubt multiple myeloma as SPEP/UPEP/IF negative for monoclonal gammopathy, suspect immobility may be playing a role plus or minus diuretic use  -iCal elevated at 1 42, repeat in am  -f/u hypercalcemia work up-labs ordered  -PTH suppressed  -ACE, PTH rP pending  -vitamin D level low at 14 6  Would benefit from vitamin d supplementation but will hold in light of hypercalcemia  3  Elevated total CO2 in setting of diuretic use-monitor bicarb, on lasix 20mg po daily, was on torsemide at home, bicarb up to 40  Consider acetazolamide, check VBG    4  Elevated BUN likely due to diuretic use    5  Hyperkalemia-will continue low K diet, continue to hold Arb home dose, potassium 5 3, follow-up a m  BMP    6  Anemia-resolved     7  Chronic diastolic heart failure, grade 1 diastolic dysfunction with EF 60%, continue Lasix, was on torsemide at home, monitor daily weight      Subjective: The patient is on 3 L oxygen nasal cannula  She complains of shortness of Breath  No other complaints  Objective:     Vitals: Blood pressure 152/78, pulse 71, temperature 97 9 °F (36 6 °C), temperature source Oral, resp  rate 18, height 5' 2" (1 575 m), weight 73 kg (160 lb 15 oz), SpO2 95 %, not currently breastfeeding  ,Body mass index is 29 44 kg/m²  Temp (24hrs), Av 8 °F (36 6 °C), Min:97 5 °F (36 4 °C), Max:98 °F (36 7 °C)      Weight (last 2 days)     Date/Time   Weight    21 0500   73 (160 94)                Intake/Output Summary (Last 24 hours) at 2021 1158  Last data filed at 2021 0913  Gross per 24 hour   Intake 960 ml   Output 700 ml   Net 260 ml     I/O last 24 hours: In: 12 [P O :960]  Out: 1000 [Urine:1000]        Physical Exam:   Physical Exam  Vitals signs and nursing note reviewed  Constitutional:       General: She is not in acute distress  Appearance: Normal appearance  She is well-developed  She is ill-appearing  She is not diaphoretic  HENT:      Head: Normocephalic and atraumatic  Mouth/Throat:      Pharynx: No oropharyngeal exudate  Eyes:      General: No scleral icterus  Right eye: No discharge  Left eye: No discharge  Neck:      Musculoskeletal: Normal range of motion and neck supple  Thyroid: No thyromegaly  Cardiovascular:      Rate and Rhythm: Normal rate and regular rhythm  Heart sounds: No murmur  Pulmonary:      Effort: Pulmonary effort is normal  No respiratory distress  Breath sounds: Normal breath sounds  No wheezing  Abdominal:      General: Bowel sounds are normal  There is no distension  Palpations: Abdomen is soft  Genitourinary:     Comments: External urinary catheter  Musculoskeletal: Normal range of motion  General: Swelling (trace in ankles) present  Skin:     General: Skin is warm and dry  Coloration: Skin is pale  Findings: No rash  Neurological:      General: No focal deficit present  Mental Status: She is alert  Motor: No abnormal muscle tone        Comments: awake   Psychiatric:         Mood and Affect: Mood normal          Behavior: Behavior normal          Invasive Devices     Drain            External Urinary Catheter 6 days                Medications:    Scheduled Meds:  Current Facility-Administered Medications   Medication Dose Route Frequency Provider Last Rate    acetaminophen  650 mg Oral Q6H [facility] Marva Hatch MD      al mag oxide-diphenhydramine-lidocaine viscous  10 mL Swish & Spit Q4H PRN ROLA Pavon      albuterol  2 puff Inhalation TID Marva Hatch MD      albuterol  2 5 mg Nebulization Q6H PRN Litzy Oliver MD      ALPRAZolam  0 25 mg Oral TID PRN Litzy Oliver MD      aluminum-magnesium hydroxide-simethicone  30 mL Oral Q6H PRN Litzy Oliver MD      atorvastatin  80 mg Oral Daily With Marcia See MD      benzonatate  100 mg Oral TID PRN Litzy Oilver MD      bisacodyl  10 mg Rectal Daily PRN Litzy Oliver MD      fluticasone-vilanterol  1 puff Inhalation Daily Litzy Oliver MD      furosemide  20 mg Oral Daily Litzy Oliver MD      guaiFENesin  600 mg Oral Q12H Mauro Saavedra MD      heparin (porcine)  5,000 Units Subcutaneous Q8H Mauro Saavedra MD      insulin lispro  1-6 Units Subcutaneous HS Litzy Oliver MD      insulin lispro  1-6 Units Subcutaneous TID AC Litzy Oliver MD      levothyroxine  125 mcg Oral Early Morning Litzy Oliver MD      lidocaine  2 patch Topical Daily Litzy Oliver MD      melatonin  6 mg Oral HS PRN Litzy Oliver MD      methocarbamol  500 mg Oral Q6H PRN Litzy Oliver MD      montelukast  10 mg Oral HS Litzy Oliver MD      nystatin   Topical BID Litzy Oliver MD      nystatin   Topical BID Litzy Oliver MD      ondansetron  4 mg Oral Q6H PRN Litzy Oliver MD      oxyCODONE  5 mg Oral Q6H PRN Litzy Oliver MD      pantoprazole  40 mg Oral Early Morning Litzy Oliver MD      polyethylene glycol  17 g Oral Daily Litzy Oliver MD      predniSONE  20 mg Oral Daily Litzy Oliver MD      Followed by   Reilly Mustafa ON 3/1/2021] predniSONE  10 mg Oral Daily Litzy Oliver MD      saccharomyces boulardii  250 mg Oral BID Litzy Oliver MD      senna  1 tablet Oral HS Litzy Oliver MD      traMADol  50 mg Oral Q6H PRN Litzy Oliver MD      white petrolatum-mineral oil   Topical TID PRN Litzy Oliver MD         PRN Meds: al mag oxide-diphenhydramine-lidocaine viscous    albuterol    ALPRAZolam    aluminum-magnesium hydroxide-simethicone    benzonatate    bisacodyl    melatonin   methocarbamol    ondansetron    oxyCODONE    traMADol    white petrolatum-mineral oil    Continuous Infusions:         LAB RESULTS:      Results from last 7 days   Lab Units 02/27/21  0918 02/27/21  0709 02/25/21  0859 02/24/21  0603 02/23/21  0443 02/22/21  0715 02/21/21  1002   WBC Thousand/uL  --  11 60* 10 40* 10 15 10 67*  --   --    HEMOGLOBIN g/dL  --  12 3 11 9 10 7* 10 9*  --   --    HEMATOCRIT %  --  38 2 37 4 33 8* 34 7*  --   --    PLATELETS Thousands/uL  --  215 208 191 186  --   --    NEUTROS PCT %  --  53 57 61 67  --   --    LYMPHS PCT %  --  33 29 26 22  --   --    MONOS PCT %  --  12 12 12 10  --   --    EOS PCT %  --  0 0 0 0  --   --    POTASSIUM mmol/L  --  5 3 5 2 5 4* 5 7* 5 2 5 3   CHLORIDE mmol/L  --  94* 100 100 100 99* 102   CO2 mmol/L  --  40* 35* 34* 33* 32 33*   BUN mg/dL  --  39* 33* 35* 39* 30* 25   CREATININE mg/dL  --  0 86 0 91 0 94 1 10 0 94 1 10   CALCIUM mg/dL  --  11 3* 11 8* 10 8* 11 0* 11 0* 10 6*   ALK PHOS U/L  --   --   --  213*  --   --   --    ALT U/L  --   --   --  52  --   --   --    AST U/L  --   --   --  33  --   --   --    MAGNESIUM mg/dL 1 9  --   --  1 7  --   --   --    PHOSPHORUS mg/dL 2 6  --   --   --   --   --   --        CUTURES:  Lab Results   Component Value Date    URINECX >100,000 cfu/ml Escherichia coli (A) 02/17/2021    URINECX >100,000 cfu/ml Escherichia coli (A) 11/04/2020    URINECX 80,000-89,000 cfu/ml Escherichia coli (A) 10/26/2018                 Portions of the record may have been created with voice recognition software  Occasional wrong word or "sound a like" substitutions may have occurred due to the inherent limitations of voice recognition software  Read the chart carefully and recognize, using context, where substitutions have occurred  If you have any questions, please contact the dictating provider

## 2021-02-27 NOTE — RESPIRATORY THERAPY NOTE
RT Protocol Note  Tremaine Niece 78 y o  female MRN: [id number]  Unit/Bed#: -01 Encounter: [id number]      Resp Comments: (P) spoke with pt family regarding udn tx  pt daughter questioning why pt was switched back to udn txs, due to fact pt chose that mdi was working better for her 2 days ago while daughter was present  per pt daughter pt uses udn tx QID at home with prn mdi which daughter states pt uses to often  will continue with tx as ordered by dr Heather Adamson at this time   rn is going to make  aware of conversation with pt daughter

## 2021-02-27 NOTE — RESPIRATORY THERAPY NOTE
RT Protocol Note  Laure Mar 78 y o  female MRN: [id number]  Unit/Bed#: -01 Encounter: [id number]    Resp Comments: (P) udn treatment time adjusted  per protocol

## 2021-02-27 NOTE — PROGRESS NOTES
02/27/21 0805   Pain Assessment   Pain Assessment Tool 0-10   Pain Score 7   Pain Location/Orientation Location: Back;Orientation: Mid   Pain Onset/Description Onset: Ongoing   Effect of Pain on Daily Activities limits positioning for meals   Hospital Pain Intervention(s) Emotional support;Repositioned  (RN notified)   Restrictions/Precautions   Precautions Cognitive; Fall Risk;Bed/chair alarms;O2;Supervision on toilet/commode;Pain;Spinal precautions   ROM Restrictions Yes  (spinal precautions)   Braces or Orthoses TLSO  (backpack OOB)   Swallow Information   Current Risks for Dysphagia & Aspiration Respiratory compromise;Weak cough;General debilitation;Behavioral issues; Positionong Issues   Current Symptoms/Concerns Clear throat;During meals; Difficulty chewing;Decreased oral intake;Weight loss   Current Diet Dysphagia pureed; Thin liquid   Baseline Diet Regular; Thin liquids   Consistencies Assessed and Performance   Materials Admnistered Puree/Level 1;Mechanical Soft/Level 2; Thin liquid   Oral Stage WFL; Moderate impaired;Severe impaired  (with puree, suspecting mod-severe w/ solids)   Phargngeal Stage WFL; Mild impaired  (w/ puree/thin)   Swallow Mechanics Mild delayed;Swallow initation;Weak larygneal rise;Aspiration risk  (w/ current diet)   Esophageal Concerns No s/s reported   Recommendations   Risk for Aspiration Present   Recommendations Dysphagia treatment   Diet Solid Recommendation Level 1 Dysphagia/pureed   Diet Liquid Recommendation Thin liquid  (pt preference for straws)   Recommended Form of Meds As tolerated   General Precautions Aspiration precautions; Feed only when alert;Minimize distractions;Upright as possible for all oral intake;Remain upright for 45 mins after meals  (OOB for meals, distant supervision, extended time for meals)   Compensatory Swallowing Strategies Alternate solids and liquids; External pacing;Coordinate breathing and swallowing;Voluntary throat clear/cough to clear penetration Further Evaluations Dietician   Results Reviewed with RN;PT/Family/Caregiver   Eating   Type of Assistance Needed Set-up / clean-up   Amount of Physical Assistance Provided No physical assistance   Eating CARE Score 5   Swallow Assessment   Swallow Treatment Assessment Pt seen during bfast for dysphagia therapy where pt was recently downgraded to a puree diet w/ thin liquids as per initial evaluation completed yesterday, as well as pt's current preferences and comfort levels w/ foods  Upon entering room, pt reclined in bed, stating that she needed richard to catch her breath prior to beginning meal  When encouraged to get OOB, pt reporting that she was more comfortable in bed right now, therefore, was repositioned fully upright and bed put in chair mode  Provided w/ tray set up, pt self fed  Presented w/ puree tray (pudding, applesauce) and thin liquids (chicken broth, ensure, juice), pt consumed ~50% of pudding during session and ~360cc thin liquids taken by straw sips  Pt also offered level 2 oatmeal to trial as she verbalized that she enjoys this, however, pt took single bite then declined any additional intake of item  Despite pt talking during meal, pt cont to report that she needed a significant amount of time due to difficulty catching her breath-pt provided w/ consistent verbal cues to refrain from speech during meals in order to better coordinate breathing w/ swallowing  During meal, pt demo functional bolus retrieval w/o anterior loss-preferred liquids to be taken by straw sips  Manipulation, transfers and overall oral clearance of pudding was [facility]  When trialing oatmeal (one tsp), oral function also appeared functional w/ swallows appearing timely, however, pt reported that "but there's stills ome in my mouth" in reference to small pieces on medial tongue, which is to be expected w/ this texture   Pt cued to utilize liquids and was able to clear trace residual but declined further intake of oatmeal  Transfers and oral control of liquids suspected to be timely, along w/ swallow initiation of liquids  Hyolaryngeal movement present to palpation  Pt elicited throat clear x2 during meal but did not appear related to PO intake and suspect may be more so related to coordinating breathing-no further overt s/s penetration/aspiration  Despite verbal encouragement for PO intake of items which she feels comfortable w/ and extended time, pt remained w/ limited/poor PO intake  Of note, during meal pt appeared to be inconsistent in her reasoning for poor PO intake and only consuming puree and thin liquids at this time (no appetite vs inability to chew vs unable to swallow vs unable to coordinate her breathing)  Will cont to recommend puree diet and thin liquids at this time w/ the following: OOB or FULLY upright for meals, alternate solids/liquids, minimize distractions during meals, encouragement for PO intake  At this time, pt is recommended for continued skilled ST services for dysphagia therapy in order to determine safest and least restrictive diet w/ goal of also supporting overall PO intake  Swallow Assessment Prognosis   Prognosis Fair   Prognosis Considerations Co-morbidities; Medical diagnosis; Medical prognosis;Participation level; Potential;Severity of impairments;Ability to carry over   SLP Therapy Minutes   SLP Time In 0805   SLP Time Out 0850   SLP Total Time (minutes) 45   SLP Mode of treatment - Individual (minutes) 45   SLP Mode of treatment - Concurrent (minutes) 0   SLP Mode of treatment - Group (minutes) 0   SLP Mode of treatment - Co-treat (minutes) 0   SLP Mode of Treatment - Total time(minutes) 45 minutes   SLP Cumulative Minutes 45   Therapy Time missed   Time missed?  No

## 2021-02-27 NOTE — PROGRESS NOTES
Internal Medicine Progress Note  Patient: Angeles Klein  Age/sex: 78 y o  female  Medical Record #: [id number]      ASSESSMENT/PLAN: (Interval History)  Angeles Kleni is seen and examined and management for following issues:    Compression fracture T5/6  · No surgical intervention  · Cont TLSO brace when OOB  · Aggressive rehab per PMR  · Pain mgmt per PMR     COPD  · Wears oxygen 2-3 L continuous  · Continue current inhalers/nebs per home  · Recent PFT DLCO 54%  · Monitor oxygen saturations  · Currently on prednisone wean  · Recent CT scan +moderate emphysema; no PE  · Had increased SOB after therapy today and O2 was increased to [facility] and given neb tx  · Nebs were changed back to scheduled from prn     Chronic diastolic HF  · Cont furosemide 20mg daily here   · Takes Torsemide at home  · Monitor volume status closely  · Chronic LE edema  · Echo=ef 22%; diastolic dysfunction     DM II  · Hgb A1c 7 1 in October  · Home meds: Glyburide 5mg BID  · Add sliding scale and coverage  · Dc'd glyburide on 2/24/21 secondary to low BS  · Will continue coverage for now   · May consider restart Glyburide at smaller dose      HTN  · Diovan is on hold 2/2 VICKY/CKD and K+ increase  · tx per renal  · BPs acceptable      CKD  · Level 3  · Baseline 0 9-1 1  · Avoid nephrotoxic medications  · Currently at baseline  · Renal managing     Hypercalcemia  · Nephrology w/u at [facility]  · [address] secondary to volume status  · Renal managing      Hyponatremia  · Mild  · Renal following    Hypothyroid  · Cont home supplementation     Sacral decubitus  · Cont off loading, nutritional supplementation  · Wound nurse following     Oral ulcers  · Cont magic mouthwash q4 hrs as needed  · Feels this helps          The above assessment and plan was reviewed and updated as determined by my evaluation of the patient on 2/27/2021      Labs:   Results from last 7 days   Lab Units 02/27/21  0709 02/25/21  0859   WBC Thousand/uL 11 60* 10 40*   HEMOGLOBIN g/dL 12 3 11 9   HEMATOCRIT % 38 2 37 4   PLATELETS Thousands/uL 215 208     Results from last 7 days   Lab Units 02/27/21  0709 02/25/21  0859   SODIUM mmol/L 132* 136   POTASSIUM mmol/L 5 3 5 2   CHLORIDE mmol/L 94* 100   CO2 mmol/L 40* 35*   BUN mg/dL 39* 33*   CREATININE mg/dL 0 86 0 91   CALCIUM mg/dL 11 3* 11 8*             Results from last 7 days   Lab Units 02/27/21  1013 02/27/21  0642 02/26/21  2051   POC GLUCOSE mg/dl 162* 98 185*       Review of Scheduled Meds:  Current Facility-Administered Medications   Medication Dose Route Frequency Provider Last Rate    acetaminophen  650 mg Oral Q6H [address] Marva Hatch MD      al mag oxide-diphenhydramine-lidocaine viscous  10 mL Swish & Spit Q4H PRN ROLA Shepard      albuterol  2 puff Inhalation TID Marva Hatch MD      albuterol  2 5 mg Nebulization Q6H PRN Marva Hatch MD      ALPRAZolam  0 25 mg Oral TID PRN Marva Hatch MD      aluminum-magnesium hydroxide-simethicone  30 mL Oral Q6H PRN Marva Hatch MD      atorvastatin  80 mg Oral Daily With Bibi Ann MD      benzonatate  100 mg Oral TID PRN Marva Hatch MD      bisacodyl  10 mg Rectal Daily PRN Marva Hatch MD      fluticasone-vilanterol  1 puff Inhalation Daily Marva Hatch MD      furosemide  20 mg Oral Daily Mavra Hatch MD      guaiFENesin  600 mg Oral Q12H [address] Marva Hatch MD      heparin (porcine)  5,000 Units Subcutaneous Q8H [address] Marva Hatch MD      insulin lispro  1-6 Units Subcutaneous HS Marva Hatch MD      insulin lispro  1-6 Units Subcutaneous TID AC Marva Hatch MD      levothyroxine  125 mcg Oral Early Morning Marva Hatch MD      lidocaine  2 patch Topical Daily Marva Hatch MD      melatonin  6 mg Oral HS PRN Marva Hatch MD      methocarbamol  500 mg Oral Q6H PRN Marva Hatch MD      montelukast  10 mg Oral HS Marva Hatch MD      nystatin   Topical BID Marva Hatch MD      nystatin   Topical BID Marva Hatch MD  ondansetron  4 mg Oral Q6H PRN Brendan Mcqueen MD      oxyCODONE  5 mg Oral Q6H PRN Brendan Mcqueen MD      pantoprazole  40 mg Oral Early Morning Brendan Mcqueen MD      polyethylene glycol  17 g Oral Daily Brendan Mcqueen MD      predniSONE  20 mg Oral Daily Brendan Mcqueen MD      Followed by   Kaila Willams ON 3/1/2021] predniSONE  10 mg Oral Daily Brendan Mcqueen MD      saccharomyces boulardii  250 mg Oral BID Brendan Mcqueen MD      senna  1 tablet Oral HS Brendan Mcqueen MD      traMADol  50 mg Oral Q6H PRN Brendan Mcqueen MD      white petrolatum-mineral oil   Topical TID PRN Brendan Mcqueen MD         Subjective/ HPI: Patients overnight issues or events were reviewed with nursing or staff during rounds or morning huddle session  No new or overnight issues  Offers no complaints  ROS:   A 10 point ROS was performed; negative except as noted above         Imaging:     No orders to display       *Labs /Radiology studies reviewed  *Medications reviewed and reconciled as needed  *Please refer to order section for additional ordered labs studies  *Case discussed with primary attending during morning huddle case rounds      Physical Examination:  Vitals:   Vitals:    02/26/21 1500 02/26/21 1940 02/26/21 2015 02/27/21 0516   BP: 136/71  124/68 152/78   BP Location: Left arm  Right arm Right arm   Pulse: 77  76 71   Resp: 18  17 18   Temp: 98 °F (36 7 °C)  97 5 °F (36 4 °C) 97 9 °F (36 6 °C)   TempSrc: Oral  Oral Oral   SpO2: 93% 94% 96% 95%   Weight:       Height:           General Appearance: no distress, conversive  HEENT: PERRLA, conjuctiva normal; oropharynx clear; mucous membranes moist   Neck:  Supple, normal ROM, no JVD  Lungs: BBS with very decreased BS, normal respiratory effort, no retractions, expiratory effort normal; on 3L NC  CV: regular rate and rhythm; no rubs/murmurs/gallops, PMI normal   ABD: soft; ND/NT; +BS  EXT: no edema; chronic venous stasis changes LLE  Skin: normal turgor, normal texture, no rashes  Psych: affect normal, mood normal  Neuro: AAO      The above physical exam was reviewed and updated as determined by my evaluation of the patient on 2/27/2021  Invasive Devices     Drain            External Urinary Catheter 6 days                   VTE Pharmacologic Prophylaxis: Heparin  Code Status: Level 3 - DNAR and DNI  Current Length of Stay: 3 day(s)      Total time spent:  30 minutes with more than 50% spent counseling/coordinating care  Counseling includes discussion with patient re: progress  and discussion with patient of his/her current medical state/information  Coordination of patient's care was performed in conjunction with primary service  Time invested included review of patient's labs, vitals, and management of their comorbidities with continued monitoring  In addition, this patient was discussed with medical team including physician and advanced extenders  The care of the patient was extensively discussed and appropriate treatment plan was formulated unique for this patient  ** Please Note:  voice to text software may have been used in the creation of this document   Although proof errors in transcription or interpretation are a potential of such software**

## 2021-02-28 NOTE — PROGRESS NOTES
Progress Note - Nephrology   Arielle Salomon 78 y o  female MRN: [id number]  Unit/Bed#: -01 Encounter: [id number]      Assessment / Plan:  1  VICKY - resolved, now with CKD stage 3a in setting of hypertensive nephrosclerosis/physiologic aging of the kidney-b/l sCr < 1, at baseline    2  Hypercalcemia - corrected calcium 12 8-->11 9 in light of low albumin, despite low anion gap doubt multiple myeloma as SPEP/UPEP/IF negative for monoclonal gammopathy, suspect immobility may be playing a role plus or minus diuretic use  -iCal elevated at 1 42-->1 39, repeat in am  -f/u hypercalcemia work up-labs ordered  -PTH suppressed  -ACE, PTH rP pending  -vitamin D level low at 14 6  Would benefit from vitamin d supplementation but will hold in light of hypercalcemia    -will check am iCal    3  Elevated total CO2 in setting of diuretic use-monitor bicarb, on lasix 20mg po daily, was on torsemide at home, bicarb improving now     4  Elevated BUN likely due to diuretic use    5  Hyperkalemia-will continue low K diet, continue to hold Arb home dose, potassium 5 2, follow-up a m  BMP    6  Anemia-resolved     7  Chronic diastolic heart failure, grade 1 diastolic dysfunction with EF 60%, continue Lasix, was on torsemide at home, monitor daily weight, improving on last check      Subjective:   She denies chest pain or shortness breath  She complains of pain  Objective:     Vitals: Blood pressure (!) 114/46, pulse 85, temperature 97 8 °F (36 6 °C), temperature source Oral, resp  rate 18, height 5' 2" (1 575 m), weight 73 kg (160 lb 15 oz), SpO2 100 %, not currently breastfeeding  ,Body mass index is 29 44 kg/m²  Temp (24hrs), Av 7 °F (36 5 °C), Min:97 5 °F (36 4 °C), Max:97 8 °F (36 6 °C)      Weight (last 2 days)     Date/Time   Weight    21 0500   73 (160 94)                Intake/Output Summary (Last 24 hours) at 2021 1433  Last data filed at 2021 1115  Gross per 24 hour   Intake 120 ml   Output 1025 ml   Net -905 ml     I/O last 24 hours: In: 480 [P O :480]  Out: 1175 [Urine:1175]        Physical Exam:   Physical Exam  Vitals signs and nursing note reviewed  Constitutional:       General: She is not in acute distress  Appearance: She is well-developed  She is ill-appearing  She is not diaphoretic  HENT:      Head: Normocephalic and atraumatic  Mouth/Throat:      Pharynx: No oropharyngeal exudate  Eyes:      General: No scleral icterus  Right eye: No discharge  Left eye: No discharge  Neck:      Musculoskeletal: Normal range of motion and neck supple  Thyroid: No thyromegaly  Cardiovascular:      Rate and Rhythm: Normal rate and regular rhythm  Heart sounds: No murmur  Pulmonary:      Effort: Pulmonary effort is normal  No respiratory distress  Breath sounds: Wheezing present  Abdominal:      General: Bowel sounds are normal  There is no distension  Palpations: Abdomen is soft  Genitourinary:     Comments: external urinary catheter  Musculoskeletal: Normal range of motion  General: Swelling (trace b/l LE) present  Skin:     General: Skin is warm and dry  Coloration: Skin is pale  Findings: No rash  Neurological:      General: No focal deficit present  Mental Status: She is alert  Motor: No abnormal muscle tone        Comments: awake   Psychiatric:         Mood and Affect: Mood normal          Behavior: Behavior normal          Invasive Devices     Drain            External Urinary Catheter 7 days                Medications:    Scheduled Meds:  Current Facility-Administered Medications   Medication Dose Route Frequency Provider Last Rate    acetaminophen  650 mg Oral Q6H [address] Izabella Rivera MD      al mag oxide-diphenhydramine-lidocaine viscous  10 mL Swish & Spit Q4H PRN ROLA Abel      albuterol  2 puff Inhalation Q6H PRN Izabella Rivera MD      albuterol  2 5 mg Nebulization Q6H PRN Izabella Rivera MD  ALPRAZolam  0 25 mg Oral TID PRN Cuauhtemoc Velez MD      aluminum-magnesium hydroxide-simethicone  30 mL Oral Q6H PRN Cuauhtemoc Velez MD      atorvastatin  80 mg Oral Daily With Angel Guadalupe MD      benzonatate  100 mg Oral TID PRN Cuauhtemoc Velez MD      bisacodyl  10 mg Rectal Daily PRN Cuauhtemoc Velez MD      fluticasone-vilanterol  1 puff Inhalation Daily Cuauhtemoc Velez MD      furosemide  20 mg Oral Daily Cuauhtemoc Velez MD      [START ON 3/1/2021] glyBURIDE  1 25 mg Oral Daily With Breakfast ROLA Hayward      guaiFENesin  600 mg Oral Q12H Sushila Davidson MD      heparin (porcine)  5,000 Units Subcutaneous Q8H Sushila Davidson MD      insulin lispro  1-6 Units Subcutaneous HS Cuauhtemoc Velez MD      insulin lispro  1-6 Units Subcutaneous TID AC Cuauhtemoc Velez MD      ipratropium-albuterol  3 mL Nebulization 4x Daily Cuauhtemoc Velez MD      levothyroxine  125 mcg Oral Early Morning Cuauhtemoc Velez MD      lidocaine  2 patch Topical Daily Cuauhtemoc Velez MD      melatonin  6 mg Oral HS PRN Cuauhtemoc Velez MD      methocarbamol  500 mg Oral Q6H PRN Cuauhtemoc Velez MD      montelukast  10 mg Oral HS Cuauhtemoc Velez MD      nystatin   Topical BID Cuauhtemoc Velez MD      nystatin   Topical BID Cuauhtemoc Velez MD      ondansetron  4 mg Oral Q6H PRN Cuauhtemoc Velez MD      oxyCODONE  5 mg Oral Q6H PRN Cuauhtemoc Velez MD      pantoprazole  40 mg Oral Early Morning Cuauhtemoc Velez MD      polyethylene glycol  17 g Oral Daily Cuauhtemoc Velez MD     [facility] ON 3/1/2021] predniSONE  10 mg Oral Daily Cuauhtemoc Velez MD      saccharomyces boulardii  250 mg Oral BID Cuauhtemoc Velez MD      senna  1 tablet Oral HS Cuauhtemoc Velez MD      traMADol  50 mg Oral Q6H PRN Cuauhtemoc Velez MD      white petrolatum-mineral oil   Topical TID PRN Cuauhtemoc Velez MD         PRN Meds: al mag oxide-diphenhydramine-lidocaine viscous    albuterol    albuterol    ALPRAZolam    aluminum-magnesium hydroxide-simethicone    benzonatate    bisacodyl    melatonin    methocarbamol    ondansetron    oxyCODONE    traMADol    white petrolatum-mineral oil    Continuous Infusions:         LAB RESULTS:      Results from last 7 days   Lab Units 02/28/21  0519 02/27/21  0918 02/27/21  0709 02/25/21  0859 02/24/21  0603 02/23/21  0443 02/22/21  0715   WBC Thousand/uL  --   --  11 60* 10 40* 10 15 10 67*  --    HEMOGLOBIN g/dL  --   --  12 3 11 9 10 7* 10 9*  --    HEMATOCRIT %  --   --  38 2 37 4 33 8* 34 7*  --    PLATELETS Thousands/uL  --   --  215 208 191 186  --    NEUTROS PCT %  --   --  53 57 61 67  --    LYMPHS PCT %  --   --  33 29 26 22  --    MONOS PCT %  --   --  12 12 12 10  --    EOS PCT %  --   --  0 0 0 0  --    POTASSIUM mmol/L 5 2  --  5 3 5 2 5 4* 5 7* 5 2   CHLORIDE mmol/L 96*  --  94* 100 100 100 99*   CO2 mmol/L 37*  --  40* 35* 34* 33* 32   BUN mg/dL 39*  --  39* 33* 35* 39* 30*   CREATININE mg/dL 0 86  --  0 86 0 91 0 94 1 10 0 94   CALCIUM mg/dL 11 0*  --  11 3* 11 8* 10 8* 11 0* 11 0*   ALK PHOS U/L 184*  --   --   --  213*  --   --    ALT U/L 35  --   --   --  52  --   --    AST U/L 14  --   --   --  33  --   --    MAGNESIUM mg/dL  --  1 9  --   --  1 7  --   --    PHOSPHORUS mg/dL  --  2 6  --   --   --   --   --        CUTURES:  Lab Results   Component Value Date    URINECX >100,000 cfu/ml Escherichia coli (A) 02/17/2021    URINECX >100,000 cfu/ml Escherichia coli (A) 11/04/2020    URINECX 80,000-89,000 cfu/ml Escherichia coli (A) 10/26/2018                 Portions of the record may have been created with voice recognition software  Occasional wrong word or "sound a like" substitutions may have occurred due to the inherent limitations of voice recognition software  Read the chart carefully and recognize, using context, where substitutions have occurred  If you have any questions, please contact the dictating provider

## 2021-02-28 NOTE — PROGRESS NOTES
No good BM x 5 days  Lactulose followed by suppository today  Increased gas pains associated with decreased appetite -  order placed for KUB    Patient with chronic urinary incontinence, however perineal skin getting dry and irritated with continuous use of purewick - therefore would like to scale back completely for 24-48 hours and use depends, timed toilet with commode instead        Walker Pineda MD

## 2021-02-28 NOTE — PROGRESS NOTES
02/28/21 0745   Pain Assessment   Pain Assessment Tool 0-10   Pain Score 6   Pain Location/Orientation Location: Buttocks; Location: Back;Orientation: Lower   Pain Onset/Description Onset: Ongoing   Effect of Pain on Daily Activities pt reported unable to get OOB, prefered to stay in bed in chair mode   Hospital Pain Intervention(s) Repositioned; Rest   Restrictions/Precautions   Precautions Aspiration;Bed/chair alarms;Cognitive; Fall Risk;O2;Pain;Spinal precautions;Supervision on toilet/commode   Braces or Orthoses TLSO  (backpack OOB)   Swallow Information   Current Risks for Dysphagia & Aspiration Respiratory compromise;Weak cough;General debilitation;Positionong Issues   Current Symptoms/Concerns Cough;Clear throat; With food; Difficulty chewing;Decreased oral intake;Weight loss   Current Diet Dysphagia pureed; Thin liquid   Baseline Diet Regular; Thin liquids   Consistencies Assessed and Performance   Materials Admnistered Puree/Level 1;Mechanical Soft/Level 2; Thin liquid   Oral Stage WFL; Mild impaired; With limited texture; With limited amount  (moderate w/ more solid items presented)   Phargngeal Stage WFL; Mild impaired; With limited texture; With limited amount  (w/ puree and thin, mild-mod w/ solids)   Swallow Mechanics Mild delayed;Swallow initation;Weak larygneal rise;Aspiration risk  (suspected to be more mild-mod delayed w/ peaches, cereal)   Esophageal Concerns No s/s reported   Recommendations   Risk for Aspiration Present   Recommendations Dysphagia treatment   Diet Solid Recommendation Level 1 Dysphagia/pureed   Diet Liquid Recommendation Thin liquid  (pt prefers straw use)   Recommended Form of Meds As tolerated   General Precautions Aspiration precautions; Feed only when alert;Minimize distractions;Upright as possible for all oral intake;Remain upright for 45 mins after meals  (OOB or FULLY upright, distant sup, extended time for meals)   Compensatory Swallowing Strategies Alternate solids and liquids; External pacing;Coordinate breathing and swallowing;Voluntary throat clear/cough to clear penetration   Further Evaluations Dietician   Results Reviewed with RN;PT/Family/Caregiver   Eating   Type of Assistance Needed Set-up / clean-up   Amount of Physical Assistance Provided No physical assistance   Eating CARE Score 5   Swallow Assessment   Swallow Treatment Assessment Pt seen during bfast for continued dysphagia therapy targeting trials of diet upgrades  Pt seated fully upright and presented w/ puree tray (cream of wheat, applesauce) and trials of dysphagia level 2 items (oatmeal, peaches, rice crispies cereal)  Pt appeared more agreeable to PO intake this session, as she trialed oatmeal and small amount of peaches  Following assist w/ tray set up, pt self fed meal w/ appropriate pacing and use of small bite sizes  Pt continued to use a slower rate and frequent rest breaks this session, pt was more consistent in her meal consumption rate  Consumed ~240cc thin liquids during meal, ~25% cream of wheat, ~25% oatmeal, peaches x3 pieces, 2 tsp applesauce and 1 tsp rice krispies cereal  Demonstrated functional bolus retrieval w/o anterior loss  Manipulation of pureed items was mildly slower but overall functional w/ effective and fairly timely transfers and full oral clearance  When trialing oatmeal, improvement in bolus formation and oral clearance where only observing intermittent trace lingual residual, which pt independently cleared occasionally during intake  Pt reported increased difficulty w/ cereal and declined any additional intake  Regarding peaches, pt demonstrated mastication (mildly prolonged, which is suspected due to edentulous bottoms) but pt did appear to break down small pieces as she took one chopped piece at a time  Complete oral clearance achieved  Swallow initiation of puree (as well as oatmeal) and thins suspected to be timely to possibly mildly delayed but remained functional for pt  Regarding level 2 items of peaches and cereal, observed longer oral prep phase and suspecting more delayed swallow but ?ing delay impacted by pt's prolonged oral prep and fear of swallowing  Pt elicited throat clear/cough w/ cereal but no further overt s/s penetration/aspiration observed across remainder of meal  While pt improved overall intake in comparison to last meal, intake remains suboptimal-RN notified  Will cont to recommend puree diet and thin liquids at this time w/ the following: OOB or FULLY upright for meals, alternate solids/liquids, minimize distractions during meals, encouragement for PO intake  At this time, pt is recommended for continued skilled ST services for dysphagia therapy in order to determine safest and least restrictive diet w/ goal of also supporting overall PO intake  Plan to further trial diet upgrades as pt agreeable-pt noted to be more receptive to trialing items this session  Swallow Assessment Prognosis   Prognosis Fair   Prognosis Considerations Co-morbidities; Medical diagnosis; Medical prognosis;Participation level;Previous level of function;Ability to carry over; Cooperation   SLP Therapy Minutes   SLP Time In 0745   SLP Time Out 0830   SLP Total Time (minutes) 45   SLP Mode of treatment - Individual (minutes) 45   SLP Mode of treatment - Concurrent (minutes) 0   SLP Mode of treatment - Group (minutes) 0   SLP Mode of treatment - Co-treat (minutes) 0   SLP Mode of Treatment - Total time(minutes) 45 minutes   SLP Cumulative Minutes 90   Therapy Time missed   Time missed?  No

## 2021-02-28 NOTE — PROGRESS NOTES
02/28/21 1030   Pain Assessment   Pain Assessment Tool 0-10   Pain Score 8   Pain Location/Orientation Orientation: Mid;Orientation: Lower; Location: Back; Location: Buttocks   Pain Radiating Towards "tailbone"    Pain Onset/Description Onset: Ongoing   Effect of Pain on Daily Activities limits stand tolerance   Hospital Pain Intervention(s) Rest;Repositioned; Ambulation/increased activity   Restrictions/Precautions   Precautions Aspiration;Bed/chair alarms;Cognitive; Fall Risk;O2;Pain;Spinal precautions;Supervision on toilet/commode   ROM Restrictions Yes  (spinal precautions )   Braces or Orthoses LSO  (backpack TLSO OOB)   Lifestyle   Autonomy "it's break time" repeated several times throughout session   Reciprocal Relationships     Sit to Stand   Type of Assistance Needed Physical assistance   Amount of Physical Assistance Provided 25%-49%   Comment Marguerite with fatigue and inc time, CGA with inc self confidence  Pt admits to being "fearful my whole life of falling"  Sit to Stand CARE Score 3   Bed-Chair Transfer   Type of Assistance Needed Physical assistance   Amount of Physical Assistance Provided 25%-49%   Comment Marguerite/CGA and max encouragement to inc pt self confidence, A for safe O2 line mgmt  Inc time due to fatigue and pain for SPT with RW   Chair/Bed-to-Chair Transfer CARE Score 3   Toileting Hygiene   Type of Assistance Needed Physical assistance   Amount of Physical Assistance Provided Total assistance   Comment Pt able to complete keena hygiene seated however Marguerite in stance with OT and dependent on DORA RAYMOND with CM over hips and closure of tabs on brief  Pt wearing tab brief due to frequent incontinence of urine  Toileting Hygiene CARE Score 1   Toileting   Able to Pull Clothing down no, up no  Able to Manage Clothing Closures No   Manage Hygiene Bladder   Limitations Noted In Balance; Coordination;ROM;Safety;UE Strength;LE Strength   Toilet Transfer   Type of Assistance Needed Physical assistance   Amount of Physical Assistance Provided 25%-49%   Comment SPT w/ RW WC<>BSC   Toilet Transfer CARE Score 3   Toilet Transfer   Surface Assessed Standard Commode   Transfer Technique Stand Pivot   Limitations Noted In Confidence;Balance; Endurance; Safety;UE Strength;LE Strength   Adaptive Equipment Walker   Positioning Concerns Safety   Cognition   Overall Cognitive Status Impaired   Arousal/Participation Alert; Cooperative   Attention Attends with cues to redirect   Orientation Level Oriented X4   Memory Decreased recall of precautions   Following Commands Follows one step commands with increased time or repetition   Comments Requires prompting to recall spinal precautions   Additional Activities   Additional Activities Comments Instruction for stacked breathing to further oxygenate lower lobes of lungs and promote improved breathing  Pt tolerated with no lightheadedness or dizziness  Activity Tolerance   Activity Tolerance Patient limited by fatigue   Medical Staff [address] notified  Pt 97% on 3L O2 at rest and with initial stand pt desaturates to 79% and instructed to sit back down with deep pursed lip breathing and > 1 min to recover  Pt also noted with pulse ox indicating HR at 148 during stance with pt admitting hx of anxiety and fear of falling  RN gave pt anxiety med per pt orders  Pt at rest on 3L O2 via NC and O2 sat 97% at end of OT session  Assessment   Treatment Assessment Pt participated in skilled OT tx with focus on toileting, stand tolerance, O2 monitoring, and deep breathing  See above note for detail  Pt noted yelling with fear upon inital stance due to slight posterior LOB with OT there to steady pt  Pt extremely fearful and fatigued  Pt requires encouragement to participate and to improve pt overall self confidence for transfers  Pt requires frequent rest breaks including after scooting self forward for transfers   Pt continues to require skilled OT intervention to maximize rehab potential to inc pt indep with ADL/IADL fxn and all fxnl transfers per OT POC  Prognosis Fair   Problem List Decreased strength;Decreased range of motion;Decreased endurance; Impaired balance;Decreased mobility; Decreased coordination;Decreased safety awareness;Orthopedic restrictions;Pain   Barriers to Discharge Inaccessible home environment;Decreased caregiver support   Plan   Treatment/Interventions ADL retraining;Functional transfer training; Endurance training;Patient/family training   Progress Slow progress, decreased activity tolerance   Recommendation   OT Discharge Recommendation Home with skilled therapy  (pending pt progress)   OT Therapy Minutes   OT Time In 1030   OT Time Out 1130   OT Total Time (minutes) 60   OT Mode of treatment - Individual (minutes) 60   OT Mode of treatment - Concurrent (minutes) 0   OT Mode of treatment - Group (minutes) 0   OT Mode of treatment - Co-treat (minutes) 0   OT Mode of Treatment - Total time(minutes) 60 minutes   OT Cumulative Minutes 300   Therapy Time missed   Time missed?  No

## 2021-02-28 NOTE — PROGRESS NOTES
Internal Medicine Progress Note  Patient: Lorraine Montes De Oca  Age/sex: 78 y o  female  Medical Record #: [id number]      ASSESSMENT/PLAN: (Interval History)  Lorraine Montes De Oca is seen and examined and management for following issues:    Compression fracture T5/6  · No surgical intervention  · Cont TLSO brace when OOB  · Aggressive rehab per PMR  · Pain mgmt per PMR     COPD  · Wears oxygen 2-3 L continuous  · Continue current inhalers/nebs per home  · Recent PFT DLCO 54%  · Oxygen saturations stable  · Currently on prednisone wean  · Recent CT scan +moderate emphysema; no PE  · Had increased SOB after therapy today and O2 was increased to [facility] and given neb tx  · Nebs were changed back to scheduled from prn on 2/27/21     Chronic diastolic HF  · Cont furosemide 20mg daily here   · Takes Torsemide at home  · Monitor volume status closely  · Chronic LE edema  · Echo = LVEF 81%; diastolic dysfunction     DM II  · Hgb A1c 7 1 in October  · Home meds: Glyburide 5mg BID  · Add sliding scale and coverage  · Dc'd glyburide on 2/24/21 secondary to low BS  · Will restart Glyburide at smaller dose at 1 25mg qam tomorrow 3/1/21  · ? Metformin in the past?  · Perhaps Januvia 25mg qd better for her to try?     HTN  · Diovan is on hold 2/2 VICKY/CKD and K+ increase  · tx per renal  · BPs acceptable      CKD  · Level 3  · Baseline 0 9-1 1  · Avoid nephrotoxic medications  · Currently at baseline  · Renal managing  · VBG done this AM per renal (total CO2 2/27 was 40)     Hypercalcemia  · Nephrology w/u at [place]  · [address] secondary to volume status  · Renal managing      Hyponatremia  · Mild/stable  · Renal following     Hyperkalemia  · On low K+ diet and Diovan is on hold  · tx per renal    Hypothyroid  · Cont home supplementation     Sacral decubitus  · Cont off loading, nutritional supplementation  · Wound nurse following     Oral ulcers  · Cont magic mouthwash q4 hrs as needed  · Feels this helps and uses 1-2x/day          The above assessment and plan was reviewed and updated as determined by my evaluation of the patient on 2/28/2021      Labs:   Results from last 7 days   Lab Units 02/27/21  0709 02/25/21  0859   WBC Thousand/uL 11 60* 10 40*   HEMOGLOBIN g/dL 12 3 11 9   HEMATOCRIT % 38 2 37 4   PLATELETS Thousands/uL 215 208     Results from last 7 days   Lab Units 02/28/21  0519 02/27/21  0709   SODIUM mmol/L 133* 132*   POTASSIUM mmol/L 5 2 5 3   CHLORIDE mmol/L 96* 94*   CO2 mmol/L 37* 40*   BUN mg/dL 39* 39*   CREATININE mg/dL 0 86 0 86   CALCIUM mg/dL 11 0* 11 3*             Results from last 7 days   Lab Units 02/28/21  0601 02/27/21 2050 02/27/21  1652   POC GLUCOSE mg/dl 103 211* 224*       Review of Scheduled Meds:  Current Facility-Administered Medications   Medication Dose Route Frequency Provider Last Rate    acetaminophen  650 mg Oral Q6H [facility] Molly Carr MD      al mag oxide-diphenhydramine-lidocaine viscous  10 mL Swish & Spit Q4H PRN ROLA Shepard      albuterol  2 puff Inhalation Q6H PRN Molly Carr MD      albuterol  2 5 mg Nebulization Q6H PRN Molly Carr MD      ALPRAZolam  0 25 mg Oral TID PRN Molly Carr MD      aluminum-magnesium hydroxide-simethicone  30 mL Oral Q6H PRN Molly Carr MD      atorvastatin  80 mg Oral Daily With Sirisha Ramirez MD      benzonatate  100 mg Oral TID PRN Molly Carr MD      bisacodyl  10 mg Rectal Daily PRN Molly Carr MD      fluticasone-vilanterol  1 puff Inhalation Daily Molly Carr MD      furosemide  20 mg Oral Daily Molly Carr MD      guaiFENesin  600 mg Oral Q12H [facility] Molly Carr MD      heparin (porcine)  5,000 Units Subcutaneous Q8H [facility] Molly Carr MD      insulin lispro  1-6 Units Subcutaneous HS Molly Carr MD      insulin lispro  1-6 Units Subcutaneous TID AC Molly Carr MD      ipratropium-albuterol  3 mL Nebulization 4x Daily Molly Carr MD      levothyroxine  125 mcg Oral Early Morning Molly Carr, MD      lidocaine  2 patch Topical Daily Marino Castaneda MD      melatonin  6 mg Oral HS PRN Marino Castaneda MD      methocarbamol  500 mg Oral Q6H PRN Marino Castaneda MD      montelukast  10 mg Oral HS Marino Castaneda MD      nystatin   Topical BID Marino Castaneda MD      nystatin   Topical BID Marino Castaneda MD      ondansetron  4 mg Oral Q6H PRN Marino Castaneda MD      oxyCODONE  5 mg Oral Q6H PRN Marino Castaneda MD      pantoprazole  40 mg Oral Early Morning Marino Castaneda MD      polyethylene glycol  17 g Oral Daily MD [facility] [START ON 3/1/2021] predniSONE  10 mg Oral Daily Marino Castaneda MD      saccharomyces boulardii  250 mg Oral BID Marino Castaneda MD      senna  1 tablet Oral HS Marino Castaneda MD      traMADol  50 mg Oral Q6H PRN Marino Castaneda MD      white petrolatum-mineral oil   Topical TID PRN Marino Castaneda MD         Subjective/ HPI: Patients overnight issues or events were reviewed with nursing or staff during rounds or morning huddle session  No new or overnight issues  Offers no complaints  ROS:   A 10 point ROS was performed; negative except as noted above         Imaging:     No orders to display       *Labs /Radiology studies reviewed  *Medications reviewed and reconciled as needed  *Please refer to order section for additional ordered labs studies  *Case discussed with primary attending during morning huddle case rounds      Physical Examination:  Vitals:   Vitals:    02/27/21 2030 02/27/21 2215 02/28/21 0701 02/28/21 0856   BP: 124/54  125/55 124/61   BP Location: Left arm   Right arm   Pulse: 71  71 78   Resp: 18  17    Temp: 97 5 °F (36 4 °C)  97 8 °F (36 6 °C)    TempSrc: Oral      SpO2: 97% 97% 97%    Weight:       Height:           General Appearance: no distress, conversive  HEENT: PERRLA, conjuctiva normal; oropharynx clear; mucous membranes moist   Neck:  Supple, normal ROM, no JVD  Lungs: BBS but very decreased, normal respiratory effort, no retractions, expiratory effort normal  CV: regular rate and rhythm; no rubs/murmurs/gallops, PMI normal   ABD: soft; ND/NT; +BS  EXT: no edema RLE; chronic venous stasis changes lower left leg  Skin: normal turgor, normal texture, no rashes  Psych: affect normal, mood normal  Neuro: AAO      The above physical exam was reviewed and updated as determined by my evaluation of the patient on 2/28/2021  Invasive Devices     Drain            External Urinary Catheter 7 days                   VTE Pharmacologic Prophylaxis: Heparin  Code Status: Level 3 - DNAR and DNI  Current Length of Stay: 4 day(s)      Total time spent:  30 minutes with more than 50% spent counseling/coordinating care  Counseling includes discussion with patient re: progress  and discussion with patient of his/her current medical state/information  Coordination of patient's care was performed in conjunction with primary service  Time invested included review of patient's labs, vitals, and management of their comorbidities with continued monitoring  In addition, this patient was discussed with medical team including physician and advanced extenders  The care of the patient was extensively discussed and appropriate treatment plan was formulated unique for this patient  ** Please Note:  voice to text software may have been used in the creation of this document   Although proof errors in transcription or interpretation are a potential of such software**

## 2021-03-01 NOTE — PLAN OF CARE
Problem: Potential for Falls  Goal: Patient will remain free of falls  Description: INTERVENTIONS:  - Assess patient frequently for physical needs  -  Identify cognitive and physical deficits and behaviors that affect risk of falls  -  [place] fall precautions as indicated by assessment   - Educate patient/family on patient safety including physical limitations  - Instruct patient to call for assistance with activity based on assessment  - Modify environment to reduce risk of injury  - Consider OT/PT consult to assist with strengthening/mobility  Outcome: Progressing     Problem: Prexisting or High Potential for Compromised Skin Integrity  Goal: Skin integrity is maintained or improved  Description: INTERVENTIONS:  - Identify patients at risk for skin breakdown  - Assess and monitor skin integrity  - Assess and monitor nutrition and hydration status  - Monitor labs   - Assess for incontinence   - Turn and reposition patient  - Assist with mobility/ambulation  - Relieve pressure over bony prominences  - Avoid friction and shearing  - Provide appropriate hygiene as needed including keeping skin clean and dry  - Evaluate need for skin moisturizer/barrier cream  - Collaborate with interdisciplinary team   - Patient/family teaching  - Consider wound care consult   Outcome: Progressing     Problem: Nutrition/Hydration-ADULT  Goal: Nutrient/Hydration intake appropriate for improving, restoring or maintaining nutritional needs  Description: Monitor and assess patient's nutrition/hydration status for malnutrition  Collaborate with interdisciplinary team and initiate plan and interventions as ordered  Monitor patient's weight and dietary intake as ordered or per policy  Utilize nutrition screening tool and intervene as necessary  Determine patient's food preferences and provide high-protein, high-caloric foods as appropriate       INTERVENTIONS:  - Monitor oral intake, urinary output, labs, and treatment plans  - Assess nutrition and hydration status and recommend course of action  - Evaluate amount of meals eaten  - Assist patient with eating if necessary   - Allow adequate time for meals  - Recommend/ encourage appropriate diets, oral nutritional supplements, and vitamin/mineral supplements  - Order, calculate, and assess calorie counts as needed  - Recommend, monitor, and adjust tube feedings and TPN/PPN based on assessed needs  - Assess need for intravenous fluids  - Provide specific nutrition/hydration education as appropriate  - Include patient/family/caregiver in decisions related to nutrition  Outcome: Progressing     Problem: PAIN - ADULT  Goal: Verbalizes/displays adequate comfort level or baseline comfort level  Description: Interventions:  - Encourage patient to monitor pain and request assistance  - Assess pain using appropriate pain scale  - Administer analgesics based on type and severity of pain and evaluate response  - Implement non-pharmacological measures as appropriate and evaluate response  - Consider cultural and social influences on pain and pain management  - Notify physician/advanced practitioner if interventions unsuccessful or patient reports new pain  Outcome: Progressing     Problem: INFECTION - ADULT  Goal: Absence or prevention of progression during hospitalization  Description: INTERVENTIONS:  - Assess and monitor for signs and symptoms of infection  - Monitor lab/diagnostic results  - Monitor all insertion sites, i e  indwelling lines, tubes, and drains  - Monitor endotracheal if appropriate and nasal secretions for changes in amount and color  - [place] appropriate cooling/warming therapies per order  - Administer medications as ordered  - Instruct and encourage patient and family to use good hand hygiene technique  - Identify and instruct in appropriate isolation precautions for identified infection/condition  Outcome: Progressing     Problem: SAFETY ADULT  Goal: Patient will remain free of falls  Description: INTERVENTIONS:  - Assess patient frequently for physical needs  -  Identify cognitive and physical deficits and behaviors that affect risk of falls    -  [place] fall precautions as indicated by assessment   - Educate patient/family on patient safety including physical limitations  - Instruct patient to call for assistance with activity based on assessment  - Modify environment to reduce risk of injury  - Consider OT/PT consult to assist with strengthening/mobility  Outcome: Progressing  Goal: Maintain or return to baseline ADL function  Description: INTERVENTIONS:  -  Assess patient's ability to carry out ADLs; assess patient's baseline for ADL function and identify physical deficits which impact ability to perform ADLs (bathing, care of mouth/teeth, toileting, grooming, dressing, etc )  - Assess/evaluate cause of self-care deficits   - Assess range of motion  - Assess patient's mobility; develop plan if impaired  - Assess patient's need for assistive devices and provide as appropriate  - Encourage maximum independence but intervene and supervise when necessary  - Involve family in performance of ADLs  - Assess for home care needs following discharge   - Consider OT consult to assist with ADL evaluation and planning for discharge  - Provide patient education as appropriate  Outcome: Progressing  Goal: Maintain or return mobility status to optimal level  Description: INTERVENTIONS:  - Assess patient's baseline mobility status (ambulation, transfers, stairs, etc )    - Identify cognitive and physical deficits and behaviors that affect mobility  - Identify mobility aids required to assist with transfers and/or ambulation (gait belt, sit-to-stand, lift, walker, cane, etc )  - [place] fall precautions as indicated by assessment  - Record patient progress and toleration of activity level on Mobility SBAR; progress patient to next Phase/Stage  - Instruct patient to call for assistance with activity based on assessment  - Consider rehabilitation consult to assist with strengthening/weightbearing, etc   Outcome: Progressing     Problem: DISCHARGE PLANNING  Goal: Discharge to home or other facility with appropriate resources  Description: INTERVENTIONS:  - Identify barriers to discharge w/patient and caregiver  - Arrange for needed discharge resources and transportation as appropriate  - Identify discharge learning needs (meds, wound care, etc )  - Arrange for interpretive services to assist at discharge as needed  - Refer to Case Management Department for coordinating discharge planning if the patient needs post-hospital services based on physician/advanced practitioner order or complex needs related to functional status, cognitive ability, or social support system  Outcome: Progressing

## 2021-03-01 NOTE — PROGRESS NOTES
03/01/21 1400   Pain Assessment   Pain Assessment Tool 0-10   Pain Score 7   Pain Location/Orientation Orientation: Right;Location: Rib Cage   Restrictions/Precautions   Precautions Bed/chair alarms;Cognitive; Fall Risk;O2;Supervision on toilet/commode;Spinal precautions  (3LO2 NC)   ROM Restrictions Yes  (spinal preacutions)   Braces or Orthoses TLSO  (backpack when OOB)   Cognition   Overall Cognitive Status Impaired   Arousal/Participation Alert; Cooperative   Attention Attends with cues to redirect   Memory Decreased short term memory;Decreased recall of recent events   Following Commands Follows one step commands without difficulty   Subjective   Subjective Pt and pts daughter in room upon arrival   Sit to Stand   Type of Assistance Needed Physical assistance; Adaptive equipment   Amount of Physical Assistance Provided 25%-49%   Comment Nikolay DELVALLE pt more tired this session   Sit to Stand CARE Score 3   Bed-Chair Transfer   Type of Assistance Needed Physical assistance; Adaptive equipment   Amount of Physical Assistance Provided Less than 25%   Comment stand pivot, A w/ O2 line management   Chair/Bed-to-Chair Transfer CARE Score 3   Car Transfer   Reason if not Attempted Safety concerns   Car Transfer CARE Score 88   Walk 10 Feet   Type of Assistance Needed Physical assistance; Adaptive equipment   Amount of Physical Assistance Provided Less than 25%   Comment SEGUNDO Pichardo for O2 line management   Walk 10 Feet CARE Score 3   Walk 50 Feet with Two Turns   Type of Assistance Needed Physical assistance; Adaptive equipment   Amount of Physical Assistance Provided Less than 25%   Comment ADELIA Everett    Walk 50 Feet with Two Turns CARE Score 3   Walk 150 Feet   Comment Pt fatigued at 48'   Reason if not Attempted Safety concerns   Walk 150 Feet CARE Score 88   Walking 10 Feet on Uneven Surfaces   Reason if not Attempted Safety concerns   Walking 10 Feet on Uneven Surfaces CARE Score 88   Ambulation   Does the patient walk?  2  Yes   Primary Mode of Locomotion Prior to Admission Walk   Distance Walked (feet) 20 ft  (x2, 50')   Assist Device Roller Walker   Gait Pattern Slow Lory;Decreased foot clearance; Inconsistant Lory; Forward Flexion; Step through; Step to; Improper weight shift   Limitations Noted In Balance;Device Management; Coordination; Endurance; Heel Strike;Posture; Sequencing;Speed;Strength;Swing   Provided Assistance with: Balance   Walk Assist Level Minimum Assist   Findings Marguerite RW, A for O2 line management  3LO2 NC, SpO2 WNL, HR in 130s dec to WNL after seated rest break ~5min   Wheel 50 Feet with Two Turns   Reason if not Attempted Activity not applicable   Wheel 50 Feet with Two Turns CARE Score 9   Wheel 150 Feet   Reason if not Attempted Activity not applicable   Wheel 912 Feet CARE Score 9   Wheelchair mobility   Does the patient use a wheelchair? 0  No   Curb or Single Stair   Reason if not Attempted Safety concerns   1 Step (Curb) CARE Score 88   4 Steps   Reason if not Attempted Safety concerns   4 Steps CARE Score 88   12 Steps   Reason if not Attempted Safety concerns   12 Steps CARE Score 88   Toilet Transfer   Type of Assistance Needed Physical assistance; Adaptive equipment   Amount of Physical Assistance Provided 25%-49%   Comment Nikolay DELVALLE stand pivot to [facility]   Toilet Transfer CARE Score 3   Toilet Transfer   Surface Assessed Bedside Commode   Limitations Noted In Balance;Confidence; Endurance;ROM;UE Strength;LE Strength   Assessment   Treatment Assessment PT session focusing on repeated bouts of HH distances w/ use of RW  Pt cont to req A for O2 line management, frequently getting caught in line and req A to move out of her way putting pt at high falls risk during ambulation at this time  Pt tolerated session on 3LO2, SpO2 WNL throughotu session, HR in 130s after ambulation but dec to WNL after seated rest breaks   Educated pt and her daughter who was present about need for frequent bouts of short distances vs smaller bouts of longer distances to improve endurance  Gave pts daughter resources for ramp installation 2* pt having 5STE  Pt overall demoing improvement in activity tolerance evidenced by inc bouts of short distances w/ shorter rest breaks compared to IE, however, remains very limited by dec endurance and pain in her tailbone  D/c SNF vs home pending pts progress and familys ability to provide/hire A  Cont w/ POC focusing on HH dsitance ambulation, stairs, balance ex, LE therex and inc activity tolerance to improve independence    Family/Caregiver Present Daughter   Problem List Decreased strength;Decreased range of motion;Decreased endurance; Impaired balance;Decreased mobility; Decreased coordination;Decreased cognition;Orthopedic restrictions;Pain   Barriers to Discharge Inaccessible home environment;Decreased caregiver support   PT Barriers   Functional Limitation Car transfers; Ramp negotiation;Stair negotiation;Standing;Transfers; Walking; Wheelchair management   Plan   Treatment/Interventions Functional transfer training;LE strengthening/ROM; Elevations; Therapeutic exercise; Endurance training;Patient/family training;Equipment eval/education; Bed mobility;Gait training; Compensatory technique education   Progress Slow progress, decreased activity tolerance   Recommendation   PT Discharge Recommendation Other (Comment)  (TBD)   Equipment Recommended Walker   PT Therapy Minutes   PT Time In 1400   PT Time Out 1500   PT Total Time (minutes) 60   PT Mode of treatment - Individual (minutes) 60   PT Mode of treatment - Concurrent (minutes) 0   PT Mode of treatment - Group (minutes) 0   PT Mode of treatment - Co-treat (minutes) 0   PT Mode of Treatment - Total time(minutes) 60 minutes   PT Cumulative Minutes 330   Therapy Time missed   Time missed?  No

## 2021-03-01 NOTE — QUICK NOTE
Provided a routine medical and functional update to patient's daughter in law - Bess Morales  Functionally patient still needs Min-Mod A  Medically discontinuation of purewick as her perineal skin is becoming dry  Renal following electrolyte abnormalities    Daughter in law reports they are getting her house in order  Son was considering hiring help for 1-2 weeks in the transition period if she is able to get home  We discussed if not meeting her goals - SNF referral may need to occur    Bess Morales reports patient has recently become more anxious about bathing and eating  She is afraid of getting water on her face and therefore at home didn't bathe used Huggies wipes  She was recently become more afraid of eating solid foods as she is afraid of choking  She prefers a pureed diet here        Nova Boswell MD

## 2021-03-01 NOTE — PCC OCCUPATIONAL THERAPY
Occupational Therapy Weekly Team Note    Pt is demonstrating fair progress with occupational therapy and is progressing toward long term goals for ADL, IADL, and functional transfers/mobility  Pts long term goals for ADLs are Minimum assistance with Rolling Walker  Pt continues to present with impairments in activity tolerance, endurance, standing balance/tolerance, sitting balance/tolerance, UE strength, UE ROM, memory and attention   Occupational performance remains limited by fatigue, SOB, RICO, pain, spinal precautions, orthopedic restricitions , decreased caregiver support, risk for falls and Fear of falling    Family training/education will be required prior to D/C, if discharging home  Pt will continue to benefit from skilled acute rehab OT services to address above mentioned barriers and maximize functional independence in baseline areas of occupation to meet established treatment goals with overall decreased burden of care  Plan of care to continue to focus on ADL Retraining , LB Dressing, UB dressing,  LHAE education/training, Functional Transfers, Functional Cognition, Functional Attention, Standing tolerance, Standing balance , Fine motor strengthening , Gross motor strengthening , DME training/education, Family training/education and Energy conservation training/education  Anticipate Re-team at this time  pt may require sub acute d/c  Nico Corey

## 2021-03-01 NOTE — PROGRESS NOTES
PM&R PROGRESS NOTE:  Arielle Salomon 78 y o  female MRN: [id number]  Unit/Bed#: -01 Encounter: [id number]        Rehabilitation Diagnosis: Impairment of mobility, safety and Activities of Daily Living (ADLs) due to Orthopedic Disorders:  08 9  Other Orthopedic Thoracic compression fractures    HPI:   Arielle Salomon is a 78 y o  female with COPD/ emphysema on baseline oxygen 2-3 L,  Congestive heart failure, osteoarthritis who presented to the [address] [place] [address] on 2/17/21 with back pain and shortness of breath  Pulmonary workup showing moderate pulmonary emphysematous changes bilaterally  Workup for back pain revealed Compression fracture of T5 (33% height loss approximately) and T6 (approximately 25% height loss  )  There is also superior endplate T2 compression fracture with approximately 10%-20 percent height loss at the superior endplate  Case was discussed with both Orthopedics and Neurosurgery and patient was recommended conservative treatment with TLSO brace while out of bed  Medically, patient was treated for an E coli urinary tract infection  Patient was started on steroids for COPD and emphysema and continued on her Breo and DuoNebs  Patient was seen by Nephrology  acute renal insufficiency, hypercalcemia and hyperkalemia  Patient started on Lasix by Nephrology  Patient was followed by wound care for a sacral pressure ulcer  After medical stabilization patient was found to have acute functional deficits from her baseline and therefore was admitted to [address] [place] [address]  SUBJECTIVE: Patient seen face to face in her room  She reports that the back pain is stable  She reports that her breathing has been stable as well  She denies any other complaints        ASSESSMENT: Stable, progressing      PLAN:    Rehabilitation   Functional deficits:  Generalized weakness,  Reduced Endurance,  Impaired mobility, impaired self-care,  Impaired balance,  Possible impaired swallow   Continue current rehabilitation plan of care to maximize function   Functional update:   o PT: MaxA bad mobility  [address] [place] transfers  Royal ambulation 65ft RW   o OT: MaxA bathing, UB dressing  TotalA LB dressing, toiletingm, toilet transfer  [address] [place] transfers  o SLP: Supervision eating   Estimated Discharge: To be determined      Pain  ·   Mid to lower back pain:  Managed on Tylenol,  Topical Lidoderm patch, p r n  Robaxin, tramadol and oxycodone  P r n  DVT prophylaxis    managed on subcutaneous heparin and SCDs    Bladder plan   Incontinent at times  Juan C Reed was causing some skin breakdown and has been discontinued  Bowel plan   Continent   Last BM 2/28/21  * Thoracic compression fracture (HCC)  Assessment & Plan  · T2, T5 and T6 compression fractures  · Treated conservatively  · TLSO brace while out of bed  · Follow-up as outpatient with Neurosurgery    Pressure ulcer  Assessment & Plan  · Located on Sacrum  · Consulted wound care to follow while at [facility]  · Continue optimization of nutrition, pressure relieving techniques, and turns  · Nutrition consulted  · Continue local wound care with foam dressing as recommended by wound care RN       UTI (urinary tract infection)  Assessment & Plan  · E Coli UTI  · Completed treatment     Chronic diastolic heart failure (HCC)  Assessment & Plan  Managed on Lasix 20mg daily      Type 2 diabetes mellitus with complication, without long-term current use of insulin ([facility], [facility])  Assessment & Plan  · Managed  On sliding scale insulin  · Due to hypoglycemia glyburide was discontinued  · CCD  · IM consultants following    COPD exacerbation ([address] )  Assessment & Plan  · Wears oxygen 2-3 L at baseline  · At baseline feels SOB  · Continue current inhalers  · Monitor oxygen saturations with activity  · Recent CT scan +moderate emphysema  · Continue prednisone taper    VICKY (acute kidney injury) (HCC)-resolved as of 2/27/2021  Assessment & Plan  · Renal function baseline 0 9 - 1 1  · Renal following hypercalcemia/hyperkaemia (hyperkalemia likely due to diet - was drinking V8s and fruity drinks)  ·  continue low-potassium diet        Appreciate IM consultants medical co-management  Labs, medications, and imaging personally reviewed  ROS:  A ten point review of systems was completed on 3/1/21 and pertinent positives are listed in subjective section  All other systems reviewed were negative  OBJECTIVE:   /59 (BP Location: Left arm)   Pulse 82   Temp 98 3 °F (36 8 °C)   Resp 17   Ht 5' 2" (1 575 m)   Wt 70 9 kg (156 lb 4 8 oz)   SpO2 97%   BMI 28 59 kg/m²     Physical Exam  Vitals signs reviewed  Constitutional:       Appearance: She is well-developed  HENT:      Head: Normocephalic and atraumatic  Eyes:      Pupils: Pupils are equal, round, and reactive to light  Neck:      Musculoskeletal: Normal range of motion and neck supple  Cardiovascular:      Rate and Rhythm: Normal rate  Pulmonary:      Effort: Pulmonary effort is normal       Comments: Decreased breath sounds  O2 via NC present  Abdominal:      General: Bowel sounds are normal       Palpations: Abdomen is soft  Musculoskeletal: Normal range of motion  Comments: Backpack TLSO intact  Skin:     General: Skin is warm and dry  Neurological:      Mental Status: She is alert and oriented to person, place, and time            Lab Results   Component Value Date    WBC 11 60 (H) 02/27/2021    HGB 12 3 02/27/2021    HCT 38 2 02/27/2021    MCV 92 02/27/2021     02/27/2021     Lab Results   Component Value Date    SODIUM 129 (L) 03/01/2021    K 5 3 03/01/2021    CL 94 (L) 03/01/2021    CO2 36 (H) 03/01/2021    BUN 39 (H) 03/01/2021    CREATININE 0 96 03/01/2021    GLUC 168 (H) 03/01/2021    CALCIUM 11 1 (H) 03/01/2021     No results found for: INR, PROTIME        Current Facility-Administered Medications:     acetaminophen (TYLENOL) tablet 650 mg, 650 mg, Oral, Q6H [address], King Mar MD, 650 mg at 03/01/21 1127    al mag oxide-diphenhydramine-lidocaine viscous (MAGIC MOUTHWASH) suspension 10 mL, 10 mL, Swish & Spit, Q4H PRN, ROLA Shepard, 10 mL at 03/01/21 1127    albuterol (PROVENTIL HFA,VENTOLIN HFA) inhaler 2 puff, 2 puff, Inhalation, Q6H PRN, King Mar MD    ALPRAZolam Edgar Oralia) tablet 0 25 mg, 0 25 mg, Oral, TID PRN, King Mar MD, 0 25 mg at 02/28/21 2215    aluminum-magnesium hydroxide-simethicone (MYLANTA) oral suspension 30 mL, 30 mL, Oral, Q6H PRN, King Mar MD    atorvastatin (LIPITOR) tablet 80 mg, 80 mg, Oral, Daily With Jos Jo MD, 80 mg at 02/28/21 1659    benzonatate (TESSALON PERLES) capsule 100 mg, 100 mg, Oral, TID PRN, King Mar MD    bisacodyl (DULCOLAX) rectal suppository 10 mg, 10 mg, Rectal, Daily PRN, King Mar MD, 10 mg at 02/28/21 1802    fluticasone-vilanterol (BREO ELLIPTA) 100-25 mcg/inh inhaler 1 puff, 1 puff, Inhalation, Daily, King Mar MD, 1 puff at 03/01/21 0837    furosemide (LASIX) tablet 20 mg, 20 mg, Oral, Daily, King Mar MD, 20 mg at 03/01/21 0835    [START ON 3/2/2021] glyBURIDE (DIABETA) tablet 2 5 mg, 2 5 mg, Oral, Daily With Breakfast, ROLA Shepard    guaiFENesin (MUCINEX) 12 hr tablet 600 mg, 600 mg, Oral, Q12H [facility], King Mar MD, 600 mg at 03/01/21 0835    heparin (porcine) subcutaneous injection 5,000 Units, 5,000 Units, Subcutaneous, Q8H [facility], King Mar MD, 5,000 Units at 03/01/21 0555    insulin lispro (HumaLOG) 100 units/mL subcutaneous injection 1-6 Units, 1-6 Units, Subcutaneous, HS, King Mar MD, 2 Units at 02/28/21 2216    insulin lispro (HumaLOG) 100 units/mL subcutaneous injection 1-6 Units, 1-6 Units, Subcutaneous, TID AC, 3 Units at 02/28/21 1657 **AND** Fingerstick Glucose (POCT), , , TID AC, King Mar MD    ipratropium-albuterol (DUO-NEB) 0 5-2 5 mg/3 mL inhalation solution 3 mL, 3 mL, Nebulization, 4x Daily, King Mar MD, 3 mL at 03/01/21 0757    levothyroxine tablet 125 mcg, 125 mcg, Oral, Early Morning, Molly Carr MD, 125 mcg at 03/01/21 0555    lidocaine (LIDODERM) 5 % patch 2 patch, 2 patch, Topical, Daily, Molly Carr MD, 2 patch at 03/01/21 0839    melatonin tablet 6 mg, 6 mg, Oral, HS PRN, Molly Carr MD, 6 mg at 02/25/21 2103    methocarbamol (ROBAXIN) tablet 500 mg, 500 mg, Oral, Q6H PRN, Molly Carr MD    montelukast (SINGULAIR) tablet 10 mg, 10 mg, Oral, HS, Molly Carr MD, 10 mg at 02/28/21 2215    nystatin (MYCOSTATIN) cream, , Topical, BID, Molly Carr MD, 1 application at 95/81/69 7302    nystatin (MYCOSTATIN) powder, , Topical, BID, Molly Carr MD, 1 application at 94/60/87 0839    ondansetron (ZOFRAN-ODT) dispersible tablet 4 mg, 4 mg, Oral, Q6H PRN, Molly Carr MD    oxyCODONE (ROXICODONE) IR tablet 5 mg, 5 mg, Oral, Q6H PRN, Molly Carr MD, 5 mg at 02/28/21 2217    pantoprazole (PROTONIX) EC tablet 40 mg, 40 mg, Oral, Early Morning, Molly Carr MD, 40 mg at 03/01/21 0555    polyethylene glycol (MIRALAX) packet 17 g, 17 g, Oral, Daily, Molly Carr MD, 17 g at 03/01/21 0834    [COMPLETED] predniSONE tablet 40 mg, 40 mg, Oral, Daily, 40 mg at 02/26/21 0835 **FOLLOWED BY** [COMPLETED] predniSONE tablet 20 mg, 20 mg, Oral, Daily, 20 mg at 02/28/21 0858 **FOLLOWED BY** predniSONE tablet 10 mg, 10 mg, Oral, Daily, Molly Carr MD, 10 mg at 03/01/21 9198    saccharomyces boulardii (FLORASTOR) capsule 250 mg, 250 mg, Oral, BID, Molly Carr MD, 250 mg at 03/01/21 0835    senna (SENOKOT) tablet 8 6 mg, 1 tablet, Oral, HS, Molly Carr MD, 8 6 mg at 02/28/21 2215    traMADol (ULTRAM) tablet 50 mg, 50 mg, Oral, Q6H PRN, Molly Carr MD, 50 mg at 03/01/21 0557    white petrolatum-mineral oil (EUCERIN,HYDROCERIN) cream, , Topical, TID PRN, Molly Carr MD, Given at 02/27/21 2212    Past Medical History:   Diagnosis Date    Cataract     CHF (congestive heart failure) ([place] [address] )     Leukocytosis        Patient Active Problem List    Diagnosis Date Noted    Thoracic compression fracture ([place] [address] ) 02/18/2021     Priority: High    Stage 3a chronic kidney disease 02/27/2021    Alkalosis 02/25/2021    Hyperkalemia 02/23/2021    Urinary incontinence 02/23/2021    Pressure ulcer 02/22/2021    Hypercalcemia 02/21/2021    Chronic respiratory failure with hypoxia (HCC) 02/18/2021    Moderate protein-calorie malnutrition ([place] [address] ) 02/18/2021    UTI (urinary tract infection) 02/18/2021    SOB (shortness of breath) 02/17/2021    Chronic bilateral thoracic back pain 02/17/2021    Chronic diastolic heart failure ([place] [address] ) 02/17/2021    Atherosclerosis of abdominal aorta ([place] [address] ) 12/01/2020    CHF (congestive heart failure) ([place] [address] ) 02/11/2019    Type 2 diabetes mellitus with complication, without long-term current use of insulin ([place] [address] ) 04/08/2015    Carotid artery plaque 04/06/2015    Hyperlipidemia 03/14/2014    Hypothyroidism 03/14/2014    Osteoarthrosis 03/14/2014    COPD exacerbation ([place] [address] ) 03/10/2014          Nesha Lopez PA-C    Total time spent:  30 minutes with more than 50% spent counseling/coordinating care  Counseling includes discussion with patient re: progress and discussion with patient of his/her current medical/functional state/information  Coordination of patient's care was performed in conjunction with consulting services  Time invested included review of patient's labs, vitals, and management of their comorbidities with continued monitoring  The care of the patient was extensively discussed and appropriate treatment plan was formulated unique for this patient  ** Please Note:  voice to text software may have been used in the creation of this document   Although proof errors in transcription or interpretation are a potential of such software**

## 2021-03-01 NOTE — PROGRESS NOTES
03/01/21 1130   Pain Assessment   Pain Assessment Tool Pain Assessment not indicated - pt denies pain   Pain Score No Pain   Restrictions/Precautions   Precautions Bed/chair alarms;Supervision on toilet/commode;O2;Torres; Fluid restriction; Fall Risk;Aspiration   Swallow Information   Current Risks for Dysphagia & Aspiration Respiratory compromise;Weak cough;General debilitation;Positionong Issues   Current Symptoms/Concerns Clear throat;During meals;Decreased oral intake;Weight loss; Difficulty chewing   Current Diet Dysphagia pureed; Thin liquid   Baseline Diet Regular; Thin liquids   Consistencies Assessed and Performance   Materials Admnistered Puree/Level 1; Thin liquid   Oral Stage WFL; Mild impaired   Phargngeal Stage WFL; Mild impaired;Aspiration risk; With limited texture   Swallow Mechanics Mild delayed;Swallow initation; Appears prompt;Aspiration risk;Good Larygneal rise   Esophageal Concerns No s/s reported   Recommendations   Risk for Aspiration Present   Recommendations Dysphagia treatment   Diet Solid Recommendation Level 1 Dysphagia/pureed   Diet Liquid Recommendation Thin liquid   Recommended Form of Meds As tolerated   General Precautions Aspiration precautions;Minimize distractions;Upright as possible for all oral intake;Remain upright for 45 mins after meals; Supervision with meals  (FULL supervision w/ OOB and fully upright during meals )   Compensatory Swallowing Strategies External pacing; Alternate solids and liquids; Coordinate breathing and swallowing;Voluntary throat clear/cough to clear penetration   Results Reviewed with PT/Family/Caregiver;RN   Eating   Type of Assistance Needed Set-up / clean-up;Supervision   Amount of Physical Assistance Provided No physical assistance   Eating CARE Score 4   Swallow Assessment   Swallow Treatment Assessment Pt was alert, cooperative and upright in WC upon arrival  Pt seen during lunch for dysphagia therapy as pt is currently on a pureed diet w/ thin liquids by straw  SLP set up tray for pt w/ pt rika to self feed during the whole meal  Pt consumed about 25% of meal, consisting of pureed beef w/ gravy and pureed green beans as well as vanilla pudding and thin 300 cc of thin liquids (water) by straw  Compared to previous sessions, pt's PO intake was increased in today's session  SLP left tray w/ pt in her  room, encouraging pt for further food intake  Throughout meal, SLP needing to remind pt NOT to task while eating in order to save energy for eating meal as pt continued to commend on SOB when talking/eating  Pt's overall rate of PO intake w/ pureed items and liquids was slower w/ small bolus sizes of puree and liquids  Adequate lip seal was noted around fork, straw and spoon across al consistencies w/o any anterior loss noted  Bolus manipulation/transfer of pureed items and thin liquids appeared to mildly prolonged, suspect due to pt's difficulty with coordination of breathing and swallowing  Pt independently alternated between pureed food/liquids  Swallow initiation appeared to be prompt and adequate laryngeal excursion noted upon palpation  Throughout the meal, pt demonstrated mild throat clears suspect due to decreased coordination of swallowing/breathing  SLP continuing to recommend pureed diet and thin liquids at this time w/ the following aspiration precautions: OOB or FULLY upright for meals, alternate solids/liquids, minimize distractions during meals, NOT talk during meal and encouragement for PO intake  At this time, will continue to attempt diet upgrade trials to determine if diet upgrade can be warranted while in acute rehab center  If pt continues to decline trials or any softer textures suspect that puree diet will be safest least restrictive diet at this time  Will continue dysphagia therapy in order to determine safest and least restrictive diet w/ goal of also supporting overall PO intake      Swallow Assessment Prognosis   Prognosis Fair   Prognosis Considerations Age; Co-morbidities; Medical diagnosis;Previous level of function;Severity of impairments;New learning ability;Ability to carry over;Participation level; Cooperation   SLP Therapy Minutes   SLP Time In 36   SLP Time Out 1200   SLP Total Time (minutes) 30   SLP Mode of treatment - Individual (minutes) 30   SLP Mode of treatment - Concurrent (minutes) 0   SLP Mode of treatment - Group (minutes) 0   SLP Mode of treatment - Co-treat (minutes) 0   SLP Mode of Treatment - Total time(minutes) 30 minutes   SLP Cumulative Minutes 120   Therapy Time missed   Time missed?  No

## 2021-03-01 NOTE — PROGRESS NOTES
NEPHROLOGY PROGRESS NOTE   Mary Ndiaye 78 y o  female MRN: [id number]  Unit/Bed#: [facility] 453-01 Encounter: [id number]  Reason for Consult: VICKY/Hypercalcemia    ASSESSMENT/PLAN:  1  Acute Kidney Injury- resolved  2  Chronic Kidney Disease stage 3A- Baseline creatinine <1  Hypertensive nephrosclerosis is suspected etiology  3  Hypercalcemia- possible etiology due to immobilization vs other  - workup: PTH 55 7, vitamin D low at 14 6, PTHrp __, ACE level 20, SPEP without monoclonal gammopathy, UPEP without monoclonal gammopathy  - ionized calcium = 1 27  - monitor with 1 8% IVF  4  Elevated bicarbonate level- slowly improving  - on lasix 20mg daily  5  Hyponatremia- consider mild volume depletion  - added fluid restriction  - appears euvolemic  - start gentle 1 8% IVF  6  Hyperkalemia, borderline- monitor with low K diet    Disposition:  Start gentle 1 8% IVF  Discussed with attending  SUBJECTIVE:  Patient was having physical therapy and is short of breath with the exertion  She has pain with movement at times      OBJECTIVE:  Current Weight: Weight - Scale: 70 9 kg (156 lb 4 8 oz)  Vitals:    03/01/21 0553 03/01/21 0827 03/01/21 1121 03/01/21 1326   BP: 136/62 128/59  (!) 113/43   BP Location:  Left arm  Right arm   Pulse: 73 82  84   Resp: 17   18   Temp: 98 3 °F (36 8 °C)   97 8 °F (36 6 °C)   TempSrc:    Oral   SpO2: 97%   100%   Weight:   70 9 kg (156 lb 4 8 oz)    Height:           Intake/Output Summary (Last 24 hours) at 3/1/2021 1437  Last data filed at 3/1/2021 1415  Gross per 24 hour   Intake 195 ml   Output 1100 ml   Net -905 ml     General: NAD  Skin: no rash  Eyes: anicteric  ENMT: mm moist  Neck: no masses  Respiratory: clear but decreased  Cardiac: RRR  Extremities: no edema  GI: soft nt nd  Neuro: alert awake  Psych: mood and affect appropriate    Medications:    Current Facility-Administered Medications:     acetaminophen (TYLENOL) tablet 650 mg, 650 mg, Oral, Q6H [facility], Jon Caruso MD, 650 mg at 03/01/21 1127    al mag oxide-diphenhydramine-lidocaine viscous (MAGIC MOUTHWASH) suspension 10 mL, 10 mL, Swish & Spit, Q4H PRN, ROLA Shepard, 10 mL at 03/01/21 1127    albuterol (PROVENTIL HFA,VENTOLIN HFA) inhaler 2 puff, 2 puff, Inhalation, Q6H PRN, Janice Muniz MD    ALPRAZolam Chiara Rasta) tablet 0 25 mg, 0 25 mg, Oral, TID PRN, Janice Muniz MD, 0 25 mg at 02/28/21 2215    aluminum-magnesium hydroxide-simethicone (MYLANTA) oral suspension 30 mL, 30 mL, Oral, Q6H PRN, Janice Muniz MD    atorvastatin (LIPITOR) tablet 80 mg, 80 mg, Oral, Daily With Juaquin Gilliam MD, 80 mg at 02/28/21 1659    benzonatate (TESSALON PERLES) capsule 100 mg, 100 mg, Oral, TID PRN, Janice Muniz MD    bisacodyl (DULCOLAX) rectal suppository 10 mg, 10 mg, Rectal, Daily PRN, Janice Muniz MD, 10 mg at 02/28/21 1802    fluticasone-vilanterol (BREO ELLIPTA) 100-25 mcg/inh inhaler 1 puff, 1 puff, Inhalation, Daily, Janice Muniz MD, 1 puff at 03/01/21 0837    furosemide (LASIX) tablet 20 mg, 20 mg, Oral, Daily, Janice Muniz MD, 20 mg at 03/01/21 0835    [START ON 3/2/2021] glyBURIDE (DIABETA) tablet 2 5 mg, 2 5 mg, Oral, Daily With Breakfast, ROLA Avila    guaiFENesin (MUCINEX) 12 hr tablet 600 mg, 600 mg, Oral, Q12H [address], Janice Muniz MD, 600 mg at 03/01/21 0835    heparin (porcine) subcutaneous injection 5,000 Units, 5,000 Units, Subcutaneous, Q8H [address], Janice Muniz MD, 5,000 Units at 03/01/21 1359    insulin lispro (HumaLOG) 100 units/mL subcutaneous injection 1-6 Units, 1-6 Units, Subcutaneous, HS, Janice Muniz MD, 2 Units at 02/28/21 2216    insulin lispro (HumaLOG) 100 units/mL subcutaneous injection 1-6 Units, 1-6 Units, Subcutaneous, TID AC, 3 Units at 02/28/21 1657 **AND** Fingerstick Glucose (POCT), , , TID AC, Janice Muniz MD    ipratropium-albuterol (DUO-NEB) 0 5-2 5 mg/3 mL inhalation solution 3 mL, 3 mL, Nebulization, 4x Daily, Janice Muniz MD, 3 mL at 03/01/21 1201   levothyroxine tablet 125 mcg, 125 mcg, Oral, Early Morning, Marva Hatch MD, 125 mcg at 03/01/21 0555    lidocaine (LIDODERM) 5 % patch 2 patch, 2 patch, Topical, Daily, Marva Hatch MD, 2 patch at 03/01/21 0839    melatonin tablet 6 mg, 6 mg, Oral, HS PRN, Marva Hatch MD, 6 mg at 02/25/21 2103    methocarbamol (ROBAXIN) tablet 500 mg, 500 mg, Oral, Q6H PRN, Marva Hatch MD    montelukast (SINGULAIR) tablet 10 mg, 10 mg, Oral, HS, Marva Hatch MD, 10 mg at 02/28/21 2215    nystatin (MYCOSTATIN) cream, , Topical, BID, Marva Hatch MD, 1 application at 07/54/70 2728    nystatin (MYCOSTATIN) powder, , Topical, BID, Marva Hatch MD, 1 application at 55/45/13 0839    ondansetron (ZOFRAN-ODT) dispersible tablet 4 mg, 4 mg, Oral, Q6H PRN, Marva Hatch MD    oxyCODONE (ROXICODONE) IR tablet 5 mg, 5 mg, Oral, Q6H PRN, Marva Hatch MD, 5 mg at 02/28/21 2217    pantoprazole (PROTONIX) EC tablet 40 mg, 40 mg, Oral, Early Morning, Marva Hatch MD, 40 mg at 03/01/21 0555    polyethylene glycol (MIRALAX) packet 17 g, 17 g, Oral, Daily, Marva Hatch MD, 17 g at 03/01/21 0834    [COMPLETED] predniSONE tablet 40 mg, 40 mg, Oral, Daily, 40 mg at 02/26/21 0835 **FOLLOWED BY** [COMPLETED] predniSONE tablet 20 mg, 20 mg, Oral, Daily, 20 mg at 02/28/21 0858 **FOLLOWED BY** predniSONE tablet 10 mg, 10 mg, Oral, Daily, Marva Hatch MD, 10 mg at 03/01/21 4237    saccharomyces boulardii (FLORASTOR) capsule 250 mg, 250 mg, Oral, BID, Marva Hatch MD, 250 mg at 03/01/21 0835    senna (SENOKOT) tablet 8 6 mg, 1 tablet, Oral, HS, Marva Hatch MD, 8 6 mg at 02/28/21 4985    sodium chloride (concentrated) 302 mEq in sterile water 1,000 mL infusion, , Intravenous, Continuous, [facility], PA-C    traMADol (ULTRAM) tablet 50 mg, 50 mg, Oral, Q6H PRN, Marva Hatch MD, 50 mg at 03/01/21 1414    white petrolatum-mineral oil (EUCERIN,HYDROCERIN) cream, , Topical, TID PRN, Marva Hatch MD, Given at 02/27/21 2218    Laboratory Results:  Results from last 7 days   Lab Units 03/01/21  0806 02/28/21  0519 02/27/21  0918 02/27/21  0709 02/25/21  0859 02/24/21  0603 02/23/21  0443   WBC Thousand/uL  --   --   --  11 60* 10 40* 10 15 10 67*   HEMOGLOBIN g/dL  --   --   --  12 3 11 9 10 7* 10 9*   HEMATOCRIT %  --   --   --  38 2 37 4 33 8* 34 7*   PLATELETS Thousands/uL  --   --   --  215 208 191 186   POTASSIUM mmol/L 5 3 5 2  --  5 3 5 2 5 4* 5 7*   CHLORIDE mmol/L 94* 96*  --  94* 100 100 100   CO2 mmol/L 36* 37*  --  40* 35* 34* 33*   BUN mg/dL 39* 39*  --  39* 33* 35* 39*   CREATININE mg/dL 0 96 0 86  --  0 86 0 91 0 94 1 10   CALCIUM mg/dL 11 1* 11 0*  --  11 3* 11 8* 10 8* 11 0*   MAGNESIUM mg/dL  --   --  1 9  --   --  1 7  --    PHOSPHORUS mg/dL  --   --  2 6  --   --   --   --

## 2021-03-01 NOTE — PROGRESS NOTES
Internal Medicine Progress Note  Patient: Tracy Burgess  Age/sex: 78 y o  female  Medical Record #: [id number]      ASSESSMENT/PLAN: (Interval History)  Tracy Burgess is seen and examined and management for following issues:    Compression fracture T5/6  · No surgical intervention  · Cont TLSO brace when OOB  · Aggressive rehab per PMR  · Pain mgmt per PMR     COPD  · Wears oxygen 2-3 L continuous  · Continue current inhalers/nebs per home  · Recent PFT DLCO 54%  · Oxygen saturations stable  · Currently on prednisone wean  · Recent CT scan +moderate emphysema; no PE  · Nebs were changed back to scheduled from prn on 2/27/21     Chronic diastolic HF  · Cont furosemide 20mg daily here   · Takes Torsemide at home  · Monitor volume status closely  · Chronic LE edema  · Echo = LVEF 58%; diastolic dysfunction     DM II  · Hgb A1c 7 1 in October  · Home meds: Glyburide 5mg BID  · Cont sliding scale and coverage  · Dc'd glyburide on 2/24/21 secondary to low BS  · Now BS in 200's will try daily glyburide at 2 5mg 3/1/21     HTN  · Diovan is on hold 2/2 VICKY/CKD and K+ increase  · tx per renal  · BPs acceptable      CKD  · Level 3  · Baseline 0 9-1 1  · Avoid nephrotoxic medications  · Currently at baseline  · Renal managing     Hypercalcemia  · Nephrology w/u at [facility]  · [address] secondary to volume status  · Renal managing      Hyponatremia  · Mild/stable  · Renal following     Hyperkalemia  · On low K+ diet and Diovan is on hold  · tx per renal    Hypothyroid  · Cont home supplementation     Sacral decubitus  · Cont off loading, nutritional supplementation  · Wound nurse following     Oral ulcers  · Cont magic mouthwash q4 hrs as needed  · Feels this helps and uses 1-2x/day          The above assessment and plan was reviewed and updated as determined by my evaluation of the patient on 3/1/2021      Labs:   Results from last 7 days   Lab Units 02/27/21  0709 02/25/21  0859   WBC Thousand/uL 11 60* 10 40*   HEMOGLOBIN g/dL 12 3 11 9   HEMATOCRIT % 38 2 37 4   PLATELETS Thousands/uL 215 208     Results from last 7 days   Lab Units 03/01/21  0806 02/28/21  0519   SODIUM mmol/L 129* 133*   POTASSIUM mmol/L 5 3 5 2   CHLORIDE mmol/L 94* 96*   CO2 mmol/L 36* 37*   BUN mg/dL 39* 39*   CREATININE mg/dL 0 96 0 86   CALCIUM mg/dL 11 1* 11 0*             Results from last 7 days   Lab Units 03/01/21  0609 02/28/21 2037 02/28/21  1655   POC GLUCOSE mg/dl 87 206* 266*       Review of Scheduled Meds:  Current Facility-Administered Medications   Medication Dose Route Frequency Provider Last Rate    acetaminophen  650 mg Oral Q6H [address] Brooke Prior, MD      al mag oxide-diphenhydramine-lidocaine viscous  10 mL Swish & Spit Q4H PRN ROLA Shepard      albuterol  2 puff Inhalation Q6H PRN Brooke Prior, MD      ALPRAZolam  0 25 mg Oral TID PRN Brooke Prior, MD      aluminum-magnesium hydroxide-simethicone  30 mL Oral Q6H PRN Brooke Prior, MD      atorvastatin  80 mg Oral Daily With Latashaa Bumpers, MD      benzonatate  100 mg Oral TID PRN Brooke Prior, MD      bisacodyl  10 mg Rectal Daily PRN Brooke Prior, MD      fluticasone-vilanterol  1 puff Inhalation Daily Brooke Prior, MD      furosemide  20 mg Oral Daily Brooke Prior, MD Chago Munroe ON 3/2/2021] glyBURIDE  2 5 mg Oral Daily With Breakfast ROLA Shepard      guaiFENesin  600 mg Oral Q12H [address] Brooke Prior, MD      heparin (porcine)  5,000 Units Subcutaneous Q8H [address] Brooke Prior, MD      insulin lispro  1-6 Units Subcutaneous HS Brooke Prior, MD      insulin lispro  1-6 Units Subcutaneous TID AC Brooke Prior, MD      ipratropium-albuterol  3 mL Nebulization 4x Daily Brooke Prior, MD      levothyroxine  125 mcg Oral Early Morning Brooke Prior, MD      lidocaine  2 patch Topical Daily Brooke Prior, MD      melatonin  6 mg Oral HS PRN Brooke Prior, MD      methocarbamol  500 mg Oral Q6H PRN Brooke Prior, MD      montelukast  10 mg Oral HS Jemma Marcia Chakraborty MD      nystatin   Topical BID Edmonia [place], MD      nystatin   Topical BID Edmonia [place], MD      ondansetron  4 mg Oral Q6H PRN Edmonia [place], MD      oxyCODONE  5 mg Oral Q6H PRN Edmonia [place], MD      pantoprazole  40 mg Oral Early Morning Edmonia [place], MD      polyethylene glycol  17 g Oral Daily Edmonia [place], MD      predniSONE  10 mg Oral Daily Edmonia [place], MD      saccharomyces boulardii  250 mg Oral BID Edmonia [place], MD      senna  1 tablet Oral HS Edmonia [place], MD      traMADol  50 mg Oral Q6H PRN Edmonia [place], MD      white petrolatum-mineral oil   Topical TID PRN Edmonia [place], MD         Subjective/ HPI: Patients overnight issues or events were reviewed with nursing or staff during rounds or morning huddle session  No new or overnight issues  Offers no complaints  Pt c/w RICO, otherwise no other complaints; +BM      ROS:   A 10 point ROS was performed; negative except as noted above  Imaging:     XR abdomen 1 view kub   Final Result by Yuliya Kelly MD (03/01 0648)      Large-volume stool, suggesting constipation  Nonobstructive bowel gas pattern                 Workstation performed: [id number]             *Labs /Radiology studies reviewed  *Medications reviewed and reconciled as needed  *Please refer to order section for additional ordered labs studies  *Case discussed with primary attending during morning huddle case rounds      Physical Examination:  Vitals:   Vitals:    02/28/21 2000 02/28/21 2030 03/01/21 0553 03/01/21 0827   BP: 101/56  136/62 128/59   BP Location:    Left arm   Pulse: 87  73 82   Resp: 16  17    Temp: 97 8 °F (36 6 °C)  98 3 °F (36 8 °C)    TempSrc: Oral      SpO2: 97% 97% 97%    Weight:       Height:           GEN: No apparent distress, interactive  NEURO: Alert and oriented x3  HEENT: Pupils are equal and reactive, EOMI, mucous membranes are moist, face symmetrical  CV: S1 S2 regular, no MRG, no peripheral edema noted  RESP: Lungs are decreased throughout bilaterally, no wheezes, rales or rhonchi noted, on room air, respirations easy and non labored  GI: obese, soft non tender, non distended; +BS x4  : Voiding without difficulty; +generalized deconditioning  MUSC: Moves all extremities  SKIN: sacral wound, perineal excoriation        The above physical exam was reviewed and updated as determined by my evaluation of the patient on 3/1/2021  Invasive Devices     Drain            External Urinary Catheter 8 days                   VTE Pharmacologic Prophylaxis: Heparin  Code Status: Level 3 - DNAR and DNI  Current Length of Stay: 5 day(s)      Total time spent:  30 minutes with more than 50% spent counseling/coordinating care  Counseling includes discussion with patient re: progress  and discussion with patient of his/her current medical state/information  Coordination of patient's care was performed in conjunction with primary service  Time invested included review of patient's labs, vitals, and management of their comorbidities with continued monitoring  In addition, this patient was discussed with medical team including physician and advanced extenders  The care of the patient was extensively discussed and appropriate treatment plan was formulated unique for this patient  ** Please Note:  voice to text software may have been used in the creation of this document   Although proof errors in transcription or interpretation are a potential of such software**

## 2021-03-01 NOTE — PCC PHYSICAL THERAPY
Pt making slow but fair progress throughout stay in [facility]  Pt able to perform functional mobilty at min-modA w/ RW pending fatigue levels  Pt req frequent rest breaks throughout session and often becomes RICO, reporting "I can't breathe" despite O2 levels being WNL  Pt cont to be very limited by dec endurance, poor tolerance to activity, dec adherence to spinal precautions, poor standing tolerance, and global strength deficits  Pts biggest barriers to d/c include above stated impairments along w/ 5STE and pt living alone w/ intermittent family support  Pt cont to req acute rehab PT focusing on ambulation, stair negoatition, functional transfers, balance ex and LE therex to maximize functional independence  3/8/21  Pt continues to demo slow functional gains in PT  Pt is able to complete level surface mobilities at CG-min A most of the time but may require mod A at times due to fatigue and pain  Also still requires VC or assist for safe O2 line management during mobilities  Pt demos dec compliance of spinal precautions with max A to don/doff TLSO brace  Additional barriers include dec endurance and poor activity tolerance with RICO requiring rest break every after functional task, dec standing tolerance/balance and impaired strengths of UE/LE  At this time pt is not safe to return home alone and therapy recommends for pt to have 24/7 S/assist at d/c so will require additional skilled PT intervention to improve overall functional mobility indep and reduce caregiver burden at d/c

## 2021-03-01 NOTE — PCC SPEECH THERAPY
Pt currently being followed for dysphagia tx sessions  Currently, pt verbalizing weight loss of ~15-20 lbs in 3 months, along w/ difficulty in lower dentures fitting and currently does not wear them w/ meals  During this hospitalization, pt has demonstrated and verbalized "fear of choking" on food items, which pt has favored pudding, thin liquids, broths, ice cream, etc  Pt does have h/o COPD, which currently pt is on 3L of O2 and does get "winded" quickly  Pt did require increased encouragement to trial solids (bread w/ peanut butter) during assessment, again favoring broth and pudding items  Currently pt is demonstrating mild-moderate oral deficits characterized by prolonged/effortful mastication of soft/solids, mild delay in manipulation/transfer of puree and thin liquids, but w/o oral residual or anterior loss  Mild pharyngeal deficits are noted characterized by delayed swallow initiation across consistencies, slight decrease in hyolaryngeal excursion exhibiting intermittent throat clearing during meals, but suspect due to difficulty of coordination breathing w/ swallowing  Throughout meal, pt verbalized as well as demonstrated "running out of breath" when talking, needing verbal cues to not talk while eating and educated on pacing during meals to conserve energy and decrease sensation of SOB  Pacing of meal is appropriate as well as ability to independently alternate solids/liquids, small bites/sips  Discussed w/ pt at end of the meal about the different modified dysphagia diet levels, including level 3 diet which includes chopped meats/vegetables, softer food items and level 2 diet, which includes minced meat/finely chopped vegetables and some pureed items  Pt stating that those diet levels suspect will be difficult for her to manage at this time  SLP then proceeded to educate pt on pureed diet, which all items would be the consistency of apple sauce and pudding   Pt was agreeable to try this diet, stating "I need protein and vegetables"  SLP reiterating that those items will come out smooth just as pudding textures  Again, reeducating pt that pureed diet will allow PO intake along w/ able to conserve energy during meals due to lack of increased time needed for chewing  At this time, recommendation is to downgrade diet to pureed diet w/ thin liquids, continuing aspiration precautions as well as recommending DISTANT supervision during meals  Also, recommending pt to be OOB for all meals as well as encouraging of PO intake throughout meals  Pt w/ limited trials of level 2 items at this time, but when trialing items, pt is demonstrating prolonged oral stage (mastication/manipulation/transfer) due to suspected fear of choking upon initiation of swallowing  No increased or overt aspiration sxs noted consistently during f/u sessions  If pt's overall willingness to trial other food textures remains decreased, suspect that puree diet will be safest/least restrictive at this time  Currently, pt to continue to benefit from SLP services targeting establishment of safest least restrictive diet w/o increased oropharyngeal sxs  Update from week 3/9/2021: Continues to be followed for dysphagia tx sessions, to which currently diet was able to be advanced from puree/thin liquids from last week to now dysphagia level 2 w/ thin liquids  Pt does continue to demonstrate more medical barriers of requiring 2-3L O2 which is baseline at this time, but does have increased SOB due to positioning primarily when getting seated FULLY upright in bed, noting better breathing when OOB in [facility] for meals  Pt is able to now wear lower denture more consistently which improved pt's tolerance of mastication provided level 2 as well as when introducing level 3 items which pt has been able to tolerate as well w/o increased oral residual/pocketing  Pt does not demonstrate increased aspiration risk for at this time during meals   Pt is able to demonstrate fair consistency in ability to recall swallow strategies during meal, BUT does continue to require verbal cues for NO TALKING during meals  Will continue to assess level 3 textures under SLP supervision only at this time for monitoring advancement of diet continuing aspiration precautions: DISTANT supervision and OOB or FULLY upright for meals, alternate solids/liquids during meals

## 2021-03-01 NOTE — PROGRESS NOTES
03/01/21 1100   Pain Assessment   Pain Assessment Tool 0-10   Pain Score 4   Pain Location/Orientation Location: Generalized; Location: Back   Restrictions/Precautions   Precautions Aspiration;Bed/chair alarms;Cognitive; Fall Risk;O2;Spinal precautions;Pain  (3LO2 NC)   ROM Restrictions Yes  (Spinal)   Braces or Orthoses TLSO  (backpack when OOB)   Cognition   Overall Cognitive Status Impaired   Arousal/Participation Alert; Cooperative   Attention Attends with cues to redirect   Memory Decreased recall of recent events;Decreased recall of precautions   Following Commands Follows one step commands with increased time or repetition   Subjective   Subjective Pt requesting to use bathroom   Roll Left and Right   Type of Assistance Needed Physical assistance; Adaptive equipment   Amount of Physical Assistance Provided 50%-74%   Comment modA, VC for log roll   Roll Left and Right CARE Score 2   Sit to Lying   Type of Assistance Needed Physical assistance; Adaptive equipment   Amount of Physical Assistance Provided 50%-74%   Comment A w/ LE, HOB elevated and use of bed rails   Sit to Lying CARE Score 2   Lying to Sitting on Side of Bed   Type of Assistance Needed Physical assistance; Adaptive equipment   Amount of Physical Assistance Provided 25%-49%   Comment modA, use of bed rails   Lying to Sitting on Side of Bed CARE Score 3   Sit to Stand   Type of Assistance Needed Physical assistance; Adaptive equipment   Amount of Physical Assistance Provided 25%-49%   Comment Marguerite RW, pt req mult attempts to get up   Sit to Stand CARE Score 3   Bed-Chair Transfer   Type of Assistance Needed Physical assistance; Adaptive equipment   Amount of Physical Assistance Provided 25%-49%   Comment full A w/ O2 line management, Marguerite for stand pivot w/ RW   Chair/Bed-to-Chair Transfer CARE Score 3   Transfer Bed/Chair/Wheelchair   Limitations Noted In Balance; Endurance;Pain Management;UE Strength;LE Strength   Adaptive Equipment Nestor Isabel Stand Pivot Minimal Assist   Sit to Stand Minimal Assist   Stand to Sit Minimal Assist   Supine to Sit Moderate Assist   Sit to Supine Moderate Assist   Car Transfer   Reason if not Attempted Safety concerns   Car Transfer CARE Score 88   Walk 10 Feet   Type of Assistance Needed Incidental touching; Adaptive equipment   Comment CGA RW, A for O2 line   Walk 10 Feet CARE Score 4   Walk 50 Feet with Two Turns   Reason if not Attempted Safety concerns   Walk 50 Feet with Two Turns CARE Score 88   Walk 150 Feet   Reason if not Attempted Safety concerns   Walk 150 Feet CARE Score 88   Walking 10 Feet on Uneven Surfaces   Reason if not Attempted Safety concerns   Walking 10 Feet on Uneven Surfaces CARE Score 88   Ambulation   Does the patient walk? 2  Yes   Primary Mode of Locomotion Prior to Admission Walk   Distance Walked (feet) 10 ft   Assist Device Roller Walker   Gait Pattern Slow Lory;Decreased foot clearance; Forward Flexion; Step to; Step through; Improper weight shift   Limitations Noted In Balance; Endurance;Posture;Speed;Strength;Swing   Walk Assist Level Contact Guard   Findings 10' RW to w/c    Wheel 50 Feet with Two Turns   Reason if not Attempted Activity not applicable   Wheel 50 Feet with Two Turns CARE Score 9   Wheel 150 Feet   Reason if not Attempted Activity not applicable   Wheel 508 Feet CARE Score 9   Wheelchair mobility   Does the patient use a wheelchair? 0  No   Curb or Single Stair   Reason if not Attempted Safety concerns   1 Step (Curb) CARE Score 88   4 Steps   Reason if not Attempted Safety concerns   4 Steps CARE Score 88   12 Steps   Reason if not Attempted Safety concerns   12 Steps CARE Score 88   Toilet Transfer   Type of Assistance Needed Physical assistance; Adaptive equipment   Amount of Physical Assistance Provided 25%-49%   Comment modA stand pivot to [facility]   Toilet Transfer CARE Score 3   Assessment   Treatment Assessment PT session focusing on repeated transfers to various surfaces and bed mobility 2* DORA Forrest requesting pt return to bed to weigh pt  Pt able to perform at min-modA w/ RW, req inc time for all fcuntioanl mobilty and frequent seated rest breaks as pt becomes RICO w/ little mobiliyt  Pt cont to req A for O2 line management w/ all transfers and ambulating short distances  Able to recall some spinal precautions, however, req VC for log rolling to adhere to precautions when performing bed mobility  Pt tolerated session on 3LO2 NC  Pt cont to req acute rehab PT focusing on endurance ex, ambulation, stair negotaition, balance ex and LE therex to maximize functional independence  Problem List Decreased strength;Decreased range of motion;Decreased endurance; Impaired balance;Decreased mobility;Orthopedic restrictions   Barriers to Discharge Inaccessible home environment;Decreased caregiver support   PT Barriers   Functional Limitation Car transfers; Ramp negotiation;Stair negotiation;Standing;Transfers; Walking   Plan   Treatment/Interventions Functional transfer training;LE strengthening/ROM; Elevations; Therapeutic exercise; Endurance training;Cognitive reorientation;Patient/family training;Equipment eval/education; Bed mobility;Gait training; Compensatory technique education   Progress Slow progress, decreased activity tolerance   Recommendation   PT Discharge Recommendation Other (Comment)  (TBD pending progress)   Equipment Recommended Walker   PT Therapy Minutes   PT Time In 1100   PT Time Out 1130   PT Total Time (minutes) 30   PT Mode of treatment - Individual (minutes) 30   PT Mode of treatment - Concurrent (minutes) 0   PT Mode of treatment - Group (minutes) 0   PT Mode of treatment - Co-treat (minutes) 0   PT Mode of Treatment - Total time(minutes) 30 minutes   PT Cumulative Minutes 270   Therapy Time missed   Time missed?  No

## 2021-03-01 NOTE — PROGRESS NOTES
03/01/21 0831   Pain Assessment   Pain Assessment Tool 0-10   Pain Score 6   Pain Location/Orientation Location: Back;Orientation: Mid   Pain Onset/Description Onset: Ongoing;Frequency: Constant/Continuous; Descriptor: Sore;Descriptor: Aching;Descriptor: Cramping   Effect of Pain on Daily Activities reports pain makes breathing tougher, also limiting tolerance to brace   Hospital Pain Intervention(s) Repositioned; Rest   Restrictions/Precautions   Precautions Aspiration;Bed/chair alarms;Cognitive; Fall Risk;O2;Pain;Spinal precautions;Supervision on toilet/commode   ROM Restrictions   (thoracic spinal prec)   Braces or Orthoses TLSO  (backpack)   Lifestyle   Autonomy "I know I know I have to get out of bed"   Oral Hygiene   Type of Assistance Needed Physical assistance   Amount of Physical Assistance Provided 25%-49%   Comment cont to req A for thorough denture care and encouragement to complete   Oral Hygiene CARE Score 3   Shower/Bathe Self   Type of Assistance Needed Physical assistance   Amount of Physical Assistance Provided 50%-74%   Comment Completed bathing seated on platform commode this session  Pt able to bath UB, thighs, and partially clean keena region  Req A for LEs and buttocks in stance  Fatigues quickly req more frequent rest breaks  Utilizing 3L O2 to maintain above 90% SPO2 and pt report moderate difficulty for task  Shower/Bathe Self CARE Score 2   Bathing   Assessed Bath Style Sponge Bath   Anticipated D/C Bath Style Shower;Sponge Bath  (pending progress)   Tub/Shower Transfer   Reason Not Assessed Sponge Bath;Endurance; Safety   Upper Body Dressing   Type of Assistance Needed Physical assistance   Amount of Physical Assistance Provided 75% or more   Comment Req A to bring button shirt around back after threading LUE  Req A for buttoning  Req A for TLSO      Upper Body Dressing CARE Score 2   Lower Body Dressing   Type of Assistance Needed Physical assistance   Amount of Physical Assistance Provided Total assistance   Comment dependent for threading and CM over hips  Ax1  Lower Body Dressing CARE Score 1   Putting On/Taking Off Footwear   Type of Assistance Needed Physical assistance; Adaptive equipment   Amount of Physical Assistance Provided 25%-49%   Comment Able to don socks onto sockaide  Req A to bring over toes and A pulling on ropes to pull onto foot 2* significant UE weakness  Putting On/Taking Off Footwear CARE Score 3   Lying to Sitting on Side of Bed   Type of Assistance Needed Physical assistance   Amount of Physical Assistance Provided 25%-49%   Comment heavy reliance on bed rails and ext time necessary   Lying to Sitting on Side of Bed CARE Score 3   Sit to Stand   Type of Assistance Needed Physical assistance   Amount of Physical Assistance Provided 25%-49%   Comment Marguerite sit<>stand, L hand on armrest/bed and R hand on RW   Sit to Stand CARE Score 3   Bed-Chair Transfer   Type of Assistance Needed Physical assistance   Amount of Physical Assistance Provided 25%-49%   Comment A necessary for O2 line management   Chair/Bed-to-Chair Transfer CARE Score 3   Toileting Hygiene   Type of Assistance Needed Physical assistance   Amount of Physical Assistance Provided 75% or more   Comment A for thorough hygiene  Pt seated on platform commode with in ability to spread legs to trial keena hygiene  Toileting Hygiene CARE Score 2   Toilet Transfer   Type of Assistance Needed Physical assistance   Amount of Physical Assistance Provided 25%-49%   Comment Replaced commode with wide platform commode as pt not tolerating sitting on standard commode 2* tailbone discomfort and inability to spread legs  SPent extensive time discussion MD Sathe order to stop purewick for skin integrity concerns  Pt receptive to trialing transfers to new commode more frequently  Toilet Transfer CARE Score 3   Functional Standing Tolerance   Time 20-30s x 2   Activity hygiene and toileting   Comments fatiguing quickly  Utilized 3L with WFL SPO2 but inc SOB   Cognition   Overall Cognitive Status Impaired   Arousal/Participation Alert; Cooperative   Attention Attends with cues to redirect   Orientation Level Oriented X4   Memory Decreased recall of precautions   Following Commands Follows one step commands with increased time or repetition   Activity Tolerance   Activity Tolerance Patient limited by fatigue   Assessment   Treatment Assessment Pt participated in skilled OT session with focus on ADL management, toileting  Pt cont to be limited by fatigue, difficulty breathing, motivation to engage, weakness  Req 3L for self care tolerance with SPO2 between 92-96% dependent on task exertion  As per most recent MD note, requesting 24-48 hour break from [address] for skin integrity concerns with pt initially resistive 2* discomfort on commode  Switched out standard commode with widebase platform commode to allow pt more support, ability to shift for tailbone relief  Pt reporting inc comfort and willingness to utilize at this time  Pt perform all transfers at [facility] level with RW but does cont to req A for O2 line management at this time and frequent rest breaks for fatigue management  At this time, pt cont to be dependent for TLSO but would benefit from extensive time spend on education, positioning for donning of brace EOB  Cont with POC with focus on LHAE for LB dressing and ECT management, act tolerance, pulmonary rehab, fx mobility, standing tolerance and balance for inc indep with toileting and self care  Pt in wc at end of session with air cushion underneath and pt reporting no discomfort in tailbone, call bell in reach, all needs currently met  Of note, RN present during session to assess skin and apply necessary ointments  Prognosis Fair   Problem List Decreased strength;Decreased range of motion;Decreased endurance; Impaired balance;Decreased mobility; Decreased coordination;Decreased safety awareness;Orthopedic restrictions;Pain Barriers to Discharge Inaccessible home environment;Decreased caregiver support   Plan   Treatment/Interventions ADL retraining;Functional transfer training; Therapeutic exercise; Endurance training;Patient/family training;Equipment eval/education; Bed mobility   Progress Slow progress, decreased activity tolerance   Recommendation   OT Discharge Recommendation Home with skilled therapy  (inc family A v HHA, pending progress)   OT Therapy Minutes   OT Time In 0830   OT Time Out 0930   OT Total Time (minutes) 60   OT Mode of treatment - Individual (minutes) 60   OT Mode of treatment - Concurrent (minutes) 0   OT Mode of treatment - Group (minutes) 0   OT Mode of treatment - Co-treat (minutes) 0   OT Mode of Treatment - Total time(minutes) 60 minutes   OT Cumulative Minutes 360   Therapy Time missed   Time missed?  No

## 2021-03-02 NOTE — PLAN OF CARE
Problem: Potential for Falls  Goal: Patient will remain free of falls  Description: INTERVENTIONS:  - Assess patient frequently for physical needs  -  Identify cognitive and physical deficits and behaviors that affect risk of falls  -  [place] fall precautions as indicated by assessment   - Educate patient/family on patient safety including physical limitations  - Instruct patient to call for assistance with activity based on assessment  - Modify environment to reduce risk of injury  - Consider OT/PT consult to assist with strengthening/mobility  Outcome: Progressing     Problem: Prexisting or High Potential for Compromised Skin Integrity  Goal: Skin integrity is maintained or improved  Description: INTERVENTIONS:  - Identify patients at risk for skin breakdown  - Assess and monitor skin integrity  - Assess and monitor nutrition and hydration status  - Monitor labs   - Assess for incontinence   - Turn and reposition patient  - Assist with mobility/ambulation  - Relieve pressure over bony prominences  - Avoid friction and shearing  - Provide appropriate hygiene as needed including keeping skin clean and dry  - Evaluate need for skin moisturizer/barrier cream  - Collaborate with interdisciplinary team   - Patient/family teaching  - Consider wound care consult   Outcome: Progressing     Problem: Nutrition/Hydration-ADULT  Goal: Nutrient/Hydration intake appropriate for improving, restoring or maintaining nutritional needs  Description: Monitor and assess patient's nutrition/hydration status for malnutrition  Collaborate with interdisciplinary team and initiate plan and interventions as ordered  Monitor patient's weight and dietary intake as ordered or per policy  Utilize nutrition screening tool and intervene as necessary  Determine patient's food preferences and provide high-protein, high-caloric foods as appropriate       INTERVENTIONS:  - Monitor oral intake, urinary output, labs, and treatment plans  - Assess nutrition and hydration status and recommend course of action  - Evaluate amount of meals eaten  - Assist patient with eating if necessary   - Allow adequate time for meals  - Recommend/ encourage appropriate diets, oral nutritional supplements, and vitamin/mineral supplements  - Order, calculate, and assess calorie counts as needed  - Recommend, monitor, and adjust tube feedings and TPN/PPN based on assessed needs  - Assess need for intravenous fluids  - Provide specific nutrition/hydration education as appropriate  - Include patient/family/caregiver in decisions related to nutrition  Outcome: Progressing     Problem: PAIN - ADULT  Goal: Verbalizes/displays adequate comfort level or baseline comfort level  Description: Interventions:  - Encourage patient to monitor pain and request assistance  - Assess pain using appropriate pain scale  - Administer analgesics based on type and severity of pain and evaluate response  - Implement non-pharmacological measures as appropriate and evaluate response  - Consider cultural and social influences on pain and pain management  - Notify physician/advanced practitioner if interventions unsuccessful or patient reports new pain  Outcome: Progressing     Problem: INFECTION - ADULT  Goal: Absence or prevention of progression during hospitalization  Description: INTERVENTIONS:  - Assess and monitor for signs and symptoms of infection  - Monitor lab/diagnostic results  - Monitor all insertion sites, i e  indwelling lines, tubes, and drains  - Monitor endotracheal if appropriate and nasal secretions for changes in amount and color  - [place] appropriate cooling/warming therapies per order  - Administer medications as ordered  - Instruct and encourage patient and family to use good hand hygiene technique  - Identify and instruct in appropriate isolation precautions for identified infection/condition  Outcome: Progressing     Problem: SAFETY ADULT  Goal: Patient will remain free of falls  Description: INTERVENTIONS:  - Assess patient frequently for physical needs  -  Identify cognitive and physical deficits and behaviors that affect risk of falls    -  [place] fall precautions as indicated by assessment   - Educate patient/family on patient safety including physical limitations  - Instruct patient to call for assistance with activity based on assessment  - Modify environment to reduce risk of injury  - Consider OT/PT consult to assist with strengthening/mobility  Outcome: Progressing  Goal: Maintain or return to baseline ADL function  Description: INTERVENTIONS:  -  Assess patient's ability to carry out ADLs; assess patient's baseline for ADL function and identify physical deficits which impact ability to perform ADLs (bathing, care of mouth/teeth, toileting, grooming, dressing, etc )  - Assess/evaluate cause of self-care deficits   - Assess range of motion  - Assess patient's mobility; develop plan if impaired  - Assess patient's need for assistive devices and provide as appropriate  - Encourage maximum independence but intervene and supervise when necessary  - Involve family in performance of ADLs  - Assess for home care needs following discharge   - Consider OT consult to assist with ADL evaluation and planning for discharge  - Provide patient education as appropriate  Outcome: Progressing  Goal: Maintain or return mobility status to optimal level  Description: INTERVENTIONS:  - Assess patient's baseline mobility status (ambulation, transfers, stairs, etc )    - Identify cognitive and physical deficits and behaviors that affect mobility  - Identify mobility aids required to assist with transfers and/or ambulation (gait belt, sit-to-stand, lift, walker, cane, etc )  - [place] fall precautions as indicated by assessment  - Record patient progress and toleration of activity level on Mobility SBAR; progress patient to next Phase/Stage  - Instruct patient to call for assistance with activity based on assessment  - Consider rehabilitation consult to assist with strengthening/weightbearing, etc   Outcome: Progressing     Problem: DISCHARGE PLANNING  Goal: Discharge to home or other facility with appropriate resources  Description: INTERVENTIONS:  - Identify barriers to discharge w/patient and caregiver  - Arrange for needed discharge resources and transportation as appropriate  - Identify discharge learning needs (meds, wound care, etc )  - Arrange for interpretive services to assist at discharge as needed  - Refer to Case Management Department for coordinating discharge planning if the patient needs post-hospital services based on physician/advanced practitioner order or complex needs related to functional status, cognitive ability, or social support system  Outcome: Progressing

## 2021-03-02 NOTE — PROGRESS NOTES
03/02/21 0927   Pain Assessment   Pain Assessment Tool 0-10   Pain Score 4   Pain Location/Orientation Location: Back   Restrictions/Precautions   Precautions Bed/chair alarms;Cognitive; Fall Risk;O2;Supervision on toilet/commode  (3L O2 NC)   Braces or Orthoses TLSO  (backpack when OOB)   Lifestyle   Autonomy "I can't do much of anything because I'm so tired"   Oral Hygiene   Type of Assistance Needed Physical assistance   Amount of Physical Assistance Provided 25%-49%   Comment cont to req A for thorough denture care and encouragement to complete   Oral Hygiene CARE Score 3   Roll Left and Right   Type of Assistance Needed Physical assistance   Amount of Physical Assistance Provided 50%-74%   Comment ModA w/ VCs to complete log roll, pt used bed rails   Roll Left and Right CARE Score 2   Bed-Chair Transfer   Comment Pt refused transfer OOB during session   Reason if not Attempted Refused to perform   Chair/Bed-to-Chair Transfer CARE Score 7   Toileting Hygiene   Type of Assistance Needed Physical assistance   Amount of Physical Assistance Provided Total assistance   Comment Assist to complete hygiene at bed level   Toileting Hygiene CARE Score 1   Cognition   Overall Cognitive Status Impaired   Arousal/Participation Alert; Cooperative   Attention Attends with cues to redirect   Orientation Level Oriented X4   Activity Tolerance   Activity Tolerance Patient limited by fatigue   Assessment   Treatment Assessment 41min IPOT session focusing on completion of basic self care tasks at bed level  Pt reports extensive fatigue after not sleeping well and "being at it since 5am " Pt agreeable to attempt tasks but still requires assist for thoroughness and d/t fatigue level  With little movements in bed pt continues to report that she "can't breathe," but continues to demo ability to talk and no additional SOB  OT to continue to treat and address fxnl deficits by following POC     Prognosis Fair   Problem List Decreased strength;Decreased range of motion;Decreased endurance; Impaired balance;Decreased mobility; Decreased coordination;Decreased cognition; Impaired judgement;Decreased safety awareness;Pain;Orthopedic restrictions;Decreased skin integrity   Barriers to Discharge Inaccessible home environment;Decreased caregiver support   Plan   Treatment/Interventions ADL retraining;Functional transfer training; Therapeutic exercise; Endurance training;Cognitive reorientation;Patient/family training;Equipment eval/education; Compensatory technique education;Continued evaluation   OT Therapy Minutes   OT Time In 0927   OT Time Out 1008   OT Total Time (minutes) 41   OT Mode of treatment - Individual (minutes) 41   OT Mode of treatment - Concurrent (minutes) 0   OT Mode of treatment - Group (minutes) 0   OT Mode of treatment - Co-treat (minutes) 0   OT Mode of Treatment - Total time(minutes) 41 minutes   OT Cumulative Minutes 401   Therapy Time missed   Time missed?  No

## 2021-03-02 NOTE — PROGRESS NOTES
NEPHROLOGY PROGRESS NOTE   Bonita Garland 78 y o  female MRN: [id number]  Unit/Bed#: -01 Encounter: [id number]  Reason for Consult: VICKY/Hypercalcemia    ASSESSMENT/PLAN:  1  Acute Kidney Injury- resolved  2  Chronic Kidney Disease stage 3A- Baseline creatinine <1  Hypertensive nephrosclerosis is suspected etiology  3  Hypercalcemia- possible etiology due to immobilization vs other  - workup: PTH 55 7, vitamin D low at 14 6, PTHrp __, ACE level 20, SPEP without monoclonal gammopathy, UPEP without monoclonal gammopathy  - ionized calcium = 1 27 (3/1/21)  - slightly improved with 1 8% saline to 10 9 today  4  Elevated bicarbonate level- slightly up this morning to 37 from 36 yesterday morning  - on lasix 20mg daily  5  Hyponatremia- improved status post 1 8% saline overnight  - continue fluid restriction  - s/p gentle 1 8% saline  6  Hyperkalemia, borderline- monitor with low K diet    Disposition:  Discontinue 1 8%  SUBJECTIVE:  Patient feeling well  Gets SOB with talking too much    Currently eating breakfast     OBJECTIVE:  Current Weight: Weight - Scale: 70 9 kg (156 lb 4 8 oz)  Vitals:    03/01/21 1326 03/01/21 1904 03/01/21 2047 03/02/21 0533   BP: (!) 113/43  112/68 131/64   BP Location: Right arm  Left arm Right arm   Pulse: 84  81 92   Resp: 18  17 18   Temp: 97 8 °F (36 6 °C)  98 °F (36 7 °C) 97 7 °F (36 5 °C)   TempSrc: Oral  Oral Oral   SpO2: 100% 100% 100% 97%   Weight:       Height:           Intake/Output Summary (Last 24 hours) at 3/2/2021 4628  Last data filed at 3/2/2021 8027  Gross per 24 hour   Intake 719 5 ml   Output 975 ml   Net -255 5 ml     General: NAD  Skin: no rash  Eyes: anicteric  ENMT: mm moist  Neck: no masses  Respiratory: + wheeze, decreased  Cardiac: RRR  Extremities: no edema  GI: soft nt nd  Neuro: alert awake  Psych: mood and affect appropriate    Medications:    Current Facility-Administered Medications:     acetaminophen (TYLENOL) tablet 650 mg, 650 mg, Oral, Q6H [facility], Varghese Fine MD, 650 mg at 03/02/21 0534    al mag oxide-diphenhydramine-lidocaine viscous (MAGIC MOUTHWASH) suspension 10 mL, 10 mL, Swish & Spit, Q4H PRN, ROLA Shepard, 10 mL at 03/01/21 1547    albuterol (PROVENTIL HFA,VENTOLIN HFA) inhaler 2 puff, 2 puff, Inhalation, Q6H PRN, Varghese Fine MD    ALPRAZolam Dimple Crandall) tablet 0 25 mg, 0 25 mg, Oral, TID PRN, Varghese Fine MD, 0 25 mg at 03/01/21 2129    aluminum-magnesium hydroxide-simethicone (MYLANTA) oral suspension 30 mL, 30 mL, Oral, Q6H PRN, Varghese Fine MD    atorvastatin (LIPITOR) tablet 80 mg, 80 mg, Oral, Daily With Sumeet Singh MD, 80 mg at 03/01/21 1522    benzonatate (TESSALON PERLES) capsule 100 mg, 100 mg, Oral, TID PRN, Varghese Fine MD    bisacodyl (DULCOLAX) rectal suppository 10 mg, 10 mg, Rectal, Daily PRN, Varghese Fine MD, 10 mg at 02/28/21 1802    fluticasone-vilanterol (BREO ELLIPTA) 100-25 mcg/inh inhaler 1 puff, 1 puff, Inhalation, Daily, Varghese Fine MD, 1 puff at 03/02/21 0904    furosemide (LASIX) tablet 20 mg, 20 mg, Oral, Daily, Varghese Fine MD, 20 mg at 03/02/21 2245    glyBURIDE (DIABETA) tablet 2 5 mg, 2 5 mg, Oral, Daily With Bronwyn, ROLA Wen, 2 5 mg at 03/02/21 0725    guaiFENesin (MUCINEX) 12 hr tablet 600 mg, 600 mg, Oral, Q12H [facility], Varghese Fine MD, 600 mg at 03/02/21 0903    heparin (porcine) subcutaneous injection 5,000 Units, 5,000 Units, Subcutaneous, Q8H [facility], Varghese Fine MD, 5,000 Units at 03/02/21 0533    insulin lispro (HumaLOG) 100 units/mL subcutaneous injection 1-6 Units, 1-6 Units, Subcutaneous, HS, Varghese Fine MD, 1 Units at 03/01/21 2126    insulin lispro (HumaLOG) 100 units/mL subcutaneous injection 1-6 Units, 1-6 Units, Subcutaneous, TID AC, 3 Units at 03/01/21 1612 **AND** Fingerstick Glucose (POCT), , , TID AC, Varghese Fine MD    ipratropium-albuterol (DUO-NEB) 0 5-2 5 mg/3 mL inhalation solution 3 mL, 3 mL, Nebulization, 4x Daily, Jemma ADAIR MD Merlin, 3 mL at 03/02/21 4105    levothyroxine tablet 125 mcg, 125 mcg, Oral, Early Morning, Marino Castaneda MD, 125 mcg at 03/02/21 0534    lidocaine (LIDODERM) 5 % patch 2 patch, 2 patch, Topical, Daily, Marino Castaneda MD, 2 patch at 03/02/21 0902    melatonin tablet 6 mg, 6 mg, Oral, HS PRN, Marino Castaneda MD, 6 mg at 02/25/21 2103    methocarbamol (ROBAXIN) tablet 500 mg, 500 mg, Oral, Q6H PRN, Marino Castaneda MD    montelukast (SINGULAIR) tablet 10 mg, 10 mg, Oral, HS, Marino Castaneda MD, 10 mg at 03/01/21 2125    nystatin (MYCOSTATIN) cream, , Topical, BID, Marino Castaneda MD, 1 application at 41/25/55 5059    nystatin (MYCOSTATIN) powder, , Topical, BID, Marino Castaneda MD, 1 application at 52/19/07 0904    ondansetron (ZOFRAN-ODT) dispersible tablet 4 mg, 4 mg, Oral, Q6H PRN, Marino Castaneda MD    oxyCODONE (ROXICODONE) IR tablet 5 mg, 5 mg, Oral, Q6H PRN, Marino Castaneda MD, 5 mg at 03/01/21 2129    pantoprazole (PROTONIX) EC tablet 40 mg, 40 mg, Oral, Early Morning, Marino Castaneda MD, 40 mg at 03/02/21 0534    polyethylene glycol (MIRALAX) packet 17 g, 17 g, Oral, Daily, Marino Castaneda MD, 17 g at 03/02/21 4057    saccharomyces boulardii (FLORASTOR) capsule 250 mg, 250 mg, Oral, BID, Marino Castaneda MD, 250 mg at 03/02/21 0902    senna (SENOKOT) tablet 8 6 mg, 1 tablet, Oral, HS, Marino Castaneda MD, 8 6 mg at 03/01/21 2125    traMADol (ULTRAM) tablet 50 mg, 50 mg, Oral, Q6H PRN, Marino Castaneda MD, 50 mg at 03/01/21 1414    white petrolatum-mineral oil (EUCERIN,HYDROCERIN) cream, , Topical, TID PRN, Marino Castaneda MD, Given at 02/27/21 9268    Laboratory Results:  Results from last 7 days   Lab Units 03/02/21  0640 03/01/21  2110 03/01/21  0806 02/28/21  0519 02/27/21  0918 02/27/21  0709 02/25/21  0859 02/24/21  0603   WBC Thousand/uL  --   --   --   --   --  11 60* 10 40* 10 15   HEMOGLOBIN g/dL  --   --   --   --   --  12 3 11 9 10 7*   HEMATOCRIT %  --   --   --   --   --  38 2 37 4 33 8* PLATELETS Thousands/uL  --   --   --   --   --  215 208 191   POTASSIUM mmol/L 5 3 5 0 5 3 5 2  --  5 3 5 2 5 4*   CHLORIDE mmol/L 99* 102 94* 96*  --  94* 100 100   CO2 mmol/L 37* 35* 36* 37*  --  40* 35* 34*   BUN mg/dL 41* 43* 39* 39*  --  39* 33* 35*   CREATININE mg/dL 0 90 1 00 0 96 0 86  --  0 86 0 91 0 94   CALCIUM mg/dL 10 9* 10 2* 11 1* 11 0*  --  11 3* 11 8* 10 8*   MAGNESIUM mg/dL  --   --   --   --  1 9  --   --  1 7   PHOSPHORUS mg/dL  --   --   --   --  2 6  --   --   --

## 2021-03-02 NOTE — PROGRESS NOTES
Internal Medicine Progress Note  Patient: Arielle Salomon  Age/sex: 78 y o  female  Medical Record #: [id number]      ASSESSMENT/PLAN: (Interval History)  Arielle Salomon is seen and examined and management for following issues:    Compression fracture T5/6  · No surgical intervention  · Cont TLSO brace when OOB  · Aggressive rehab per PMR  · Pain mgmt per PMR     COPD  · Wears oxygen 2-3 L continuous  · Continue current inhalers/nebs per home  · Recent PFT DLCO 54%  · Oxygen saturations stable  · Prednisone as scheduled  · Recent CT scan +moderate emphysema; no PE  · Nebs were changed back to scheduled from prn on 2/27/21     Chronic diastolic HF  · Cont furosemide 20mg daily here   · Takes Torsemide at home  · Monitor volume status closely  · Chronic LE edema  · Echo = LVEF 16%; diastolic dysfunction  · Renal managing     DM II  · Hgb A1c 7 1 in October  · Home meds: Glyburide 5mg BID  · Cont sliding scale and coverage  · Started glyburide at 2 5mg 3/1/21  · BS stable so far     HTN  · Diovan is on hold 2/2 VICKY/CKD and K+ increase  · tx per renal  · BPs acceptable      CKD  · Level 3  · Baseline 0 9-1 1  · Avoid nephrotoxic medications  · Currently at baseline  · Renal managing     Hypercalcemia  · Nephrology w/u at [facility]  · [address] secondary to volume status  · Renal managing      Hyponatremia  · Improved today  · 1 8% saline   · Renal managing     Hyperkalemia  · On low K+ diet and Diovan is on hold  · tx per renal    Hypothyroid  · Cont home supplementation     Sacral decubitus  · Cont off loading, nutritional supplementation  · Wound nurse following     Oral ulcers  · Cont magic mouthwash q4 hrs as needed  · Feels this helps and uses 1-2x/day          The above assessment and plan was reviewed and updated as determined by my evaluation of the patient on 3/2/2021      Labs:   Results from last 7 days   Lab Units 02/27/21  0709 02/25/21  0859   WBC Thousand/uL 11 60* 10 40*   HEMOGLOBIN g/dL 12 3 11 9 HEMATOCRIT % 38 2 37 4   PLATELETS Thousands/uL 215 208     Results from last 7 days   Lab Units 03/02/21  0640 03/01/21  2110   SODIUM mmol/L 137 135*   POTASSIUM mmol/L 5 3 5 0   CHLORIDE mmol/L 99* 102   CO2 mmol/L 37* 35*   BUN mg/dL 41* 43*   CREATININE mg/dL 0 90 1 00   CALCIUM mg/dL 10 9* 10 2*             Results from last 7 days   Lab Units 03/02/21  0624 03/01/21  2119 03/01/21  1602   POC GLUCOSE mg/dl 87 174* 233*       Review of Scheduled Meds:  Current Facility-Administered Medications   Medication Dose Route Frequency Provider Last Rate    acetaminophen  650 mg Oral Q6H [address] Luca Cosme MD      al mag oxide-diphenhydramine-lidocaine viscous  10 mL Swish & Spit Q4H PRN ROLA Shepard      albuterol  2 puff Inhalation Q6H PRN Luca Cosme MD      ALPRAZolam  0 25 mg Oral TID PRN Luca Cosme MD      aluminum-magnesium hydroxide-simethicone  30 mL Oral Q6H PRN Luca Cosme MD      atorvastatin  80 mg Oral Daily With Devika Precise, MD      benzonatate  100 mg Oral TID PRN Luca Cosme MD      bisacodyl  10 mg Rectal Daily PRN Luca Cosme MD      fluticasone-vilanterol  1 puff Inhalation Daily Luca Cosme MD      furosemide  20 mg Oral Daily Luca Cosme MD      glyBURIDE  2 5 mg Oral Daily With Breakfast ROLA Shepard      guaiFENesin  600 mg Oral Q12H [address] Luca Cosme MD      heparin (porcine)  5,000 Units Subcutaneous Q8H [address] Luca Cosme MD      insulin lispro  1-6 Units Subcutaneous HS uLca Cosme MD      insulin lispro  1-6 Units Subcutaneous TID AC Luca Cosme MD      ipratropium-albuterol  3 mL Nebulization 4x Daily Luca Cosme MD      levothyroxine  125 mcg Oral Early Morning Luca Cosme MD      lidocaine  2 patch Topical Daily Luca Cosme MD      melatonin  6 mg Oral HS PRN Luca Cosme MD      methocarbamol  500 mg Oral Q6H PRN Luca Cosme MD      montelukast  10 mg Oral HS Luca Cosme MD      nystatin   Topical BID Walker Pineda MD      nystatin   Topical BID Walker Pineda MD      ondansetron  4 mg Oral Q6H PRN Walker Pineda MD      oxyCODONE  5 mg Oral Q6H PRN Walker Pineda MD      pantoprazole  40 mg Oral Early Morning Walker Pineda MD      polyethylene glycol  17 g Oral Daily Wakler Pineda MD      predniSONE  10 mg Oral Daily Walker Pineda MD      saccharomyces boulardii  250 mg Oral BID Walker Pineda MD      senna  1 tablet Oral HS Walker Pineda MD      traMADol  50 mg Oral Q6H PRN Walker Pineda MD      white petrolatum-mineral oil   Topical TID PRN Walker Pineda MD         Subjective/ HPI: Patients overnight issues or events were reviewed with nursing or staff during rounds or morning huddle session  No new or overnight issues  Offers no complaints  Pt c/w RICO      ROS:   A 10 point ROS was performed; negative except as noted above  Imaging:     XR abdomen 1 view kub   Final Result by Ben Hays MD (03/01 0580)      Large-volume stool, suggesting constipation  Nonobstructive bowel gas pattern                 Workstation performed: [id number]             *Labs /Radiology studies reviewed  *Medications reviewed and reconciled as needed  *Please refer to order section for additional ordered labs studies  *Case discussed with primary attending during morning huddle case rounds      Physical Examination:  Vitals:   Vitals:    03/01/21 1326 03/01/21 1904 03/01/21 2047 03/02/21 0533   BP: (!) 113/43  112/68 131/64   BP Location: Right arm  Left arm Right arm   Pulse: 84  81 92   Resp: 18  17 18   Temp: 97 8 °F (36 6 °C)  98 °F (36 7 °C) 97 7 °F (36 5 °C)   TempSrc: Oral  Oral Oral   SpO2: 100% 100% 100% 97%   Weight:       Height:           GEN: No apparent distress, interactive  NEURO: Alert and oriented x3  HEENT: Pupils are equal and reactive, EOMI, mucous membranes are moist, face symmetrical  CV: S1 S2 regular, no MRG, no peripheral edema noted  RESP: Lungs are clear and decreased bilaterally, no wheezes, rales or rhonchi noted, 2-3 L NC, chronic RICO  GI: Flat, soft non tender, non distended; +BS x4  : Voiding without difficulty  MUSC: Moves all extremities; +generalized deconditioning  SKIN: pink, warm and dry, normal turgor, sacral dressing intact; perineal excoriation noted        The above physical exam was reviewed and updated as determined by my evaluation of the patient on 3/2/2021  Invasive Devices     Peripheral Intravenous Line            Peripheral IV 03/01/21 Right;Ventral (anterior) Forearm less than 1 day          Drain            External Urinary Catheter 9 days                   VTE Pharmacologic Prophylaxis: Heparin  Code Status: Level 3 - DNAR and DNI  Current Length of Stay: 6 day(s)      Total time spent:  30 minutes with more than 50% spent counseling/coordinating care  Counseling includes discussion with patient re: progress  and discussion with patient of his/her current medical state/information  Coordination of patient's care was performed in conjunction with primary service  Time invested included review of patient's labs, vitals, and management of their comorbidities with continued monitoring  In addition, this patient was discussed with medical team including physician and advanced extenders  The care of the patient was extensively discussed and appropriate treatment plan was formulated unique for this patient  ** Please Note:  voice to text software may have been used in the creation of this document   Although proof errors in transcription or interpretation are a potential of such software**

## 2021-03-02 NOTE — PROGRESS NOTES
03/02/21 1410   Pain Assessment   Pain Assessment Tool 0-10   Pain Score 4   Pain Location/Orientation Orientation: Right;Location: Rib Cage  (+ tailbone)   Restrictions/Precautions   Precautions Bed/chair alarms;Cognitive; Fall Risk;Fluid restriction;O2;Pain;Supervision on toilet/commode;Spinal precautions   ROM Restrictions Yes  (thoracic spinal precautions)   Braces or Orthoses TLSO  (backpack when OOB)   Cognition   Overall Cognitive Status Impaired   Arousal/Participation Alert; Cooperative   Attention Attends with cues to redirect   Memory Decreased short term memory;Decreased recall of recent events;Decreased recall of precautions   Following Commands Follows one step commands without difficulty   Subjective   Subjective Pt agreeable to PT session, daughter and grandson present for session   Sit to Stand   Type of Assistance Needed Physical assistance; Adaptive equipment   Amount of Physical Assistance Provided 25%-49%   Comment HAKAN Everett at times   Sit to Stand CARE Score 3   Car Transfer   Reason if not Attempted Safety concerns   Car Transfer CARE Score 88   Walk 10 Feet   Type of Assistance Needed Physical assistance; Adaptive equipment   Amount of Physical Assistance Provided Less than 25%   Comment SEGUNDO Pichardo for O2 line management   Walk 10 Feet CARE Score 3   Walk 50 Feet with Two Turns   Type of Assistance Needed Physical assistance; Adaptive equipment   Amount of Physical Assistance Provided Less than 25%   Comment Marguerite RW   Walk 50 Feet with Two Turns CARE Score 3   Walk 150 Feet   Reason if not Attempted Safety concerns   Walk 150 Feet CARE Score 88   Walking 10 Feet on Uneven Surfaces   Reason if not Attempted Safety concerns   Walking 10 Feet on Uneven Surfaces CARE Score 88   Ambulation   Does the patient walk? 2  Yes   Primary Mode of Locomotion Prior to Admission Walk   Distance Walked (feet) 50 ft  (x2+70)   Assist Device Roller Walker   Gait Pattern Inconsistant Lory; Slow Lory;Decreased foot clearance; Forward Flexion; Step through; Improper weight shift   Limitations Noted In Balance; Coordination;Device Management; Endurance;Posture; Safety;Speed;Strength;Swing   Provided Assistance with: Balance  (O2 line management)   Walk Assist Level Minimum Assist   Findings 3Lo2 NC  SpO2 97% HR in 130s   Wheel 50 Feet with Two Turns   Reason if not Attempted Activity not applicable   Wheel 50 Feet with Two Turns CARE Score 9   Wheel 150 Feet   Reason if not Attempted Activity not applicable   Wheel 189 Feet CARE Score 9   Wheelchair mobility   Does the patient use a wheelchair? 0  No   Curb or Single Stair   Reason if not Attempted Safety concerns   1 Step (Curb) CARE Score 88   4 Steps   Reason if not Attempted Safety concerns   4 Steps CARE Score 88   12 Steps   Reason if not Attempted Safety concerns   12 Steps CARE Score 88   Toilet Transfer   Type of Assistance Needed Physical assistance; Adaptive equipment   Amount of Physical Assistance Provided Less than 25%   Comment Marguerite BSC   Toilet Transfer CARE Score 3   Assessment   Treatment Assessment PT session focusing on HH distance ambulation w/ use of RW as well as toileting  Pt able to ambulate at Marguerite w/ RW, req A for O2 line management as pt reports being unable to manage 2* fear of falling while walking  Pt cont to be very limited by dec endurance, frequently reporting "I can't breathe" despite O2 WNL  Pt tolerated session on 3LO2 NC w/ no drop in SpO2 levels during ambulation  Pt cont to req acute rehab PT focusing on ambulation, stair negotiation, O2 line management, energy conservation education as well as improving balance and overall functional mobiltiy to maximize functional independence   Problem List Decreased strength;Decreased range of motion;Decreased endurance; Impaired balance;Decreased mobility; Decreased coordination;Decreased cognition;Orthopedic restrictions;Pain   Barriers to Discharge Inaccessible home environment;Decreased caregiver support   PT Barriers   Functional Limitation Car transfers; Ramp negotiation;Standing;Stair negotiation;Transfers; Walking   Plan   Treatment/Interventions Functional transfer training;LE strengthening/ROM; Therapeutic exercise;Elevations; Endurance training;Cognitive reorientation;Patient/family training;Equipment eval/education; Compensatory technique education; Bed mobility;Gait training   Progress Slow progress, decreased activity tolerance   Recommendation   PT Discharge Recommendation Other (Comment)  (TBD pending progress)   Equipment Recommended Walker   PT Therapy Minutes   PT Time In 1410   PT Time Out 1440   PT Total Time (minutes) 30   PT Mode of treatment - Individual (minutes) 30   PT Mode of treatment - Concurrent (minutes) 0   PT Mode of treatment - Group (minutes) 0   PT Mode of treatment - Co-treat (minutes) 0   PT Mode of Treatment - Total time(minutes) 30 minutes   PT Cumulative Minutes 385   Therapy Time missed   Time missed?  No

## 2021-03-02 NOTE — PROGRESS NOTES
03/02/21 0815   Pain Assessment   Pain Assessment Tool Pain Assessment not indicated - pt denies pain   Pain Score No Pain   Restrictions/Precautions   Precautions Aspiration;Bed/chair alarms;Cognitive; Fall Risk;Fluid restriction;O2;Pain;Supervision on toilet/commode;Spinal precautions   Braces or Orthoses TLSO  (when out of bed )   Swallow Information   Current Risks for Dysphagia & Aspiration Respiratory compromise;Weak cough;General debilitation;Positionong Issues   Current Symptoms/Concerns Clear throat;Cough;During meals; Difficulty chewing;Weight loss;Decreased oral intake   Current Diet Dysphagia pureed; Thin liquid   Baseline Diet Regular; Thin liquids   Consistencies Assessed and Performance   Materials Admnistered Puree/Level 1; Thin liquid;Mechanical Soft/Level 2   Oral Stage WFL; Mild impaired   Phargngeal Stage WFL; Mild impaired;Aspiration risk   Swallow Mechanics WFL; Mild delayed;Aspiration risk   Esophageal Concerns No s/s reported   Recommendations   Risk for Aspiration Present   Recommendations Dysphagia treatment   Diet Solid Recommendation Level 1 Dysphagia/pureed   Diet Liquid Recommendation Thin liquid   Recommended Form of Meds As tolerated   General Precautions Aspiration precautions;Minimize distractions;Upright as possible for all oral intake;Remain upright for 45 mins after meals; Supervision with meals  (FULL supervision w/ pt OOB for all meals  )   Compensatory Swallowing Strategies External pacing;Coordinate breathing and swallowing;Voluntary throat clear/cough to clear penetration   Results Reviewed with PT/Family/Caregiver;RN;MD   Eating   Type of Assistance Needed Set-up / clean-up;Supervision   Amount of Physical Assistance Provided No physical assistance   Eating CARE Score 4   Swallow Assessment   Swallow Treatment Assessment Pt was alert, cooperative and in bed upon arrival  When attempting to get pt upright within bed chair position, pt immediately stated "I can't breathe, stop raising the bed"  SLP providing educating about needing to be fully upright if in bed since pt had declined getting OOB for meal   SLP boosted pt so then as bed was placed in chair position, pt was able to maintain being fully upright for meal and w/o of any complaints of SOB  Pt seen during bfast w/ trial tray of soft solids as well as pt's current diet of purred diet w/ thin liquids  SLP set up tray for pt w/ pt able to self feed during the whole meal  Pt consumed about 75% of meal, consisting of soft solids (banana, peach pieces), pureed [place] toast oatmeal w/ syrup and thin liquids by straw/cup (2-3 sips of coffee and water)  Compared to previous sessions, pt's PO intake was increased in today's session, w/ pt finishing the whole banana when cut into fruit bowl as well as eating her oatmeal  SLP left tray w/ pt in her room, encouraging pt for further food intake of pureed food, which pt was agreeable to, stating that she "is doing much better today"  Throughout meal, SLP needing to remind pt NOT to task while eating in order to save energy for eating meal as pt continued to commend on SOB when talking/eating  Pt's overall rate of PO intake w/ soft solids, pureed items and liquids was slower but increased compared to yesterday's session w/ small bolus sizes of all consistencies  Adequate lip seal was noted around spoon, straw and cup across al consistencies w/o any anterior loss noted  Mastication of soft solids was prolonged but effective w/o any pocketing/residual noted during meal  Of note, pt continues w/ complaint of sores in lower buccal cavity and is therefore not wearing lower dentures which then increases mastication time of soft solids  Bolus manipulation/transfer of pureed items and thin liquids appeared to mildly prolonged, suspect due to pt's difficulty with coordination of breathing and swallowing  Pt independently alternated between soft solids/pureed food/liquids   Swallow initiation appeared to be prompt and adequate laryngeal excursion noted upon palpation  Throughout the meal, pt did NOT demonstrated any throat clears which pt had demonstrated in prior dysphagia sessions suspect due to decreased coordination of swallowing/breathing  Overall, pt appeared to be more motivated and less anxious today to try out foods w/ a softer consistency w/ increased overall PO intake  SLP continuing to recommend pureed diet and thin liquids at this time, however will continue w/ trial trays of softer solids  SLP continuing to recommend the following aspiration precautions: OOB or FULLY upright for meals, alternate solids/liquids, minimize distractions during meals, NOT talk during meal and encouragement for PO intake  At this time, will continue to attempt diet upgrade trials to determine if diet upgrade can be warranted while in acute rehab center  If pt continues to decline trials or any softer textures suspect that puree diet will be safest least restrictive diet at this time  Will continue dysphagia therapy in order to determine safest and least restrictive diet w/ goal of also supporting overall PO intake  Swallow Assessment Prognosis   Prognosis Fair   Prognosis Considerations Age; Co-morbidities; Medical diagnosis; Medical prognosis;Previous level of function;Severity of impairments;New learning ability;Ability to carry over; Cooperation   SLP Therapy Minutes   SLP Time In 0815   SLP Time Out 0900   SLP Total Time (minutes) 45   SLP Mode of treatment - Individual (minutes) 45   SLP Mode of treatment - Concurrent (minutes) 0   SLP Mode of treatment - Group (minutes) 0   SLP Mode of treatment - Co-treat (minutes) 0   SLP Mode of Treatment - Total time(minutes) 45 minutes   SLP Cumulative Minutes 165   Therapy Time missed   Time missed?  No

## 2021-03-02 NOTE — PCC NURSING
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-3 L - started on steroids (Prednisone) as well as continue on her Breo and DuoNebs; CHF - Lasix 20 mg daily; 1500 fluid restriction, Type 2 DM - QID BS with sliding scale insulin coverage; Hypothyroidism - Levothyroxine 125 mcg daily  Pt's pain is being managed with scheduled Tylenol, PRN oxycodone and tramadol, and lidocaine patches  Pt requires 2-3 L of O2 at all times  Pt is incontinent of bowel and bladder at times  We will continue to monitor labs, VS, and blood sugars, continue maintaining adequate oxygenation status, We will maintain pt's skin integrity by encouraging turning/repositioning, frequent weight shifts, and promote continence  We will monitor for constipation and treat accordingly  We will monitor pt's pain to provide adequate pain control  We will encourage independence with ADL's  We will maintain safety precautions and keep pt free from falls

## 2021-03-02 NOTE — PLAN OF CARE
Problem: Potential for Falls  Goal: Patient will remain free of falls  Description: INTERVENTIONS:  - Assess patient frequently for physical needs  -  Identify cognitive and physical deficits and behaviors that affect risk of falls  -  [place] fall precautions as indicated by assessment   - Educate patient/family on patient safety including physical limitations  - Instruct patient to call for assistance with activity based on assessment  - Modify environment to reduce risk of injury  - Consider OT/PT consult to assist with strengthening/mobility  Outcome: Progressing     Problem: Prexisting or High Potential for Compromised Skin Integrity  Goal: Skin integrity is maintained or improved  Description: INTERVENTIONS:  - Identify patients at risk for skin breakdown  - Assess and monitor skin integrity  - Assess and monitor nutrition and hydration status  - Monitor labs   - Assess for incontinence   - Turn and reposition patient  - Assist with mobility/ambulation  - Relieve pressure over bony prominences  - Avoid friction and shearing  - Provide appropriate hygiene as needed including keeping skin clean and dry  - Evaluate need for skin moisturizer/barrier cream  - Collaborate with interdisciplinary team   - Patient/family teaching  - Consider wound care consult   Outcome: Progressing     Problem: Nutrition/Hydration-ADULT  Goal: Nutrient/Hydration intake appropriate for improving, restoring or maintaining nutritional needs  Description: Monitor and assess patient's nutrition/hydration status for malnutrition  Collaborate with interdisciplinary team and initiate plan and interventions as ordered  Monitor patient's weight and dietary intake as ordered or per policy  Utilize nutrition screening tool and intervene as necessary  Determine patient's food preferences and provide high-protein, high-caloric foods as appropriate       INTERVENTIONS:  - Monitor oral intake, urinary output, labs, and treatment plans  - Assess nutrition and hydration status and recommend course of action  - Evaluate amount of meals eaten  - Assist patient with eating if necessary   - Allow adequate time for meals  - Recommend/ encourage appropriate diets, oral nutritional supplements, and vitamin/mineral supplements  - Order, calculate, and assess calorie counts as needed  - Recommend, monitor, and adjust tube feedings and TPN/PPN based on assessed needs  - Assess need for intravenous fluids  - Provide specific nutrition/hydration education as appropriate  - Include patient/family/caregiver in decisions related to nutrition  Outcome: Progressing     Problem: PAIN - ADULT  Goal: Verbalizes/displays adequate comfort level or baseline comfort level  Description: Interventions:  - Encourage patient to monitor pain and request assistance  - Assess pain using appropriate pain scale  - Administer analgesics based on type and severity of pain and evaluate response  - Implement non-pharmacological measures as appropriate and evaluate response  - Consider cultural and social influences on pain and pain management  - Notify physician/advanced practitioner if interventions unsuccessful or patient reports new pain  Outcome: Progressing     Problem: INFECTION - ADULT  Goal: Absence or prevention of progression during hospitalization  Description: INTERVENTIONS:  - Assess and monitor for signs and symptoms of infection  - Monitor lab/diagnostic results  - Monitor all insertion sites, i e  indwelling lines, tubes, and drains  - Monitor endotracheal if appropriate and nasal secretions for changes in amount and color  - [place] appropriate cooling/warming therapies per order  - Administer medications as ordered  - Instruct and encourage patient and family to use good hand hygiene technique  - Identify and instruct in appropriate isolation precautions for identified infection/condition  Outcome: Progressing     Problem: SAFETY ADULT  Goal: Patient will remain free of falls  Description: INTERVENTIONS:  - Assess patient frequently for physical needs  -  Identify cognitive and physical deficits and behaviors that affect risk of falls    -  [place] fall precautions as indicated by assessment   - Educate patient/family on patient safety including physical limitations  - Instruct patient to call for assistance with activity based on assessment  - Modify environment to reduce risk of injury  - Consider OT/PT consult to assist with strengthening/mobility  Outcome: Progressing  Goal: Maintain or return to baseline ADL function  Description: INTERVENTIONS:  -  Assess patient's ability to carry out ADLs; assess patient's baseline for ADL function and identify physical deficits which impact ability to perform ADLs (bathing, care of mouth/teeth, toileting, grooming, dressing, etc )  - Assess/evaluate cause of self-care deficits   - Assess range of motion  - Assess patient's mobility; develop plan if impaired  - Assess patient's need for assistive devices and provide as appropriate  - Encourage maximum independence but intervene and supervise when necessary  - Involve family in performance of ADLs  - Assess for home care needs following discharge   - Consider OT consult to assist with ADL evaluation and planning for discharge  - Provide patient education as appropriate  Outcome: Progressing  Goal: Maintain or return mobility status to optimal level  Description: INTERVENTIONS:  - Assess patient's baseline mobility status (ambulation, transfers, stairs, etc )    - Identify cognitive and physical deficits and behaviors that affect mobility  - Identify mobility aids required to assist with transfers and/or ambulation (gait belt, sit-to-stand, lift, walker, cane, etc )  - [place] fall precautions as indicated by assessment  - Record patient progress and toleration of activity level on Mobility SBAR; progress patient to next Phase/Stage  - Instruct patient to call for assistance with activity based on assessment  - Consider rehabilitation consult to assist with strengthening/weightbearing, etc   Outcome: Progressing     Problem: DISCHARGE PLANNING  Goal: Discharge to home or other facility with appropriate resources  Description: INTERVENTIONS:  - Identify barriers to discharge w/patient and caregiver  - Arrange for needed discharge resources and transportation as appropriate  - Identify discharge learning needs (meds, wound care, etc )  - Arrange for interpretive services to assist at discharge as needed  - Refer to Case Management Department for coordinating discharge planning if the patient needs post-hospital services based on physician/advanced practitioner order or complex needs related to functional status, cognitive ability, or social support system  Outcome: Progressing

## 2021-03-02 NOTE — NURSING NOTE
Met with patient and MD to discuss team discussion from this morning  Patient was informed that the plan would be for the patient to stay at least another week and to further discuss discharge plans next week  Patient informed that continued stay depends on insurance, as well as continued therapy and medical needs as well as progression/tolerance with program  Patient stated that she had no questions at this time

## 2021-03-02 NOTE — PROGRESS NOTES
PM&R PROGRESS NOTE:  Daria Kirkpatrick 78 y o  female MRN: [id number]  Unit/Bed#: -01 Encounter: [id number]        Rehabilitation Diagnosis: Impairment of mobility, safety and Activities of Daily Living (ADLs) due to Orthopedic Disorders:  08 9  Other Orthopedic Thoracic compression fractures    HPI:   Daria Kirkpatrick is a 78 y o  female with COPD/ emphysema on baseline oxygen 2-3 L,  Congestive heart failure, osteoarthritis who presented to the [facility] on 2/17/21 with back pain and shortness of breath  Pulmonary workup showing moderate pulmonary emphysematous changes bilaterally  Workup for back pain revealed Compression fracture of T5 (33% height loss approximately) and T6 (approximately 25% height loss  )  There is also superior endplate T2 compression fracture with approximately 10%-20 percent height loss at the superior endplate  Case was discussed with both Orthopedics and Neurosurgery and patient was recommended conservative treatment with TLSO brace while out of bed  Medically, patient was treated for an E coli urinary tract infection  Patient was started on steroids for COPD and emphysema and continued on her Breo and DuoNebs  Patient was seen by Nephrology  acute renal insufficiency, hypercalcemia and hyperkalemia  Patient started on Lasix by Nephrology  Patient was followed by wound care for a sacral pressure ulcer  After medical stabilization patient was found to have acute functional deficits from her baseline and therefore was admitted to [address] [place] [address]  SUBJECTIVE: Patient seen face to face  No acute issues today  Denies SOB  Reviewed team conference with patient with her understanding      ASSESSMENT: Stable, progressing      PLAN:    Rehabilitation   Functional deficits:  Generalized weakness,  Reduced Endurance,  Impaired mobility, impaired self-care,  Impaired balance,  Possible impaired swallow   Continue current rehabilitation plan of care to maximize function   Functional update:   I personally attended, reviewed, and discussed medical and functional updates in team conference today  Please refer to advance care planning note for details   Estimated Discharge: To be determined, Reteam       Pain  · Mid to lower back pain:  Managed on Tylenol,  Topical Lidoderm patch, p r n  Robaxin, tramadol and oxycodone  P r n  DVT prophylaxis    managed on subcutaneous heparin and SCDs    Bladder plan   Incontinent at baseline     Bowel plan   Continent      * Thoracic compression fracture (HCC)  Assessment & Plan  · T2, T5 and T6 compression fractures  · Treated conservatively  · TLSO brace while out of bed  · Follow-up as outpatient with Neurosurgery    COPD exacerbation ([facility] [address] )  Assessment & Plan  · Wears oxygen 2-3 L at baseline  · At baseline feels SOB  · Continue current inhalers  · Monitor oxygen saturations with activity  · Recent CT scan +moderate emphysema  · Continue prednisone taper    Pressure ulcer  Assessment & Plan  · Located on Sacrum  · Consulted wound care to follow while at [facility]  · Continue optimization of nutrition, pressure relieving techniques, and turns  · Nutrition consulted  · Continue local wound care with foam dressing as recommended by wound care RN       UTI (urinary tract infection)  Assessment & Plan  · E Coli UTI  · Completed treatment     Chronic diastolic heart failure (HCC)  Assessment & Plan  Managed on Lasix 20mg daily      Type 2 diabetes mellitus with complication, without long-term current use of insulin ([facility])  Assessment & Plan  · Managed  On sliding scale insulin  · Due to hypoglycemia glyburide was discontinued  · CCD  · IM consultants following    VICKY (acute kidney injury) ([facility])-resolved as of 2/27/2021  Assessment & Plan  · Renal function baseline 0 9 - 1 1  · Renal following hypercalcemia/hyperkaemia (hyperkalemia likely due to diet - was drinking V8s and fruity drinks)  ·  continue low-potassium diet        Appreciate IM consultants medical co-management  Labs, medications, and imaging personally reviewed  ROS:  A ten point review of systems was completed on 3/2/21 and pertinent positives are listed in subjective section  All other systems reviewed were negative  OBJECTIVE:   /64 (BP Location: Right arm)   Pulse 92   Temp 97 7 °F (36 5 °C) (Oral)   Resp 18   Ht 5' 2" (1 575 m)   Wt 70 9 kg (156 lb 4 8 oz)   SpO2 97%   BMI 28 59 kg/m²     Physical Exam  Vitals signs and nursing note reviewed  Constitutional:       General: She is not in acute distress  Appearance: She is well-developed  HENT:      Head: Normocephalic  Nose: Nose normal    Eyes:      Conjunctiva/sclera: Conjunctivae normal    Neck:      Musculoskeletal: Neck supple  Cardiovascular:      Rate and Rhythm: Normal rate  Pulmonary:      Effort: Pulmonary effort is normal       Breath sounds: No wheezing  Comments: Oxygen with nasal canula   Abdominal:      General: There is no distension  Palpations: Abdomen is soft  Musculoskeletal:      Comments: TLSO brace in place    Skin:     General: Skin is warm  Neurological:      Mental Status: She is alert and oriented to person, place, and time     Psychiatric:         Mood and Affect: Mood normal           Lab Results   Component Value Date    WBC 11 60 (H) 02/27/2021    HGB 12 3 02/27/2021    HCT 38 2 02/27/2021    MCV 92 02/27/2021     02/27/2021     Lab Results   Component Value Date    SODIUM 137 03/02/2021    K 5 3 03/02/2021    CL 99 (L) 03/02/2021    CO2 37 (H) 03/02/2021    BUN 41 (H) 03/02/2021    CREATININE 0 90 03/02/2021    GLUC 92 03/02/2021    CALCIUM 10 9 (H) 03/02/2021     No results found for: INR, PROTIME        Current Facility-Administered Medications:     acetaminophen (TYLENOL) tablet 650 mg, 650 mg, Oral, Q6H [address] MD Holden, 650 mg at 03/02/21 1129    al mag oxide-diphenhydramine-lidocaine viscous (MAGIC MOUTHWASH) suspension 10 mL, 10 mL, Swish & Spit, Q4H PRN, ROLA Shepard, 10 mL at 03/02/21 1130    albuterol (PROVENTIL HFA,VENTOLIN HFA) inhaler 2 puff, 2 puff, Inhalation, Q6H PRN, Mino Braswell MD    ALPRAZolam Paddy Quan) tablet 0 25 mg, 0 25 mg, Oral, TID PRN, Mino Braswell MD, 0 25 mg at 03/01/21 2129    aluminum-magnesium hydroxide-simethicone (MYLANTA) oral suspension 30 mL, 30 mL, Oral, Q6H PRN, Mino Braswell MD    atorvastatin (LIPITOR) tablet 80 mg, 80 mg, Oral, Daily With Ivonne Reid MD, 80 mg at 03/01/21 1522    benzonatate (TESSALON PERLES) capsule 100 mg, 100 mg, Oral, TID PRN, Mino Braswell MD    bisacodyl (DULCOLAX) rectal suppository 10 mg, 10 mg, Rectal, Daily PRN, Mino Braswell MD, 10 mg at 02/28/21 1802    fluticasone-vilanterol (BREO ELLIPTA) 100-25 mcg/inh inhaler 1 puff, 1 puff, Inhalation, Daily, Mino Braswell MD, 1 puff at 03/02/21 0904    furosemide (LASIX) tablet 20 mg, 20 mg, Oral, Daily, Mino Braswell MD, 20 mg at 03/02/21 0848    glyBURIDE (DIABETA) tablet 2 5 mg, 2 5 mg, Oral, Daily With Breakfast, ROLA Hamilton, 2 5 mg at 03/02/21 0725    guaiFENesin (MUCINEX) 12 hr tablet 600 mg, 600 mg, Oral, Q12H [facility], Mino Braswell MD, 600 mg at 03/02/21 0903    heparin (porcine) subcutaneous injection 5,000 Units, 5,000 Units, Subcutaneous, Q8H [facility], Mnio Braswell MD, 5,000 Units at 03/02/21 0533    insulin lispro (HumaLOG) 100 units/mL subcutaneous injection 1-6 Units, 1-6 Units, Subcutaneous, HS, Mino Braswell MD, 1 Units at 03/01/21 2126    insulin lispro (HumaLOG) 100 units/mL subcutaneous injection 1-6 Units, 1-6 Units, Subcutaneous, TID AC, 3 Units at 03/01/21 1612 **AND** Fingerstick Glucose (POCT), , , TID AC, Mino Braswell MD    ipratropium-albuterol (DUO-NEB) 0 5-2 5 mg/3 mL inhalation solution 3 mL, 3 mL, Nebulization, 4x Daily, Mino Braswell MD, 3 mL at 03/02/21 1208    levothyroxine tablet 125 mcg, 125 mcg, Oral, Early Morning, Jemma ADAIR MD Merlin, 125 mcg at 03/02/21 0534    lidocaine (LIDODERM) 5 % patch 2 patch, 2 patch, Topical, Daily, Keturah Pappas MD, 2 patch at 03/02/21 0902    melatonin tablet 6 mg, 6 mg, Oral, HS PRN, Keturah Pappas MD, 6 mg at 02/25/21 2103    methocarbamol (ROBAXIN) tablet 500 mg, 500 mg, Oral, Q6H PRN, Keturah Pappas MD    montelukast (SINGULAIR) tablet 10 mg, 10 mg, Oral, HS, Keturah Pappas MD, 10 mg at 03/01/21 2125    nystatin (MYCOSTATIN) cream, , Topical, BID, Keturah Pappas MD, 1 application at 62/39/59 2368    nystatin (MYCOSTATIN) powder, , Topical, BID, Keturah Pappas MD, 1 application at 72/78/32 0904    ondansetron (ZOFRAN-ODT) dispersible tablet 4 mg, 4 mg, Oral, Q6H PRN, Keturah Pappas MD    oxyCODONE (ROXICODONE) IR tablet 5 mg, 5 mg, Oral, Q6H PRN, Keturah Ppapas MD, 5 mg at 03/01/21 2129    pantoprazole (PROTONIX) EC tablet 40 mg, 40 mg, Oral, Early Morning, Keturah Pappas MD, 40 mg at 03/02/21 0534    polyethylene glycol (MIRALAX) packet 17 g, 17 g, Oral, Daily, Keturah Pappas MD, 17 g at 03/02/21 3550    saccharomyces boulardii (FLORASTOR) capsule 250 mg, 250 mg, Oral, BID, Keturah Pappas MD, 250 mg at 03/02/21 0902    senna (SENOKOT) tablet 8 6 mg, 1 tablet, Oral, HS, Keturah Pappas MD, 8 6 mg at 03/01/21 2125    traMADol (ULTRAM) tablet 50 mg, 50 mg, Oral, Q6H PRN, Keturah Pappas MD, 50 mg at 03/01/21 1414    white petrolatum-mineral oil (EUCERIN,HYDROCERIN) cream, , Topical, TID PRN, Keturah Pappas MD, Given at 02/27/21 1991    Past Medical History:   Diagnosis Date    Cataract     CHF (congestive heart failure) ([facility] )     Leukocytosis        Patient Active Problem List    Diagnosis Date Noted    Thoracic compression fracture ([facility] ) 02/18/2021     Priority: High    COPD exacerbation ([place] [address] ) 03/10/2014     Priority: Medium    Stage 3a chronic kidney disease 02/27/2021    Alkalosis 02/25/2021    Hyperkalemia 02/23/2021    Urinary incontinence 02/23/2021    Pressure ulcer 02/22/2021  Hypercalcemia 02/21/2021    Chronic respiratory failure with hypoxia (HCC) 02/18/2021    Moderate protein-calorie malnutrition ([facility] ) 02/18/2021    UTI (urinary tract infection) 02/18/2021    SOB (shortness of breath) 02/17/2021    Chronic bilateral thoracic back pain 02/17/2021    Chronic diastolic heart failure ([facility] ) 02/17/2021    Atherosclerosis of abdominal aorta ([place] [address] ) 12/01/2020    CHF (congestive heart failure) ([place] [address] ) 02/11/2019    Type 2 diabetes mellitus with complication, without long-term current use of insulin ([place] [address] ) 04/08/2015    Carotid artery plaque 04/06/2015    Hyperlipidemia 03/14/2014    Hypothyroidism 03/14/2014    Osteoarthrosis 03/14/2014          Catalino Schmidt MD    Total time spent:  45 minutes with more than 50% spent counseling/coordinating care  Counseling includes discussion with patient re: progress and discussion with patient of his/her current medical/functional state/information  Coordination of patient's care was performed in conjunction with consulting services  Time invested included review of patient's labs, vitals, and management of their comorbidities with continued monitoring  The care of the patient was extensively discussed and appropriate treatment plan was formulated unique for this patient  ** Please Note:  voice to text software may have been used in the creation of this document   Although proof errors in transcription or interpretation are a potential of such software**

## 2021-03-02 NOTE — PROGRESS NOTES
03/02/21 1150   Pain Assessment   Pain Assessment Tool 0-10   Pain Score 4   Pain Location/Orientation Orientation: Right;Location: Rib Cage   Restrictions/Precautions   Precautions Bed/chair alarms;Cognitive; Fall Risk;Fluid restriction;O2;Pain;Supervision on toilet/commode;Spinal precautions   ROM Restrictions Yes  (Spinal precautions)   Braces or Orthoses TLSO  (backpack TLSO when OOB)   Cognition   Overall Cognitive Status Impaired   Arousal/Participation Alert; Cooperative   Attention Attends with cues to redirect   Memory Decreased short term memory;Decreased recall of recent events   Following Commands Follows one step commands without difficulty   Sit to Stand   Type of Assistance Needed Physical assistance; Adaptive equipment   Amount of Physical Assistance Provided 25%-49%   Comment Marguerite, CGA at times w/ RW   Sit to Stand CARE Score 3   Bed-Chair Transfer   Type of Assistance Needed Physical assistance; Adaptive equipment   Amount of Physical Assistance Provided 25%-49%   Comment RW   Chair/Bed-to-Chair Transfer CARE Score 3   Car Transfer   Reason if not Attempted Safety concerns   Car Transfer CARE Score 88   Walk 10 Feet   Type of Assistance Needed Physical assistance; Adaptive equipment   Amount of Physical Assistance Provided Less than 25%   Comment Marguerite RW A for O2 line   Walk 10 Feet CARE Score 3   Walk 50 Feet with Two Turns   Reason if not Attempted Safety concerns   Walk 50 Feet with Two Turns CARE Score 88   Walk 150 Feet   Reason if not Attempted Safety concerns   Walk 150 Feet CARE Score 88   Walking 10 Feet on Uneven Surfaces   Reason if not Attempted Safety concerns   Walking 10 Feet on Uneven Surfaces CARE Score 88   Ambulation   Does the patient walk? 2  Yes   Primary Mode of Locomotion Prior to Admission Walk   Distance Walked (feet) 10 ft   Assist Device Roller Walker   Gait Pattern Inconsistant Lory; Slow Lory;Decreased foot clearance; Forward Flexion; Step to; Step through; Improper weight shift   Limitations Noted In Balance; Coordination; Endurance;Posture; Safety;Speed;Strength   Provided Assistance with: Balance  (O2 line management)   Walk Assist Level Minimum Assist   Wheel 50 Feet with Two Turns   Reason if not Attempted Activity not applicable   Wheel 50 Feet with Two Turns CARE Score 9   Wheel 150 Feet   Reason if not Attempted Activity not applicable   Wheel 144 Feet CARE Score 9   Wheelchair mobility   Does the patient use a wheelchair? 0  No   Curb or Single Stair   Reason if not Attempted Safety concerns   1 Step (Curb) CARE Score 88   4 Steps   Reason if not Attempted Safety concerns   4 Steps CARE Score 88   12 Steps   Reason if not Attempted Safety concerns   12 Steps CARE Score 88   Toilet Transfer   Type of Assistance Needed Physical assistance; Adaptive equipment   Amount of Physical Assistance Provided Less than 25%   Comment Marguerite stand pivot RW   Toilet Transfer CARE Score 3   Therapeutic Interventions   Other Spoke w/ pts son while pt was on commode  Provided functional update, informed him of team meeting that just took place and that she will be staying another week to cont to try and maximize her independence  Spoke about recommendations for home such as ramp for 5STE given pts current medical/functioanl status, as well as anticipation that pt will req A at home for all functional mobility 2* pts dec activity tolerance, inability to perform pericare, difficulty w/ TLSO management as well as poor abilty to manage O2 line with ambulation which pts son in agreement to  Assessment   Treatment Assessment PT session focusing on OOB activities and repeated transfers  Pt able to perform sit to stand w/ modA and RW, however, CG at times w/ proper VC and set up  Pts son called while on commode, see above for details  Pt cont to be very limited by pain, SOB and dec abiltiy to manage O2 line w/ functioanl mobility, anticipate pt will req A for all functional moblity upon d/c   Pt would benefit from cont acute rehab PT focusing on ambulation, stairs, inc endurance and activity tolerance as well as standing/dynamic balance ex to maximize fucntioanl independence upon d/c  Problem List Decreased strength;Decreased range of motion;Decreased endurance; Impaired balance;Decreased mobility;Orthopedic restrictions;Pain   Barriers to Discharge Decreased caregiver support; Inaccessible home environment   Barriers to Discharge Comments Spoke w/ pts son about ramp installation and provided daughter w/ handout for [employer]   PT Barriers   Functional Limitation Car transfers; Ramp negotiation;Stair negotiation;Standing;Transfers; Walking   Plan   Treatment/Interventions LE strengthening/ROM; Functional transfer training;Elevations; Therapeutic exercise; Endurance training;Patient/family training;Equipment eval/education; Bed mobility;Gait training; Compensatory technique education   Progress Slow progress, decreased activity tolerance   Recommendation   PT Discharge Recommendation Other (Comment)  (TBD)   Equipment Recommended Walker   PT Therapy Minutes   PT Time In 1150   PT Time Out 1215   PT Total Time (minutes) 25   PT Mode of treatment - Individual (minutes) 25   PT Mode of treatment - Concurrent (minutes) 0   PT Mode of treatment - Group (minutes) 0   PT Mode of treatment - Co-treat (minutes) 0   PT Mode of Treatment - Total time(minutes) 25 minutes   PT Cumulative Minutes 355   Therapy Time missed   Time missed?  No

## 2021-03-02 NOTE — PCC CARE MANAGEMENT
Pt is participating well with therapy but due to living situation and lack of support pt will most likely need subacute setting on dc  Family has the list of options and will be making selections today  Following to assist w/referral process and transition to facility

## 2021-03-02 NOTE — TEAM CONFERENCE
Acute RehabilitationTeam Conference Note  Date: 3/2/2021   Time: 10:45 AM       Patient Name:  Amy Horne       Medical Record Number: [id number]   YOB: 1940  Sex: Female          Room/Bed:  [facility] 453/[facility] 453-01  Payor Info:  Payor: MEDICARE / Plan: MEDICARE A AND B / Product Type: Medicare A & B Fee for Service /      Admitting Diagnosis: Compression fracture of body of thoracic vertebra ([facility] ) [S22 000A]   Admit Date/Time:  2/24/2021  4:02 PM  Admission Comments: No comment available     Primary Diagnosis:  Thoracic compression fracture ([facility] )  Principal Problem: Thoracic compression fracture [facility])    Patient Active Problem List    Diagnosis Date Noted    Stage 3a chronic kidney disease 02/27/2021    Alkalosis 02/25/2021    Hyperkalemia 02/23/2021    Urinary incontinence 02/23/2021    Pressure ulcer 02/22/2021    Hypercalcemia 02/21/2021    Chronic respiratory failure with hypoxia ([facility] ) 02/18/2021    Moderate protein-calorie malnutrition ([facility] ) 02/18/2021    Thoracic compression fracture ([facility] ) 02/18/2021    UTI (urinary tract infection) 02/18/2021    SOB (shortness of breath) 02/17/2021    Chronic bilateral thoracic back pain 02/17/2021    Chronic diastolic heart failure ([facility] ) 02/17/2021    Atherosclerosis of abdominal aorta ([facility] ) 12/01/2020    CHF (congestive heart failure) ([facility] ) 02/11/2019    Type 2 diabetes mellitus with complication, without long-term current use of insulin ([facility] ) 04/08/2015    Carotid artery plaque 04/06/2015    Hyperlipidemia 03/14/2014    Hypothyroidism 03/14/2014    Osteoarthrosis 03/14/2014    COPD exacerbation ([facility] ) 03/10/2014       Physical Therapy:    Weight Bearing Status: Full Weight Bearing  Transfers: Minimal Assistance  Bed Mobility: Moderate Assistance  Amulation Distance (ft): 50 feet  Ambulation: Minimal Assistance  Assistive Device for Ambulation: Roller Walker  Number of Stairs: 2  Assistive Device for Stairs: Right Hand Rail  Stair Assistance: Moderate Assistance  Discharge Recommendations: Other(TBD pending progress)    Pt making slow but fair progress throughout stay in [facility]  Pt able to perform functional mobilty at min-modA w/ RW pending fatigue levels  Pt req frequent rest breaks throughout session and often becomes RICO, reporting "I can't breathe" despite O2 levels being WNL  Pt cont to be very limited by dec endurance, poor tolerance to activity, dec adherence to spinal precautions, poor standing tolerance, and global strength deficits  Pts biggest barriers to d/c include above stated impairments along w/ 5STE and pt living alone w/ intermittent family support  Pt cont to req acute rehab PT focusing on ambulation, stair negoatition, functional transfers, balance ex and LE therex to maximize functional independence  Occupational Therapy:  Eating: Supervision  Grooming: Supervision  Bathing: Moderate Assistance  Bathing: Moderate Assistance  Upper Body Dressing: Maximum Assistance  Lower Body Dressing: Total Assistance  Toileting: Total Assistance  Toilet Transfer: Minimal Assistance  Cognition: Exceptions to WNL  Cognition: Decreased Memory, Decreased Safety  Orientation: Person, Place, Time, Situation  Discharge Recommendations: Home with:  [address] with[de-identified] Family Support, Home Occupational Therapy(HHA pending pt progress)       Pt is demonstrating slow progress with occupational therapy  Pt continues to present with impairments in spinal precautions, act tolerance, strength, motivation, skin integrity, inc supplemental O2  Occupational performance remains limited and pt continues to require assistance for brace management, bathing, dressing, fx transfers, toileting, IADL management  Pt has support from family upon discharge and HHA 1x weekly for showering but anticipate that pt will req inc A at dc   Pt will continue to benefit from skilled acute rehab OT services to address above mentioned barriers and maximize functional independence in baseline areas of occupation to meet established treatment goals with overall decreased burden of care  Speech Therapy:  Mode of Communication: Verbal     Orientation: Place, Person, Time, Situation  Swallowing: Exceptions to WNL  Swallowing: Oral Dysphagia, Aspiration Risk  Diet Recommendations: Level 1/Puree, Thin  Discharge Recommendations: (pending pt progress)  Pt currently being followed for dysphagia tx sessions  Currently, pt verbalizing weight loss of ~15-20 lbs in 3 months, along w/ difficulty in lower dentures fitting and currently does not wear them w/ meals  During this hospitalization, pt has demonstrated and verbalized "fear of choking" on food items, which pt has favored pudding, thin liquids, broths, ice cream, etc  Pt does have h/o COPD, which currently pt is on 3L of O2 and does get "winded" quickly  Pt did require increased encouragement to trial solids (bread w/ peanut butter) during assessment, again favoring broth and pudding items  Currently pt is demonstrating mild-moderate oral deficits characterized by prolonged/effortful mastication of soft/solids, mild delay in manipulation/transfer of puree and thin liquids, but w/o oral residual or anterior loss  Mild pharyngeal deficits are noted characterized by delayed swallow initiation across consistencies, slight decrease in hyolaryngeal excursion exhibiting intermittent throat clearing during meals, but suspect due to difficulty of coordination breathing w/ swallowing  Throughout meal, pt verbalized as well as demonstrated "running out of breath" when talking, needing verbal cues to not talk while eating and educated on pacing during meals to conserve energy and decrease sensation of SOB  Pacing of meal is appropriate as well as ability to independently alternate solids/liquids, small bites/sips   Discussed w/ pt at end of the meal about the different modified dysphagia diet levels, including level 3 diet which includes chopped meats/vegetables, softer food items and level 2 diet, which includes minced meat/finely chopped vegetables and some pureed items  Pt stating that those diet levels suspect will be difficult for her to manage at this time  SLP then proceeded to educate pt on pureed diet, which all items would be the consistency of apple sauce and pudding  Pt was agreeable to try this diet, stating "I need protein and vegetables"  SLP reiterating that those items will come out smooth just as pudding textures  Again, reeducating pt that pureed diet will allow PO intake along w/ able to conserve energy during meals due to lack of increased time needed for chewing  At this time, recommendation is to downgrade diet to pureed diet w/ thin liquids, continuing aspiration precautions as well as recommending DISTANT supervision during meals  Also, recommending pt to be OOB for all meals as well as encouraging of PO intake throughout meals  Pt w/ limited trials of level 2 items at this time, but when trialing items, pt is demonstrating prolonged oral stage (mastication/manipulation/transfer) due to suspected fear of choking upon initiation of swallowing  No increased or overt aspiration sxs noted consistently during f/u sessions  If pt's overall willingness to trial other food textures remains decreased, suspect that puree diet will be safest/least restrictive at this time  Currently, pt to continue to benefit from SLP services targeting establishment of safest least restrictive diet w/o increased oropharyngeal sxs            Nursing Notes:  Appetite: Poor  Diet Type: Dysphagia I, Thin Liquids, Other (Specify)(Potassium 2 gm)                                                                     Pain Location/Orientation: Location: Back  Pain Score: 7  Pain 2  Pain Score 2: 0  Garcia-Baker FACES Pain Rating 2: No hurt  Patient's Stated Pain Goal 2: No pain                    Hospital Pain Intervention(s): Medication (See MAR)          Pt admitted to [facility] with compression fracture of T5 and T6 s/p c/o back pain and SOB - pt also with moderate pulmonary emphysematous changes bilaterally and a superior endplate T2 compression fracture with approximately 10%-20 percent height loss at the superior endplate  Pt recommended conservative treatment with TLSO brace while out of bed  Pt with hx of COPD/emphysema on baseline 2-3 L - started on steroids (Prednisone) as well as continue on her Breo and DuoNebs; CHF - Lasix 20 mg daily; Type 2 DM - QID BS with sliding scale insulin coverage; Hypothyroidism - Levothyroxine 125 mcg daily  Pt is currently receiving continuous infusion of Sodium Chloride (concentrated) 4 mEq/mL Soln 302 mEq running at 30mL/hr d/t her low Na level  Pt's pain is being managed with scheduled Tylenol, PRN oxycodone and tramadol, and lidocaine patches  Pt requires 2-3 L of O2 at all times  Pt is incontinent of bowel and bladder at times  Pt requires alarms for safety  We will continue to monitor labs, VS, and blood sugars  We will maintain pt's skin integrity by encouraging turning/repositioning, frequent weight shifts, and promote continence  We will monitor for constipation and treat accordingly  We will monitor pt's pain and promote adequate pain control  We will encourage independence with ADL's  We will maintain safety precautions and keep pt free from falls       Case Management:     Discharge Planning  Living Arrangements: Alone  Support Systems: Family members, Children, Friends/neighbors  Assistance Needed: stated she required a lot of help prior to admission and did not walk at [facility] prior to admission to [facility]  Type of Current Residence: Private residence  [address]: Yes([facility] - RN, PT, OT)  Type of Current Home Care Services: Home PT, Nurse visit, Other (Comment)(home OT)  CM has met with the patient over the previous week to discuss the rehab program, identify prior services and resources available, and begin discussing needs for discharge and discharge planning  Will continue to follow over the next week to assist with DC planning    Is the patient actively participating in therapies? yes  List any modifications to the treatment plan: No    Barriers Interventions   Dyspnea, poor activity tolerance Nebulizer, O2, energy conservation, strengthening   Kidney function Nephrology managing- implementing saline, Lasix and routine med adjustments   Limited Caregiver support Educating family on need for hired help, will complete family training, will educate on wh   Functional Incontinence B&B program, improved mobility   Oral dysphagia Introducing softer solids   Anxiety Positive reinforcement, Xanax PRN     Is the patient making expected progress toward goals? yes  List any update or changes to goals: No    Medical Goals: Patient will be medically stable [facility] program    Weekly Team Goals:   Rehab Team Goals  ADL Team Goal: Patient will require assist with ADLs with least restrictive device upon completion of rehab program  Transfer Team Goal: Patient will require assist with transfers with least restrictive device upon completion of rehab program  Locomotion Team Goal: Patient will require assist with locomotion with least restrictive device upon completion of rehab program    Discussion: Patient is making progress toward her goals however continues with barriers noted above  Over the next week the team will focus on addressing barriers as noted  The team anticipates that she will likely need some assistance upon DC and will coordinate training sessions and education for her family over the next week and communicate potential need for hired help  Anticipated Discharge Date:  [facility] Team Members Present: The following team members are supervising care for this patient and were present during this Weekly Team Conference      Physician: Dr Niya Palencia, MD  : Chai Pratt DPT  Registered Nurse: Daphne Cervantes RN  Physical Therapist: ZANDER CrumpT  Occupational Therapist: Jin Stark MS, OTR/L

## 2021-03-03 NOTE — PROGRESS NOTES
PM&R PROGRESS NOTE:  Jasson Hammonds 78 y o  female MRN: [id number]  Unit/Bed#: -01 Encounter: [id number]        Rehabilitation Diagnosis: Impairment of mobility, safety and Activities of Daily Living (ADLs) due to Orthopedic Disorders:  08 9  Other Orthopedic Thoracic compression fractures    HPI:   Jasson Hammonds is a 78 y o  female with COPD/ emphysema on baseline oxygen 2-3 L,  Congestive heart failure, osteoarthritis who presented to the [facility] on 2/17/21 with back pain and shortness of breath  Pulmonary workup showing moderate pulmonary emphysematous changes bilaterally  Workup for back pain revealed Compression fracture of T5 (33% height loss approximately) and T6 (approximately 25% height loss  )  There is also superior endplate T2 compression fracture with approximately 10%-20 percent height loss at the superior endplate  Case was discussed with both Orthopedics and Neurosurgery and patient was recommended conservative treatment with TLSO brace while out of bed  Medically, patient was treated for an E coli urinary tract infection  Patient was started on steroids for COPD and emphysema and continued on her Breo and DuoNebs  Patient was seen by Nephrology  acute renal insufficiency, hypercalcemia and hyperkalemia  Patient started on Lasix by Nephrology  Patient was followed by wound care for a sacral pressure ulcer  After medical stabilization patient was found to have acute functional deficits from her baseline and therefore was admitted to [address] [place] [address]  SUBJECTIVE: Patient seen face to face  No acute issues today  Denies SOB  Reviewed team conference with patient with her understanding      ASSESSMENT: Stable, progressing      PLAN:    Rehabilitation   Functional deficits:  Generalized weakness,  Reduced Endurance,  Impaired mobility, impaired self-care,  Impaired balance,  Possible impaired swallow   Continue current rehabilitation plan of care to maximize function   Estimated Discharge: To be determined, [facility]       Pain  · Mid to lower back pain:  Managed on Tylenol,  Topical Lidoderm patch, p r n  Robaxin, tramadol and oxycodone  P r n  DVT prophylaxis    managed on subcutaneous heparin and SCDs    Bladder plan   Incontinent at baseline     Bowel plan   Continent      * Thoracic compression fracture (HCC)  Assessment & Plan  · T2, T5 and T6 compression fractures  · Treated conservatively  · TLSO brace while out of bed  · Follow-up as outpatient with Neurosurgery    COPD exacerbation ([address] )  Assessment & Plan  · Wears oxygen 2-3 L at baseline  · At baseline feels SOB  · Continue current inhalers reduced to TID  · Monitor oxygen saturations with activity  · Recent CT scan +moderate emphysema  · Continue prednisone taper    Pressure ulcer  Assessment & Plan  · Located on Sacrum  · Consulted wound care to follow while at [facility]  · Continue optimization of nutrition, pressure relieving techniques, and turns  · Nutrition consulted  · Continue local wound care with foam dressing as recommended by wound care RN  UTI (urinary tract infection)  Assessment & Plan  · E Coli UTI  · Completed treatment     Chronic diastolic heart failure (HCC)  Assessment & Plan  Managed on Lasix 20mg daily      Type 2 diabetes mellitus with complication, without long-term current use of insulin ([facility])  Assessment & Plan  · Managed  On sliding scale insulin  · Due to hypoglycemia glyburide was discontinued  · CCD  · IM consultants following    VICKY (acute kidney injury) ([facility])-resolved as of 2/27/2021  Assessment & Plan  · Renal function baseline 0 9 - 1 1  · Renal following hypercalcemia/hyperkaemia (hyperkalemia likely due to diet - was drinking V8s and fruity drinks)  ·  continue low-potassium diet        Appreciate IM consultants medical co-management  Labs, medications, and imaging personally reviewed        ROS:  A ten point review of systems was completed on 3/3/21 and pertinent positives are listed in subjective section  All other systems reviewed were negative  OBJECTIVE:   /68   Pulse 80   Temp 97 5 °F (36 4 °C) (Oral)   Resp 18   Ht 5' 2" (1 575 m)   Wt 72 5 kg (159 lb 13 3 oz)   SpO2 94%   BMI 29 23 kg/m²     Physical Exam  Vitals signs and nursing note reviewed  Constitutional:       General: She is not in acute distress  Appearance: She is well-developed  HENT:      Head: Normocephalic  Nose: Nose normal    Eyes:      Conjunctiva/sclera: Conjunctivae normal    Neck:      Musculoskeletal: Neck supple  Cardiovascular:      Rate and Rhythm: Normal rate  Pulmonary:      Effort: Pulmonary effort is normal       Breath sounds: No wheezing  Comments: Oxygen via NC  Abdominal:      General: There is no distension  Palpations: Abdomen is soft  Skin:     General: Skin is warm  Neurological:      Mental Status: She is alert and oriented to person, place, and time     Psychiatric:         Mood and Affect: Mood normal           Lab Results   Component Value Date    WBC 11 60 (H) 02/27/2021    HGB 12 3 02/27/2021    HCT 38 2 02/27/2021    MCV 92 02/27/2021     02/27/2021     Lab Results   Component Value Date    SODIUM 137 03/02/2021    K 5 3 03/02/2021    CL 99 (L) 03/02/2021    CO2 37 (H) 03/02/2021    BUN 41 (H) 03/02/2021    CREATININE 0 90 03/02/2021    GLUC 92 03/02/2021    CALCIUM 10 9 (H) 03/02/2021     No results found for: INR, PROTIME        Current Facility-Administered Medications:     acetaminophen (TYLENOL) tablet 650 mg, 650 mg, Oral, Q6H [address], Penny Gerard MD, 650 mg at 03/03/21 1146    al mag oxide-diphenhydramine-lidocaine viscous (MAGIC MOUTHWASH) suspension 10 mL, 10 mL, Swish & Spit, Q4H PRN, ROLA Shepard, 10 mL at 03/02/21 1526    albuterol (PROVENTIL HFA,VENTOLIN HFA) inhaler 2 puff, 2 puff, Inhalation, Q6H PRN, Penny Gerard MD    ALPRAZolam [facility]) tablet 0 25 mg, 0 25 mg, Oral, TID PRN, Bisi Santiago Eugenio Nicholson MD, 0 25 mg at 03/02/21 2132    aluminum-magnesium hydroxide-simethicone (MYLANTA) oral suspension 30 mL, 30 mL, Oral, Q6H PRN, Paige Lindsey MD    atorvastatin (LIPITOR) tablet 80 mg, 80 mg, Oral, Daily With Dariel Miguel MD, 80 mg at 03/02/21 1526    benzonatate (TESSALON PERLES) capsule 100 mg, 100 mg, Oral, TID PRN, Paige Lindsey MD    bisacodyl (DULCOLAX) rectal suppository 10 mg, 10 mg, Rectal, Daily PRN, Paige Lindsey MD, 10 mg at 02/28/21 1802    fluticasone-vilanterol (BREO ELLIPTA) 100-25 mcg/inh inhaler 1 puff, 1 puff, Inhalation, Daily, Paige Lindsey MD, 1 puff at 03/03/21 0848    furosemide (LASIX) tablet 20 mg, 20 mg, Oral, Daily, Paige Lindsey MD, 20 mg at 03/03/21 0841    glyBURIDE (DIABETA) tablet 2 5 mg, 2 5 mg, Oral, Daily With Breakfast, ROLA Parmar, 2 5 mg at 03/03/21 0842    guaiFENesin (MUCINEX) 12 hr tablet 600 mg, 600 mg, Oral, Q12H [address], Paige Lindsey MD, 600 mg at 03/03/21 0841    heparin (porcine) subcutaneous injection 5,000 Units, 5,000 Units, Subcutaneous, Q8H [address], Paige Lindsey MD, 5,000 Units at 03/03/21 0527    insulin lispro (HumaLOG) 100 units/mL subcutaneous injection 1-6 Units, 1-6 Units, Subcutaneous, HS, Paieg Lindsey MD, 2 Units at 03/02/21 2134    insulin lispro (HumaLOG) 100 units/mL subcutaneous injection 1-6 Units, 1-6 Units, Subcutaneous, TID AC, 2 Units at 03/02/21 1627 **AND** Fingerstick Glucose (POCT), , , TID AC, Paige Lindsey MD    ipratropium-albuterol (DUO-NEB) 0 5-2 5 mg/3 mL inhalation solution 3 mL, 3 mL, Nebulization, TID, Paige Lindsey MD    levothyroxine tablet 125 mcg, 125 mcg, Oral, Early Morning, Paige Lindsey MD, 125 mcg at 03/03/21 0527    lidocaine (LIDODERM) 5 % patch 2 patch, 2 patch, Topical, Daily, Paige Lindsey MD, 2 patch at 03/03/21 0841    melatonin tablet 6 mg, 6 mg, Oral, HS PRN, Paige Lindsey MD, 6 mg at 02/25/21 1513    menthol-methyl salicylate (BENGAY) 40-15 % cream, , Apply externally, TID, Luca Cosme MD    methocarbamol (ROBAXIN) tablet 500 mg, 500 mg, Oral, Q6H PRN, Luca Cosme MD    montelukast (SINGULAIR) tablet 10 mg, 10 mg, Oral, HS, Luca Cosme MD, 10 mg at 03/02/21 2133    nystatin (MYCOSTATIN) cream, , Topical, BID, Luca Cosme MD, 1 application at 50/27/02 0851    nystatin (MYCOSTATIN) powder, , Topical, BID, Luca Cosme MD, 1 application at 23/28/59 0851    ondansetron (ZOFRAN-ODT) dispersible tablet 4 mg, 4 mg, Oral, Q6H PRN, Luca Cosme MD    oxyCODONE (ROXICODONE) IR tablet 5 mg, 5 mg, Oral, Q6H PRN, Luca Cosme MD, 5 mg at 03/03/21 0929    pantoprazole (PROTONIX) EC tablet 40 mg, 40 mg, Oral, Early Morning, Luca Cosme MD, 40 mg at 03/03/21 0527    polyethylene glycol (MIRALAX) packet 17 g, 17 g, Oral, Daily, Luca Cosme MD, 17 g at 03/03/21 0841    saccharomyces boulardii (FLORASTOR) capsule 250 mg, 250 mg, Oral, BID, Luca Cosme MD, 250 mg at 03/03/21 0841    senna (SENOKOT) tablet 8 6 mg, 1 tablet, Oral, HS, Luca Cosme MD, 8 6 mg at 03/02/21 2132    traMADol (ULTRAM) tablet 50 mg, 50 mg, Oral, Q6H PRN, Luca Cosme MD, 50 mg at 03/03/21 1146    white petrolatum-mineral oil (EUCERIN,HYDROCERIN) cream, , Topical, TID PRN, Luca Cosme MD, Given at 02/27/21 2218    Past Medical History:   Diagnosis Date    Cataract     CHF (congestive heart failure) ([facility] )     Leukocytosis        Patient Active Problem List    Diagnosis Date Noted    Thoracic compression fracture ([facility] ) 02/18/2021     Priority: High    COPD exacerbation ([facility] ) 03/10/2014     Priority: Medium    Stage 3a chronic kidney disease 02/27/2021    Alkalosis 02/25/2021    Hyperkalemia 02/23/2021    Urinary incontinence 02/23/2021    Pressure ulcer 02/22/2021    Hypercalcemia 02/21/2021    Chronic respiratory failure with hypoxia ([place] [id number] ) 02/18/2021    Moderate protein-calorie malnutrition ([place] [address] ) 02/18/2021    UTI (urinary tract infection) 02/18/2021    SOB (shortness of breath) 02/17/2021    Chronic bilateral thoracic back pain 02/17/2021    Chronic diastolic heart failure ([facility] ) 02/17/2021    Atherosclerosis of abdominal aorta ([place] [address] ) 12/01/2020    CHF (congestive heart failure) ([place] [address] ) 02/11/2019    Type 2 diabetes mellitus with complication, without long-term current use of insulin ([place] [address] ) 04/08/2015    Carotid artery plaque 04/06/2015    Hyperlipidemia 03/14/2014    Hypothyroidism 03/14/2014    Osteoarthrosis 03/14/2014          Andrea Salgado MD    Total time spent:  30 minutes with more than 50% spent counseling/coordinating care  Counseling includes discussion with patient re: progress and discussion with patient of his/her current medical/functional state/information  Coordination of patient's care was performed in conjunction with consulting services  Time invested included review of patient's labs, vitals, and management of their comorbidities with continued monitoring  The care of the patient was extensively discussed and appropriate treatment plan was formulated unique for this patient  ** Please Note:  voice to text software may have been used in the creation of this document   Although proof errors in transcription or interpretation are a potential of such software**

## 2021-03-03 NOTE — PROGRESS NOTES
03/03/21 1300   Pain Assessment   Pain Assessment Tool 0-10   Pain Score 7   Pain Location/Orientation Orientation: Left; Location: Rib Cage  (posterior)   Pain Onset/Description Onset: Ongoing;Frequency: Constant/Continuous   Hospital Pain Intervention(s) Ambulation/increased activity   Restrictions/Precautions   Precautions Cognitive; Fall Risk;O2;Pain;Supervision on toilet/commode  (2LO2 NC)   ROM Restrictions Yes  (thoracic spinal precautions)   Braces or Orthoses TLSO  (backpack when OOB)   Cognition   Overall Cognitive Status Impaired   Arousal/Participation Alert; Cooperative   Attention Attends with cues to redirect   Memory Decreased short term memory;Decreased recall of recent events;Decreased recall of precautions   Following Commands Follows one step commands without difficulty   Subjective   Subjective Pt agreeable to PT session, daughter in room upon arrival   Sit to Stand   Type of Assistance Needed Physical assistance; Adaptive equipment   Amount of Physical Assistance Provided Less than 25%   Comment Marguerite CG at times does better when pushing off w/c w/ BUE   Sit to Stand CARE Score 3   Bed-Chair Transfer   Type of Assistance Needed Physical assistance   Amount of Physical Assistance Provided Less than 25%   Comment Marguerite RW, O2 line management   Chair/Bed-to-Chair Transfer CARE Score 3   Car Transfer   Comment Trial next session if safe   Reason if not Attempted Safety concerns   Car Transfer CARE Score 88   Walk 10 Feet   Type of Assistance Needed Physical assistance; Adaptive equipment   Amount of Physical Assistance Provided Less than 25%   Comment Marguerite RW, A for O2 line   Walk 10 Feet CARE Score 3   Walk 50 Feet with Two Turns   Type of Assistance Needed Physical assistance; Adaptive equipment   Amount of Physical Assistance Provided Less than 25%   Comment Marguerite RW, A for O2 line   Walk 50 Feet with Two Turns CARE Score 3   Walk 150 Feet   Reason if not Attempted Safety concerns   Walk 150 Feet CARE Score 88   Walking 10 Feet on Uneven Surfaces   Comment trial next session if safe   Reason if not Attempted Safety concerns   Walking 10 Feet on Uneven Surfaces CARE Score 88   Ambulation   Does the patient walk? 2  Yes   Primary Mode of Locomotion Prior to Admission Walk   Distance Walked (feet) 50 ft  (x2)   Assist Device Roller Walker   Gait Pattern Inconsistant Lory; Slow Lory;Decreased foot clearance; Forward Flexion; Step to; Step through; Improper weight shift   Limitations Noted In Balance;Device Management; Coordination; Endurance; Heel Strike;Posture; Sequencing;Speed;Strength;Swing   Provided Assistance with:   (O2 line management)   Walk Assist Level Minimum Assist   Findings 2LO2 NC, A for O2 line despite VC   Wheel 50 Feet with Two Turns   Reason if not Attempted Activity not applicable   Wheel 50 Feet with Two Turns CARE Score 9   Wheel 150 Feet   Reason if not Attempted Activity not applicable   Wheel 036 Feet CARE Score 9   Wheelchair mobility   Does the patient use a wheelchair? 0  No   Curb or Single Stair   Style negotiated Single stair   Type of Assistance Needed Physical assistance; Adaptive equipment   Amount of Physical Assistance Provided Total assistance   Comment minAx1 w/ SBA of second person for safety  Use of BHR   1 Step (Curb) CARE Score 1   4 Steps   Reason if not Attempted Safety concerns   4 Steps CARE Score 88   12 Steps   Reason if not Attempted Safety concerns   12 Steps CARE Score 88   Stairs   Type Stairs   # of Steps 2  (x2)   Weight Bearing Precautions Back; Fall Risk   Assist Devices Bilateral Rail   Findings 6in , BHR  Up fwd, down fwd non reciporcal  Req 10min rest break between trials  SpO2 WNL, HR 110s   Toilet Transfer   Type of Assistance Needed Physical assistance; Adaptive equipment   Amount of Physical Assistance Provided Less than 25%   Comment Marguerite RW [facility]   Toilet Transfer CARE Score 3   Therapeutic Interventions   Balance standing while pulling down & pulling up pants   Assessment   Treatment Assessment PT session focusing on stair negotiation and toileting  Pt able to navigate 2x2 6in stairs, w/ 10min rest break in between vital signs stated above  Pt able to stand and pull down/up her pants after toileting showing improvements in strength and standing balance  Able to progress to CG w/ RW for sit>stands as well if she pushes off w/ BUE  Pt cont to be very limited by dec endurance, req frequent rest breaks after activity  Pt also reports high anxiety and fear of falling, which she reports is limiting her from managing her O2 line 2* being unable to concentrate on it b/c she is worried she will fall w/ walking  Pt also unable to don/doff TLSO w/o assistance  Pt cont to req aacute rehab PT focusing on HH distance ambulation, stair negotiation (even if pt able to install ramp, will use for endurance/strengthening), standing tolernace/balance as well as improving overlal functional mobiltiy and indepdennce as pt lives alone  Family/Caregiver Present Daughter   Problem List Decreased strength;Decreased range of motion;Decreased endurance; Impaired balance;Decreased mobility; Decreased coordination;Decreased cognition;Orthopedic restrictions;Pain   Barriers to Discharge Decreased caregiver support; Inaccessible home environment   PT Barriers   Functional Limitation Car transfers; Ramp negotiation;Stair negotiation;Standing;Transfers; Walking; Wheelchair management   Plan   Treatment/Interventions Functional transfer training;LE strengthening/ROM; Elevations; Therapeutic exercise; Endurance training;Cognitive reorientation;Patient/family training;Equipment eval/education; Bed mobility;Gait training; Compensatory technique education   Progress Slow progress, decreased activity tolerance   Recommendation   PT Discharge Recommendation Other (Comment)  (TBD pending progress)   Equipment Recommended Walker   PT Therapy Minutes   PT Time In 1300   PT Time Out 1400   PT Total Time (minutes) 60   PT Mode of treatment - Individual (minutes) 60   PT Mode of treatment - Concurrent (minutes) 0   PT Mode of treatment - Group (minutes) 0   PT Mode of treatment - Co-treat (minutes) 0   PT Mode of Treatment - Total time(minutes) 60 minutes   PT Cumulative Minutes 445   Therapy Time missed   Time missed?  No

## 2021-03-03 NOTE — PROGRESS NOTES
03/03/21 1145   Pain Assessment   Pain Assessment Tool 0-10   Pain Score Worst Possible Pain   Pain Location/Orientation Orientation: Left;Orientation: Right;Location: Chest   Hospital Pain Intervention(s) Repositioned;Rest;Emotional support  (RN aware)   Restrictions/Precautions   Precautions Bed/chair alarms;Cognitive; Fall Risk;Fluid restriction;O2;Supervision on toilet/commode;Spinal precautions;Pain;Aspiration   Swallow Information   Current Risks for Dysphagia & Aspiration Respiratory compromise;Weak cough;General debilitation;Positionong Issues   Current Symptoms/Concerns Clear throat;Cough;During meals; Difficulty chewing;Holding food in mouth;Weight loss;Decreased oral intake   Current Diet Dysphagia pureed; Thin liquid   Baseline Diet Regular; Thin liquids   Consistencies Assessed and Performance   Materials Admnistered Puree/Level 1;Mechanical Soft/Level 2; Thin liquid   Oral Stage Mild impaired  (w/ current items  )   Phargngeal Stage Mild impaired;Aspiration risk  (w/ current items  )   Swallow Mechanics Mild delayed;Aspiration risk;Swallow initation   Esophageal Concerns No s/s reported   Recommendations   Risk for Aspiration Present   Recommendations Dysphagia treatment   Diet Solid Recommendation Level 1 Dysphagia/pureed   Diet Liquid Recommendation Thin liquid   Recommended Form of Meds As tolerated   General Precautions Aspiration precautions;Minimize distractions;Upright as possible for all oral intake;Remain upright for 45 mins after meals; Supervision with meals  (DISTANT supervision w/ pt OOB for all meals )   Compensatory Swallowing Strategies External pacing; Alternate solids and liquids; Coordinate breathing and swallowing;Voluntary throat clear/cough to clear penetration   Further Evaluations Dietician   Results Reviewed with PT/Family/Caregiver   Eating   Type of Assistance Needed Set-up / clean-up   Amount of Physical Assistance Provided No physical assistance   Eating CARE Score 5   Swallow Assessment   Swallow Treatment Assessment Pt was alert, cooperative and in wheelchair upon time of arrival  Pt observed for lunch w/ trial tray of soft solids as well as pt's current diet of pureed diet w/ thin liquids  SLP set up tray for pt, w/ pt able to feed self during the whole meal  Of note, pt's daughter was present for most of the session, w/ SLP providing education about pt's current diet and trials of softer solids to see if pt can tolerate different textures w/o any signs/sxs of aspiration  Pt consumed 75% of meal, consisting of soft solids (small cut broccoli pieces, minced chicken), puree (mashed potatoes, vanilla pudding, apple sauce) and 320cc of thin liquids by straw (chicken broth, water)  Compared to previous sessions, pt's PO intake was increased in today's session, w/ pt finishing most of new food texture (mashed potatoes and minced chicken)  SLP left vanilla pudding w/ pt in her room, encouraging pt for further food intake of pureed food  Initially, pt appeared focused on anticipating that she would not be able to swallow food and food intake was minimal  However, when engaging pt in conversation which appeared to distract her from anticipating limitations from meal, pt continued to eat meal w/o difficulty  SLP assisting pt in mixing mashed potatoes w/ chicken to ensure pt's sufficient protein intake and to help w/ overall bolus transfer of softer solids and pureed food  Pt's overall rate of PO intake w/ soft solids and puree was slow but increased when compared to previous sessions  Small - adequate bolus sizes across all consistencies noted during meal  Adequate lip seal was noted around fork, spoon, and straw across all consistencies w/o any anterior loss noted  Mastication of soft solids was prolonged along w/ broccoli and minced chicken, suspect due to pt's decreased coordination of breathing and eating as well as pt being afraid of choking on food   However, when minced chicken was mixed w/ mashed potatoes or moistened w/ apple sauce, pt's mastication was more functional  No pocketing/ residual noted during meal  Bolus manipulation of puree/ thin liquids fluctuated from mildly slower/timely intermittently  to functional throughout meal  SLP needing to cue pt to alternate solids w/ puree and liquids as well as to assist in mixing mashed potatoes w/ minced chicken and applesauce to help w/ bolus transfer  Swallow initiation appeared to be mildly delayed and adequate laryngeal excursion noted upon palpation  Throughout meal, pt demonstrated inconsistent mild throat clearing suspect due to decreased coordination w/ breathing and swallowing  Overall, pt was agreeable to trying new food textures and appeared to be less anxious when distracted by SLP and daughter when engaged in conversation when eating  Throughout meal, SLP educating pt about current diet, trials of softer solids as well as swallow strategies, such as alternating between solids/liquid and mixing solids w/ apple sauce to help w/ bolus transfer  SLP continuing to recommend pureed diet and thin liquids at this time, however will continue w/ trial trays of softer solids in future session  SLP continuing to recommend the following aspiration precautions: DISTANT supervision and OOB or FULLY upright for meals, alternate solids/liquids during meals  Will continue to attempt diet upgrade trials to determine if diet upgrade can be warranted while in acute rehab center   If pt continues to decline trials or any softer textures suspect that puree diet will be safest least restrictive diet at this time  Will continue dysphagia therapy in order to determine safest and least restrictive diet w/ goal of also supporting overall PO intake  Swallow Assessment Prognosis   Prognosis Fair   Prognosis Considerations Age; Co-morbidities; Medical diagnosis; Medical prognosis;Previous level of function; Cooperation;Ability to carry over;New learning ability SLP Therapy Minutes   SLP Time In 7987   SLP Time Out 1986   SLP Total Time (minutes) 60   SLP Mode of treatment - Individual (minutes) 60   SLP Mode of treatment - Concurrent (minutes) 0   SLP Mode of treatment - Group (minutes) 0   SLP Mode of treatment - Co-treat (minutes) 0   SLP Mode of Treatment - Total time(minutes) 60 minutes   SLP Cumulative Minutes 225   Therapy Time missed   Time missed?  No

## 2021-03-03 NOTE — PLAN OF CARE
Problem: Potential for Falls  Goal: Patient will remain free of falls  Description: INTERVENTIONS:  - Assess patient frequently for physical needs  -  Identify cognitive and physical deficits and behaviors that affect risk of falls  -  [place] fall precautions as indicated by assessment   - Educate patient/family on patient safety including physical limitations  - Instruct patient to call for assistance with activity based on assessment  - Modify environment to reduce risk of injury  - Consider OT/PT consult to assist with strengthening/mobility  Outcome: Progressing     Problem: Prexisting or High Potential for Compromised Skin Integrity  Goal: Skin integrity is maintained or improved  Description: INTERVENTIONS:  - Identify patients at risk for skin breakdown  - Assess and monitor skin integrity  - Assess and monitor nutrition and hydration status  - Monitor labs   - Assess for incontinence   - Turn and reposition patient  - Assist with mobility/ambulation  - Relieve pressure over bony prominences  - Avoid friction and shearing  - Provide appropriate hygiene as needed including keeping skin clean and dry  - Evaluate need for skin moisturizer/barrier cream  - Collaborate with interdisciplinary team   - Patient/family teaching  - Consider wound care consult   Outcome: Progressing     Problem: Nutrition/Hydration-ADULT  Goal: Nutrient/Hydration intake appropriate for improving, restoring or maintaining nutritional needs  Description: Monitor and assess patient's nutrition/hydration status for malnutrition  Collaborate with interdisciplinary team and initiate plan and interventions as ordered  Monitor patient's weight and dietary intake as ordered or per policy  Utilize nutrition screening tool and intervene as necessary  Determine patient's food preferences and provide high-protein, high-caloric foods as appropriate       INTERVENTIONS:  - Monitor oral intake, urinary output, labs, and treatment plans  - Assess nutrition and hydration status and recommend course of action  - Evaluate amount of meals eaten  - Assist patient with eating if necessary   - Allow adequate time for meals  - Recommend/ encourage appropriate diets, oral nutritional supplements, and vitamin/mineral supplements  - Order, calculate, and assess calorie counts as needed  - Recommend, monitor, and adjust tube feedings and TPN/PPN based on assessed needs  - Assess need for intravenous fluids  - Provide specific nutrition/hydration education as appropriate  - Include patient/family/caregiver in decisions related to nutrition  Outcome: Progressing     Problem: PAIN - ADULT  Goal: Verbalizes/displays adequate comfort level or baseline comfort level  Description: Interventions:  - Encourage patient to monitor pain and request assistance  - Assess pain using appropriate pain scale  - Administer analgesics based on type and severity of pain and evaluate response  - Implement non-pharmacological measures as appropriate and evaluate response  - Consider cultural and social influences on pain and pain management  - Notify physician/advanced practitioner if interventions unsuccessful or patient reports new pain  Outcome: Progressing     Problem: INFECTION - ADULT  Goal: Absence or prevention of progression during hospitalization  Description: INTERVENTIONS:  - Assess and monitor for signs and symptoms of infection  - Monitor lab/diagnostic results  - Monitor all insertion sites, i e  indwelling lines, tubes, and drains  - Monitor endotracheal if appropriate and nasal secretions for changes in amount and color  - [place] appropriate cooling/warming therapies per order  - Administer medications as ordered  - Instruct and encourage patient and family to use good hand hygiene technique  - Identify and instruct in appropriate isolation precautions for identified infection/condition  Outcome: Progressing     Problem: SAFETY ADULT  Goal: Patient will remain free of falls  Description: INTERVENTIONS:  - Assess patient frequently for physical needs  -  Identify cognitive and physical deficits and behaviors that affect risk of falls    -  [place] fall precautions as indicated by assessment   - Educate patient/family on patient safety including physical limitations  - Instruct patient to call for assistance with activity based on assessment  - Modify environment to reduce risk of injury  - Consider OT/PT consult to assist with strengthening/mobility  Outcome: Progressing  Goal: Maintain or return to baseline ADL function  Description: INTERVENTIONS:  -  Assess patient's ability to carry out ADLs; assess patient's baseline for ADL function and identify physical deficits which impact ability to perform ADLs (bathing, care of mouth/teeth, toileting, grooming, dressing, etc )  - Assess/evaluate cause of self-care deficits   - Assess range of motion  - Assess patient's mobility; develop plan if impaired  - Assess patient's need for assistive devices and provide as appropriate  - Encourage maximum independence but intervene and supervise when necessary  - Involve family in performance of ADLs  - Assess for home care needs following discharge   - Consider OT consult to assist with ADL evaluation and planning for discharge  - Provide patient education as appropriate  Outcome: Progressing  Goal: Maintain or return mobility status to optimal level  Description: INTERVENTIONS:  - Assess patient's baseline mobility status (ambulation, transfers, stairs, etc )    - Identify cognitive and physical deficits and behaviors that affect mobility  - Identify mobility aids required to assist with transfers and/or ambulation (gait belt, sit-to-stand, lift, walker, cane, etc )  - [place] fall precautions as indicated by assessment  - Record patient progress and toleration of activity level on Mobility SBAR; progress patient to next Phase/Stage  - Instruct patient to call for assistance with activity based on assessment  - Consider rehabilitation consult to assist with strengthening/weightbearing, etc   Outcome: Progressing     Problem: DISCHARGE PLANNING  Goal: Discharge to home or other facility with appropriate resources  Description: INTERVENTIONS:  - Identify barriers to discharge w/patient and caregiver  - Arrange for needed discharge resources and transportation as appropriate  - Identify discharge learning needs (meds, wound care, etc )  - Arrange for interpretive services to assist at discharge as needed  - Refer to Case Management Department for coordinating discharge planning if the patient needs post-hospital services based on physician/advanced practitioner order or complex needs related to functional status, cognitive ability, or social support system  Outcome: Progressing

## 2021-03-03 NOTE — PROGRESS NOTES
Internal Medicine Progress Note  Patient: Vivian Renteria  Age/sex: 78 y o  female  Medical Record #: [id number]      ASSESSMENT/PLAN: (Interval History)  Vivian Renteria is seen and examined and management for following issues:    Compression fracture T5/6  · No surgical intervention  · Cont TLSO brace when OOB  · Aggressive rehab per PMR  · Pain mgmt per PMR     COPD  · Wears oxygen 2-3 L continuous  · Continue current inhalers/nebs per home  · Recent PFT DLCO 54%  · Oxygen saturations stable  · Prednisone as scheduled  · Recent CT scan +moderate emphysema; no PE  · Nebs were changed back to scheduled from prn on 2/27/21     Chronic diastolic HF  · Cont furosemide 20mg daily here   · Takes Torsemide at home  · Monitor volume status closely  · Chronic LE edema  · Echo = LVEF 39%; diastolic dysfunction  · Renal managing     DM II  · Hgb A1c 7 1 in October  · Home meds: Glyburide 5mg BID  · Cont sliding scale and coverage  · Cont glyburide at 2 5mg qd started 3/1/21  · BS stable     HTN  · Diovan is on hold 2/2 VICKY/CKD and K+ increase  · tx per renal  · BPs acceptable      CKD  · Level 3  · Baseline 0 9-1 1  · Avoid nephrotoxic medications  · Currently at baseline  · Renal managing     Hypercalcemia  · Nephrology w/u at [place]  · [place] secondary to volume status  · Renal managing      Hyponatremia  · Improved today  · 1 8% saline   · Renal managing     Hyperkalemia  · On low K+ diet and Diovan is on hold  · tx per renal    Hypothyroid  · Cont home supplementation     Sacral decubitus  · Cont off loading, nutritional supplementation  · Wound nurse following     Oral ulcers  · Cont magic mouthwash q4 hrs as needed  · Feels this helps and uses 1-2x/day          The above assessment and plan was reviewed and updated as determined by my evaluation of the patient on 3/3/2021      Labs:   Results from last 7 days   Lab Units 02/27/21  0709 02/25/21  0859   WBC Thousand/uL 11 60* 10 40*   HEMOGLOBIN g/dL 12 3 11 9 HEMATOCRIT % 38 2 37 4   PLATELETS Thousands/uL 215 208     Results from last 7 days   Lab Units 03/02/21  0640 03/01/21  2110   SODIUM mmol/L 137 135*   POTASSIUM mmol/L 5 3 5 0   CHLORIDE mmol/L 99* 102   CO2 mmol/L 37* 35*   BUN mg/dL 41* 43*   CREATININE mg/dL 0 90 1 00   CALCIUM mg/dL 10 9* 10 2*             Results from last 7 days   Lab Units 03/02/21  1553 03/02/21  1103 03/02/21  0624   POC GLUCOSE mg/dl 218* 124 87       Review of Scheduled Meds:  Current Facility-Administered Medications   Medication Dose Route Frequency Provider Last Rate    acetaminophen  650 mg Oral Q6H [address] Izabella Rivera MD      al mag oxide-diphenhydramine-lidocaine viscous  10 mL Swish & Spit Q4H PRN ROLA Shepard      albuterol  2 puff Inhalation Q6H PRN Izabella Rivera MD      ALPRAZolam  0 25 mg Oral TID PRN Izabella Rivera MD      aluminum-magnesium hydroxide-simethicone  30 mL Oral Q6H PRN Izabella Rivera MD      atorvastatin  80 mg Oral Daily With Keith Alba MD      benzonatate  100 mg Oral TID PRN Izabella Rivera MD      bisacodyl  10 mg Rectal Daily PRN Izabella Rivera MD      fluticasone-vilanterol  1 puff Inhalation Daily Izabella Rivera MD      furosemide  20 mg Oral Daily Izabella Rivera MD      glyBURIDE  2 5 mg Oral Daily With Breakfast ROLA Shepard      guaiFENesin  600 mg Oral Q12H [address] Izabella Rivera MD      heparin (porcine)  5,000 Units Subcutaneous Q8H [address] Izabella Rivera MD      insulin lispro  1-6 Units Subcutaneous HS Izabella Rivera MD      insulin lispro  1-6 Units Subcutaneous TID AC Izabella Rivera MD      ipratropium-albuterol  3 mL Nebulization 4x Daily Izabella Rivera MD      levothyroxine  125 mcg Oral Early Morning Izabella Rivera MD      lidocaine  2 patch Topical Daily Izabella Rivera MD      melatonin  6 mg Oral HS PRN Izabella Rivera MD      methocarbamol  500 mg Oral Q6H PRN Izabella Rivera MD      montelukast  10 mg Oral HS Izabella Rivera MD      nystatin   Topical BID Maddi Viveros MD      nystatin   Topical BID Maddi Viveros MD      ondansetron  4 mg Oral Q6H PRN Maddi Viveros MD      oxyCODONE  5 mg Oral Q6H PRN Maddi Viveros MD      pantoprazole  40 mg Oral Early Morning Maddi Viveros MD      polyethylene glycol  17 g Oral Daily Maddi Viveros MD      saccharomyces boulardii  250 mg Oral BID Maddi Viveros MD      senna  1 tablet Oral HS Maddi Viveros MD      traMADol  50 mg Oral Q6H PRN Maddi Viveros MD      white petrolatum-mineral oil   Topical TID PRN Maddi Viveros MD         Subjective/ HPI: Patients overnight issues or events were reviewed with nursing or staff during rounds or morning huddle session  No new or overnight issues  Offers no complaints  Pt c/w RICO which is her baseline; slept well      ROS:   A 10 point ROS was performed; negative except as noted above  Imaging:     XR abdomen 1 view kub   Final Result by Moraima Gryason MD (03/01 8245)      Large-volume stool, suggesting constipation  Nonobstructive bowel gas pattern                 Workstation performed: [id number]             *Labs /Radiology studies reviewed  *Medications reviewed and reconciled as needed  *Please refer to order section for additional ordered labs studies  *Case discussed with primary attending during morning huddle case rounds      Physical Examination:  Vitals:   Vitals:    03/02/21 2256 03/03/21 0600 03/03/21 0705 03/03/21 0814   BP: 135/65  150/65    BP Location: Left arm  Right arm    Pulse: 84  78    Resp: 17  17    Temp: 97 6 °F (36 4 °C)  97 8 °F (36 6 °C)    TempSrc: Oral  Oral    SpO2: 97%  98% 98%   Weight:  72 5 kg (159 lb 13 3 oz)     Height:           GEN: No apparent distress, interactive  NEURO: Alert and oriented x3  HEENT: Pupils are equal and reactive, EOMI, mucous membranes are moist, face symmetrical  CV: S1 S2 regular, no MRG, no peripheral edema noted  RESP: Lungs are decreased throughout with some scattered expiratory wheezing noted throughout; O2 2L NC, +RICO  GI: obese, soft non tender, non distended; +BS x4  : Voiding without difficulty  MUSC: Moves all extremities; +generalized deconditioning  SKIN: pink, warm and dry, normal turgor, sacral dressing intact; perineal excoriation noted          The above physical exam was reviewed and updated as determined by my evaluation of the patient on 3/3/2021  Invasive Devices     Peripheral Intravenous Line            Peripheral IV 03/01/21 Right;Ventral (anterior) Forearm 1 day                   VTE Pharmacologic Prophylaxis: Heparin  Code Status: Level 3 - DNAR and DNI  Current Length of Stay: 7 day(s)      Total time spent:  30 minutes with more than 50% spent counseling/coordinating care  Counseling includes discussion with patient re: progress  and discussion with patient of his/her current medical state/information  Coordination of patient's care was performed in conjunction with primary service  Time invested included review of patient's labs, vitals, and management of their comorbidities with continued monitoring  In addition, this patient was discussed with medical team including physician and advanced extenders  The care of the patient was extensively discussed and appropriate treatment plan was formulated unique for this patient  ** Please Note:  voice to text software may have been used in the creation of this document   Although proof errors in transcription or interpretation are a potential of such software**

## 2021-03-03 NOTE — PROGRESS NOTES
03/03/21 1030   Pain Assessment   Pain Assessment Tool 0-10   Pain Score Worst Possible Pain   Pain Location/Orientation Orientation: Left; Location: Rib Cage   Restrictions/Precautions   Precautions Cognitive; Fall Risk;O2;Pain;Spinal precautions;Supervision on toilet/commode;Fluid restriction   RUE Weight Bearing Per Order WBAT   LUE Weight Bearing Per Order WBAT   RLE Weight Bearing Per Order WBAT   LLE Weight Bearing Per Order WBAT   ROM Restrictions Yes  (thoracic spinal prec)   Braces or Orthoses TLSO   Lifestyle   Autonomy "Oh the pain  Yesterday it was like a 6 or 7, today, it's at a 15!"    Upper Body Dressing   Type of Assistance Needed Physical assistance   Amount of Physical Assistance Provided Total assistance   Comment to don TLSO   Upper Body Dressing CARE Score 1   Putting On/Taking Off Footwear   Type of Assistance Needed Physical assistance   Amount of Physical Assistance Provided 25%-49%   Comment Use of dressing stick to doff socks with min VCs for recall; min assist post demo for donning socks using sock aid  Assist required to fully pull up over heel as pt is wearing B/L heel Allevyns  Putting On/Taking Off Footwear CARE Score 3   Sit to Stand   Type of Assistance Needed Physical assistance   Amount of Physical Assistance Provided 25%-49%   Comment with RW; O2 tube management   Sit to Stand CARE Score 3   Bed-Chair Transfer   Type of Assistance Needed Physical assistance   Amount of Physical Assistance Provided Less than 25%   Comment with RW; O2 tube management   Chair/Bed-to-Chair Transfer CARE Score 3   Toileting Hygiene   Type of Assistance Needed Physical assistance   Amount of Physical Assistance Provided 75% or more   Comment Pt refusing to perform hygiene post BM; minimally assisted with CM  CG while in stance during toileting tasks     Toileting Hygiene CARE Score 2   Toilet Transfer   Type of Assistance Needed Physical assistance   Amount of Physical Assistance Provided 25%-49% Comment with RW to BSC; O2 tubing management; inc time spent on toilet due to [facility]   Toilet Transfer CARE Score 3   Functional Standing Tolerance   Time 2 min x1   Activity toileting tasks   Comments close supervision while in stance while SOLANO performed hygiene post BM   Cognition   Overall Cognitive Status Impaired   Arousal/Participation Alert; Cooperative   Attention Attends with cues to redirect   Orientation Level Oriented X4   Memory Decreased short term memory;Decreased recall of recent events;Decreased recall of precautions   Following Commands Follows one step commands without difficulty   Activity Tolerance   Activity Tolerance Patient limited by fatigue;Patient limited by pain   Assessment   Treatment Assessment Pt engages in 60 minutes skilled OT session focusing on SPT with RW, toileting tasks and AE retraining focusing on footwear  Pt initially reluctant to participate in OT session due to inc in pain-L posterior rib cage  Pt voices concerns as yesterday it was moreso on her R side  Per NSG, Jazmin Antonia was ordered for additional pain relief  Pt continues to require total assist for TLSO management as well as O2 tubing management with SPT  Inc time spent on the [facility] due to BM; pt refusing pt perform hygiene stating, "Oh honey, I can't", minimally assists with CM  AE retraining completed focusing on donning/doffing hospital socks  Pt able to doff socks with dressing stick, min VCs for recall of task  Dons socks using sock aid with min assist post demo due to socks getting caught on B/L heel Allevyns  Frequent and prolonged rest breaks required due to general fatigue and inc pain  Pt's SpO2 remains WNL above 94% on 3L O2  Prognosis Fair   Problem List Decreased strength;Decreased range of motion;Decreased endurance; Impaired balance;Decreased mobility; Decreased coordination;Decreased cognition;Orthopedic restrictions;Pain   Barriers to Discharge Inaccessible home environment;Decreased caregiver support Plan   Treatment/Interventions ADL retraining;Functional transfer training; Therapeutic exercise; Endurance training;Cognitive reorientation;Patient/family training;Equipment eval/education; Compensatory technique education   OT Therapy Minutes   OT Time In 1030   OT Time Out 1130   OT Total Time (minutes) 60   OT Mode of treatment - Individual (minutes) 60   OT Mode of treatment - Concurrent (minutes) 0   OT Mode of treatment - Group (minutes) 0   OT Mode of treatment - Co-treat (minutes) 0   OT Mode of Treatment - Total time(minutes) 60 minutes   OT Cumulative Minutes 461   Therapy Time missed   Time missed?  No

## 2021-03-03 NOTE — PLAN OF CARE
Problem: Potential for Falls  Goal: Patient will remain free of falls  Description: INTERVENTIONS:  - Assess patient frequently for physical needs  -  Identify cognitive and physical deficits and behaviors that affect risk of falls  -  [place] fall precautions as indicated by assessment   - Educate patient/family on patient safety including physical limitations  - Instruct patient to call for assistance with activity based on assessment  - Modify environment to reduce risk of injury  - Consider OT/PT consult to assist with strengthening/mobility  Outcome: Progressing     Problem: Prexisting or High Potential for Compromised Skin Integrity  Goal: Skin integrity is maintained or improved  Description: INTERVENTIONS:  - Identify patients at risk for skin breakdown  - Assess and monitor skin integrity  - Assess and monitor nutrition and hydration status  - Monitor labs   - Assess for incontinence   - Turn and reposition patient  - Assist with mobility/ambulation  - Relieve pressure over bony prominences  - Avoid friction and shearing  - Provide appropriate hygiene as needed including keeping skin clean and dry  - Evaluate need for skin moisturizer/barrier cream  - Collaborate with interdisciplinary team   - Patient/family teaching  - Consider wound care consult   Outcome: Progressing     Problem: Nutrition/Hydration-ADULT  Goal: Nutrient/Hydration intake appropriate for improving, restoring or maintaining nutritional needs  Description: Monitor and assess patient's nutrition/hydration status for malnutrition  Collaborate with interdisciplinary team and initiate plan and interventions as ordered  Monitor patient's weight and dietary intake as ordered or per policy  Utilize nutrition screening tool and intervene as necessary  Determine patient's food preferences and provide high-protein, high-caloric foods as appropriate       INTERVENTIONS:  - Monitor oral intake, urinary output, labs, and treatment plans  - Assess nutrition and hydration status and recommend course of action  - Evaluate amount of meals eaten  - Assist patient with eating if necessary   - Allow adequate time for meals  - Recommend/ encourage appropriate diets, oral nutritional supplements, and vitamin/mineral supplements  - Order, calculate, and assess calorie counts as needed  - Recommend, monitor, and adjust tube feedings and TPN/PPN based on assessed needs  - Assess need for intravenous fluids  - Provide specific nutrition/hydration education as appropriate  - Include patient/family/caregiver in decisions related to nutrition  Outcome: Progressing     Problem: PAIN - ADULT  Goal: Verbalizes/displays adequate comfort level or baseline comfort level  Description: Interventions:  - Encourage patient to monitor pain and request assistance  - Assess pain using appropriate pain scale  - Administer analgesics based on type and severity of pain and evaluate response  - Implement non-pharmacological measures as appropriate and evaluate response  - Consider cultural and social influences on pain and pain management  - Notify physician/advanced practitioner if interventions unsuccessful or patient reports new pain  Outcome: Progressing     Problem: INFECTION - ADULT  Goal: Absence or prevention of progression during hospitalization  Description: INTERVENTIONS:  - Assess and monitor for signs and symptoms of infection  - Monitor lab/diagnostic results  - Monitor all insertion sites, i e  indwelling lines, tubes, and drains  - Monitor endotracheal if appropriate and nasal secretions for changes in amount and color  - [place] appropriate cooling/warming therapies per order  - Administer medications as ordered  - Instruct and encourage patient and family to use good hand hygiene technique  - Identify and instruct in appropriate isolation precautions for identified infection/condition  Outcome: Progressing     Problem: SAFETY ADULT  Goal: Patient will remain free of falls  Description: INTERVENTIONS:  - Assess patient frequently for physical needs  -  Identify cognitive and physical deficits and behaviors that affect risk of falls    -  [place] fall precautions as indicated by assessment   - Educate patient/family on patient safety including physical limitations  - Instruct patient to call for assistance with activity based on assessment  - Modify environment to reduce risk of injury  - Consider OT/PT consult to assist with strengthening/mobility  Outcome: Progressing  Goal: Maintain or return to baseline ADL function  Description: INTERVENTIONS:  -  Assess patient's ability to carry out ADLs; assess patient's baseline for ADL function and identify physical deficits which impact ability to perform ADLs (bathing, care of mouth/teeth, toileting, grooming, dressing, etc )  - Assess/evaluate cause of self-care deficits   - Assess range of motion  - Assess patient's mobility; develop plan if impaired  - Assess patient's need for assistive devices and provide as appropriate  - Encourage maximum independence but intervene and supervise when necessary  - Involve family in performance of ADLs  - Assess for home care needs following discharge   - Consider OT consult to assist with ADL evaluation and planning for discharge  - Provide patient education as appropriate  Outcome: Progressing  Goal: Maintain or return mobility status to optimal level  Description: INTERVENTIONS:  - Assess patient's baseline mobility status (ambulation, transfers, stairs, etc )    - Identify cognitive and physical deficits and behaviors that affect mobility  - Identify mobility aids required to assist with transfers and/or ambulation (gait belt, sit-to-stand, lift, walker, cane, etc )  - [place] fall precautions as indicated by assessment  - Record patient progress and toleration of activity level on Mobility SBAR; progress patient to next Phase/Stage  - Instruct patient to call for assistance with activity based on assessment  - Consider rehabilitation consult to assist with strengthening/weightbearing, etc   Outcome: Progressing     Problem: DISCHARGE PLANNING  Goal: Discharge to home or other facility with appropriate resources  Description: INTERVENTIONS:  - Identify barriers to discharge w/patient and caregiver  - Arrange for needed discharge resources and transportation as appropriate  - Identify discharge learning needs (meds, wound care, etc )  - Arrange for interpretive services to assist at discharge as needed  - Refer to Case Management Department for coordinating discharge planning if the patient needs post-hospital services based on physician/advanced practitioner order or complex needs related to functional status, cognitive ability, or social support system  Outcome: Progressing

## 2021-03-04 NOTE — PROGRESS NOTES
03/04/21 1400   Pain Assessment   Pain Assessment Tool 0-10   Pain Score 8   Pain Location/Orientation Location: Rib Cage; Location: Back   Pain Onset/Description Onset: Ongoing;Frequency: Constant/Continuous   Hospital Pain Intervention(s) Repositioned;Rest;Relaxation technique   Restrictions/Precautions   Precautions Aspiration;Bed/chair alarms; Fall Risk;Cognitive;Supervision on toilet/commode;Pain;Spinal precautions;O2  (level 2 with thins, 2L)   RLE Weight Bearing Per Order WBAT   LLE Weight Bearing Per Order WBAT   ROM Restrictions Yes  (spinal precautions)   Braces or Orthoses TLSO   Cognition   Overall Cognitive Status Impaired   Arousal/Participation Alert; Cooperative   Attention Attends with cues to redirect   Subjective   Subjective pt reported pain as above and was agreeable to have  PT   Sit to Stand   Type of Assistance Needed Physical assistance;Verbal cues; Adaptive equipment   Amount of Physical Assistance Provided 25%-49%   Comment during initial stand required min A with noted retropulsion then pt progressed to CGA at best    Sit to Stand CARE Score 3   Bed-Chair Transfer   Type of Assistance Needed Physical assistance;Verbal cues; Adaptive equipment   Amount of Physical Assistance Provided Less than 25%   Comment with RW, VC for O2 line management   Chair/Bed-to-Chair Transfer CARE Score 3   Walk 10 Feet   Type of Assistance Needed Physical assistance;Verbal cues; Adaptive equipment   Amount of Physical Assistance Provided Less than 25%   Comment min/CG with RW x 10' x 2 , 40' x 1    Walk 10 Feet CARE Score 3   Walk 50 Feet with Two Turns   Type of Assistance Needed Physical assistance;Verbal cues; Adaptive equipment   Amount of Physical Assistance Provided Less than 25%   Comment min/CG with RW, PT provided CF while 2nd person for O2 tank management x 50' x 1    Walk 50 Feet with Two Turns CARE Score 3   Toilet Transfer   Type of Assistance Needed Verbal cues; Physical assistance; Adaptive equipment Amount of Physical Assistance Provided Less than 25%   Comment SPT to [facility] <>w/c with RW   Toilet Transfer CARE Score 3   Assessment   Treatment Assessment Skilled PT focused on functional mobility training using a RW while managing O2 line  Also worked on increasing pt gait tolerance which she was able to tolerate up to 40-50'  Although pt lesly RICO with reported difficulty breathing but O2 sat on 2L stayed above 96% every after functional activity training and pt instructed to avoid talking while walking and right after performing functional activities so she an concentrate on purse lip breathing  CM notified dtr request for HHA and subacute facilities in the area so she can discuss with brother and DIL and so she can start calling agencies/facilities in the area while she is still here for she is going back to [place] this Sunday  PT will work on bed mobility, stair training tomorrow as well as assess car and uneven surface amb using a RW  Family/Caregiver Present dtr Austendayanna Valentinetes - educated on purpose of bed cane as well as discussed options for d/c including SNF vs HHA with family support  Per Austen Garcia since she is flying back to [place] this weekend final d/c plans will be discuss with brother and YUSUF  Barriers to Discharge Inaccessible home environment;Decreased caregiver support   PT Barriers   Functional Limitation Car transfers; Ramp negotiation;Stair negotiation;Standing;Transfers; Walking; Wheelchair management   Plan   Treatment/Interventions Functional transfer training; Therapeutic exercise; Endurance training;Spoke to nursing;Gait training;OT;Compensatory technique education   Progress Slow progress, decreased activity tolerance   Recommendation   PT Discharge Recommendation Other (Comment)  (TBD)   PT - OK to Discharge No   PT Therapy Minutes   PT Time In 1400   PT Time Out 1510   PT Total Time (minutes) 70   PT Mode of treatment - Individual (minutes) 70   PT Mode of treatment - Concurrent (minutes) 0   PT Mode of treatment - Group (minutes) 0   PT Mode of treatment - Co-treat (minutes) 0   PT Mode of Treatment - Total time(minutes) 70 minutes   PT Cumulative Minutes 515

## 2021-03-04 NOTE — PROGRESS NOTES
Internal Medicine Progress Note  Patient: Ramsey Miller  Age/sex: 78 y o  female  Medical Record #: [id number]      ASSESSMENT/PLAN: (Interval History)  Ramsey Miller is seen and examined and management for following issues:    Compression fracture T5/6  · No surgical intervention  · Cont TLSO brace when OOB  · Aggressive rehab per PMR  · Pain mgmt per PMR     COPD  · Wears oxygen 2-3 L continuous  · Continue current inhalers/nebs per home  · Recent PFT DLCO 54%  · Oxygen saturations stable  · Prednisone complete  · Recent CT scan +moderate emphysema; no PE  · Nebs were changed back to scheduled from prn on 2/27/21     Chronic diastolic HF  · Cont furosemide 20mg daily here   · Takes Torsemide at home  · Monitor volume status closely  · Chronic LE edema  · Echo = LVEF 99%; diastolic dysfunction  · Renal managing     DM II  · Hgb A1c 7 1 in October  · Home meds: Glyburide 5mg BID  · Cont sliding scale and coverage  · Cont glyburide at 2 5mg qd started 3/1/21  · BS stable     HTN  · Diovan is on hold 2/2 VICKY/CKD and K+ increase  · tx per renal  · BPs acceptable      CKD  · Level 3  · Baseline 0 9-1 1  · Avoid nephrotoxic medications  · Currently at baseline  · Renal managing     Hypercalcemia  · Nephrology w/u at [facility]  · [address] secondary to volume status  · Renal managing      Hyponatremia  · Awaiting bmp  · Renal managing     Hyperkalemia  · On low K+ diet and Diovan is on hold  · tx per renal    Hypothyroid  · Cont home supplementation     Sacral decubitus  · Cont off loading, nutritional supplementation  · Wound nurse following     Oral ulcers  · Cont magic mouthwash q4 hrs as needed  · Feels this helps and uses 1-2x/day          The above assessment and plan was reviewed and updated as determined by my evaluation of the patient on 3/4/2021      Labs:   Results from last 7 days   Lab Units 02/27/21  0709 02/25/21  0859   WBC Thousand/uL 11 60* 10 40*   HEMOGLOBIN g/dL 12 3 11 9   HEMATOCRIT % 38 2 37 4 PLATELETS Thousands/uL 215 208     Results from last 7 days   Lab Units 03/02/21  0640 03/01/21  2110   SODIUM mmol/L 137 135*   POTASSIUM mmol/L 5 3 5 0   CHLORIDE mmol/L 99* 102   CO2 mmol/L 37* 35*   BUN mg/dL 41* 43*   CREATININE mg/dL 0 90 1 00   CALCIUM mg/dL 10 9* 10 2*             Results from last 7 days   Lab Units 03/04/21  0711 03/03/21  2057 03/03/21  1555   POC GLUCOSE mg/dl 87 89 162*       Review of Scheduled Meds:  Current Facility-Administered Medications   Medication Dose Route Frequency Provider Last Rate    acetaminophen  650 mg Oral Q6H [facility] Jon Caruso MD      al mag oxide-diphenhydramine-lidocaine viscous  10 mL Swish & Spit Q4H PRN ROLA Shepard      albuterol  2 puff Inhalation Q6H PRN Jon Caruso MD      ALPRAZolam  0 25 mg Oral TID PRN Jon Caruso MD      aluminum-magnesium hydroxide-simethicone  30 mL Oral Q6H PRN Jon Caruso MD      atorvastatin  80 mg Oral Daily With Daniele Horn MD      benzonatate  100 mg Oral TID PRN Jon Caruso MD      bisacodyl  10 mg Rectal Daily PRN Jon Caruso MD      fluticasone-vilanterol  1 puff Inhalation Daily Jon Caruso MD      furosemide  20 mg Oral Daily Jon Caruso MD      glyBURIDE  2 5 mg Oral Daily With Breakfast ROLA Shepard      guaiFENesin  600 mg Oral Q12H [facility] Jon Caruso MD      heparin (porcine)  5,000 Units Subcutaneous Q8H [facility] Jon Caruso MD      insulin lispro  1-6 Units Subcutaneous HS Jon Caruso MD      insulin lispro  1-6 Units Subcutaneous TID AC Jon Caruso MD      ipratropium-albuterol  3 mL Nebulization TID Jon Caruso MD      levothyroxine  125 mcg Oral Early Morning Jon Caruso MD      lidocaine  2 patch Topical Daily Jon Caruso MD      melatonin  6 mg Oral HS PRN Jon Caruso MD      menthol-methyl salicylate   Apply externally TID Jon Caruso MD      methocarbamol  500 mg Oral Q6H PRN Jon Caruso MD      montelukast  10 mg Oral HS King Mar MD      nystatin   Topical BID King Mar MD      nystatin   Topical BID King Mar MD      ondansetron  4 mg Oral Q6H PRN King Mar MD      oxyCODONE  5 mg Oral Q6H PRN King Mar MD      pantoprazole  40 mg Oral Early Morning King Mar MD      polyethylene glycol  17 g Oral Daily King Mar MD      saccharomyces boulardii  250 mg Oral BID King Mar MD      senna  1 tablet Oral HS King Mar MD      traMADol  50 mg Oral Q6H PRN King Mar MD      white petrolatum-mineral oil   Topical TID PRN King Mar MD         Subjective/ HPI: Patients overnight issues or events were reviewed with nursing or staff during rounds or morning huddle session  No new or overnight issues  Offers no complaints  Pt c/w RICO which is her baseline; slept well      ROS:   A 10 point ROS was performed; negative except as noted above  Imaging:     XR abdomen 1 view kub   Final Result by Bernardo Gonzalez MD (03/01 2210)      Large-volume stool, suggesting constipation  Nonobstructive bowel gas pattern                 Workstation performed: [id number]             *Labs /Radiology studies reviewed  *Medications reviewed and reconciled as needed  *Please refer to order section for additional ordered labs studies  *Case discussed with primary attending during morning huddle case rounds      Physical Examination:  Vitals:   Vitals:    03/03/21 1519 03/03/21 1910 03/03/21 2308 03/04/21 0700   BP: 148/67  139/61    BP Location: Right arm  Right arm    Pulse: 87  87    Resp: 18  18    Temp: 98 1 °F (36 7 °C)  97 6 °F (36 4 °C)    TempSrc: Oral  Oral    SpO2: 97% 99% 94% 99%   Weight:       Height:           GEN: No apparent distress, interactive  NEURO: Alert and oriented x3  HEENT: Pupils are equal and reactive, EOMI, mucous membranes are moist, face symmetrical  CV: S1 S2 regular, no MRG, no peripheral edema noted  RESP: Lungs are decreased bilaterally, scattered expiratory wheezes, no rales or rhonchi noted, 2L NC, +RICO  GI: Flat, soft non tender, non distended; +BS x4  : Voiding without difficulty  MUSC: Moves all extremities +generalized deconditioning  SKIN: pink, warm and dry, normal turgor, no rashes, lesions        The above physical exam was reviewed and updated as determined by my evaluation of the patient on 3/4/2021  Invasive Devices     Peripheral Intravenous Line            Peripheral IV 03/01/21 Right;Ventral (anterior) Forearm 2 days                   VTE Pharmacologic Prophylaxis: Heparin  Code Status: Level 3 - DNAR and DNI  Current Length of Stay: 8 day(s)      Total time spent:  30 minutes with more than 50% spent counseling/coordinating care  Counseling includes discussion with patient re: progress  and discussion with patient of his/her current medical state/information  Coordination of patient's care was performed in conjunction with primary service  Time invested included review of patient's labs, vitals, and management of their comorbidities with continued monitoring  In addition, this patient was discussed with medical team including physician and advanced extenders  The care of the patient was extensively discussed and appropriate treatment plan was formulated unique for this patient  ** Please Note:  voice to text software may have been used in the creation of this document   Although proof errors in transcription or interpretation are a potential of such software**

## 2021-03-04 NOTE — CASE MANAGEMENT
Met w/pts dtr Markyyamileth Maldonados who will be travelling back to [place] this coming Sunday  Provided list of a agencies which Aj Cele confirmed pt was receiving services through [facility] prior to this hospitalization  As requested CM also provided a list of subacute facilities, explained ratings and medicare coverage  CM also explained respite at assisted living facilities which is completely oop for the pt  CM also discussed the [facility] at [facility]  Aj Oakley stated she will be returning to [place] in May until Sept but in the meantime it is only her brother and sister in law to assist unless they hire help  CM reviewed team mtg plan on Tuesday

## 2021-03-04 NOTE — PROGRESS NOTES
03/04/21 1045   Pain Assessment   Pain Assessment Tool 0-10   Pain Score 7   Pain Location/Orientation Orientation: Mid;Location: Chest   Pain Onset/Description Onset: Ongoing   Effect of Pain on Daily Activities limits tolerance and transfers   Restrictions/Precautions   Precautions Aspiration;Cognitive; Fall Risk;Pain;Supervision on toilet/commode;O2   Lifestyle   Autonomy "Oh hon, I just got comfortable in this wheelchair"   Sit to Stand   Type of Assistance Needed Physical assistance   Amount of Physical Assistance Provided 25%-49%   Comment inc assist this date due to pain and fatigue   Sit to Stand CARE Score 3   Bed-Chair Transfer   Type of Assistance Needed Physical assistance   Amount of Physical Assistance Provided 25%-49%   Comment with RW and O2 tubing management   Chair/Bed-to-Chair Transfer CARE Score 3   Cognition   Overall Cognitive Status Impaired   Arousal/Participation Alert; Cooperative   Attention Attends with cues to redirect   Orientation Level Oriented X4   Memory Decreased short term memory;Decreased recall of recent events;Decreased recall of precautions   Following Commands Follows one step commands without difficulty   Additional Activities   Additional Activities Comments Pt engages in standing pulmonary exercises focusing on STS transfers, standing leg abduction, standing marches with minimal static standing  Exercises completed in order to inc overall endurance for all functional task completion  Significantly prolonged rest breaks required in order to manage fatigue and SOB despite SpO2 being WNL on 3L O2  Activity Tolerance   Activity Tolerance Patient limited by fatigue;Patient limited by pain   Assessment   Treatment Assessment Pt engages in 60 minutes skilled OT session focusing on continued pulmonary exercises started in previous OT session  Pt requires inc assist for STS transfers this date due to overall pain, fatigue and SOB despite SpO2 being WNL on 3L O2   Pt requires significantly prolonged and frequent rest breaks due to mostly fatigue and SOB, again SpO2 WNL  Improvements with balance in stance noted today during standing pulmonary exercises requiring only close  supervision/CG, no LOB noted  Max encouragement required to fully participate in OT session due to general fatigue  Recommend continued skilled OT to inc independence with self care, functional transfers, standing hattie/bal, func cog for safe IADL task completion in order to decrease burden of care at d/c  Prognosis Fair   Problem List Decreased strength;Decreased range of motion;Decreased endurance; Impaired balance;Decreased coordination;Decreased mobility; Decreased cognition;Orthopedic restrictions;Pain;Decreased skin integrity   Barriers to Discharge Decreased caregiver support; Inaccessible home environment   Plan   Treatment/Interventions ADL retraining;Functional transfer training; Therapeutic exercise; Endurance training;Cognitive reorientation;Patient/family training;Equipment eval/education; Compensatory technique education   OT Therapy Minutes   OT Time In 1045   OT Time Out 1145   OT Total Time (minutes) 60   OT Mode of treatment - Individual (minutes) 60   OT Mode of treatment - Concurrent (minutes) 0   OT Mode of treatment - Group (minutes) 0   OT Mode of treatment - Co-treat (minutes) 0   OT Mode of Treatment - Total time(minutes) 60 minutes   OT Cumulative Minutes 551   Therapy Time missed   Time missed?  No

## 2021-03-04 NOTE — PROGRESS NOTES
03/04/21 0800   Pain Assessment   Pain Assessment Tool 0-10   Pain Score 7   Pain Location/Orientation Orientation: Right;Location: Chest   Hospital Pain Intervention(s) Repositioned; Other (Comment)  (notified nursing about pain  )   Restrictions/Precautions   Precautions Aspiration; Fall Risk;O2;Supervision on toilet/commode;Pain   Braces or Orthoses TLSO  (when OOB  )   Swallow Information   Current Risks for Dysphagia & Aspiration Respiratory compromise;General debilitation;Positionong Issues;Weak cough   Current Symptoms/Concerns Clear throat; With food; Difficulty chewing;Decreased oral intake;Weight loss   Current Diet Dysphagia pureed; Thin liquid   Baseline Diet Regular; Thin liquids   Consistencies Assessed and Performance   Materials Admnistered Puree/Level 1;Mechanical Soft/Level 2; Thin liquid   Oral Stage WFL; Mild impaired   Phargngeal Stage WFL; Aspiration risk   Swallow Mechanics Mild delayed;Aspiration risk;Swallow initation;WFL   Esophageal Concerns No s/s reported   Recommendations   Risk for Aspiration Present   Recommendations Dysphagia treatment   Diet Solid Recommendation Level 2 Dysphagia/ mechanical soft/altered   Diet Liquid Recommendation Thin liquid   Recommended Form of Meds As tolerated   General Precautions Aspiration precautions;Minimize distractions;Upright as possible for all oral intake;Remain upright for 45 mins after meals; Supervision with meals  (distant supervision w/ pt OOB for meals )   Compensatory Swallowing Strategies External pacing;Coordinate breathing and swallowing; Alternate solids and liquids   Further Evaluations Dietician   Results Reviewed with PT/Family/Caregiver;MD;RN   Eating   Type of Assistance Needed Set-up / clean-up   Amount of Physical Assistance Provided No physical assistance   Eating CARE Score 5   Swallow Assessment   Swallow Treatment Assessment Pt was alert, cooperative and in bed upon time of arrival  SLP boosting up pt and slowly placing bed into chair position for meal, providing pt w/ education about needing to be fully upright if eating in bed  Pt needing some time to adjust to new upright positioning but was then w/o any complaints of pain/ SOB  Pt observed for breakfast w/ trial tray of softer solids and thin liquids  Pt consumed ~ 75% of meal, consisting of soft solids (pancakes w/ syrup, chopped sausage pieces, peaches), puree (1 bowl of apple sauce) and 200 of thin liquids (glucerna chocolate drink)  Prior to meal, pt initiated putting in her lower dentures since her sores improved over the last couple of days  Pt reported NO pain w/ dentures and stated improved ability of chewing w/ dentures  Pt's overall rate of PO intake was appropriate along w/ small-adequate bolus sizes across all consistencies  Adequate lip seal around fork, spoon and straw w/ good bolus retrieval across all consistencies  No anterior loss noted during meal  Mastication of soft solids was mildly prolonged w/ pancakes and sausage pieces but overall improved compared to yesterday's session suspect due to pt wearing dentures  No pocketing/ residual noted throughout meal  Bolus manipulation/transfer of puree and thin liquids was noted to be prompt  Pt requiring minimal cueing for alternating between solids and liquids  Swallow initiation fluctuated throughout meal from midly delayed to functional across all consistencies  Upon palpation laryngeal excursion was judged to be functional  Throughout meal, pt did not demonstrate any signs/sxs of aspiration, improving from yesterday's session where pt demonstrated inconsistent throat clears during meal  Overall, pt was agreeable in trying new textures of pancakes/sausage and was demonstrated less anxiety about chewing food copmapred to prior sessions  Pt tolerating soft solids w/o any signs/symptoms of aspiration/ complaint of SOB/pain   SLP recommending diet uprade from pureed diet to level 2 diet w/ thin liquids to allow for better carry over to daily food items at home while tolerating safest least restrictive diet for pt  SLP talked to MD and nursing in regards to diet upgrade  At this time, SLP continuing to recommend the following aspiration precautions: DISTANT supervision and OOB or FULLY upright for meals, alternate solids/liquids during meals  Will continue dysphagia therapy in order to determine safest and least restrictive diet w/ goal of also supporting overall PO intake       Swallow Assessment Prognosis   Prognosis Fair   Prognosis Considerations Age; Co-morbidities; Medical diagnosis; Medical prognosis;Previous level of function;New learning ability;Ability to carry over;Participation level   SLP Therapy Minutes   SLP Time In 0800   SLP Time Out 0845   SLP Total Time (minutes) 45   SLP Mode of treatment - Individual (minutes) 45   SLP Mode of treatment - Concurrent (minutes) 0   SLP Mode of treatment - Group (minutes) 0   SLP Mode of treatment - Co-treat (minutes) 0   SLP Mode of Treatment - Total time(minutes) 45 minutes   SLP Cumulative Minutes 270   Therapy Time missed   Time missed?  No

## 2021-03-04 NOTE — NURSING NOTE
Multiple attempts to get pt's bloodwork done this am without success  Melani CANELA notified, will DC order for today and will re-order for tomorrow if needed

## 2021-03-04 NOTE — PROGRESS NOTES
Follow up Consultation    Nephrology   Bonita Garland 78 y o  female MRN: [id number]  Unit/Bed#: -01 Encounter: [id number]      Physician Requesting Consult: Lonnie Rojas MD        ASSESSMENT/PLAN:     Acute kidney injury on CKD stage IIIA:  - VICKY resolved  - After review of records it appears that the patient has a baseline Creatinine of less than 1 mg/dL  - patient's most recent creatinine from 03/02/2021 at 0 9 mg/dL  Unable to get further blood draws at this time despite multiple attempts  - Avoid nephrotoxins, adjust meds to appropriate GFR   - Strict I/O   - Daily weights  - Urinary retention protocol if patient does not have a Torres  - Most likely has underlying CKD secondary to hypertensive nephrosclerosis plus age-related nephron loss  - will need to set up patient for follow up with Nephrology as an outpatient post hospitalization   - as an outpatient for nephrology patient follows up with no nephrologist  - stable for discharge from renal standpoint when medically cleared     · Blood pressure:  - home medications:  Torsemide 10 mg p o  Q day, Diovan 160 mg p o  Q day  - current medications:  Lasix 20 mg p o  Q day  - recommendations:  No changes for now  - Optimize hemodynamics   - Maintain MAP > 65mmHg  - Avoid BP fluctuations      · H/H/anemia:  - most recent hemoglobin at 12 3 grams/deciliter  - maintain hemoglobin greater than 8 grams/deciliter     · Acid-base electrolytes:  ? Hyponatremia:    § Most recent sodium at 137 mEq  § Stable and improved  § Resolved     ? Borderline Hyperkalemia:  § Follow low-potassium diet  § Most recent potassium of 5 3  § Likely due to high potassium diet patient'was consuming a lot of V8 juice     ?  Hypercalcemia:  § Likely thought to be secondary to immobilization  § Workup reveals PTH 55, vitamin-D 14 6, Ace level 20, SPEP UPEP within normal limits, PTHrP pending  § Most recent serum calcium at 10 9, ionized calcium within normal limits at 1 27  § No further workup needed     ?  Acid-base:    § Most recent bicarb at 40     · Volume status:  ?  Clinically appears to be euvolemic     · Proteinuria:   ? Most recent UA with no protein as of 2021     · Other medical problems:  ? Diabetes:  Management per primary team   On glyburide, insulin       Thanks for the consult  Will sign off  Please call with questions/ concerns  Above-mentioned orders and Plan in terms of hyponatremia and hypercalcemia was discussed with the team in [address] MD, BERTHA, 3/4/2021, 2:11 PM              Objective :   Patient seen and examined in her room no overnight events hemodynamically stable remains afebrile urine output 1 L plus  Unable to get further blood work  Multiple attempts were made  Does not know discharge planning at this time  PHYSICAL EXAM  /58 (BP Location: Right arm)   Pulse 74   Temp 98 3 °F (36 8 °C) (Oral)   Resp 20   Ht 5' 2" (1 575 m)   Wt 72 5 kg (159 lb 13 3 oz)   SpO2 99%   BMI 29 23 kg/m²   Temp (24hrs), Av °F (36 7 °C), Min:97 6 °F (36 4 °C), Max:98 3 °F (36 8 °C)        Intake/Output Summary (Last 24 hours) at 3/4/2021 1411  Last data filed at 3/4/2021 1331  Gross per 24 hour   Intake 120 ml   Output 700 ml   Net -580 ml       I/O last 24 hours: In: 120 [P O :120]  Out: 1050 [Urine:1050]      Current Weight: Weight - Scale: 72 5 kg (159 lb 13 3 oz)  First Weight: Weight - Scale: 74 8 kg (165 lb)  Physical Exam  Vitals signs and nursing note reviewed  Constitutional:       General: She is not in acute distress  Appearance: Normal appearance  She is normal weight  She is not ill-appearing, toxic-appearing or diaphoretic  HENT:      Head: Normocephalic and atraumatic  Mouth/Throat:      Pharynx: Oropharynx is clear  No oropharyngeal exudate  Eyes:      General: No scleral icterus  Conjunctiva/sclera: Conjunctivae normal    Neck:      Musculoskeletal: Normal range of motion and neck supple  Cardiovascular:      Rate and Rhythm: Normal rate  Heart sounds: Normal heart sounds  No friction rub  Pulmonary:      Effort: Pulmonary effort is normal  No respiratory distress  Breath sounds: Normal breath sounds  No stridor  No wheezing  Abdominal:      General: There is no distension  Palpations: Abdomen is soft  There is no mass  Tenderness: There is no abdominal tenderness  Musculoskeletal:         General: No swelling  Skin:     General: Skin is warm  Coloration: Skin is not jaundiced  Neurological:      General: No focal deficit present  Mental Status: She is alert and oriented to person, place, and time  Psychiatric:         Mood and Affect: Mood normal          Behavior: Behavior normal              Review of Systems   Constitutional: Negative for chills, fatigue and fever  HENT: Negative for congestion  Respiratory: Negative for cough, shortness of breath and wheezing  Cardiovascular: Negative for leg swelling  Gastrointestinal: Negative for abdominal pain, constipation, diarrhea, nausea and vomiting  Genitourinary: Negative for difficulty urinating and dysuria  Musculoskeletal: Negative for back pain  Skin: Negative for pallor  Neurological: Negative for dizziness and headaches  Psychiatric/Behavioral: Negative for agitation and confusion  All other systems reviewed and are negative        Scheduled Meds:  Current Facility-Administered Medications   Medication Dose Route Frequency Provider Last Rate    acetaminophen  650 mg Oral Q6H [address] Paige Lindsey MD      al mag oxide-diphenhydramine-lidocaine viscous  10 mL Swish & Spit Q4H PRN ROLA Parmar      albuterol  2 puff Inhalation Q6H PRN Paige Lindsey MD      ALPRAZolam  0 25 mg Oral TID PRN Paige Lindsey MD      aluminum-magnesium hydroxide-simethicone  30 mL Oral Q6H PRN Paige Lindsey MD      atorvastatin  80 mg Oral Daily With Avis Castellon MD      benzonatate 100 mg Oral TID PRN Janice Muniz MD      bisacodyl  10 mg Rectal Daily PRN Janice Muniz MD      fluticasone-vilanterol  1 puff Inhalation Daily Janice Muniz MD      furosemide  20 mg Oral Daily Janice Muniz MD      glyBURIDE  2 5 mg Oral Daily With Breakfast Ani ROLA Yoo      guaiFENesin  600 mg Oral Q12H [address] Janice Muniz MD      heparin (porcine)  5,000 Units Subcutaneous Q8H [address] Janice Muniz MD      insulin lispro  1-6 Units Subcutaneous HS Janice Muniz MD      insulin lispro  1-6 Units Subcutaneous TID AC Janice Muniz MD      ipratropium-albuterol  3 mL Nebulization TID Janice Muniz MD      levothyroxine  125 mcg Oral Early Morning Janice Muniz MD      lidocaine  2 patch Topical Daily Janice Muniz MD      melatonin  6 mg Oral HS PRN Janice Muniz MD      menthol-methyl salicylate   Apply externally TID Janice Muniz MD      methocarbamol  500 mg Oral Q6H PRN Janice Muniz MD      montelukast  10 mg Oral HS Janice Muniz MD      nystatin   Topical BID Janice Muniz MD      nystatin   Topical BID Janice Muniz MD      ondansetron  4 mg Oral Q6H PRN Janice Muniz MD      oxyCODONE  5 mg Oral Q6H PRN Janice Muniz MD      pantoprazole  40 mg Oral Early Morning Janice Muniz MD      polyethylene glycol  17 g Oral Daily Janice Muniz MD      saccharomyces boulardii  250 mg Oral BID Janice Muniz MD      senna  1 tablet Oral HS Janice Muniz MD      traMADol  50 mg Oral Q6H PRN Janice Muniz MD      white petrolatum-mineral oil   Topical TID PRN Janice Muniz MD         PRN Meds: al mag oxide-diphenhydramine-lidocaine viscous    albuterol    ALPRAZolam    aluminum-magnesium hydroxide-simethicone    benzonatate    bisacodyl    melatonin    methocarbamol    ondansetron    oxyCODONE    traMADol    white petrolatum-mineral oil    Continuous Infusions:       Invasive Devices:      Invasive Devices     Peripheral Intravenous Line Peripheral IV 03/01/21 Right;Ventral (anterior) Forearm 2 days                  LABORATORY:    Results from last 7 days   Lab Units 03/02/21  0640 03/01/21  2110 03/01/21  0806 02/28/21  0519 02/27/21  0918 02/27/21  0709   WBC Thousand/uL  --   --   --   --   --  11 60*   HEMOGLOBIN g/dL  --   --   --   --   --  12 3   HEMATOCRIT %  --   --   --   --   --  38 2   PLATELETS Thousands/uL  --   --   --   --   --  215   POTASSIUM mmol/L 5 3 5 0 5 3 5 2  --  5 3   CHLORIDE mmol/L 99* 102 94* 96*  --  94*   CO2 mmol/L 37* 35* 36* 37*  --  40*   BUN mg/dL 41* 43* 39* 39*  --  39*   CREATININE mg/dL 0 90 1 00 0 96 0 86  --  0 86   CALCIUM mg/dL 10 9* 10 2* 11 1* 11 0*  --  11 3*   MAGNESIUM mg/dL  --   --   --   --  1 9  --    PHOSPHORUS mg/dL  --   --   --   --  2 6  --       rest all reviewed    RADIOLOGY:  XR abdomen 1 view kub   Final Result by Kiara Magana MD (03/01 0519)      Large-volume stool, suggesting constipation  Nonobstructive bowel gas pattern  Workstation performed: [id number]           Rest all reviewed    Portions of the record may have been created with voice recognition software  Occasional wrong word or "sound a like" substitutions may have occurred due to the inherent limitations of voice recognition software  Read the chart carefully and recognize, using context, where substitutions have occurred  If you have any questions, please contact the dictating provider

## 2021-03-04 NOTE — PLAN OF CARE
Reviewed    Problem: Potential for Falls  Goal: Patient will remain free of falls  Description: INTERVENTIONS:  - Assess patient frequently for physical needs  -  Identify cognitive and physical deficits and behaviors that affect risk of falls  -  [place] fall precautions as indicated by assessment   - Educate patient/family on patient safety including physical limitations  - Instruct patient to call for assistance with activity based on assessment  - Modify environment to reduce risk of injury  - Consider OT/PT consult to assist with strengthening/mobility  Outcome: Progressing     Problem: Prexisting or High Potential for Compromised Skin Integrity  Goal: Skin integrity is maintained or improved  Description: INTERVENTIONS:  - Identify patients at risk for skin breakdown  - Assess and monitor skin integrity  - Assess and monitor nutrition and hydration status  - Monitor labs   - Assess for incontinence   - Turn and reposition patient  - Assist with mobility/ambulation  - Relieve pressure over bony prominences  - Avoid friction and shearing  - Provide appropriate hygiene as needed including keeping skin clean and dry  - Evaluate need for skin moisturizer/barrier cream  - Collaborate with interdisciplinary team   - Patient/family teaching  - Consider wound care consult   Outcome: Progressing     Problem: Nutrition/Hydration-ADULT  Goal: Nutrient/Hydration intake appropriate for improving, restoring or maintaining nutritional needs  Description: Monitor and assess patient's nutrition/hydration status for malnutrition  Collaborate with interdisciplinary team and initiate plan and interventions as ordered  Monitor patient's weight and dietary intake as ordered or per policy  Utilize nutrition screening tool and intervene as necessary  Determine patient's food preferences and provide high-protein, high-caloric foods as appropriate       INTERVENTIONS:  - Monitor oral intake, urinary output, labs, and treatment plans  - Assess nutrition and hydration status and recommend course of action  - Evaluate amount of meals eaten  - Assist patient with eating if necessary   - Allow adequate time for meals  - Recommend/ encourage appropriate diets, oral nutritional supplements, and vitamin/mineral supplements  - Order, calculate, and assess calorie counts as needed  - Recommend, monitor, and adjust tube feedings and TPN/PPN based on assessed needs  - Assess need for intravenous fluids  - Provide specific nutrition/hydration education as appropriate  - Include patient/family/caregiver in decisions related to nutrition  Outcome: Progressing     Problem: PAIN - ADULT  Goal: Verbalizes/displays adequate comfort level or baseline comfort level  Description: Interventions:  - Encourage patient to monitor pain and request assistance  - Assess pain using appropriate pain scale  - Administer analgesics based on type and severity of pain and evaluate response  - Implement non-pharmacological measures as appropriate and evaluate response  - Consider cultural and social influences on pain and pain management  - Notify physician/advanced practitioner if interventions unsuccessful or patient reports new pain  Outcome: Progressing     Problem: INFECTION - ADULT  Goal: Absence or prevention of progression during hospitalization  Description: INTERVENTIONS:  - Assess and monitor for signs and symptoms of infection  - Monitor lab/diagnostic results  - Monitor all insertion sites, i e  indwelling lines, tubes, and drains  - Monitor endotracheal if appropriate and nasal secretions for changes in amount and color  - [place] appropriate cooling/warming therapies per order  - Administer medications as ordered  - Instruct and encourage patient and family to use good hand hygiene technique  - Identify and instruct in appropriate isolation precautions for identified infection/condition  Outcome: Progressing     Problem: SAFETY ADULT  Goal: Patient will remain free of falls  Description: INTERVENTIONS:  - Assess patient frequently for physical needs  -  Identify cognitive and physical deficits and behaviors that affect risk of falls    -  [place] fall precautions as indicated by assessment   - Educate patient/family on patient safety including physical limitations  - Instruct patient to call for assistance with activity based on assessment  - Modify environment to reduce risk of injury  - Consider OT/PT consult to assist with strengthening/mobility  Outcome: Progressing  Goal: Maintain or return to baseline ADL function  Description: INTERVENTIONS:  -  Assess patient's ability to carry out ADLs; assess patient's baseline for ADL function and identify physical deficits which impact ability to perform ADLs (bathing, care of mouth/teeth, toileting, grooming, dressing, etc )  - Assess/evaluate cause of self-care deficits   - Assess range of motion  - Assess patient's mobility; develop plan if impaired  - Assess patient's need for assistive devices and provide as appropriate  - Encourage maximum independence but intervene and supervise when necessary  - Involve family in performance of ADLs  - Assess for home care needs following discharge   - Consider OT consult to assist with ADL evaluation and planning for discharge  - Provide patient education as appropriate  Outcome: Progressing  Goal: Maintain or return mobility status to optimal level  Description: INTERVENTIONS:  - Assess patient's baseline mobility status (ambulation, transfers, stairs, etc )    - Identify cognitive and physical deficits and behaviors that affect mobility  - Identify mobility aids required to assist with transfers and/or ambulation (gait belt, sit-to-stand, lift, walker, cane, etc )  - [place] fall precautions as indicated by assessment  - Record patient progress and toleration of activity level on Mobility SBAR; progress patient to next Phase/Stage  - Instruct patient to call for assistance with activity based on assessment  - Consider rehabilitation consult to assist with strengthening/weightbearing, etc   Outcome: Progressing     Problem: DISCHARGE PLANNING  Goal: Discharge to home or other facility with appropriate resources  Description: INTERVENTIONS:  - Identify barriers to discharge w/patient and caregiver  - Arrange for needed discharge resources and transportation as appropriate  - Identify discharge learning needs (meds, wound care, etc )  - Arrange for interpretive services to assist at discharge as needed  - Refer to Case Management Department for coordinating discharge planning if the patient needs post-hospital services based on physician/advanced practitioner order or complex needs related to functional status, cognitive ability, or social support system  Outcome: Progressing

## 2021-03-04 NOTE — PROGRESS NOTES
[facility] Occupational Therapy Daily Note  Patient Active Problem List   Diagnosis    Carotid artery plaque    COPD exacerbation (HCC)    Type 2 diabetes mellitus with complication, without long-term current use of insulin (HCC)    Hyperlipidemia    Hypothyroidism    Osteoarthrosis    CHF (congestive heart failure) (HCC)    Atherosclerosis of abdominal aorta (HCC)    SOB (shortness of breath)    Chronic bilateral thoracic back pain    Chronic diastolic heart failure (HCC)    Chronic respiratory failure with hypoxia (HCC)    Moderate protein-calorie malnutrition (HCC)    Thoracic compression fracture (HCC)    UTI (urinary tract infection)    Hypercalcemia    Pressure ulcer    Hyperkalemia    Urinary incontinence    Alkalosis    Stage 3a chronic kidney disease       Past Medical History:   Diagnosis Date    Cataract     CHF (congestive heart failure) (HCC)     Leukocytosis      Etiologic Diagnosis: T5 to T7 Compression Fractures  Restrictions/Precautions  Precautions: Cognitive, Fall Risk, O2, Pain, Spinal precautions, Supervision on toilet/commode, Fluid restriction  RUE Weight Bearing Per Order: WBAT  LUE Weight Bearing Per Order: WBAT  RLE Weight Bearing Per Order: WBAT  LLE Weight Bearing Per Order: WBAT  ROM Restrictions: Yes(thoracic spinal prec)  Braces or Orthoses: TLSO  ADL Team Goal: Patient will require assist with ADLs with least restrictive device upon completion of rehab program  Occupational Therapy LTG's  Eating Oral care Bathing UB dress LB dress   Eating Goal: 06  Independent - Patient completes the activity by him/herself with no assistance from a helper  Oral Hygiene Goal: 06  Independent - Patient completes the activity by him/herself with no assistance from a helper  Shower/bathe self Goal: 04  TOUCHING/ STEADYING assistance as patient completes activity  Lower body dressing Goal: 03  Partial/moderate assistance - [employer] does less than half the effort   [employer] lifts or holds trunk or limbs and provides more than half the effort  Upper body dressing Goal: 03  Partial/moderate assistance - [place] does less than half the effort  [place] lifts or holds trunk or limbs and provides more than half the effort  Toileting Toilet txf Func txf IADL Med    Toileting hygiene Goal: 04  Supervision or touching assistance- [place] provides VERBAL CUES or supervision throughout activity  Toilet transfer Goal: 04  Supervision or touching assistance- [place] provides VERBAL CUES or supervision throughout activity  Chair/bed-to-chair transfer Goal: 04  Supervision or touching assistance- [place] provides VERBAL CUES or supervision throughout activity  Assist Level: Supervision   OT interventions: Treatment/Interventions: (P) ADL retraining, Functional transfer training, LE strengthening/ROM, Therapeutic exercise, Endurance training, Cognitive reorientation, Patient/family training, Equipment eval/education, Bed mobility, Compensatory technique education  Discharge Plan:  OT Discharge Recommendation: Home with skilled therapy(inc family A v HHA, pending progress)   DME:  ,  ,       03/04/21 0900   Pain Assessment   Pain Assessment Tool 0-10   Pain Score 7   Pain Location/Orientation Location: Back   Hospital Pain Intervention(s) Repositioned   Lifestyle   Autonomy "this has been a bad year "    Exercise Tools   Other Exercise Tool 1 engaged pt in all seated E pulmonary exercises utilizing OT Toolkit for direction  Pt performing majority of seated exercises for 2x10 repetitions to promote efficient breathing, improved cardiovascular endurance to increase functional performance  3 lb dumbells utilized for bicep curls  SPO2 97% on 3 L O2  Pt demo improved carryover for counting out loud to increase breaths during activity,    Assessment   Treatment Assessment Pt participated in skilled OT treatment session with treatment focus on activity tolerance, UE endurance, and deep breathing tech   Pt tolerated session well, with participating in bed level activity as a pre curser to completing OOB activity  Cont with POC with focus on LHAE for LB dressing and ECT management, act tolerance, pulmonary rehab, fx mobility, standing tolerance and balance for inc indep with toileting and self care  Prognosis Fair   Problem List Decreased strength;Decreased range of motion;Decreased endurance; Impaired balance;Decreased coordination;Decreased mobility; Decreased cognition;Orthopedic restrictions;Pain;Decreased skin integrity   Plan   Treatment/Interventions ADL retraining;Functional transfer training;LE strengthening/ROM; Therapeutic exercise; Endurance training;Cognitive reorientation;Patient/family training;Equipment eval/education; Bed mobility; Compensatory technique education   Progress Slow progress, decreased activity tolerance   OT Therapy Minutes   OT Time In 0900   OT Time Out 0930   OT Total Time (minutes) 30   OT Mode of treatment - Individual (minutes) 30   OT Mode of treatment - Concurrent (minutes) 0   OT Mode of treatment - Group (minutes) 0   OT Mode of treatment - Co-treat (minutes) 0   OT Mode of Treatment - Total time(minutes) 30 minutes   OT Cumulative Minutes 491

## 2021-03-04 NOTE — PLAN OF CARE
Problem: Potential for Falls  Goal: Patient will remain free of falls  Description: INTERVENTIONS:  - Assess patient frequently for physical needs  -  Identify cognitive and physical deficits and behaviors that affect risk of falls  -  [place] fall precautions as indicated by assessment   - Educate patient/family on patient safety including physical limitations  - Instruct patient to call for assistance with activity based on assessment  - Modify environment to reduce risk of injury  - Consider OT/PT consult to assist with strengthening/mobility  Outcome: Progressing     Problem: Prexisting or High Potential for Compromised Skin Integrity  Goal: Skin integrity is maintained or improved  Description: INTERVENTIONS:  - Identify patients at risk for skin breakdown  - Assess and monitor skin integrity  - Assess and monitor nutrition and hydration status  - Monitor labs   - Assess for incontinence   - Turn and reposition patient  - Assist with mobility/ambulation  - Relieve pressure over bony prominences  - Avoid friction and shearing  - Provide appropriate hygiene as needed including keeping skin clean and dry  - Evaluate need for skin moisturizer/barrier cream  - Collaborate with interdisciplinary team   - Patient/family teaching  - Consider wound care consult   Outcome: Progressing     Problem: Nutrition/Hydration-ADULT  Goal: Nutrient/Hydration intake appropriate for improving, restoring or maintaining nutritional needs  Description: Monitor and assess patient's nutrition/hydration status for malnutrition  Collaborate with interdisciplinary team and initiate plan and interventions as ordered  Monitor patient's weight and dietary intake as ordered or per policy  Utilize nutrition screening tool and intervene as necessary  Determine patient's food preferences and provide high-protein, high-caloric foods as appropriate       INTERVENTIONS:  - Monitor oral intake, urinary output, labs, and treatment plans  - Assess nutrition and hydration status and recommend course of action  - Evaluate amount of meals eaten  - Assist patient with eating if necessary   - Allow adequate time for meals  - Recommend/ encourage appropriate diets, oral nutritional supplements, and vitamin/mineral supplements  - Order, calculate, and assess calorie counts as needed  - Recommend, monitor, and adjust tube feedings and TPN/PPN based on assessed needs  - Assess need for intravenous fluids  - Provide specific nutrition/hydration education as appropriate  - Include patient/family/caregiver in decisions related to nutrition  Outcome: Progressing     Problem: PAIN - ADULT  Goal: Verbalizes/displays adequate comfort level or baseline comfort level  Description: Interventions:  - Encourage patient to monitor pain and request assistance  - Assess pain using appropriate pain scale  - Administer analgesics based on type and severity of pain and evaluate response  - Implement non-pharmacological measures as appropriate and evaluate response  - Consider cultural and social influences on pain and pain management  - Notify physician/advanced practitioner if interventions unsuccessful or patient reports new pain  Outcome: Progressing     Problem: INFECTION - ADULT  Goal: Absence or prevention of progression during hospitalization  Description: INTERVENTIONS:  - Assess and monitor for signs and symptoms of infection  - Monitor lab/diagnostic results  - Monitor all insertion sites, i e  indwelling lines, tubes, and drains  - Monitor endotracheal if appropriate and nasal secretions for changes in amount and color  - [place] appropriate cooling/warming therapies per order  - Administer medications as ordered  - Instruct and encourage patient and family to use good hand hygiene technique  - Identify and instruct in appropriate isolation precautions for identified infection/condition  Outcome: Progressing     Problem: SAFETY ADULT  Goal: Patient will remain free of falls  Description: INTERVENTIONS:  - Assess patient frequently for physical needs  -  Identify cognitive and physical deficits and behaviors that affect risk of falls    -  [place] fall precautions as indicated by assessment   - Educate patient/family on patient safety including physical limitations  - Instruct patient to call for assistance with activity based on assessment  - Modify environment to reduce risk of injury  - Consider OT/PT consult to assist with strengthening/mobility  Outcome: Progressing  Goal: Maintain or return to baseline ADL function  Description: INTERVENTIONS:  -  Assess patient's ability to carry out ADLs; assess patient's baseline for ADL function and identify physical deficits which impact ability to perform ADLs (bathing, care of mouth/teeth, toileting, grooming, dressing, etc )  - Assess/evaluate cause of self-care deficits   - Assess range of motion  - Assess patient's mobility; develop plan if impaired  - Assess patient's need for assistive devices and provide as appropriate  - Encourage maximum independence but intervene and supervise when necessary  - Involve family in performance of ADLs  - Assess for home care needs following discharge   - Consider OT consult to assist with ADL evaluation and planning for discharge  - Provide patient education as appropriate  Outcome: Progressing  Goal: Maintain or return mobility status to optimal level  Description: INTERVENTIONS:  - Assess patient's baseline mobility status (ambulation, transfers, stairs, etc )    - Identify cognitive and physical deficits and behaviors that affect mobility  - Identify mobility aids required to assist with transfers and/or ambulation (gait belt, sit-to-stand, lift, walker, cane, etc )  - [place] fall precautions as indicated by assessment  - Record patient progress and toleration of activity level on Mobility SBAR; progress patient to next Phase/Stage  - Instruct patient to call for assistance with activity based on assessment  - Consider rehabilitation consult to assist with strengthening/weightbearing, etc   Outcome: Progressing     Problem: DISCHARGE PLANNING  Goal: Discharge to home or other facility with appropriate resources  Description: INTERVENTIONS:  - Identify barriers to discharge w/patient and caregiver  - Arrange for needed discharge resources and transportation as appropriate  - Identify discharge learning needs (meds, wound care, etc )  - Arrange for interpretive services to assist at discharge as needed  - Refer to Case Management Department for coordinating discharge planning if the patient needs post-hospital services based on physician/advanced practitioner order or complex needs related to functional status, cognitive ability, or social support system  Outcome: Progressing

## 2021-03-05 NOTE — PROGRESS NOTES
03/05/21 1000   Pain Assessment   Pain Assessment Tool 0-10   Pain Score Worst Possible Pain   Pain Location/Orientation Orientation: Bilateral;Location: Back; Location: Rib Cage   Pain Onset/Description Onset: Ongoing;Frequency: Constant/Continuous   Effect of Pain on Daily Activities limits act tolerance requires rest breaks every after task along with SOB   Hospital Pain Intervention(s) Repositioned;Relaxation technique; Rest   Restrictions/Precautions   Precautions Aspiration;Bed/chair alarms;Cognitive; Fall Risk;Supervision on toilet/commode;O2;Fluid restriction  (2L, 1500ml)   Braces or Orthoses TLSO  (max Assist to donned brace this session)   Cognition   Overall Cognitive Status Impaired   Arousal/Participation Alert; Cooperative   Subjective   Subjective pt reported pain and SOB but was agreeable to be seen by PT  Roll Left and Right   Comment per pt she have been sleeping on the recliner for the past year since she was not able to tolerate adjustable bed at home due to hip discomfort after each use in the past     Reason if not Attempted Activity not applicable   Roll Left and Right CARE Score 9   Sit to Lying   Reason if not Attempted Activity not applicable   Sit to Lying CARE Score 9   Lying to Sitting on Side of Bed   Reason if not Attempted Activity not applicable   Lying to Sitting on Side of Bed CARE Score 9   Sit to Stand   Type of Assistance Needed Physical assistance;Verbal cues; Adaptive equipment   Amount of Physical Assistance Provided 75% or more   Comment max to stand up initially from bed, mod from w/c    Sit to Stand CARE Score 2   Bed-Chair Transfer   Type of Assistance Needed Physical assistance;Verbal cues; Adaptive equipment   Amount of Physical Assistance Provided Less than 25%   Comment min/CG SPT with RW   Chair/Bed-to-Chair Transfer CARE Score 3   Transfer Bed/Chair/Wheelchair   Supine to Sit Moderate Assist   Findings bed rolling -unable to tolerate flat bed - HOB raised 30 deg and used bed rails to complete task with mod A    Walk 10 Feet   Type of Assistance Needed Physical assistance;Verbal cues; Adaptive equipment   Amount of Physical Assistance Provided Less than 25%   Comment min/CG walk fwd/bwd x 10' with O2 line management using a RW   Walk 10 Feet CARE Score 3   Assessment   Treatment Assessment Pt was in bed and just finished getting ready assisted by RN and PCA when PT came in  Pt demos SOB required redirection to stop talking and concentrate on breathing, SpO2 at this time was at 97% on 2L via NC with IA 86-88%  Pt's functional indep and activity tolerance continues to be significantly limited by SOB and pain as she requires a rest break every after task even after just scooting fwd to edge of bed  Also because of this she was unable to continuously attend to task of donning TLSO brace right away after sitting up as needed to catch her breath requiring max A to put on brace properly  Also because of habitus unable to manage brace while holding breast out of the way so anticipate pt will always require assist for correct donning/doffing of brace to maintain compliance with spinal precautions  PT Barriers   Physical Impairment Decreased strength;Decreased endurance; Impaired balance;Decreased mobility; Decreased cognition; Impaired judgement;Decreased safety awareness;Pain;Orthopedic restrictions   Plan   Progress Slow progress, decreased activity tolerance   Recommendation   Equipment Recommended Walker   PT Therapy Minutes   PT Time In 1000   PT Time Out 1030   PT Total Time (minutes) 30   PT Mode of treatment - Individual (minutes) 30   PT Mode of treatment - Concurrent (minutes) 0   PT Mode of treatment - Group (minutes) 0   PT Mode of treatment - Co-treat (minutes) 0   PT Mode of Treatment - Total time(minutes) 30 minutes   PT Cumulative Minutes 535   Therapy Time missed   Time missed?  No

## 2021-03-05 NOTE — PROGRESS NOTES
03/05/21 1045   Pain Assessment   Pain Assessment Tool 0-10   Pain Score 7   Pain Location/Orientation Location: Rib Cage; Location: Chest   Pain Onset/Description Onset: Ongoing;Frequency: Constant/Continuous; Descriptor: Aching;Descriptor: Sore;Descriptor: Pressure   Effect of Pain on Daily Activities limits activity tolerance, limits ability to "catch breath"   Hospital Pain Intervention(s) Repositioned; Rest  (RN provided tylenol this session)   Restrictions/Precautions   Precautions Aspiration;Bed/chair alarms;Cognitive; Fall Risk;Supervision on toilet/commode;O2;Fluid restriction  (2L O2, 1500ml)   ROM Restrictions   (T-spine precautions)   Braces or Orthoses TLSO   Lifestyle   Autonomy "everything is just a chore "   Lower Body Dressing   Type of Assistance Needed Physical assistance   Amount of Physical Assistance Provided 50%-74%   Comment dependent for underwear pants over feet 2* slight incontinence  Pt able to perform CM over hips in stance but fatiguing very quickly   Lower Body Dressing CARE Score 2   Putting On/Taking Off Footwear   Type of Assistance Needed Adaptive equipment;Physical assistance   Amount of Physical Assistance Provided Less than 25%   Comment utilized dressing stick to GIOVANY lee carryover of sockaide education for donning  Req some A over heels 2* [place]vnellas   Did attempt to don two socks on one foot req cuing to inc awareness   Putting On/Taking Off Footwear CARE Score 3   Sit to Stand   Type of Assistance Needed Physical assistance   Amount of Physical Assistance Provided 50%-74%   Comment modA to RW this session   Sit to Stand CARE Score 2   Bed-Chair Transfer   Type of Assistance Needed Physical assistance   Amount of Physical Assistance Provided 50%-74%   Comment modA SPT, A necessary for O2 line management   Chair/Bed-to-Chair Transfer CARE Score 2   Toileting Hygiene   Type of Assistance Needed Physical assistance   Amount of Physical Assistance Provided 50%-74%   Comment A for bowel hygiene  A for balance  Pt able to A with CM over hips this session  RN notified of urinate and BM  Toileting Hygiene CARE Score 2   Toilet Transfer   Type of Assistance Needed Physical assistance   Amount of Physical Assistance Provided 50%-74%   Comment modA SPT to BSC from    Toilet Transfer CARE Score 2   Cognition   Overall Cognitive Status Impaired   Arousal/Participation Alert; Cooperative   Attention Attends with cues to redirect   Orientation Level Oriented X4   Memory Decreased short term memory;Decreased recall of recent events;Decreased recall of precautions   Following Commands Follows one step commands without difficulty   Activity Tolerance   Activity Tolerance Patient limited by fatigue;Patient limited by pain   Medical Staff Made Aware RN, MD aware   Assessment   Treatment Assessment Pt participated in skilled OT session with focus on toileting, [place] for footwear management  Pt cont to be significantly limited by generalized pain in rib cage/chest, fx endurance, balance all limiting her ability to engage in self care, mobility  Cont to anticipate that pt will req inc A at dc for inc pt safety  Cont to attempt to engage pt in tasks with focus on improving indep but pt req MAX encouragement to engage and reinforced education on benefits of therapy to improve indep  Vitals monitored with SPO2 maintaining above 90% on 2L but HR reaching 110s after toileting, quick SPT  Cont with POC with focus on endurance work, pulmonary rehab, fx transfers specifically sit<>stands, O2 line management, standing for hygiene and CM, brace management to dec burden of care upon dc  Pt in  at end of session with SCDs on, call bell in reach, all needs met  Prognosis Fair   Problem List Decreased strength;Decreased range of motion;Decreased endurance; Impaired balance;Decreased coordination;Decreased mobility; Decreased cognition;Orthopedic restrictions;Pain;Decreased skin integrity   Barriers to Discharge Inaccessible home environment;Decreased caregiver support   Plan   Treatment/Interventions ADL retraining;Functional transfer training; Therapeutic exercise; Endurance training;Patient/family training;Equipment eval/education; Bed mobility   Progress Slow progress, decreased activity tolerance   Recommendation   OT Discharge Recommendation Other (Comment)  (anticipating inc A necessary for safe dc)   OT Therapy Minutes   OT Time In 1045   OT Time Out 1130   OT Total Time (minutes) 45   OT Mode of treatment - Individual (minutes) 45   OT Mode of treatment - Concurrent (minutes) 0   OT Mode of treatment - Group (minutes) 0   OT Mode of treatment - Co-treat (minutes) 0   OT Mode of Treatment - Total time(minutes) 45 minutes   OT Cumulative Minutes 596   Therapy Time missed   Time missed?  No

## 2021-03-05 NOTE — PROGRESS NOTES
03/05/21 1200   Pain Assessment   Pain Assessment Tool Pain Assessment not indicated - pt denies pain   Pain Score No Pain   Restrictions/Precautions   Precautions Aspiration;Bed/chair alarms;Cognitive; Fall Risk;Fluid restriction;O2;Supervision on toilet/commode   Swallow Information   Current Risks for Dysphagia & Aspiration Respiratory compromise;General debilitation;Positionong Issues;Weak cough   Current Symptoms/Concerns Clear throat;During meals; Difficulty chewing;Decreased oral intake;Weight loss   Current Diet Dysphagia mechanical soft; Thin liquid   Baseline Diet Regular; Thin liquids   Consistencies Assessed and Performance   Materials Admnistered Puree/Level 1;Mechanical Soft/Level 2; Thin liquid   Oral Stage WFL; Mild impaired; With limited texture; With limited amount   Phargngeal Stage WFL; Aspiration risk   Swallow Mechanics Mild delayed;Swallow initation;Good [place] rise;Aspiration risk   Esophageal Concerns No s/s reported   Recommendations   Risk for Aspiration Present   Recommendations Dysphagia treatment   Diet Solid Recommendation Level 2 Dysphagia/ mechanical soft/altered   Diet Liquid Recommendation Thin liquid   Recommended Form of Meds As tolerated   General Precautions Aspiration precautions;Minimize distractions;Upright as possible for all oral intake;Remain upright for 45 mins after meals; Supervision with meals  (DISTANT supervision w/ pt OOB and upright for meals )   Results Reviewed with PT/Family/Caregiver  (Pt's daughter was present and educated during session  )   Eating   Type of Assistance Needed Set-up / clean-up   Amount of Physical Assistance Provided No physical assistance   Eating CARE Score 5   Swallow Assessment   Swallow Treatment Assessment Pt awake, alert and cooperative for the session  During the session, pt's daughter arrived w/ SLP providing education about current diet upgrade as well as potential diet upgrade to a level 3 diet   Pt in [facility] 23 upon time of arrival  Pt observed for lunch, as pt's current diet is a 2 diet w/ thin liquids  Pt consumed 50% of meal, consisting of soft solids (minced beef, cut carrots), puree (apple sauce, vanilla pudding) and 180 cc of thin liquids by straw (apple juice)  Pt w/ dentures for the whole meal, w/ pt stating that she does not have any pain in the gums anymore and the dentures help her chewing her food  Pt's overall rate PO intake was slow-appropriate along w/ all consistencies, w/ SLP needing to cue pt at times to continue w/ food intake  Adequate lip seal noted around fork, spoon and straw w/ good bolus retrieval across all consistencies  No anterior loss noted throughout meal  Mastication of soft solids was mildly prolonged w/ minced beef but effective for most of the meal  However, pt "had to" spit out 2 pieces of beef that were slightly larger in size  SLP cueing pt to use swallow strategy of alternating solids/liquids as well as to mix soft solids w/ apple sauce to help w/ bolus transfer of minced beef  No pocketing or residual noted during meal  Bolus manipulation/transfer of puree and thin liquids was observed to be prompt  Swallow initiation was observed to be mildly delayed across soft solids  Upon palpation laryngeal excursion was judged to be functional  Throughout meal, pt did not demonstrate any signs/sxs of aspiration, but demonstrated inconsistent mild throat clearing in between bites, suspect due to decreased coordination between breathing and eating  Overall, pt willing to try upgraded food but mixed soft solids w/ applesauce for improved bolus transfer of soft bolus  SLP educating daughter about different food options for pt's current diet and about a greater variety if pt was upgraded from a level 2 diet to a level 3 diet  Pt tolerating soft solids w/o any signs/symptoms of aspiration/ complaint of SOB/pain   SLP continuing w/ level 2 diet w/ thin liquids but will attempt level 3 diet trials in future sessions for better carry over to daily food items at time of d/c while tolerating safest least restrictive diet for pt  At this time, SLP continuing to recommend the following aspiration precautions: DISTANT supervision and OOB or FULLY upright for meals, alternate solids/liquids during meals  Will continue dysphagia therapy in order to determine safest and least restrictive diet w/ goal of also supporting overall PO intake       Swallow Assessment Prognosis   Prognosis Fair   Prognosis Considerations Age; Co-morbidities; Medical diagnosis; Medical prognosis;Participation level;New learning ability;Ability to carry over; Cooperation;Previous level of function   SLP Therapy Minutes   SLP Time In 1200   SLP Time Out 1230   SLP Total Time (minutes) 30   SLP Mode of treatment - Individual (minutes) 30   SLP Mode of treatment - Concurrent (minutes) 0   SLP Mode of treatment - Group (minutes) 0   SLP Mode of treatment - Co-treat (minutes) 0   SLP Mode of Treatment - Total time(minutes) 30 minutes   SLP Cumulative Minutes 300   Therapy Time missed   Time missed?  No

## 2021-03-05 NOTE — PROGRESS NOTES
Internal Medicine Progress Note  Patient: Lorraine Montes De Oca  Age/sex: 78 y o  female  Medical Record #: [id number]      ASSESSMENT/PLAN: (Interval History)  Lorraine Montes De Oca is seen and examined and management for following issues:    Compression fracture T5/6  · No surgical intervention  · Cont TLSO brace when OOB  · Aggressive rehab per PMR  · Pain mgmt per PMR     COPD  · Wears oxygen 2-3 L continuous  · Continue current inhalers/nebs per home  · Recent PFT DLCO 54%  · Oxygen saturations stable  · Prednisone complete  · Recent CT scan +moderate emphysema; no PE  · Nebs were changed back to scheduled from prn on 2/27/21     Chronic diastolic HF  · Cont furosemide 20mg daily here   · Takes Torsemide at home  · Monitor volume status closely  · Chronic LE edema  · Echo = LVEF 28%; diastolic dysfunction  · Renal managing  · Unable to obtain labs 3/4/21 OK to draw Monday from our standpoint     DM II  · Hgb A1c 7 1 in October  · Home meds: Glyburide 5mg BID  · Cont sliding scale and coverage  · Cont glyburide at 2 5mg qd started 3/1/21  · BS stable     HTN  · Diovan is on hold 2/2 VICKY/CKD and K+ increase  · tx per renal  · BPs acceptable      CKD  · Level 3  · Baseline 0 9-1 1  · Avoid nephrotoxic medications  · Currently at baseline  · Renal managing     Hypercalcemia  · Nephrology w/u at [facility]  · [address] secondary to volume status  · Renal managing      Hyponatremia  · Awaiting bmp  · Renal managing     Hyperkalemia  · On low K+ diet and Diovan is on hold  · tx per renal    Hypothyroid  · Cont home supplementation     Sacral decubitus  · Cont off loading, nutritional supplementation  · Wound nurse following     Oral ulcers  · Cont magic mouthwash q4 hrs as needed  · Feels this helps and uses 1-2x/day          The above assessment and plan was reviewed and updated as determined by my evaluation of the patient on 3/5/2021      Labs:   Results from last 7 days   Lab Units 02/27/21  0709   WBC Thousand/uL 11 60* HEMOGLOBIN g/dL 12 3   HEMATOCRIT % 38 2   PLATELETS Thousands/uL 215     Results from last 7 days   Lab Units 03/02/21  0640 03/01/21  2110   SODIUM mmol/L 137 135*   POTASSIUM mmol/L 5 3 5 0   CHLORIDE mmol/L 99* 102   CO2 mmol/L 37* 35*   BUN mg/dL 41* 43*   CREATININE mg/dL 0 90 1 00   CALCIUM mg/dL 10 9* 10 2*             Results from last 7 days   Lab Units 03/04/21  1711 03/04/21  1606 03/04/21  1051   POC GLUCOSE mg/dl 175* 175* 135       Review of Scheduled Meds:  Current Facility-Administered Medications   Medication Dose Route Frequency Provider Last Rate    acetaminophen  650 mg Oral Q6H [address] Keturah Pappas MD      al mag oxide-diphenhydramine-lidocaine viscous  10 mL Swish & Spit Q4H PRN ROLA Shepard      albuterol  2 puff Inhalation Q6H PRN Keturah Pappas MD      ALPRAZolam  0 25 mg Oral TID PRN Keturah Pappas MD      aluminum-magnesium hydroxide-simethicone  30 mL Oral Q6H PRN Keturah Pappas MD      atorvastatin  80 mg Oral Daily With Ivy Iyer MD      benzonatate  100 mg Oral TID PRN Keturah Pappas MD      bisacodyl  10 mg Rectal Daily PRN Keturah Pappas MD      fluticasone-vilanterol  1 puff Inhalation Daily Keturah Pappas MD      furosemide  20 mg Oral Daily Keturah Pappas MD      glyBURIDE  2 5 mg Oral Daily With Breakfast ROLA Shepard      guaiFENesin  600 mg Oral Q12H [address] Keturah Pappas MD      heparin (porcine)  5,000 Units Subcutaneous Q8H [address] Keturah Pappas MD      insulin lispro  1-6 Units Subcutaneous HS Keturah Pappas MD      insulin lispro  1-6 Units Subcutaneous TID AC Keturah Pappas MD      ipratropium-albuterol  3 mL Nebulization TID Keturah Pappas MD      levothyroxine  125 mcg Oral Early Morning Keturah Pappas MD      lidocaine  2 patch Topical Daily Keturah Pappas MD      melatonin  6 mg Oral HS PRN Keturah Pappas MD      menthol-methyl salicylate   Apply externally TID Keturah Pappas MD      methocarbamol  500 mg Oral Q6H PRN Genice Scott Boy Means MD      montelukast  10 mg Oral HS Jon Caruso MD      nystatin   Topical BID Jon Caruso MD      nystatin   Topical BID Jon Caruso MD      ondansetron  4 mg Oral Q6H PRN Jon Caruso MD      oxyCODONE  5 mg Oral Q6H PRN Jon Caruso MD      pantoprazole  40 mg Oral Early Morning Jon Caruso MD      polyethylene glycol  17 g Oral Daily Jon Caruso MD      saccharomyces boulardii  250 mg Oral BID Jon Caruso MD      senna  1 tablet Oral HS Jon Caruso MD      traMADol  50 mg Oral Q6H PRN Jon Caruso MD      white petrolatum-mineral oil   Topical TID PRN Jon Caruso MD         Subjective/ HPI: Patients overnight issues or events were reviewed with nursing or staff during rounds or morning huddle session  No new or overnight issues  Offers no complaints  Pt without overnight issues or concerns      ROS:   A 10 point ROS was performed; negative except as noted above  Imaging:     XR abdomen 1 view kub   Final Result by Kiara Magana MD (03/01 4015)      Large-volume stool, suggesting constipation  Nonobstructive bowel gas pattern                 Workstation performed: [id number]             *Labs /Radiology studies reviewed  *Medications reviewed and reconciled as needed  *Please refer to order section for additional ordered labs studies  *Case discussed with primary attending during morning huddle case rounds      Physical Examination:  Vitals:   Vitals:    03/04/21 1916 03/04/21 2332 03/05/21 0700 03/05/21 0722   BP:  100/68 96/67 128/58   BP Location:   Right arm Left arm   Pulse:  81 90    Resp:  17 16    Temp:  97 7 °F (36 5 °C) 97 8 °F (36 6 °C)    TempSrc:   Oral    SpO2: 98% 98% 90%    Weight:       Height:           GEN: No apparent distress, interactive  NEURO: Alert and oriented x3  HEENT: Pupils are equal and reactive, EOMI, mucous membranes are moist, face symmetrical  CV: S1 S2 regular, no MRG, no peripheral edema noted  RESP: Lungs are decreased throughout some scattered expiratory wheezing noted throughout, prolonged expiration due to significant COPD; remains on 2 L NC; +RICO  GI: obese, soft non tender, non distended; +BS x4  : Voiding without difficulty  MUSC: Moves all extremities; +generalized deconditioniong  SKIN: pink, warm and dry, normal turgor, sacral dressing in place        The above physical exam was reviewed and updated as determined by my evaluation of the patient on 3/5/2021  Invasive Devices     Peripheral Intravenous Line            Peripheral IV 03/01/21 Right;Ventral (anterior) Forearm 3 days                   VTE Pharmacologic Prophylaxis: Heparin  Code Status: Level 3 - DNAR and DNI  Current Length of Stay: 9 day(s)      Total time spent:  30 minutes with more than 50% spent counseling/coordinating care  Counseling includes discussion with patient re: progress  and discussion with patient of his/her current medical state/information  Coordination of patient's care was performed in conjunction with primary service  Time invested included review of patient's labs, vitals, and management of their comorbidities with continued monitoring  In addition, this patient was discussed with medical team including physician and advanced extenders  The care of the patient was extensively discussed and appropriate treatment plan was formulated unique for this patient  ** Please Note:  voice to text software may have been used in the creation of this document   Although proof errors in transcription or interpretation are a potential of such software**

## 2021-03-05 NOTE — PROGRESS NOTES
03/05/21 1500   Pain Assessment   Pain Assessment Tool 0-10   Pain Score 7   Pain Location/Orientation Orientation: Bilateral;Location: Rib Cage; Location: Back;Orientation: Mid   Pain Onset/Description Onset: Ongoing;Frequency: Constant/Continuous   Hospital Pain Intervention(s) Rest;Relaxation technique   Restrictions/Precautions   Precautions Aspiration;Bed/chair alarms;Cognitive; Fall Risk;Supervision on toilet/commode;Fluid restriction;O2;Pain;Spinal precautions   Braces or Orthoses TLSO  (max Assist to donned brace this session)   Cognition   Overall Cognitive Status Impaired   Arousal/Participation Alert; Cooperative   Subjective   Subjective pt reported pain as above and was agreeable to have PT   Sit to Stand   Type of Assistance Needed Physical assistance;Verbal cues; Adaptive equipment   Amount of Physical Assistance Provided Less than 25%   Comment min-mod with RW   Sit to Stand CARE Score 3   Bed-Chair Transfer   Type of Assistance Needed Physical assistance;Verbal cues; Adaptive equipment   Amount of Physical Assistance Provided 25%-49%   Comment min with RW    Chair/Bed-to-Chair Transfer CARE Score 3   Car Transfer   Type of Assistance Needed Physical assistance;Verbal cues; Adaptive equipment   Amount of Physical Assistance Provided 50%-74%   Comment assist with L LE getting in and to stand up with RW and extra cushion to mimic SUV car    Car Transfer CARE Score 2   Walk 10 Feet   Type of Assistance Needed Physical assistance;Verbal cues; Adaptive equipment   Amount of Physical Assistance Provided Less than 25%   Walk 10 Feet CARE Score 3   Walk 50 Feet with Two Turns   Type of Assistance Needed Physical assistance;Verbal cues; Adaptive equipment   Amount of Physical Assistance Provided Less than 25%   Comment 48' with RW, CF performed by PT while jamison Moore assisted with O2 tank management , VC to normalize gait pattern and promote gait efficiency   Walk 50 Feet with Two Turns CARE Score 3   Walking 10 Feet on Uneven Surfaces   Type of Assistance Needed Physical assistance;Verbal cues; Adaptive equipment   Amount of Physical Assistance Provided 50%-74%   Comment mod of 1 with VC for technique and walker management on floor mat  with RW   Walking 10 Feet on Uneven Surfaces CARE Score 2   Assessment   Treatment Assessment Skilled PT worked on functional mobility training with focused on car transfer training, walking on uneven surface using floor mat and gait tolerance on level surface  Pt demo anxious behavior during car transfer training with reported difficulty breathing although O2 sat were [facility] as monitored but with rest and encouragement pt was able to to continue with therapy  Pt's dtr asked if pt can also take xanax during the day to facilitate therapy tolerance since pt only takes xanax at night  Request relayed to RN Ludmila Garland who expressed concerns to both PT and dtr of possible drowsiness effect on pt which may impact therapy tolerance but RN will look into the order for possible trial tomorrow  Pt cont to demo inc risk for falls and has inc caregiver burden at this time so will benefit from additional skilled PT intervention to work on improving activity tolerance and functional mobility indep with emphasis on safe O2 line management  Family/Caregiver Present dtr miguel a   PT Barriers   Physical Impairment Decreased strength;Decreased endurance; Impaired balance;Decreased mobility; Decreased cognition; Impaired judgement;Decreased safety awareness;Pain;Orthopedic restrictions   Plan   Treatment/Interventions Functional transfer training; Therapeutic exercise; Endurance training;Gait training;Spoke to nursing;OT   Progress Slow progress, decreased activity tolerance   Recommendation   PT Discharge Recommendation   (TBD)   Equipment Recommended Wheelchair;Walker   PT - OK to Discharge No   PT Therapy Minutes   PT Time In 1500   PT Time Out 1540   PT Total Time (minutes) 40   PT Mode of treatment - Individual (minutes) 40   PT Mode of treatment - Concurrent (minutes) 0   PT Mode of treatment - Group (minutes) 0   PT Mode of treatment - Co-treat (minutes) 0   PT Mode of Treatment - Total time(minutes) 40 minutes   PT Cumulative Minutes 575

## 2021-03-05 NOTE — QUICK NOTE
Provided a medical and functional update to her daughter in law Ottoniel melo today  Relayed to her that we don't anticipate patient to return to home by herself - still needs assist with the brace management, and transfers       Family considering options     Anat Hernández MD

## 2021-03-05 NOTE — PROGRESS NOTES
PM&R PROGRESS NOTE:  Bing Gallagher 78 y o  female MRN: [id number]  Unit/Bed#: -01 Encounter: [id number]        Rehabilitation Diagnosis: Impairment of mobility, safety and Activities of Daily Living (ADLs) due to Orthopedic Disorders:  08 9  Other Orthopedic Thoracic compression fractures    HPI:   Bing Gallagher is a 78 y o  female with COPD/ emphysema on baseline oxygen 2-3 L,  Congestive heart failure, osteoarthritis who [facility] with back pain and shortness of breath  Pulmonary workup showing moderate pulmonary emphysematous changes bilaterally  Workup for back pain revealed Compression fracture of T5 (33% height loss approximately) and T6 (approximately 25% height loss  )  There is also superior endplate T2 compression fracture with approximately 10%-20 percent height loss at the superior endplate  Case was discussed with both Orthopedics and Neurosurgery and patient was recommended conservative treatment with TLSO brace while out of bed  Medically, patient was treated for an E coli urinary tract infection  Patient was started on steroids for COPD and emphysema and continued on her Breo and DuoNebs  Patient was seen by Nephrology  acute renal insufficiency, hypercalcemia and hyperkalemia  Patient started on Lasix by Nephrology  Patient was followed by wound care for a sacral pressure ulcer  After medical stabilization patient was found to have acute functional deficits from her baseline and therefore was admitted to [address] [place] [address]  SUBJECTIVE: Patient seen face to face  Pt reports musculoskeletal chest pain this AM  She will try BenGay  She reports that the chronic shortness of breath is stable  She denies any other complaints      ASSESSMENT: Stable, progressing      PLAN:    Rehabilitation   Functional deficits:  Generalized weakness,  Reduced Endurance,  Impaired mobility, impaired self-care,  Impaired balance,  Possible impaired swallow   Functional update:  o PT: ModA transfers  ModA 40ft RW, toilet transfer  o OT: ModA transfers   Continue current rehabilitation plan of care to maximize function   Estimated Discharge: To be determined, Reteam       Pain  · Mid to lower back pain:  Managed on Tylenol,  Topical Lidoderm patch, p r n  Robaxin, tramadol and oxycodone  P r n  DVT prophylaxis    managed on subcutaneous heparin and SCDs    Bladder plan   Incontinent at baseline     Bowel plan   Continent   Last BM 3/4/21  * Thoracic compression fracture (HCC)  Assessment & Plan  · T2, T5 and T6 compression fractures  · Treated conservatively  · TLSO brace while out of bed  · Follow-up as outpatient with Neurosurgery    Pressure ulcer  Assessment & Plan  · Located on Sacrum  · Consulted wound care to follow while at [facility]  · Continue optimization of nutrition, pressure relieving techniques, and turns  · Nutrition consulted  · Continue local wound care with foam dressing as recommended by wound care RN  UTI (urinary tract infection)  Assessment & Plan  · E Coli UTI  · Completed treatment     Chronic diastolic heart failure (HCC)  Assessment & Plan  Managed on Lasix 20mg daily      Type 2 diabetes mellitus with complication, without long-term current use of insulin ([facility])  Assessment & Plan  · Managed on glyburide - lower than home dose due to hypoglycemia    · CCD  · IM consultants following    COPD exacerbation ([facility] [address] )  Assessment & Plan  · Wears oxygen 2-3 L at baseline  · At baseline feels SOB  · Continue current inhalers reduced to TID  · Monitor oxygen saturations with activity  · Recent CT scan +moderate emphysema  · Continue prednisone taper    VICKY (acute kidney injury) ([facility])-resolved as of 2/27/2021  Assessment & Plan  · Renal function baseline 0 9 - 1 1  · Renal following hypercalcemia/hyperkaemia (hyperkalemia likely due to diet - was drinking V8s and fruity drinks)  ·  continue low-potassium diet        Appreciate IM consultants medical co-management  Labs, medications, and imaging personally reviewed  ROS:  A ten point review of systems was completed on 3/5/21 and pertinent positives are listed in subjective section  All other systems reviewed were negative  OBJECTIVE:   /58 (BP Location: Left arm)   Pulse 90   Temp 97 8 °F (36 6 °C) (Oral)   Resp 16   Ht 5' 2" (1 575 m)   Wt 72 5 kg (159 lb 13 3 oz)   SpO2 90%   BMI 29 23 kg/m²     Physical Exam  Vitals signs reviewed  Constitutional:       Appearance: She is well-developed  HENT:      Head: Normocephalic and atraumatic  Eyes:      Pupils: Pupils are equal, round, and reactive to light  Neck:      Musculoskeletal: Normal range of motion and neck supple  Cardiovascular:      Rate and Rhythm: Normal rate  Pulmonary:      Effort: Pulmonary effort is normal       Comments: Decreased breath sounds  On 2-3L O2 via NC  Abdominal:      General: Bowel sounds are normal       Palpations: Abdomen is soft  Musculoskeletal: Normal range of motion  Skin:     General: Skin is warm and dry  Neurological:      Mental Status: She is alert and oriented to person, place, and time            Lab Results   Component Value Date    WBC 11 60 (H) 02/27/2021    HGB 12 3 02/27/2021    HCT 38 2 02/27/2021    MCV 92 02/27/2021     02/27/2021     Lab Results   Component Value Date    SODIUM 137 03/02/2021    K 5 3 03/02/2021    CL 99 (L) 03/02/2021    CO2 37 (H) 03/02/2021    BUN 41 (H) 03/02/2021    CREATININE 0 90 03/02/2021    GLUC 92 03/02/2021    CALCIUM 10 9 (H) 03/02/2021     No results found for: INR, PROTIME        Current Facility-Administered Medications:     acetaminophen (TYLENOL) tablet 650 mg, 650 mg, Oral, Q6H [address], Marva Hatch MD, 650 mg at 03/05/21 0529    al mag oxide-diphenhydramine-lidocaine viscous (MAGIC MOUTHWASH) suspension 10 mL, 10 mL, Swish & Spit, Q4H PRN, ROLA Shepard, 10 mL at 03/04/21 1143    albuterol (PROVENTIL HFA,VENTOLIN HFA) inhaler 2 puff, 2 puff, Inhalation, Q6H PRN, Sara Rowland MD    ALPRAZolam Venda Distel) tablet 0 25 mg, 0 25 mg, Oral, TID PRN, Sara Rowland MD, 0 25 mg at 03/04/21 2159    aluminum-magnesium hydroxide-simethicone (MYLANTA) oral suspension 30 mL, 30 mL, Oral, Q6H PRN, Sara Rowland MD    atorvastatin (LIPITOR) tablet 80 mg, 80 mg, Oral, Daily With Korey Recinos MD, 80 mg at 03/04/21 1700    benzonatate (TESSALON PERLES) capsule 100 mg, 100 mg, Oral, TID PRN, Sara Rowland MD    bisacodyl (DULCOLAX) rectal suppository 10 mg, 10 mg, Rectal, Daily PRN, Sara Rowland MD, 10 mg at 02/28/21 1802    fluticasone-vilanterol (BREO ELLIPTA) 100-25 mcg/inh inhaler 1 puff, 1 puff, Inhalation, Daily, Sara Rowland MD, 1 puff at 03/05/21 0819    furosemide (LASIX) tablet 20 mg, 20 mg, Oral, Daily, Sara Rowland MD, 20 mg at 03/05/21 1887    glyBURIDE (DIABETA) tablet 2 5 mg, 2 5 mg, Oral, Daily With Breakfast, ROLA Maza, 2 5 mg at 03/05/21 0820    guaiFENesin (MUCINEX) 12 hr tablet 600 mg, 600 mg, Oral, Q12H [facility], Sara Rowland MD, 600 mg at 03/05/21 0816    heparin (porcine) subcutaneous injection 5,000 Units, 5,000 Units, Subcutaneous, Q8H [facility], Sara Rowland MD, 5,000 Units at 03/05/21 0530    insulin lispro (HumaLOG) 100 units/mL subcutaneous injection 1-6 Units, 1-6 Units, Subcutaneous, HS, Sara Rowland MD, 1 Units at 03/04/21 2224    insulin lispro (HumaLOG) 100 units/mL subcutaneous injection 1-6 Units, 1-6 Units, Subcutaneous, TID AC, 1 Units at 03/04/21 1705 **AND** Fingerstick Glucose (POCT), , , TID AC, Sara Rowland MD    ipratropium-albuterol (DUO-NEB) 0 5-2 5 mg/3 mL inhalation solution 3 mL, 3 mL, Nebulization, TID, Sara Rowland MD, 3 mL at 03/05/21 9652    levothyroxine tablet 125 mcg, 125 mcg, Oral, Early Morning, Sara Rowland MD, 125 mcg at 03/05/21 0530    lidocaine (LIDODERM) 5 % patch 2 patch, 2 patch, Topical, Daily, Sara Rowland MD, 2 patch at 03/05/21 0820    melatonin tablet 6 mg, 6 mg, Oral, HS PRN, Lauren Magallanes MD, 6 mg at 03/03/21 2153    menthol-methyl salicylate (BENGAY) 54-72 % cream, , Apply externally, TID, Lauren Magallanes MD, 1 application at 55/28/15 2204    methocarbamol (ROBAXIN) tablet 500 mg, 500 mg, Oral, Q6H PRN, Lauren Magallanes MD, 500 mg at 03/04/21 2159    montelukast (SINGULAIR) tablet 10 mg, 10 mg, Oral, HS, Lauren Magallanes MD, 10 mg at 03/04/21 2155    nystatin (MYCOSTATIN) cream, , Topical, BID, Lauren Magallanes MD, 1 application at 69/67/41 0818    nystatin (MYCOSTATIN) powder, , Topical, BID, Lauren Magallanes MD, 1 application at 57/62/36 0818    ondansetron (ZOFRAN-ODT) dispersible tablet 4 mg, 4 mg, Oral, Q6H PRN, Lauren Magallanes MD    oxyCODONE (ROXICODONE) IR tablet 5 mg, 5 mg, Oral, Q6H PRN, Lauren Magallanes MD, 5 mg at 03/04/21 2158    pantoprazole (PROTONIX) EC tablet 40 mg, 40 mg, Oral, Early Morning, Lauren Magallanes MD, 40 mg at 03/05/21 0530    polyethylene glycol (MIRALAX) packet 17 g, 17 g, Oral, Daily, Lauren Magallanes MD, 17 g at 03/05/21 0816    saccharomyces boulardii (FLORASTOR) capsule 250 mg, 250 mg, Oral, BID, Lauren Magallanes MD, 250 mg at 03/05/21 0816    senna (SENOKOT) tablet 8 6 mg, 1 tablet, Oral, HS, Lauren Magallanes MD, 8 6 mg at 03/04/21 2155    traMADol (ULTRAM) tablet 50 mg, 50 mg, Oral, Q6H PRN, Lauren Magallanes MD, 50 mg at 03/05/21 1166    white petrolatum-mineral oil (EUCERIN,HYDROCERIN) cream, , Topical, TID PRN, Lauren Magallanes MD, Given at 02/27/21 8631    Past Medical History:   Diagnosis Date    Cataract     CHF (congestive heart failure) ([facility] [address] )     Leukocytosis        Patient Active Problem List    Diagnosis Date Noted    [facility] compression fracture ([facility] ) 02/18/2021     Priority: High    Stage 3a chronic kidney disease 02/27/2021    Alkalosis 02/25/2021    Hyperkalemia 02/23/2021    Urinary incontinence 02/23/2021    Pressure ulcer 02/22/2021    Hypercalcemia 02/21/2021    Chronic respiratory failure with hypoxia ([place] [address] ) 02/18/2021    Moderate protein-calorie malnutrition ([place] [address] ) 02/18/2021    UTI (urinary tract infection) 02/18/2021    SOB (shortness of breath) 02/17/2021    Chronic bilateral thoracic back pain 02/17/2021    Chronic diastolic heart failure ([place] [address] ) 02/17/2021    Atherosclerosis of abdominal aorta ([place] [address] ) 12/01/2020    CHF (congestive heart failure) ([place] [address] ) 02/11/2019    Type 2 diabetes mellitus with complication, without long-term current use of insulin ([place] [address] ) 04/08/2015    Carotid artery plaque 04/06/2015    Hyperlipidemia 03/14/2014    Hypothyroidism 03/14/2014    Osteoarthrosis 03/14/2014    COPD exacerbation ([place] [address] ) 03/10/2014          Nesha Lopez PA-C    Total time spent:  30 minutes with more than 50% spent counseling/coordinating care  Counseling includes discussion with patient re: progress and discussion with patient of his/her current medical/functional state/information  Coordination of patient's care was performed in conjunction with consulting services  Time invested included review of patient's labs, vitals, and management of their comorbidities with continued monitoring  The care of the patient was extensively discussed and appropriate treatment plan was formulated unique for this patient  ** Please Note:  voice to text software may have been used in the creation of this document   Although proof errors in transcription or interpretation are a potential of such software**

## 2021-03-05 NOTE — NUTRITION
Consult- food choices      03/05/21 6448   Recommendations/Interventions   Summary Pts appetite remains poor-fair, states she isn't able to complete more than 50% of meals but is trying to eat her protein foods first  Sipping on Glucerna supplements throughout the day  Daughter encourages pt to eat when visiting during lunch meal  Pt was agreeable to trying Ken for wound healing   Interventions Diet: order adjustment  (Removed 2gm K diet d/t poor PO intake   Ken added BID)

## 2021-03-05 NOTE — PROGRESS NOTES
PM&R Progress Note:    HPI: Chelsea Henderson is a 78 y  o  female with COPD/ emphysema on baseline oxygen 2-3 L,  Congestive heart failure, osteoarthritis who presented to the [address] 2/17/21 with back pain and shortness of breath  Pulmonary workup showing moderate pulmonary emphysematous changes bilaterally  Workup for back pain revealed Compression fracture of T5 (33% height loss approximately) and T6 (approximately 25% height loss  )  There is also superior endplate T2 compression fracture with approximately 10%-20 percent height loss at the superior endplate  Case was discussed with both Orthopedics and Neurosurgery and patient was recommended conservative treatment with TLSO brace while out of bed   Medically, patient was treated for an E coli urinary tract infection   Patient was started on steroids for COPD and emphysema and continued on her Breo and DuoNebs  Patient was seen by Nephrology  acute renal insufficiency, hypercalcemia and hyperkalemia    Patient started on Lasix by Nephrology  Nereida Gonzalez was followed by wound care for a sacral pressure ulcer     After medical stabilization patient was found to have acute functional deficits from her baseline and therefore was admitted to [facility]      ASSESSMENT: Stable      PLAN:    Rehabilitation   Continue current rehabilitation plan of care to maximize function   No funcitonal barriers identified    Medical issues   No acute concerns   Continue current medical plan of care  Appreciate IM consultants co-management  SUBJECTIVE: Patient seen face to face  No acute issues  Progressing as expected in rehabilitation program       ROS:  A ten point review of systems was completed and pertinent positives are listed in subjective section  All other systems reviewed were negative       OBJECTIVE:   /58 (BP Location: Left arm)   Pulse 90   Temp 97 8 °F (36 6 °C) (Oral)   Resp 16   Ht 5' 2" (1 575 m)   Wt 72 5 kg (159 lb 13 3 oz)   SpO2 90%   BMI 29 23 kg/m²     Physical Exam  Vitals signs and nursing note reviewed  Constitutional:       General: She is not in acute distress  Appearance: She is well-developed  HENT:      Head: Normocephalic  Nose: Nose normal    Eyes:      Conjunctiva/sclera: Conjunctivae normal    Neck:      Musculoskeletal: Neck supple  Cardiovascular:      Rate and Rhythm: Normal rate  Pulmonary:      Effort: Pulmonary effort is normal       Breath sounds: No wheezing  Comments: Decreased breath sounds throughout  Abdominal:      General: There is no distension  Palpations: Abdomen is soft  Skin:     General: Skin is warm  Neurological:      Mental Status: She is alert and oriented to person, place, and time  Motor: Weakness present        Gait: Gait abnormal    Psychiatric:         Mood and Affect: Mood normal           Lab Results   Component Value Date    WBC 11 60 (H) 02/27/2021    HGB 12 3 02/27/2021    HCT 38 2 02/27/2021    MCV 92 02/27/2021     02/27/2021     Lab Results   Component Value Date    SODIUM 137 03/02/2021    K 5 3 03/02/2021    CL 99 (L) 03/02/2021    CO2 37 (H) 03/02/2021    BUN 41 (H) 03/02/2021    CREATININE 0 90 03/02/2021    GLUC 92 03/02/2021    CALCIUM 10 9 (H) 03/02/2021     No results found for: INR, PROTIME        Current Facility-Administered Medications:     acetaminophen (TYLENOL) tablet 650 mg, 650 mg, Oral, Q6H [facility], Andrea Salgado MD, 650 mg at 03/05/21 0529    al mag oxide-diphenhydramine-lidocaine viscous (MAGIC MOUTHWASH) suspension 10 mL, 10 mL, Swish & Spit, Q4H PRN, ROLA Shepard, 10 mL at 03/04/21 1143    albuterol (PROVENTIL HFA,VENTOLIN HFA) inhaler 2 puff, 2 puff, Inhalation, Q6H PRN, Andrea Salgado MD    ALPRAZolam Yuniel Jackson) tablet 0 25 mg, 0 25 mg, Oral, TID PRN, Andrea Salgado MD, 0 25 mg at 03/04/21 2159    aluminum-magnesium hydroxide-simethicone (MYLANTA) oral suspension 30 mL, 30 mL, Oral, Q6H PRN, Janice Muniz MD    atorvastatin (LIPITOR) tablet 80 mg, 80 mg, Oral, Daily With Tee Mae MD, 80 mg at 03/04/21 1700    benzonatate (TESSALON PERLES) capsule 100 mg, 100 mg, Oral, TID PRN, Janice Muniz MD    bisacodyl (DULCOLAX) rectal suppository 10 mg, 10 mg, Rectal, Daily PRN, Janice Muniz MD, 10 mg at 02/28/21 1802    fluticasone-vilanterol (BREO ELLIPTA) 100-25 mcg/inh inhaler 1 puff, 1 puff, Inhalation, Daily, Janice Muniz MD, 1 puff at 03/05/21 0819    furosemide (LASIX) tablet 20 mg, 20 mg, Oral, Daily, Janice Muniz MD, 20 mg at 03/05/21 8805    glyBURIDE (DIABETA) tablet 2 5 mg, 2 5 mg, Oral, Daily With Breakfast, ROLA Avila, 2 5 mg at 03/05/21 0820    guaiFENesin (MUCINEX) 12 hr tablet 600 mg, 600 mg, Oral, Q12H [address], Janice Muniz MD, 600 mg at 03/05/21 0816    heparin (porcine) subcutaneous injection 5,000 Units, 5,000 Units, Subcutaneous, Q8H [address], Janice Muniz MD, 5,000 Units at 03/05/21 0530    insulin lispro (HumaLOG) 100 units/mL subcutaneous injection 1-6 Units, 1-6 Units, Subcutaneous, HS, Janice Muniz MD, 1 Units at 03/04/21 2224    insulin lispro (HumaLOG) 100 units/mL subcutaneous injection 1-6 Units, 1-6 Units, Subcutaneous, TID AC, 1 Units at 03/04/21 1705 **AND** Fingerstick Glucose (POCT), , , TID AC, Janice Muniz MD    ipratropium-albuterol (DUO-NEB) 0 5-2 5 mg/3 mL inhalation solution 3 mL, 3 mL, Nebulization, TID, Janice Muniz MD, 3 mL at 03/05/21 0721    levothyroxine tablet 125 mcg, 125 mcg, Oral, Early Morning, Janice Muniz MD, 125 mcg at 03/05/21 0530    lidocaine (LIDODERM) 5 % patch 2 patch, 2 patch, Topical, Daily, Janice Muniz MD, 2 patch at 03/05/21 0820    melatonin tablet 6 mg, 6 mg, Oral, HS PRN, Janice Muniz MD, 6 mg at 03/03/21 2153    menthol-methyl salicylate (BENGAY) 16-03 % cream, , Apply externally, TID, Janice Muniz MD, 1 application at 82/43/69 2204    methocarbamol (ROBAXIN) tablet 500 mg, 500 mg, Oral, Q6H PRN, Marva Hatch MD, 500 mg at 03/04/21 2159    montelukast (SINGULAIR) tablet 10 mg, 10 mg, Oral, HS, Marva Hatch MD, 10 mg at 03/04/21 2155    nystatin (MYCOSTATIN) cream, , Topical, BID, Marva Hatch MD, 1 application at 79/17/63 0818    nystatin (MYCOSTATIN) powder, , Topical, BID, Marva Hatch MD, 1 application at 20/83/02 0818    ondansetron (ZOFRAN-ODT) dispersible tablet 4 mg, 4 mg, Oral, Q6H PRN, Marva Hatch MD    oxyCODONE (ROXICODONE) IR tablet 5 mg, 5 mg, Oral, Q6H PRN, Marva Hatch MD, 5 mg at 03/04/21 2158    pantoprazole (PROTONIX) EC tablet 40 mg, 40 mg, Oral, Early Morning, Marva Hatch MD, 40 mg at 03/05/21 0530    polyethylene glycol (MIRALAX) packet 17 g, 17 g, Oral, Daily, Marva Hatch MD, 17 g at 03/05/21 0816    saccharomyces boulardii (FLORASTOR) capsule 250 mg, 250 mg, Oral, BID, Marva Hatch MD, 250 mg at 03/05/21 0816    senna (SENOKOT) tablet 8 6 mg, 1 tablet, Oral, HS, Marva Hatch MD, 8 6 mg at 03/04/21 2155    traMADol (ULTRAM) tablet 50 mg, 50 mg, Oral, Q6H PRN, Marva Hatch MD, 50 mg at 03/05/21 9086    white petrolatum-mineral oil (EUCERIN,HYDROCERIN) cream, , Topical, TID PRN, Marva Hatch MD, Given at 02/27/21 2218    Past Medical History:   Diagnosis Date    Cataract     CHF (congestive heart failure) (HCC)     Leukocytosis        Patient Active Problem List    Diagnosis Date Noted    [facility] ) 02/18/2021     Priority: High    COPD exacerbation ([facility] ) 03/10/2014     Priority: Medium    Stage 3a chronic kidney disease 02/27/2021    Alkalosis 02/25/2021    Hyperkalemia 02/23/2021    Urinary incontinence 02/23/2021    Pressure ulcer 02/22/2021    Hypercalcemia 02/21/2021    Chronic respiratory failure with hypoxia ([facility] ) 02/18/2021    Moderate protein-calorie malnutrition ([facility] ) 02/18/2021    UTI (urinary tract infection) 02/18/2021    SOB (shortness of breath) 02/17/2021    Chronic bilateral thoracic back pain 02/17/2021    Chronic diastolic heart failure ([facility] ) 02/17/2021    Atherosclerosis of abdominal aorta ([place] [address] ) 12/01/2020    CHF (congestive heart failure) ([place] [address] ) 02/11/2019    Type 2 diabetes mellitus with complication, without long-term current use of insulin ([place] [address] ) 04/08/2015    Carotid artery plaque 04/06/2015    Hyperlipidemia 03/14/2014    Hypothyroidism 03/14/2014    Osteoarthrosis 03/14/2014          Paige Lindsey MD

## 2021-03-06 NOTE — PROGRESS NOTES
Internal Medicine Progress Note  Patient: Amy Horne  Age/sex: 78 y o  female  Medical Record #: [id number]      ASSESSMENT/PLAN: (Interval History)  Amy Horne is seen and examined and management for following issues:    Compression fracture T5/6  · No surgical intervention  · Cont TLSO brace when OOB  · Aggressive rehab per PMR  · Pain mgmt per PMR     COPD  · Wears oxygen 2-3 L continuous  · Continue current inhalers/nebs per home  · Recent PFT DLCO 54%  · Oxygen saturations stable  · Prednisone complete  · Recent CT scan +moderate emphysema; no PE  · Nebs were changed back to scheduled from prn on 2/27/21     Chronic diastolic HF  · Cont furosemide 20mg daily here   · Takes Torsemide at home  · Monitor volume status closely  · Chronic LE edema  · Echo = LVEF 27%; diastolic dysfunction  · Renal managing  · Unable to obtain labs 3/4/21 OK to draw Monday from our standpoint     DM II  · Hgb A1c 7 1 in October  · Home meds: Glyburide 5mg BID  · Cont sliding scale and coverage  · Cont glyburide at 2 5mg qd started 3/1/21  · BS stable     HTN  · Diovan is on hold 2/2 VICKY/CKD and K+ increase  · tx per renal  · BPs acceptable      CKD  · Level 3  · Baseline 0 9-1 1  · Avoid nephrotoxic medications  · Currently at baseline  · Renal managing     Hypercalcemia  · Nephrology w/u at [facility] secondary to volume status  · Renal managing      Hyponatremia  · Improved  · Renal managing     Hyperkalemia  · On low K+ diet and Diovan is on hold  · tx per renal     Hypothyroid  · Cont home supplementation     Sacral decubitus  · Cont off loading, nutritional supplementation  · Wound nurse following     Oral ulcers  · Cont magic mouthwash q4 hrs as needed  · Feels this helps and uses 1-2x/day    The above assessment and plan was reviewed and updated as determined by my evaluation of the patient on 3/6/2021      Labs:   Results from last 7 days   Lab Units 03/06/21  0610   WBC Thousand/uL 12 44*   HEMOGLOBIN g/dL 9 9*   HEMATOCRIT % 30 9*   PLATELETS Thousands/uL 165     Results from last 7 days   Lab Units 03/02/21  0640 03/01/21  2110   SODIUM mmol/L 137 135*   POTASSIUM mmol/L 5 3 5 0   CHLORIDE mmol/L 99* 102   CO2 mmol/L 37* 35*   BUN mg/dL 41* 43*   CREATININE mg/dL 0 90 1 00   CALCIUM mg/dL 10 9* 10 2*             Results from last 7 days   Lab Units 03/06/21  0630 03/06/21  0619 03/05/21  2048   POC GLUCOSE mg/dl 93 93 144*       Review of Scheduled Meds:  Current Facility-Administered Medications   Medication Dose Route Frequency Provider Last Rate    acetaminophen  650 mg Oral Q6H [facility] Kavon Mancilla, MD      al mag oxide-diphenhydramine-lidocaine viscous  10 mL Swish & Spit Q4H PRN ROLA Shepard      albuterol  2 puff Inhalation Q6H PRN Kavon Mancilla, MD      ALPRAZolam  0 25 mg Oral TID PRN Kavon Mancilla, MD      aluminum-magnesium hydroxide-simethicone  30 mL Oral Q6H PRN Kavon Mancilla, MD      atorvastatin  80 mg Oral Daily With Gearl Shauna, MD      benzonatate  100 mg Oral TID PRN Kavon Mancilla, MD      bisacodyl  10 mg Rectal Daily PRN Kavon Mancilla, MD      fluticasone-vilanterol  1 puff Inhalation Daily Kavon Mancilla MD      furosemide  20 mg Oral Daily Kavon Shape, MD      glyBURIDE  2 5 mg Oral Daily With Breakfast ROLA Shepard      guaiFENesin  600 mg Oral Q12H [facility] Kavon Mancilla MD      heparin (porcine)  5,000 Units Subcutaneous Q8H [facility] Kavon Mancilla MD      insulin lispro  1-6 Units Subcutaneous HS Kavon Mancilla MD      insulin lispro  1-6 Units Subcutaneous TID AC Kaovn Mancilla MD      ipratropium-albuterol  3 mL Nebulization TID Kavon Mancilla, MD      levothyroxine  125 mcg Oral Early Morning Kavon Mancilla, MD      lidocaine  2 patch Topical Daily Kavon Mancilla, MD      melatonin  6 mg Oral HS PRN Kavon Mancilla, MD      menthol-methyl salicylate   Apply externally TID Kavoniman Mancilla MD      methocarbamol  500 mg Oral Q6H PRN MD Prince López montelukast  10 mg Oral HS Brooke Prior, MD      nystatin   Topical BID Brooke Prior, MD      nystatin   Topical BID Brooke Prior, MD      ondansetron  4 mg Oral Q6H PRN Brooke Prior, MD      oxyCODONE  5 mg Oral Q6H PRN Brooke Prior, MD      pantoprazole  40 mg Oral Early Morning Brooke Prior, MD      polyethylene glycol  17 g Oral Daily Brooke Prior, MD Violet pina boulardii  250 mg Oral BID Brooke Prior, MD      senna  1 tablet Oral HS Brooke Prior, MD      traMADol  50 mg Oral Q6H PRN Brooke Prior, MD      white petrolatum-mineral oil   Topical TID PRN Brooke Prior, MD         Subjective/ HPI: Patient seen and examined  Patients overnight issues or events were reviewed with nursing or staff during rounds or morning huddle session  New or overnight issues include the following:     Pt states that she is stable  She has chronic shortness of breath  She reports sleeping off and on last night  She denies any other complaints  ROS:   A 10 point ROS was performed; negative except as noted above  Imaging:     XR abdomen 1 view kub   Final Result by Chanda Garcia MD (03/01 3868)      Large-volume stool, suggesting constipation  Nonobstructive bowel gas pattern                 Workstation performed: [id number]             *Labs /Radiology studies Reviewed  *Medications  reviewed and reconciled as needed  *Please refer to order section for additional ordered labs studies  *Case discussed with primary attending during morning huddle case rounds    Physical Examination:  Vitals:   Vitals:    03/05/21 2030 03/06/21 0616 03/06/21 0743 03/06/21 0829   BP: 132/62  113/62    BP Location: Right arm  Right arm    Pulse: 85  84    Resp: 18  18    Temp: 98 5 °F (36 9 °C)  98 1 °F (36 7 °C)    TempSrc: Oral  Oral    SpO2: 98%  98% 97%   Weight:  72 5 kg (159 lb 14 4 oz)     Height:           General Appearance: no distress, conversive  HEENT: PERRLA, conjuctiva normal; oropharynx clear; mucous membranes moist;   Neck:  Supple, no lymphadenopathy or thyromegaly  Lungs: Decreased breath sounds  O2 via NC present at 3L  CV: regular rate and rhythm , PMI normal   ABD: soft non tender, no masses , no hepatic or splenomegaly  EXT: DP pulses intact, no lymphadenopathy, no edema  Skin: normal turgor, normal texture, no rash  Psych: affect normal, mood normal  Neuro: AAOx3      The above physical exam was reviewed and updated as determined by my evaluation of the patient on 3/6/2021  Invasive Devices     None                    VTE Pharmacologic Prophylaxis: Heparin  Code Status: Level 3 - DNAR and DNI  Current Length of Stay: 10 day(s)      Total time spent:  30 minutes with more than 50% spent counseling/coordinating care  Counseling includes discussion with patient re: progress  and discussion with patient of his/her current medical state/information  Coordination of patient's care was performed in conjunction with primary service  Time invested included review of patient's labs, vitals, and management of their comorbidities with continued monitoring  In addition, this patient was discussed with medical team including physician and advanced extenders  The care of the patient was extensively discussed and appropriate treatment plan was formulated unique for this patient  ** Please Note:  voice to text software may have been used in the creation of this document   Although proof errors in transcription or interpretation are a potential of such software**

## 2021-03-06 NOTE — PLAN OF CARE
Problem: INFECTION - ADULT  Goal: Absence or prevention of progression during hospitalization  Description: INTERVENTIONS:  - Assess and monitor for signs and symptoms of infection  - Monitor lab/diagnostic results  - Monitor all insertion sites, i e  indwelling lines, tubes, and drains  - Monitor endotracheal if appropriate and nasal secretions for changes in amount and color  - [place] appropriate cooling/warming therapies per order  - Administer medications as ordered  - Instruct and encourage patient and family to use good hand hygiene technique  - Identify and instruct in appropriate isolation precautions for identified infection/condition  Outcome: Progressing

## 2021-03-06 NOTE — PROGRESS NOTES
03/06/21 1000   Pain Assessment   Pain Assessment Tool 0-10   Pain Score 8   Pain Location/Orientation Orientation: Bilateral;Location: Back;Orientation: Upper; Location: Rib Cage   Hospital Pain Intervention(s) Rest;Relaxation technique   Restrictions/Precautions   Precautions Fall Risk;Aspiration;Fluid restriction;O2;Supervision on toilet/commode;Spinal precautions;Pain;Bed/chair alarms   Weight Bearing Restrictions No   ROM Restrictions Yes  (Spinal precautions)   Braces or Orthoses TLSO   Upper Body Dressing   Type of Assistance Needed Physical assistance   Amount of Physical Assistance Provided Total assistance   Comment Attempted to have pt initiate independent donning, able to don LUE  Pt lesly good insight on TLSO orientation  Pt continues to be limited by spinal precautions, BUE ROM, activity tolerance  Would benefit from continued practice with various donning methods  Upper Body Dressing CARE Score 1   Lying to Sitting on Side of Bed   Type of Assistance Needed Supervision   Amount of Physical Assistance Provided No physical assistance   Comment HOB slightly elevated  Pt able to maintain spinal precautions with rolling technique  Lying to Sitting on Side of Bed CARE Score 4   Sit to Stand   Type of Assistance Needed Physical assistance   Amount of Physical Assistance Provided 25%-49%   Comment Min A to RW  Pt requires balance assist for noted retropulsion  Sit to Stand CARE Score 3   Bed-Chair Transfer   Type of Assistance Needed Physical assistance   Amount of Physical Assistance Provided 25%-49%   Comment Min A with RW  Required min cues to manage o2 line  Chair/Bed-to-Chair Transfer CARE Score 3   Toileting Hygiene   Type of Assistance Needed Physical assistance   Amount of Physical Assistance Provided Total assistance   Comment Total A for hygiene in stance due to reports of chest pain with reaching   Able to complete clothing management with Marguerite in stance with alternating unilateral support on RW    Toileting Hygiene CARE Score 1   Toilet Transfer   Type of Assistance Needed Physical assistance   Amount of Physical Assistance Provided 25%-49%   Comment Min A with RW to [facility] placed at bedside  Cues for O2 line management  Toilet Transfer CARE Score 3   Cognition   Overall Cognitive Status Impaired   Arousal/Participation Alert; Cooperative;Responsive   Attention Attends with cues to redirect   Orientation Level Oriented X4   Memory Decreased short term memory   Following Commands Follows one step commands without difficulty   Comments Pt demonstrated limited mental flexibility  Pt unable to recall pants she was looking for were already on  Additional Activities   Additional Activities Comments Pt completed pulmonary exercise HEP completed 1 x 10 of UE portion with focus on inc breathing strategies to inc independence with I/ADL tasks  Pt very limited by pain and SOB requiring frequent extended rest breaks  Activity Tolerance   Activity Tolerance Patient limited by pain; Patient limited by fatigue   Assessment   Treatment Assessment Pt participated in skilled OT session with focus on pulmonary exercise, functional mobility and transfers with focus on maximizing independence with ADL tasks  Pt very limited this session by pain and SOB, SpO2 remained >92 on 2L O2 via NC  Pt continues to initiate rest breaks to manage SOB/pain  Pt requires encouragement to participate in session  Pt continues to be limited by pulmonary dysfunction, orthopedic restrictions, endurance, strength  Pt would benefit from continued skilled OT services with focus on ADL retraining, pulmonary rehab, endurance, O2 line management, TLSO brace management  Prognosis Fair   Problem List Decreased strength;Decreased range of motion;Decreased endurance; Impaired balance;Decreased mobility;Orthopedic restrictions;Pain   Plan   Treatment/Interventions ADL retraining;Functional transfer training; Therapeutic exercise; Endurance training;Patient/family training;Equipment eval/education; Compensatory technique education   Progress Progressing toward goals   Recommendation   OT Discharge Recommendation Home with skilled therapy   OT Therapy Minutes   OT Time In 1000   OT Time Out 1100   OT Total Time (minutes) 60   OT Mode of treatment - Individual (minutes) 60   OT Mode of treatment - Concurrent (minutes) 0   OT Mode of treatment - Group (minutes) 0   OT Mode of treatment - Co-treat (minutes) 0   OT Mode of Treatment - Total time(minutes) 60 minutes   OT Cumulative Minutes 656   Therapy Time missed   Time missed?  No Colostomy present    H/O hernia repair  10/2020  History of appendectomy    History of lumpectomy of both breasts  3/2020  History of partial colectomy  7/2020  History of tonsillectomy    S/P laparoscopic cholecystectomy  1999  S/P MARIELLA (total abdominal hysterectomy)  2000, due to "infection"

## 2021-03-06 NOTE — PROGRESS NOTES
03/06/21 1440   Pain Assessment   Pain Assessment Tool 0-10   Pain Score 6   Pain Location/Orientation Orientation: Left; Location: Rib Cage   Restrictions/Precautions   Precautions Aspiration;Cognitive; Fall Risk;O2;Spinal precautions;Supervision on toilet/commode   ROM Restrictions Yes  (spinal)   Braces or Orthoses TLSO  (backpack when OOB)   Cognition   Overall Cognitive Status Impaired   Arousal/Participation Alert; Cooperative   Attention Attends with cues to redirect   Memory Decreased short term memory;Decreased recall of recent events   Following Commands Follows one step commands without difficulty   Subjective   Subjective Pt agreeable to PT, daughter and son present   Sit to Stand   Type of Assistance Needed Physical assistance; Adaptive equipment   Amount of Physical Assistance Provided 25%-49%   Comment modA progressing to CG throughout session   Sit to Stand CARE Score 3   Walk 10 Feet   Type of Assistance Needed Physical assistance; Adaptive equipment   Amount of Physical Assistance Provided Less than 25%   Comment Marguerite progressing to CG w/ RW, A for O2   Walk 10 Feet CARE Score 3   Walk 50 Feet with Two Turns   Type of Assistance Needed Physical assistance; Adaptive equipment   Amount of Physical Assistance Provided Less than 25%   Walk 50 Feet with Two Turns CARE Score 3   Walk 150 Feet   Reason if not Attempted Safety concerns   Walk 150 Feet CARE Score 88   Ambulation   Does the patient walk? 2  Yes   Primary Mode of Locomotion Prior to Admission Walk   Distance Walked (feet) 50 ft  (x4)   Assist Device Roller Walker   Gait Pattern Inconsistant Lory; Slow Lory;Decreased foot clearance; Forward Flexion; Improper weight shift   Limitations Noted In Balance; Coordination;Device Management; Endurance;Posture;Speed;Strength;Swing   Walk Assist Level Minimum Assist   Findings 2LO2 NC   Wheel 50 Feet with Two Turns   Reason if not Attempted Activity not applicable   Wheel 50 Feet with Two Turns CARE Score 9   Wheel 150 Feet   Reason if not Attempted Activity not applicable   Wheel 147 Feet CARE Score 9   Wheelchair mobility   Does the patient use a wheelchair? 0  No   Curb or Single Stair   Reason if not Attempted Safety concerns   1 Step (Curb) CARE Score 88   4 Steps   Reason if not Attempted Safety concerns   4 Steps CARE Score 88   12 Steps   Reason if not Attempted Safety concerns   12 Steps CARE Score 88   Toilet Transfer   Type of Assistance Needed Physical assistance; Adaptive equipment   Amount of Physical Assistance Provided Less than 25%   Comment Marguerite BSC, able to pull down pants but A for pulling up   Toilet Transfer CARE Score 3   Therapeutic Interventions   Other repeated bouts of household distance ambulation  Assessment   Treatment Assessment PT session focusing on repeated bouts of HH distances w/ RW  Pt able to perform 4 trials of 50' w/ approx 5min seated rest break  SpO2 greater than 95% and HR in 120s during mobility  Pt w/ improved tolerance to activity however cont to req A for O2 line despite VC to maintain and frequently turns in wrong direction req VC to recorrect  Pts daughter, son and daugther in law present, spoke w/ them about recommendations at home, what pt still req A w/ and realistic expectations about progress during next couple of weeks which they were receptive to  Pt cont to req acute rehab PT focusing on ambulation, transfers, stairs, bed mobilty, LE therex and breathing ex to maximize independence and dec burden of care  Family/Caregiver Present daughter, son and daughter in law   Problem List Decreased strength;Decreased range of motion;Decreased endurance; Impaired balance;Decreased mobility; Decreased coordination; Impaired judgement;Decreased cognition;Decreased safety awareness;Orthopedic restrictions;Pain   Barriers to Discharge Decreased caregiver support; Inaccessible home environment   PT Barriers   Functional Limitation Car transfers; Ramp negotiation;Stair negotiation;Transfers;Standing; Wheelchair management; Walking   Plan   Treatment/Interventions Functional transfer training;LE strengthening/ROM; Elevations; Therapeutic exercise;Cognitive reorientation; Endurance training;Patient/family training;Equipment eval/education; Bed mobility;Gait training; Compensatory technique education   Progress Slow progress, decreased activity tolerance   Recommendation   PT Discharge Recommendation Other (Comment)  (TBD pending progress)   PT Therapy Minutes   PT Time In 1440   PT Time Out 1525   PT Total Time (minutes) 45   PT Mode of treatment - Individual (minutes) 45   PT Mode of treatment - Concurrent (minutes) 0   PT Mode of treatment - Group (minutes) 0   PT Mode of treatment - Co-treat (minutes) 0   PT Mode of Treatment - Total time(minutes) 45 minutes   PT Cumulative Minutes 620   Therapy Time missed   Time missed?  No

## 2021-03-07 NOTE — PROGRESS NOTES
[facility] Occupational Therapy Daily Note  Patient Active Problem List   Diagnosis    Carotid artery plaque    COPD exacerbation (HCC)    Type 2 diabetes mellitus with complication, without long-term current use of insulin (HCC)    Hyperlipidemia    Hypothyroidism    Osteoarthrosis    CHF (congestive heart failure) (HCC)    Atherosclerosis of abdominal aorta (HCC)    SOB (shortness of breath)    Chronic bilateral thoracic back pain    Chronic diastolic heart failure (HCC)    Chronic respiratory failure with hypoxia (HCC)    Moderate protein-calorie malnutrition (HCC)    Thoracic compression fracture (HCC)    UTI (urinary tract infection)    Hypercalcemia    Pressure ulcer    Hyperkalemia    Urinary incontinence    Alkalosis    Stage 3a chronic kidney disease       Past Medical History:   Diagnosis Date    Cataract     CHF (congestive heart failure) (HCC)     Leukocytosis      Etiologic Diagnosis: T5 to T7 Compression Fractures  Restrictions/Precautions  Precautions: Aspiration, Bed/chair alarms, Cognitive, Fall Risk, Supervision on toilet/commode, O2, Fluid restriction(2L O2, 1500ml)  RUE Weight Bearing Per Order: WBAT  LUE Weight Bearing Per Order: WBAT  RLE Weight Bearing Per Order: WBAT  LLE Weight Bearing Per Order: WBAT  ROM Restrictions: (T-spine precautions)  Braces or Orthoses: TLSO  ADL Team Goal: Patient will require assist with ADLs with least restrictive device upon completion of rehab program  Occupational Therapy LTG's  Eating Oral care Bathing UB dress LB dress   Eating Goal: 06  Independent - Patient completes the activity by him/herself with no assistance from a helper  Oral Hygiene Goal: 06  Independent - Patient completes the activity by him/herself with no assistance from a helper  Shower/bathe self Goal: 04  TOUCHING/ STEADYING assistance as patient completes activity  Lower body dressing Goal: 03  Partial/moderate assistance - Kathleen does less than half the effort   Kathleen lifts or holds trunk or limbs and provides more than half the effort  Upper body dressing Goal: 03  Partial/moderate assistance - [place] does less than half the effort  [place] lifts or holds trunk or limbs and provides more than half the effort  Toileting Toilet txf Func txf IADL Med    Toileting hygiene Goal: 04  Supervision or touching assistance- [place] provides VERBAL CUES or supervision throughout activity  Toilet transfer Goal: 04  Supervision or touching assistance- [place] provides VERBAL CUES or supervision throughout activity  Chair/bed-to-chair transfer Goal: 04  Supervision or touching assistance- [place] provides VERBAL CUES or supervision throughout activity  Assist Level: Supervision   OT interventions: Treatment/Interventions: ADL retraining, Functional transfer training, Therapeutic exercise, Endurance training, Patient/family training, Equipment eval/education, Bed mobility  Discharge Plan:  OT Discharge Recommendation: (P) (Home with inc support 24/7, vs SNF pending progress )   DME: May require [facility]     03/07/21 0830   Pain Assessment   Pain Assessment Tool 0-10   Pain Score 7   Pain Location/Orientation Location: Back; Location: Generalized   Hospital Pain Intervention(s) Repositioned   Lifestyle   Autonomy "dont mind me, I dont know my friggen words "    Upper Body Dressing   Comment Pt did not want to change shirt today  Attempted new technique for donning TLSO overhead while sitting EOB  Pt reporting that she is unable to complete 2* pain with moving her arms  Pt contines to require maximal assist for TLSO management  Pt does not have adequate UE strength to manage components of TLSO  Cont with UE strengtheninging program     Sit to Stand   Type of Assistance Needed Physical assistance   Amount of Physical Assistance Provided 25%-49%   Comment MOD A for rise from EOB  Pt does have intermittnatn bouts of fear of falling in static stance   Fear increases her anxious feeling and increased her rate of breathe then requiring extensive time to "rest a minute "    Sit to Stand CARE Score 3   Bed-Chair Transfer   Type of Assistance Needed Physical assistance   Amount of Physical Assistance Provided 25%-49%   Comment MIN A  for Func mob in room  Pt does demo good pacing  Does initiate line management however does req assist for moving large portions of it out of the way  CGA in stance when she uses b/l UE for winding up O2 line  Chair/Bed-to-Chair Transfer CARE Score 3   Toileting Hygiene   Type of Assistance Needed Physical assistance   Amount of Physical Assistance Provided 50%-74%   Comment Pt encouraged to attempt toileting on standard toilet in bathroom to simulate home set up  Pt is quite reluctant at first, but with encouragement she is able to compelte  pt is able to doff pants in stance with CGA  Does require assit for bowel hygiene, able to complete keena hygiene in sitting with forward reach on elongated bowl  Pt Req assist for donning pants over hips in stance with 2x bouts of fear of falling, causing increased stress during session  Pt cont to report difficulties with urgency issues  pt stating that she didnt want to move because of pain, so she urinated in a napkin in the bed  pt will cont to need increased support at home to manage all components of bed mobilty, and transfers to make it to the bathroom or BSC everytime to reduce the risk of skin infections  Toileting Hygiene CARE Score 2   Toilet Transfer   Type of Assistance Needed Physical assistance   Amount of Physical Assistance Provided 50%-74%   Comment triled use of standard toilet with grab bars  Required MAX A to complete sit to stand from toilet  MOD A for slow controlled sit  pt will require raised seat and grab bar support to increase safety with slow lower to surface for preservation of back 2* fx's  Toilet Transfer CARE Score 2   Cognition   Overall Cognitive Status Impaired   Arousal/Participation Alert; Cooperative Attention Attends with cues to redirect   Orientation Level Oriented X4   Memory Decreased short term memory;Decreased recall of recent events;Decreased recall of precautions   Following Commands Follows one step commands with increased time or repetition   Comments Pt cont to report that she "cant remember anything," and "I cant even talk straight "    Assessment   Treatment Assessment Pt participated in skilled OT treatment session with treatment focus bed mobilty, func transfers, and toileting  Pt tolerated session well, with progression of task to complete ambulation to/from bathroom today  Does require increased time to mange breathe, but is able to self initiate appropriately  SPO2 remains WNL on 2 L O2 NC during activity   Pt continues to require skilled acute rehab OT services to increase overall functional independence and safety w/ I/ADLs and functional transfers  Continue with plan for discharge as home with family support and hired care 24/7 vs cont rehab at [facility]  Continued plan of care for OT sessions to focus on the following areas:  ADL Retraining , LB Dressing,  LHAE education/training, Functional Transfers, Functional Cognition, Standing tolerance, Standing balance , DME training/education, Family training/education, Energy conservation training/education, healthy coping education and TLSO management, O2 line safety, and UE strengthening   Prognosis Fair   Problem List Decreased strength;Decreased range of motion;Decreased endurance; Impaired balance;Decreased mobility; Decreased coordination;Decreased cognition   Plan   Progress Slow progress, decreased activity tolerance   Recommendation   OT Discharge Recommendation   (Home with inc support 24/7, vs SNF pending progress )   OT Therapy Minutes   OT Time In 0830   OT Time Out 0930   OT Total Time (minutes) 60   OT Mode of treatment - Individual (minutes) 60   OT Mode of treatment - Concurrent (minutes) 0   OT Mode of treatment - Group (minutes) 0   OT Mode of treatment - Co-treat (minutes) 0   OT Mode of Treatment - Total time(minutes) 60 minutes   OT Cumulative Minutes 982

## 2021-03-07 NOTE — PROGRESS NOTES
03/07/21 1120   Pain Assessment   Pain Assessment Tool 0-10   Pain Score 4   Pain Location/Orientation Location: Back   Hospital Pain Intervention(s) Repositioned;Declines   Restrictions/Precautions   Precautions Aspiration;Bed/chair alarms;Cognitive; Fall Risk;Pain;Spinal precautions;Supervision on toilet/commode   Braces or Orthoses TLSO  (backpack TLSO when OOB)   Swallow Information   Current Risks for Dysphagia & Aspiration Respiratory compromise;Rapid respiratory rate;General debilitation;Cognitive deficit; Positionong Issues   Current Symptoms/Concerns Clear throat;Cough;During meals; Difficulty chewing;Decreased oral intake;Weight loss   Current Diet Dysphagia mechanical soft; Thin liquid   Baseline Diet Dyphagia advanced;Regular; Thin liquids   Consistencies Assessed and Performance   Materials Admnistered Puree/Level 1;Soft/Level 3; Thin liquid   Oral Stage Mild impaired; Moderate impaired   Phargngeal Stage Mild impaired; Moderate impaired;Aspiration risk   Swallow Mechanics Mild delayed; Moderate delayed;Swallow initation;Good Larygneal rise;Aspiration risk   Esophageal Concerns No s/s reported   Recommendations   Risk for Aspiration Present   Recommendations Dysphagia treatment   Diet Solid Recommendation Level 2 Dysphagia/ mechanical soft/altered   Diet Liquid Recommendation Thin liquid   Recommended Form of Meds As tolerated   General Precautions Aspiration precautions; Feed only when alert;Upright as possible for all oral intake;Remain upright for 45 mins after meals; Minimize distractions; Other (Comment)  (DIstant supervision w/ meals, FULLY upright during meals,)   Compensatory Swallowing Strategies Alternate solids and liquids; External pacing;Effortful swallow;Voluntary throat clear/cough to clear penetration   Results Reviewed with RN;PT/Family/Caregiver   Eating   Type of Assistance Needed Set-up / clean-up; Verbal cues   Amount of Physical Assistance Provided No physical assistance   Eating CARE Score 4 Swallow Assessment   Swallow Treatment Assessment Pt awake, alert and cooperative for sessions, which already OOB in [place] 23 for meal  Today trialing level 3 diet during lunchtime meal for dysphagia tx session, in which SLP setup tray for pt but pt able to feed self  Pt remains able to demonstrate recall and carryover of the swallow strategies during meals, including, alternating solids w/ liquids or applesauce, small bites/sips, allowing time to consume meal  What pt continues to require verbal cues from remains to be NO TALKING during meals  SLP providing verbal reminders x5 throughout meal  Of note, pt was not wearing lower dentures today stating that her gum line was sore beginning yesterday  SLP asking if she would like to wear them today, but declining due to ongoing sore gums  Pt consumed 25% of meal and 240cc of thin liquids by straw  Lip seal remains functional around tsp, fork and straw w/ adequate bolus retrieval across consistencies today  No anterior loss was noted  Mastication of level 3 items (chopped meatball, penne pasta, chopped broccoli) was moderately prolonged, but consistently used liquid or applesauce as a "wash" to aid in breakdown  No increased oral residual/pocketing noted  Bolus manipulation of puree/pudding was noted to be mildly delayed  When taking thin liquids, prompt bolus transfer was noted  As for pt's swallow initiation, prompt swallow noted w/ thin liquids but mild-moderate delay occurs w/ puree/soft solids due to increased oral stage  Hyolaryngeal excursion upon palpation across consistencies today was fair  Pt did have cough after trialing chopped broccoli and refused to trial again  NO overt signs/sxs of aspiration noted w/ other consistencies during meal  At this time, will continue to recommend level 2 diet w/ thin liquids but ongoing level 3 trials to occur under SLP supervision only   At this time, SLP continuing to recommend the following aspiration precautions: DISTANT supervision and OOB or FULLY upright for meals, alternate solids/liquids during meals  Will continue dysphagia therapy in order to determine safest and least restrictive diet w/ goal of also supporting overall PO intake     Swallow Assessment Prognosis   Prognosis Fair   Prognosis Considerations Co-morbidities; Medical diagnosis; Medical prognosis; Age;Severity of impairments;New learning ability;Ability to carry over   SLP Therapy Minutes   SLP Time In 1120   SLP Time Out 1150   SLP Total Time (minutes) 30   SLP Mode of treatment - Individual (minutes) 30   SLP Mode of treatment - Concurrent (minutes) 0   SLP Mode of treatment - Group (minutes) 0   SLP Mode of treatment - Co-treat (minutes) 0   SLP Mode of Treatment - Total time(minutes) 30 minutes   SLP Cumulative Minutes 330   Therapy Time missed   Time missed?  No

## 2021-03-07 NOTE — PLAN OF CARE
Problem: Potential for Falls  Goal: Patient will remain free of falls  Description: INTERVENTIONS:  - Assess patient frequently for physical needs  -  Identify cognitive and physical deficits and behaviors that affect risk of falls  -  [place] fall precautions as indicated by assessment   - Educate patient/family on patient safety including physical limitations  - Instruct patient to call for assistance with activity based on assessment  - Modify environment to reduce risk of injury  - Consider OT/PT consult to assist with strengthening/mobility  Outcome: Progressing     Problem: Prexisting or High Potential for Compromised Skin Integrity  Goal: Skin integrity is maintained or improved  Description: INTERVENTIONS:  - Identify patients at risk for skin breakdown  - Assess and monitor skin integrity  - Assess and monitor nutrition and hydration status  - Monitor labs   - Assess for incontinence   - Turn and reposition patient  - Assist with mobility/ambulation  - Relieve pressure over bony prominences  - Avoid friction and shearing  - Provide appropriate hygiene as needed including keeping skin clean and dry  - Evaluate need for skin moisturizer/barrier cream  - Collaborate with interdisciplinary team   - Patient/family teaching  - Consider wound care consult   Outcome: Progressing     Problem: Nutrition/Hydration-ADULT  Goal: Nutrient/Hydration intake appropriate for improving, restoring or maintaining nutritional needs  Description: Monitor and assess patient's nutrition/hydration status for malnutrition  Collaborate with interdisciplinary team and initiate plan and interventions as ordered  Monitor patient's weight and dietary intake as ordered or per policy  Utilize nutrition screening tool and intervene as necessary  Determine patient's food preferences and provide high-protein, high-caloric foods as appropriate       INTERVENTIONS:  - Monitor oral intake, urinary output, labs, and treatment plans  - Assess nutrition and hydration status and recommend course of action  - Evaluate amount of meals eaten  - Assist patient with eating if necessary   - Allow adequate time for meals  - Recommend/ encourage appropriate diets, oral nutritional supplements, and vitamin/mineral supplements  - Order, calculate, and assess calorie counts as needed  - Recommend, monitor, and adjust tube feedings and TPN/PPN based on assessed needs  - Assess need for intravenous fluids  - Provide specific nutrition/hydration education as appropriate  - Include patient/family/caregiver in decisions related to nutrition  Outcome: Progressing     Problem: PAIN - ADULT  Goal: Verbalizes/displays adequate comfort level or baseline comfort level  Description: Interventions:  - Encourage patient to monitor pain and request assistance  - Assess pain using appropriate pain scale  - Administer analgesics based on type and severity of pain and evaluate response  - Implement non-pharmacological measures as appropriate and evaluate response  - Consider cultural and social influences on pain and pain management  - Notify physician/advanced practitioner if interventions unsuccessful or patient reports new pain  Outcome: Progressing     Problem: INFECTION - ADULT  Goal: Absence or prevention of progression during hospitalization  Description: INTERVENTIONS:  - Assess and monitor for signs and symptoms of infection  - Monitor lab/diagnostic results  - Monitor all insertion sites, i e  indwelling lines, tubes, and drains  - Monitor endotracheal if appropriate and nasal secretions for changes in amount and color  - [place] appropriate cooling/warming therapies per order  - Administer medications as ordered  - Instruct and encourage patient and family to use good hand hygiene technique  - Identify and instruct in appropriate isolation precautions for identified infection/condition  Outcome: Progressing     Problem: SAFETY ADULT  Goal: Patient will remain free of falls  Description: INTERVENTIONS:  - Assess patient frequently for physical needs  -  Identify cognitive and physical deficits and behaviors that affect risk of falls    -  [place] fall precautions as indicated by assessment   - Educate patient/family on patient safety including physical limitations  - Instruct patient to call for assistance with activity based on assessment  - Modify environment to reduce risk of injury  - Consider OT/PT consult to assist with strengthening/mobility  Outcome: Progressing  Goal: Maintain or return to baseline ADL function  Description: INTERVENTIONS:  -  Assess patient's ability to carry out ADLs; assess patient's baseline for ADL function and identify physical deficits which impact ability to perform ADLs (bathing, care of mouth/teeth, toileting, grooming, dressing, etc )  - Assess/evaluate cause of self-care deficits   - Assess range of motion  - Assess patient's mobility; develop plan if impaired  - Assess patient's need for assistive devices and provide as appropriate  - Encourage maximum independence but intervene and supervise when necessary  - Involve family in performance of ADLs  - Assess for home care needs following discharge   - Consider OT consult to assist with ADL evaluation and planning for discharge  - Provide patient education as appropriate  Outcome: Progressing  Goal: Maintain or return mobility status to optimal level  Description: INTERVENTIONS:  - Assess patient's baseline mobility status (ambulation, transfers, stairs, etc )    - Identify cognitive and physical deficits and behaviors that affect mobility  - Identify mobility aids required to assist with transfers and/or ambulation (gait belt, sit-to-stand, lift, walker, cane, etc )  - [place] fall precautions as indicated by assessment  - Record patient progress and toleration of activity level on Mobility SBAR; progress patient to next Phase/Stage  - Instruct patient to call for assistance with activity based on assessment  - Consider rehabilitation consult to assist with strengthening/weightbearing, etc   Outcome: Progressing     Problem: DISCHARGE PLANNING  Goal: Discharge to home or other facility with appropriate resources  Description: INTERVENTIONS:  - Identify barriers to discharge w/patient and caregiver  - Arrange for needed discharge resources and transportation as appropriate  - Identify discharge learning needs (meds, wound care, etc )  - Arrange for interpretive services to assist at discharge as needed  - Refer to Case Management Department for coordinating discharge planning if the patient needs post-hospital services based on physician/advanced practitioner order or complex needs related to functional status, cognitive ability, or social support system  Outcome: Progressing

## 2021-03-07 NOTE — PROGRESS NOTES
Internal Medicine Progress Note  Patient: Sanjeev Garcia  Age/sex: 78 y o  female  Medical Record #: [id number]      ASSESSMENT/PLAN: (Interval History)  Sanjeev Garcia is seen and examined and management for following issues:    Compression fracture T5/6  · No surgical intervention  · Cont TLSO brace when OOB  · Aggressive rehab per PMR  · Pain mgmt per PMR     COPD  · Wears oxygen 2-3 L continuous  · Continue current inhalers/nebs per home  · Recent PFT DLCO 54%  · Oxygen saturations stable  · Prednisone complete  · Recent CT scan +moderate emphysema; no PE  · Nebs were changed back to scheduled from prn on 2/27/21     Chronic diastolic HF  · Cont furosemide 20mg daily here   · Takes Torsemide at home  · Monitor volume status closely  · Chronic LE edema  · Echo = LVEF 94%; diastolic dysfunction  · Renal managing     DM II  · Hgb A1c 7 1 in October  · Home meds: Glyburide 5mg BID  · Cont sliding scale and coverage  · Cont glyburide at 2 5mg qd started 3/1/21  · BS stable     HTN  · Diovan is on hold 2/2 VICKY/CKD and K+ increase  · tx per renal  · BPs acceptable      CKD  · Level 3  · Baseline 0 9-1 1  · Avoid nephrotoxic medications  · Currently at baseline  · Renal managing     Hypercalcemia  · Nephrology w/u at [facility] secondary to volume status  · Renal managing      Hyponatremia  · Improved  · Renal managing     Hyperkalemia  · On low K+ diet and Diovan is on hold  · tx per renal     Hypothyroid  · Cont home supplementation     Sacral decubitus  · Cont off loading, nutritional supplementation  · Wound nurse following     Oral ulcers  · Cont magic mouthwash q4 hrs as needed  · Feels this helps and uses 1-2x/day    The above assessment and plan was reviewed and updated as determined by my evaluation of the patient on 3/7/2021      Labs:   Results from last 7 days   Lab Units 03/06/21  0610   WBC Thousand/uL 12 44*   HEMOGLOBIN g/dL 9 9*   HEMATOCRIT % 30 9*   PLATELETS Thousands/uL 165     Results from last 7 days   Lab Units 03/06/21  1108 03/02/21  0640   SODIUM mmol/L 132* 137   POTASSIUM mmol/L 5 3 5 3   CHLORIDE mmol/L 96* 99*   CO2 mmol/L 36* 37*   BUN mg/dL 39* 41*   CREATININE mg/dL 1 00 0 90   CALCIUM mg/dL 10 8* 10 9*             Results from last 7 days   Lab Units 03/07/21  0626 03/06/21  2114 03/06/21  1602   POC GLUCOSE mg/dl 94 99 133       Review of Scheduled Meds:  Current Facility-Administered Medications   Medication Dose Route Frequency Provider Last Rate    acetaminophen  650 mg Oral Q6H [facility] Jon Caruso MD      al mag oxide-diphenhydramine-lidocaine viscous  10 mL Swish & Spit Q4H PRN ROLA Shepard      albuterol  2 puff Inhalation Q6H PRN Jon Caruso MD      ALPRAZolam  0 25 mg Oral TID PRN Jon Caruso MD      aluminum-magnesium hydroxide-simethicone  30 mL Oral Q6H PRN Jon Caruso MD      atorvastatin  80 mg Oral Daily With Daniele Horn MD      benzonatate  100 mg Oral TID PRN Jon Caruso MD      bisacodyl  10 mg Rectal Daily PRN Jon Caruso MD      fluticasone-vilanterol  1 puff Inhalation Daily Jon Caruso MD      furosemide  20 mg Oral Daily Jon Caruso MD      glyBURIDE  2 5 mg Oral Daily With Breakfast ROLA Shepard      guaiFENesin  600 mg Oral Q12H [facility] Jon Caruso MD      heparin (porcine)  5,000 Units Subcutaneous Q8H [facility] Jon Caruso MD      insulin lispro  1-6 Units Subcutaneous HS Jon Caruso MD      insulin lispro  1-6 Units Subcutaneous TID AC Jon Caruso MD      ipratropium-albuterol  3 mL Nebulization TID Jon Caruso MD      levothyroxine  125 mcg Oral Early Morning Jon Caruso MD      lidocaine  2 patch Topical Daily Jon Caruso MD      melatonin  6 mg Oral HS PRN Jon Caruso MD      menthol-methyl salicylate   Apply externally TID Jon Caruso MD      methocarbamol  500 mg Oral Q6H PRN Jon Caruso MD      montelukast  10 mg Oral HS Jon Caruso MD      nystatin   Topical BID Paige Fairly, MD      nystatin   Topical BID Nuala Fairly, MD      ondansetron  4 mg Oral Q6H PRN Nuala Fairly, MD      oxyCODONE  5 mg Oral Q6H PRN Nuala Fairly, MD      pantoprazole  40 mg Oral Early Morning Nuala Fairly, MD      polyethylene glycol  17 g Oral Daily Paige Fairly, MD Violet pina boulardii  250 mg Oral BID Nuala Fairly, MD      senna  1 tablet Oral HS Nuala Fairly, MD      traMADol  50 mg Oral Q6H PRN Nuala Fairly, MD      white petrolatum-mineral oil   Topical TID PRN Nuala Fairly, MD         Subjective/ HPI: Patient seen and examined  Patients overnight issues or events were reviewed with nursing or staff during rounds or morning huddle session  New or overnight issues include the following:     Pt reports overall stability  She continues to have shortness of breath with activity which is chronic  Back pain is unchanged  She denies any other complaints  ROS:   A 10 point ROS was performed; negative except as noted above  Imaging:     XR abdomen 1 view kub   Final Result by Jeff Kaye MD (03/01 2006)      Large-volume stool, suggesting constipation  Nonobstructive bowel gas pattern                 Workstation performed: [id number]             *Labs /Radiology studies Reviewed  *Medications  reviewed and reconciled as needed  *Please refer to order section for additional ordered labs studies  *Case discussed with primary attending during morning huddle case rounds    Physical Examination:  Vitals:   Vitals:    03/06/21 1954 03/06/21 2300 03/07/21 0819 03/07/21 0820   BP:  109/51 128/57    BP Location:   Left arm    Pulse:  82 85    Resp:  20 20    Temp:  98 2 °F (36 8 °C) (!) 97 4 °F (36 3 °C)    TempSrc:  Oral Oral    SpO2: 97% 95% 100% 98%   Weight:       Height:           General Appearance: no distress, conversive  HEENT: PERRLA, conjuctiva normal; oropharynx clear; mucous membranes moist;   Neck:  Supple, no lymphadenopathy or thyromegaly  Lungs: Decreased breath sounds and expiratory wheezes throughout  O2 via NC  CV: regular rate and rhythm , PMI normal   ABD: soft non tender, no masses , no hepatic or splenomegaly  EXT: DP pulses intact, no lymphadenopathy, no edema  Skin: normal turgor, normal texture, no rash  Psych: affect normal, mood normal  Neuro: AAOx3      The above physical exam was reviewed and updated as determined by my evaluation of the patient on 3/7/2021  Invasive Devices     None                    VTE Pharmacologic Prophylaxis: Heparin  Code Status: Level 3 - DNAR and DNI  Current Length of Stay: 11 day(s)      Total time spent:  30 minutes with more than 50% spent counseling/coordinating care  Counseling includes discussion with patient re: progress  and discussion with patient of his/her current medical state/information  Coordination of patient's care was performed in conjunction with primary service  Time invested included review of patient's labs, vitals, and management of their comorbidities with continued monitoring  In addition, this patient was discussed with medical team including physician and advanced extenders  The care of the patient was extensively discussed and appropriate treatment plan was formulated unique for this patient  ** Please Note:  voice to text software may have been used in the creation of this document   Although proof errors in transcription or interpretation are a potential of such software**

## 2021-03-08 NOTE — PROGRESS NOTES
[facility] Occupational Therapy Daily Note  Patient Active Problem List   Diagnosis    Carotid artery plaque    COPD exacerbation (HCC)    Type 2 diabetes mellitus with complication, without long-term current use of insulin (HCC)    Hyperlipidemia    Hypothyroidism    Osteoarthrosis    CHF (congestive heart failure) (HCC)    Atherosclerosis of abdominal aorta (HCC)    SOB (shortness of breath)    Chronic bilateral thoracic back pain    Chronic diastolic heart failure (HCC)    Chronic respiratory failure with hypoxia (HCC)    Moderate protein-calorie malnutrition (HCC)    Thoracic compression fracture (HCC)    UTI (urinary tract infection)    Hypercalcemia    Pressure ulcer    Hyperkalemia    Urinary incontinence    Alkalosis    Stage 3a chronic kidney disease       Past Medical History:   Diagnosis Date    Cataract     CHF (congestive heart failure) (HCC)     Leukocytosis      Etiologic Diagnosis: T5 to T7 Compression Fractures  Restrictions/Precautions  Precautions: Aspiration, Bed/chair alarms, Cognitive, Fall Risk, Supervision on toilet/commode, O2, Fluid restriction(2L O2, 1500ml)  RUE Weight Bearing Per Order: WBAT  LUE Weight Bearing Per Order: WBAT  RLE Weight Bearing Per Order: WBAT  LLE Weight Bearing Per Order: WBAT  ROM Restrictions: (T-spine precautions)  Braces or Orthoses: TLSO  ADL Team Goal: Patient will require assist with ADLs with least restrictive device upon completion of rehab program  Occupational Therapy LTG's  Eating Oral care Bathing UB dress LB dress   Eating Goal: 06  Independent - Patient completes the activity by him/herself with no assistance from a helper  Oral Hygiene Goal: 06  Independent - Patient completes the activity by him/herself with no assistance from a helper  Shower/bathe self Goal: 04  TOUCHING/ STEADYING assistance as patient completes activity  Lower body dressing Goal: 03  Partial/moderate assistance - [place] does less than half the effort   [place] lifts or holds trunk or limbs and provides more than half the effort  Upper body dressing Goal: 03  Partial/moderate assistance - [place] does less than half the effort  [place] lifts or holds trunk or limbs and provides more than half the effort  Toileting Toilet txf Func txf IADL Med    Toileting hygiene Goal: 04  Supervision or touching assistance- [place] provides VERBAL CUES or supervision throughout activity  Toilet transfer Goal: 04  Supervision or touching assistance- [place] provides VERBAL CUES or supervision throughout activity  Chair/bed-to-chair transfer Goal: 04  Supervision or touching assistance- [place] provides VERBAL CUES or supervision throughout activity  Assist Level: Supervision   OT interventions: Treatment/Interventions: ADL retraining, Functional transfer training, Therapeutic exercise, Endurance training, Patient/family training, Equipment eval/education, Bed mobility  Discharge Plan:  OT Discharge Recommendation: (Home with inc support 24/7, vs SNF pending progress )   DME:  ,  ,       03/08/21 0900   Pain Assessment   Pain Assessment Tool 0-10   Pain Score 7   Pain Location/Orientation Orientation: Upper; Location: Back   Hospital Pain Intervention(s) Repositioned   Lifestyle   Autonomy "I cant do anything, I might as well leave this world "    Shower/Bathe Self   Type of Assistance Needed Physical assistance   Amount of Physical Assistance Provided 75% or more   Comment Completed bathing seated on platform commode this session  Pt able to bathe face, encouragement for completing UB,  Req A for LEs and perineal/buttocks in stance  Fatigues quickly req more frequent rest breaks  Shower/Bathe Self CARE Score 2   Upper Body Dressing   Type of Assistance Needed Physical assistance   Amount of Physical Assistance Provided Total assistance   Comment Pt stating that she can not lft her arms to thread UE into shirt   Dependent fot TLSO donning/doffing   Upper Body Dressing CARE Score 1   Lower Body Dressing   Type of Assistance Needed Physical assistance   Amount of Physical Assistance Provided 75% or more   Comment dependent for underwear pants over feet  Pt able to perform CM over hips in stance with MIN A    Lower Body Dressing CARE Score 2   Putting On/Taking Off Footwear   Type of Assistance Needed Physical assistance   Amount of Physical Assistance Provided Total assistance   Putting On/Taking Off Footwear CARE Score 1   Sit to Stand   Type of Assistance Needed Physical assistance   Amount of Physical Assistance Provided 25%-49%   Comment still cont to req MOD A for boost to rise and assist for controlled decent  VC's for hand placement to assist in boosting   Sit to Stand CARE Score 3   Bed-Chair Transfer   Type of Assistance Needed Physical assistance   Amount of Physical Assistance Provided Less than 25%   Comment SPT w/ RW   Chair/Bed-to-Chair Transfer CARE Score 3   Toileting Hygiene   Type of Assistance Needed Physical assistance   Amount of Physical Assistance Provided 75% or more   Comment Pt req extensive assist for bowel hygiene  Toileting Hygiene CARE Score 2   Toilet Transfer   Type of Assistance Needed Physical assistance   Amount of Physical Assistance Provided 25%-49%   Toilet Transfer CARE Score 3   Right Upper Extremity- Strength   R Hand   (blue resistance sponge)   RUE Strength Comment 2x 10 reps   Left Upper Extremity-Strength   L Hand   (blue resistance sponge)   LUE Strength Comment 2x 10 reps   Exercise Tools   Other Exercise Tool 1 engaged pt in all seated pulmonary exercises utilizing OT Toolkit for direction  Pt performing majority of seated exercises for [id number] pt in all seated  pulmonary exercises utilizing OT Toolkit for direction  Pt performing majority of ther-ex with extensive rest periods  Pt demo poor abilty to manage couting and pacing appropriatly without supervision  Cognition   Overall Cognitive Status Impaired   Arousal/Participation Alert; Cooperative Attention Attends with cues to redirect   Orientation Level Oriented X4   Memory Decreased short term memory;Decreased recall of recent events;Decreased recall of precautions   Following Commands Follows one step commands with increased time or repetition   Comments pt is very fixated throughout session about the way tasks were being completed  "well they usually just wash me " Pt educated on role of OT to complete tasks in a theraputic manor to increase her own activity tolerance through the use of daily cares  still cont to perseverate on directing how the room needed to be set up for conslusion of session  pt does not recall working with therapist before   Additional Activities   Additional Activities Comments Pt engages in pulmonary exercise training using a Manual Incentive Spirometer, to increase activity tolerance for participation in daily ADLs  Pt is able to achieve 250 and below ml  Completed 2x10 reps   Assessment   Treatment Assessment Pt participated in skilled OT session focusing on functional ADL retraining, and UE strengthening  Task progressed  with completion of ADL out of bed  See above for details on ADL function  Pt continues to demonstrate the need for 24/7 assistance for D/C  Pt is very limited by SOB, despite SPO2 remains WNL  Pt continues to require skilled acute rehab OT services to increase overall functional independence and safety w/ I/ADLs and functional transfers  Continue with plan for discharge as home with family support and hired care 24/7 vs cont rehab at [facility]   Continued plan of care for OT sessions to focus on the following areas:  ADL Retraining , LB Dressing,  LHAE education/training, Functional Transfers, Functional Cognition, Standing tolerance, Standing balance , DME training/education, Family training/education, Energy conservation training/education, healthy coping education and TLSO management, O2 line safety, and UE strengthening      Prognosis Fair   Problem List Decreased strength;Decreased range of motion;Decreased endurance; Impaired balance;Decreased mobility; Decreased coordination;Decreased cognition; Impaired judgement;Decreased safety awareness;Decreased skin integrity;Orthopedic restrictions;Pain   Plan   Progress Slow progress, decreased activity tolerance   OT Therapy Minutes   OT Time In 0900   OT Time Out 1030   OT Total Time (minutes) 90   OT Mode of treatment - Individual (minutes) 90   OT Mode of treatment - Concurrent (minutes) 0   OT Mode of treatment - Group (minutes) 0   OT Mode of treatment - Co-treat (minutes) 0   OT Mode of Treatment - Total time(minutes) 90 minutes   OT Cumulative Minutes 806

## 2021-03-08 NOTE — PROGRESS NOTES
PM&R PROGRESS NOTE:  Daria Kirkpatrick 78 y o  female MRN: [id number]  Unit/Bed#: -01 Encounter: [id number]        Rehabilitation Diagnosis: Impairment of mobility, safety and Activities of Daily Living (ADLs) due to Orthopedic Disorders:  08 9  Other Orthopedic Thoracic compression fractures    HPI:   Daria Kirkpatrick is a 78 y o  female with COPD/ emphysema on baseline oxygen 2-3 L,  Congestive heart failure, osteoarthritis who presented to the [facility] on 2/17/21 with back pain and shortness of breath  Pulmonary workup showing moderate pulmonary emphysematous changes bilaterally  Workup for back pain revealed Compression fracture of T5 (33% height loss approximately) and T6 (approximately 25% height loss  )  There is also superior endplate T2 compression fracture with approximately 10%-20 percent height loss at the superior endplate  Case was discussed with both Orthopedics and Neurosurgery and patient was recommended conservative treatment with TLSO brace while out of bed  Medically, patient was treated for an E coli urinary tract infection  Patient was started on steroids for COPD and emphysema and continued on her Breo and DuoNebs  Patient was seen by Nephrology  acute renal insufficiency, hypercalcemia and hyperkalemia  Patient started on Lasix by Nephrology  Patient was followed by wound care for a sacral pressure ulcer  After medical stabilization patient was found to have acute functional deficits from her baseline and therefore was admitted to [address] [place] [address]  SUBJECTIVE: Patient seen face to face  She reports that she has been stable  She reports that bengay does not help and the scent causes the shortness of breath to be worse  Pain is stable  She states that her family is working on her discharge location  She denies any other complaints      ASSESSMENT: Stable, progressing      PLAN:    Rehabilitation   Functional deficits:  Generalized weakness,  Reduced Endurance,  Impaired mobility, impaired self-care,  Impaired balance,  Possible impaired swallow   Functional update:  o PT: ModA transfers, ambulation 60ft RW   o OT: ModA transfers  MaxA toileting   Continue current rehabilitation plan of care to maximize function   Estimated Discharge: To be determined, Reteam       Pain  · Mid to lower back pain:  Managed on Tylenol,  Topical Lidoderm patch, p r n  Robaxin, tramadol and oxycodone  P r n  DVT prophylaxis    managed on subcutaneous heparin and SCDs    Bladder plan   Incontinent at baseline     Bowel plan   Continent   Last BM 3/8/21  * Thoracic compression fracture (HCC)  Assessment & Plan  · T2, T5 and T6 compression fractures  · Treated conservatively  · TLSO brace while out of bed  · Follow-up as outpatient with Neurosurgery    Pressure ulcer  Assessment & Plan  · Located on Sacrum  · Consulted wound care to follow while at [facility]  · Continue optimization of nutrition, pressure relieving techniques, and turns  · Nutrition consulted  · Continue local wound care with foam dressing as recommended by wound care RN  UTI (urinary tract infection)  Assessment & Plan  · E Coli UTI  · Completed treatment     Chronic diastolic heart failure (HCC)  Assessment & Plan  Managed on Lasix 20mg daily      Type 2 diabetes mellitus with complication, without long-term current use of insulin ([facility])  Assessment & Plan  · Managed on glyburide - lower than home dose due to hypoglycemia    · CCD  · IM consultants following    COPD exacerbation ([facility] [address] )  Assessment & Plan  · Wears oxygen 2-3 L at baseline  · At baseline feels SOB  · Continue current inhalers reduced to TID  · Monitor oxygen saturations with activity  · Recent CT scan +moderate emphysema  · Continue prednisone taper    VICKY (acute kidney injury) ([facility])-resolved as of 2/27/2021  Assessment & Plan  · Renal function baseline 0 9 - 1 1  · Renal following hypercalcemia/hyperkaemia (hyperkalemia likely due to diet - was drinking V8s and fruity drinks)  ·  continue low-potassium diet        Appreciate IM consultants medical co-management  Labs, medications, and imaging personally reviewed  ROS:  A ten point review of systems was completed on 3/8/21 and pertinent positives are listed in subjective section  All other systems reviewed were negative  OBJECTIVE:   /75 (BP Location: Left arm)   Pulse 81   Temp 97 6 °F (36 4 °C) (Oral)   Resp 17   Ht 5' 2" (1 575 m)   Wt 63 kg (138 lb 14 2 oz)   SpO2 97%   BMI 25 40 kg/m²     Physical Exam  Vitals signs reviewed  Constitutional:       Appearance: She is well-developed  HENT:      Head: Normocephalic and atraumatic  Eyes:      Pupils: Pupils are equal, round, and reactive to light  Neck:      Musculoskeletal: Normal range of motion and neck supple  Cardiovascular:      Pulses: Normal pulses  Pulmonary:      Effort: Pulmonary effort is normal       Comments: O2 at 2L  Abdominal:      General: Bowel sounds are normal       Palpations: Abdomen is soft  Musculoskeletal: Normal range of motion  Comments: Backpack TLSO  Skin:     General: Skin is warm and dry  Neurological:      Mental Status: She is alert and oriented to person, place, and time            Lab Results   Component Value Date    WBC 10 49 (H) 03/08/2021    HGB 10 0 (L) 03/08/2021    HCT 31 2 (L) 03/08/2021    MCV 93 03/08/2021     03/08/2021     Lab Results   Component Value Date    SODIUM 134 (L) 03/08/2021    K 4 6 03/08/2021    CL 95 (L) 03/08/2021    CO2 36 (H) 03/08/2021    BUN 54 (H) 03/08/2021    CREATININE 0 86 03/08/2021    GLUC 75 03/08/2021    CALCIUM 10 4 (H) 03/08/2021     No results found for: INR, PROTIME        Current Facility-Administered Medications:     acetaminophen (TYLENOL) tablet 650 mg, 650 mg, Oral, Q6H [address], Jeff Israel MD, 650 mg at 03/08/21 1155    al mag oxide-diphenhydramine-lidocaine viscous (MAGIC MOUTHWASH) suspension 10 mL, 10 mL, Swish & Spit, Q4H PRN, ROLA Shepard, 10 mL at 03/06/21 0711    albuterol (PROVENTIL HFA,VENTOLIN HFA) inhaler 2 puff, 2 puff, Inhalation, Q6H PRN, Mino Braswell MD    ALPRAZolam Paddy Quan) tablet 0 25 mg, 0 25 mg, Oral, TID PRN, Mino Braswell MD, 0 25 mg at 03/07/21 2117    aluminum-magnesium hydroxide-simethicone (MYLANTA) oral suspension 30 mL, 30 mL, Oral, Q6H PRN, Mino Braswell MD    atorvastatin (LIPITOR) tablet 80 mg, 80 mg, Oral, Daily With Ivonne Reid MD, 80 mg at 03/07/21 1748    benzonatate (TESSALON PERLES) capsule 100 mg, 100 mg, Oral, TID PRN, Mino Braswell MD    bisacodyl (DULCOLAX) rectal suppository 10 mg, 10 mg, Rectal, Daily PRN, Mino Braswell MD, 10 mg at 02/28/21 1802    fluticasone-vilanterol (BREO ELLIPTA) 100-25 mcg/inh inhaler 1 puff, 1 puff, Inhalation, Daily, Mino Braswell MD, 1 puff at 03/08/21 0835    furosemide (LASIX) tablet 20 mg, 20 mg, Oral, Daily, Mino Braswell MD, 20 mg at 03/08/21 0835    glyBURIDE (DIABETA) tablet 2 5 mg, 2 5 mg, Oral, Daily With Breakfast, ROLA Hamilton, 2 5 mg at 03/08/21 0835    guaiFENesin (MUCINEX) 12 hr tablet 600 mg, 600 mg, Oral, Q12H [address], Mino Braswell MD, 600 mg at 03/08/21 0835    heparin (porcine) subcutaneous injection 5,000 Units, 5,000 Units, Subcutaneous, Q8H [address], Mino Braswell MD, 5,000 Units at 03/08/21 0544    insulin lispro (HumaLOG) 100 units/mL subcutaneous injection 1-6 Units, 1-6 Units, Subcutaneous, HS, Mino Braswell MD, 1 Units at 03/04/21 2224    insulin lispro (HumaLOG) 100 units/mL subcutaneous injection 1-6 Units, 1-6 Units, Subcutaneous, TID AC, 1 Units at 03/08/21 1155 **AND** Fingerstick Glucose (POCT), , , TID AC, Mino Braswell MD    ipratropium-albuterol (DUO-NEB) 0 5-2 5 mg/3 mL inhalation solution 3 mL, 3 mL, Nebulization, TID, Mino Braswell MD, 3 mL at 03/08/21 0703    levothyroxine tablet 125 mcg, 125 mcg, Oral, Early Morning, Mino Braswell MD, 125 mcg at 03/08/21 0554   lidocaine (LIDODERM) 5 % patch 2 patch, 2 patch, Topical, Daily, Marino Castaneda MD, 2 patch at 03/08/21 0835    melatonin tablet 6 mg, 6 mg, Oral, HS PRN, Marino Castaneda MD, 6 mg at 03/07/21 2117    methocarbamol (ROBAXIN) tablet 500 mg, 500 mg, Oral, Q6H PRN, Marino Castaneda MD, 500 mg at 03/04/21 2159    montelukast (SINGULAIR) tablet 10 mg, 10 mg, Oral, HS, Marino Castaneda MD, 10 mg at 03/07/21 2113    nystatin (MYCOSTATIN) cream, , Topical, BID, Marino Castaneda MD, Given at 03/08/21 0835    nystatin (MYCOSTATIN) powder, , Topical, BID, Marino Castaneda MD, Given at 03/08/21 0835    ondansetron (ZOFRAN-ODT) dispersible tablet 4 mg, 4 mg, Oral, Q6H PRN, Marino Castaneda MD    oxyCODONE (ROXICODONE) IR tablet 5 mg, 5 mg, Oral, Q6H PRN, Marino Castaneda MD, 5 mg at 03/07/21 2117    pantoprazole (PROTONIX) EC tablet 40 mg, 40 mg, Oral, Early Morning, Marino Castaneda MD, 40 mg at 03/08/21 0544    polyethylene glycol (MIRALAX) packet 17 g, 17 g, Oral, Daily, Marino Castaneda MD, 17 g at 03/08/21 0835    saccharomyces boulardii (FLORASTOR) capsule 250 mg, 250 mg, Oral, BID, Marino Castaneda MD, 250 mg at 03/08/21 0835    senna (SENOKOT) tablet 8 6 mg, 1 tablet, Oral, HS, Marino Castaneda MD, 8 6 mg at 03/07/21 2113    traMADol (ULTRAM) tablet 50 mg, 50 mg, Oral, Q6H PRN, Marino Castaneda MD, 50 mg at 03/08/21 0919    white petrolatum-mineral oil (EUCERIN,HYDROCERIN) cream, , Topical, TID PRN, Marino Castaneda MD, Given at 02/27/21 7947    Past Medical History:   Diagnosis Date    Cataract     CHF (congestive heart failure) ([facility] )     Leukocytosis        Patient Active Problem List    Diagnosis Date Noted    Thoracic compression fracture ([facility] ) 02/18/2021     Priority: High    Stage 3a chronic kidney disease 02/27/2021    Alkalosis 02/25/2021    Hyperkalemia 02/23/2021    Urinary incontinence 02/23/2021    Pressure ulcer 02/22/2021    Hypercalcemia 02/21/2021    Chronic respiratory failure with hypoxia ([facility] ) 02/18/2021  Moderate protein-calorie malnutrition ([place] [address] ) 02/18/2021    UTI (urinary tract infection) 02/18/2021    SOB (shortness of breath) 02/17/2021    Chronic bilateral thoracic back pain 02/17/2021    Chronic diastolic heart failure ([place] [address] ) 02/17/2021    Atherosclerosis of abdominal aorta ([place] [address] ) 12/01/2020    CHF (congestive heart failure) ([place] [address] ) 02/11/2019    Type 2 diabetes mellitus with complication, without long-term current use of insulin ([place] [address] ) 04/08/2015    Carotid artery plaque 04/06/2015    Hyperlipidemia 03/14/2014    Hypothyroidism 03/14/2014    Osteoarthrosis 03/14/2014    COPD exacerbation ([place] [address] ) 03/10/2014          Nesha Lopez PA-C    Total time spent:  30 minutes with more than 50% spent counseling/coordinating care  Counseling includes discussion with patient re: progress and discussion with patient of his/her current medical/functional state/information  Coordination of patient's care was performed in conjunction with consulting services  Time invested included review of patient's labs, vitals, and management of their comorbidities with continued monitoring  The care of the patient was extensively discussed and appropriate treatment plan was formulated unique for this patient  ** Please Note:  voice to text software may have been used in the creation of this document   Although proof errors in transcription or interpretation are a potential of such software**

## 2021-03-08 NOTE — PROGRESS NOTES
Pt's blood sugar 72 this morning  Pt asymptomatic  Orange  juice and saloni crackers given  Will recheck BS  after 15 minutes

## 2021-03-08 NOTE — PROGRESS NOTES
03/08/21 1100   Pain Assessment   Pain Assessment Tool 0-10   Pain Score 7   Pain Location/Orientation Location: Back; Location: Rib Cage   Pain Onset/Description Onset: Ongoing;Frequency: Constant/Continuous   Restrictions/Precautions   Precautions Aspiration;Cognitive; Fall Risk;Supervision on toilet/commode;Spinal precautions;Pain;Fluid restriction;O2  (2L, 1500ml )   Braces or Orthoses TLSO   Cognition   Overall Cognitive Status Impaired   Arousal/Participation Alert; Cooperative   Subjective   Subjective pt reported 5-6/10 pain at rest to 7/10 with mobilities but was agreeable to have PT  Sit to Stand   Type of Assistance Needed Physical assistance;Verbal cues; Adaptive equipment   Amount of Physical Assistance Provided Less than 25%   Comment min/CG with VC for hand placement about 50% of the time   Sit to Stand CARE Score 3   Bed-Chair Transfer   Type of Assistance Needed Physical assistance;Verbal cues; Adaptive equipment   Amount of Physical Assistance Provided Less than 25%   Comment min/CG with RW with VC for optimal O2 line management    Chair/Bed-to-Chair Transfer CARE Score 3   Walk 10 Feet   Type of Assistance Needed Physical assistance;Verbal cues; Adaptive equipment   Amount of Physical Assistance Provided Less than 25%   Comment min/CG with RW x 20' x 2, 10'x 2 with emphasis on safe O2 line management    Walk 10 Feet CARE Score 3   Assessment   Treatment Assessment Pt cont to reports difficulty of breathing chris when had to wear mask  Still requires rest break every after task due to dyspnea  Admits to being anxious but reports xanax makes her sleepy that is why she only takes it at night  Pt cont to require VC for hand placement and safe O2 line handling during mobilities but complexed chair transfers and short distance amb at min/CGA level only  CM notified of pt's family request for a phone call regarding subacute questions  O2 at rest on 2L >96% with WI 87-89   SpO2 after mobilities >94% with WI   Family/Caregiver Present none   Barriers to Discharge Inaccessible home environment;Decreased caregiver support   PT Barriers   Functional Limitation Car transfers; Ramp negotiation;Stair negotiation;Standing;Transfers; Walking   Plan   Treatment/Interventions Functional transfer training; Therapeutic exercise;Gait training;Spoke to nursing;OT   Progress Slow progress, decreased activity tolerance   Recommendation   Equipment Recommended Wheelchair;Walker   PT - OK to Discharge No   PT Therapy Minutes   PT Time In 1100   PT Time Out 1130   PT Total Time (minutes) 30   PT Mode of treatment - Individual (minutes) 30   PT Mode of treatment - Concurrent (minutes) 0   PT Mode of treatment - Group (minutes) 0   PT Mode of treatment - Co-treat (minutes) 0   PT Mode of Treatment - Total time(minutes) 30 minutes   PT Cumulative Minutes 710   Therapy Time missed   Time missed?  No

## 2021-03-08 NOTE — CASE MANAGEMENT
Phone call placed to pts son who reviewed options for subacute rehab and stated pt will be going that level at dc  He is aware of team mtg tomorrow but due to his job he asked cm to follow up with his sister Cesar Ridley tomorrow  They will discuss options and confirm choices tomorrow  At this time they are interested in [facility], [address] and [facility]  Son made aware dc could occur the end of the week depending on team mtg outcome

## 2021-03-08 NOTE — PROGRESS NOTES
Internal Medicine Progress Note  Patient: Dominga Velásquez  Age/sex: 78 y o  female  Medical Record #: [id number]      ASSESSMENT/PLAN: (Interval History)  Dominga Velásquez is seen and examined and management for following issues:    Compression fracture T5/6  · No surgical intervention  · Cont TLSO brace when OOB  · Aggressive rehab per PMR  · Pain mgmt per PMR     COPD  · Wears oxygen 2-3 L continuous  · Continue current inhalers/nebs per home  · Recent PFT DLCO 54%  · Oxygen saturations stable  · Prednisone complete  · Recent CT scan +moderate emphysema; no PE  · Nebs were changed back to scheduled from prn on 2/27/21     Chronic diastolic HF  · Cont furosemide 20mg daily here   · Takes Torsemide at home  · Monitor volume status closely  · Chronic LE edema  · Echo = LVEF 59%; diastolic dysfunction  · Renal managing     DM II  · Hgb A1c 7 1 in October  · Home meds: Glyburide 5mg BID  · Cont sliding scale and coverage  · Cont glyburide at 2 5mg qd started 3/1/21  · BS stable     HTN  · Diovan is on hold 2/2 VICKY/CKD and K+ increase  · tx per renal  · BPs acceptable      CKD  · Level 3  · Baseline 0 9-1 1  · Avoid nephrotoxic medications  · Currently at baseline  · Renal managing     Hypercalcemia  · Nephrology w/u at [facility] secondary to volume status  · Renal managing      Hyponatremia  · Improved  · Renal managing     Hyperkalemia  · On low K+ diet and Diovan is on hold  · tx per renal     Hypothyroid  · Cont home supplementation     Sacral decubitus  · Cont off loading, nutritional supplementation  · Wound nurse following     Oral ulcers  · Cont magic mouthwash q4 hrs as needed  · Feels this helps and uses 1-2x/day    The above assessment and plan was reviewed and updated as determined by my evaluation of the patient on 3/8/2021      Labs:   Results from last 7 days   Lab Units 03/08/21  0541 03/06/21  0610   WBC Thousand/uL 10 49* 12 44*   HEMOGLOBIN g/dL 10 0* 9 9*   HEMATOCRIT % 31 2* 30 9* PLATELETS Thousands/uL 173 165     Results from last 7 days   Lab Units 03/08/21  0541 03/06/21  1108   SODIUM mmol/L 134* 132*   POTASSIUM mmol/L 4 6 5 3   CHLORIDE mmol/L 95* 96*   CO2 mmol/L 36* 36*   BUN mg/dL 54* 39*   CREATININE mg/dL 0 86 1 00   CALCIUM mg/dL 10 4* 10 8*             Results from last 7 days   Lab Units 03/08/21  0644 03/07/21  2100 03/07/21  1627   POC GLUCOSE mg/dl 72 84 99       Review of Scheduled Meds:  Current Facility-Administered Medications   Medication Dose Route Frequency Provider Last Rate    acetaminophen  650 mg Oral Q6H [facility] Allie Yost MD      al mag oxide-diphenhydramine-lidocaine viscous  10 mL Swish & Spit Q4H PRN ROLA Shepard      albuterol  2 puff Inhalation Q6H PRN Allie Yost MD      ALPRAZolam  0 25 mg Oral TID PRN Allie Yost MD      aluminum-magnesium hydroxide-simethicone  30 mL Oral Q6H PRN Allie Yost MD      atorvastatin  80 mg Oral Daily With Chanda Villegas MD      benzonatate  100 mg Oral TID PRN Allie Yost MD      bisacodyl  10 mg Rectal Daily PRN Allie Yost MD      fluticasone-vilanterol  1 puff Inhalation Daily Allie Yost MD      furosemide  20 mg Oral Daily Allie Yost MD      glyBURIDE  2 5 mg Oral Daily With Breakfast ROLA Shepard      guaiFENesin  600 mg Oral Q12H [facility] Allie Yost MD      heparin (porcine)  5,000 Units Subcutaneous Q8H [facility] Allie Yost MD      insulin lispro  1-6 Units Subcutaneous HS Allie Yost MD      insulin lispro  1-6 Units Subcutaneous TID AC Allie Yost MD      ipratropium-albuterol  3 mL Nebulization TID Allie Yost MD      levothyroxine  125 mcg Oral Early Morning Allie Yost MD      lidocaine  2 patch Topical Daily Allie Yost MD      melatonin  6 mg Oral HS PRN Allie Yost MD      menthol-methyl salicylate   Apply externally TID Allie Yost MD      methocarbamol  500 mg Oral Q6H PRN Allie Yost MD      montelukast  10 mg Oral HS Barnet Smaller, MD      nystatin   Topical BID Barnet Smaller, MD      nystatin   Topical BID Barnet Smaller, MD      ondansetron  4 mg Oral Q6H PRN Barnet Smaller, MD      oxyCODONE  5 mg Oral Q6H PRN Barnet Smaller, MD      pantoprazole  40 mg Oral Early Morning Barnet Smaller, MD      polyethylene glycol  17 g Oral Daily Barnet Smaller, MD      saccharomyces boulardii  250 mg Oral BID Barnet Smaller, MD      senna  1 tablet Oral HS Barnet Smaller, MD      traMADol  50 mg Oral Q6H PRN Barnet Smaller, MD      white petrolatum-mineral oil   Topical TID PRN Barnet Smaller, MD         Subjective/ HPI: Patient seen and examined  Patients overnight issues or events were reviewed with nursing or staff during rounds or morning huddle session  New or overnight issues include the following:     Pt reports overall stability  ROS:   A 10 point ROS was performed; negative except as noted above  Imaging:     XR abdomen 1 view kub   Final Result by Steven Ortiz MD (03/01 9020)      Large-volume stool, suggesting constipation  Nonobstructive bowel gas pattern                 Workstation performed: [id number]             *Labs /Radiology studies Reviewed  *Medications  reviewed and reconciled as needed  *Please refer to order section for additional ordered labs studies  *Case discussed with primary attending during morning huddle case rounds    Physical Examination:  Vitals:   Vitals:    03/07/21 1951 03/07/21 2300 03/08/21 0601 03/08/21 0703   BP:  112/61     BP Location:  Left arm     Pulse:  78     Resp:  18     Temp:  97 7 °F (36 5 °C)     TempSrc:  Oral     SpO2: 98% 96%  95%   Weight:   63 kg (138 lb 14 2 oz)    Height:           GEN: No apparent distress, interactive  NEURO: Alert and oriented x3  HEENT: Pupils are equal and reactive, EOMI, mucous membranes are moist, face symmetrical  CV: S1 S2 regular, no MRG, no peripheral edema noted  RESP: Lungs are decreased with scattered expiratory wheezing noted throughout; continuous 2L NC; +chronic RICO  GI: obese, soft non tender, non distended; +BS x4  : Voiding without difficulty; incontinence at times  MUSC: Moves all extremities; +generalized deconditioning  SKIN: pink, warm and dry, normal turgor, sacral decubitus dressing intact         The above physical exam was reviewed and updated as determined by my evaluation of the patient on 3/8/2021  Invasive Devices     None                    VTE Pharmacologic Prophylaxis: Heparin  Code Status: Level 3 - DNAR and DNI  Current Length of Stay: 12 day(s)      Total time spent:  30 minutes with more than 50% spent counseling/coordinating care  Counseling includes discussion with patient re: progress  and discussion with patient of his/her current medical state/information  Coordination of patient's care was performed in conjunction with primary service  Time invested included review of patient's labs, vitals, and management of their comorbidities with continued monitoring  In addition, this patient was discussed with medical team including physician and advanced extenders  The care of the patient was extensively discussed and appropriate treatment plan was formulated unique for this patient  ** Please Note:  voice to text software may have been used in the creation of this document   Although proof errors in transcription or interpretation are a potential of such software**

## 2021-03-08 NOTE — PROGRESS NOTES
03/08/21 0830   Pain Assessment   Pain Assessment Tool 0-10   Pain Location/Orientation Location: Back   Hospital Pain Intervention(s) Medication (See MAR); Repositioned   Restrictions/Precautions   Precautions Aspiration;Bed/chair alarms;Cognitive; Fall Risk;Fluid restriction;Pain;Spinal precautions;O2  (1500ml )   Speech/Swallow Mechanism Exam   Respirations 17   SpO2 97 %   Swallow Information   Current Risks for Dysphagia & Aspiration Respiratory compromise;Weak cough;General debilitation;Positionong Issues   Current Symptoms/Concerns Clear throat;During meals; Difficulty chewing;Decreased oral intake;Weight loss   Current Diet Dysphagia mechanical soft; Thin liquid   Baseline Diet Dyphagia advanced;Regular; Thin liquids   Consistencies Assessed and Performance   Materials Admnistered Puree/Level 1;Mechanical Soft/Level 2;Soft/Level 3; Thin liquid   Oral Stage Mild impaired; Moderate impaired   Phargngeal Stage Mild impaired; Moderate impaired;Aspiration risk   Swallow Mechanics Mild delayed; Moderate delayed;Swallow initation;Good Larygneal rise;Aspiration risk   Esophageal Concerns No s/s reported   Recommendations   Risk for Aspiration Present   Recommendations Dysphagia treatment   Diet Solid Recommendation Level 2 Dysphagia/ mechanical soft/altered   Diet Liquid Recommendation Thin liquid   Recommended Form of Meds As desired; As tolerated   General Precautions Aspiration precautions; Feed only when alert;Minimize distractions;Upright as possible for all oral intake;Remain upright for 45 mins after meals  (FULLY upright, distant sup)   Compensatory Swallowing Strategies Alternate solids and liquids; External pacing;Effortful swallow;Voluntary throat clear/cough to clear penetration; Coordinate breathing and swallowing   Results Reviewed with PT/Family/Caregiver;RN   Eating   Type of Assistance Needed Set-up / clean-up; Verbal cues   Eating CARE Score 4   Swallow Assessment   Swallow Treatment Assessment Pt seen for skilled speech therapy session for dysphagia  Pt alert and interactive see repositioned upright in bed in room  Pt seen with breakfast meal including trial items- items assessed included Spanish toast, oatmeal, yogurt, dices peaches, and thin liquids by cup and straw  Pt required set up assistance but no physical assistance self feeding  Pt needed cues for pacing, reminders to slow rate and reduce talking, coordinate breathing and swallowing during meal  Pt expressing during meal "Let me take a break to breathe" recognizing need for frequent rest breaks  Pt did have upper denture in but refusing lower stating her gums were still sore  Overall meal completion poor- 25% and 120cc liquids  Pt eating soft textures (Spanish toast and peaches) good bolus retrieval and oral control  No anterior loss of food items  Bolus manipulation and mastication slightly redured, noted some anterior munching mastication patterns, delayed transfers and swallows  Oral clearance appropriate- pt taking sips of thin liquds as well as bites of puree items (yogurt/oatmeal) between bites of food textures  Tolerated thin liquids with good bolus retrieval, oral control, functional transfers and swallows  Pt noted to talk during intake with yogurt- throat clear x1 with verbal cues to attend to task  Pt expressing "I know I need to eat the protein, I used to eat a ton a meat"  Education and encouragement on adequate nutrition and hydration in order to heal as well as have the energy to participation in therapy sessions  At this time, will continue to recommend level 2 diet with thin liquids and ongoing level 3 trials to occur under SLP supervision only  Recommend  DISTANT supervision and OOB or FULLY upright for meals, alternate solids/liquids during meals   Will continue skilled dysphagia therapy in order to determine safest and least restrictive diet with goal of also supporting overall PO intake     Swallow Assessment Prognosis   Prognosis Fair Prognosis Considerations Co-morbidities; Medical diagnosis; Medical prognosis;Participation level; Potential;Previous level of function;Severity of impairments;New learning ability;Ability to carry over; Cooperation   SLP Therapy Minutes   SLP Time In 0830   SLP Time Out 0900   SLP Total Time (minutes) 30   SLP Mode of treatment - Individual (minutes) 30   SLP Mode of treatment - Concurrent (minutes) 0   SLP Mode of treatment - Group (minutes) 0   SLP Mode of treatment - Co-treat (minutes) 0   SLP Mode of Treatment - Total time(minutes) 30 minutes   SLP Cumulative Minutes 360   Therapy Time missed   Time missed?  No

## 2021-03-08 NOTE — PLAN OF CARE
Problem: Potential for Falls  Goal: Patient will remain free of falls  Description: INTERVENTIONS:  - Assess patient frequently for physical needs  -  Identify cognitive and physical deficits and behaviors that affect risk of falls  -  [place] fall precautions as indicated by assessment   - Educate patient/family on patient safety including physical limitations  - Instruct patient to call for assistance with activity based on assessment  - Modify environment to reduce risk of injury  - Consider OT/PT consult to assist with strengthening/mobility  Outcome: Progressing     Problem: Prexisting or High Potential for Compromised Skin Integrity  Goal: Skin integrity is maintained or improved  Description: INTERVENTIONS:  - Identify patients at risk for skin breakdown  - Assess and monitor skin integrity  - Assess and monitor nutrition and hydration status  - Monitor labs   - Assess for incontinence   - Turn and reposition patient  - Assist with mobility/ambulation  - Relieve pressure over bony prominences  - Avoid friction and shearing  - Provide appropriate hygiene as needed including keeping skin clean and dry  - Evaluate need for skin moisturizer/barrier cream  - Collaborate with interdisciplinary team   - Patient/family teaching  - Consider wound care consult   Outcome: Progressing     Problem: Nutrition/Hydration-ADULT  Goal: Nutrient/Hydration intake appropriate for improving, restoring or maintaining nutritional needs  Description: Monitor and assess patient's nutrition/hydration status for malnutrition  Collaborate with interdisciplinary team and initiate plan and interventions as ordered  Monitor patient's weight and dietary intake as ordered or per policy  Utilize nutrition screening tool and intervene as necessary  Determine patient's food preferences and provide high-protein, high-caloric foods as appropriate       INTERVENTIONS:  - Monitor oral intake, urinary output, labs, and treatment plans  - Assess nutrition and hydration status and recommend course of action  - Evaluate amount of meals eaten  - Assist patient with eating if necessary   - Allow adequate time for meals  - Recommend/ encourage appropriate diets, oral nutritional supplements, and vitamin/mineral supplements  - Order, calculate, and assess calorie counts as needed  - Recommend, monitor, and adjust tube feedings and TPN/PPN based on assessed needs  - Assess need for intravenous fluids  - Provide specific nutrition/hydration education as appropriate  - Include patient/family/caregiver in decisions related to nutrition  Outcome: Progressing     Problem: PAIN - ADULT  Goal: Verbalizes/displays adequate comfort level or baseline comfort level  Description: Interventions:  - Encourage patient to monitor pain and request assistance  - Assess pain using appropriate pain scale  - Administer analgesics based on type and severity of pain and evaluate response  - Implement non-pharmacological measures as appropriate and evaluate response  - Consider cultural and social influences on pain and pain management  - Notify physician/advanced practitioner if interventions unsuccessful or patient reports new pain  Outcome: Progressing     Problem: INFECTION - ADULT  Goal: Absence or prevention of progression during hospitalization  Description: INTERVENTIONS:  - Assess and monitor for signs and symptoms of infection  - Monitor lab/diagnostic results  - Monitor all insertion sites, i e  indwelling lines, tubes, and drains  - Monitor endotracheal if appropriate and nasal secretions for changes in amount and color  - [place] appropriate cooling/warming therapies per order  - Administer medications as ordered  - Instruct and encourage patient and family to use good hand hygiene technique  - Identify and instruct in appropriate isolation precautions for identified infection/condition  Outcome: Progressing     Problem: DISCHARGE PLANNING  Goal: Discharge to home or other facility with appropriate resources  Description: INTERVENTIONS:  - Identify barriers to discharge w/patient and caregiver  - Arrange for needed discharge resources and transportation as appropriate  - Identify discharge learning needs (meds, wound care, etc )  - Arrange for interpretive services to assist at discharge as needed  - Refer to Case Management Department for coordinating discharge planning if the patient needs post-hospital services based on physician/advanced practitioner order or complex needs related to functional status, cognitive ability, or social support system  Outcome: Progressing

## 2021-03-08 NOTE — PROGRESS NOTES
03/08/21 1400   Pain Assessment   Pain Assessment Tool 0-10   Pain Score 8   Pain Location/Orientation Location: Back; Location: Rib Cage   Pain Onset/Description Onset: Ongoing;Frequency: Constant/Continuous   Restrictions/Precautions   Precautions Aspiration;Cognitive; Fall Risk;O2;Spinal precautions;Pain;Fluid restriction   Braces or Orthoses TLSO   Cognition   Overall Cognitive Status Impaired   Arousal/Participation Alert; Cooperative   Subjective   Subjective pt reported 7-8/10 pain but was agreeable to have PT  also reported fatigue and requested to return to bed after tx session  Roll Left and Right   Type of Assistance Needed Supervision;Verbal cues; Adaptive equipment   Comment CS with bedrail    Roll Left and Right CARE Score 4   Sit to Lying   Type of Assistance Needed Verbal cues; Adaptive equipment;Supervision   Comment HOB slightly raised at 20 deg with bedrail performed log roll technique   Sit to Lying CARE Score 4   Sit to Stand   Type of Assistance Needed Incidental touching;Verbal cues; Adaptive equipment   Comment CG/CS with RW, VC for hand placement about50% of the time   Sit to Stand CARE Score 4   Bed-Chair Transfer   Type of Assistance Needed Incidental touching;Verbal cues; Adaptive equipment   Chair/Bed-to-Chair Transfer CARE Score 4   Walk 10 Feet   Type of Assistance Needed Incidental touching;Verbal cues; Adaptive equipment   Comment 20' x 2 with RW and CGA   Walk 10 Feet CARE Score 4   Therapeutic Interventions   Strengthening repeated sit to stand x 3 reps x 3 sets with rest break in between sets for functional strengthening    Assessment   Treatment Assessment Skilled PT focused on household distance mobility training using a RW with emphasis on energy conservation technique, O2 line management and safe practices  Pt cont to be limited by dyspnea, fatigue and pain with reported difficulty breathing when wearing mask on   Cont PT POC to improve overall functional indep and reduce caregiver burden at d/c  Family/Caregiver Present none   Barriers to Discharge Inaccessible home environment;Decreased caregiver support   PT Barriers   Functional Limitation Car transfers; Ramp negotiation;Stair negotiation;Standing;Transfers; Walking   Plan   Progress Slow progress, decreased activity tolerance   Recommendation   Equipment Recommended Wheelchair;Walker   PT Therapy Minutes   PT Time In 1400   PT Time Out 1430   PT Total Time (minutes) 30   PT Mode of treatment - Individual (minutes) 30   PT Mode of treatment - Concurrent (minutes) 0   PT Mode of treatment - Group (minutes) 0   PT Mode of treatment - Co-treat (minutes) 0   PT Mode of Treatment - Total time(minutes) 30 minutes   PT Cumulative Minutes 740

## 2021-03-09 NOTE — PLAN OF CARE
Problem: Potential for Falls  Goal: Patient will remain free of falls  Description: INTERVENTIONS:  - Assess patient frequently for physical needs  -  Identify cognitive and physical deficits and behaviors that affect risk of falls  -  [place] fall precautions as indicated by assessment   - Educate patient/family on patient safety including physical limitations  - Instruct patient to call for assistance with activity based on assessment  - Modify environment to reduce risk of injury  - Consider OT/PT consult to assist with strengthening/mobility  Outcome: Progressing     Problem: Prexisting or High Potential for Compromised Skin Integrity  Goal: Skin integrity is maintained or improved  Description: INTERVENTIONS:  - Identify patients at risk for skin breakdown  - Assess and monitor skin integrity  - Assess and monitor nutrition and hydration status  - Monitor labs   - Assess for incontinence   - Turn and reposition patient  - Assist with mobility/ambulation  - Relieve pressure over bony prominences  - Avoid friction and shearing  - Provide appropriate hygiene as needed including keeping skin clean and dry  - Evaluate need for skin moisturizer/barrier cream  - Collaborate with interdisciplinary team   - Patient/family teaching  - Consider wound care consult   Outcome: Progressing     Problem: Nutrition/Hydration-ADULT  Goal: Nutrient/Hydration intake appropriate for improving, restoring or maintaining nutritional needs  Description: Monitor and assess patient's nutrition/hydration status for malnutrition  Collaborate with interdisciplinary team and initiate plan and interventions as ordered  Monitor patient's weight and dietary intake as ordered or per policy  Utilize nutrition screening tool and intervene as necessary  Determine patient's food preferences and provide high-protein, high-caloric foods as appropriate       INTERVENTIONS:  - Monitor oral intake, urinary output, labs, and treatment plans  - Assess nutrition and hydration status and recommend course of action  - Evaluate amount of meals eaten  - Assist patient with eating if necessary   - Allow adequate time for meals  - Recommend/ encourage appropriate diets, oral nutritional supplements, and vitamin/mineral supplements  - Order, calculate, and assess calorie counts as needed  - Recommend, monitor, and adjust tube feedings and TPN/PPN based on assessed needs  - Assess need for intravenous fluids  - Provide specific nutrition/hydration education as appropriate  - Include patient/family/caregiver in decisions related to nutrition  Outcome: Progressing     Problem: PAIN - ADULT  Goal: Verbalizes/displays adequate comfort level or baseline comfort level  Description: Interventions:  - Encourage patient to monitor pain and request assistance  - Assess pain using appropriate pain scale  - Administer analgesics based on type and severity of pain and evaluate response  - Implement non-pharmacological measures as appropriate and evaluate response  - Consider cultural and social influences on pain and pain management  - Notify physician/advanced practitioner if interventions unsuccessful or patient reports new pain  Outcome: Progressing     Problem: INFECTION - ADULT  Goal: Absence or prevention of progression during hospitalization  Description: INTERVENTIONS:  - Assess and monitor for signs and symptoms of infection  - Monitor lab/diagnostic results  - Monitor all insertion sites, i e  indwelling lines, tubes, and drains  - Monitor endotracheal if appropriate and nasal secretions for changes in amount and color  - [place] appropriate cooling/warming therapies per order  - Administer medications as ordered  - Instruct and encourage patient and family to use good hand hygiene technique  - Identify and instruct in appropriate isolation precautions for identified infection/condition  Outcome: Progressing     Problem: SAFETY ADULT  Goal: Patient will remain free of falls  Description: INTERVENTIONS:  - Assess patient frequently for physical needs  -  Identify cognitive and physical deficits and behaviors that affect risk of falls    -  [place] fall precautions as indicated by assessment   - Educate patient/family on patient safety including physical limitations  - Instruct patient to call for assistance with activity based on assessment  - Modify environment to reduce risk of injury  - Consider OT/PT consult to assist with strengthening/mobility  Outcome: Progressing  Goal: Maintain or return to baseline ADL function  Description: INTERVENTIONS:  -  Assess patient's ability to carry out ADLs; assess patient's baseline for ADL function and identify physical deficits which impact ability to perform ADLs (bathing, care of mouth/teeth, toileting, grooming, dressing, etc )  - Assess/evaluate cause of self-care deficits   - Assess range of motion  - Assess patient's mobility; develop plan if impaired  - Assess patient's need for assistive devices and provide as appropriate  - Encourage maximum independence but intervene and supervise when necessary  - Involve family in performance of ADLs  - Assess for home care needs following discharge   - Consider OT consult to assist with ADL evaluation and planning for discharge  - Provide patient education as appropriate  Outcome: Progressing  Goal: Maintain or return mobility status to optimal level  Description: INTERVENTIONS:  - Assess patient's baseline mobility status (ambulation, transfers, stairs, etc )    - Identify cognitive and physical deficits and behaviors that affect mobility  - Identify mobility aids required to assist with transfers and/or ambulation (gait belt, sit-to-stand, lift, walker, cane, etc )  - [place] fall precautions as indicated by assessment  - Record patient progress and toleration of activity level on Mobility SBAR; progress patient to next Phase/Stage  - Instruct patient to call for assistance with activity based on assessment  - Consider rehabilitation consult to assist with strengthening/weightbearing, etc   Outcome: Progressing     Problem: DISCHARGE PLANNING  Goal: Discharge to home or other facility with appropriate resources  Description: INTERVENTIONS:  - Identify barriers to discharge w/patient and caregiver  - Arrange for needed discharge resources and transportation as appropriate  - Identify discharge learning needs (meds, wound care, etc )  - Arrange for interpretive services to assist at discharge as needed  - Refer to Case Management Department for coordinating discharge planning if the patient needs post-hospital services based on physician/advanced practitioner order or complex needs related to functional status, cognitive ability, or social support system  Outcome: Progressing

## 2021-03-09 NOTE — PROGRESS NOTES
03/09/21 0810   Pain Assessment   Pain Assessment Tool 0-10   Pain Score 7   Pain Location/Orientation Location: Back   Hospital Pain Intervention(s) Repositioned  (nsg provided medications to pt during sessionn)   Restrictions/Precautions   Precautions Aspiration;Bed/chair alarms;Cognitive; Fall Risk;O2;Pain;Supervision on toilet/commode   Swallow Information   Current Risks for Dysphagia & Aspiration Rapid respiratory rate; Respiratory compromise;General debilitation;Cognitive deficit; Positionong Issues   Current Symptoms/Concerns Difficulty chewing;Decreased oral intake   Current Diet Dysphagia mechanical soft; Thin liquid   Baseline Diet Dyphagia advanced;Regular; Thin liquids   Consistencies Assessed and Performance   Materials Admnistered Soft/Level 3;Mechanical Soft/Level 2;Puree/Level 1; Thin liquid;Pills; Whole; With thin liquid   Oral Stage Mild impaired   Phargngeal Stage Mild impaired;Aspiration risk   Swallow Mechanics Mild delayed;Swallow initation; Appears prompt;Good Larygneal rise;Aspiration risk   Esophageal Concerns No s/s reported   Recommendations   Risk for Aspiration Present   Recommendations Dysphagia treatment   Diet Solid Recommendation Level 2 Dysphagia/ mechanical soft/altered   Diet Liquid Recommendation Thin liquid   Recommended Form of Meds Whole; With thin liquid; As tolerated   General Precautions Aspiration precautions; Feed only when alert;Minimize distractions;Upright as possible for all oral intake;Remain upright for 45 mins after meals; Other (Comment)  (Distant supervision, FULLY upright or OOB for meals)   Compensatory Swallowing Strategies Alternate solids and liquids; External pacing;Effortful swallow;Cue for lingual sweep;Voluntary throat clear/cough to clear penetration   Results Reviewed with MD;RN;PT/Family/Caregiver   Eating   Type of Assistance Needed Set-up / clean-up; Verbal cues   Amount of Physical Assistance Provided No physical assistance   Eating CARE Score 4   Swallow Assessment   Swallow Treatment Assessment Pt awake, alert and ready for dysphagia tx session, which today, pt was trialed w/ level 3 diet and thin liquids  Pt was repositioned in bed and bed was placed in chair mode for meal  SLP setup tray for pt but pt able to feed self w/o assistance  Consumed 50% of meal and 240cc of thin liquids by straw  Pt wishing to have lower dentures placed today which would maximize ability to masticates softer solids  Lip seal around tsp, fork and straw remains to be [facility] along w/ functional bolus retrieval/transfer across consistencies  No anterior loss was observed throughout meal  As for mastication of softer solids (Irish toast, canned peaches), overall was noted to be mildly prolonged but w/o increased or overt oral residual/pocketing  Bolus manipulation when taking puree/puddin textures (oatmeal, yogurt) was noted to be fairly prompt  Bolus control/transfer of thin liquids by straw [facility]  Of note, nsg brought in medications for pt to take during meal, which pt was able to demonstrate tolerance of pills whole w/ thin liquids w/o increased oropharygneal or aspiration sxs  Swallow initiation was fairly prompt across w/ puree/pudding and thin liquids but mildly delayed w/ softer solids due to slower mastication of items  Hyolaryngeal excursion was fairly functional upon palpation  No overt signs/sxs of aspiration noted w/ meal today  Pt's tolerance of level 3 items appears to be improved but will continue to assess across meals monitoring different textures w/o increase oral sxs  At this time, continue level 2 diet w/ thin liquids along w/ the following recommendations: aspiration precautions: DISTANT supervision and OOB or FULLY upright for meals, alternate solids/liquids during meals   Will continue dysphagia therapy in order to determine safest and least restrictive diet w/ goal of also supporting overall PO intake     Swallow Assessment Prognosis   Prognosis Guarded   Prognosis Considerations Co-morbidities; Medical diagnosis; Medical prognosis; Severity of impairments;New learning ability;Ability to carry over   SLP Therapy Minutes   SLP Time In 0810   SLP Time Out 0840   SLP Total Time (minutes) 30   SLP Mode of treatment - Individual (minutes) 30   SLP Mode of treatment - Concurrent (minutes) 0   SLP Mode of treatment - Group (minutes) 0   SLP Mode of treatment - Co-treat (minutes) 0   SLP Mode of Treatment - Total time(minutes) 30 minutes   SLP Cumulative Minutes 390   Therapy Time missed   Time missed?  No

## 2021-03-09 NOTE — PROGRESS NOTES
PM&R PROGRESS NOTE:  Arielle Salomon 78 y o  female MRN: [id number]  Unit/Bed#: -01 Encounter: [id number]        Rehabilitation Diagnosis: Impairment of mobility, safety and Activities of Daily Living (ADLs) due to Orthopedic Disorders:  08 9  Other Orthopedic Thoracic compression fractures    HPI:   Arielle Salomon is a 78 y o  female with COPD/ emphysema on baseline oxygen 2-3 L,  Congestive heart failure, osteoarthritis who presented to the [address] [place] [address] on 2/17/21 with back pain and shortness of breath  Pulmonary workup showing moderate pulmonary emphysematous changes bilaterally  Workup for back pain revealed Compression fracture of T5 (33% height loss approximately) and T6 (approximately 25% height loss  )  There is also superior endplate T2 compression fracture with approximately 10%-20 percent height loss at the superior endplate  Case was discussed with both Orthopedics and Neurosurgery and patient was recommended conservative treatment with TLSO brace while out of bed  Medically, patient was treated for an E coli urinary tract infection  Patient was started on steroids for COPD and emphysema and continued on her Breo and DuoNebs  Patient was seen by Nephrology  acute renal insufficiency, hypercalcemia and hyperkalemia  Patient started on Lasix by Nephrology  Patient was followed by wound care for a sacral pressure ulcer  After medical stabilization patient was found to have acute functional deficits from her baseline and therefore was admitted to [address] [place] [address]  SUBJECTIVE:  Patient seen face to face  No acute issues  No increased pain than usual   Breathing is also stable    Patient's family will be providing SNF options to CM     ASSESSMENT: Stable, progressing      PLAN:    Rehabilitation   Functional deficits:  Generalized weakness,  Reduced Endurance,  Impaired mobility, impaired self-care,  Impaired balance,  Possible impaired swallow   Continue current rehabilitation plan of care to maximize function  I personally attended, reviewed, and discussed medical and functional updates in team conference today  Please refer to advance care planning note for details   Expected Discharge:  SNF referrals to be started as patient will not have 24/7 care at home      Pain  · Mid to lower back pain:  Managed on Tylenol,  Topical Lidoderm patch, p r n  Robaxin, tramadol and oxycodone  P r n  DVT prophylaxis    managed on subcutaneous heparin and SCDs    Bladder plan   Incontinent at baseline     Bowel plan   Continent      * Thoracic compression fracture (HCC)  Assessment & Plan  · T2, T5 and T6 compression fractures  · Treated conservatively  · TLSO brace while out of bed  · Follow-up as outpatient with Neurosurgery    COPD exacerbation ([address] )  Assessment & Plan  · Wears oxygen 2-3 L at baseline  · At baseline feels SOB  · Continue current inhalers reduced to TID  · Monitor oxygen saturations with activity  · Recent CT scan +moderate emphysema  · Completed prednisone taper    Pressure ulcer  Assessment & Plan  · Located on Sacrum  · Consulted wound care to follow while at [facility]  · Continue optimization of nutrition, pressure relieving techniques, and turns  · Nutrition consulted  · Continue local wound care with foam dressing as recommended by wound care RN  UTI (urinary tract infection)  Assessment & Plan  · E Coli UTI  · Completed treatment     Chronic diastolic heart failure ([facility])  Assessment & Plan  Managed on Lasix 20mg daily      Type 2 diabetes mellitus with complication, without long-term current use of insulin ([facility])  Assessment & Plan  · Managed on glyburide - lower than home dose due to hypoglycemia    · CCD  · IM consultants following    VICKY (acute kidney injury) ([facility])-resolved as of 2/27/2021  Assessment & Plan  · Renal function baseline 0 9 - 1 1  · Renal following hypercalcemia/hyperkaemia (hyperkalemia likely due to diet - was drinking V8s and fruity drinks)  ·  continue low-potassium diet        Appreciate IM consultants medical co-management  Labs, medications, and imaging personally reviewed  ROS:  A ten point review of systems was completed on 3/9/21 and pertinent positives are listed in subjective section  All other systems reviewed were negative  OBJECTIVE:   /62 (BP Location: Left arm)   Pulse 83   Temp 97 8 °F (36 6 °C) (Oral)   Resp 17   Ht 5' 2" (1 575 m)   Wt 63 kg (138 lb 14 2 oz)   SpO2 97%   BMI 25 40 kg/m²     Physical Exam  Vitals signs and nursing note reviewed  Constitutional:       General: She is not in acute distress  Appearance: She is well-developed  HENT:      Head: Normocephalic  Nose: Nose normal    Eyes:      Conjunctiva/sclera: Conjunctivae normal    Neck:      Musculoskeletal: Neck supple  Cardiovascular:      Rate and Rhythm: Normal rate  Pulmonary:      Effort: Pulmonary effort is normal       Breath sounds: No wheezing  Comments: Decreased BS throughout  Abdominal:      General: There is no distension  Palpations: Abdomen is soft  Musculoskeletal:      Comments: +TLSO in place   Skin:     General: Skin is warm  Neurological:      Mental Status: She is alert and oriented to person, place, and time     Psychiatric:         Mood and Affect: Mood normal           Lab Results   Component Value Date    WBC 10 49 (H) 03/08/2021    HGB 10 0 (L) 03/08/2021    HCT 31 2 (L) 03/08/2021    MCV 93 03/08/2021     03/08/2021     Lab Results   Component Value Date    SODIUM 134 (L) 03/08/2021    K 4 6 03/08/2021    CL 95 (L) 03/08/2021    CO2 36 (H) 03/08/2021    BUN 54 (H) 03/08/2021    CREATININE 0 86 03/08/2021    GLUC 75 03/08/2021    CALCIUM 10 4 (H) 03/08/2021     No results found for: INR, PROTIME        Current Facility-Administered Medications:     acetaminophen (TYLENOL) tablet 650 mg, 650 mg, Oral, Q6H [facility], Catalino Schmidt MD, 650 mg at 03/09/21 1122    irma Snyder oxide-diphenhydramine-lidocaine viscous (MAGIC MOUTHWASH) suspension 10 mL, 10 mL, Swish & Spit, Q4H PRN, ROLA Shepard, 10 mL at 03/06/21 0711    albuterol (PROVENTIL HFA,VENTOLIN HFA) inhaler 2 puff, 2 puff, Inhalation, Q6H PRN, Molly Carr MD    ALPRAZolam Brendalyn Pilon) tablet 0 25 mg, 0 25 mg, Oral, TID PRN, Molly Carr MD, 0 25 mg at 03/08/21 2151    aluminum-magnesium hydroxide-simethicone (MYLANTA) oral suspension 30 mL, 30 mL, Oral, Q6H PRN, Molly Carr MD    atorvastatin (LIPITOR) tablet 80 mg, 80 mg, Oral, Daily With Isaias Contreras MD, 80 mg at 03/08/21 1714    benzonatate (TESSALON PERLES) capsule 100 mg, 100 mg, Oral, TID PRN, Molly Carr MD    bisacodyl (DULCOLAX) rectal suppository 10 mg, 10 mg, Rectal, Daily PRN, Molly Carr MD, 10 mg at 02/28/21 1802    fluticasone-vilanterol (BREO ELLIPTA) 100-25 mcg/inh inhaler 1 puff, 1 puff, Inhalation, Daily, Molly Carr MD, 1 puff at 03/09/21 0819    furosemide (LASIX) tablet 20 mg, 20 mg, Oral, Daily, Molly Carr MD, 20 mg at 03/09/21 0818    glyBURIDE (DIABETA) tablet 2 5 mg, 2 5 mg, Oral, Daily With Breakfast, Margarite ROLA Doe, 2 5 mg at 03/09/21 0728    guaiFENesin (MUCINEX) 12 hr tablet 600 mg, 600 mg, Oral, Q12H [address], Molly Carr MD, 600 mg at 03/09/21 0819    heparin (porcine) subcutaneous injection 5,000 Units, 5,000 Units, Subcutaneous, Q8H [address], Molly Carr MD, 5,000 Units at 03/09/21 0552    insulin lispro (HumaLOG) 100 units/mL subcutaneous injection 1-6 Units, 1-6 Units, Subcutaneous, HS, Molly Carr MD, 1 Units at 03/04/21 2224    insulin lispro (HumaLOG) 100 units/mL subcutaneous injection 1-6 Units, 1-6 Units, Subcutaneous, TID AC, 2 Units at 03/09/21 1121 **AND** Fingerstick Glucose (POCT), , , TID AC, Molly Carr MD    ipratropium-albuterol (DUO-NEB) 0 5-2 5 mg/3 mL inhalation solution 3 mL, 3 mL, Nebulization, TID, Molly Carr MD, 3 mL at 03/09/21 0703    levothyroxine tablet 125 mcg, 125 mcg, Oral, Early Morning, Nova Boswell MD, 125 mcg at 03/09/21 0552    lidocaine (LIDODERM) 5 % patch 2 patch, 2 patch, Topical, Daily, Nova Boswell MD, 2 patch at 03/09/21 0821    melatonin tablet 6 mg, 6 mg, Oral, HS PRN, Nova Boswell MD, 6 mg at 03/07/21 2117    methocarbamol (ROBAXIN) tablet 500 mg, 500 mg, Oral, Q6H PRN, Nova Boswell MD, 500 mg at 03/08/21 1441    montelukast (SINGULAIR) tablet 10 mg, 10 mg, Oral, HS, Nova Boswell MD, 10 mg at 03/08/21 2148    nystatin (MYCOSTATIN) cream, , Topical, BID, Nova Boswell MD, 1 application at 62/76/99 0820    nystatin (MYCOSTATIN) powder, , Topical, BID, Nova Boswell MD, 1 application at 47/01/30 0820    ondansetron (ZOFRAN-ODT) dispersible tablet 4 mg, 4 mg, Oral, Q6H PRN, Nova Boswell MD    oxyCODONE (ROXICODONE) IR tablet 5 mg, 5 mg, Oral, Q6H PRN, Nova Boswell MD, 5 mg at 03/08/21 2151    pantoprazole (PROTONIX) EC tablet 40 mg, 40 mg, Oral, Early Morning, oNva Boswell MD, 40 mg at 03/09/21 0552    polyethylene glycol (MIRALAX) packet 17 g, 17 g, Oral, Daily, Nova Boswell MD, 17 g at 03/09/21 0370    saccharomyces boulardii (FLORASTOR) capsule 250 mg, 250 mg, Oral, BID, Nova Boswell MD, 250 mg at 03/09/21 0820    senna (SENOKOT) tablet 8 6 mg, 1 tablet, Oral, HS, Nova Boswell MD, 8 6 mg at 03/08/21 2147    traMADol (ULTRAM) tablet 50 mg, 50 mg, Oral, Q6H PRN, Nova Boswell MD, 50 mg at 03/09/21 9775    white petrolatum-mineral oil (EUCERIN,HYDROCERIN) cream, , Topical, TID PRN, Nova Boswell MD, Given at 02/27/21 1370    Past Medical History:   Diagnosis Date    Cataract     CHF (congestive heart failure) ([place] [address] )     Leukocytosis        Patient Active Problem List    Diagnosis Date Noted    Thoracic compression fracture ([place] [address] ) 02/18/2021     Priority: High    COPD exacerbation ([place] [address] ) 03/10/2014     Priority: Medium    Stage 3a chronic kidney disease 02/27/2021    Alkalosis 02/25/2021    Hyperkalemia 02/23/2021  Urinary incontinence 02/23/2021    Pressure ulcer 02/22/2021    Hypercalcemia 02/21/2021    Chronic respiratory failure with hypoxia (HCC) 02/18/2021    Moderate protein-calorie malnutrition ([facility] ) 02/18/2021    UTI (urinary tract infection) 02/18/2021    SOB (shortness of breath) 02/17/2021    Chronic bilateral thoracic back pain 02/17/2021    Chronic diastolic heart failure ([place] [address] ) 02/17/2021    Atherosclerosis of abdominal aorta ([place] [address] ) 12/01/2020    CHF (congestive heart failure) ([place] [address] ) 02/11/2019    Type 2 diabetes mellitus with complication, without long-term current use of insulin ([place] [address] ) 04/08/2015    Carotid artery plaque 04/06/2015    Hyperlipidemia 03/14/2014    Hypothyroidism 03/14/2014    Osteoarthrosis 03/14/2014          Maddi Viveros MD    Total time spent:  45 minutes with more than 50% spent counseling/coordinating care  Counseling includes discussion with patient re: progress and discussion with patient of his/her current medical/functional state/information  Coordination of patient's care was performed in conjunction with consulting services  Time invested included review of patient's labs, vitals, and management of their comorbidities with continued monitoring  The care of the patient was extensively discussed and appropriate treatment plan was formulated unique for this patient  ** Please Note:  voice to text software may have been used in the creation of this document   Although proof errors in transcription or interpretation are a potential of such software**

## 2021-03-09 NOTE — CASE MANAGEMENT
Met w/pt and reviewed team update  Pt stated she is leaving everything up to her children and doesn't know anything  Pt did say she is in agreement with subacute rehab if that is what is best  Cm phoned pts dtr [facility] who confirmed choices of [address], [address] and [facility] [place]  [facility] made aware dc would occur when a bed was secured  Referrals made via ecin awaiting determination

## 2021-03-09 NOTE — PROGRESS NOTES
Internal Medicine Progress Note  Patient: Ishaan Millard  Age/sex: 78 y o  female  Medical Record #: [id number]      ASSESSMENT/PLAN: (Interval History)  Ishaan Millard is seen and examined and management for following issues:    Compression fracture T5/6  · No surgical intervention  · Cont TLSO brace when OOB  · Aggressive rehab per PMR  · Pain mgmt per PMR     COPD  · Wears oxygen 2-3 L continuous  · Continue current inhalers/nebs per home  · Recent PFT DLCO 54%  · Oxygen saturations stable  · Prednisone complete  · Recent CT scan +moderate emphysema; no PE  · Nebs were changed back to scheduled from prn on 2/27/21     Chronic diastolic HF  · Cont furosemide 20mg daily here   · Takes Torsemide at home  · Monitor volume status closely  · Chronic LE edema  · Echo = LVEF 10%; diastolic dysfunction  · Renal managing  · stable     DM II  · Hgb A1c 7 1 in October  · Home meds: Glyburide 5mg BID  · Cont sliding scale and coverage  · Cont glyburide at 2 5mg qd started 3/1/21  · BS stable     HTN  · Diovan is on hold 2/2 VICKY/CKD and K+ increase  · tx per renal  · BPs acceptable      CKD  · Level 3  · Baseline 0 9-1 1  · Avoid nephrotoxic medications  · Currently at baseline  · Renal managing     Hypercalcemia  · [facility]phrology w/u at [facility] secondary to volume status  · Renal managing      Hyponatremia  · Improved  · Renal managing     Hyperkalemia  · On low K+ diet and Diovan is on hold  · tx per renal     Hypothyroid  · Cont home supplementation     Sacral decubitus  · Cont off loading, nutritional supplementation  · Wound nurse following     Oral ulcers  · Cont magic mouthwash q4 hrs as needed  · Feels this helps and uses 1-2x/day    DC disposition to SNF later this wk    The above assessment and plan was reviewed and updated as determined by my evaluation of the patient on 3/9/2021      Labs:   Results from last 7 days   Lab Units 03/08/21  0541 03/06/21  0610   WBC Thousand/uL 10 49* 12 44*   HEMOGLOBIN g/dL 10 0* 9 9*   HEMATOCRIT % 31 2* 30 9*   PLATELETS Thousands/uL 173 165     Results from last 7 days   Lab Units 03/08/21  0541 03/06/21  1108   SODIUM mmol/L 134* 132*   POTASSIUM mmol/L 4 6 5 3   CHLORIDE mmol/L 95* 96*   CO2 mmol/L 36* 36*   BUN mg/dL 54* 39*   CREATININE mg/dL 0 86 1 00   CALCIUM mg/dL 10 4* 10 8*             Results from last 7 days   Lab Units 03/09/21  0618 03/08/21  2059 03/08/21  1813   POC GLUCOSE mg/dl 81 110 197*       Review of Scheduled Meds:  Current Facility-Administered Medications   Medication Dose Route Frequency Provider Last Rate    acetaminophen  650 mg Oral Q6H [facility] Walker Pineda MD      al mag oxide-diphenhydramine-lidocaine viscous  10 mL Swish & Spit Q4H PRN ROLA Shepard      albuterol  2 puff Inhalation Q6H PRN Walker Pineda MD      ALPRAZolam  0 25 mg Oral TID PRN Walker Pineda MD      aluminum-magnesium hydroxide-simethicone  30 mL Oral Q6H PRN Walker Pineda MD      atorvastatin  80 mg Oral Daily With Basnohemi Brooke MD      benzonatate  100 mg Oral TID PRN Walker Pineda MD      bisacodyl  10 mg Rectal Daily PRN Walker Pineda MD      fluticasone-vilanterol  1 puff Inhalation Daily Walker Pineda MD      furosemide  20 mg Oral Daily Walker Pineda MD      glyBURIDE  2 5 mg Oral Daily With Breakfast ROLA Shepard      guaiFENesin  600 mg Oral Q12H [facility] Walker Pineda MD      heparin (porcine)  5,000 Units Subcutaneous Q8H [facility] Walker Pineda MD      insulin lispro  1-6 Units Subcutaneous HS Walker Pineda MD      insulin lispro  1-6 Units Subcutaneous TID AC Walker Pineda MD      ipratropium-albuterol  3 mL Nebulization TID Walker Pineda MD      levothyroxine  125 mcg Oral Early Morning Walker Pineda MD      lidocaine  2 patch Topical Daily Walker Pineda MD      melatonin  6 mg Oral HS PRN Walker Pineda MD      methocarbamol  500 mg Oral Q6H PRN Walker Pineda MD      montelukast  10 mg Oral HS MD Della Aguirre nystatin   Topical BID Molly Carr MD      nystatin   Topical BID Molly Carr MD      ondansetron  4 mg Oral Q6H PRN Molly Carr MD      oxyCODONE  5 mg Oral Q6H PRN Molly Carr MD      pantoprazole  40 mg Oral Early Morning Molly Carr MD      polyethylene glycol  17 g Oral Daily Molly Carr MD      saccharomyces boulardii  250 mg Oral BID Molly Carr MD      senna  1 tablet Oral HS Molly Carr MD      traMADol  50 mg Oral Q6H PRN Molly Carr MD      white petrolatum-mineral oil   Topical TID PRN Molly Carr MD         Subjective/ HPI: Patient seen and examined  Patients overnight issues or events were reviewed with nursing or staff during rounds or morning huddle session  New or overnight issues include the following:     Pt reports overall stability, ongoing RICO this a m  just got done eating breakfast    ROS:   A 10 point ROS was performed; negative except as noted above  Imaging:     XR abdomen 1 view kub   Final Result by Mario Peraza MD (03/01 3627)      Large-volume stool, suggesting constipation  Nonobstructive bowel gas pattern                 Workstation performed: [id number]             *Labs /Radiology studies Reviewed  *Medications  reviewed and reconciled as needed  *Please refer to order section for additional ordered labs studies  *Case discussed with primary attending during morning huddle case rounds    Physical Examination:  Vitals:   Vitals:    03/08/21 2002 03/08/21 2100 03/09/21 0552 03/09/21 0703   BP:  146/68 135/62    BP Location:  Right arm Left arm    Pulse:  88 83    Resp:  20 17    Temp:  97 7 °F (36 5 °C) 97 8 °F (36 6 °C)    TempSrc:  Oral Oral    SpO2: 96% 97% 98% 97%   Weight:       Height:           GEN: No apparent distress, interactive  NEURO: Alert and oriented x3  HEENT: Pupils are equal and reactive, EOMI, mucous membranes are moist, face symmetrical  CV: S1 S2 regular, no MRG, no peripheral edema noted  RESP: Lungs are decreased throughout with expiratory wheezing scattered throughout; +RICO w/use of accessory muscles at baseline; continuous oxygen 2L NC  GI: obese, soft non tender, non distended; +BS x4  : Voiding without difficulty  MUSC: Moves all extremities; +generalized deconditioning  SKIN: pink, warm and dry, normal turgor, sacral dressing intact         The above physical exam was reviewed and updated as determined by my evaluation of the patient on 3/9/2021  Invasive Devices     None                    VTE Pharmacologic Prophylaxis: Heparin  Code Status: Level 3 - DNAR and DNI  Current Length of Stay: 13 day(s)      Total time spent:  30 minutes with more than 50% spent counseling/coordinating care  Counseling includes discussion with patient re: progress  and discussion with patient of his/her current medical state/information  Coordination of patient's care was performed in conjunction with primary service  Time invested included review of patient's labs, vitals, and management of their comorbidities with continued monitoring  In addition, this patient was discussed with medical team including physician and advanced extenders  The care of the patient was extensively discussed and appropriate treatment plan was formulated unique for this patient  ** Please Note:  voice to text software may have been used in the creation of this document   Although proof errors in transcription or interpretation are a potential of such software**

## 2021-03-09 NOTE — PROGRESS NOTES
03/09/21 0845   Pain Assessment   Pain Assessment Tool 0-10   Pain Score 6   Pain Location/Orientation Orientation: Mid;Location: Back   Restrictions/Precautions   Precautions Aspiration;Bed/chair alarms;Cognitive; Fall Risk;O2;Pain;Supervision on toilet/commode   Weight Bearing Restrictions No   Braces or Orthoses TLSO   Oral Hygiene   Type of Assistance Needed Set-up / clean-up   Amount of Physical Assistance Provided No physical assistance   Comment Able to complete denture management while in long sit in bed with increased time  Oral Hygiene CARE Score 5   Shower/Bathe Self   Type of Assistance Needed Physical assistance   Amount of Physical Assistance Provided 25%-49%   Comment For energy conservation purposes and to increase pt success with task, ADL completed in long sit in bed for LB  Pt then transitions to seated EOB for UB tasks  Demos increased ability to complete cross leg technique to reach distal LE's, however, fatigues quickly  Shower/Bathe Self CARE Score 3   Bathing   Assessed Bath Style Sponge Bath   Anticipated D/C Bath Style   (pending progress)   Able to Gather/Transport No   Able to [employer] Temperature No   Able to Wash/Rinse/Dry (body part) Left Arm;Right Arm;L Upper Leg;R Upper Leg;Chest;Abdomen;Perineal Area   Limitations Noted in Balance; Endurance;ROM;Safety;Strength;Timeliness   Positioning Supine;Seated   Tub/Shower Transfer   Reason Not Assessed Sponge Bath   Upper Body Dressing   Type of Assistance Needed Physical assistance   Amount of Physical Assistance Provided Total assistance   Comment Pt very limited with UB dressing, partially due to self limiting behaviors and additionally as she fatigues very quickly with task  Requires extensive time for task completion and overall TA for brace management and donning button shirt  Pt was able to direct care of brace      Upper Body Dressing CARE Score 1   Lower Body Dressing   Type of Assistance Needed Physical assistance   Amount of Physical Assistance Provided 50%-74%   Comment Pt able to thread BLEs into underwear with increased time, VC's, and LHAE while in long sit in bed as she was able to sustain partial cross leg to complete after initiating with use of reacher  Pt does continue to require TA for pants 2* to extensive time provided and significant fatigue  Additionally trialed bridging in bed for increased independence with LB dressing, however, actually appeared to expend more energy than standing  Pt able to complete unilateral release in stance to A with CM up over hips this session  Lower Body Dressing CARE Score 2   Putting On/Taking Off Footwear   Type of Assistance Needed Physical assistance   Amount of Physical Assistance Provided 50%-74%   Comment Pt able to doff b/l socks with use of dressing stick required for EC purposes  Pt does demo technique for sock aide, however, requires A to fully manage 2* to allevyn  Putting On/Taking Off Footwear CARE Score 2   Lying to Sitting on Side of Bed   Type of Assistance Needed Supervision; Adaptive equipment   Amount of Physical Assistance Provided No physical assistance   Comment HOB slightly elevated, use of rails, increased time  Lying to Sitting on Side of Bed CARE Score 4   Sit to Stand   Type of Assistance Needed Physical assistance   Amount of Physical Assistance Provided Less than 25%   Comment Marguerite 2* to fatigue  Sit to Stand CARE Score 3   Bed-Chair Transfer   Type of Assistance Needed Physical assistance   Amount of Physical Assistance Provided Less than 25%   Comment Marguerite with RW and increased time  Chair/Bed-to-Chair Transfer CARE Score 3   Cognition   Overall Cognitive Status Impaired   Arousal/Participation Alert; Cooperative   Attention Attends with cues to redirect   Orientation Level Oriented X4   Memory Decreased short term memory;Decreased recall of recent events;Decreased recall of precautions   Following Commands Follows one step commands with increased time or repetition   Comments MOCA administerred, see below for details  Activity Tolerance   Activity Tolerance Patient limited by pain; Patient limited by fatigue   Assessment   Treatment Assessment Pt participated in skilled OT services with focus on ADL retraining, assessment of functional cognition and functional transfers  Pt significantly limited by dec endurance, dec act hattie, dec standing balance, dec strength, and orthopedic restrictions  Pt requires extensive time for all task completion  Pt's SPO2 on 2LO2 remains >92% throughout session  She continues to present with dec funcitonal cognition specifically with STM  Pt will continue to benefit from skilled OT services with focus on above mentioned deficits to increase safety and independence with I/ADL tasks  Prognosis Fair   Problem List Decreased strength;Decreased range of motion;Decreased endurance; Impaired balance;Decreased mobility; Decreased coordination;Decreased cognition; Impaired judgement;Decreased safety awareness;Decreased skin integrity;Orthopedic restrictions;Pain   Plan   Treatment/Interventions ADL retraining;Functional transfer training; Therapeutic exercise; Endurance training;Cognitive reorientation;Patient/family training;Equipment eval/education; Bed mobility; Compensatory technique education;Spoke to nursing   Progress Slow progress, decreased activity tolerance   Recommendation   OT Discharge Recommendation   (pending progress)   OT Therapy Minutes   OT Time In 0845   OT Time Out 1000   OT Total Time (minutes) 75   OT Mode of treatment - Individual (minutes) 75   OT Mode of treatment - Concurrent (minutes) 0   OT Mode of treatment - Group (minutes) 0   OT Mode of treatment - Co-treat (minutes) 0   OT Mode of Treatment - Total time(minutes) 75 minutes   OT Cumulative Minutes 881   Therapy Time missed   Time missed? No       Pt completed Gadiel Cognitive Assessment [facility]) version 8 1 per request of supervising OTR/L Eneida  Pt scored overall 18/ 30  indicating a mild cognitive deficit  Visuospatial/executive: 2 /5 receiving points for only contour and numbers  Naming: 3/3   Memory: first trial: able to recall 4/5 words omitting face  Second trial: able to recall 5/5 words (worth no points)   Attention:  5/ 6 missing points for serial subtraction as she was only able to complete 2/5 subtractions accurately  Language: 1/ 3 missing points for 2nd sentence and fluency section  Abstraction: 1 / 2 unable to determine category for watch/ruler despite cues  Perseverated on numbers  Delayed recall: 0 / 5 MIS: 4/15 Pt was able to recall 4/5 words with multiple choice cue, one word she was unable to recall despite categorical cues and multiple choice cues  Orientation: 6/ 6    Results communicated with ALESSANDROR/L Eneida to adjust POC accordingly

## 2021-03-09 NOTE — PROGRESS NOTES
03/09/21 1222   Pain Assessment   Pain Assessment Tool 0-10   Pain Score 7   Pain Location/Orientation Orientation: Mid;Location: Back; Location: Rib Cage   Pain Onset/Description Onset: Ongoing;Frequency: Constant/Continuous   Hospital Pain Intervention(s) Rest;Repositioned   Restrictions/Precautions   Precautions Aspiration;Bed/chair alarms;Cognitive; Fall Risk;Supervision on toilet/commode;O2;Pain;Spinal precautions; Fluid restriction   Braces or Orthoses TLSO - required max A to doffed brace   Cognition   Overall Cognitive Status Impaired   Arousal/Participation Alert; Cooperative   Attention Attends with cues to redirect   Subjective   Subjective pt reported pain as above and fatigue but was agreeable to have PT  Sit to Lying   Type of Assistance Needed    Comment Not applicable - sleeps on recliner at home   Sit to Lying CARE Score 9   Sit to Stand   Type of Assistance Needed Physical assistance;Verbal cues; Adaptive equipment   Amount of Physical Assistance Provided Less than 25%   Comment min A x 1 due to slight retropulsion with initial standing then progressed to CGA    Sit to Stand CARE Score 3   Walk 10 Feet   Type of Assistance Needed Incidental touching;Verbal cues; Adaptive equipment   Comment 10 x 3 with emphasis on O2 line management around obstacle course  27' with RW   Walk 10 Feet CARE Score 4   Walk 50 Feet with Two Turns   Type of Assistance Needed Incidental touching;Verbal cues; Adaptive equipment   Comment CG x 50' with RW   Walk 50 Feet with Two Turns CARE Score 4   Toilet Transfer   Type of Assistance Needed Physical assistance;Verbal cues; Adaptive equipment   Amount of Physical Assistance Provided 50%-74%   Comment min to sit, mod to stand up with RW and R grab bar on regular toilet seat  max A for toileting hygiene and mod A for LB dressing but required encouragement to participate as due to dyspnea would ask PT to just help her with task    DORA Henao notified Lore Hawkins needed to be change as pt soiled it    Toilet Transfer CARE Score 2   Assessment   Treatment Assessment Skilled PT focused on functional household mobility training with emphasis on safe O2 line management  Pt cont to require VC for safe line management especially around obstacles  Still demos at times slight retropulsion during sit to stand requiring inc assist but progressed to CG with repeated practice  Pt cont to demo difficulty attending to ADLs and functional mobilities often times would just ask for assistance citing difficulty breathing and fatigue  PT will continue to work on strengthening and [place] distance mobilities to maximize functional indep while awaiting SNF placement for further therapy  Family/Caregiver Present none   Barriers to Discharge Inaccessible home environment;Decreased caregiver support   PT Barriers   Functional Limitation Car transfers; Ramp negotiation;Stair negotiation;Standing;Transfers; Walking; Wheelchair management   Plan   Treatment/Interventions ADL retraining;Functional transfer training; Therapeutic exercise; Bed mobility;Gait training;Spoke to nursing;OT;Spoke to case management;Spoke to MD   Progress Slow progress, decreased activity tolerance   Recommendation   PT Discharge Recommendation   (SNF)   Equipment Recommended Wheelchair   PT - OK to Discharge No   PT Therapy Minutes   PT Time In 1222   PT Time Out 1330   PT Total Time (minutes) 68   PT Mode of treatment - Individual (minutes) 68   PT Mode of treatment - Concurrent (minutes) 0   PT Mode of treatment - Group (minutes) 0   PT Mode of treatment - Co-treat (minutes) 0   PT Mode of Treatment - Total time(minutes) 68 minutes   PT Cumulative Minutes 808

## 2021-03-09 NOTE — TEAM CONFERENCE
Acute RehabilitationTeam Conference Note  Date: 3/9/2021   Time: 10:40 AM       Patient Name:  Bernice Abarca       Medical Record Number: [id number]   YOB: 1940  Sex: Female          Room/Bed:  [facility] 453/[facility] 453-01  Payor Info:  Payor: MEDICARE / Plan: MEDICARE A AND B / Product Type: Medicare A & B Fee for Service /      Admitting Diagnosis: Compression fracture of body of thoracic vertebra ([facility] ) [S22 000A]   Admit Date/Time:  2/24/2021  4:02 PM  Admission Comments: No comment available     Primary Diagnosis:  Thoracic compression fracture ([facility] )  Principal Problem: Thoracic compression fracture [facility])    Patient Active Problem List    Diagnosis Date Noted    Stage 3a chronic kidney disease 02/27/2021    Alkalosis 02/25/2021    Hyperkalemia 02/23/2021    Urinary incontinence 02/23/2021    Pressure ulcer 02/22/2021    Hypercalcemia 02/21/2021    Chronic respiratory failure with hypoxia ([facility] ) 02/18/2021    Moderate protein-calorie malnutrition ([facility] ) 02/18/2021    Thoracic compression fracture ([facility] ) 02/18/2021    UTI (urinary tract infection) 02/18/2021    SOB (shortness of breath) 02/17/2021    Chronic bilateral thoracic back pain 02/17/2021    Chronic diastolic heart failure ([facility] ) 02/17/2021    Atherosclerosis of abdominal aorta ([facility] ) 12/01/2020    CHF (congestive heart failure) ([facility] ) 02/11/2019    Type 2 diabetes mellitus with complication, without long-term current use of insulin ([facility] ) 04/08/2015    Carotid artery plaque 04/06/2015    Hyperlipidemia 03/14/2014    Hypothyroidism 03/14/2014    Osteoarthrosis 03/14/2014    COPD exacerbation ([facility] ) 03/10/2014       Physical Therapy:    Weight Bearing Status: Full Weight Bearing  Transfers: Minimal Assistance, Incidental Touching  Bed Mobility: (pt been sleeping on recliner at home but required mod A x 1 for bed transfers here using bedrail on hospital bed)  Amulation Distance (ft): 60 feet  Ambulation: Minimal Assistance, Incidental Touching  Assistive Device for Ambulation: Roller Walker  Number of Stairs: 1  Assistive Device for Stairs: Bilateral [employer]  Stair Assistance: Assist of 2(min of 1 with SBA of 2nd person for safety)  Discharge Recommendations: (25/7 S/assist for all mobilities/ADLs)    Pt making slow but fair progress throughout stay in [facility]  Pt able to perform functional mobilty at min-modA w/ RW pending fatigue levels  Pt req frequent rest breaks throughout session and often becomes RICO, reporting "I can't breathe" despite O2 levels being WNL  Pt cont to be very limited by dec endurance, poor tolerance to activity, dec adherence to spinal precautions, poor standing tolerance, and global strength deficits  Pts biggest barriers to d/c include above stated impairments along w/ 5STE and pt living alone w/ intermittent family support  Pt cont to req acute rehab PT focusing on ambulation, stair negoatition, functional transfers, balance ex and LE therex to maximize functional independence  3/8/21  Pt continues to demo slow functional gains in PT  Pt is able to complete level surface mobilities at CG-min A most of the time but may require mod A at times due to fatigue and pain  Also still requires VC or assist for safe O2 line management during mobilities  Pt demos dec compliance of spinal precautions with max A to don/doff TLSO brace  Additional barriers include dec endurance and poor activity tolerance with RICO requiring rest break every after functional task, dec standing tolerance/balance and impaired strengths of UE/LE  At this time pt is not safe to return home alone and therapy recommends for pt to have 24/7 S/assist at d/c so will require additional skilled PT intervention to improve overall functional mobility indep and reduce caregiver burden at d/c  Occupational Therapy:  Eating: Supervision  Grooming:  Moderate Assistance  Bathing: Maximum Assistance  Bathing: Maximum Assistance  Upper Body Dressing: Total Assistance  Lower Body Dressing: Total Assistance  Toileting: Total Assistance  Tub/Shower Transfer: (na)  Toilet Transfer: Moderate Assistance  Cognition: Exceptions to WNL  Cognition: Decreased Memory, Decreased Safety, Decreased Executive Functions, Decreased Attention  Orientation: Person, Place, Time, Situation  Discharge Recommendations: Home with:  [address] with[de-identified] 24 Hour Assistance       Occupational Therapy Weekly Team Note    Pt is demonstrating fair progress with occupational therapy and is progressing toward long term goals for ADL, IADL, and functional transfers/mobility  Pts long term goals for ADLs are Minimum assistance with Rolling Walker  Pt continues to present with impairments in activity tolerance, endurance, standing balance/tolerance, sitting balance/tolerance, UE strength, UE ROM, memory and attention   Occupational performance remains limited by fatigue, SOB, RICO, pain, spinal precautions, orthopedic restricitions , decreased caregiver support, risk for falls and Fear of falling    Family training/education will be required prior to D/C, if discharging home  Pt will continue to benefit from skilled acute rehab OT services to address above mentioned barriers and maximize functional independence in baseline areas of occupation to meet established treatment goals with overall decreased burden of care  Plan of care to continue to focus on ADL Retraining , LB Dressing, UB dressing,  LHAE education/training, Functional Transfers, Functional Cognition, Functional Attention, Standing tolerance, Standing balance , Fine motor strengthening , Gross motor strengthening , DME training/education, Family training/education and Energy conservation training/education  Anticipate Re-team at this time  pt may require sub acute d/c  Troy Arechiga                     Speech Therapy:  Mode of Communication: Verbal     Orientation: Place, Person, Time, Situation  Swallowing: Within Defined Limits  Swallowing: Oral Dysphagia, Pharyngeal Dysphagia, Aspiration Risk  Diet Recommendations: Level 2/Mechanically Altered, Thin  Discharge Recommendations: (pending pt progress)  Pt currently being followed for dysphagia tx sessions  Currently, pt verbalizing weight loss of ~15-20 lbs in 3 months, along w/ difficulty in lower dentures fitting and currently does not wear them w/ meals  During this hospitalization, pt has demonstrated and verbalized "fear of choking" on food items, which pt has favored pudding, thin liquids, broths, ice cream, etc  Pt does have h/o COPD, which currently pt is on 3L of O2 and does get "winded" quickly  Pt did require increased encouragement to trial solids (bread w/ peanut butter) during assessment, again favoring broth and pudding items  Currently pt is demonstrating mild-moderate oral deficits characterized by prolonged/effortful mastication of soft/solids, mild delay in manipulation/transfer of puree and thin liquids, but w/o oral residual or anterior loss  Mild pharyngeal deficits are noted characterized by delayed swallow initiation across consistencies, slight decrease in hyolaryngeal excursion exhibiting intermittent throat clearing during meals, but suspect due to difficulty of coordination breathing w/ swallowing  Throughout meal, pt verbalized as well as demonstrated "running out of breath" when talking, needing verbal cues to not talk while eating and educated on pacing during meals to conserve energy and decrease sensation of SOB  Pacing of meal is appropriate as well as ability to independently alternate solids/liquids, small bites/sips  Discussed w/ pt at end of the meal about the different modified dysphagia diet levels, including level 3 diet which includes chopped meats/vegetables, softer food items and level 2 diet, which includes minced meat/finely chopped vegetables and some pureed items  Pt stating that those diet levels suspect will be difficult for her to manage at this time   SLP then proceeded to educate pt on pureed diet, which all items would be the consistency of apple sauce and pudding  Pt was agreeable to try this diet, stating "I need protein and vegetables"  SLP reiterating that those items will come out smooth just as pudding textures  Again, reeducating pt that pureed diet will allow PO intake along w/ able to conserve energy during meals due to lack of increased time needed for chewing  At this time, recommendation is to downgrade diet to pureed diet w/ thin liquids, continuing aspiration precautions as well as recommending DISTANT supervision during meals  Also, recommending pt to be OOB for all meals as well as encouraging of PO intake throughout meals  Pt w/ limited trials of level 2 items at this time, but when trialing items, pt is demonstrating prolonged oral stage (mastication/manipulation/transfer) due to suspected fear of choking upon initiation of swallowing  No increased or overt aspiration sxs noted consistently during f/u sessions  If pt's overall willingness to trial other food textures remains decreased, suspect that puree diet will be safest/least restrictive at this time  Currently, pt to continue to benefit from SLP services targeting establishment of safest least restrictive diet w/o increased oropharyngeal sxs  Update from week 3/9/2021: Continues to be followed for dysphagia tx sessions, to which currently diet was able to be advanced from puree/thin liquids from last week to now dysphagia level 2 w/ thin liquids  Pt does continue to demonstrate more medical barriers of requiring 2-3L O2 which is baseline at this time, but does have increased SOB due to positioning primarily when getting seated FULLY upright in bed, noting better breathing when OOB in [facility] for meals   Pt is able to now wear lower denture more consistently which improved pt's tolerance of mastication provided level 2 as well as when introducing level 3 items which pt has been able to tolerate as well w/o increased oral residual/pocketing  Pt does not demonstrate increased aspiration risk for at this time during meals  Pt is able to demonstrate fair consistency in ability to recall swallow strategies during meal, BUT does continue to require verbal cues for NO TALKING during meals  Will continue to assess level 3 textures under SLP supervision only at this time for monitoring advancement of diet continuing aspiration precautions: DISTANT supervision and OOB or FULLY upright for meals, alternate solids/liquids during meals  Nursing Notes:  Appetite: Poor  Diet Type: Dysphagia II, Thin Liquids                                                                     Pain Location/Orientation: Location: Back  Pain Score: 7  Pain 2  Pain Score 2: 0  Garcia-Baker FACES Pain Rating 2: No hurt  Patient's Stated Pain Goal 2: No pain                    Hospital Pain Intervention(s): Repositioned(nsg provided medications to pt during sessionn)          Pt admitted to [facility] with compression fracture of T5 and T6 s/p c/o back pain and SOB - pt also with moderate pulmonary emphysematous changes bilaterally and a superior endplate T2 compression fracture with approximately 10%-20 percent height loss at the superior endplate  Pt recommended conservative treatment with TLSO brace while out of bed  Pt with hx of COPD/emphysema on baseline 2-3 L - started on steroids (Prednisone) as well as continue on her Breo and DuoNebs; CHF - Lasix 20 mg daily; 1500 fluid restriction, Type 2 DM - QID BS with sliding scale insulin coverage; Hypothyroidism - Levothyroxine 125 mcg daily  Pt's pain is being managed with scheduled Tylenol, PRN oxycodone and tramadol, and lidocaine patches  Pt requires 2-3 L of O2 at all times  Pt is incontinent of bowel and bladder at times       We will continue to monitor labs, VS, and blood sugars, continue maintaining adequate oxygenation status, We will maintain pt's skin integrity by encouraging turning/repositioning, frequent weight shifts, and promote continence  We will monitor for constipation and treat accordingly  We will monitor pt's pain to provide adequate pain control  We will encourage independence with ADL's  We will maintain safety precautions and keep pt free from falls  Case Management:     Discharge Planning  Living Arrangements: Alone  Support Systems: Family members, Children, Friends/neighbors  Assistance Needed: stated she required a lot of help prior to admission and did not walk [facility]  Type of Current Residence: Private residence  [address]: Yes([facility] - RN, PT, OT)  Type of Current Home Care Services: Home PT, Nurse visit, Other (Comment)(home OT)  Pt is participating well with therapy but due to living situation and lack of support pt will most likely need subacute setting on dc  Family has the list of options and will be making selections today  Following to assist w/referral process and transition to facility  Is the patient actively participating in therapies? yes  List any modifications to the treatment plan:     Barriers Interventions   Lives alone, family support Subacute setting on dc   oxygen Titrate or assess for dc needs   Strength, activity tolerance tlso Therapy exercises, energy conservation education   Poor po appetitie, sob when eating Speech therapy , diet advance when tolerated         Is the patient making expected progress toward goals?  yes  List any update or changes to goals:     Medical Goals: Patient will be medically stable for discharge to [facility] upon completion of rehab program and Patient will be able to manage medical conditions and comorbid conditions with medications and follow up upon completion of rehab program    Weekly Team Goals:   Rehab Team Goals  ADL Team Goal: Patient will require assist with ADLs with least restrictive device upon completion of rehab program  Transfer Team Goal: Patient will require assist with transfers with least restrictive device upon completion of rehab program  Locomotion Team Goal: Patient will require assist with locomotion with least restrictive device upon completion of rehab program    Discussion: pt presents with the above barriers and is making slow gains  Due to slow progress and overall weakness it is not recommeded pt return home alone  Family is not able to provide support until may when dtr returns from [facility]  Functionally pt is min a household distance with ambulation, sit to stand and bed mobility is mod a, max to total for toileting for most adls  Family in agreement with plan  Anticipated Discharge Date:  tbd based on referral process and securing a bed  [facility] Team Members Present: The following team members are supervising care for this patient and were present during this Weekly Team Conference      Physician: Dr Ihsan Aguilar MD  : JO Ozuna  Registered Nurse: Gio Ortega RN  Physical Therapist: Jerome Goode DPT  Occupational Therapist: Gibran Manuel MS, OTR/L  Speech Therapist: Alex Leblanc MA, CCC-SLP  Other:

## 2021-03-10 NOTE — PROGRESS NOTES
03/10/21 0960   Pain Assessment   Pain Assessment Tool 0-10   Pain Score 6   Pain Location/Orientation Location: Back   Hospital Pain Intervention(s) Repositioned; Other (Comment)  (RN notified)   Restrictions/Precautions   Precautions Aspiration;Bed/chair alarms;Cognitive; Fall Risk;Supervision on toilet/commode;O2;Pain;Spinal precautions; Fluid restriction   Weight Bearing Restrictions No   ROM Restrictions Yes  (T spine precautions)   Braces or Orthoses TLSO   Upper Body Dressing   Type of Assistance Needed Physical assistance   Amount of Physical Assistance Provided Total assistance   Comment Pt successfully completes threading of BLEs into pants and undergarments this session with use of LH reacher with increased time, VC's for technique, and encouragement  Pt continues to require Marguerite in stance this session for CM fully up/down over hips but demos ability to A this session with alternating UE release  Significant improvement from yesterday's session  Upper Body Dressing CARE Score 1   Lower Body Dressing   Type of Assistance Needed Physical assistance   Amount of Physical Assistance Provided 25%-49%   Comment Pt successfully completes threading of BLEs into pants and undergarments this session with use of [address] reacher with increased time, VC's for technique, and encouragement  Pt continues to require Marguerite in stance this session for CM fully up/down over hips but demos ability to A this session with alternating UE release  Significant improvement from yesterday's session  Lower Body Dressing CARE Score 3   Putting On/Taking Off Footwear   Type of Assistance Needed Supervision;Verbal cues; Adaptive equipment   Amount of Physical Assistance Provided No physical assistance   Comment Pt able to doff/don b/l socks with increased time with use of LHAE, pt does require min VC's for proper orientation of sock aide  Pt requires frequent rest breaks to manage fatigue      Putting On/Taking Off Footwear CARE Score 4 Lying to Sitting on Side of Bed   Type of Assistance Needed Supervision   Amount of Physical Assistance Provided No physical assistance   Lying to Sitting on Side of Bed CARE Score 4   Sit to Stand   Type of Assistance Needed Physical assistance   Amount of Physical Assistance Provided Less than 25%   Comment fluctuates between Marguerite and CGA, more frequently Marguerite this session  Sit to Stand CARE Score 3   Bed-Chair Transfer   Type of Assistance Needed Physical assistance   Amount of Physical Assistance Provided Less than 25%   Comment fluctuates between Marguerite and CGA, more frequently Marguerite this session  Cues for hand placement  Improved awareness of O2 line mgmt this session  Chair/Bed-to-Chair Transfer CARE Score 3   Toileting Hygiene   Type of Assistance Needed Physical assistance   Amount of Physical Assistance Provided 50%-74%   Comment Pt with improved ability to A with CM while in stance via alternating UE release  She continues to require A for bowel hygiene thoroughness  Toileting Hygiene CARE Score 2   Toilet Transfer   Type of Assistance Needed Physical assistance   Amount of Physical Assistance Provided Less than 25%   Comment Marguerite   Toilet Transfer CARE Score 3   Exercise Tools   Other Exercise Tool 1 Pt completes seated pulmonary exercises following HEP from OT tool kit to promote UE ROM and breathing strategies to promote increased independence with ADL tasks  Pt overall tolerates fairly well with significant breaks required  Pt requires cues for proper form and technique throughout  Completes 1 x 10 each  Cognition   Overall Cognitive Status Impaired   Arousal/Participation Alert; Cooperative   Attention Attends with cues to redirect   Orientation Level Oriented X4   Memory Decreased short term memory;Decreased recall of recent events;Decreased recall of precautions   Following Commands Follows one step commands with increased time or repetition   Activity Tolerance   Activity Tolerance Patient limited by pain; Patient limited by fatigue   Medical Staff Made Aware Both wound care RN, Abdifatah Miller and RN Vernell present to assess and manage skin  Assessment   Treatment Assessment Pt participated in skilled OT services with focus on dressing, transfers, and pulmonary exercises  Pt demonstrating improvement this session with LB dressing and increased receptiveness to compensatory strategies  Continue to educate that the [facility] is useful for compliance with spinal precautions but also LT for ECTs  Pt continues to perseverate on tasks and demos slight anxiousness but is more easily redirected today  Pt remains limited by dec endurance, dec strength, slightly self limiting behaviors  States "I can't breath" but SPO2 via NC on 2LO2 remains >95% throughout session  Pt will continue to benefit from skilled OT services with focus on ADL retraining, functional transfers, endurance, strengthening, and act hattie  Prognosis Fair   Problem List Decreased strength;Decreased range of motion;Decreased endurance; Impaired balance;Decreased mobility; Decreased coordination;Decreased cognition; Impaired judgement;Decreased safety awareness;Decreased skin integrity;Orthopedic restrictions;Pain   Plan   Treatment/Interventions ADL retraining;Functional transfer training; Therapeutic exercise; Endurance training;Cognitive reorientation;Patient/family training;Equipment eval/education; Bed mobility; Compensatory technique education;Spoke to nursing   Progress Slow progress, decreased activity tolerance   Recommendation   OT Discharge Recommendation   (pending progress)   OT Therapy Minutes   OT Time In 0930   OT Time Out 1030   OT Total Time (minutes) 60   OT Mode of treatment - Individual (minutes) 60   OT Mode of treatment - Concurrent (minutes) 0   OT Mode of treatment - Group (minutes) 0   OT Mode of treatment - Co-treat (minutes) 0   OT Mode of Treatment - Total time(minutes) 60 minutes   OT Cumulative Minutes 941   Therapy Time missed   Time missed?  No

## 2021-03-10 NOTE — PROGRESS NOTES
03/10/21 1230   Pain Assessment   Pain Assessment Tool 0-10   Pain Score 5   Pain Location/Orientation Location: Back;Orientation: Mid   Pain Onset/Description Onset: Ongoing;Frequency: Constant/Continuous   Hospital Pain Intervention(s) Rest   Restrictions/Precautions   Precautions Fall Risk;Cognitive;Bed/chair alarms;Aspiration;Supervision on toilet/commode;Pain;Spinal precautions; Fluid restriction;O2  (2L, 1500ml)   Braces or Orthoses TLSO   Cognition   Overall Cognitive Status Impaired   Arousal/Participation Alert; Cooperative   Attention Attends with cues to redirect   Subjective   Subjective pt reported pain as above and was agreeable to have PT  Sit to Lying   Type of Assistance Needed    Comment Not applicable   Sit to Lying CARE Score 9   Sit to Stand   Type of Assistance Needed Physical assistance;Verbal cues; Adaptive equipment   Amount of Physical Assistance Provided Less than 25%   Comment min/CG with RW , still needs VC for hand placement about 25% of the time   Sit to Stand CARE Score 3   Bed-Chair Transfer   Type of Assistance Needed Physical assistance;Verbal cues; Adaptive equipment   Amount of Physical Assistance Provided Less than 25%   Comment RW -VC safest way to turn considering O2 line about 50% of the time   Chair/Bed-to-Chair Transfer CARE Score 3   Car Transfer   Type of Assistance Needed Physical assistance;Verbal cues; Adaptive equipment   Amount of Physical Assistance Provided 50%-74%   Comment mod to stand up from car seat with added cushion to simulate SUV seat  able to bring LE in/out of car without assist   Car Transfer CARE Score 2   Walk 10 Feet   Type of Assistance Needed Incidental touching;Verbal cues; Adaptive equipment   Comment 10' x 2 with obstacles using RW and managing O2 line   Walk 10 Feet CARE Score 4   Walk 50 Feet with Two Turns   Type of Assistance Needed Incidental touching;Verbal cues; Adaptive equipment   Comment 48' with RW with line management  training Walk 50 Feet with Two Turns CARE Score 4   Walk 150 Feet   Reason if not Attempted Safety concerns   Walk 150 Feet CARE Score 88   Walking 10 Feet on Uneven Surfaces   Type of Assistance Needed Physical assistance;Verbal cues; Adaptive equipment   Amount of Physical Assistance Provided 25%-49%   Comment min on floor mat with RW   Walking 10 Feet on Uneven Surfaces CARE Score 3   Curb or Single Stair   Style negotiated Single stair   Type of Assistance Needed Physical assistance;Verbal cues; Adaptive equipment   Amount of Physical Assistance Provided 25%-49%   Comment min of 1 ascent/descent bwd bilat HR x 4 steps    1 Step (Curb) CARE Score 3   4 Steps   Type of Assistance Needed Physical assistance;Verbal cues; Adaptive equipment   Amount of Physical Assistance Provided 25%-49%   4 Steps CARE Score 3   Toilet Transfer   Type of Assistance Needed Physical assistance;Verbal cues; Adaptive equipment   Amount of Physical Assistance Provided Less than 25%   Comment w/c to toilet SPT with RW then walked toilet to bed with RW x 10'   soiled brief so needed max A with LB dressing and toileting    Toilet Transfer CARE Score 3   Therapeutic Interventions   Flexibility passive bilat hamstring stretch x 30SH x 3 , bilat gastroc x 30 SH x 3 reps   Assessment   Treatment Assessment Pt able to engaged in skilled PT intervention for 90 mins straight however due to fatigue and SOB required rest break after every task with VC to concentrate on purse lip breathing rather than talk  Pt able to negotiate 4 steps on the 6"  but required use of 2 rails and min A descending bwd technique  Along with dyspnea and fatigue onset pt required increasing physical assistance to complete toileting hygiene and LB dressing  Pt returned back to bed at end of tx with all needs within reach  Pt scheduled to be [facility]/c to [facility] tomorrow for further therapy      Barriers to Discharge Inaccessible home environment;Decreased caregiver support   PT Barriers   Functional Limitation Car transfers; Ramp negotiation;Stair negotiation;Standing;Transfers; Walking; Wheelchair management   Plan   Treatment/Interventions Functional transfer training;LE strengthening/ROM; Therapeutic exercise;Patient/family training;Gait training;Spoke to nursing;Bed mobility;OT   Progress Slow progress, decreased activity tolerance   Recommendation   Equipment Recommended Wheelchair   PT - OK to Discharge Yes     PT Therapy Minutes   PT Time In 1230   PT Time Out 1400   PT Total Time (minutes) 90   PT Mode of treatment - Individual (minutes) 90   PT Mode of treatment - Concurrent (minutes) 0   PT Mode of treatment - Group (minutes) 0   PT Mode of treatment - Co-treat (minutes) 0   PT Mode of Treatment - Total time(minutes) 90 minutes   PT Cumulative Minutes 898   Therapy Time missed   Time missed?  No

## 2021-03-10 NOTE — PLAN OF CARE
Problem: Potential for Falls  Goal: Patient will remain free of falls  Description: INTERVENTIONS:  - Assess patient frequently for physical needs  -  Identify cognitive and physical deficits and behaviors that affect risk of falls  -  [place] fall precautions as indicated by assessment   - Educate patient/family on patient safety including physical limitations  - Instruct patient to call for assistance with activity based on assessment  - Modify environment to reduce risk of injury  - Consider OT/PT consult to assist with strengthening/mobility  Outcome: Progressing     Problem: Prexisting or High Potential for Compromised Skin Integrity  Goal: Skin integrity is maintained or improved  Description: INTERVENTIONS:  - Identify patients at risk for skin breakdown  - Assess and monitor skin integrity  - Assess and monitor nutrition and hydration status  - Monitor labs   - Assess for incontinence   - Turn and reposition patient  - Assist with mobility/ambulation  - Relieve pressure over bony prominences  - Avoid friction and shearing  - Provide appropriate hygiene as needed including keeping skin clean and dry  - Evaluate need for skin moisturizer/barrier cream  - Collaborate with interdisciplinary team   - Patient/family teaching  - Consider wound care consult   Outcome: Progressing     Problem: Nutrition/Hydration-ADULT  Goal: Nutrient/Hydration intake appropriate for improving, restoring or maintaining nutritional needs  Description: Monitor and assess patient's nutrition/hydration status for malnutrition  Collaborate with interdisciplinary team and initiate plan and interventions as ordered  Monitor patient's weight and dietary intake as ordered or per policy  Utilize nutrition screening tool and intervene as necessary  Determine patient's food preferences and provide high-protein, high-caloric foods as appropriate       INTERVENTIONS:  - Monitor oral intake, urinary output, labs, and treatment plans  - Assess nutrition and hydration status and recommend course of action  - Evaluate amount of meals eaten  - Assist patient with eating if necessary   - Allow adequate time for meals  - Recommend/ encourage appropriate diets, oral nutritional supplements, and vitamin/mineral supplements  - Order, calculate, and assess calorie counts as needed  - Recommend, monitor, and adjust tube feedings and TPN/PPN based on assessed needs  - Assess need for intravenous fluids  - Provide specific nutrition/hydration education as appropriate  - Include patient/family/caregiver in decisions related to nutrition  Outcome: Progressing     Problem: PAIN - ADULT  Goal: Verbalizes/displays adequate comfort level or baseline comfort level  Description: Interventions:  - Encourage patient to monitor pain and request assistance  - Assess pain using appropriate pain scale  - Administer analgesics based on type and severity of pain and evaluate response  - Implement non-pharmacological measures as appropriate and evaluate response  - Consider cultural and social influences on pain and pain management  - Notify physician/advanced practitioner if interventions unsuccessful or patient reports new pain  Outcome: Progressing     Problem: INFECTION - ADULT  Goal: Absence or prevention of progression during hospitalization  Description: INTERVENTIONS:  - Assess and monitor for signs and symptoms of infection  - Monitor lab/diagnostic results  - Monitor all insertion sites, i e  indwelling lines, tubes, and drains  - Monitor endotracheal if appropriate and nasal secretions for changes in amount and color  - [place] appropriate cooling/warming therapies per order  - Administer medications as ordered  - Instruct and encourage patient and family to use good hand hygiene technique  - Identify and instruct in appropriate isolation precautions for identified infection/condition  Outcome: Progressing     Problem: SAFETY ADULT  Goal: Patient will remain free of falls  Description: INTERVENTIONS:  - Assess patient frequently for physical needs  -  Identify cognitive and physical deficits and behaviors that affect risk of falls    -  [place] fall precautions as indicated by assessment   - Educate patient/family on patient safety including physical limitations  - Instruct patient to call for assistance with activity based on assessment  - Modify environment to reduce risk of injury  - Consider OT/PT consult to assist with strengthening/mobility  Outcome: Progressing  Goal: Maintain or return to baseline ADL function  Description: INTERVENTIONS:  -  Assess patient's ability to carry out ADLs; assess patient's baseline for ADL function and identify physical deficits which impact ability to perform ADLs (bathing, care of mouth/teeth, toileting, grooming, dressing, etc )  - Assess/evaluate cause of self-care deficits   - Assess range of motion  - Assess patient's mobility; develop plan if impaired  - Assess patient's need for assistive devices and provide as appropriate  - Encourage maximum independence but intervene and supervise when necessary  - Involve family in performance of ADLs  - Assess for home care needs following discharge   - Consider OT consult to assist with ADL evaluation and planning for discharge  - Provide patient education as appropriate  Outcome: Progressing  Goal: Maintain or return mobility status to optimal level  Description: INTERVENTIONS:  - Assess patient's baseline mobility status (ambulation, transfers, stairs, etc )    - Identify cognitive and physical deficits and behaviors that affect mobility  - Identify mobility aids required to assist with transfers and/or ambulation (gait belt, sit-to-stand, lift, walker, cane, etc )  - [place] fall precautions as indicated by assessment  - Record patient progress and toleration of activity level on Mobility SBAR; progress patient to next Phase/Stage  - Instruct patient to call for assistance with activity based on assessment  - Consider rehabilitation consult to assist with strengthening/weightbearing, etc   Outcome: Progressing     Problem: DISCHARGE PLANNING  Goal: Discharge to home or other facility with appropriate resources  Description: INTERVENTIONS:  - Identify barriers to discharge w/patient and caregiver  - Arrange for needed discharge resources and transportation as appropriate  - Identify discharge learning needs (meds, wound care, etc )  - Arrange for interpretive services to assist at discharge as needed  - Refer to Case Management Department for coordinating discharge planning if the patient needs post-hospital services based on physician/advanced practitioner order or complex needs related to functional status, cognitive ability, or social support system  Outcome: Progressing

## 2021-03-10 NOTE — PROGRESS NOTES
Internal Medicine Progress Note  Patient: Krystle Hewitt  Age/sex: 78 y o  female  Medical Record #: [id number]      ASSESSMENT/PLAN: (Interval History)  Krystle Hewitt is seen and examined and management for following issues:    Compression fracture T5/6  · No surgical intervention  · Cont TLSO brace when OOB  · Aggressive rehab per PMR  · Pain mgmt per PMR     COPD  · Wears oxygen 2-3 L continuous  · Continue current inhalers/nebs per home  · Recent PFT DLCO 54%  · Oxygen saturations stable  · Prednisone complete  · Recent CT scan +moderate emphysema; no PE  · Nebs were changed back to scheduled from prn on 2/27/21     Chronic diastolic HF  · Cont furosemide 20mg daily here   · Takes Torsemide at home  · Monitor volume status closely  · Chronic LE edema  · Echo = LVEF 80%; diastolic dysfunction  · Renal managing  · stable     DM II  · Hgb A1c 7 1 in October  · Home meds: Glyburide 5mg BID  · Cont sliding scale and coverage  · Cont glyburide at 2 5mg qd started 3/1/21  · BS stable     HTN  · Diovan is on hold 2/2 VICKY/CKD and K+ increase  · tx per renal  · BPs acceptable      CKD  · Level 3  · Baseline 0 9-1 1  · Avoid nephrotoxic medications  · Currently at baseline  · Renal managing     Hypercalcemia  · Nephrology w/u at [facility] secondary to volume status  · Renal managing      Hyponatremia  · Improved  · Renal managing     Hyperkalemia  · On low K+ diet and Diovan is on hold  · tx per renal     Hypothyroid  · Cont home supplementation     Sacral decubitus  · Cont off loading, nutritional supplementation  · Wound nurse following     Oral ulcers  · Cont magic mouthwash q4 hrs as needed  · Feels this helps and uses 1-2x/day    DC disposition to SNF later this wk    The above assessment and plan was reviewed and updated as determined by my evaluation of the patient on 3/10/2021      Labs:   Results from last 7 days   Lab Units 03/08/21  0541 03/06/21  0610   WBC Thousand/uL 10 49* 12 44*   HEMOGLOBIN g/dL 10 0* 9 9*   HEMATOCRIT % 31 2* 30 9*   PLATELETS Thousands/uL 173 165     Results from last 7 days   Lab Units 03/08/21  0541 03/06/21  1108   SODIUM mmol/L 134* 132*   POTASSIUM mmol/L 4 6 5 3   CHLORIDE mmol/L 95* 96*   CO2 mmol/L 36* 36*   BUN mg/dL 54* 39*   CREATININE mg/dL 0 86 1 00   CALCIUM mg/dL 10 4* 10 8*             Results from last 7 days   Lab Units 03/10/21  0647 03/09/21  2128 03/09/21  1645   POC GLUCOSE mg/dl 100 127 114       Review of Scheduled Meds:  Current Facility-Administered Medications   Medication Dose Route Frequency Provider Last Rate    acetaminophen  650 mg Oral Q6H [facility] Jeznet Smaller, MD      al mag oxide-diphenhydramine-lidocaine viscous  10 mL Swish & Spit Q4H PRN ROLA Shepard      albuterol  2 puff Inhalation Q6H PRN Barnet Smaller, MD      ALPRAZolam  0 25 mg Oral TID PRN Barnet Smaller, MD      aluminum-magnesium hydroxide-simethicone  30 mL Oral Q6H PRN Barnet Smaller, MD      atorvastatin  80 mg Oral Daily With Joan Ear, MD      benzonatate  100 mg Oral TID PRN Barnet Smaller, MD      bisacodyl  10 mg Rectal Daily PRN Barnet Smaller, MD      fluticasone-vilanterol  1 puff Inhalation Daily Barnet Smaller, MD      furosemide  20 mg Oral Daily Barnet Smaller, MD      glyBURIDE  2 5 mg Oral Daily With Breakfast ROLA Shepard      guaiFENesin  600 mg Oral Q12H [facility] Barnet Smaller, MD      heparin (porcine)  5,000 Units Subcutaneous Q8H [facility] Barnet Smaller, MD      insulin lispro  1-6 Units Subcutaneous HS Barnet Smaller, MD      insulin lispro  1-6 Units Subcutaneous TID AC Barnet Smaller, MD      ipratropium-albuterol  3 mL Nebulization TID Barnet Smaller, MD      levothyroxine  125 mcg Oral Early Morning Barnet Smaller, MD      lidocaine  2 patch Topical Daily Barnet Smaller, MD      melatonin  6 mg Oral HS PRN Barnet Smaller, MD      methocarbamol  500 mg Oral Q6H PRN Barnet Smaller, MD      montelukast  10 mg Oral HS Barnet Smaller, MD     Ethelyn Rye nystatin   Topical BID Walker Pineda MD      nystatin   Topical BID Walker Pineda MD      ondansetron  4 mg Oral Q6H PRN Walker Pineda MD      oxyCODONE  5 mg Oral Q6H PRN Walker Pineda MD      pantoprazole  40 mg Oral Early Morning Walker Pineda MD      polyethylene glycol  17 g Oral Daily Walker Pineda MD      saccharomyces boulardii  250 mg Oral BID Walker Pineda MD      senna  1 tablet Oral HS Walker Pineda MD      traMADol  50 mg Oral Q6H PRN Walker Pineda MD      white petrolatum-mineral oil   Topical TID PRN Walker Pineda MD         Subjective/ HPI: Patient seen and examined  Patients overnight issues or events were reviewed with nursing or staff during rounds or morning huddle session  New or overnight issues include the following:     Pt reports overall stability, ongoing RICO  Awaiting SNF availability    ROS:   A 10 point ROS was performed; negative except as noted above  Imaging:     XR abdomen 1 view kub   Final Result by Ben Hays MD (03/01 3464)      Large-volume stool, suggesting constipation  Nonobstructive bowel gas pattern                 Workstation performed: [id number]             *Labs /Radiology studies Reviewed  *Medications  reviewed and reconciled as needed  *Please refer to order section for additional ordered labs studies  *Case discussed with primary attending during morning huddle case rounds    Physical Examination:  Vitals:   Vitals:    03/09/21 2102 03/09/21 2128 03/10/21 0535 03/10/21 0750   BP: 129/71  118/62    BP Location: Right arm  Left arm    Pulse: 90  82    Resp: 18  18    Temp: 97 8 °F (36 6 °C)  98 2 °F (36 8 °C)    TempSrc: Oral  Oral    SpO2: 97% 98% 99% 99%   Weight:   63 2 kg (139 lb 6 4 oz)    Height:           GEN: No apparent distress, interactive  NEURO: Alert and oriented x3  HEENT: Pupils are equal and reactive, EOMI, mucous membranes are moist, face symmetrical  CV: S1 S2 regular, no MRG, no peripheral edema noted  RESP: O2 2L NC; +RICO, decreased BS with expiratory wheezing noted; minimal air movement which continues to be her baseline  GI: obese, soft non tender, non distended; +BS x4  : Voiding without difficulty  MUSC: Moves all extremities; +generalized deconditioning d/t respiratory status  SKIN: pink, warm and dry, normal turgor, sacral dressing in place         The above physical exam was reviewed and updated as determined by my evaluation of the patient on 3/10/2021  Invasive Devices     None                    VTE Pharmacologic Prophylaxis: Heparin  Code Status: Level 3 - DNAR and DNI  Current Length of Stay: 14 day(s)      Total time spent:  30 minutes with more than 50% spent counseling/coordinating care  Counseling includes discussion with patient re: progress  and discussion with patient of his/her current medical state/information  Coordination of patient's care was performed in conjunction with primary service  Time invested included review of patient's labs, vitals, and management of their comorbidities with continued monitoring  In addition, this patient was discussed with medical team including physician and advanced extenders  The care of the patient was extensively discussed and appropriate treatment plan was formulated unique for this patient  ** Please Note:  voice to text software may have been used in the creation of this document   Although proof errors in transcription or interpretation are a potential of such software**

## 2021-03-10 NOTE — CASE MANAGEMENT
Pt is approved at [facility] [address] [place] is [place] financial application and no response yet from [facility]  Justice phoned pts dtr [facility] and provided the above info  She will speak to her brother and notify cm with plan  Received call back from Diannah China that they wish to accept the bed at [facility]  They asked for dc to occur on Friday so pts son could be at the facility  Justice explained [facility] only has one female bed and will not be able to hold it until Friday and also family is not permitted in to visit  Diannah China would notify her brother  Justice made transport arrangements with [facility] for 10am on Thursday  Pt family facility and staff made aware

## 2021-03-10 NOTE — PROGRESS NOTES
PM&R PROGRESS NOTE:  Daria Kirkpartick 78 y o  female MRN: [id number]  Unit/Bed#: -01 Encounter: [id number]        Rehabilitation Diagnosis: Impairment of mobility, safety and Activities of Daily Living (ADLs) due to Orthopedic Disorders:  08 9  Other Orthopedic Thoracic compression fractures    HPI:   Daria Kirkpatrick is a 78 y o  female with COPD/ emphysema on baseline oxygen 2-3 L,  Congestive heart failure, osteoarthritis who presented to the [address] [place] [address] on 2/17/21 with back pain and shortness of breath  Pulmonary workup showing moderate pulmonary emphysematous changes bilaterally  Workup for back pain revealed Compression fracture of T5 (33% height loss approximately) and T6 (approximately 25% height loss  )  There is also superior endplate T2 compression fracture with approximately 10%-20 percent height loss at the superior endplate  Case was discussed with both Orthopedics and Neurosurgery and patient was recommended conservative treatment with TLSO brace while out of bed  Medically, patient was treated for an E coli urinary tract infection  Patient was started on steroids for COPD and emphysema and continued on her Breo and DuoNebs  Patient was seen by Nephrology  acute renal insufficiency, hypercalcemia and hyperkalemia  Patient started on Lasix by Nephrology  Patient was followed by wound care for a sacral pressure ulcer  After medical stabilization patient was found to have acute functional deficits from her baseline and therefore was admitted to [address] [place] [address]  SUBJECTIVE:  Patient seen face to face in her room  No acute issues overnight  Patient states her pain is a bit better today  No current SOB  Awaiting SNF placement       ASSESSMENT: Stable, progressing    PLAN:    Rehabilitation   Functional deficits:  Generalized weakness,  Reduced Endurance,  Impaired mobility, impaired self-care,  Impaired balance,  Possible impaired swallow   Continue current rehabilitation plan of care to maximize function   Expected Discharge:  SNF referrals to be started as patient will not have 24/7 care at home      Pain  · Mid to lower back pain:  Managed on Tylenol, Topical Lidoderm patch, p r n  Robaxin, tramadol and oxycodone  P r n  DVT prophylaxis    managed on subcutaneous heparin and SCDs    Bladder plan   Incontinent at baseline     Bowel plan   Continent      * Thoracic compression fracture (HCC)  Assessment & Plan  · T2, T5 and T6 compression fractures  · Treated conservatively  · TLSO brace while out of bed  · Follow-up as outpatient with Neurosurgery    COPD exacerbation ([facility] [address] )  Assessment & Plan  · Wears oxygen 2-3 L at baseline  · At baseline feels SOB  · Continue current inhalers reduced to TID  · Monitor oxygen saturations with activity  · Recent CT scan +moderate emphysema  · Completed prednisone taper    Pressure ulcer  Assessment & Plan  · Located on Sacrum - Stage 3  · Wound care to follow while at [facility]  1-Hydraguard to bilateral sacrum, buttock and heels BID and PRN  2-Elevate heels to offload pressure  3-Ehob cushion in chair when out of bed  4-Moisturize skin daily with skin nourishing cream   5-Turn/reposition q2h or when medically stable for pressure re-distribution on skin  · Continue optimization of nutrition, pressure relieving techniques, and turns  · Nutrition consulted     UTI (urinary tract infection)  Assessment & Plan  · E Coli UTI  · Completed treatment     Chronic diastolic heart failure (HCC)  Assessment & Plan  Managed on Lasix 20mg daily      Type 2 diabetes mellitus with complication, without long-term current use of insulin ([facility])  Assessment & Plan  · Managed on glyburide - lower than home dose due to hypoglycemia    · CCD  · IM consultants following    VICKY (acute kidney injury) ([facility])-resolved as of 2/27/2021  Assessment & Plan  · Renal function baseline 0 9 - 1 1  · Renal following hypercalcemia/hyperkaemia (hyperkalemia likely due to diet - was drinking V8s and fruity drinks)  ·  continue low-potassium diet        Appreciate IM consultants medical co-management  Labs, medications, and imaging personally reviewed  ROS:  A ten point review of systems was completed on 3/10/21 and pertinent positives are listed in subjective section  All other systems reviewed were negative  OBJECTIVE:   /61 (BP Location: Left arm)   Pulse 79   Temp 97 8 °F (36 6 °C) (Oral)   Resp 18   Ht 5' 2" (1 575 m)   Wt 63 2 kg (139 lb 6 4 oz)   SpO2 100%   BMI 25 50 kg/m²     Physical Exam  Vitals signs and nursing note reviewed  Constitutional:       General: She is not in acute distress  HENT:      Head: Normocephalic and atraumatic  Nose: Nose normal       Mouth/Throat:      Mouth: Mucous membranes are moist    Eyes:      Conjunctiva/sclera: Conjunctivae normal    Neck:      Musculoskeletal: Neck supple  Cardiovascular:      Rate and Rhythm: Normal rate and regular rhythm  Pulses: Normal pulses  Pulmonary:      Effort: Pulmonary effort is normal       Breath sounds: Wheezing present  No rales  Comments: Decreased BS throughout with occasional wheezes today  +oxygen via NC  Abdominal:      General: Bowel sounds are normal  There is no distension  Palpations: Abdomen is soft  Tenderness: There is no abdominal tenderness  Musculoskeletal:         General: No swelling  Comments: TLSO patient    Skin:     General: Skin is warm  Neurological:      Mental Status: She is alert and oriented to person, place, and time     Psychiatric:         Mood and Affect: Mood normal           Lab Results   Component Value Date    WBC 10 49 (H) 03/08/2021    HGB 10 0 (L) 03/08/2021    HCT 31 2 (L) 03/08/2021    MCV 93 03/08/2021     03/08/2021     Lab Results   Component Value Date    SODIUM 134 (L) 03/08/2021    K 4 6 03/08/2021    CL 95 (L) 03/08/2021    CO2 36 (H) 03/08/2021    BUN 54 (H) 03/08/2021    CREATININE 0 86 03/08/2021    GLUC 75 03/08/2021    CALCIUM 10 4 (H) 03/08/2021     No results found for: INR, PROTIME      Current Facility-Administered Medications:     acetaminophen (TYLENOL) tablet 650 mg, 650 mg, Oral, Q6H [address], Lauren Magallanes MD, 650 mg at 03/10/21 1130    al mag oxide-diphenhydramine-lidocaine viscous (MAGIC MOUTHWASH) suspension 10 mL, 10 mL, Swish & Spit, Q4H PRN, Ani Yoo CRNP, 10 mL at 03/06/21 0711    albuterol (PROVENTIL HFA,VENTOLIN HFA) inhaler 2 puff, 2 puff, Inhalation, Q6H PRN, Lauren Magallanes MD    ALPRAZolam Alla Liu) tablet 0 25 mg, 0 25 mg, Oral, TID PRN, Lauren Magallanes MD, 0 25 mg at 03/09/21 2127    aluminum-magnesium hydroxide-simethicone (MYLANTA) oral suspension 30 mL, 30 mL, Oral, Q6H PRN, Lauren Magallanes MD    atorvastatin (LIPITOR) tablet 80 mg, 80 mg, Oral, Daily With Ivis Kincaid MD, 80 mg at 03/09/21 1657    benzonatate (TESSALON PERLES) capsule 100 mg, 100 mg, Oral, TID PRN, Lauren Magallanes MD    bisacodyl (DULCOLAX) rectal suppository 10 mg, 10 mg, Rectal, Daily PRN, Lauren Magallanes MD, 10 mg at 02/28/21 1802    fluticasone-vilanterol (BREO ELLIPTA) 100-25 mcg/inh inhaler 1 puff, 1 puff, Inhalation, Daily, Lauren Magallanes MD, 1 puff at 03/10/21 0941    furosemide (LASIX) tablet 20 mg, 20 mg, Oral, Daily, Lauren Magallanes MD, 20 mg at 03/10/21 0940    glyBURIDE (DIABETA) tablet 2 5 mg, 2 5 mg, Oral, Daily With Breakfast, CHELSEA ShepardNP, 2 5 mg at 03/10/21 0726    guaiFENesin (MUCINEX) 12 hr tablet 600 mg, 600 mg, Oral, Q12H [address], Lauren Magallanes MD, 600 mg at 03/10/21 0938    heparin (porcine) subcutaneous injection 5,000 Units, 5,000 Units, Subcutaneous, Q8H [address], Lauren Magallanes MD, 5,000 Units at 03/10/21 1340    insulin lispro (HumaLOG) 100 units/mL subcutaneous injection 1-6 Units, 1-6 Units, Subcutaneous, HS, Lauren Magallanes MD, 1 Units at 03/04/21 2224    insulin lispro (HumaLOG) 100 units/mL subcutaneous injection 1-6 Units, 1-6 Units, Subcutaneous, TID AC, 1 Units at 03/10/21 1130 **AND** Fingerstick Glucose (POCT), , , TID AC, Molly Carr MD    ipratropium-albuterol (DUO-NEB) 0 5-2 5 mg/3 mL inhalation solution 3 mL, 3 mL, Nebulization, TID, Molly Carr MD, 3 mL at 03/10/21 0749    levothyroxine tablet 125 mcg, 125 mcg, Oral, Early Morning, Molly Carr MD, 125 mcg at 03/10/21 0548    lidocaine (LIDODERM) 5 % patch 2 patch, 2 patch, Topical, Daily, Molly Carr MD, 2 patch at 03/10/21 0938    melatonin tablet 6 mg, 6 mg, Oral, HS PRN, Molly Carr MD, 6 mg at 03/09/21 2138    methocarbamol (ROBAXIN) tablet 500 mg, 500 mg, Oral, Q6H PRN, Molly Carr MD, 500 mg at 03/08/21 1441    montelukast (SINGULAIR) tablet 10 mg, 10 mg, Oral, HS, Molly Carr MD, 10 mg at 03/09/21 2123    nystatin (MYCOSTATIN) cream, , Topical, BID, Molly Carr MD, 1 application at 76/02/31 0940    nystatin (MYCOSTATIN) powder, , Topical, BID, Molly Carr MD, 1 application at 90/77/73 0940    ondansetron (ZOFRAN-ODT) dispersible tablet 4 mg, 4 mg, Oral, Q6H PRN, Molly Carr MD    oxyCODONE (ROXICODONE) IR tablet 5 mg, 5 mg, Oral, Q6H PRN, Molly Carr MD, 5 mg at 03/09/21 2127    pantoprazole (PROTONIX) EC tablet 40 mg, 40 mg, Oral, Early Morning, Molly Carr MD, 40 mg at 03/10/21 0548    polyethylene glycol (MIRALAX) packet 17 g, 17 g, Oral, Daily, Molly Carr MD, 17 g at 03/10/21 1817    saccharomyces boulardii (FLORASTOR) capsule 250 mg, 250 mg, Oral, BID, Molly Carr MD, 250 mg at 03/10/21 4969    senna (SENOKOT) tablet 8 6 mg, 1 tablet, Oral, HS, Molly Carr MD, 8 6 mg at 03/09/21 2123    traMADol (ULTRAM) tablet 50 mg, 50 mg, Oral, Q6H PRN, Molly Carr MD, 50 mg at 03/10/21 0949    white petrolatum-mineral oil (EUCERIN,HYDROCERIN) cream, , Topical, TID PRN, Molly Carr MD, Given at 02/27/21 2212    Past Medical History:   Diagnosis Date    Cataract     CHF [facility]congestive heart failure) ([facility] [address] )     Leukocytosis        Patient Active Problem List    Diagnosis Date Noted    Thoracic compression fracture ([place] [address] ) 02/18/2021     Priority: High    COPD exacerbation ([place] [address] ) 03/10/2014     Priority: Medium    Stage 3a chronic kidney disease 02/27/2021    Alkalosis 02/25/2021    Hyperkalemia 02/23/2021    Urinary incontinence 02/23/2021    Pressure ulcer 02/22/2021    Hypercalcemia 02/21/2021    Chronic respiratory failure with hypoxia (HCC) 02/18/2021    Moderate protein-calorie malnutrition ([place] [address] ) 02/18/2021    UTI (urinary tract infection) 02/18/2021    SOB (shortness of breath) 02/17/2021    Chronic bilateral thoracic back pain 02/17/2021    Chronic diastolic heart failure ([place] [address] ) 02/17/2021    Atherosclerosis of abdominal aorta ([place] [address] ) 12/01/2020    CHF (congestive heart failure) ([place] [address] ) 02/11/2019    Type 2 diabetes mellitus with complication, without long-term current use of insulin ([place] [address] ) 04/08/2015    Carotid artery plaque 04/06/2015    Hyperlipidemia 03/14/2014    Hypothyroidism 03/14/2014    Osteoarthrosis 03/14/2014          Brendan Mcqueen MD    Total time spent:  30 minutes with more than 50% spent counseling/coordinating care  Counseling includes discussion with patient re: progress and discussion with patient of his/her current medical/functional state/information  Coordination of patient's care was performed in conjunction with consulting services  Time invested included review of patient's labs, vitals, and management of their comorbidities with continued monitoring  The care of the patient was extensively discussed and appropriate treatment plan was formulated unique for this patient  ** Please Note:  voice to text software may have been used in the creation of this document   Although proof errors in transcription or interpretation are a potential of such software**

## 2021-03-10 NOTE — PROGRESS NOTES
03/10/21 1130   Pain Assessment   Pain Assessment Tool 0-10   Pain Score 4   Pain Location/Orientation Location: Back   Hospital Pain Intervention(s) Repositioned  (distractions by consuming meal)   Restrictions/Precautions   Precautions Aspiration;Bed/chair alarms;Cognitive; Fall Risk;O2;Spinal precautions;Supervision on toilet/commode   Braces or Orthoses TLSO   Swallow Information   Current Risks for Dysphagia & Aspiration Respiratory compromise;Rapid respiratory rate;General debilitation;Weak cough;Cognitive deficit; Positionong Issues   Current Symptoms/Concerns Clear throat;Difficulty chewing;Decreased oral intake;Weight loss   Current Diet Dysphagia mechanical soft; Thin liquid   Baseline Diet Dyphagia advanced;Regular; Thin liquids   Consistencies Assessed and Performance   Materials Admnistered Puree/Level 1;Mechanical Soft/Level 2; Thin liquid   Oral Stage Mild impaired   Phargngeal Stage Mild impaired;Aspiration risk   Swallow Mechanics Mild delayed;Swallow initation;Good Larygneal rise;Aspiration risk   Esophageal Concerns No s/s reported   Recommendations   Risk for Aspiration Present   Recommendations Dysphagia treatment   Diet Solid Recommendation Level 2 Dysphagia/ mechanical soft/altered   Diet Liquid Recommendation Thin liquid   Recommended Form of Meds Whole; With thin liquid; As tolerated   General Precautions Aspiration precautions; Feed only when alert;Minimize distractions;Upright as possible for all oral intake;Remain upright for 45 mins after meals; Other (Comment)  (OOB for ALL meals, Distant supervision)   Compensatory Swallowing Strategies Alternate solids and liquids;Effortful swallow;Cue for lingual sweep;Voluntary throat clear/cough to clear penetration   Results Reviewed with RN;PT/Family/Caregiver   Eating   Type of Assistance Needed Set-up / clean-up; Verbal cues   Amount of Physical Assistance Provided No physical assistance   Eating CARE Score 4   Swallow Assessment   Swallow Treatment Assessment Pt was OOB in [facility] [address] for session, where pt was awake, alert and cooperative despite not being "hungry" per pt report  Pt taking primarily level 2 and puree items today during despite wanting to trial level 3 items on tray (chicken noodle soup)  Pt stayed w/ minced chicken, mashed potatoes, chicken broth and thin liquids  SLP asking pt if lower dentures were in which pt stated she is not wearing them due to increased soreness of lower gum line from wearing dentures yesterday  Suspect due to not willing to wear denture, pt not as willing to trial level 3 item  Pt noted w/ decreased PO intake today consuming ~25% of meal and 120cc of thin liquids by straw  Pt continues to require verbal prompting to NOT TALK during meal, but continues to carryover alternating solids/liquids (using both liquid and puree wash), along w/ appropriate pacing during meals due to SOB  Lip seal around tsp, fork and straw remains to be [facility] along w/ functional bolus retrieval/transfer across consistencies  No anterior loss was observed throughout meal  As for mastication of soft solids (minced chicken in gravy), overall was noted to be mildly prolonged due to not wearing lower denture, but w/o increased or overt oral residual/pocketing  Bolus manipulation when taking puree/puddin textures (oatmeal, yogurt) was noted to be fairly prompt  Manipulation/transfer of puree items (mashed potatoes, applesauce and yogurt) was fairly prompt and again no oral residual/pocketing  Bolus control/transfer of thin liquids by straw was [facility]  Swallow initiation was fairly prompt across w/ puree/pudding and thin liquids but mildly delayed w/ softer solids due to slower mastication of items  Hyolaryngeal excursion was fairly functional upon palpation  No overt signs/sxs of aspiration noted w/ meal today  Pt's tolerance of level 3 items appears to be improved but will continue to assess across meals monitoring different textures w/o increase oral sxs   At this time, continue level 2 diet w/ thin liquids along w/ the following recommendations: aspiration precautions: DISTANT supervision and OOB or FULLY upright for meals, alternate solids/liquids during meals  Will continue dysphagia therapy in order to determine safest and least restrictive diet w/ goal of also supporting overall PO intake     Swallow Assessment Prognosis   Prognosis Fair   Prognosis Considerations Age; Co-morbidities; Medical diagnosis; Medical prognosis;Previous level of function;Severity of impairments;New learning ability;Ability to carry over   SLP Therapy Minutes   SLP Time In 1130   SLP Time Out 1200   SLP Total Time (minutes) 30   SLP Mode of treatment - Individual (minutes) 30   SLP Mode of treatment - Concurrent (minutes) 0   SLP Mode of treatment - Group (minutes) 0   SLP Mode of treatment - Co-treat (minutes) 0   SLP Mode of Treatment - Total time(minutes) 30 minutes   SLP Cumulative Minutes 420   Therapy Time missed   Time missed?  No

## 2021-03-10 NOTE — WOUND OSTOMY CARE
Progress Note - Wound   Theoplis Jarrod 78 y o  female MRN: [id number]  Unit/Bed#: -01 Encounter: [id number]        Assessment:   Patient is seen for wound follow up  Patient examines while standing with therapy  Patient is continent  Primary nurse present during exam     Findings  1  Stage 3 pressure injury on sacrum--healed          Heels intact          Skin care plans:  1-Hydraguard to bilateral sacrum, buttock and heels BID and PRN  2-Elevate heels to offload pressure  3-Ehob cushion in chair when out of bed  4-Moisturize skin daily with skin nourishing cream   5-Turn/reposition q2h or when medically stable for pressure re-distribution on skin         Wound Care will sign off  Call or Tigertext with any questions

## 2021-03-11 NOTE — DISCHARGE SUMMARY
Discharge Summary - PMR   Halle Adjutant 78 y o  female MRN: [id number]  Unit/Bed#: -01 Encounter: [id number]    Admission Date: 2/24/2021     Discharge Date: 3/11/21    Etiologic/Rehabilitation Diagnosis: Impairment of mobility, safety and Activities of Daily Living (ADLs) due to Orthopedic Disorders:  08 9  Other Orthopedic Thoracic compression fractures    HPI: Sonia Henderson is a 78 y  o  female with COPD/ emphysema on baseline oxygen 2-3 L,  Congestive heart failure, osteoarthritis who presented to the [address] [place] [address] 2/17/21 with back pain and shortness of breath  Pulmonary workup showing moderate pulmonary emphysematous changes bilaterally  Workup for back pain revealed Compression fracture of T5 (33% height loss approximately) and T6 (approximately 25% height loss  )  There is also superior endplate T2 compression fracture with approximately 10%-20 percent height loss at the superior endplate  Case was discussed with both Orthopedics and Neurosurgery and patient was recommended conservative treatment with TLSO brace while out of bed   Medically, patient was treated for an E coli urinary tract infection   Patient was started on steroids for COPD and emphysema and continued on her Breo and DuoNebs    Patient was seen by Nephrology  acute renal insufficiency, hypercalcemia and hyperkalemia    Patient started on Lasix by Nephrology  Amanda Walls was followed by wound care for a sacral pressure ulcer     After medical stabilization patient was found to have acute functional deficits from her baseline and therefore was admitted to [facility] [address] Course:   Functional deficits:  Generalized weakness,  Reduced Endurance,  Impaired mobility, impaired self-care,  Impaired balance,  Impaired swallow  Patient participated in a comprehensive interdisciplinary inpatient rehabilitation program which included involvment of MD, therapies (PT, OT, and/or SLP), RN, CM, SW, dietary, and psychology services  She will continue to require further rehabilitation and considered safe to be discharged to SNF to continue her recovery process  Please see below for day of discharge function  She will continue her rehabilitation at SNF with SLP, PT, OT  Physical Therapy Occupational Therapy Speech Therapy   Weight Bearing Status: Full Weight Bearing  Transfers: Minimal Assistance, Incidental Touching  Bed Mobility: (pt been sleeping on recliner at home but required mod A x 1 for bed transfers here using bedrail on hospital bed)  Amulation Distance (ft): 60 feet  Ambulation: Minimal Assistance, Incidental Touching  Assistive Device for Ambulation: Roller Walker  Number of Stairs: 1  Assistive Device for Stairs: Bilateral [employer] [place]  Stair Assistance: Assist of 2(min of 1 with SBA of 2nd person for safety)  Discharge Recommendations: (25/7 S/assist for all mobilities/ADLs)   Eating: Supervision  Grooming: Moderate Assistance  Bathing: Maximum Assistance  Bathing: Maximum Assistance  Upper Body Dressing: Total Assistance  Lower Body Dressing: Total Assistance  Toileting: Total Assistance  Tub/Shower Transfer: (na)  Toilet Transfer: Moderate Assistance  Cognition: Exceptions to WNL  Cognition: Decreased Memory, Decreased Safety, Decreased Executive Functions, Decreased Attention  Orientation: Person, Place, Time, Situation   Mode of Communication: Verbal     Orientation: Place, Person, Time, Situation  Swallowing: Within Defined Limits  Swallowing: Oral Dysphagia, Pharyngeal Dysphagia, Aspiration Risk  Diet Recommendations: Level 2/Mechanically Altered, Thin  Discharge Recommendations: (pending pt progress)     Medical Issues Addressed While at [facility]:    Pain  · Mid to lower back pain:  Managed on Tylenol, Topical Lidoderm patch, p r n  Robaxin,   Patient also on Tramadol PRN  Checked [place]MP; no red flags identified; safe to proceed with prescription    Unable to electronically prescribe for SNF transfer  Printed script       DVT prophylaxis  ·  managed on subcutaneous heparin and SCDs     Bladder plan  · Incontinent at baseline      Bowel plan  · Continent    * Thoracic compression fracture (HCC)  Assessment & Plan  · T2, T5 and T6 compression fractures  · Treated conservatively  · TLSO brace while out of bed  · Follow-up as outpatient with Orthopedics or Neurosurgery    COPD exacerbation ([facility] [address] )  Assessment & Plan  · Wears oxygen 2-3 L at baseline  · At baseline feels SOB  · Continue current inhalers TID  · Monitor oxygen saturations with activity  · Recent CT scan +moderate emphysema  · Completed prednisone taper  · Follow-up with her Pulmonologist after [facility] discharge    Pressure ulcer  Assessment & Plan  · Located on Sacrum - Stage 3  · Wound care followed at [facility]  1-Hydraguard to bilateral sacrum, buttock and heels BID and PRN  2-Elevate heels to offload pressure  3-Ehob cushion in chair when out of bed  4-Moisturize skin daily with skin nourishing cream   5-Turn/reposition q2h or when medically stable for pressure re-distribution on skin  · Continue optimization of nutrition, pressure relieving techniques, and turns  · Nutrition also followed while at [facility]    UTI (urinary tract infection)  Assessment & Plan  · E Coli UTI  · Completed treatment     Chronic diastolic heart failure (HCC)  Assessment & Plan  Managed on Lasix 20mg daily      Type 2 diabetes mellitus with complication, without long-term current use of insulin ([facility])  Assessment & Plan  · Managed on glyburide - lower than home dose due to hypoglycemia  · CCD    VICKY (acute kidney injury) ([facility])-resolved as of 2/27/2021  Assessment & Plan  · Renal function baseline 0 9 - 1 1  · Renal following hypercalcemia/hyperkaemia (hyperkalemia likely due to diet - was drinking V8s and fruity drinks)  · Continue fluid restriction at 1500cc/day          Discharge Physical Examination:  Physical Exam  Vitals signs and nursing note reviewed  Constitutional:       General: She is not in acute distress  HENT:      Head: Normocephalic and atraumatic  Nose: Nose normal       Mouth/Throat:      Mouth: Mucous membranes are moist    Eyes:      Conjunctiva/sclera: Conjunctivae normal    Neck:      Musculoskeletal: Neck supple  Cardiovascular:      Rate and Rhythm: Normal rate and regular rhythm  Pulses: Normal pulses  Pulmonary:      Effort: Pulmonary effort is normal       Breath sounds: Wheezing present  No rales  Comments: Decreased BS throughout with occasional wheezes today  +oxygen via NC  Abdominal:      General: Bowel sounds are normal  There is no distension  Palpations: Abdomen is soft  Tenderness: There is no abdominal tenderness  Musculoskeletal:         General: No swelling  Comments: TLSO patient    Skin:     General: Skin is warm  Neurological:      Mental Status: She is alert and oriented to person, place, and time  Psychiatric:         Mood and Affect: Mood normal    Discharge Medications:   See after visit summary for reconciled discharge medications provided to patient and family  Condition at Discharge: fair     Discharge instructions/Information to patient and family:   See after visit summary for information provided to patient and family  Provisions for Follow-Up Care:  See after visit summary for information related to follow-up care and any pertinent home health orders  Future Appointments   Date Time Provider Ramesh Hanson   4/1/2021 10:30 AM Ag Mart MD MED [facility]   4/5/2021  9:40 AM Dayna Mendoza MD PULM E STR [facility]       Disposition: Skilled nursing facility [facility]      Planned Readmission: No    Discharge Statement   I spent more than 30 minutes discharging the patient  This time was spent on the day of discharge  I had direct contact with the patient on the day of discharge   Greater than 50% of the total time was spent examining patient, answering all patient questions, arranging and discussing plan of care with patient as well as directly providing post-discharge instructions  Additional time then spent on discharge activities  Discharge Medications:  See after visit summary for reconciled discharge medications provided to patient and family

## 2021-03-11 NOTE — PHYSICAL THERAPY NOTE
[facility] PT Discharge Summary  Pt demonstrated fair progress in PT  At time of d/c pt required mod A for supine to sit but S for rolling and sit to supine but needed to use bedrail  Although pt reported she has been sleeping on the recliner at home for over a year now  Pt required min-CG for chair/toilet transfers and CGA for household distance amb using a RW x 50-60' at best  Pt still required VC and assist for safe O2 line management during mobilities as well as assist to donned/doffed TLSO brace  Pt able to negotiate 4 steps but using bilat HR at min A level  Barriers to functional indep and safety include pain, fatigue, impaired standing hattie/balance, gait dysfunction, dec strength  Pt cont to demo inc caregiver burden and high risk for falls so unsafe to return home alone at this time  Pt scheduled to be d/c on 3/11/21 to SNF for further therapy

## 2021-03-11 NOTE — CASE MANAGEMENT
Team dc summary - pt made gains but was not yet able to return home alone and with oxygen it would not have been a safe dc  Family in agreement and selected [facility] for contd therapy services  Pt approved and transport was arranged with [facility] for 10am  Pt family facility and staff aware of dc arrangements

## 2021-03-11 NOTE — PROGRESS NOTES
Internal Medicine Progress Note  Patient: Angeles Klein  Age/sex: 78 y o  female  Medical Record #: [id number]      ASSESSMENT/PLAN: (Interval History)  Angeles Klein is seen and examined and management for following issues:    Compression fracture T5/6  · No surgical intervention  · Cont TLSO brace when OOB  · Aggressive rehab per PMR  · Pain mgmt per PMR     COPD  · Wears oxygen 2-3 L continuous  · Continue current inhalers/nebs per home  · Recent PFT DLCO 54%  · Oxygen saturations stable  · Prednisone complete  · Recent CT scan +moderate emphysema; no PE  · Nebs were changed back to scheduled from prn on 2/27/21     Chronic diastolic HF  · Cont furosemide 20mg daily here   · Takes Torsemide at home  · Monitor volume status closely  · Chronic LE edema  · Echo = LVEF 39%; diastolic dysfunction  · Renal managing  · stable     DM II  · Hgb A1c 7 1 in October  · Home meds: Glyburide 5mg BID  · Cont sliding scale and coverage  · Cont glyburide at 2 5mg qd started 3/1/21  · BS stable     HTN  · Diovan is on hold 2/2 VICKY/CKD and K+ increase  · tx per renal  · BPs acceptable      CKD  · Level 3  · Baseline 0 9-1 1  · Avoid nephrotoxic medications  · Currently at baseline  · Renal managing     Hypercalcemia  · Nephrology w/u at [facility] secondary to volume status  · Renal managing      Hyponatremia  · Improved  · Renal managing     Hyperkalemia  · On low K+ diet and Diovan is on hold  · tx per renal     Hypothyroid  · Cont home supplementation     Sacral decubitus  · Cont off loading, nutritional supplementation  · Wound nurse following     Oral ulcers  · Cont magic mouthwash q4 hrs as needed  · Feels this helps and uses 1-2x/day    OK for dc from medicine standpoint    The above assessment and plan was reviewed and updated as determined by my evaluation of the patient on 3/11/2021      Labs:   Results from last 7 days   Lab Units 03/11/21  0539 03/08/21  0541   WBC Thousand/uL 11 12* 10 49*   HEMOGLOBIN g/dL 9 6* 10 0*   HEMATOCRIT % 30 0* 31 2*   PLATELETS Thousands/uL 178 173     Results from last 7 days   Lab Units 03/11/21  0539 03/08/21  0541   SODIUM mmol/L 136 134*   POTASSIUM mmol/L 4 6 4 6   CHLORIDE mmol/L 96* 95*   CO2 mmol/L 39* 36*   BUN mg/dL 45* 54*   CREATININE mg/dL 0 90 0 86   CALCIUM mg/dL 10 6* 10 4*             Results from last 7 days   Lab Units 03/11/21  0633 03/10/21  2104 03/10/21  1615   POC GLUCOSE mg/dl 90 143* 93       Review of Scheduled Meds:  Current Facility-Administered Medications   Medication Dose Route Frequency Provider Last Rate    acetaminophen  650 mg Oral Q6H [address] Sara Rowland, MD      al mag oxide-diphenhydramine-lidocaine viscous  10 mL Swish & Spit Q4H PRN ROLA Shepard      albuterol  2 puff Inhalation Q6H PRN Sara Rowland MD      ALPRAZolam  0 25 mg Oral TID PRN Sara Rowland, MD      aluminum-magnesium hydroxide-simethicone  30 mL Oral Q6H PRN Sara Rowland, MD      atorvastatin  80 mg Oral Daily With Carlos Alberto Courser, MD      benzonatate  100 mg Oral TID PRN Sara Rowland, MD      bisacodyl  10 mg Rectal Daily PRN Sara Rowland, MD      fluticasone-vilanterol  1 puff Inhalation Daily Sara Rowland, MD      furosemide  20 mg Oral Daily Sara Rowland, MD      glyBURIDE  2 5 mg Oral Daily With Breakfast ROLA Shepard      guaiFENesin  600 mg Oral Q12H [address] Sara Rowland MD      heparin (porcine)  5,000 Units Subcutaneous Q8H [address] Sara Rowland, MD      insulin lispro  1-6 Units Subcutaneous HS Sara Rowland MD      insulin lispro  1-6 Units Subcutaneous TID AC Sara Rowland MD      ipratropium-albuterol  3 mL Nebulization TID Sara Rowland, MD      levothyroxine  125 mcg Oral Early Morning Sara Rowland, MD      lidocaine  2 patch Topical Daily Sara Rowland, MD      melatonin  6 mg Oral HS PRN Sara Rowland, MD      methocarbamol  500 mg Oral Q6H PRN Sara Rowland, MD      montelukast  10 mg Oral HS Sara Rowland, MD     [facility] nystatin   Topical BID Lainey Murphy MD      nystatin   Topical BID Lainey Murphy MD      ondansetron  4 mg Oral Q6H PRN Lainey Murphy MD      oxyCODONE  5 mg Oral Q6H PRN Laieny Murphy MD      pantoprazole  40 mg Oral Early Morning Lainey Murphy MD      polyethylene glycol  17 g Oral Daily MD Violet Lainez boulardii  250 mg Oral BID Lainey Murphy MD      senna  1 tablet Oral HS Lainey Murphy MD      traMADol  50 mg Oral Q6H PRN Lainey Murphy MD      white petrolatum-mineral oil   Topical TID PRN Lainey Murphy MD         Subjective/ HPI: Patient seen and examined  Patients overnight issues or events were reviewed with nursing or staff during rounds or morning huddle session  New or overnight issues include the following:     Pt without any issues or concerns overnight; anxious regarding transfer    ROS:   A 10 point ROS was performed; negative except as noted above  Imaging:     XR abdomen 1 view kub   Final Result by Shannen Lozada MD (03/01 [id number])      Large-volume stool, suggesting constipation  Nonobstructive bowel gas pattern                 Workstation performed: [id number]             *Labs /Radiology studies Reviewed  *Medications  reviewed and reconciled as needed  *Please refer to order section for additional ordered labs studies  *Case discussed with primary attending during morning huddle case rounds    Physical Examination:  Vitals:   Vitals:    03/10/21 1407 03/10/21 1909 03/10/21 2036 03/11/21 0500   BP:   160/74 139/70   BP Location:   Right arm Left arm   Pulse:   (!) 108 84   Resp:   18 20   Temp:   98 °F (36 7 °C) 98 2 °F (36 8 °C)   TempSrc:   Oral Oral   SpO2: 100% 100%  93%   Weight:       Height:           GEN: No apparent distress, interactive  NEURO: Alert and oriented x3  HEENT: Pupils are equal and reactive, EOMI, mucous membranes are moist, face symmetrical  CV: S1 S2 regular, no MRG, no peripheral edema noted  RESP: Lungs decreased throughout, scattered expiratory wheezing noted, 2L NC continuous; +RICO  GI: obese, soft non tender, non distended; +BS x4  : Voiding without difficulty; periods of incontinence  MUSC: Moves all extremities; generalized deconditioning  SKIN: pink, warm and dry, normal turgor, no rashes, lesions         The above physical exam was reviewed and updated as determined by my evaluation of the patient on 3/11/2021  Invasive Devices     None                    VTE Pharmacologic Prophylaxis: Heparin  Code Status: Level 3 - DNAR and DNI  Current Length of Stay: 15 day(s)      Total time spent:  30 minutes with more than 50% spent counseling/coordinating care  Counseling includes discussion with patient re: progress  and discussion with patient of his/her current medical state/information  Coordination of patient's care was performed in conjunction with primary service  Time invested included review of patient's labs, vitals, and management of their comorbidities with continued monitoring  In addition, this patient was discussed with medical team including physician and advanced extenders  The care of the patient was extensively discussed and appropriate treatment plan was formulated unique for this patient  ** Please Note:  voice to text software may have been used in the creation of this document   Although proof errors in transcription or interpretation are a potential of such software**

## 2021-03-11 NOTE — SPEECH THERAPY NOTE
SLP Discharge Summary    Pt to be discharged to subacute level of care on 3/11/2021 for continued skilled therapy services  Pt not able to advance diet back to baseline (regular/thin liquids) upon discharge  Followed pt primarily for dysphagia tx sessions, due to pt verbalizing weight loss of ~15-20 lbs in 3 months, along w/ difficulty in lower dentures fitting and currently does not wear them w/ meals  During this hospitalization, pt had demonstrated and verbalized "fear of choking" on food items, which pt had favored pudding, thin liquids, broths, ice cream, etc  Pt does have h/o COPD, which currently pt is on 3L of O2 and does get "winded" quickly  Pt did require increased encouragement to trial solids (bread w/ peanut butter) during assessment, again favoring broth and pudding items  Currently pt demonstrated mild-moderate oral deficits characterized by prolonged/effortful mastication of soft/solids, mild delay in manipulation/transfer of puree and thin liquids, but w/o oral residual or anterior loss  Mild pharyngeal deficits are noted characterized by delayed swallow initiation across consistencies, slight decrease in hyolaryngeal excursion exhibiting intermittent throat clearing during meals, but suspect due to difficulty of coordination breathing w/ swallowing  Throughout meal, pt verbalized as well as demonstrated "running out of breath" when talking, needing verbal cues to not talk while eating and educated on pacing during meals to conserve energy and decrease sensation of SOB  Pacing of meal is appropriate as well as ability to independently alternate solids/liquids, small bites/sips  Discussed w/ pt at end of the meal about the different modified dysphagia diet levels, including level 3 diet which includes chopped meats/vegetables, softer food items and level 2 diet, which includes minced meat/finely chopped vegetables and some pureed items   Pt stating that those diet levels suspect will be difficult for her to manage at this time  SLP then proceeded to educate pt on pureed diet, which all items would be the consistency of apple sauce and pudding  Pt was agreeable to try this diet, stating "I need protein and vegetables"  SLP reiterating that those items will come out smooth just as pudding textures  Again, reeducating pt that pureed diet will allow PO intake along w/ able to conserve energy during meals due to lack of increased time needed for chewing  At this time, recommendation is to downgrade diet to pureed diet w/ thin liquids, continuing aspiration precautions as well as recommending DISTANT supervision during meals  Also, recommending pt to be OOB for all meals as well as encouraging of PO intake throughout meals  Pt w/ limited trials of level 2 items at this time, but when trialing items, pt is demonstrating prolonged oral stage (mastication/manipulation/transfer) due to suspected fear of choking upon initiation of swallowing  No increased or overt aspiration sxs noted consistently during f/u sessions  If pt's overall willingness to trial other food textures remains decreased, suspect that puree diet will be safest/least restrictive at this time  Continued to be followed for dysphagia tx sessions, to which currently diet was able to be advanced from puree/thin liquids from last week to now dysphagia level 2 w/ thin liquids  Pt does continue to demonstrate more medical barriers of requiring 2-3L O2 which is baseline at this time, but does have increased SOB due to positioning primarily when getting seated FULLY upright in bed, noting better breathing when OOB in [facility] for meals  Pt is able to now wear lower denture more consistently which improved pt's tolerance of mastication provided level 2 as well as when introducing level 3 items which pt has been able to tolerate as well w/o increased oral residual/pocketing  Pt does not demonstrate increased aspiration risk for at this time during meals   Pt is able to demonstrate fair consistency in ability to recall swallow strategies during meal, BUT does continue to require verbal cues for NO TALKING during meals  Will continue to assess level 3 textures under SLP supervision only at this time for monitoring advancement of diet continuing aspiration precautions: DISTANT supervision and OOB or FULLY upright for meals, alternate solids/liquids during meals  Pt will continue to benefit from skilled SLP services at subacute level of care to establish and maximize overall diet to safest and least restrictive at this time w/o increased risk of aspiration

## 2021-03-11 NOTE — NURSING NOTE
[facility] gave report to Willy Kohli  All questions answered  Written rx for Tramadol given to transporter to give to facility  PRN dose administered prior to transport to help with the ride  Patient stable on 2L oxygen via NC  Continent of both bowel/bladder  Patient with resolved skin issues  Transported via stretcher with [facility]

## 2021-03-11 NOTE — PLAN OF CARE
Problem: Potential for Falls  Goal: Patient will remain free of falls  Description: INTERVENTIONS:  - Assess patient frequently for physical needs  -  Identify cognitive and physical deficits and behaviors that affect risk of falls  -  [place] fall precautions as indicated by assessment   - Educate patient/family on patient safety including physical limitations  - Instruct patient to call for assistance with activity based on assessment  - Modify environment to reduce risk of injury  - Consider OT/PT consult to assist with strengthening/mobility  Outcome: Adequate for Discharge     Problem: Prexisting or High Potential for Compromised Skin Integrity  Goal: Skin integrity is maintained or improved  Description: INTERVENTIONS:  - Identify patients at risk for skin breakdown  - Assess and monitor skin integrity  - Assess and monitor nutrition and hydration status  - Monitor labs   - Assess for incontinence   - Turn and reposition patient  - Assist with mobility/ambulation  - Relieve pressure over bony prominences  - Avoid friction and shearing  - Provide appropriate hygiene as needed including keeping skin clean and dry  - Evaluate need for skin moisturizer/barrier cream  - Collaborate with interdisciplinary team   - Patient/family teaching  - Consider wound care consult   Outcome: Adequate for Discharge     Problem: Nutrition/Hydration-ADULT  Goal: Nutrient/Hydration intake appropriate for improving, restoring or maintaining nutritional needs  Description: Monitor and assess patient's nutrition/hydration status for malnutrition  Collaborate with interdisciplinary team and initiate plan and interventions as ordered  Monitor patient's weight and dietary intake as ordered or per policy  Utilize nutrition screening tool and intervene as necessary  Determine patient's food preferences and provide high-protein, high-caloric foods as appropriate       INTERVENTIONS:  - Monitor oral intake, urinary output, labs, and treatment plans  - Assess nutrition and hydration status and recommend course of action  - Evaluate amount of meals eaten  - Assist patient with eating if necessary   - Allow adequate time for meals  - Recommend/ encourage appropriate diets, oral nutritional supplements, and vitamin/mineral supplements  - Order, calculate, and assess calorie counts as needed  - Recommend, monitor, and adjust tube feedings and TPN/PPN based on assessed needs  - Assess need for intravenous fluids  - Provide specific nutrition/hydration education as appropriate  - Include patient/family/caregiver in decisions related to nutrition  Outcome: Adequate for Discharge     Problem: PAIN - ADULT  Goal: Verbalizes/displays adequate comfort level or baseline comfort level  Description: Interventions:  - Encourage patient to monitor pain and request assistance  - Assess pain using appropriate pain scale  - Administer analgesics based on type and severity of pain and evaluate response  - Implement non-pharmacological measures as appropriate and evaluate response  - Consider cultural and social influences on pain and pain management  - Notify physician/advanced practitioner if interventions unsuccessful or patient reports new pain  Outcome: Adequate for Discharge     Problem: INFECTION - ADULT  Goal: Absence or prevention of progression during hospitalization  Description: INTERVENTIONS:  - Assess and monitor for signs and symptoms of infection  - Monitor lab/diagnostic results  - Monitor all insertion sites, i e  indwelling lines, tubes, and drains  - Monitor endotracheal if appropriate and nasal secretions for changes in amount and color  - [place] appropriate cooling/warming therapies per order  - Administer medications as ordered  - Instruct and encourage patient and family to use good hand hygiene technique  - Identify and instruct in appropriate isolation precautions for identified infection/condition  Outcome: Adequate for Discharge Problem: SAFETY ADULT  Goal: Patient will remain free of falls  Description: INTERVENTIONS:  - Assess patient frequently for physical needs  -  Identify cognitive and physical deficits and behaviors that affect risk of falls    -  [place] fall precautions as indicated by assessment   - Educate patient/family on patient safety including physical limitations  - Instruct patient to call for assistance with activity based on assessment  - Modify environment to reduce risk of injury  - Consider OT/PT consult to assist with strengthening/mobility  Outcome: Adequate for Discharge  Goal: Maintain or return to baseline ADL function  Description: INTERVENTIONS:  -  Assess patient's ability to carry out ADLs; assess patient's baseline for ADL function and identify physical deficits which impact ability to perform ADLs (bathing, care of mouth/teeth, toileting, grooming, dressing, etc )  - Assess/evaluate cause of self-care deficits   - Assess range of motion  - Assess patient's mobility; develop plan if impaired  - Assess patient's need for assistive devices and provide as appropriate  - Encourage maximum independence but intervene and supervise when necessary  - Involve family in performance of ADLs  - Assess for home care needs following discharge   - Consider OT consult to assist with ADL evaluation and planning for discharge  - Provide patient education as appropriate  Outcome: Adequate for Discharge  Goal: Maintain or return mobility status to optimal level  Description: INTERVENTIONS:  - Assess patient's baseline mobility status (ambulation, transfers, stairs, etc )    - Identify cognitive and physical deficits and behaviors that affect mobility  - Identify mobility aids required to assist with transfers and/or ambulation (gait belt, sit-to-stand, lift, walker, cane, etc )  - [place] fall precautions as indicated by assessment  - Record patient progress and toleration of activity level on Mobility SBAR; progress patient to next Phase/Stage  - Instruct patient to call for assistance with activity based on assessment  - Consider rehabilitation consult to assist with strengthening/weightbearing, etc   Outcome: Adequate for Discharge     Problem: DISCHARGE PLANNING  Goal: Discharge to home or other facility with appropriate resources  Description: INTERVENTIONS:  - Identify barriers to discharge w/patient and caregiver  - Arrange for needed discharge resources and transportation as appropriate  - Identify discharge learning needs (meds, wound care, etc )  - Arrange for interpretive services to assist at discharge as needed  - Refer to Case Management Department for coordinating discharge planning if the patient needs post-hospital services based on physician/advanced practitioner order or complex needs related to functional status, cognitive ability, or social support system  Outcome: Adequate for Discharge

## 2021-03-11 NOTE — TRANSPORTATION MEDICAL NECESSITY
Section I - General Information    Name of Patient: Lorraine Montes De Oca                 : 1940    Medicare #: [id number]  Transport Date: 21 (PCS is valid for round trips on this date and for all repetitive trips in the 60-day range as noted below )  Origin: [address] [place] [address]: [facility]  Is the pt's stay covered under Medicare Part A (PPS/DRG)   [x]     Closest appropriate facility? If no, why is transport to more distant facility required? Yes  If hospice pt, is this transport related to pt's terminal illness? No       Section II - Medical Necessity Questionnaire  Ambulance transportation is medically necessary only if other means of transport are contraindicated or would be potentially harmful to the patient  To meet this requirement, the patient must either be "bed confined" or suffer from a condition such that transport by means other than ambulance is contraindicated by the patient's condition  The following questions must be answered by the medical professional signing below for this form to be valid:    1)  Describe the MEDICAL CONDITION (physical and/or mental) of this patient AT [address] [place] [address] that requires the patient to be transported in an ambulance and why transport by other means is contraindicated by the patient's condition:patient requires 2 liters oxygen at all times    2) Is the patient "bed confined" as defined below? Yes  To be "be confined" the patient must satisfy all three of the following conditions: (1) unable to get up from bed without Assistance; AND (2) unable to ambulate; AND (3) unable to sit in a chair or wheelchair  3) Can this patient safely be transported by car or wheelchair van (i e , seated during transport without a medical attendant or monitoring)?    No    4) In addition to completing questions 1-3 above, please check any of the following conditions that apply*:   *Note: supporting documentation for any boxes checked must be maintained in the patient's medical records  If hosp-hosp transfer, describe services needed at 2nd facility not available at 1st facility? Requires oxygen-unable to self administer      Section III - Signature of Physician or Healthcare Professional  I certify that the above information is true and correct based on my evaluation of this patient, and represent that the patient requires transport by ambulance and that other forms of transport are contraindicated  I understand that this information will be used by the Centers for Medicare and Medicaid Services (CMS) to support the determination of medical necessity for ambulance services, and I represent that I have personal knowledge of the patient's condition at time of transport  []  If this box is checked, I also certify that the patient is physically or mentally incapable of signing the ambulance service's claim and that the institution with which I am affiliated has furnished care, services, or assistance to the patient  My signature below is made on behalf of the patient pursuant to 42 CFR §424 36(b)(4)  In accordance with 42 CFR §424 37, the specific reason(s) that the patient is physically or mentally incapable of signing the claim form is as follows: Natalia Samay[place] of Physician* or Healthcare Professional______________________________________________________________  Signature Date 03/11/21 (For scheduled repetitive transports, this form is not valid for transports performed more than 60 days after this date)    Printed Name & Credentials of Physician or Healthcare Professional (MD, DO, RN, etc )__JO Thomas______________________________  *Form must be signed by patient's attending physician for scheduled, repetitive transports   For non-repetitive, unscheduled ambulance transports, if unable to obtain the signature of the attending physician, any of the following may sign (choose appropriate option below)  [] Physician Assistant []  Clinical Nurse Specialist []  Registered Nurse  []  Nurse Practitioner  [x] Discharge Planner

## 2021-03-12 NOTE — ASSESSMENT & PLAN NOTE
Lab Results   Component Value Date    HGBA1C 7 1 (H) 10/12/2020   Pt remains on Glyburide 2 5 mg daily   Follow blood sugars

## 2021-03-12 NOTE — PROGRESS NOTES
[facility]  [address], [address] [address]    Nursing Home Admission    NAME: Nick Polanco  AGE: 78 y o  SEX: female [id number]      Patient Location   [address] [place] [address] care was coordinated with nursing facility staff  Recent vitals, labs and updated medications were reviewed on [facility]  Past Medical, surgical, social, medication and allergy history and patients previous records reviewed  Assessment/Plan:    Type 2 diabetes mellitus with complication, without long-term current use of insulin (HCC)    Lab Results   Component Value Date    HGBA1C 7 1 (H) 10/12/2020   Pt remains on Glyburide 2 5 mg daily  Follow blood sugars    Hypothyroidism  Continue Levothyroxine    COPD exacerbation (HCC)  Pt remains on chronic Oxygen therapy  Currently on a Prednisone taper  Continue Nebs    Chronic respiratory failure with hypoxia (HCC)  Continue oxygen    CHF (congestive heart failure) (HCC)  Wt Readings from Last 3 Encounters:   03/10/21 63 2 kg (139 lb 6 4 oz)   02/24/21 83 kg (182 lb 15 7 oz)   02/20/21 77 kg (169 lb 12 1 oz)       Stable  Continue maintenance diuretics, Lasix 20 mg daily      Chronic diastolic heart failure (HCC)  Wt Readings from Last 3 Encounters:   03/10/21 63 2 kg (139 lb 6 4 oz)   02/24/21 83 kg (182 lb 15 7 oz)   02/20/21 77 kg (169 lb 12 1 oz)             Thoracic compression fracture (HCC)  History of recent T2, T5 and T6 compression fractures  Continue TLSO brace     UTI (urinary tract infection)  S/P rcenet antibiotic treatment for UTI    Hyperlipidemia  Continue Statin    Hyperkalemia  K level 5 7 at the hospital, subsequently came down  F/U repeat BMP    Hypercalcemia  Calcium level noted to be 11 on 2/23, improved on subsequent BMPs  Will contiue to monitor    Pressure ulcer   Patient was recently noted to have stage III sacral ulcer    Continue local wound care per wound care team    VICKY:  Resolved      Chief Complaint       Nursing home admission   COPD exacerbation  Diabetes mellitus type 2, chronic diastolic CHF, thoracic compression fracture, UTI, hypercalcemia, CKD with recent VICKY      HPI     Patient is a 78 y o  female  With past medical history significant for chronic diastolic CHF, diabetes mellitus type 2, CKD and osteoarthritis  patient was recently hospitalized with back pain and shortness of breath  Workup revealed compression fracture of T5-T6  She she was also noted to have superior endplate T2 compression fracture with approximately 10-20% height loss at the superior endplate  Patient was seen in consultation by orthopedic and neurosurgical service and conservative treatment with TLSO brace was recommended  During the hospital stay patient was also treated for E coli UTI and  Additionally received steroids for COPD exacerbation along with inhalers and nebulizer treatments  Patient's hospital stay was also complicated by VICKY hypercalcemia and hyperkalemia  She was followed by Nephrology service  Electrolytes later stabilized  Patient received treatment for sacral pressure ulcer by wound care team   She was subsequently transferred to [facility] for rehab where she is being seen for post hospital admission  At the time of my evaluation patient is doing okay except  complains of generalized pain       Past Medical History:   Diagnosis Date    Cataract     CHF (congestive heart failure) (HCC)     Leukocytosis        Past Surgical History:   Procedure Laterality Date    APPENDECTOMY      CYSTOCELE REPAIR      ANTERIOR COLPORRHAPHY     ORIF ANKLE FRACTURE Left     TOTAL ABDOMINAL HYSTERECTOMY         Social History     Tobacco Use   Smoking Status Former Smoker    Packs/day: 2 00    Years: 48 00    Pack years: 96 00    Types: Cigarettes    Quit date: 1999    Years since quittin 4   Smokeless Tobacco Never Used          Family History Problem Relation Age of Onset    No Known Problems Mother     No Known Problems Father         Allergies   Allergen Reactions    Erythromycin     Sulfa Antibiotics           Current Outpatient Medications:     acetaminophen (TYLENOL) 325 mg tablet, Take 2 tablets (650 mg total) by mouth every 6 (six) hours, Disp:  , Rfl: 0    al mag oxide-diphenhydramine-lidocaine viscous (MAGIC MOUTHWASH) 1:1:1 suspension, Swish and spit 10 mL every 4 (four) hours as needed for mouth pain or discomfort, Disp:  , Rfl: 0    albuterol (PROVENTIL HFA,VENTOLIN HFA) 90 mcg/act inhaler, Inhale 2 puffs every 6 (six) hours as needed for wheezing, Disp:  , Rfl: 0    atorvastatin (LIPITOR) 80 mg tablet, Take 1 tablet (80 mg total) by mouth daily with dinner, Disp:  , Rfl: 0    benzonatate (TESSALON PERLES) 100 mg capsule, Take 1 capsule (100 mg total) by mouth 3 (three) times a day as needed for cough, Disp: 20 capsule, Rfl: 0    fluticasone-vilanterol (BREO ELLIPTA) 100-25 mcg/inh inhaler, Inhale 1 puff daily Rinse mouth after use , Disp:  , Rfl: 0    FML FORTE 0 25 % ophthalmic suspension, INSTILL 1 DROP INTO BOTH EYES EVERY DAY, Disp: , Rfl:     furosemide (LASIX) 20 mg tablet, Take 1 tablet (20 mg total) by mouth daily, Disp:  , Rfl: 0    glyBURIDE (DIABETA) 2 5 mg tablet, Take 1 tablet (2 5 mg total) by mouth daily with breakfast, Disp:  , Rfl: 0    guaiFENesin (MUCINEX) 600 mg 12 hr tablet, Take 1 tablet (600 mg total) by mouth every 12 (twelve) hours for 7 days, Disp: , Rfl: 0    Heparin Sodium, Porcine, (heparin, porcine,) 5,000 units/mL, Inject 1 mL (5,000 Units total) under the skin every 8 (eight) hours, Disp: 1 mL, Rfl: 0    Insulin Pen Needle (B-D UF III MINI PEN NEEDLES) 31G X 5 MM MISC, by Does not apply route, Disp: , Rfl:     ipratropium-albuterol (DUO-NEB) 0 5-2 5 mg/3 mL nebulizer solution, Take 1 vial (3 mL total) by nebulization 3 (three) times a day, Disp:  , Rfl: 0    levothyroxine 125 mcg tablet, Take 1 tablet (125 mcg total) by mouth daily in the early morning, Disp:  , Rfl: 0    lidocaine (LIDODERM) 5 %, Apply 2 patches topically daily Remove & Discard patch within 12 hours or as directed by MD, Disp:  , Rfl: 0    melatonin 3 mg, Take 2 tablets (6 mg total) by mouth daily at bedtime as needed (insomnia), Disp:  , Rfl: 0    methocarbamol (ROBAXIN) 500 mg tablet, Take 1 tablet (500 mg total) by mouth every 6 (six) hours as needed for muscle spasms, Disp:  , Rfl: 0    montelukast (SINGULAIR) 10 mg tablet, Take 1 tablet (10 mg total) by mouth daily at bedtime, Disp:  , Rfl: 0    nystatin (MYCOSTATIN) cream, Apply topically 2 (two) times a day Under breasts and abdominal folds, Disp: 30 g, Rfl: 0    nystatin (MYCOSTATIN) powder, Apply topically 2 (two) times a day Groin, Disp: 15 g, Rfl: 0    pantoprazole (PROTONIX) 40 mg tablet, Take 1 tablet (40 mg total) by mouth daily in the early morning, Disp:  , Rfl: 0    PAZEO 0 7 % SOLN, INSTILL 1 DROP INTO BOTH EYES EVERY DAY, Disp: , Rfl: 4    traMADol (ULTRAM) 50 mg tablet, Take 1 tablet (50 mg total) by mouth every 6 (six) hours as needed for moderate pain for up to 10 days, Disp: 20 tablet, Rfl: 0    Updated list was reviewed in [facility] system of facility  Vitals:    03/14/21 1605   BP: 134/74   Pulse: 80   Resp: 18   Temp: 97 5 °F (36 4 °C)   SpO2: 96%       Review of Systems   Constitutional: Positive for fatigue  Negative for chills and fever  HENT: Negative for nosebleeds and rhinorrhea  Eyes: Negative for discharge and redness  Respiratory: Negative for cough, chest tightness, shortness of breath, wheezing and stridor  Cardiovascular: Negative for chest pain and leg swelling  Gastrointestinal: Negative for abdominal distention, abdominal pain, diarrhea and vomiting  Genitourinary: Negative for dysuria, flank pain and hematuria     Musculoskeletal: Positive for arthralgias (Patient complains of generalized aches and pains), back pain and gait problem  Skin: Negative for pallor  Neurological: Positive for weakness (Generalized)  Negative for tremors, seizures, syncope and headaches  Psychiatric/Behavioral: Negative for agitation, behavioral problems and confusion  Physical Exam  Constitutional:       General: She is not in acute distress  Appearance: She is well-developed  She is not diaphoretic  HENT:      Head: Normocephalic and atraumatic  Nose: No rhinorrhea  Eyes:      General: No scleral icterus  Right eye: No discharge  Left eye: No discharge  Neck:      Musculoskeletal: Neck supple  Cardiovascular:      Rate and Rhythm: Normal rate and regular rhythm  Pulmonary:      Effort: No respiratory distress  Breath sounds: No stridor  No wheezing  Abdominal:      General: There is no distension  Palpations: Abdomen is soft  Tenderness: There is no abdominal tenderness  There is no guarding  Musculoskeletal:      Right lower leg: No edema  Left lower leg: No edema  Skin:     Coloration: Skin is not jaundiced or pale  Findings: Bruising (Ecchymosis involving upper extremities) present  Comments: Chronic with mentation involving RLE  B/L  Heel protectors in place   Neurological:      General: No focal deficit present  Mental Status: She is alert  Cranial Nerves: No cranial nerve deficit  Psychiatric:         Mood and Affect: Mood normal          Behavior: Behavior normal            Diagnostic Data       Recent labs were reviewed    Lab Results   Component Value Date    WBC 11 12 (H) 03/11/2021    HGB 9 6 (L) 03/11/2021    HCT 30 0 (L) 03/11/2021    MCV 94 03/11/2021     03/11/2021     Lab Results   Component Value Date    SODIUM 136 03/11/2021    K 4 6 03/11/2021    CL 96 (L) 03/11/2021    CO2 39 (H) 03/11/2021    BUN 45 (H) 03/11/2021    CREATININE 0 90 03/11/2021    GLUC 81 03/11/2021    CALCIUM 10 6 (H) 03/11/2021         Additional notes:    on glyburide 2 5 mg daily  t treatment    Continue PT OT   continue local sacral wound care   check blood sugars    Follow-up BMP   discontinue heparin once patient is more ambulatory      This note was electronically signed by Dr Zhao Andrews

## 2021-03-12 NOTE — ASSESSMENT & PLAN NOTE
Wt Readings from Last 3 Encounters:   03/10/21 63 2 kg (139 lb 6 4 oz)   02/24/21 83 kg (182 lb 15 7 oz)   02/20/21 77 kg (169 lb 12 1 oz)

## 2021-03-12 NOTE — TELEPHONE ENCOUNTER
I called patient to schedule a hospital follow up appointment  Patient was seen in the hospital by our Office

## 2021-03-12 NOTE — ASSESSMENT & PLAN NOTE
Wt Readings from Last 3 Encounters:   03/10/21 63 2 kg (139 lb 6 4 oz)   02/24/21 83 kg (182 lb 15 7 oz)   02/20/21 77 kg (169 lb 12 1 oz)       Stable   Continue maintenance diuretics, Lasix 20 mg daily

## 2021-03-12 NOTE — ASSESSMENT & PLAN NOTE
Pt remains on chronic Oxygen therapy  Currently on a Prednisone taper   Continue [facility]

## 2021-03-15 NOTE — TELEPHONE ENCOUNTER
Patient is at [facility]  I left a message for the  to call the office back to schedule

## 2021-03-17 NOTE — ASSESSMENT & PLAN NOTE
Multiple compression fractures of thoracic spine with severe pain, and the pain is affecting patient's ability to   Ambulate and also causing her dyspnea sometimes  continue pain management      To consider discussion with IR for possible vertebroplasty as outpatient

## 2021-03-17 NOTE — PROGRESS NOTES
Consultation - Pulmonary Medicine   Jerry Vee 78 y o  female MRN: [id number]    Physician Requesting Consult: Alber Gimenez, Dr Kathryn Weeks  Reason for Consult:  COPD    COPD exacerbation [facility])   Currently stable, continue Breo and albuterol/Atrovent q i d     Encourage incentive spirometry    Chronic respiratory failure with hypoxia ([address] )   Continue oxygen to maintain pulse ox more than 88%    Thoracic compression fracture (HCC)   Multiple compression fractures of thoracic spine with severe pain, and the pain is affecting patient's ability to   Ambulate and also causing her dyspnea sometimes  continue pain management  To consider discussion with IR for possible vertebroplasty as outpatient       currently patient has negative PCR for COVID-19      discussed the above with  Dr Kathryn Weeks  And respiratory therapy      ______________________________________________________________________    HPI:    Jerry Vee is a 78 y o  female who  Has COPD currently managed with Breo and albuterol/ ipratropum nebulizer therapy  Patient has mild dyspnea on exertion that gets worse when she has back pain  She denies cough or wheezing or sputum production, denies chest pain currently but has significant back pain that can increase to 8/10 sometimes secondary to vertebral fractures  She has a vest when she is walking and now she is lying in bed complaining of pain  She denies GERD or dysphagia, denies nausea or vomiting or abdominal pain  She is a former smoker, smoked more than 80-90 pack year and quit 22 years ago  No occupational exposure  Review of Systems:  Review of Systems   Constitutional: Negative  HENT: Negative  Eyes: Negative  Respiratory: Positive for shortness of breath  Cardiovascular: Negative  Gastrointestinal: Negative  Endocrine: Negative  Genitourinary: Negative  Musculoskeletal: Positive for back pain  Skin: Negative  Allergic/Immunologic: Negative  Neurological: Negative  Hematological: Negative  Psychiatric/Behavioral: Negative  Aside from what is mentioned in the HPI, the review of systems otherwise negative      Current Medications:    Current Outpatient Medications:     acetaminophen (TYLENOL) 325 mg tablet, Take 2 tablets (650 mg total) by mouth every 6 (six) hours, Disp:  , Rfl: 0    al mag oxide-diphenhydramine-lidocaine viscous (MAGIC MOUTHWASH) 1:1:1 suspension, Swish and spit 10 mL every 4 (four) hours as needed for mouth pain or discomfort, Disp:  , Rfl: 0    albuterol (PROVENTIL HFA,VENTOLIN HFA) 90 mcg/act inhaler, Inhale 2 puffs every 6 (six) hours as needed for wheezing, Disp:  , Rfl: 0    atorvastatin (LIPITOR) 80 mg tablet, Take 1 tablet (80 mg total) by mouth daily with dinner, Disp:  , Rfl: 0    benzonatate (TESSALON PERLES) 100 mg capsule, Take 1 capsule (100 mg total) by mouth 3 (three) times a day as needed for cough, Disp: 20 capsule, Rfl: 0    fluticasone-vilanterol (BREO ELLIPTA) 100-25 mcg/inh inhaler, Inhale 1 puff daily Rinse mouth after use , Disp:  , Rfl: 0    FML FORTE 0 25 % ophthalmic suspension, INSTILL 1 DROP INTO BOTH EYES EVERY DAY, Disp: , Rfl:     furosemide (LASIX) 20 mg tablet, Take 1 tablet (20 mg total) by mouth daily, Disp:  , Rfl: 0    glyBURIDE (DIABETA) 2 5 mg tablet, Take 1 tablet (2 5 mg total) by mouth daily with breakfast, Disp:  , Rfl: 0    guaiFENesin (MUCINEX) 600 mg 12 hr tablet, Take 1 tablet (600 mg total) by mouth every 12 (twelve) hours for 7 days, Disp: , Rfl: 0    Heparin Sodium, Porcine, (heparin, porcine,) 5,000 units/mL, Inject 1 mL (5,000 Units total) under the skin every 8 (eight) hours, Disp: 1 mL, Rfl: 0    Insulin Pen Needle (B-D UF III MINI PEN NEEDLES) 31G X 5 MM MISC, by Does not apply route, Disp: , Rfl:     ipratropium-albuterol (DUO-NEB) 0 5-2 5 mg/3 mL nebulizer solution, Take 1 vial (3 mL total) by nebulization 3 (three) times a day, Disp:  , Rfl: 0   levothyroxine 125 mcg tablet, Take 1 tablet (125 mcg total) by mouth daily in the early morning, Disp:  , Rfl: 0    lidocaine (LIDODERM) 5 %, Apply 2 patches topically daily Remove & Discard patch within 12 hours or as directed by MD, Disp:  , Rfl: 0    melatonin 3 mg, Take 2 tablets (6 mg total) by mouth daily at bedtime as needed (insomnia), Disp:  , Rfl: 0    methocarbamol (ROBAXIN) 500 mg tablet, Take 1 tablet (500 mg total) by mouth every 6 (six) hours as needed for muscle spasms, Disp:  , Rfl: 0    montelukast (SINGULAIR) 10 mg tablet, Take 1 tablet (10 mg total) by mouth daily at bedtime, Disp:  , Rfl: 0    nystatin (MYCOSTATIN) cream, Apply topically 2 (two) times a day Under breasts and abdominal folds, Disp: 30 g, Rfl: 0    nystatin (MYCOSTATIN) powder, Apply topically 2 (two) times a day Groin, Disp: 15 g, Rfl: 0    pantoprazole (PROTONIX) 40 mg tablet, Take 1 tablet (40 mg total) by mouth daily in the early morning, Disp:  , Rfl: 0    PAZEO 0 7 % SOLN, INSTILL 1 DROP INTO BOTH EYES EVERY DAY, Disp: , Rfl: 4    traMADol (ULTRAM) 50 mg tablet, Take 1 tablet (50 mg total) by mouth every 6 (six) hours as needed for moderate pain for up to 10 days, Disp: 20 tablet, Rfl: 0    Historical Information   Past Medical History:   Diagnosis Date    Cataract     CHF (congestive heart failure) (HCC)     Leukocytosis      Past Surgical History:   Procedure Laterality Date    APPENDECTOMY      CYSTOCELE REPAIR      ANTERIOR COLPORRHAPHY     ORIF ANKLE FRACTURE Left 1979    TOTAL ABDOMINAL HYSTERECTOMY       Social History   Social History     Tobacco Use   Smoking Status Former Smoker    Packs/day: 2 00    Years: 48 00    Pack years: 96 00    Types: Cigarettes    Quit date: 1999    Years since quittin 4   Smokeless Tobacco Never Used       Occupational history:    No occupational exposure    Family History:   Family History   Problem Relation Age of Onset    No Known Problems Mother    Mary Christensen No Known Problems Father          PhysicalExamination:  Vitals: There were no vitals taken for this visit  vital signs reviewed      General: alert, not in acute distress  HEENT: PERRL, no icteric sclera or cyanosis, no thrush  Neck:  Supple, no lymphadenopathy or thyromegaly, no JVD  Lungs:  Equal breath sounds and clear auscultations bilaterally, no wheezing or crackles  Heart: S1 S2 regular, no murmurs or gallops  Abdomen: soft, nontender, bowel sounds  present  Extremities:   Trace non pitting edema, no clubbing or cyanosis  Neuro: Alert and oriented x 3, no focal neuro deficits   Skin: intact, no rashes      Diagnostic Data:  Labs: I personally reviewed the most recent laboratory data pertinent to today's visit    Lab Results   Component Value Date    WBC 11 12 (H) 03/11/2021    HGB 9 6 (L) 03/11/2021    HCT 30 0 (L) 03/11/2021    MCV 94 03/11/2021     03/11/2021     Lab Results   Component Value Date    GLUCOSE 145 (H) 10/19/2015    CALCIUM 10 6 (H) 03/11/2021     10/19/2015    K 4 6 03/11/2021    CO2 39 (H) 03/11/2021    CL 96 (L) 03/11/2021    BUN 45 (H) 03/11/2021    CREATININE 0 90 03/11/2021     No results found for: IGE  Lab Results   Component Value Date    ALT 35 02/28/2021    AST 14 02/28/2021    ALKPHOS 184 (H) 02/28/2021    BILITOT 0 68 10/19/2015       PFT results: The most recent pulmonary function tests were reviewed  severe restrictive airflow limitation on spirometry with no evidence of obstruction but there was significant bronchodilator response  Probably questionable spirometry and not very valid  Lung volumes normal but increased RV/TLC ratio indicating some air trapping  Moderately impaired diffusion capacity      Obstructive and restrictive flow volume loop   6 minutes walk test: Requires 3 L of oxygen with exertion    Imaging:  I personally reviewed the images on the [facility] system pertinent to today's visit   chest CT scan reviewed on PACs:  Moderate emphysematous changes with no masses or nodules  MRI of thoracic spine:  IMPRESSION:  Compression fracture of T5 (33% height loss approximately) and T6 (approximately 25% height loss  )  There is also superior endplate T2 compression fracture with approximately 10%-20 percent height loss at the superior endplate  These were previously   identified on recent CT February 17, 2021  No evidence of bony retropulsion   Multilevel thoracolumbar degenerative changes without obvious spinal canal stenosis    Other studies:   echocardiogram: LVEF 15%, grade 1 diastolic dysfunction, normal RV with estimated systolic pressure 40      Ryne Delgado MD

## 2021-03-18 NOTE — ASSESSMENT & PLAN NOTE
Wt Readings from Last 3 Encounters:   03/17/21 68 kg (150 lb)   03/14/21 68 9 kg (152 lb)   03/10/21 63 2 kg (139 lb 6 4 oz)      CHF clinically compensated    Continue maintenance dose of diuretic 20 mg daily

## 2021-03-18 NOTE — PROGRESS NOTES
[address]  [address]   [address] [facility] [address] [facility] [address]  47     Progress Note  Code SNF 31    Patient Location     [facility]    Reason for visit     F/U COPD, DM2, Hypothyroidism, CHF, Thoracic compression fracture, Hypercalcemia    Patients care was coordinated with nursing facility staff  Recent vitals, labs and [facility]  Past Medical, surgical, social, medication and allergy history and patients previous records reviewed  Problem List Items Addressed This Visit        Endocrine    Type 2 diabetes mellitus with complication, without long-term current use of insulin ([facility] )       Lab Results   Component Value Date    HGBA1C 7 1 (H) 10/12/2020    blood sugars have been staying between  range with isolated increase of 274  Will change glyburide to glipizide 2 5 mg daily considering risk of hypoglycemia in this age group with glyburide          Relevant Medications    glipiZIDE (GLUCOTROL) 10 mg tablet    Hypothyroidism      Continue Levothyroxine            Respiratory    COPD exacerbation ([facility] ) - Primary      Patient has issues with chronic intermittent shortness of breath  No bronchospasm at present  Continue nebulizer  Treatment and Breo         Chronic respiratory failure with hypoxia [facility])      Patient has a chronic oxygen therapy            Cardiovascular and Mediastinum    CHF (congestive heart failure) (HCC)     Wt Readings from Last 3 Encounters:   03/17/21 68 kg (150 lb)   03/14/21 68 9 kg (152 lb)   03/10/21 63 2 kg (139 lb 6 4 oz)      CHF clinically compensated  Continue maintenance dose of diuretic 20 mg daily              Musculoskeletal and Integument    Thoracic compression fracture [facility])      Patient was recently diagnosed with multiple   Compression fractures of thoracic spine    She was evaluated by both orthopedic and neurosurgical service and conservative treatment with TLSO brace was recommended  Continue oxycodone p r n  Evelyn Sequin Patient remains on  subcu heparin for DVT prophylaxis  Will continue same  until pt is more ambulatory            Genitourinary    UTI (urinary tract infection)      History of recent UTI  Patient completed antimicrobial therapy         Stage 3a chronic kidney disease     Lab Results   Component Value Date    EGFR 60 03/11/2021    EGFR 63 03/08/2021    EGFR 53 03/06/2021    CREATININE 0 90 03/11/2021    CREATININE 0 86 03/08/2021    CREATININE 1 00 03/06/2021    Recent creatinine was   79  Avoid hypotension and nephrotoxins  Continue to monitor BMP periodically            Other    Hypercalcemia      Calcium level was noted to be 11 on 02/23  recent creatinine level was normal  at 10 1  Continue to monitor periodically         Hyperkalemia      Resolved  Potassium level was 3 7 on 03/17/2021                   HPI        Patient is being seen for a follow-up visit  She  is doing okay at present except complains of chronic shortness of breath  Remains on oxygen therapy  Currently saturating at 96%  patient is awake alert, able to make her needs known  Continues with intermittent back pain  TLSO brace remains in place provide the patient remains on p r n  oxycodone  Blood sugars are controlled , fasting blood sugar was low at 82 this morning  Patient remains on glyburide 2 5 mg daily  Patient has lost 2 lb since admission  Clinically there are no signs of CHF  Patient additionally complains of having loose stools, wounds laxatives discontinued  Medication list reviewed  Patient is currently not on any stool softeners or laxatives  Review of Systems   Constitutional: Positive for fatigue  Negative for chills and fever  HENT: Negative for nosebleeds and rhinorrhea  Eyes: Negative for discharge and redness  Respiratory: Negative for cough, chest tightness, wheezing and stridor  Cardiovascular: Negative    Negative for leg swelling  Gastrointestinal: Negative for abdominal distention, abdominal pain, diarrhea and vomiting  Patient reports having loose stools, laxatives discontinued   Genitourinary: Negative for dysuria, flank pain and hematuria  Musculoskeletal: Positive for back pain and gait problem  Negative for arthralgias  Skin: Negative for pallor  Neurological: Positive for weakness (Generalized)  Negative for tremors, seizures, syncope and headaches  Psychiatric/Behavioral: Negative for agitation, behavioral problems and confusion         Past Medical History:   Diagnosis Date    Cataract     CHF (congestive heart failure) (HCC)     Leukocytosis        Past Surgical History:   Procedure Laterality Date    APPENDECTOMY      CYSTOCELE REPAIR      ANTERIOR COLPORRHAPHY     ORIF ANKLE FRACTURE Left 1979    TOTAL ABDOMINAL HYSTERECTOMY         Social History     Tobacco Use   Smoking Status Former Smoker    Packs/day: 2 00    Years: 48 00    Pack years: 96 00    Types: Cigarettes    Quit date: 1999    Years since quittin 4   Smokeless Tobacco Never Used       Family History   Problem Relation Age of Onset    No Known Problems Mother     No Known Problems Father         Allergies   Allergen Reactions    Erythromycin     Sulfa Antibiotics          Current Outpatient Medications:     glipiZIDE (GLUCOTROL) 10 mg tablet, Take 2 5 mg by mouth daily, Disp: , Rfl:     acetaminophen (TYLENOL) 325 mg tablet, Take 2 tablets (650 mg total) by mouth every 6 (six) hours, Disp:  , Rfl: 0    al mag oxide-diphenhydramine-lidocaine viscous (MAGIC MOUTHWASH) 1:1:1 suspension, Swish and spit 10 mL every 4 (four) hours as needed for mouth pain or discomfort, Disp:  , Rfl: 0    albuterol (PROVENTIL HFA,VENTOLIN HFA) 90 mcg/act inhaler, Inhale 2 puffs every 6 (six) hours as needed for wheezing, Disp:  , Rfl: 0    atorvastatin (LIPITOR) 80 mg tablet, Take 1 tablet (80 mg total) by mouth daily with dinner, Disp:  , Rfl: 0    benzonatate (TESSALON PERLES) 100 mg capsule, Take 1 capsule (100 mg total) by mouth 3 (three) times a day as needed for cough, Disp: 20 capsule, Rfl: 0    fluticasone-vilanterol (BREO ELLIPTA) 100-25 mcg/inh inhaler, Inhale 1 puff daily Rinse mouth after use , Disp:  , Rfl: 0    FML FORTE 0 25 % ophthalmic suspension, INSTILL 1 DROP INTO BOTH EYES EVERY DAY, Disp: , Rfl:     furosemide (LASIX) 20 mg tablet, Take 1 tablet (20 mg total) by mouth daily, Disp:  , Rfl: 0    guaiFENesin (MUCINEX) 600 mg 12 hr tablet, Take 1 tablet (600 mg total) by mouth every 12 (twelve) hours for 7 days, Disp: , Rfl: 0    Heparin Sodium, Porcine, (heparin, porcine,) 5,000 units/mL, Inject 1 mL (5,000 Units total) under the skin every 8 (eight) hours, Disp: 1 mL, Rfl: 0    Insulin Pen Needle (B-D UF III MINI PEN NEEDLES) 31G X 5 MM MISC, by Does not apply route, Disp: , Rfl:     ipratropium-albuterol (DUO-NEB) 0 5-2 5 mg/3 mL nebulizer solution, Take 1 vial (3 mL total) by nebulization 3 (three) times a day, Disp:  , Rfl: 0    levothyroxine 125 mcg tablet, Take 1 tablet (125 mcg total) by mouth daily in the early morning, Disp:  , Rfl: 0    lidocaine (LIDODERM) 5 %, Apply 2 patches topically daily Remove & Discard patch within 12 hours or as directed by MD, Disp:  , Rfl: 0    melatonin 3 mg, Take 2 tablets (6 mg total) by mouth daily at bedtime as needed (insomnia), Disp:  , Rfl: 0    methocarbamol (ROBAXIN) 500 mg tablet, Take 1 tablet (500 mg total) by mouth every 6 (six) hours as needed for muscle spasms, Disp:  , Rfl: 0    montelukast (SINGULAIR) 10 mg tablet, Take 1 tablet (10 mg total) by mouth daily at bedtime, Disp:  , Rfl: 0    nystatin (MYCOSTATIN) cream, Apply topically 2 (two) times a day Under breasts and abdominal folds, Disp: 30 g, Rfl: 0    nystatin (MYCOSTATIN) powder, Apply topically 2 (two) times a day Groin, Disp: 15 g, Rfl: 0    pantoprazole (PROTONIX) 40 mg tablet, Take 1 tablet (40 mg total) by mouth daily in the early morning, Disp:  , Rfl: 0    PAZEO 0 7 % SOLN, INSTILL 1 DROP INTO BOTH EYES EVERY DAY, Disp: , Rfl: 4    traMADol (ULTRAM) 50 mg tablet, Take 1 tablet (50 mg total) by mouth every 6 (six) hours as needed for moderate pain for up to 10 days, Disp: 20 tablet, Rfl: 0    Updated list was reviewed in [facility] of facility  Vitals:    03/17/21 2225   BP: 138/74   Pulse: 86   Resp: 18   Temp: (!) 97 2 °F (36 2 °C)   SpO2: 96%       Physical Exam  Constitutional:       General: She is not in acute distress  Appearance: She is well-developed  She is not diaphoretic  HENT:      Head: Normocephalic and atraumatic  Nose: No rhinorrhea  Eyes:      General:         Right eye: No discharge  Left eye: No discharge  Neck:      Musculoskeletal: Neck supple  Cardiovascular:      Rate and Rhythm: Normal rate and regular rhythm  Pulmonary:      Effort: No respiratory distress  Breath sounds: No stridor  No wheezing  Comments: Patient remains on chronic oxygen therapy  Abdominal:      General: There is no distension  Comments: TLSO brace in place   Musculoskeletal:      Comments: Mild edema involving bilateral lower extremities   Skin:     Coloration: Skin is not jaundiced or pale  Findings: No bruising  Neurological:      General: No focal deficit present  Mental Status: She is alert  Diagnostic Data:    Recent labs were reviewed     labs done on 03/17/2021 revealed hemoglobin of 10 1 WBC count 7 2, platelet count 126, BUN 26, creatinine 0 79, sodium 140, potassium 3 7, calcium 10 1    Additional Notes:    discontinue glyburide  Start glipizide 2 5 mg daily    This note was electronically signed by Dr Reynaldo Cespedes

## 2021-03-18 NOTE — ASSESSMENT & PLAN NOTE
Patient has issues with chronic intermittent shortness of breath  No bronchospasm at present    Continue nebulizer  Treatment and Breo

## 2021-03-18 NOTE — ASSESSMENT & PLAN NOTE
Lab Results   Component Value Date    EGFR 60 03/11/2021    EGFR 63 03/08/2021    EGFR 53 03/06/2021    CREATININE 0 90 03/11/2021    CREATININE 0 86 03/08/2021    CREATININE 1 00 03/06/2021    Recent creatinine was   79  Avoid hypotension and nephrotoxins    Continue to monitor BMP periodically

## 2021-03-18 NOTE — ASSESSMENT & PLAN NOTE
Lab Results   Component Value Date    HGBA1C 7 1 (H) 10/12/2020    blood sugars have been staying between  range with isolated increase of 274   Will change glyburide to glipizide 2 5 mg daily considering risk of hypoglycemia in this age group with glyburide

## 2021-03-18 NOTE — ASSESSMENT & PLAN NOTE
Calcium level was noted to be 11 on 02/23       recent creatinine level was normal  at 10 1  Continue to monitor periodically

## 2021-03-18 NOTE — ASSESSMENT & PLAN NOTE
Patient was recently diagnosed with multiple   Compression fractures of thoracic spine  She was evaluated by both orthopedic and neurosurgical service and conservative treatment with TLSO brace was recommended  Continue oxycodone p r n  Rojelio Pipe Patient remains on  subcu heparin for DVT prophylaxis    Will continue same  until pt is more ambulatory

## 2021-03-22 NOTE — TELEPHONE ENCOUNTER
I called [employer] and told the [place] that I have left a couple messages with the  to schedule patient for a hospital follow up appointment  I asked her if anyone was in today that we could schedule an appointment  I was placed on hold and was transferred  I left a message for them to call the office back and ask for me to schedule

## 2021-03-24 NOTE — ASSESSMENT & PLAN NOTE
Wt Readings from Last 3 Encounters:   03/17/21 68 kg (150 lb)   03/14/21 68 9 kg (152 lb)   03/10/21 63 2 kg (139 lb 6 4 oz)         Pt is clinically euvolemic, she has lost weight during the snif stay  Continue furosemide 20 mg daily

## 2021-03-24 NOTE — ASSESSMENT & PLAN NOTE
Lab Results   Component Value Date    HGBA1C 7 1 (H) 10/12/2020   blood sugars are well controled on glipizide   She was switched from glyburide  FBG above 90  - continue to monitor

## 2021-03-24 NOTE — ASSESSMENT & PLAN NOTE
Lab Results   Component Value Date    EGFR 60 03/11/2021    EGFR 63 03/08/2021    EGFR 53 03/06/2021    CREATININE 0 90 03/11/2021    CREATININE 0 86 03/08/2021    CREATININE 1 00 03/06/2021     - follow bmp  - avoid nephrotoxic medications

## 2021-03-24 NOTE — ASSESSMENT & PLAN NOTE
2/2 compression fracture  Pt is wearing TLSO brace    - continue oxycodone 2 5 qd  - continue tramadol 50 mg q8h  - continue lidocaine patch

## 2021-03-25 NOTE — ASSESSMENT & PLAN NOTE
I reviewed due to his old PFTs and she has an FEV1 of 55% putting out moderate to severe COPD  Given her persistent symptoms I think we should escalate her therapy from Breo to Trelegy 1 puff daily and use her albuterol 3 times a day    I would like to monitor her dyspnea on her new inhaler and new pain medicine but if she maintains with persistent dyspnea at rest would recommend chest x-ray and probable palliative care referral

## 2021-03-25 NOTE — PROGRESS NOTES
Assessment/Plan:    COPD exacerbation ([place] [address] )    I reviewed due to his old PFTs and she has an FEV1 of 55% putting out moderate to severe COPD  Given her persistent symptoms I think we should escalate her therapy from Breo to Trelegy 1 puff daily and use her albuterol 3 times a day  I would like to monitor her dyspnea on her new inhaler and new pain medicine but if she maintains with persistent dyspnea at rest would recommend chest x-ray and probable palliative care referral     CHF (congestive heart failure) ([place] [address] )  Wt Readings from Last 3 Encounters:   03/24/21 69 1 kg (152 lb 6 4 oz)   03/17/21 68 kg (150 lb)   03/14/21 68 9 kg (152 lb)      Willian Mchugh has some lower extremity edema but no crackles on exam appears well compensated  Diagnoses and all orders for this visit:    COPD exacerbation ([place] [address] )    Systolic congestive heart failure, unspecified HF chronicity ([place] [address] )          Subjective:      Patient ID: Rosalva Morales is a 78 y o  female  Willian Mchugh is complaining of shortness of breath  She feels dyspneic the past several days  She denies any significant cough or wheeze  The following portions of the patient's history were reviewed and updated as appropriate: allergies, current medications, past family history, past medical history, past social history, past surgical history and problem list     Review of Systems   Constitutional: Negative  Negative for unexpected weight change  HENT: Negative  Negative for postnasal drip  Eyes: Negative  Respiratory: Positive for shortness of breath  Negative for cough and wheezing  Cardiovascular: Negative  Negative for chest pain and leg swelling  Gastrointestinal: Negative  Endocrine: Negative  Genitourinary: Negative  Musculoskeletal: Negative  Allergic/Immunologic: Negative  Neurological: Negative  Hematological: Negative  Objective: There were no vitals taken for this visit           Physical Exam  Constitutional:       Appearance: She is well-developed  HENT:      Head: Normocephalic  Eyes:      Pupils: Pupils are equal, round, and reactive to light  Neck:      Musculoskeletal: Normal range of motion and neck supple  Cardiovascular:      Rate and Rhythm: Normal rate  Heart sounds: No murmur  Pulmonary:      Effort: Pulmonary effort is normal  No respiratory distress  Breath sounds: Normal breath sounds  No wheezing or rales  Abdominal:      Palpations: Abdomen is soft  Musculoskeletal: Normal range of motion  Skin:     General: Skin is warm and dry  Neurological:      Mental Status: She is alert and oriented to person, place, and time

## 2021-03-25 NOTE — ASSESSMENT & PLAN NOTE
Wt Readings from Last 3 Encounters:   03/24/21 69 1 kg (152 lb 6 4 oz)   03/17/21 68 kg (150 lb)   03/14/21 68 9 kg (152 lb)      Jeffrey Fowler has some lower extremity edema but no crackles on exam appears well compensated

## 2021-03-26 NOTE — TELEPHONE ENCOUNTER
Patient sees Dr Marquis Cadena    Patient's daughter in law called and needed information on patients appointments

## 2021-03-28 PROBLEM — E86.0 DEHYDRATION: Status: ACTIVE | Noted: 2021-01-01

## 2021-03-28 PROBLEM — I48.91 ATRIAL FIBRILLATION (HCC): Status: ACTIVE | Noted: 2021-01-01

## 2021-03-28 NOTE — RESPIRATORY THERAPY NOTE
RT Protocol Note  Jamison Bustamante 78 y o  female MRN: [id number]  Unit/Bed#: -01 Encounter: [id number]    Assessment    Principal Problem:    SOB (shortness of breath)  Active Problems:    COPD exacerbation (HCC)    Type 2 diabetes mellitus with complication, without long-term current use of insulin (HCC)    Hyperlipidemia    Hypothyroidism    Chronic bilateral thoracic back pain    Chronic diastolic heart failure (HCC)    Hypercalcemia    Urinary incontinence    Atrial fibrillation with rapid ventricular response (HCC)    Dehydration      Home Pulmonary Medications:  Albuterol 90mcg, 2P, Q6prn  Breo 100/25  Duoneb TID       Past Medical History:   Diagnosis Date    Cataract     CHF (congestive heart failure) (HCC)     Leukocytosis      Social History     Socioeconomic History    Marital status:      Spouse name: None    Number of children: None    Years of education: None    Highest education level: None   Occupational History    Occupation: [employer]    Social Needs    Financial resource strain: None    Food insecurity     Worry: None     Inability: None    Transportation needs     Medical: None     Non-medical: None   Tobacco Use    Smoking status: Former Smoker     Packs/day: 2 00     Years: 48 00     Pack years: 96 00     Types: Cigarettes     Quit date: 1999     Years since quittin 5    Smokeless tobacco: Never Used   Substance and Sexual Activity    Alcohol use: Not Currently     Alcohol/week: 1 0 standard drinks     Types: 1 Cans of beer per week     Frequency: Monthly or less     Drinks per session: 1 or 2     Binge frequency: Never    Drug use: No    Sexual activity: Not Currently   Lifestyle    Physical activity     Days per week: 0 days     Minutes per session: 0 min    Stress:  To some extent   Relationships    Social connections     Talks on phone: None     Gets together: None     Attends [employer] service: None     Active member of club or organization: None Attends meetings of clubs or organizations: None     Relationship status: None    Intimate partner violence     Fear of current or ex partner: None     Emotionally abused: None     Physically abused: None     Forced sexual activity: None   Other Topics Concern    None   Social History Narrative    LIVING INDEPENDENTLY ALONE     NO ADVANCED DIRECTIVES ON FILE        Objective    Physical Exam:   Assessment Type: Assess only  General Appearance: Alert, Awake  Respiratory Pattern: (P) Labored  Chest Assessment: Chest expansion symmetrical  Bilateral Breath Sounds: (P) Diminished, Coarse, Rhonchi    Vitals:  Blood pressure 134/76, pulse 72, temperature (!) 97 4 °F (36 3 °C), temperature source Oral, resp  rate 20, SpO2 98 %, not currently breastfeeding  Imaging and other studies: I have personally reviewed pertinent reports  Plan    Respiratory Plan: Mild Distress pathway        Resp Comments: (P) Patient was assessed per protocol  She was admitted due to an increased respiratory effort and has a history of COPD and CHF  She utilizes supplemental oxygen at 3L NC at home and is currently on 4L, with a saturation of 92 -95%  Current lung sounds are diminished with scattered coarse rhonchi  She is a former smoker with a 96 pack year history, quitting in 1999  Home medications reviewed and Xopenex and Atrovent Q6 will be ordered per protocol  Respiratory to follow

## 2021-03-28 NOTE — ED NOTES
Pt notes to bruise the second a needle is inserted for IV placement  IV intact with blood return, noted bruised swelling around the site  Pt also noted to have significant bruising to the abdomen from blood thinners  Back brace removed as per Dr Niño Shape  Pt noted to improve once high flow was placed        Sandra Hagan RN  03/28/21 [id number]

## 2021-03-28 NOTE — ASSESSMENT & PLAN NOTE
· New onset atrial fibrillation with rapid ventricular response  · Heart rate 140-150 in ED, status post Lopressor 5 mg  Currently at 110s  · [id number] score 6 points  · No past medical history of bleeding  Patient has chronic anemia  Hemoglobin stable at 11 3  · Troponin 0 1  Elevated troponin likely due to type 2 MI in the setting of tachyarrhythmia    Plan:  · Will initiate Lopressor oral 25 mg q 6 hourly and Lopressor 5 mg IV q 6 hourly p r n  For heart rate more than 130    · Initiate therapeutic Lovenox 1 milligram/kg  · Will consider switch Eliquis later  · Follow-up with troponin  · Cardiology consult  · Follow-up with echocardiogram

## 2021-03-28 NOTE — ASSESSMENT & PLAN NOTE
· Presented with worsening shortness of breath  · Patient does have history of COPD on 2-3 L of oxygen at baseline  · She also found to have AFib with RVR in ED, heart rate 140-150 status post Lopressor 5 mg  · Shortness of breath likely due to COPD exacerbation/AFib with RVR/congestive heart failure/PE  · Will give Solu-Medrol 40 mg b i d  And inhalational therapy For COPD exacerbation  · Lopressor 25 mg q 6 hourly for AFib with RVR, Cardiology consult  · She was given IV Lasix 20 mg in ED  Will hold off Lasix given dehydration  · Will consider to do CTA if symptoms not improve

## 2021-03-28 NOTE — ED PROVIDER NOTES
History  Chief Complaint   Patient presents with    Shortness of Breath     sob and tachycardia x 2 days     22-year-old female history multiple medical problems including COPD congestive heart failure recent thoracic vertebral fractures adult onset diabetes sent from nursing facility secondary to rapid heartbeat 140-150 in some respiratory distress  Patient is normally on 3 L of O2 nasal cannula  Per report patient has been feeling poorly for a few days she has had increased respiratory effort for the past day  Patient was recently admitted to hospital within the past month with 2 issues 1 exacerbation of COPD 2 with vertebral spine compression fractures  Patient is wearing a a stabilizer vest   Patient is awake has some labored breathing somewhat diaphoretic denies any nausea vomiting denies any recent cough or congestion  Report from nursing facility is that she was recently tested for COVID and that was negative  Patient denies any increasing swelling or edema in her legs  On arrival patient is in an atrial fib rhythm at a rate of 140-160  Patient has known known history of atrial fibrillation  Patient denies any specific chest pain although states that there is a tightness sensation there  Prior to Admission Medications   Prescriptions Last Dose Informant Patient Reported? Taking?    FML FORTE 0 25 % ophthalmic suspension  Self Yes No   Sig: INSTILL 1 DROP INTO BOTH EYES EVERY DAY   Heparin Sodium, Porcine, (heparin, porcine,) 5,000 units/mL   No No   Sig: Inject 1 mL (5,000 Units total) under the skin every 8 (eight) hours   Insulin Pen Needle (B-D UF III MINI PEN NEEDLES) 31G X 5 MM MISC  Self Yes No   Sig: by Does not apply route   PAZEO 0 7 % SOLN  Self Yes No   Sig: INSTILL 1 DROP INTO BOTH EYES EVERY DAY   acetaminophen (TYLENOL) 325 mg tablet   No No   Sig: Take 2 tablets (650 mg total) by mouth every 6 (six) hours   al mag oxide-diphenhydramine-lidocaine viscous (MAGIC MOUTHWASH) 1:1:1 suspension   No No   Sig: Swish and spit 10 mL every 4 (four) hours as needed for mouth pain or discomfort   albuterol (PROVENTIL HFA,VENTOLIN HFA) 90 mcg/act inhaler   No No   Sig: Inhale 2 puffs every 6 (six) hours as needed for wheezing   atorvastatin (LIPITOR) 80 mg tablet   No No   Sig: Take 1 tablet (80 mg total) by mouth daily with dinner   benzonatate (TESSALON PERLES) 100 mg capsule   No No   Sig: Take 1 capsule (100 mg total) by mouth 3 (three) times a day as needed for cough   fluticasone-vilanterol (BREO ELLIPTA) 100-25 mcg/inh inhaler   No No   Sig: Inhale 1 puff daily Rinse mouth after use     furosemide (LASIX) 20 mg tablet   No No   Sig: Take 1 tablet (20 mg total) by mouth daily   glipiZIDE (GLUCOTROL) 10 mg tablet   Yes No   Sig: Take 2 5 mg by mouth daily   ipratropium-albuterol (DUO-NEB) 0 5-2 5 mg/3 mL nebulizer solution   No No   Sig: Take 1 vial (3 mL total) by nebulization 3 (three) times a day   levothyroxine 125 mcg tablet   No No   Sig: Take 1 tablet (125 mcg total) by mouth daily in the early morning   lidocaine (LIDODERM) 5 %   No No   Sig: Apply 2 patches topically daily Remove & Discard patch within 12 hours or as directed by MD   melatonin 3 mg   No No   Sig: Take 2 tablets (6 mg total) by mouth daily at bedtime as needed (insomnia)   methocarbamol (ROBAXIN) 500 mg tablet   No No   Sig: Take 1 tablet (500 mg total) by mouth every 6 (six) hours as needed for muscle spasms   montelukast (SINGULAIR) 10 mg tablet   No No   Sig: Take 1 tablet (10 mg total) by mouth daily at bedtime   nystatin (MYCOSTATIN) cream   No No   Sig: Apply topically 2 (two) times a day Under breasts and abdominal folds   nystatin (MYCOSTATIN) powder   No No   Sig: Apply topically 2 (two) times a day Groin   pantoprazole (PROTONIX) 40 mg tablet   No No   Sig: Take 1 tablet (40 mg total) by mouth daily in the early morning      Facility-Administered Medications: None       Past Medical History:   Diagnosis Date    Cataract     CHF (congestive heart failure) (HCC)     Leukocytosis        Past Surgical History:   Procedure Laterality Date    APPENDECTOMY      CYSTOCELE REPAIR      ANTERIOR COLPORRHAPHY     ORIF ANKLE FRACTURE Left 1979    TOTAL ABDOMINAL HYSTERECTOMY         Family History   Problem Relation Age of Onset    No Known Problems Mother     No Known Problems Father      I have reviewed and agree with the history as documented  E-Cigarette/Vaping    E-Cigarette Use Never User      E-Cigarette/Vaping Substances    Nicotine No     THC No     CBD No     Flavoring No     Other No     Unknown No      Social History     Tobacco Use    Smoking status: Former Smoker     Packs/day: 2 00     Years: 48 00     Pack years: 96 00     Types: Cigarettes     Quit date: 1999     Years since quittin 5    Smokeless tobacco: Never Used   Substance Use Topics    Alcohol use: Not Currently     Alcohol/week: 1 0 standard drinks     Types: 1 Cans of beer per week     Frequency: Monthly or less     Drinks per session: 1 or 2     Binge frequency: Never    Drug use: No       Review of Systems   Constitutional: Negative for chills and fever  HENT: Negative for congestion  Eyes: Negative for visual disturbance  Respiratory: Positive for chest tightness and shortness of breath  Cardiovascular: Positive for palpitations  Negative for chest pain  Gastrointestinal: Negative for abdominal pain  Endocrine: Negative for cold intolerance  Genitourinary: Negative for frequency  Musculoskeletal: Negative for gait problem  Skin: Negative for rash  Neurological: Negative for dizziness  Psychiatric/Behavioral: Negative for behavioral problems and confusion  Physical Exam  Physical Exam  Vitals signs and nursing note reviewed  Constitutional:       General: She is in acute distress (moderate respiratory distress)  Appearance: She is well-developed     HENT:      Head: Normocephalic and atraumatic  Eyes:      Conjunctiva/sclera: Conjunctivae normal       Pupils: Pupils are equal, round, and reactive to light  Neck:      Musculoskeletal: Normal range of motion and neck supple  Cardiovascular:      Rate and Rhythm: Tachycardia present  Rhythm irregular  Heart sounds: Normal heart sounds  Pulmonary:      Effort: Tachypnea present  Breath sounds: Examination of the right-upper field reveals wheezing and rhonchi  Examination of the left-upper field reveals wheezing and rhonchi  Examination of the right-lower field reveals decreased breath sounds  Examination of the left-lower field reveals decreased breath sounds  Decreased breath sounds, wheezing and rhonchi present  Abdominal:      General: Bowel sounds are normal       Palpations: Abdomen is soft  Musculoskeletal: Normal range of motion  Skin:     General: Skin is warm and dry  Capillary Refill: Capillary refill takes less than 2 seconds  Neurological:      Mental Status: She is alert and oriented to person, place, and time     Psychiatric:         Behavior: Behavior normal          Vital Signs  ED Triage Vitals   Temperature Pulse Respirations Blood Pressure SpO2   03/28/21 1315 03/28/21 1315 03/28/21 1315 03/28/21 1315 03/28/21 1315   (!) 97 4 °F (36 3 °C) 80 17 147/78 94 %      Temp Source Heart Rate Source Patient Position - Orthostatic VS BP Location FiO2 (%)   03/28/21 1315 03/28/21 1315 03/28/21 1315 03/28/21 1315 03/28/21 1326   Oral Monitor Lying Left arm 35      Pain Score       --                  Vitals:    03/28/21 1315 03/28/21 1330 03/28/21 1345 03/28/21 1405   BP: 147/78 141/72 134/77 131/69   Pulse: 80 (!) 135 (!) 110 75   Patient Position - Orthostatic VS: Lying  Lying          Visual Acuity      ED Medications  Medications   ipratropium-albuterol (DUO-NEB) 0 5-2 5 mg/3 mL inhalation solution 3 mL (3 mL Nebulization Given 3/28/21 1346)   metoprolol (LOPRESSOR) injection 2 5 mg (0 mg Intravenous Hold 3/28/21 1357)   calcitonin (salmon) (MIACALCIN) injection 276 Units (has no administration in time range)   metoprolol (LOPRESSOR) injection 2 5 mg (2 5 mg Intravenous Given 3/28/21 1326)   metoprolol (LOPRESSOR) injection 2 5 mg (2 5 mg Intravenous Given 3/28/21 1403)   sodium chloride 0 9 % infusion (500 mL/hr Intravenous New Bag 3/28/21 1436)   furosemide (LASIX) injection 20 mg (20 mg Intravenous Given 3/28/21 1436)       Diagnostic Studies  Results Reviewed     Procedure Component Value Units Date/Time    COVID19, Influenza A/B, RSV PCR, [facility] [[id number]]  (Normal) Collected: 03/28/21 1326    Lab Status: Final result Specimen: Nares from Nasopharyngeal Swab Updated: 03/28/21 1423     SARS-CoV-2 Negative     INFLUENZA A PCR Negative     INFLUENZA B PCR Negative     RSV PCR Negative    Narrative: This test has been authorized by FDA under an EUA (Emergency Use Assay) for use by authorized laboratories  Clinical caution and judgement should be used with the interpretation of these results with consideration of the clinical impression and other laboratory testing  Testing reported as "Positive" or "Negative" has been proven to be accurate according to standard laboratory validation requirements  All testing is performed with control materials showing appropriate reactivity at standard intervals  CBC and differential [[id number]]  (Abnormal) Collected: 03/28/21 1326    Lab Status: Final result Specimen: Blood from Arm, Left Updated: 03/28/21 1420     WBC 20 28 Thousand/uL      RBC 3 69 Million/uL      Hemoglobin 11 3 g/dL      Hematocrit 36 6 %      MCV 99 fL      MCH 30 6 pg      MCHC 30 9 g/dL      RDW 18 5 %      MPV 9 8 fL      Platelets 024 Thousands/uL     Narrative: This is an appended report  These results have been appended to a previously verified report      Manual Differential(PHLEBS Do Not Order) [[id number]]  (Abnormal) Collected: 03/28/21 1326    Lab Status: Final result Specimen: Blood from Arm, Left Updated: 03/28/21 1420     Segmented % 62 %      Bands % 8 %      Lymphocytes % 25 %      Monocytes % 3 %      Eosinophils, % 0 %      Basophils % 0 %      Metamyelocytes% 2 %      Absolute Neutrophils 14 20 Thousand/uL      Lymphocytes Absolute 5 07 Thousand/uL      Monocytes Absolute 0 61 Thousand/uL      Eosinophils Absolute 0 00 Thousand/uL      Basophils Absolute 0 00 Thousand/uL      Total Counted 100     RBC Morphology Present     Anisocytosis Present     Ovalocytes Present     Poikilocytes Present     Polychromasia Present     Platelet Estimate Adequate    B-Type Natriuretic Peptide [facility] ONLY) [[id number]]  (Abnormal) Collected: 03/28/21 1326    Lab Status: Final result Specimen: Blood from Arm, Left Updated: 03/28/21 1412      1 pg/mL     Comprehensive metabolic panel [[id number]]  (Abnormal) Collected: 03/28/21 1326    Lab Status: Final result Specimen: Blood from Arm, Left Updated: 03/28/21 1406     Sodium 144 mmol/L      Potassium 4 0 mmol/L      Chloride 100 mmol/L      CO2 34 mmol/L      ANION GAP 10 mmol/L      BUN 56 mg/dL      Creatinine 1 03 mg/dL      Glucose 336 mg/dL      Calcium 14 1 mg/dL      AST 17 U/L      ALT 16 U/L      Alkaline Phosphatase 189 3 U/L      Total Protein 6 7 g/dL      Albumin 3 6 g/dL      Total Bilirubin 0 41 mg/dL      eGFR 51 ml/min/1 73sq m     Narrative:      Linus guidelines for Chronic Kidney Disease (CKD):     Stage 1 with normal or high GFR (GFR > 90 mL/min/1 73 square meters)    Stage 2 Mild CKD (GFR = 60-89 mL/min/1 73 square meters)    Stage 3A Moderate CKD (GFR = 45-59 mL/min/1 73 square meters)    Stage 3B Moderate CKD (GFR = 30-44 mL/min/1 73 square meters)    Stage 4 Severe CKD (GFR = 15-29 mL/min/1 73 square meters)    Stage 5 End Stage CKD (GFR <15 mL/min/1 73 square meters)  Note: GFR calculation is accurate only with a steady state creatinine    Troponin I [[id number]]  (Abnormal) Collected: 03/28/21 1326    Lab Status: Final result Specimen: Blood from Arm, Left Updated: 03/28/21 1358     Troponin I 0 10 ng/mL     Lactic acid, plasma [[id number]]  (Normal) Collected: 03/28/21 1326    Lab Status: Final result Specimen: Blood from Arm, Left Updated: 03/28/21 1354     LACTIC ACID 1 9 mmol/L     Narrative:      Result may be elevated if tourniquet was used during collection  Yolanda Pack [[id number]]  (Normal) Collected: 03/28/21 1326    Lab Status: Final result Specimen: Blood from Arm, Left Updated: 03/28/21 1353     Protime 11 4 seconds      INR 1 01    Narrative:      INR Reference Ranges:  No Anticoagulant, Normal:           0 9-1 1  Standard Dose, Oral Anticoagulant:  2 0-3 0  High Dose, Oral Anticoagulant:      2 5-3 5    APTT [[id number]]  (Abnormal) Collected: 03/28/21 1326    Lab Status: Final result Specimen: Blood from Arm, Left Updated: 03/28/21 1353     PTT 34 seconds     Blood culture #1 [[id number]] Collected: 03/28/21 1327    Lab Status: In process Specimen: Blood from Arm, Left Updated: 03/28/21 1334    Blood culture #2 [[id number]] Collected: 03/28/21 1326    Lab Status: In process Specimen: Blood from Arm, Right Updated: 03/28/21 1334                 XR chest 1 view portable    (Results Pending)              Procedures  Procedures         ED Course                             SBIRT 20yo+      Most Recent Value   SBIRT (23 yo +)   In order to provide better care to our patients, we are screening all of our patients for alcohol and drug use  Would it be okay to ask you these screening questions? Yes Filed at: 03/28/2021 1347   Initial Alcohol Screen: US AUDIT-C    1  How often do you have a drink containing alcohol?  0 Filed at: 03/28/2021 1340   2  How many drinks containing alcohol do you have on a typical day you are drinking? 0 Filed at: 03/28/2021 1344   3b  FEMALE Any Age, or MALE 65+: How often do you have 4 or more drinks on one occassion?   0 Filed at: 03/28/2021 1342 Audit-C Score  0 Filed at: 03/28/2021 1344   BLANCA: How many times in the past year have you    Used an illegal drug or used a prescription medication for non-medical reasons? Never Filed at: 03/28/2021 1344                    [facility]  Number of Diagnoses or Management Options  Diagnosis management comments: Upon arrival patient seemed to be in some respiratory distress although her pulse ox was at 88-90% on 3 L nasal cannula patient was placed on high-flow O2 nasal O2 upon arrival to keep her sats at 92% or above  Patient on arrival demonstrated rapid atrial fibrillation on the monitor 145-155  Workup here demonstrates EKG no acute ST T wave changes atrial fib rhythm  Rated 140 patient had lab work which demonstrates troponin slightly elevated 0 1 BNP slightly elevated 400 patient white count 20K lactic was not elevated differential was not significant patient was hypercalcemic at 14 1 patient received Lopressor 2 5 mg IV followed by 2nd Lopressor 2 5 mg IV which brought her heart rate down to 70-90 remaining in atrial fib flutter rhythm no conversion noted  No acute ST T waves noted with heart rate coming down  Chest x-ray demonstrates no acute infiltrate or effusion  Started normal saline infusion with Lasix secondary to hypercalcemia order calcitonin IM case was discussed with admitting team plan will be to admit here to [facility] patient will need Cardiology consultation to address her atrial fibrillation situation she will need management treatment and workup of her hypercalcemia        Disposition  Final diagnoses:   Rapid atrial fibrillation ([address] )   Hypercalcemia     Time reflects when diagnosis was documented in both MDM as applicable and the Disposition within this note     Time User Action Codes Description Comment    3/28/2021  2:36 PM Jeanne Alcantaraat Add [I48 91] Rapid atrial fibrillation ([address] )     3/28/2021  2:36 PM Jeanne Muskrat Add [E83 52] Hypercalcemia       ED Disposition     ED Disposition Condition Date/Time Comment    Admit Stable Sun Mar 28, 2021  2:36 PM Case was discussed with Hospitalist and the patient's admission status was agreed to be Admission Status: inpatient status to the service of Dr Zan Kelly    None         Patient's Medications   Discharge Prescriptions    No medications on file     No discharge procedures on file      PDMP Review       Value Time User    PDMP Reviewed  Yes 3/11/2021  7:53 AM Viviane Bowie MD          ED Provider  Electronically Signed by           Darlene Lazcano MD  03/28/21 3217 No past CPS involvement and [employer] charge in college

## 2021-03-28 NOTE — ASSESSMENT & PLAN NOTE
Lab Results   Component Value Date    HGBA1C 7 1 (H) 10/12/2020       No results for input(s): POCGLU in the last 72 hours      Blood Sugar Average: Last 72 hrs:     · Patient seems like have very poor oral intake  · Will continue regular diet-puree, thin liquid as per nursing home  · Sliding scale insulin and Accu-Chek  · Hypoglycemic protocol

## 2021-03-28 NOTE — ASSESSMENT & PLAN NOTE
· Patient has chronic lower back pain and the compression fracture of T5 to T6  · Continue pain management as per NH

## 2021-03-28 NOTE — ASSESSMENT & PLAN NOTE
Wt Readings from Last 3 Encounters:   03/24/21 69 1 kg (152 lb 6 4 oz)   03/17/21 68 kg (150 lb)   03/14/21 68 9 kg (152 lb)     · Patient has history of HFpEF on Lasix 20 mg daily at home  · TTE on 10/20/2020 showed EF 60% with grade 1 diastolic dysfunction  · Patient looks hypovolemic     · , troponin 0 1  · She was given IV Lasix 20 mg in ED  · Hold off Lasix currently given IV hydration   · Lopressor for AFib with RVR  · Cardiology consult  · Follow-up with echocardiogram  · Daily weights, I and Os

## 2021-03-28 NOTE — ASSESSMENT & PLAN NOTE
· History of COPD on 2-3 L of oxygen at baseline  · Currently on 4 L of oxygen  · She was given nebulization therapy in ED  · During my encounter, patient looks tachypneic, tachycardic, O2 sat 94-95% on 4 L of oxygen via nasal cannula  Significantly decreased breath sounds bilateral lungs  · Chest x-ray pending report  No significant pulmonary edema, no opacities, no significant hyperinflation of the lungs  Chronically elevated right diaphragm    · Will give Solu-Medrol 125 mg once followed by Solu-Medrol 40 mg b i d   · Follow-up with [facility]  · Respiratory protocol  · Xopenex and Atrovent

## 2021-03-28 NOTE — TELEPHONE ENCOUNTER
TigerText:    [phone]/ Howie/ Perry Taylor/ Pt Oscar Munguia  1940/ Pt is diaphoretic, tachycardic (150-160), pulse ox 88-92% on 3 L   Chest x ray confirmed no infiltrate, but pt sounds like she's gurgling

## 2021-03-28 NOTE — ASSESSMENT & PLAN NOTE
· Patient looks very dry     · BMP showed BUN 56, creatinine 1 03, BUN creatinine ratio more than 20, calcium 14 1  · She was given  cc in ED  · Will give NS 75 cc/hour

## 2021-03-28 NOTE — ASSESSMENT & PLAN NOTE
· Patient does have urinary incontinence  · She is complaining of frequency, urgency  · She has been afebrile    She does have leukocytosis likely due to reactive leukocytosis in the setting of dehydration/tachyarrhythmia  · Follow-up with [facility]

## 2021-03-28 NOTE — PLAN OF CARE
Problem: Potential for Falls  Goal: Patient will remain free of falls  Description: INTERVENTIONS:  - Assess patient frequently for physical needs  -  Identify cognitive and physical deficits and behaviors that affect risk of falls    -  [place] fall precautions as indicated by assessment   - Educate patient/family on patient safety including physical limitations  - Instruct patient to call for assistance with activity based on assessment  - Modify environment to reduce risk of injury  - Consider OT/PT consult to assist with strengthening/mobility  Outcome: Progressing     Problem: CARDIOVASCULAR - ADULT  Goal: Maintains optimal cardiac output and hemodynamic stability  Description: INTERVENTIONS:  - Monitor I/O, vital signs and rhythm  - Monitor for S/S and trends of decreased cardiac output  - Administer and titrate ordered vasoactive medications to optimize hemodynamic stability  - Assess quality of pulses, skin color and temperature  - Assess for signs of decreased coronary artery perfusion  - Instruct patient to report change in severity of symptoms  Outcome: Progressing  Goal: Absence of cardiac dysrhythmias or at baseline rhythm  Description: INTERVENTIONS:  - Continuous cardiac monitoring, vital signs, obtain 12 lead EKG if ordered  - Administer antiarrhythmic and heart rate control medications as ordered  - Monitor electrolytes and administer replacement therapy as ordered  Outcome: Progressing     Problem: RESPIRATORY - ADULT  Goal: Achieves optimal ventilation and oxygenation  Description: INTERVENTIONS:  - Assess for changes in respiratory status  - Assess for changes in mentation and behavior  - Position to facilitate oxygenation and minimize respiratory effort  - Oxygen administered by appropriate delivery if ordered  - Initiate smoking cessation education as indicated  - Encourage broncho-pulmonary hygiene including cough, deep breathe, Incentive Spirometry  - Assess the need for suctioning and aspirate as needed  - Assess and instruct to report SOB or any respiratory difficulty  - Respiratory Therapy support as indicated  Outcome: Progressing     Problem: METABOLIC, FLUID AND ELECTROLYTES - ADULT  Goal: Electrolytes maintained within normal limits  Description: INTERVENTIONS:  - Monitor labs and assess patient for signs and symptoms of electrolyte imbalances  - Administer electrolyte replacement as ordered  - Monitor response to electrolyte replacements, including repeat lab results as appropriate  - Instruct patient on fluid and nutrition as appropriate  Outcome: Progressing  Goal: Fluid balance maintained  Description: INTERVENTIONS:  - Monitor labs   - Monitor I/O and WT  - Instruct patient on fluid and nutrition as appropriate  - Assess for signs & symptoms of volume excess or deficit  Outcome: Progressing     Problem: SKIN/TISSUE INTEGRITY - ADULT  Goal: Skin integrity remains intact  Description: INTERVENTIONS  - Identify patients at risk for skin breakdown  - Assess and monitor skin integrity  - Assess and monitor nutrition and hydration status  - Monitor labs (i e  albumin)  - Assess for incontinence   - Turn and reposition patient  - Assist with mobility/ambulation  - Relieve pressure over bony prominences  - Avoid friction and shearing  - Provide appropriate hygiene as needed including keeping skin clean and dry  - Evaluate need for skin moisturizer/barrier cream  - Collaborate with interdisciplinary team (i e  Nutrition, Rehabilitation, etc )   - Patient/family teaching  Outcome: Progressing  Goal: Oral mucous membranes remain intact  Description: INTERVENTIONS  - Assess oral mucosa and hygiene practices  - Implement preventative oral hygiene regimen  - Implement oral medicated treatments as ordered  - Initiate Nutrition services referral as needed  Outcome: Progressing     Problem: PAIN - ADULT  Goal: Verbalizes/displays adequate comfort level or baseline comfort level  Description: Interventions:  - Encourage patient to monitor pain and request assistance  - Assess pain using appropriate pain scale  - Administer analgesics based on type and severity of pain and evaluate response  - Implement non-pharmacological measures as appropriate and evaluate response  - Consider cultural and social influences on pain and pain management  - Notify physician/advanced practitioner if interventions unsuccessful or patient reports new pain  Outcome: Progressing     Problem: SAFETY ADULT  Goal: Patient will remain free of falls  Description: INTERVENTIONS:  - Assess patient frequently for physical needs  -  Identify cognitive and physical deficits and behaviors that affect risk of falls    -  [place] fall precautions as indicated by assessment   - Educate patient/family on patient safety including physical limitations  - Instruct patient to call for assistance with activity based on assessment  - Modify environment to reduce risk of injury  - Consider OT/PT consult to assist with strengthening/mobility  Outcome: Progressing  Goal: Maintain or return to baseline ADL function  Description: INTERVENTIONS:  -  Assess patient's ability to carry out ADLs; assess patient's baseline for ADL function and identify physical deficits which impact ability to perform ADLs (bathing, care of mouth/teeth, toileting, grooming, dressing, etc )  - Assess/evaluate cause of self-care deficits   - Assess range of motion  - Assess patient's mobility; develop plan if impaired  - Assess patient's need for assistive devices and provide as appropriate  - Encourage maximum independence but intervene and supervise when necessary  - Involve family in performance of ADLs  - Assess for home care needs following discharge   - Consider OT consult to assist with ADL evaluation and planning for discharge  - Provide patient education as appropriate  Outcome: Progressing  Goal: Maintain or return mobility status to optimal level  Description: INTERVENTIONS:  - Assess patient's baseline mobility status (ambulation, transfers, stairs, etc )    - Identify cognitive and physical deficits and behaviors that affect mobility  - Identify mobility aids required to assist with transfers and/or ambulation (gait belt, sit-to-stand, lift, walker, cane, etc )  - [place] fall precautions as indicated by assessment  - Record patient progress and toleration of activity level on Mobility SBAR; progress patient to next Phase/Stage  - Instruct patient to call for assistance with activity based on assessment  - Consider rehabilitation consult to assist with strengthening/weightbearing, etc   Outcome: Progressing

## 2021-03-28 NOTE — ASSESSMENT & PLAN NOTE
· Patient has chronic hypercalcemia, hypercalcemia workup done in February, ruled out her primary hyperparathyroidism  · Calcium 14 1  · Hypercalcemia likely due to volume depletion/immobilization  · ED ordered calcitonin 276 units  · Will give IV hydration NS 75 cc/hour  · Will follow-up with BMP in a m    · If not improve, will consider Nephrology consult

## 2021-03-28 NOTE — H&P
Georgiana U  64   H&P- [place] 1940, 78 y o  female MRN: [id number]  Unit/Bed#: -01 Encounter: [id number]  Primary Care Provider: Sylvia Munoz MD   Date and time admitted to hospital: 3/28/2021  1:10 PM    * SOB (shortness of breath)  Assessment & Plan  · Presented with worsening shortness of breath  · Patient does have history of COPD on 2-3 L of oxygen at baseline  · She also found to have AFib with RVR in ED, heart rate 140-150 status post Lopressor 5 mg  · Shortness of breath likely due to COPD exacerbation/AFib with RVR/congestive heart failure/PE  · Will give Solu-Medrol 40 mg b i d  And inhalational therapy For COPD exacerbation  · Lopressor 25 mg q 6 hourly for AFib with RVR, Cardiology consult  · She was given IV Lasix 20 mg in ED  Will hold off Lasix given dehydration  · Will consider to do CTA if symptoms not improve  COPD exacerbation ([facility] [address] )  Assessment & Plan  · History of COPD on 2-3 L of oxygen at baseline  · Currently on 4 L of oxygen  · She was given nebulization therapy in ED  · During my encounter, patient looks tachypneic, tachycardic, O2 sat 94-95% on 4 L of oxygen via nasal cannula  Significantly decreased breath sounds bilateral lungs  · Chest x-ray pending report  No significant pulmonary edema, no opacities, no significant hyperinflation of the lungs  Chronically elevated right diaphragm  · Will give Solu-Medrol 125 mg once followed by Solu-Medrol 40 mg b i d   · Follow-up with [facility]  · Respiratory protocol  · Xopenex and Atrovent    Atrial fibrillation with rapid ventricular response (HCC)  Assessment & Plan  · New onset atrial fibrillation with rapid ventricular response  · Heart rate 140-150 in ED, status post Lopressor 5 mg  Currently at 110s  · [id number] score 6 points  · No past medical history of bleeding  Patient has chronic anemia  Hemoglobin stable at 11 3  · Troponin 0 1    Elevated troponin likely due to type 2 MI in the setting of tachyarrhythmia    Plan:  · Will initiate Lopressor oral 25 mg q 6 hourly and Lopressor 5 mg IV q 6 hourly p r n  For heart rate more than 130  · Initiate therapeutic Lovenox 1 milligram/kg  · Will consider switch Eliquis later  · Follow-up with troponin  · Cardiology consult  · Follow-up with echocardiogram    Chronic diastolic heart failure (HCC)  Assessment & Plan  Wt Readings from Last 3 Encounters:   03/24/21 69 1 kg (152 lb 6 4 oz)   03/17/21 68 kg (150 lb)   03/14/21 68 9 kg (152 lb)     · Patient has history of HFpEF on Lasix 20 mg daily at home  · TTE on 10/20/2020 showed EF 60% with grade 1 diastolic dysfunction  · Patient looks hypovolemic  · , troponin 0 1  · She was given IV Lasix 20 mg in ED  · Hold off Lasix currently given IV hydration   · Lopressor for AFib with RVR  · Cardiology consult  · Follow-up with echocardiogram  · Daily weights, I and Os        Dehydration  Assessment & Plan  · Patient looks very dry  · BMP showed BUN 56, creatinine 1 03, BUN creatinine ratio more than 20, calcium 14 1  · She was given  cc in ED  · Will give NS 75 cc/hour    Urinary incontinence  Assessment & Plan  · Patient does have urinary incontinence  · She is complaining of frequency, urgency  · She has been afebrile  She does have leukocytosis likely due to reactive leukocytosis in the setting of dehydration/tachyarrhythmia  · Follow-up with UA    Hypercalcemia  Assessment & Plan  · Patient has chronic hypercalcemia, hypercalcemia workup done in February, ruled out her primary hyperparathyroidism  · Calcium 14 1  · Hypercalcemia likely due to volume depletion/immobilization  · ED ordered calcitonin 276 units  · Will give IV hydration NS 75 cc/hour  · Will follow-up with BMP in a m    · If not improve, will consider Nephrology consult    Chronic bilateral thoracic back pain  Assessment & Plan  · Patient has chronic lower back pain and the compression fracture of T5 to T6  · Continue pain management as per NH    Hypothyroidism  Assessment & Plan  · Continue levothyroxine  · Follow-up with TSH    Hyperlipidemia  Assessment & Plan  · Continue atorvastatin    Type 2 diabetes mellitus with complication, without long-term current use of insulin (HCC)  Assessment & Plan  Lab Results   Component Value Date    HGBA1C 7 1 (H) 10/12/2020       No results for input(s): POCGLU in the last 72 hours  Blood Sugar Average: Last 72 hrs:     · Patient seems like have very poor oral intake  · Will continue regular diet-puree, thin liquid as per nursing home  · Sliding scale insulin and Accu-Chek  · Hypoglycemic protocol    VTE Prophylaxis: Enoxaparin (Lovenox)  / sequential compression device   Code Status:  DNR DNI  POLST: POLST form is not discussed and not completed at this time  Discussion with family:  We discussed with the patient's son at the bedside  Anticipated Length of Stay:  Patient will be admitted on an Inpatient basis with an anticipated length of stay of  > 2 midnights  Justification for Hospital Stay:  Shortness of breath likely due to COPD exacerbation, AFib with RVR    Total Time for Visit, including Counseling / Coordination of Care: 45 minutes  Greater than 50% of this total time spent on direct patient counseling and coordination of care  Chief Complaint:   Worsening shortness of breath    History of Present Illness:    Alicia Ferraro is a 78 y o  female with past medical history of COPD on 2-3 L of oxygen at baseline, type 2 diabetes, diastolic heart failure, hyperlipidemia, thoracic compression fracture who presents with worsening shortness of breath for a few days  Patient is having difficulty breathing  History is very limited due to acuity of condition  She reports she cannot catch her breath for few days and it is progressively worsening  Also reports of some palpitation    Denies chest pain, lightheadedness, dizziness, fever, chills, nausea, vomiting, constipation, diarrhea  She came from nursing home  She reports she is not able to do physical therapy today due to shortness of breath  She reports of some what urinary frequency and urgency  In ED, initial vital signs revealed tachycardia heart rate 130-140, tachypnea, she is afebrile, blood pressure is stable  She was on 4 L of oxygen via nasal cannula with high-flow  COVID-19 negative  CBC revealed WBC 20 2, hemoglobin 11 3, , CMP revealed calcium 14 1, glucose 336, alkaline phos 189, BUN 56, creatinine 1 03, troponin of 0 1, lactic acid 1 9  Chest x-ray pending official report however does not show significant consolidation, pneumothorax, pleural effusion, pulmonary edema  She was given nebulization therapy, IV Lasix 20 mg,  cc in ED  She was admitted to hospital for further management of shortness of breath in the setting of COPD exacerbation and AFib with RVR plus hypercalcemia  Review of Systems:    Review of Systems   Unable to perform ROS: Acuity of condition       Past Medical and Surgical History:     Past Medical History:   Diagnosis Date    Cataract     CHF (congestive heart failure) (HCC)     Leukocytosis        Past Surgical History:   Procedure Laterality Date    APPENDECTOMY      CYSTOCELE REPAIR      ANTERIOR COLPORRHAPHY     ORIF ANKLE FRACTURE Left 1979    TOTAL ABDOMINAL HYSTERECTOMY         Meds/Allergies:    Prior to Admission medications    Medication Sig Start Date End Date Taking?  Authorizing Provider   acetaminophen (TYLENOL) 325 mg tablet Take 2 tablets (650 mg total) by mouth every 6 (six) hours 3/11/21  Yes Lakisha Mensah MD   al mag oxide-diphenhydramine-lidocaine viscous (MAGIC MOUTHWASH) 1:1:1 suspension Swish and spit 10 mL every 4 (four) hours as needed for mouth pain or discomfort 3/11/21  Yes Lakisha Mensah MD   albuterol (PROVENTIL HFA,VENTOLIN HFA) 90 mcg/act inhaler Inhale 2 puffs every 6 (six) hours as needed for wheezing 3/11/21  Yes Lakisha Mensah MD atorvastatin (LIPITOR) 80 mg tablet Take 1 tablet (80 mg total) by mouth daily with dinner 3/11/21  Yes Placido Gonzalez MD   fluticasone-vilanterol (BREO ELLIPTA) 100-25 mcg/inh inhaler Inhale 1 puff daily Rinse mouth after use  3/11/21  Yes Placido Gonzalez MD   FML FORTE 0 25 % ophthalmic suspension INSTILL 1 DROP INTO BOTH EYES EVERY DAY 3/25/20  Yes Historical Provider, MD   furosemide (LASIX) 20 mg tablet Take 1 tablet (20 mg total) by mouth daily 3/11/21  Yes Placido Gonzalez MD   glipiZIDE (GLUCOTROL) 10 mg tablet Take 2 5 mg by mouth daily   Yes Historical Provider, MD   Heparin Sodium, Porcine, (heparin, porcine,) 5,000 units/mL Inject 1 mL (5,000 Units total) under the skin every 8 (eight) hours 3/11/21  Yes Placido Gonzalez MD   Insulin Pen Needle (B-D UF III MINI PEN NEEDLES) 31G X 5 MM MISC by Does not apply route 4/8/15  Yes Historical Provider, MD   ipratropium-albuterol (DUO-NEB) 0 5-2 5 mg/3 mL nebulizer solution Take 1 vial (3 mL total) by nebulization 3 (three) times a day 3/11/21  Yes Placido Gonzalez MD   levothyroxine 125 mcg tablet Take 1 tablet (125 mcg total) by mouth daily in the early morning 3/12/21  Yes Placido Gonzalez MD   lidocaine (LIDODERM) 5 % Apply 2 patches topically daily Remove & Discard patch within 12 hours or as directed by MD 3/11/21  Yes Placido Gonzalez MD   melatonin 3 mg Take 2 tablets (6 mg total) by mouth daily at bedtime as needed (insomnia) 3/11/21  Yes Placido Gonzalez MD   methocarbamol (ROBAXIN) 500 mg tablet Take 1 tablet (500 mg total) by mouth every 6 (six) hours as needed for muscle spasms 3/11/21  Yes Placido Gonzalez MD   montelukast (SINGULAIR) 10 mg tablet Take 1 tablet (10 mg total) by mouth daily at bedtime 3/11/21  Yes Placido Gonzalez MD   nystatin (MYCOSTATIN) cream Apply topically 2 (two) times a day Under breasts and abdominal folds 3/11/21  Yes Placido Gonzalez MD   nystatin (MYCOSTATIN) powder Apply topically 2 (two) times a day Groin 3/11/21  Yes Placido Gonzalez MD pantoprazole (PROTONIX) 40 mg tablet Take 1 tablet (40 mg total) by mouth daily in the early morning 3/12/21  Yes Goran Hagen MD   PAZEO 0 7 % SOLN INSTILL 1 DROP INTO BOTH EYES EVERY DAY 3/1/18  Yes Historical Provider, MD   benzonatate (TESSALON PERLES) 100 mg capsule Take 1 capsule (100 mg total) by mouth 3 (three) times a day as needed for cough 3/11/21   Goran Hagen MD     I have reveiwed home medications using records provided by [facility]  Allergies: Allergies   Allergen Reactions    Erythromycin     Sulfa Antibiotics        Social History:     Marital Status:    Occupation:   Patient Pre-hospital Living Situation:  From  nursing home  Patient Pre-hospital Level of Mobility:  Limited  Patient Pre-hospital Diet Restrictions:   Substance Use History:   Social History     Substance and Sexual Activity   Alcohol Use Not Currently    Alcohol/week: 1 0 standard drinks    Types: 1 Cans of beer per week    Frequency: Monthly or less    Drinks per session: 1 or 2    Binge frequency: Never     Social History     Tobacco Use   Smoking Status Former Smoker    Packs/day: 2 00    Years: 48 00    Pack years: 96 00    Types: Cigarettes    Quit date: 1999    Years since quittin 5   Smokeless Tobacco Never Used     Social History     Substance and Sexual Activity   Drug Use No       Family History:    non-contributory    Physical Exam:     Vitals:   Blood Pressure: 134/76 (21 1500)  Pulse: 72 (21 1500)  Temperature: (!) 97 4 °F (36 3 °C) (21 1315)  Temp Source: Oral (21 1315)  Respirations: 20 (21 1500)  SpO2: 94 % (21 1656)    Physical Exam  Vitals signs and nursing note reviewed  Constitutional:       General: She is in acute distress  Appearance: Normal appearance  She is ill-appearing  HENT:      Head: Normocephalic and atraumatic  Mouth/Throat:      Mouth: Mucous membranes are dry  Comments: Breathing with her mouth    She looks very dry  Eyes:      General: No scleral icterus  Conjunctiva/sclera: Conjunctivae normal    Cardiovascular:      Rate and Rhythm: Tachycardia present  Rhythm irregular  Pulses: Normal pulses  Heart sounds: Normal heart sounds  No murmur  No gallop  Pulmonary:      Effort: Respiratory distress present  Breath sounds: Wheezing present  Comments: Decreased breath sounds bilaterally  Abdominal:      General: Bowel sounds are normal  There is no distension  Palpations: Abdomen is soft  Musculoskeletal:      Right lower leg: No edema  Left lower leg: No edema  Skin:     General: Skin is warm and dry  Neurological:      General: No focal deficit present  Mental Status: She is alert  Psychiatric:         Behavior: Behavior normal        Additional Data:     Lab Results: I have personally reviewed pertinent reports  Results from last 7 days   Lab Units 03/28/21  1326   WBC Thousand/uL 20 28*   HEMOGLOBIN g/dL 11 3*   HEMATOCRIT % 36 6   PLATELETS Thousands/uL 268   BANDS PCT % 8   LYMPHO PCT % 25   MONO PCT % 3*   EOS PCT % 0     Results from last 7 days   Lab Units 03/28/21  1326   SODIUM mmol/L 144   POTASSIUM mmol/L 4 0   CHLORIDE mmol/L 100   CO2 mmol/L 34*   BUN mg/dL 56*   CREATININE mg/dL 1 03   ANION GAP mmol/L 10   CALCIUM mg/dL 14 1*   ALBUMIN g/dL 3 6   TOTAL BILIRUBIN mg/dL 0 41   ALK PHOS U/L 189 3*   ALT U/L 16   AST U/L 17   GLUCOSE RANDOM mg/dL 336*     Results from last 7 days   Lab Units 03/28/21  1326   INR  1 01             Results from last 7 days   Lab Units 03/28/21  1326   LACTIC ACID mmol/L 1 9       Imaging: I have personally reviewed pertinent reports  XR chest 1 view portable    (Results Pending)       EKG, Pathology, and Other Studies Reviewed on Admission:   · EKG:  Atrial flutter with RVR    Allscripts / Epic Records Reviewed: Yes     ** Please Note: This note has been constructed using a voice recognition system   **

## 2021-03-29 NOTE — PROGRESS NOTES
Occupational Therapy Discharge Summary     Pt has made fair Progress in Occupational Therapy  Pt NOT MET long term goals  Pt progressed to Minimum assistance  level for functional transfers with RW  Minimum assistance and Moderate assistance level for ADL function with use of RW  Pt discharged to SNF for further reahab

## 2021-03-29 NOTE — CASE MANAGEMENT
LOS: 1 day  Bundle: No  Readmission: Yes  Unplanned Readmission Score: 28    Patient admitted to [facility] as an inpatient on 3/28/21 for COPD exacerbation  CM Met with _Patient dtrt Bro Tay over the phone to ___  to introduce CM, review role, complete initial assessment and discuss DCP  Lives where: [place] [address] address not specified  Lives with: Alone  Home Type & Entry: Nursing home  DME: Rolator and oxygen  ADLs: Independent  VNA history: Yes Agency was [facility]  STR history: Yes Agency was [address] Preference & Coverage: [facility] Health/Drug/ETOH history: None  Dialysis history: None  POA/AD/LW: Dtr Bro Tay and Son Hezekiah Bamberger  Income/Employment: Pension and SSI  Transportation: Drives self  PCP: [facility]  Transport Home: Patient may be transferred to hospice or comfort facility if well enough for transport or placed on comfort care at [facility]     DCP:    CM discussed possibility of hospice with pt dtr who was agreeable however stated she would have to talk it over with her brother  Bro Tay informed CM that attending told her pt was not well enough for transport and hospice services would have to be provided at [facility]  CM clarified that hospice cannot be given at [facility] however we can provide comfort care  CM called pt dtr back with attending to confirm clarification  CM then gave IMM to pt dtr Bro Tay, signed IMM and placed in pt chart for scanning  No further CM needs at this time  CM reviewed discharge planning process including the following: identifying caregivers at home, preference for d/c planning needs,   availability of [facility] to [facility] program, availability of treatment team to discuss questions or concerns patient and/or family may have regarding diagnosis, plan of care, old or new medications and discharge planning   CM will continue to follow for care coordination and update assessment as appropriate

## 2021-03-29 NOTE — CONSULTS
Consultation - Pulmonary Medicine   Marino Botello 78 y o  female MRN: [id number]  Unit/Bed#: -01 Encounter: [id number]      Assessment and Plan:  1  Acute on chronic hypoxic and hypercarbic respiratory failure:  She has acute on chronic hypoxic and hypercarbic respiratory failure most likely precipitated by COPD exacerbation and congestive heart failure and new onset atrial fibrillation  She had refused to wear the BiPAP last night and her pCO2 had increased  Currently she is using BiPAP at 14/6 with FiO2 50% and is tolerating this well  Try on nasal cannula later when she is better  2  COPD exacerbation:  She carries a history of COPD and has been on home oxygen at 2 to 3 L per min  She is currently on nebulized bronchodilators, IV Solu-Medrol and ceftriaxone  She is a previous smoker  Her chest x-ray does not show any evidence of pneumonia  3  Congestive heart failure diastolic dysfunction:  She had normal ejection fraction before  She received IV Lasix  Her BNP is  Elevated  4  Atrial fibrillation with rapid ventricular rate:  She was found to have new onset atrial fibrillation with rapid ventricular rate  She has been started on Cardizem and is on systemic anticoagulation with Lovenox  5  Hypercalcemia:  She was found to have hypercalcemia and is receiving slow rehydration  Discussed with Dr Salvador and the hospitalist team and the nursing staff  Her overall prognosis is guarded  She has multiple problems including respiratory failure, congestive heart failure COPD and atrial fibrillation  She is currently DNR DNI status  History of Present Illness   Physician Requesting Consult: Elbert Kumar MD  Reason for Consult / Principal Problem:  COPD exacerbation  Hx and PE limited by: Altered mental status    Respiratory failure on BiPAP support  HPI: Marino Botello is a 78y o  year old female , nursing home resident who presents with worsening shortness of breath since few days  She reportedly had cough and wheezing  She was also found to be in atrial fibrillation with rapid ventricular rate  She has past medical history of COPD and has been on home oxygen at 2-3 liters/minute  She also has history congestive heart failure with normal ejection fraction  She has been on diuresis with Lasix  She also has vertebral compression fracture T5-T6  She was also found to have hypercalcemia and was given slow rehydration  For her acute on chronic hypoxic and hypercarbic respiratory failure, she was placed on BiPAP 12/6 with FiO2 60%  She tolerated it for few hours last night but refused to wear after that  This morning her ABG was found to have worsening CO2 level  Now she has been placed back on BiPAP support 14/6 FiO2 50%  She is drawing tidal volumes 350-430 and is saturating 97%  No history is available from the patient but she indicates that her shortness of breath is better now  She has been started on IV Solu-Medrol, nebulized bronchodilators and ceftriaxone  Her chest x-ray was unremarkable  Her previous CT scan from February showed no pulmonary embolism , but showed bilateral emphysematous changes and prominence of pulmonary arteries  She is currently DNR DNI status  rInpatient consult to Pulmonology  Consult performed by: Brooklynn Colon MD  Consult ordered by: Lexy Muñoz MD          Review of Systems   Unable to perform ROS: Mental status change   Constitutional: Negative for fever  Unable to do review of systems as patient is on BiPAP and in respiratory failure and is unable to communicate   Respiratory: Positive for cough and shortness of breath  Gastrointestinal: Negative for diarrhea     Genitourinary:        Incontinent       Historical Information   Past Medical History:   Diagnosis Date    Cataract     CHF (congestive heart failure) (HCC)     Leukocytosis      Past Surgical History:   Procedure Laterality Date    APPENDECTOMY      CYSTOCELE REPAIR      ANTERIOR COLPORRHAPHY     ORIF ANKLE FRACTURE Left 1979    TOTAL ABDOMINAL HYSTERECTOMY       Social History   Social History     Substance and Sexual Activity   Alcohol Use Not Currently    Alcohol/week: 1 0 standard drinks    Types: 1 Cans of beer per week    Frequency: Monthly or less    Drinks per session: 1 or 2    Binge frequency: Never     Social History     Substance and Sexual Activity   Drug Use No     E-Cigarette/Vaping    E-Cigarette Use Never User      E-Cigarette/Vaping Substances    Nicotine No     THC No     CBD No     Flavoring No     Other No     Unknown No      Social History     Tobacco Use   Smoking Status Former Smoker    Packs/day: 2 00    Years: 48 00    Pack years: 96 00    Types: Cigarettes    Quit date: 1999    Years since quittin 5   Smokeless Tobacco Never Used     Occupational History:  Unable to obtain    Family History: Unable to obtain at this time    Meds/Allergies   all current active meds have been reviewed    Allergies   Allergen Reactions    Erythromycin     Sulfa Antibiotics        Objective   Vitals: Blood pressure (!) 113/45, pulse 104, temperature 98 9 °F (37 2 °C), temperature source Tympanic, resp  rate (!) 24, height 5' 2" (1 575 m), weight 69 1 kg (152 lb 5 4 oz), SpO2 98 %, not currently breastfeeding  ,Body mass index is 27 86 kg/m²  Intake/Output Summary (Last 24 hours) at 3/29/2021 1021  Last data filed at 3/29/2021 0954  Gross per 24 hour   Intake 420 ml   Output --   Net 420 ml     Invasive Devices     Peripheral Intravenous Line            Peripheral IV 21 Right;Ventral (anterior) Forearm 1 day    Peripheral IV 21 Left Wrist less than 1 day                Physical Exam  Vitals signs reviewed  Constitutional:       Appearance: She is obese  She is ill-appearing  She is not diaphoretic  Interventions: She is not intubated  HENT:      Head: Normocephalic     Neck: Vascular: No hepatojugular reflux or JVD  Trachea: No tracheal deviation  Cardiovascular:      Rate and Rhythm: Normal rate  Heart sounds: Normal heart sounds  No murmur  Pulmonary:      Effort: Tachypnea present  No accessory muscle usage or respiratory distress  She is not intubated  Breath sounds: No stridor  Examination of the right-middle field reveals rhonchi  Examination of the left-middle field reveals rhonchi  Examination of the right-lower field reveals rales  Examination of the left-lower field reveals rales  Rhonchi and rales present  No decreased breath sounds or wheezing  Abdominal:      General: Bowel sounds are normal       Palpations: Abdomen is soft  Tenderness: There is no abdominal tenderness  There is no guarding  Musculoskeletal:      Right lower leg: Edema present  Left lower leg: Edema present  Skin:     Coloration: Skin is not cyanotic or pale  Nails: There is clubbing (Clubbing of fingers)  Neurological:      Mental Status: She is disoriented  Psychiatric:         Behavior: Behavior is not agitated  Lab Results: I have personally reviewed pertinent lab results  Leukocytosis improving  Currently 17 48  Hemoglobin 9 7 sodium 148  Elevated pCO2  PH 7 27  Hypercalcemia  Elevated BNP  Imaging Studies: I have personally reviewed pertinent reports  Chest x-ray portable film increased bronchovascular markings  Her previous CT scan from February 2021 showed bilateral emphysematous changes and prominence of pulmonary arteries  EKG, Pathology, and Other Studies: I have personally reviewed pertinent reports  VTE Prophylaxis: Enoxaparin (Lovenox)    Code Status: Level 3 - DNAR and DNI  Advance Directive and Living Will:      Power of :    POLST:      Counseling/Coordination of Care: Total floor / unit time spent today 50 minutes   Greater than 50% of total time was spent with the patient and / or family counseling and / or coordination of care  A description of the counseling / coordination of care: Discussion with nursing staff and primary care team   Respiratory failure COPD plan of treatment

## 2021-03-29 NOTE — ASSESSMENT & PLAN NOTE
· Patient has chronic hypercalcemia, hypercalcemia workup done in February, ruled out her primary hyperparathyroidism  · Calcium 14 1  · Hypercalcemia likely due to volume depletion/immobilization  · She was given calcitonin 276 units  · Will give IV hydration half strength NS 75 cc/hour  · Will follow-up with ionized calcium in a m    · Nephrology consult, appreciate recommendation

## 2021-03-29 NOTE — ASSESSMENT & PLAN NOTE
· Patient looks very dry     · BMP showed BUN 56, creatinine 1 03, BUN creatinine ratio more than 20, calcium 14 1  · She was given  cc in ED  · Continue 0 45% NS 75 cc/hr

## 2021-03-29 NOTE — QUICK NOTE
-received ABG from 9:00 a m  Showing CO2 66 and O2 83 along with metabolic alkalosis  She is retaining carbon dioxide however she was not on BiPAP for some time before ABG was drawn  Currently she is agreeable for BiPAP and tolerating it for now  Will repeat VBG in 2 hours  -patient's BP was found to be low in 93/57 for which she was given 25 g of albumin which corrected BP to 120/53  -TSH was found to be low, levothyroxine was held  She is taking 125 mcg levothyroxine at home  Can consider holding or reducing does  -for hypercalcemia will check UA and repeat BMP at 11:00 p m  Claudine Gonzales

## 2021-03-29 NOTE — ASSESSMENT & PLAN NOTE
Wt Readings from Last 3 Encounters:   03/29/21 69 1 kg (152 lb 5 4 oz)   03/24/21 69 1 kg (152 lb 6 4 oz)   03/17/21 68 kg (150 lb)     · Patient has history of HFpEF on Lasix 20 mg daily at home  · TTE on 10/20/2020 showed EF 60% with grade 1 diastolic dysfunction  · Repeat TACOS showed normal EF without regional wall motion abnormality  · Patient looks hypovolemic  · , troponin 0 1>0 08>0 08  · She was given Lasix trial    · Will consider to give IV Lasix 40 b i d  If her respiratory status is not improved    · Lopressor for AFib with RVR  · Cardiology consult  · Follow-up with echocardiogram  · Daily weights, I and Os

## 2021-03-29 NOTE — ASSESSMENT & PLAN NOTE
· TSH 0 284, free T4 1 85  · Hold off levothyroxine  · Need to decrease levothyroxine dose on discharge

## 2021-03-29 NOTE — QUICK NOTE
Was notified by nurse that Pt refuses to use BiPAP anymore  She agreed to take BiPAP initially after being convinced by her son but after taking 1 hour she refused further use of BiPAP per nebulizer  VBG showing worsening CO2 retention  Currently stable on 5 L saturating 96%  BP being soft around 100/60  Plan to give albumin 25 g if but pressure drops  Attending notified

## 2021-03-29 NOTE — QUICK NOTE
-Was called by nurse Micheline Glover and dayanara walden as Pt was refusing albuterol nebulization and taking it off continuously  I explained the Pt and explained regarding benefits of taking albuterol nebulization  She continues to refuse nebs  -Pt being very sensitive to albuterol as she developed tremors and anxiety so albuterol was held and does to continue with ipratropium for now  -She was feeling tachypneic, no crackles heard but B/L coarse sounds heard on auscultation  Initial CXR did show very mild congestion  BNP was elevated initially along with elevated troponins  So IVF were held  She was given 20 IV Lasix  Repeated CXR  ABG to be done after 2 hours      -Respiratory therapist was called and Pt was started on BiPAP because of tachypnea and respiratory distress which she tolerated for some time after which she started taking it off  She was asked once again regarding risk of not getting BiPAP including but not limited to worsening respiratory status and she might die  She understanded and verbalized understanding     -I spoke with son and daughter regarding patient's condition and her needing BiPAP  Omar Colunga called her and convinced ir after a she agreed for mask

## 2021-03-29 NOTE — ASSESSMENT & PLAN NOTE
· Patient does have urinary incontinence  · She is complaining of frequency, urgency  · She has been afebrile    She does have leukocytosis likely due to reactive leukocytosis in the setting of dehydration/tachyarrhythmia  · Follow-up with UA  · Currently on empiric ceftriaxone in the setting of COPD exacerbation and possible UTI

## 2021-03-29 NOTE — OCCUPATIONAL THERAPY NOTE
OT EVALUATION       03/29/21 0856   Note Type   Note type Evaluation   Restrictions/Precautions   Braces or Orthoses TLSO   Other Precautions Cognitive; Chair Alarm; Bed Alarm; Fall Risk;O2  (on BiPap)   Pain Assessment   Pain Assessment Tool FLACC   Pain Rating: FLACC (Rest) - Face 0   Pain Rating: FLACC (Rest) - Legs 0   Pain Rating: FLACC (Rest) - Activity 0   Pain Rating: FLACC (Rest) - Cry 0   Pain Rating: FLACC (Rest) - Consolability 0   Score: FLACC (Rest) 0   Home Living   Type of Home SNF   Additional Comments Patient [facility]enetly at [facility] then to SNF on 3/11/21  Prior to STR pt was living alone and independent with mobility, assist for ADLS and IADLS  Patient has a home health aide 1x/week for showering  Prior Function   Level of [place] Needs assistance with ADLs and functional mobility; Needs assistance with IADLs   Lives With Facility staff   Receives Help From Personal care attendant   ADL Assistance Needs assistance   IADLs Needs assistance   ADL   Eating Assistance 2  Maximal Assistance   Grooming Assistance 1  Total Assistance   UB Bathing Assistance 1  Total Assistance   LB Bathing Assistance 1  Total Assistance   [address] 1  Total Assistance   LB Dressing Assistance 1  Total Assistance   Toileting Assistance  1  Total Assistance   Bed Mobility   Rolling R 2  Maximal assistance   Additional items Assist x 1;Verbal cues   Rolling L 2  Maximal assistance   Additional items Assist x 1;Verbal cues   Supine to Sit Unable to assess   Sit to Supine Unable to assess   Transfers   Sit to Stand Unable to assess   Activity Tolerance   Activity Tolerance Patient limited by fatigue;Treatment limited secondary to medical complications (Comment)  (on BiPapa, lethargic)   RUE Assessment   RUE Assessment   (AAROM shoulder flexion WFL, AROM elbow and  WFL)   LUE Assessment   LUE Assessment   (AAROM shoulder flexion WFL, AROM elbow and  WFL)   Cognition   Overall Cognitive Status Impaired Arousal/Participation Lethargic   Attention Difficulty dividing attention   Orientation Level Unable to assess   Following Commands Follows one step commands inconsistently   Comments pt lethargic, poor command follow  Assessment   Limitation Decreased ADL status; Decreased UE ROM; Decreased UE strength;Decreased Safe judgement during ADL;Decreased cognition;Decreased endurance;Decreased self-care trans;Decreased high-level ADLs  (decreased balance and mobility )   Prognosis Fair   Assessment Patient evaluated by Occupational Therapy  Patient admitted with COPD exacerbation ([facility] [address] )  The patients occupational profile, medical and therapy history includes a extensive additional review of physical, cognitive, or psychosocial history related to current functional performance  Comorbidities affecting functional mobility and ADLS include: CHF and L ankle fracture  Prior to admission, patient was requiring assist for functional mobility with walker, requiring assist for ADLS, requiring assist for IADLS and in short term rehab  The evaluation identifies the following performance deficits: weakness, decreased ROM, impaired balance, decreased endurance, increased fall risk, new onset of impairment of functional mobility, decreased ADLS, decreased IADLS, decreased activity tolerance, decreased safety awareness, impaired judgement, decreased cognition and decreased strength, that result in activity limitations and/or participation restrictions  This evaluation requires clinical decision making of high complexity, because the patient presents with comorbidites that affect occupational performance and required significant modification of tasks or assistance with consideration of multiple treatment options  The Barthel Index was used as a functional outcome tool presenting with a score of 0, indicating marked limitations of functional mobility and ADLS    The patient's raw score on the -PAC Daily Activity inpatient short form is 6, standardized score is  , less than 39 4  Patients at this level are likely to benefit from DC to post-acute rehabilitation services  Please refer to the recommendation of the Occupational Therapist for safe DC planning  Patient will benefit from skilled Occupational Therapy services to address above deficits and facilitate a safe return to prior level of function  Goals   Patient Goals pt unable to state due to cognitive impairment and lethargy    STG Time Frame   (1-7 days)   Short Term Goal  Patient will increase standing tolerance to 1 minutes during ADL task to decrease assistance level and decrease fall risk; Patient will increase bed mobility to mod assist in preparation for ADLS and transfers; Patient will tolerate 10 minutesof UE ROM/strengthening to increase general activity tolerance and performance in ADLS/IADLS; Patient will improve functional activity tolerance to 10 minutes of sustained functional tasks to increase participation in basic self-care and decrease assistance level;  Patient will increase static sitting balance to poor+ to improve the ability to sit at edge of bed or on a chair for ADLS;  Patient will increase static standing balance to poor to improve postural stability and decrease fall risk during standing ADLS and transfers  LTG Time Frame   (8-14 days)   Long Term Goal Patient will increase standing tolerance to 2 minutes during ADL task to decrease assistance level and decrease fall risk; Patient will increase bed mobility to min assist in preparation for ADLS and transfers;  Patient will tolerate 20 minutesof UE ROM/strengthening to increase general activity tolerance and performance in ADLS/IADLS; Patient will improve functional activity tolerance to 20 minutes of sustained functional tasks to increase participation in basic self-care and decrease assistance level;  Patient will increase static sitting balance to fair- to improve the ability to sit at edge of bed or on a chair for ADLS;  Patient will increase static standing balance to poor+ to improve postural stability and decrease fall risk during standing ADLS and transfers  Functional Transfer Goals   Pt Will Perform All Functional Transfers   (STG mod assist of 2 LTG mod assist )   ADL Goals   Pt Will Perform All ADL's   (STG mod assist LTG min assist )   Plan   Treatment Interventions ADL retraining;Functional transfer training;UE strengthening/ROM; Cognitive reorientation; Endurance training;Patient/family training;Equipment evaluation/education; Activityengagement; Compensatory technique education   Goal Expiration Date 04/12/21   OT Frequency 1-2x/wk   Recommendation   OT Discharge Recommendation Post-Acute Rehabilitation Services   AM-PAC Daily Activity Inpatient   Lower Body Dressing 1   Bathing 1   Toileting 1   Upper Body Dressing 1   Grooming 1   Eating 1   Daily Activity Raw Score 6   Turning Head Towards Sound 1   Follow Simple Instructions 2   Low Function Daily Activity Raw Score 9   Low Function Daily Activity Standardized Score 14 9   AM-PAC Applied Cognition Inpatient   Following a Speech/Presentation 2   Understanding Ordinary Conversation 2   Taking Medications 1   Remembering Where Things Are Placed or Put Away 1   Remembering List of 4-5 Errands 1   Taking Care of Complicated Tasks 1   Applied Cognition Raw Score 8   Applied Cognition Standardized Score 19 32   Barthel Index   Feeding 0   Bathing 0   Grooming Score 0   Dressing Score 0   Bladder Score 0   Bowels Score 0   Toilet Use Score 0   Transfers (Bed/Chair) Score 0   Mobility (Level Surface) Score 0   Stairs Score 0   Barthel Index Score 0     [place] Search MS OTR/L

## 2021-03-29 NOTE — PLAN OF CARE
Problem: Potential for Falls  Goal: Patient will remain free of falls  Description: INTERVENTIONS:  - Assess patient frequently for physical needs  -  Identify cognitive and physical deficits and behaviors that affect risk of falls    -  [place] fall precautions as indicated by assessment   - Educate patient/family on patient safety including physical limitations  - Instruct patient to call for assistance with activity based on assessment  - Modify environment to reduce risk of injury  - Consider OT/PT consult to assist with strengthening/mobility  3/29/2021 0956 by Areli Murray RN  Outcome: Progressing  3/29/2021 0956 by Areli Mruray RN  Outcome: Progressing     Problem: CARDIOVASCULAR - ADULT  Goal: Maintains optimal cardiac output and hemodynamic stability  Description: INTERVENTIONS:  - Monitor I/O, vital signs and rhythm  - Monitor for S/S and trends of decreased cardiac output  - Administer and titrate ordered vasoactive medications to optimize hemodynamic stability  - Assess quality of pulses, skin color and temperature  - Assess for signs of decreased coronary artery perfusion  - Instruct patient to report change in severity of symptoms  3/29/2021 0956 by Areli Murray RN  Outcome: Progressing  3/29/2021 0956 by Areli Murray RN  Outcome: Progressing  Goal: Absence of cardiac dysrhythmias or at baseline rhythm  Description: INTERVENTIONS:  - Continuous cardiac monitoring, vital signs, obtain 12 lead EKG if ordered  - Administer antiarrhythmic and heart rate control medications as ordered  - Monitor electrolytes and administer replacement therapy as ordered  3/29/2021 0956 by Areli Murray RN  Outcome: Progressing  3/29/2021 0956 by Areli Murray RN  Outcome: Progressing     Problem: RESPIRATORY - ADULT  Goal: Achieves optimal ventilation and oxygenation  Description: INTERVENTIONS:  - Assess for changes in respiratory status  - Assess for changes in mentation and behavior  - Position to facilitate oxygenation and minimize respiratory effort  - Oxygen administered by appropriate delivery if ordered  - Initiate smoking cessation education as indicated  - Encourage broncho-pulmonary hygiene including cough, deep breathe, Incentive Spirometry  - Assess the need for suctioning and aspirate as needed  - Assess and instruct to report SOB or any respiratory difficulty  - Respiratory Therapy support as indicated  3/29/2021 0956 by Della Johnston RN  Outcome: Progressing  3/29/2021 0956 by Della Johnston RN  Outcome: Progressing     Problem: METABOLIC, FLUID AND ELECTROLYTES - ADULT  Goal: Electrolytes maintained within normal limits  Description: INTERVENTIONS:  - Monitor labs and assess patient for signs and symptoms of electrolyte imbalances  - Administer electrolyte replacement as ordered  - Monitor response to electrolyte replacements, including repeat lab results as appropriate  - Instruct patient on fluid and nutrition as appropriate  3/29/2021 0956 by Della Johnston RN  Outcome: Progressing  3/29/2021 0956 by Della Johnston RN  Outcome: Progressing  Goal: Fluid balance maintained  Description: INTERVENTIONS:  - Monitor labs   - Monitor I/O and WT  - Instruct patient on fluid and nutrition as appropriate  - Assess for signs & symptoms of volume excess or deficit  3/29/2021 0956 by Della Johnston RN  Outcome: Progressing  3/29/2021 0956 by Della Johnston RN  Outcome: Progressing     Problem: SKIN/TISSUE INTEGRITY - ADULT  Goal: Skin integrity remains intact  Description: INTERVENTIONS  - Identify patients at risk for skin breakdown  - Assess and monitor skin integrity  - Assess and monitor nutrition and hydration status  - Monitor labs (i e  albumin)  - Assess for incontinence   - Turn and reposition patient  - Assist with mobility/ambulation  - Relieve pressure over bony prominences  - Avoid friction and shearing  - Provide appropriate hygiene as needed including keeping skin clean and dry  - Evaluate need for skin moisturizer/barrier cream  - Collaborate with interdisciplinary team (i e  Nutrition, Rehabilitation, etc )   - Patient/family teaching  3/29/2021 [id number] by Cristo Linder RN  Outcome: Progressing  3/29/2021 0956 by Cristo Linder RN  Outcome: Progressing  Goal: Oral mucous membranes remain intact  Description: INTERVENTIONS  - Assess oral mucosa and hygiene practices  - Implement preventative oral hygiene regimen  - Implement oral medicated treatments as ordered  - Initiate Nutrition services referral as needed  3/29/2021 0956 by Cristo Linder RN  Outcome: Progressing  3/29/2021 0956 by Cristo Linder RN  Outcome: Progressing     Problem: PAIN - ADULT  Goal: Verbalizes/displays adequate comfort level or baseline comfort level  Description: Interventions:  - Encourage patient to monitor pain and request assistance  - Assess pain using appropriate pain scale  - Administer analgesics based on type and severity of pain and evaluate response  - Implement non-pharmacological measures as appropriate and evaluate response  - Consider cultural and social influences on pain and pain management  - Notify physician/advanced practitioner if interventions unsuccessful or patient reports new pain  3/29/2021 0956 by Cristo Linder RN  Outcome: Progressing  3/29/2021 0956 by Cristo Linder RN  Outcome: Progressing     Problem: SAFETY ADULT  Goal: Patient will remain free of falls  Description: INTERVENTIONS:  - Assess patient frequently for physical needs  -  Identify cognitive and physical deficits and behaviors that affect risk of falls    -  [place] fall precautions as indicated by assessment   - Educate patient/family on patient safety including physical limitations  - Instruct patient to call for assistance with activity based on assessment  - Modify environment to reduce risk of injury  - Consider OT/PT consult to assist with strengthening/mobility  3/29/2021 0956 by Jose Roberto Argueta RN  Outcome: Progressing  3/29/2021 0956 by Jose Roberto Argueta RN  Outcome: Progressing  Goal: Maintain or return to baseline ADL function  Description: INTERVENTIONS:  -  Assess patient's ability to carry out ADLs; assess patient's baseline for ADL function and identify physical deficits which impact ability to perform ADLs (bathing, care of mouth/teeth, toileting, grooming, dressing, etc )  - Assess/evaluate cause of self-care deficits   - Assess range of motion  - Assess patient's mobility; develop plan if impaired  - Assess patient's need for assistive devices and provide as appropriate  - Encourage maximum independence but intervene and supervise when necessary  - Involve family in performance of ADLs  - Assess for home care needs following discharge   - Consider OT consult to assist with ADL evaluation and planning for discharge  - Provide patient education as appropriate  3/29/2021 0956 by Jose Roberto Argueta RN  Outcome: Progressing  3/29/2021 0956 by Jose Roberto Argueta RN  Outcome: Progressing  Goal: Maintain or return mobility status to optimal level  Description: INTERVENTIONS:  - Assess patient's baseline mobility status (ambulation, transfers, stairs, etc )    - Identify cognitive and physical deficits and behaviors that affect mobility  - Identify mobility aids required to assist with transfers and/or ambulation (gait belt, sit-to-stand, lift, walker, cane, etc )  - [place] fall precautions as indicated by assessment  - Record patient progress and toleration of activity level on Mobility SBAR; progress patient to next Phase/Stage  - Instruct patient to call for assistance with activity based on assessment  - Consider rehabilitation consult to assist with strengthening/weightbearing, etc   3/29/2021 0956 by Jose Roberto Argueta RN  Outcome: Progressing  3/29/2021 0956 by Jose Roberto Argueta RN  Outcome: Progressing     Problem: Prexisting or High Potential for Compromised Skin Integrity  Goal: Skin integrity is maintained or improved  Description: INTERVENTIONS:  - Identify patients at risk for skin breakdown  - Assess and monitor skin integrity  - Assess and monitor nutrition and hydration status  - Monitor labs   - Assess for incontinence   - Turn and reposition patient  - Assist with mobility/ambulation  - Relieve pressure over bony prominences  - Avoid friction and shearing  - Provide appropriate hygiene as needed including keeping skin clean and dry  - Evaluate need for skin moisturizer/barrier cream  - Collaborate with interdisciplinary team   - Patient/family teaching  - Consider wound care consult   3/29/2021 0956 by Taylor Heredia RN  Outcome: Progressing  3/29/2021 0956 by Taylor Heredia RN  Outcome: Progressing

## 2021-03-29 NOTE — PHYSICAL THERAPY NOTE
PHYSICAL THERAPY EVALUATION  Time: 9496-8773    NAME:  Anali Beaulieu  DATE: 03/29/21    AGE:   78 y o  Mrn:   [id number]  Length Of Stay: 1    ADMIT DX:  Hypercalcemia [E83 52]  Shortness of breath [R06 02]  Rapid atrial fibrillation (HCC) [I48 91]    Past Medical History:   Diagnosis Date    Cataract     CHF (congestive heart failure) (HCC)     Leukocytosis      Past Surgical History:   Procedure Laterality Date    APPENDECTOMY      CYSTOCELE REPAIR      ANTERIOR COLPORRHAPHY     ORIF ANKLE FRACTURE Left 1979    TOTAL ABDOMINAL HYSTERECTOMY         Performed at least 2 patient identifiers during session: Name and ID bracelet        03/29/21 0858   PT Last Visit   PT Visit Date 03/29/21   Note Type   Note type Evaluation   Pain Assessment   Pain Assessment Tool 0-10   Pain Score No Pain   Hospital Pain Intervention(s) Repositioned   Multiple Pain Sites No   Home Living   Type of Home SNF   Additional Comments Pt unable to provide accurate PLOF/home setup information  Per EMR, pt recently d/c from [facility] 3/11/2021 to SNF for further rehab  Pt admitted from SNF this visit  Pt unable to obtain if pt currently receiving PT/OT services at SNF  PRIOR TO STR, pt was living at home alone in a [facility] with 5STE with HR, was indep with mobility, needed assist from family and HHAs for ADLs and IADLs  Had HHA 1x/wk for showering (tub shower, +GBs, shower chair, elevated toilet seat, RW, SPC)  Prior Function   Level of [place] Needs assistance with ADLs and functional mobility; Needs assistance with IADLs   Lives With Facility staff   Receives Help From Personal care attendant   ADL Assistance Needs assistance   IADLs Needs assistance   Falls in the last 6 months   (unknown)   Comments As of 3/11/2021 upon d/c from [facility], pt was requiring MODA for bed mobility except rolling req S  Required NAMAN for transfers and ambulation of max 50-60ft with RW, negotiating 4 steps with NAMAN      Restrictions/Precautions Weight Bearing Precautions Per Order No   Braces or Orthoses TLSO   Other Precautions Cognitive; Bed Alarm;Multiple lines;Telemetry;Aspiration; Fall Risk  (+BiPAP)   General   Additional Pertinent History Pt admitted 3/28/2021 with c/o SOB, tachycardia, hypotension  Dx: COPD exacerbation  Family/Caregiver Present No   Cognition   Overall Cognitive Status Impaired   Arousal/Participation Lethargic   Orientation Level Unable to assess   Memory Unable to assess   Following Commands Follows one step commands inconsistently   RUE Assessment   RUE Assessment X   RUE Strength   RUE Overall Strength Deficits  (See OT note for details)   LUE Assessment   LUE Assessment X   LUE Strength   LUE Overall Strength Deficits  (See OT note for details)   RLE Assessment   RLE Assessment X   Strength RLE   RLE Overall Strength 2/5   LLE Assessment   LLE Assessment X   Strength LLE   LLE Overall Strength 2/5   Coordination   Movements are Fluid and Coordinated 0   Sensation WFL   Light Touch   RLE Light Touch Grossly intact   LLE Light Touch Grossly intact   Bed Mobility   Rolling R 2  Maximal assistance   Additional items Assist x 1;Bedrails; Increased time required;Verbal cues;LE management  (trunk management )   Rolling L 2  Maximal assistance   Additional items Assist x 1;Bedrails; Increased time required;Verbal cues;LE management  (trunk management )   Supine to Sit Unable to assess   Sit to Supine Unable to assess   Additional Comments Total/dependent assist for scooting up towards HOB and respositioning  Waffle cushion placed under patient for pressure relief and wound prevention  Transfers   Sit to Stand Unable to assess   Stand to Sit Unable to assess   Additional Comments Transfers deferred on this date due to high acuity of illness  Pt on BiPAP and demonstrates difficulty following commands, increased lethargy, decreased active participation in bed mobility  Ambulation/Elevation   Gait pattern Not tested; Not appropriate   Endurance Deficit   Endurance Deficit Yes   Activity Tolerance   Activity Tolerance Patient limited by fatigue   Medical Staff Made Aware Spoke with Pulmonologist   Nurse Made Aware Spoke with DORA Luna pre/post session    Assessment   Prognosis Fair   Problem List Decreased strength;Decreased range of motion;Decreased endurance; Impaired balance;Decreased mobility; Decreased coordination;Decreased cognition; Impaired judgement;Decreased safety awareness; Impaired hearing;Obesity; Decreased skin integrity   Assessment Pt seen for PT evaluation, cleared by DORA Luna  Pt admitted 3/28/2021 with SOB/tachycardia/hypotension, dx COPD exacerbation ([facility] [address] )  Comorbidities affecting pt's fnxl performance include: chronic back pain, CHF, COPD, diabetes and hypothyroid, h/o L ankle ORIF, recent T5/T6 vertebral fx with TLSO  Personal factors affecting pt at time of PT IE include: ambulating with assistive device, communication issues, inability to ambulate household distances, decreased cognition, hearing impairments, decreased initiation and engagement, inability to perform physical activity, limited insight into impairments, inability to perform ADLS and inability to perform IADLS   PTA, pt was requiring assist for functional mobility with RW, requiring assist for ADLS, requiring assist for IADLS and in short term rehab  Pt demonstrates fnxl impairments identified in objective findings from Elderly Mobility Scale assessment, scoring 0/20, indicating full dependency on others for completion of ADLs & mobility  Pt scores 6/24 on AM-PAC objective tool, indicating need for extensive rehab services upon d/c from the acute care setting  The Barthel Index outcome tool was used & pt scores 0 indicating total limitations of fnxl mobility/ADLS   Pt is at risk of falls d/t multiple comorbidities, impaired balance, impaired insight/safety awareness, use of assistive device, advanced age, acuity of medical illness, ongoing medical treatment of primary diagnosis and abnormal lab values  Pt's clinical presentation is currently unstable/unpredictable seen in pt's presentation of vital sign response, varying levels of cognitive performance, increased fall risk, new onset of impairment of functional mobility, decreased endurance and new onset of weakness  This PT IE requires high complexity clinical decision making, based on multiple medical/physiological/fnxl/social factors which affect pt's performance w/ evaluation & therapist judgement for disposition recommendations  Pt will benefit from continued PT treatment in order to address impairments, decrease risk of falls, maximize independence w/ fnxl mobility, & ensure safety w/ mobility for transition to next level of care  Based on pt presentation & impairments, pt would most appropriately benefit from post acute STR upon d/c  Goals   Patient Goals pt unable to state rehab goals at this time   STG Expiration Date 04/08/21   Short Term Goal #1 Patient PT goals established in order to address goal of increased mobility/activity tolerance  1  Pt will complete all bed mobility in hospital bed with MODA 1 person, in order to promote increased OOB functional mobility to improve overall activity tolerance  2  Pt will complete all transfers with RW and MODA 1 person, in order to increase safety with functional mobility  3  Pt will ambulate 25ft with RW and MODA in order to increase safety with in room distance functional mobility  4  PT to assess elevations and create goal as appropriate  5  Pt will improve B LE strength to >/= 3+/5 MMT throughout, in order to increase safety with functional mobility and decrease risk of falls  6  Pt will demonstrate understanding and independence with LE strengthening HEP  7  Pt will improve AM-PAC score to >/= 14/24 in order to increase independence with mobility and decrease burden of care   8  Pt will improve Barthel Index score to >/= 10/100 in order to increase independence and decrease risk of falls  9  Pt will improve Elderly Mobility Scale score to >/= 4/20 in order to decrease burden of care and increase independence with functional mobility and ADLs  10  Pt will improve static sitting balance to >/= fair grade in order to promote safety and increased independence with mobility  11  Pt will tolerate >3hrs OOB in upright position, in order to improve muscular endurance and respiratory status  PT Treatment Day 0   Plan   Treatment/Interventions ADL retraining;Functional transfer training;LE strengthening/ROM; Elevations; Therapeutic exercise; Endurance training;Cognitive reorientation;Patient/family training;Equipment eval/education;Gait training;Bed mobility; Compensatory technique education;Continued evaluation;Spoke to nursing;Spoke to MD;OT   PT Frequency Other (Comment)  (3-5x/wk )   Recommendation   PT Discharge Recommendation Post-Acute Rehabilitation Services   Equipment Recommended   (TBD pending progress with PT services )   AM-PAC Basic Mobility Inpatient   Turning in Bed Without Bedrails 1   Lying on Back to Sitting on Edge of Flat Bed 1   Moving Bed to Chair 1   Standing Up From Chair 1   Walk in Room 1   Climb 3-5 Stairs 1   Basic Mobility Inpatient Raw Score 6   Turning Head Towards Sound 2   Follow Simple Instructions 2   Low Function Basic Mobility Raw Score 10   Low Function Basic Mobility Standardized Score 14 65   Modified Burnet Scale   Modified Burnet Scale 5   Barthel Index   Feeding 0   Bathing 0   Grooming Score 0   Dressing Score 0   Bladder Score 0   Bowels Score 0   Toilet Use Score 0   Transfers (Bed/Chair) Score 0   Mobility (Level Surface) Score 0   Stairs Score 0   Barthel Index Score 0       The patient's AM-PAC Basic Mobility Inpatient Short Form Low Function Raw Score 10 , Standardized Score is 14 65  A standardized score less 42 9 suggests the patient may benefit from discharge to post-acute rehab services   Please also refer to the recommendation of the Physical Therapist for safe discharge planning  ELDERLY MOBILITY SCALE OUTCOME MEASURE:   Older Adult & Geriatric Care: (Leonidas Serrano; n=36; age= 70-93)  Level of independence and EMS scores:   Score > 14 = independent in basic ADLs and safe for independent mobility maneuvers (with or without device)   Score 10-13 = borderline in terms of safe mobility and independence in ADLs (require some help with ADLs and mobility maneuvers)    Score < 10 = dependent or high degree of dependency for basic ADLs and limited mobility maneuvers (such as transfers, toileting, dressing)  Discharge recommendations and EMS scores:   Score 0-6 = post acute STR / SNF / IRF   Score 7-13 = home with high levels of care - ie: skilled caretaker, community resources, family asssistance   Score 14-20 = home   Please refer to therapist full assessment & documentation for most appropriate recommendation and details for discharge           Rina Cade PT, DPT   Available via [employer]  NPI # [id number]  [place] License - [id number]  [place] License - [id number]  3/29/2021

## 2021-03-29 NOTE — CONSULTS
[id number] Umm Fowler 78 y o  female MRN: [id number]  Unit/Bed#: -01 Encounter: [id number]    ASSESSMENT and PLAN:    78-year-old female with history of chronic obstructive pulmonary disease on home oxygen, type 2 diabetes, diastolic heart failure and a thoracic compression fracture who presents for worsening shortness of breath  Nephrology consult a for hypercalcemia  Hypercalcemia  --prior workup reviewed  Was seen by Nephrology in the past   --thought to be secondary to immobilization  --PTH at that time was within normal limits but would be appropriate in the setting of hypercalcemia, vitamin-D was 14 6, angiotensin-converting enzyme was 20, SPEP/UPEP were within normal, PTH related protein was negative  --will start half-normal saline at 75 cc/hour  --check daily ionized calcium  --discussed with Dr Marsha Loyd    Respiratory acidosis  --secondary COPD exacerbation  --currently on BiPAP  --DNR/DNI  --chest x-ray is clear    Chronic kidney disease stage IIIA  --baseline creatinine around 1 mg/dL  --currently stable    Azotemia  --stable renal function  --likely in the setting of steroids, antibiotics, intermittent diuretics    Hypernatremia  --will start half-normal saline at 75 cc/hour    SUMMARY OF RECOMMENDATIONS:  Please see above    HISTORY OF PRESENT ILLNESS:  Requesting Physician: Brendan Heaton MD  Reason for Consult:  Hypercalcemia    Angeline Ulloa is a 78 y o  female who was admitted to [facility] after presenting with worsening shortness of breath  A renal consultation is requested today for assistance in the management of hypercalcemia  Most history obtained from the chart  Patient is lethargic in currently on BiPAP  Unable to provide any history  Patient was seen by Nephrology back in March of this year for hypercalcemia with underlying chronic kidney disease and an episode of acute kidney injury    Her baseline creatinine is usually around 1 mg/dL  Renal function is currently stable at her baseline  She presents with hypercalcemia  Her she was worked up in the past the only abnormality that I see being a normal PTH but in the setting of hypercalcemia  She is also hypernatremic after receiving some intravenous fluids  She is currently NPO while on BiPAP  He did not keep her BiPAP on overnight and became worsening hypercapnia  Her BUN had been trending up likely in the setting of steroids  Chest x-ray shows no acute cardiopulmonary disease      PAST MEDICAL HISTORY:  Past Medical History:   Diagnosis Date    Cataract     CHF (congestive heart failure) (HCC)     Leukocytosis        PAST SURGICAL HISTORY:  Past Surgical History:   Procedure Laterality Date    APPENDECTOMY      CYSTOCELE REPAIR      ANTERIOR COLPORRHAPHY     ORIF ANKLE FRACTURE Left     TOTAL ABDOMINAL HYSTERECTOMY         ALLERGIES:  Allergies   Allergen Reactions    Erythromycin     Sulfa Antibiotics        SOCIAL HISTORY:  Social History     Substance and Sexual Activity   Alcohol Use Not Currently    Alcohol/week: 1 0 standard drinks    Types: 1 Cans of beer per week    Frequency: Monthly or less    Drinks per session: 1 or 2    Binge frequency: Never     Social History     Substance and Sexual Activity   Drug Use No     Social History     Tobacco Use   Smoking Status Former Smoker    Packs/day: 2 00    Years: 48 00    Pack years: 96 00    Types: Cigarettes    Quit date: 1999    Years since quittin 5   Smokeless Tobacco Never Used       FAMILY HISTORY:  Family History   Problem Relation Age of Onset    No Known Problems Mother     No Known Problems Father        MEDICATIONS:    Current Facility-Administered Medications:     acetaminophen (TYLENOL) tablet 650 mg, 650 mg, Oral, Q6H [facility], MD Violet Melissa mag oxide-diphenhydramine-lidocaine viscous (MAGIC MOUTHWASH) suspension 10 mL, 10 mL, Swish & Spit, Q4H PRN, Mary HusbandsMD Bea atorvastatin (LIPITOR) tablet 80 mg, 80 mg, Oral, Daily With Kumar Hernandez MD    benzonatate (TESSALON PERLES) capsule 100 mg, 100 mg, Oral, TID PRN, Nubia Pastrana MD    cefTRIAXone (ROCEPHIN) IVPB (premix in dextrose) 1,000 mg 50 mL, 1,000 mg, Intravenous, Q24H, Goergia Mcpherson MD, Last Rate: 100 mL/hr at 03/29/21 0808, 1,000 mg at 03/29/21 0808    diltiazem (CARDIZEM) injection 10 mg, 10 mg, Intravenous, Q6H, Arsenio Mccray MD, 10 mg at 03/29/21 0808    docusate sodium (COLACE) capsule 100 mg, 100 mg, Oral, BID, Nubia Pastrana MD    enoxaparin (LOVENOX) subcutaneous injection 70 mg, 1 mg/kg, Subcutaneous, Q12H [facility], Arsenio Mccray MD, 70 mg at 03/29/21 0808    insulin lispro (HumaLOG) 100 units/mL subcutaneous injection 1-5 Units, 1-5 Units, Subcutaneous, TID AC, 2 Units at 03/28/21 1753 **AND** Fingerstick Glucose (POCT), , , TID AC, Arsenio Mccray MD    ipratropium (ATROVENT) 0 02 % inhalation solution 0 5 mg, 0 5 mg, Nebulization, Q6H, Michoacano Shultz MD, 0 5 mg at 03/29/21 0737    lidocaine (LIDODERM) 5 % patch 2 patch, 2 patch, Topical, Daily, Nubia Pastrana MD    melatonin tablet 6 mg, 6 mg, Oral, HS PRN, Nubia Pastrana MD    methocarbamol (ROBAXIN) tablet 500 mg, 500 mg, Oral, Q6H PRN, Nubia Pastrana MD    methylPREDNISolone sodium succinate (Solu-MEDROL) injection 40 mg, 40 mg, Intravenous, Q8H, Arsenio Mccray MD    montelukast (SINGULAIR) tablet 10 mg, 10 mg, Oral, HS, Arsenio Mccray MD, 10 mg at 03/28/21 8898    nystatin (MYCOSTATIN) cream, , Topical, BID, Nubia Pastrana MD, Given at 03/29/21 2979    nystatin (MYCOSTATIN) powder, , Topical, BID, Nubia Pastrana MD, Given at 03/29/21 0821    ondansetron (ZOFRAN) injection 4 mg, 4 mg, Intravenous, Q6H PRN, Nubia Pastrana MD, 4 mg at 03/29/21 1011    pantoprazole (PROTONIX) EC tablet 40 mg, 40 mg, Oral, Early Morning, Arsenio Mccray MD    REVIEW OF SYSTEMS:  Unable to obtain review of systems due to the patient's mental status    PHYSICAL EXAM:  Current Weight: Weight - Scale: 69 1 kg (152 lb 5 4 oz)  First Weight: Weight - Scale: 69 1 kg (152 lb 5 4 oz)  Vitals:    03/29/21 0737 03/29/21 0810 03/29/21 0843 03/29/21 0900   BP:  141/86 (!) 113/45    Pulse:  104     Resp:  (!) 24     Temp:  98 9 °F (37 2 °C)     TempSrc:  Tympanic     SpO2: 98%      Weight:    69 1 kg (152 lb 5 4 oz)   Height:    5' 2" (1 575 m)       Intake/Output Summary (Last 24 hours) at 3/29/2021 1019  Last data filed at 3/29/2021 0954  Gross per 24 hour   Intake 420 ml   Output --   Net 420 ml     Physical Exam  Constitutional:       General: She is not in acute distress  Appearance: She is well-developed  HENT:      Head: Normocephalic and atraumatic  Eyes:      General: No scleral icterus  Conjunctiva/sclera: Conjunctivae normal       Pupils: Pupils are equal, round, and reactive to light  Neck:      Musculoskeletal: Normal range of motion and neck supple  Cardiovascular:      Rate and Rhythm: Normal rate and regular rhythm  Heart sounds: S1 normal and S2 normal  No murmur  No friction rub  No gallop  Pulmonary:      Effort: Respiratory distress present  Breath sounds: Wheezing and rhonchi present  No rales  Comments: Currently on BiPAP  Abdominal:      General: Bowel sounds are normal       Palpations: Abdomen is soft  Tenderness: There is no abdominal tenderness  There is no rebound  Musculoskeletal: Normal range of motion  Skin:     Findings: No rash  Neurological:      Mental Status: She is alert             Invasive Devices:      Lab Results:   Results from last 7 days   Lab Units 03/29/21  0847 03/29/21  0546 03/28/21  2249 03/28/21  2140 03/28/21  1326   WBC Thousand/uL  --  17 48*  --   --  20 28*   HEMOGLOBIN g/dL  --  9 7*  --   --  11 3*   I STAT HEMOGLOBIN g/dl 10 9*  --   --  11 2*  --    HEMATOCRIT %  --  32 4*  --   --  36 6   HEMATOCRIT, ISTAT % 32*  --   --  33*  --    PLATELETS Thousands/uL  --  223  --   --  268   POTASSIUM mmol/L  --  4 4 4 1  --  4 0   CHLORIDE mmol/L  --  105 103  --  100 CO2 mmol/L  --  36* 37*  --  34*   CO2, I-STAT mmol/L 41*  --   --  42*  --    BUN mg/dL  --  61* 59*  --  56*   CREATININE mg/dL  --  0 96 0 95  --  1 03   CALCIUM mg/dL  --  13 8* 13 8*  --  14 1*   MAGNESIUM mg/dL  --  1 6  --   --   --    PHOSPHORUS mg/dL  --  3 3  --   --   --    ALK PHOS U/L  --   --   --   --  189 3*   ALT U/L  --   --   --   --  16   AST U/L  --   --   --   --  17   GLUCOSE, ISTAT mg/dl 217*  --   --  220*  --

## 2021-03-29 NOTE — ASSESSMENT & PLAN NOTE
· New onset atrial fibrillation with rapid ventricular response  · Heart rate 140-150 in ED, status post Lopressor 5 mg  Currently at 110s  · [id number] score 6 points  · No past medical history of bleeding  Patient has chronic anemia  Hemoglobin stable at 11 3  · Troponin 0 1>0 08>0  09  Elevated troponin likely due to type 2 MI in the setting of tachyarrhythmia  · Rate has been around 100-110s  · TSH decreased to 0 28  Free T4 elevated 1 85  · TTE showed normal EF without regional wall motion abnormality  Plan:  · Switch lopressor to diltiazem in the setting of borderline BP  · Continue therapeutic Lovenox 1 milligram/kg  · Cardiology consult, appreciate recommendation  · Hold levothyroxine

## 2021-03-29 NOTE — ASSESSMENT & PLAN NOTE
· Presented with progressive worsening shortness of breath from baseline  · Patient does have history of COPD on 2-3 L of oxygen at baseline     · On admission, she has new onset AFib with RVR in ED, heart rate 140-150 status post Lopressor 5 mg  · Shortness of breath likely due to COPD exacerbation/AFib with RVR/congestive heart failure  · Management as below

## 2021-03-29 NOTE — PROGRESS NOTES
[place] [facility]  64   Progress Note - Gina Alvarez 1940, 78 y o  female MRN: [id number]  Unit/Bed#: -01 Encounter: [id number]  Primary Care Provider: Hanna Gay MD   Date and time admitted to hospital: 3/28/2021  1:10 PM    SOB (shortness of breath)  Assessment & Plan  · Presented with progressive worsening shortness of breath from baseline  · Patient does have history of COPD on 2-3 L of oxygen at baseline  · On admission, she has new onset AFib with RVR in ED, heart rate 140-150 status post Lopressor 5 mg  · Shortness of breath likely due to COPD exacerbation/AFib with RVR/congestive heart failure  · Management as below    * COPD exacerbation (HCC)  Assessment & Plan  · History of COPD on 2-3 L of oxygen at baseline  · Currently on 4 L of oxygen  · She was given nebulization therapy in ED  · Chest x-ray no acute cardiopulmonary disease  Repeat chest x-ray was similar to previous study  · ABG revealed respiratory acidosis and CO2 retention  · Solu-Medrol 40 mg Q 8 hourly, nebulization therapy  · BiPAP at night and as needed  · Respiratory protocol  · Pulmonology consult, appreciate recommendation    Atrial fibrillation with rapid ventricular response ([address] )  Assessment & Plan  · New onset atrial fibrillation with rapid ventricular response  · Heart rate 140-150 in ED, status post Lopressor 5 mg  Currently at 110s  · [id number] score 6 points  · No past medical history of bleeding  Patient has chronic anemia  Hemoglobin stable at 11 3  · Troponin 0 1>0 08>0  09  Elevated troponin likely due to type 2 MI in the setting of tachyarrhythmia  · Rate has been around 100-110s  · TSH decreased to 0 28  Free T4 elevated 1 85  · TTE showed normal EF without regional wall motion abnormality  Plan:  · Switch lopressor to diltiazem in the setting of borderline BP    · Continue therapeutic Lovenox 1 milligram/kg  · Cardiology consult, appreciate recommendation  · Hold levothyroxine  Chronic diastolic heart failure (HCC)  Assessment & Plan  Wt Readings from Last 3 Encounters:   03/29/21 69 1 kg (152 lb 5 4 oz)   03/24/21 69 1 kg (152 lb 6 4 oz)   03/17/21 68 kg (150 lb)     · Patient has history of HFpEF on Lasix 20 mg daily at home  · TTE on 10/20/2020 showed EF 60% with grade 1 diastolic dysfunction  · Repeat TACOS showed normal EF without regional wall motion abnormality  · Patient looks hypovolemic  · , troponin 0 1>0 08>0 08  · She was given Lasix trial    · Will consider to give IV Lasix 40 b i d  If her respiratory status is not improved  · Lopressor for AFib with RVR  · Cardiology consult  · Follow-up with echocardiogram  · Daily weights, I and Os        Dehydration  Assessment & Plan  · Patient looks very dry  · BMP showed BUN 56, creatinine 1 03, BUN creatinine ratio more than 20, calcium 14 1  · She was given  cc in ED  · Continue 0 45% NS 75 cc/hr    Urinary incontinence  Assessment & Plan  · Patient does have urinary incontinence  · She is complaining of frequency, urgency  · She has been afebrile  She does have leukocytosis likely due to reactive leukocytosis in the setting of dehydration/tachyarrhythmia  · Follow-up with UA  · Currently on empiric ceftriaxone in the setting of COPD exacerbation and possible UTI    Hypercalcemia  Assessment & Plan  · Patient has chronic hypercalcemia, hypercalcemia workup done in February, ruled out her primary hyperparathyroidism  · Calcium 14 1  · Hypercalcemia likely due to volume depletion/immobilization  · She was given calcitonin 276 units  · Will give IV hydration half strength NS 75 cc/hour  · Will follow-up with ionized calcium in a m    · Nephrology consult, appreciate recommendation    Chronic bilateral thoracic back pain  Assessment & Plan  · Patient has chronic lower back pain and the compression fracture of T5 to T6  · Continue pain management as per NH    Hypothyroidism  Assessment & Plan  · TSH 0 284, free T4 1 85  · Hold off levothyroxine  · Need to decrease levothyroxine dose on discharge    Hyperlipidemia  Assessment & Plan  · Continue atorvastatin    Type 2 diabetes mellitus with complication, without long-term current use of insulin [facility])  Assessment & Plan  Lab Results   Component Value Date    HGBA1C 6 5 (H) 2021       Recent Labs     21  1749 21  0814 21  1134   POCGLU 228* 216* 255*       Blood Sugar Average: Last 72 hrs:  (P) 233   · Patient seems like have very poor oral intake  · Will continue regular diet-puree, thin liquid as per nursing home  · Sliding scale insulin and Accu-Chek  · Hypoglycemic protocol      VTE Pharmacologic Prophylaxis:   Pharmacologic: Enoxaparin (Lovenox)  Mechanical VTE Prophylaxis in Place: Yes    Discussions with Specialists or Other Care Team Provider: Yes    Education and Discussions with Family / Patient:  Discussed with the patient family regarding prognosis and management plan  Current Length of Stay: 1 day(s)    Current Patient Status: Inpatient     Discharge Plan / Estimated Discharge Date: TBD     Code Status: Level 3 - DNAR and DNI      Subjective:   She was seen and examined the bedside  She is on BiPAP  She she is in acute respiratory distress  Looks lethargic  Overnight, she was refusing BiPAP because of claustrophobia  VBG were showing, CO2 retention and worsening respiratory acidosis  Objective:     Vitals:   Temp (24hrs), Av °F (37 2 °C), Min:98 2 °F (36 8 °C), Max:99 4 °F (37 4 °C)    Temp:  [98 2 °F (36 8 °C)-99 4 °F (37 4 °C)] 98 9 °F (37 2 °C)  HR:  [] 105  Resp:  [20-30] 24  BP: ()/(45-86) 122/58  SpO2:  [66 %-99 %] 94 %  Body mass index is 27 86 kg/m²  Input and Output Summary (last 24 hours):        Intake/Output Summary (Last 24 hours) at 3/29/2021 1526  Last data filed at 3/29/2021 1001  Gross per 24 hour   Intake 420 ml   Output 150 ml   Net 270 ml       Physical Exam: Physical Exam  Vitals signs and nursing note reviewed  Constitutional:       General: She is in acute distress  Appearance: She is ill-appearing  HENT:      Nose:      Comments: On BiPAP  Eyes:      General: No scleral icterus  Conjunctiva/sclera: Conjunctivae normal    Cardiovascular:      Rate and Rhythm: Regular rhythm  Tachycardia present  Pulses: Normal pulses  Heart sounds: Normal heart sounds  No murmur  No gallop  Pulmonary:      Effort: Respiratory distress present  Breath sounds: Rhonchi and rales present  No wheezing  Abdominal:      General: Bowel sounds are normal  There is no distension  Palpations: Abdomen is soft  Tenderness: There is no abdominal tenderness  Musculoskeletal:      Right lower leg: No edema  Left lower leg: No edema  Skin:     General: Skin is warm and dry  Capillary Refill: Capillary refill takes less than 2 seconds  Neurological:      Mental Status: She is lethargic  Additional Data:     Labs:    Results from last 7 days   Lab Units 03/29/21  0847 03/29/21  0546  03/28/21  1326   WBC Thousand/uL  --  17 48*  --  20 28*   HEMOGLOBIN g/dL  --  9 7*  --  11 3*   I STAT HEMOGLOBIN g/dl 10 9*  --    < >  --    HEMATOCRIT %  --  32 4*  --  36 6   HEMATOCRIT, ISTAT % 32*  --    < >  --    PLATELETS Thousands/uL  --  223  --  268   LYMPHO PCT %  --   --   --  25   MONO PCT %  --   --   --  3*   EOS PCT %  --   --   --  0    < > = values in this interval not displayed       Results from last 7 days   Lab Units 03/29/21  0847 03/29/21  0546  03/28/21  1326   POTASSIUM mmol/L  --  4 4   < > 4 0   CHLORIDE mmol/L  --  105   < > 100   CO2 mmol/L  --  36*   < > 34*   CO2, I-STAT mmol/L 41*  --    < >  --    BUN mg/dL  --  61*   < > 56*   CREATININE mg/dL  --  0 96   < > 1 03   CALCIUM mg/dL  --  13 8*   < > 14 1*   ALK PHOS U/L  --   --   --  189 3*   ALT U/L  --   --   --  16   AST U/L  --   --   --  17   GLUCOSE, ISTAT mg/dl 217*  --    < >  --     < > = values in this interval not displayed  Results from last 7 days   Lab Units 03/28/21  1326   INR  1 01       * I Have Reviewed All Lab Data Listed Above  * Additional Pertinent Lab Tests Reviewed: Anand 64 Admission Reviewed    Imaging:    Imaging Reports Reviewed Today Include: CXR  Imaging Personally Reviewed by Myself Includes:  CXR    Recent Cultures (last 7 days):     Results from last 7 days   Lab Units 03/28/21  1327 03/28/21  1326   BLOOD CULTURE  Received in Microbiology Lab  Culture in Progress  Received in Microbiology Lab  Culture in Progress         Last 24 Hours Medication List:   Current Facility-Administered Medications   Medication Dose Route Frequency Provider Last Rate    acetaminophen  650 mg Oral Q6H [facility] MD Shazia      al mag oxide-diphenhydramine-lidocaine viscous  10 mL Swish & Spit Q4H PRN Johnice Mcardle, MD      atorvastatin  80 mg Oral Daily With Kory Pena MD      benzonatate  100 mg Oral TID PRN Johnice Mcardle, MD      cefTRIAXone  1,000 mg Intravenous Q24H Sin Oneil MD 1,000 mg (03/29/21 7494)    diltiazem  10 mg Intravenous Q6H Ei MD Shazia      docusate sodium  100 mg Oral BID Johnice Mcardle, MD      enoxaparin  1 mg/kg Subcutaneous Q12H [facility] Arsenio Mccray MD      insulin lispro  1-6 Units Subcutaneous TID AC Sin Oneil MD      ipratropium  0 5 mg Nebulization Q6H Gary Gomez MD      lidocaine  2 patch Topical Daily Johnice Mcardle, MD      melatonin  6 mg Oral HS PRN Johnice Mcardle, MD      methocarbamol  500 mg Oral Q6H PRN Johnice Mcardle, MD      methylPREDNISolone sodium succinate  40 mg Intravenous Q8H Arsenio Mccray MD      montelukast  10 mg Oral HS Johnice Mcardle, MD      nystatin   Topical BID Johnice Mcardle, MD      nystatin   Topical BID Johnice Mcardle, MD      ondansetron  4 mg Intravenous Q6H PRN Johnice Mcardle, MD      pantoprazole  40 mg Oral Early Morning Johnice Mcardle, MD      sodium chloride  75 mL/hr Intravenous Continuous Elizabeth Marie MD 75 mL/hr (03/29/21 1031)        Today, Patient Was Seen By: Catracho Bailon MD    ** Please Note: This note has been constructed using a voice recognition system   **

## 2021-03-29 NOTE — CONSULTS
Consultation - Cardiology   Peter Chopra 78 y o  female MRN: [id number]  Unit/Bed#: -01 Encounter: [id number]  03/29/21  9:22 AM        Impression:    38-year-old with diabetes, dyslipidemia, chronic diastolic CHF on Lasix 20 mg daily at baseline, COPD, presented in acute respiratory failure, in new onset atrial fibrillation with hypercalcemia, treated for COPD exacerbation and dehydration, subsequently developed worsening shortness of breath, started on Lasix and BiPAP  Plan:    Acute respiratory failure:  Does examine volume overloaded with pulmonary edema  Would give another dose of IV Lasix in a couple of hours if her respiratory status does not improve  She just received a dose of 40 mg IV Lasix 2 hours ago  Renal function so far remains stable  Consider giving 40 IV twice a tomorrow also if her respiratory status does not improve  Hypercapnia: Now on BiPAP, defer to the primary team   She is a DNR/DNI    Atrial fibrillation:  New onset, currently in sinus rhythm, currently on therapeutic dose Lovenox  Can not convert to Eliquis 5 mg twice daily at discharge, apparently was previously more functional   Need to decrease Synthroid dose    Dyslipidemia:  Not on a statin at baseline, reasonable considering her advanced age      Discussed with the primary team       Assessment:  Principal Problem:    COPD exacerbation ([address] )  Active Problems:    Type 2 diabetes mellitus with complication, without long-term current use of insulin (HCC)    Hyperlipidemia    Hypothyroidism    SOB (shortness of breath)    Chronic bilateral thoracic back pain    Chronic diastolic heart failure (HCC)    Hypercalcemia    Urinary incontinence    Atrial fibrillation with rapid ventricular response ([address] )    Dehydration    Chief Complaint   Patient presents with    Shortness of Breath     sob and tachycardia x 2 days     Admitting diagnosis:  Hypercalcemia [E83 52]  Shortness of breath [R06 02]  Rapid atrial fibrillation ([facility] ) [I48 91]    History of Present Illness   Physician Requesting Consult: Gama Fall MD   Reason for Consult / Principal Problem:  Shortness of breath, atrial fibrillation  HPI: Qi Schultz is a 40-year-old with a history of diabetes, dyslipidemia, chronic diastolic CHF-on Lasix 20 mg daily at baseline, COPD-with a prior 96 pack year smoking history, no prior history of atrial fibrillation or flutter, had seen my partner Ruddy Zhao once in February 2021 at which time she was deemed stable, now admitted from a skilled nursing facility with shortness of breath and tachycardia  She was found to be in new onset rapid  atrial flutter  She was treated for COPD with steroids and nebulization treatments, dehydration based on elevated BUN and hypercalcemia with IV fluids, treated for the atrial fibrillation with IV metoprolol and subsequently based on ECGs seems to have converted to sinus rhythm  Her white count was also elevated at 20 at the time of presentation, now decreased to 17  Patient has hypothyroidism and on Synthroid at baseline-her TSH is suppressed at 0 28, with an elevated free T4 at 1 8  Serial chest x-rays do not suggest any new cardiopulmonary disease  Has been on BiPAP since this morning for mental status changes and hypercapnia  Mental status is apparently better  She is still somnolent, barely responds, in no respiratory distress however  Inpatient consult to Cardiology  Consult performed by:  Zaida Solorio MD  Consult ordered by: Rao Alba MD          Review of Systems:  generally weak  Review of Systems   Unable to perform ROS: Mental status change       Historical Information   Past Medical History:   Diagnosis Date    Cataract     CHF (congestive heart failure) ([facility] )     Leukocytosis      Past Surgical History:   Procedure Laterality Date    APPENDECTOMY      CYSTOCELE REPAIR      ANTERIOR COLPORRHAPHY     ORIF ANKLE FRACTURE Left 1979    TOTAL ABDOMINAL HYSTERECTOMY       Social History     Substance and Sexual Activity   Alcohol Use Not Currently    Alcohol/week: 1 0 standard drinks    Types: 1 Cans of beer per week    Frequency: Monthly or less    Drinks per session: 1 or 2    Binge frequency: Never     Social History     Substance and Sexual Activity   Drug Use No     Social History     Tobacco Use   Smoking Status Former Smoker    Packs/day: 2 00    Years: 48 00    Pack years: 96 00    Types: Cigarettes    Quit date: 1999    Years since quittin 5   Smokeless Tobacco Never Used     Family History:   Family History   Problem Relation Age of Onset    No Known Problems Mother     No Known Problems Father      No family history of premature CAD or Sudden Cardiac Death    Meds/Allergies     Current Facility-Administered Medications:     acetaminophen (TYLENOL) tablet 650 mg, 650 mg, Oral, Q6H [address], Matt Coyle MD    al mag oxide-diphenhydramine-lidocaine viscous (MAGIC MOUTHWASH) suspension 10 mL, 10 mL, Swish & Spit, Q4H PRN, Matt Coyle MD    atorvastatin (LIPITOR) tablet 80 mg, 80 mg, Oral, Daily With Dinner, Matt Coyle MD    benzonatate (TESSALON PERLES) capsule 100 mg, 100 mg, Oral, TID PRN, Matt Coyle MD    cefTRIAXone (ROCEPHIN) IVPB (premix in dextrose) 1,000 mg 50 mL, 1,000 mg, Intravenous, Q24H, Raad Kearney MD, Last Rate: 100 mL/hr at 21 0808, 1,000 mg at 21 0808    diltiazem (CARDIZEM) injection 10 mg, 10 mg, Intravenous, Q6H, Arsenio Mccray MD, 10 mg at 21 0808    docusate sodium (COLACE) capsule 100 mg, 100 mg, Oral, BID, Matt Coyle MD    enoxaparin (LOVENOX) subcutaneous injection 70 mg, 1 mg/kg, Subcutaneous, Q12H [address]Arsenio MD, 70 mg at 21 0808    insulin lispro (HumaLOG) 100 units/mL subcutaneous injection 1-5 Units, 1-5 Units, Subcutaneous, TID AC, 2 Units at 21 1753 **AND** Fingerstick Glucose (POCT), , , TID AC, Arsenio Mccray MD    ipratropium (ATROVENT) 0 02 % inhalation solution 0 5 mg, 0 5 mg, Nebulization, Q6H, Costa Ahuja MD, 0 5 mg at 03/29/21 0737    lidocaine (LIDODERM) 5 % patch 2 patch, 2 patch, Topical, Daily, Hayden Sawant MD    magnesium sulfate IVPB (premix) SOLN 1 g, 1 g, Intravenous, Once, Shanda Morton MD, Last Rate: 100 mL/hr at 03/29/21 0904, 1 g at 03/29/21 0904    melatonin tablet 6 mg, 6 mg, Oral, HS PRN, Hayden Sawant MD    methocarbamol (ROBAXIN) tablet 500 mg, 500 mg, Oral, Q6H PRN, Hayden Sawant MD    methylPREDNISolone sodium succinate (Solu-MEDROL) injection 40 mg, 40 mg, Intravenous, Q12H VIC, Arsenio Mccray MD, 40 mg at 03/29/21 0808    montelukast (SINGULAIR) tablet 10 mg, 10 mg, Oral, HS, Arsenio Mccray MD, 10 mg at 03/28/21 2218    nystatin (MYCOSTATIN) cream, , Topical, BID, Hayden Sawant MD, Given at 03/29/21 5078    nystatin (MYCOSTATIN) powder, , Topical, BID, Hayden Sawant MD, Given at 03/29/21 0821    ondansetron (ZOFRAN) injection 4 mg, 4 mg, Intravenous, Q6H PRN, Hayden Sawant MD, 4 mg at 03/28/21 1901    pantoprazole (PROTONIX) EC tablet 40 mg, 40 mg, Oral, Early Morning, Arsenio Mccray MD  Allergies   Allergen Reactions    Erythromycin     Sulfa Antibiotics        Objective   Vitals: Blood pressure (!) 113/45, pulse 104, temperature 98 9 °F (37 2 °C), temperature source Tympanic, resp   rate (!) 24, height 5' 2" (1 575 m), SpO2 98 %, not currently breastfeeding , Body mass index is 27 87 kg/m² ,   Orthostatic Blood Pressures      Most Recent Value   Blood Pressure  (!) 113/45 filed at 03/29/2021 0843   Patient Position - Orthostatic VS  Lying filed at 03/28/2021 1909          No intake or output data in the 24 hours ending 03/29/21 8738    Weight (last 2 days)     None          Invasive Devices     Peripheral Intravenous Line            Peripheral IV 03/28/21 Right;Ventral (anterior) Forearm 1 day    Peripheral IV 03/28/21 Left Wrist less than 1 day                  Physical Exam:  GEN: Colletta Legato appears ill, alert and oriented x 3, pleasant and cooperative   HEENT: pupils equal, round, and reactive to light; extraocular muscles intact  NECK: supple, no carotid bruits or JVD  HEART: regular rhythm, normal S1 and S2, no murmurs, no clicks, gallops or rubs   LUNGS: clear to auscultation bilaterally; no wheezes or rhonchi, bilateral posterior rales  ABDOMEN/GI: normal bowel sounds, soft, no tenderness, no distention  EXTREMITIES/Musculoskeltal: peripheral pulses normal; no clubbing, cyanosis, or edema  NEURO: no focal motor findings   SKIN: normal without suspicious lesions on exposed skin      Lab Results:   Admission on 03/28/2021   Component Date Value    WBC 03/28/2021 20 28*    RBC 03/28/2021 3 69*    Hemoglobin 03/28/2021 11 3*    Hematocrit 03/28/2021 36 6     MCV 03/28/2021 99*    MCH 03/28/2021 30 6     MCHC 03/28/2021 30 9*    RDW 03/28/2021 18 5*    MPV 03/28/2021 9 8     Platelets 07/87/2799 268     Sodium 03/28/2021 144     Potassium 03/28/2021 4 0     Chloride 03/28/2021 100     CO2 03/28/2021 34*    ANION GAP 03/28/2021 10     BUN 03/28/2021 56*    Creatinine 03/28/2021 1 03     Glucose 03/28/2021 336*    Calcium 03/28/2021 14 1*    AST 03/28/2021 17     ALT 03/28/2021 16     Alkaline Phosphatase 03/28/2021 189 3*    Total Protein 03/28/2021 6 7     Albumin 03/28/2021 3 6     Total Bilirubin 03/28/2021 0 41     eGFR 03/28/2021 51     BNP 03/28/2021 431 1*    Troponin I 03/28/2021 0 10*    SARS-CoV-2 03/28/2021 Negative     INFLUENZA A PCR 03/28/2021 Negative     INFLUENZA B PCR 03/28/2021 Negative     RSV PCR 03/28/2021 Negative     Protime 03/28/2021 11 4     INR 03/28/2021 1 01     PTT 03/28/2021 34*    LACTIC ACID 03/28/2021 1 9     Blood Culture 03/28/2021 Received in Microbiology Lab  Culture in Progress   Blood Culture 03/28/2021 Received in Microbiology Lab  Culture in Progress       Segmented % 03/28/2021 62     Bands % 03/28/2021 8     Lymphocytes % 03/28/2021 25     Monocytes % 03/28/2021 3*    Eosinophils, % 03/28/2021 0     Basophils % 03/28/2021 0     Metamyelocytes% 03/28/2021 2*    Absolute Neutrophils 03/28/2021 14 20*    Lymphocytes Absolute 03/28/2021 5 07*    Monocytes Absolute 03/28/2021 0 61     Eosinophils Absolute 03/28/2021 0 00     Basophils Absolute 03/28/2021 0 00     Total Counted 03/28/2021 100     RBC Morphology 03/28/2021 Present     Anisocytosis 03/28/2021 Present     Ovalocytes 03/28/2021 Present     Poikilocytes 03/28/2021 Present     Polychromasia 03/28/2021 Present     Platelet Estimate 67/53/8559 Adequate     TSH 3RD GENERATON 03/28/2021 0 284*    Troponin I 03/28/2021 0 08*    pH, Dwaine 03/28/2021 7 345     pCO2, Dwaine 03/28/2021 56 9*    pO2, Dwaine 03/28/2021 148 4*    HCO3, Dwaine 03/28/2021 30 4*    Base Excess, Dwaine 03/28/2021 4 2     O2 Content, Dwaine 03/28/2021 8 8     O2 HGB, VENOUS 03/28/2021 96 9*    Hemoglobin A1C 03/28/2021 6 5*    EAG 03/28/2021 140     Free T4 03/28/2021 1 85*    POC Glucose 03/28/2021 228*    Sodium 03/28/2021 147*    Potassium 03/28/2021 4 1     Chloride 03/28/2021 103     CO2 03/28/2021 37*    ANION GAP 03/28/2021 7     BUN 03/28/2021 59*    Creatinine 03/28/2021 0 95     Glucose 03/28/2021 225*    Calcium 03/28/2021 13 8*    eGFR 03/28/2021 56     pH, Dwaine 03/28/2021 7 296*    pCO2, Dwaine 03/28/2021 64 0*    pO2, Dwaine 03/28/2021 136 2*    HCO3, Dwaine 03/28/2021 30 5*    Base Excess, Dwaine 03/28/2021 2 8     O2 Content, Dwaine 03/28/2021 14 9     O2 HGB, VENOUS 03/28/2021 96 6*    Troponin I 03/28/2021 0 09*    Sodium 03/29/2021 148*    Potassium 03/29/2021 4 4     Chloride 03/29/2021 105     CO2 03/29/2021 36*    ANION GAP 03/29/2021 7     BUN 03/29/2021 61*    Creatinine 03/29/2021 0 96     Glucose 03/29/2021 214*    Calcium 03/29/2021 13 8*    eGFR 03/29/2021 55     Magnesium 03/29/2021 1 6     Phosphorus 03/29/2021 3 3     WBC 03/29/2021 17 48*    RBC 03/29/2021 3 17*    Hemoglobin 03/29/2021 9 7*    Hematocrit 03/29/2021 32 4*    MCV 03/29/2021 102*    MCH 03/29/2021 30 6     MCHC 03/29/2021 29 9*    RDW 03/29/2021 18 5*    MPV 03/29/2021 10 1     Platelets 52/70/7211 223     pH, Arterial 03/29/2021 7 275*    pCO2, Arterial 03/29/2021 71 3*    pO2, Arterial 03/29/2021 100 9     HCO3, Arterial 03/29/2021 32 4*    Base Excess, Arterial 03/29/2021 3 7     O2 Content, Arterial 03/29/2021 16 3     O2 HGB,Arterial  03/29/2021 96 4     SOURCE 03/29/2021 Radial, Right     JUAN TEST 03/29/2021 Yes     Temperature 03/29/2021 98 1     Non Vent type BIPAP 03/29/2021      pH, Art i-STAT 03/28/2021 7 385     pH, i-STAT Temp Corrected 03/28/2021 7 384     pCO2, Art i-STAT 03/28/2021 66 2*    pCO2, i-STAT TC 03/28/2021 66 3     pO2, ART i-STAT 03/28/2021 83 0     pO2, i-STAT TC 03/28/2021 83     BE, i-STAT 03/28/2021 12*    HCO3, Art i-STAT 03/28/2021 39 6*    CO2, i-STAT 03/28/2021 42*    O2 Sat, i-STAT 03/28/2021 95*    SODIUM, I-STAT 03/28/2021 141     Potassium, i-STAT 03/28/2021 4 0     Hct, i-STAT 03/28/2021 33*    Hgb, i-STAT 03/28/2021 11 2*    Glucose, i-STAT 03/28/2021 220*    POC FIO2 03/28/2021 45     Specimen Type 03/28/2021 ARTERIAL     POC Glucose 03/29/2021 216*    pH, Art i-STAT 03/29/2021 7 305*    pCO2, Art i-STAT 03/29/2021 76 9*    pO2, ART i-STAT 03/29/2021 98 0     BE, i-STAT 03/29/2021 10*    HCO3, Art i-STAT 03/29/2021 38 3*    CO2, i-STAT 03/29/2021 41*    O2 Sat, i-STAT 03/29/2021 96*    SODIUM, I-STAT 03/29/2021 142     Potassium, i-STAT 03/29/2021 4 4     Hct, i-STAT 03/29/2021 32*    Hgb, i-STAT 03/29/2021 10 9*    Glucose, i-STAT 03/29/2021 217*    POC FIO2 03/29/2021 50     Specimen Type 03/29/2021 ARTERIAL          Imaging: I have personally reviewed pertinent reports  EKG/Telemetry:  Remains in sinus rhythm at this time    Counseling / Coordination of Care    Thank you for allowing us to participate in their care       This note was completed in part utilizing m-modal fluency direct voice recognition software  Grammatical errors, random word insertion, spelling mistakes, occasional wrong word or "sound-alike" substitutions and incomplete sentences may be an occasional consequence of the system secondary to software limitations, ambient noise and hardware issues  At the time of dictation, efforts were made to edit, clarify and /or correct errors  Please read the chart carefully and recognize, using context, where substitutions have occurred  If you have any questions or concerns about the context, text or information contained within the body of this dictation, please contact myself, the provider, for further clarification      Shoshana Ndiaye MD

## 2021-03-29 NOTE — ASSESSMENT & PLAN NOTE
· History of COPD on 2-3 L of oxygen at baseline  · Currently on 4 L of oxygen  · She was given nebulization therapy in ED  · Chest x-ray no acute cardiopulmonary disease  Repeat chest x-ray was similar to previous study  · ABG revealed respiratory acidosis and CO2 retention  · Solu-Medrol 40 mg Q 8 hourly, nebulization therapy  · BiPAP at night and as needed    · Respiratory protocol  · Pulmonology consult, appreciate recommendation

## 2021-03-29 NOTE — ASSESSMENT & PLAN NOTE
Lab Results   Component Value Date    HGBA1C 6 5 (H) 03/28/2021       Recent Labs     03/28/21  1749 03/29/21  0814 03/29/21  1134   POCGLU 228* 216* 255*       Blood Sugar Average: Last 72 hrs:  (P) 233   · Patient seems like have very poor oral intake  · Will continue regular diet-puree, thin liquid as per nursing home  · Sliding scale insulin and Accu-Chek  · Hypoglycemic protocol

## 2021-03-30 LAB
ATRIAL RATE: 155 BPM
ATRIAL RATE: 313 BPM
ATRIAL RATE: 80 BPM
ATRIAL RATE: 80 BPM
P AXIS: 192 DEGREES
P AXIS: 236 DEGREES
P AXIS: 60 DEGREES
P AXIS: 60 DEGREES
PR INTERVAL: 153 MS
PR INTERVAL: 153 MS
PR INTERVAL: 173 MS
PR INTERVAL: 181 MS
QRS AXIS: -3 DEGREES
QRS AXIS: 1 DEGREES
QRS AXIS: 33 DEGREES
QRS AXIS: 33 DEGREES
QRSD INTERVAL: 117 MS
QRSD INTERVAL: 76 MS
QRSD INTERVAL: 78 MS
QRSD INTERVAL: 78 MS
QT INTERVAL: 301 MS
QT INTERVAL: 331 MS
QT INTERVAL: 331 MS
QT INTERVAL: 341 MS
QTC INTERVAL: 377 MS
QTC INTERVAL: 377 MS
QTC INTERVAL: 383 MS
QTC INTERVAL: 522 MS
T WAVE AXIS: 23 DEGREES
T WAVE AXIS: 254 DEGREES
T WAVE AXIS: 36 DEGREES
T WAVE AXIS: 36 DEGREES
VENTRICULAR RATE: 141 BPM
VENTRICULAR RATE: 78 BPM
VENTRICULAR RATE: 78 BPM
VENTRICULAR RATE: 97 BPM

## 2021-03-30 PROCEDURE — 93010 ELECTROCARDIOGRAM REPORT: CPT | Performed by: INTERNAL MEDICINE

## 2021-03-30 NOTE — DISCHARGE SUMMARY
Discharge Summary - Tiffany Hernandez 78 y o  female MRN: [id number]    Unit/Bed#: -01 Encounter: [id number] PCP: Colletta Lolling, MD    Admission Date:   Admission Orders (From admission, onward)     Ordered        03/28/21 1437  Inpatient Admission  Once                     Admitting Diagnosis: Hypercalcemia [E83 52]  Shortness of breath [R06 02]  Rapid atrial fibrillation ([address] ) [I48 91]    HPI:   Patient was transferred from nursing home due to shortness of breath and tachycardia  In the emergency department she was found to have tachypnea, tachycardia, was complaining of shortness of breath, initially was placed on high-flow 10 L of oxygen and then gradually weaned off to 3 L of oxygen on nasal cannula which is her baseline  Patient was found to be in rapid AFib as well, received Cardizem and heart rate improved  This is new diagnosis for the patient  Patient is visibly short of breath, toxin short sentences, she is DNR/DNI  Son was present at the bedside  Patient unable to give significant history however as per the son patient had recent fall and vertebral fracture and was admitted to [facility] rehab however she has gradually declined        Procedures Performed: No orders of the defined types were placed in this encounter  Summary of Hospital Course:   During her stay patient was treated for her hypercalcemia with IV fluids and 1 dose of calcitonin in the ED  Patient was in respiratory failure secondary to her COPD, she was requiring BiPAP which she initially refused  Her hypercapnia worsened along with her mental status  Trial of Lasix with minimal improvement  Cardiology and nephrology consulted  Hypercalcemia felt to be secondary to immobilization  Pulmonology also consulted, IV antibiotics steroids and bronchodilators continued  However hypoxic hypercapnic respiratory failure did not improve    After long discussion with family decision was made to transition to comfort care   Patient was pronounced dead at 23 50 on 3/29/21    Significant Findings, Care, Treatment and Services Provided:   Worsening respiratory acidosis  Complications:  Death    Disposition:      Final Diagnosis:   Acute on chronic hypoxic and hypercarbic respiratory failure  COPD exacerbation  Congestive heart failure  AFib with RVR  Hypercalcemia    Resolved Problems  Date Reviewed: 3/30/2021    None          Condition at Time of Death:  Patient was transitioned to comfort care        Death Note:    INPATIENT DEATH NOTE  Royal Munozum 78 y o  female MRN: [id number]  Unit/Bed#: -[place] Encounter: [id number]             PHYSICAL EXAM:  Unresponsive to noxious stimuli, Spontaneous respirations absent, Breath sounds absent, Carotid pulse absent, Heart sounds absent, Pupillary light reflex absent and Corneal blink reflex absent    Medical Examiner notification criteria:  NONE APPLICABLE   Medical Examiner's office notified?:  No, does not meet ME notification criteria   Medical Examiner accepted case?:  No  Name of Medical Examiner: n/a   At 11:50 pm I was called by RN in charge Paola De Leon   Patient was properly identify through wristband  Patient was unresponsive to verbal or painful stimuli  No pupil light reflex or gag reflex was noted  Unable to appreciate any pulse breath sounds or heart sounds  Above mention exam was done 2 times without any response witnessed by Paola De Leon  Patient was pronounced dead at 11:50 p m on 3/29/21, Dr Abena Mathias was notified  Daughter Vinod Arechiga and son Steve Stoner were notified  Autopsy was offered to the family and currently family members have refused autopsy  Autopsy Options:  Decision for post-mortem examination refused by next of kin      Primary Service Attending Physician notified?:  yes - Attending:  Bacilio Kay MD    Physician/Resident responsible for completing Discharge Summary:  Bere Cuello

## 2021-03-30 NOTE — ASSESSMENT & PLAN NOTE
· New onset atrial fibrillation with rapid ventricular response  · Heart rate 140-150 in ED, status post Lopressor 5 mg  Currently at 110s  · [id number] score 6 points  · No past medical history of bleeding  Patient has chronic anemia  Hemoglobin stable at 11 3  · Troponin 0 1>0 08>0  09  Elevated troponin likely due to type 2 MI in the setting of tachyarrhythmia  · Rate has been around 100-110s  · TSH decreased to 0 28  Free T4 elevated 1 85  · TTE showed normal EF without regional wall motion abnormality  Plan:  · Switch lopressor to diltiazem in the setting of borderline BP  · Continue therapeutic Lovenox 1 milligram/kg  · Cardiology consult, appreciate recommendation  · Hold levothyroxine

## 2021-03-30 NOTE — NURSING NOTE
Pt noted to have gasping respiration and then stopped breathing ,was pronounced dead by Dr Amna Stout ,next of kin informed and all necessary paperwork filled out and  informed of death ,and body  was allowed to be released

## 2021-03-30 NOTE — QUICK NOTE
We met with patient's family, present in the meeting where Tobin Latif ( child), Basim Fu ( daughter-in-law), Einar Prader ( daughter), Dr Donnell Hernandez and myself  We discussed in length present clinical status, plan of care and prognosis, after which family decided to transition to comfort care  Code status was changed from level 3 to level 4  Patient was started on end of life care, comfort measures, no further labs, procedures or diagnostics tests  Morphine 2 mg every 10 minutes as needed for severe pain, tachypnea, air hunger  Ativan 0 5 mg every hour as needed for anxiety or agitation  Oral care, pleasure feeds if patient awake, vital signs as needed

## 2021-03-30 NOTE — DEATH NOTE
INPATIENT DEATH NOTE  Kirk Xavier 78 y o  female MRN: [id number]  Unit/Bed#: -01 Encounter: [id number]             PHYSICAL EXAM:  Unresponsive to noxious stimuli, Spontaneous respirations absent, Breath sounds absent, Carotid pulse absent, Heart sounds absent, Pupillary light reflex absent and Corneal blink reflex absent    Medical Examiner notification criteria:  NONE APPLICABLE   Medical Examiner's office notified?:  No, does not meet ME notification criteria   Medical Examiner accepted case?:  No  Name of Medical Examiner: n/a   At 11:50 pm I was called by RN in charge Paola De Leon   Patient was properly identify through wristband  Patient was unresponsive to verbal or painful stimuli  No pupil light reflex or gag reflex was noted  Unable to appreciate any pulse breath sounds or heart sounds  Above mention exam was done 2 times without any response witnessed by Paola De Leon  Patient was pronounced dead at 11:50 p m on 3/29/21, Dr Tasha Grace was notified  Daughter Marcia Fisher and son Sissy Victoria were notified  Autopsy was offered to the family and currently family members have refused autopsy  Autopsy Options:  Decision for post-mortem examination refused by next of kin      Primary Service Attending Physician notified?:  yes - Attending:  Gary Gomez MD    Physician/Resident responsible for completing Discharge Summary:  Gina Mueller

## 2021-03-30 NOTE — NURSING NOTE
Pts family discussed her condition and decided to put pt on comfort care , pt taken off bipap and Oxygen  , pt made comfortable will continue to monitor

## 2021-03-30 NOTE — ASSESSMENT & PLAN NOTE
Lab Results   Component Value Date    HGBA1C 6 5 (H) 03/28/2021       Recent Labs     03/29/21  0814 03/29/21  1134 03/29/21  1613 03/29/21  2101   POCGLU 216* 255* 184* 203*       Blood Sugar Average: Last 72 hrs:  (P) 217 2   · Patient seems like have very poor oral intake  · Will continue regular diet-puree, thin liquid as per nursing home  · Sliding scale insulin and Accu-Chek  · Hypoglycemic protocol

## 2021-04-02 LAB
BACTERIA BLD CULT: NORMAL
BACTERIA BLD CULT: NORMAL

## 2021-04-05 NOTE — PHYSICIAN ADVISOR
Current patient class: Inpatient  The patient is currently on Hospital Day: 3 at [facility]      The patient was admitted to the hospital at [phone] on 3/28/21 for the following diagnosis:  Hypercalcemia [E83 52]  Shortness of breath [R06 02]  Rapid atrial fibrillation [facility] [I48 91]       Patient requiring BIPAP and  on 3/29/21  After review of the relevant documentation, labs, vital signs and test results, the patient is appropriate for INPATIENT ADMISSION  Admission to the hospital as an inpatient is a complex decision making process which requires the practitioner to consider the patients presenting complaint, history and physical examination and all relevant testing  With this in mind, in this case, the patient was deemed appropriate for INPATIENT ADMISSION  After review of the documentation and testing available at the time of the admission I concur with this clinical determination of medical necessity  Rationale is as follows: The patient is a 78 yrs old Female who presented to the ED at 3/28/2021  1:10 PM with a chief complaint of Shortness of Breath (sob and tachycardia x 2 days)   Patient was transferred from nursing home due to shortness of breath and tachycardia   In the emergency department she was found to have tachypnea, tachycardia, was complaining of shortness of breath, initially was placed on high-flow 10 L of oxygen and then gradually weaned off to 3 L of oxygen on nasal cannula which is her baseline   Patient was found to be in rapid AFib as well, received Cardizem and heart rate improved   This is new diagnosis for the patient   Patient is visibly short of breath on admission,speaking short sentencesI   She is dnr/dni  Son was present at the bedside   Patient unable to give significant history however as per the son patient had recent fall and vertebral fracture and was admitted to [facility] rehab however she has gradually declined    During her stay patient was treated for her hypercalcemia with IV fluids and 1 dose of calcitonin in the ED  Patient was in respiratory failure secondary to her COPD, she was requiring BiPAP which she initially refused  Her hypercapnia worsened along with her mental status  Trial of Lasix with minimal improvement  Cardiology and nephrology consulted  Hypercalcemia felt to be secondary to immobilization  Pulmonology also consulted, IV antibiotics steroids and bronchodilators continued  However hypoxic hypercapnic respiratory failure did not improve  After long discussion with family decision was made to transition to comfort care    Patient was pronounced dead at 23 50 on 3/29/21       The patients vitals on arrival were   ED Triage Vitals   Temperature Pulse Respirations Blood Pressure SpO2   03/28/21 1315 03/28/21 1315 03/28/21 1315 03/28/21 1315 03/28/21 1315   (!) 97 4 °F (36 3 °C) 80 17 147/78 94 %      Temp Source Heart Rate Source Patient Position - Orthostatic VS BP Location FiO2 (%)   03/28/21 1315 03/28/21 1315 03/28/21 1315 03/28/21 1315 03/28/21 1326   Oral Monitor Lying Left arm 35      Pain Score       03/28/21 1656       8           Past Medical History:   Diagnosis Date    Cataract     CHF (congestive heart failure) (HCC)     Leukocytosis      Past Surgical History:   Procedure Laterality Date    APPENDECTOMY      CYSTOCELE REPAIR      ANTERIOR COLPORRHAPHY     ORIF ANKLE FRACTURE Left 1979    TOTAL ABDOMINAL HYSTERECTOMY             Consults have been placed to:   IP CONSULT TO CARDIOLOGY  IP CONSULT TO NEPHROLOGY  IP CONSULT TO PULMONOLOGY    Vitals:    03/29/21 1801 03/29/21 1809 03/29/21 1934 03/29/21 1943   BP:  105/52  126/56   BP Location:    Left arm   Pulse:  (!) 112 84 95   Resp:  (!) 24  (!) 24   Temp: 99 9 °F (37 7 °C)   99 3 °F (37 4 °C)   TempSrc: Tympanic   Tympanic   SpO2:  91% 94%    Weight:       Height:           Most recent labs:    No results for input(s): WBC, HGB, HCT, PLT, K, NA, CALCIUM, BUN, CREATININE, LIPASE, AMYLASE, INR, TROPONINI, CKTOTAL, AST, ALT, ALKPHOS, BILITOT in the last 72 hours  Scheduled Meds:  Continuous Infusions:No current facility-administered medications for this encounter  PRN Meds:      Surgical procedures (if appropriate):

## 2021-09-11 NOTE — PROGRESS NOTES
[address]  [address]   [address] [facility] [address] [place], [place], Cesar Santana U  47     Progress Note  Code SNF 31    Patient Location     [address] [place] [address] rehab    Reason for visit     F/U COPD, DM2, Hypothyroidism, CHF, Thoracic compression fracture, Hypercalcemia    Patients care was coordinated with nursing facility staff  Recent vitals, labs and updated medications were reviewed on [facility] system of facility  Past Medical, surgical, social, medication and allergy history and patients previous records reviewed  Problem List Items Addressed This Visit        Endocrine    Type 2 diabetes mellitus with complication, without long-term current use of insulin ([facility] [address] ) - Primary       Lab Results   Component Value Date    HGBA1C 7 1 (H) 10/12/2020   blood sugars are well controled on glipizide  She was switched from glyburide  FBG above 90  - continue to monitor            Respiratory    Chronic respiratory failure with hypoxia (HCC)     Patient saturates well on baseline O2  Continue oxygen supplement            Cardiovascular and Mediastinum    CHF (congestive heart failure) (HCC)     Wt Readings from Last 3 Encounters:   03/17/21 68 kg (150 lb)   03/14/21 68 9 kg (152 lb)   03/10/21 63 2 kg (139 lb 6 4 oz)         Pt is clinically euvolemic, she has lost weight during the snif stay  Continue furosemide 20 mg daily            Genitourinary    Stage 3a chronic kidney disease     Lab Results   Component Value Date    EGFR 60 03/11/2021    EGFR 63 03/08/2021    EGFR 53 03/06/2021    CREATININE 0 90 03/11/2021    CREATININE 0 86 03/08/2021    CREATININE 1 00 03/06/2021     - follow bmp  - avoid nephrotoxic medications            Other    Chronic bilateral thoracic back pain     2/2 compression fracture  Pt is wearing TLSO brace    - continue oxycodone 2 5 qd  - continue tramadol 50 mg q8h  - continue lidocaine patch                   HPI        Patient is being seen for a follow-up visit  She  is doing okay at present except complains of chronic troubles breathing, severe pain in back, low appetite  Patient is awake alert and oriented  She nicely maintains conversation and states she wants to go home  Remains on baseline oxygen therapy and  saturats at 94%  There are somewhat decreased breath sounds bilaterally  There is trace pedal edema b/l but no jvd  TLSO brace remains in place  Pt is on oxycodone, tramadol and lidocaine patch  Blood sugars are controlled, fluctuates from 90 in the morning to 180 postprandial   Patient remains on glyburide 2 5 mg daily  Patient has lost 2 lb since admission  Review of Systems   Constitutional: Positive for fatigue  Negative for chills and fever  HENT: Negative for nosebleeds and rhinorrhea  Eyes: Negative for discharge and redness  Respiratory: Negative for cough, chest tightness, wheezing and stridor  Cardiovascular: Negative  Negative for leg swelling  Gastrointestinal: Negative for abdominal distention, abdominal pain, diarrhea and vomiting  Genitourinary: Negative for dysuria, flank pain and hematuria  Musculoskeletal: Positive for back pain and gait problem  Negative for arthralgias  Skin: Negative for pallor  Neurological: Positive for weakness (Generalized)  Negative for tremors, seizures, syncope and headaches  Psychiatric/Behavioral: Negative for agitation, behavioral problems and confusion         Past Medical History:   Diagnosis Date    Cataract     CHF (congestive heart failure) (HCC)     Leukocytosis        Past Surgical History:   Procedure Laterality Date    APPENDECTOMY      CYSTOCELE REPAIR      ANTERIOR COLPORRHAPHY     ORIF ANKLE FRACTURE Left 1979    TOTAL ABDOMINAL HYSTERECTOMY         Social History     Tobacco Use   Smoking Status Former Smoker    Packs/day: 2 00    Years: 48 00    Pack years: 96 00    Types: Cigarettes    Quit date: 1999    Years since quittin 5   Smokeless Tobacco Never Used       Family History   Problem Relation Age of Onset    No Known Problems Mother     No Known Problems Father         Allergies   Allergen Reactions    Erythromycin     Sulfa Antibiotics          Current Outpatient Medications:     acetaminophen (TYLENOL) 325 mg tablet, Take 2 tablets (650 mg total) by mouth every 6 (six) hours, Disp:  , Rfl: 0    al mag oxide-diphenhydramine-lidocaine viscous (MAGIC MOUTHWASH) 1:1:1 suspension, Swish and spit 10 mL every 4 (four) hours as needed for mouth pain or discomfort, Disp:  , Rfl: 0    albuterol (PROVENTIL HFA,VENTOLIN HFA) 90 mcg/act inhaler, Inhale 2 puffs every 6 (six) hours as needed for wheezing, Disp:  , Rfl: 0    atorvastatin (LIPITOR) 80 mg tablet, Take 1 tablet (80 mg total) by mouth daily with dinner, Disp:  , Rfl: 0    benzonatate (TESSALON PERLES) 100 mg capsule, Take 1 capsule (100 mg total) by mouth 3 (three) times a day as needed for cough, Disp: 20 capsule, Rfl: 0    fluticasone-vilanterol (BREO ELLIPTA) 100-25 mcg/inh inhaler, Inhale 1 puff daily Rinse mouth after use , Disp:  , Rfl: 0    FML FORTE 0 25 % ophthalmic suspension, INSTILL 1 DROP INTO BOTH EYES EVERY DAY, Disp: , Rfl:     furosemide (LASIX) 20 mg tablet, Take 1 tablet (20 mg total) by mouth daily, Disp:  , Rfl: 0    glipiZIDE (GLUCOTROL) 10 mg tablet, Take 2 5 mg by mouth daily, Disp: , Rfl:     Heparin Sodium, Porcine, (heparin, porcine,) 5,000 units/mL, Inject 1 mL (5,000 Units total) under the skin every 8 (eight) hours, Disp: 1 mL, Rfl: 0    Insulin Pen Needle (B-D UF III MINI PEN NEEDLES) 31G X 5 MM MISC, by Does not apply route, Disp: , Rfl:     ipratropium-albuterol (DUO-NEB) 0 5-2 5 mg/3 mL nebulizer solution, Take 1 vial (3 mL total) by nebulization 3 (three) times a day, Disp:  , Rfl: 0    levothyroxine 125 mcg tablet, Take 1 tablet (125 mcg total) by mouth daily in the early morning, Disp:  , Rfl: 0    lidocaine (LIDODERM) 5 %, Apply 2 patches topically daily Remove & Discard patch within 12 hours or as directed by MD, Disp:  , Rfl: 0    melatonin 3 mg, Take 2 tablets (6 mg total) by mouth daily at bedtime as needed (insomnia), Disp:  , Rfl: 0    methocarbamol (ROBAXIN) 500 mg tablet, Take 1 tablet (500 mg total) by mouth every 6 (six) hours as needed for muscle spasms, Disp:  , Rfl: 0    montelukast (SINGULAIR) 10 mg tablet, Take 1 tablet (10 mg total) by mouth daily at bedtime, Disp:  , Rfl: 0    nystatin (MYCOSTATIN) cream, Apply topically 2 (two) times a day Under breasts and abdominal folds, Disp: 30 g, Rfl: 0    nystatin (MYCOSTATIN) powder, Apply topically 2 (two) times a day Groin, Disp: 15 g, Rfl: 0    pantoprazole (PROTONIX) 40 mg tablet, Take 1 tablet (40 mg total) by mouth daily in the early morning, Disp:  , Rfl: 0    PAZEO 0 7 % SOLN, INSTILL 1 DROP INTO BOTH EYES EVERY DAY, Disp: , Rfl: 4    Updated list was reviewed in [facility] of facility  Vitals:    03/24/21 1746   BP: 122/58   Pulse: 88   Resp: 19   Temp: 98 4 °F (36 9 °C)   SpO2: 94%       Physical Exam  Constitutional:       General: She is not in acute distress  Appearance: She is well-developed  She is ill-appearing  She is not diaphoretic  HENT:      Head: Normocephalic and atraumatic  Nose: No rhinorrhea  Eyes:      General:         Right eye: No discharge  Left eye: No discharge  Extraocular Movements: Extraocular movements intact  Neck:      Musculoskeletal: Neck supple  Cardiovascular:      Rate and Rhythm: Normal rate  Rhythm irregular  Pulmonary:      Effort: No respiratory distress  Breath sounds: No stridor  No wheezing  Comments: Patient remains on chronic oxygen therapy  There are mildly decreased breath sounds at the lung bases bilaterally  Abdominal:      General: There is no distension        Comments: TLSO brace in place   Musculoskeletal:      Comments: Mild edema involving bilateral lower extremities Skin:     Coloration: Skin is not jaundiced or pale  Findings: No bruising  Neurological:      General: No focal deficit present  Mental Status: She is alert  Diagnostic Data:    Recent labs were reviewed     labs done on 03/17/2021 revealed hemoglobin of 10 1 WBC count 7 2, platelet count 053, BUN 26, creatinine 0 79, sodium 140, potassium 3 7, calcium 10 1 0-6 days (Julio César)

## 2022-09-28 NOTE — WOUND OSTOMY CARE
Progress Note - Wound   Arielle Aime 78 y o  female MRN: [id number]  Unit/Bed#: -01 Encounter: [id number]      Assessment:   Patient admitted for thoracic compression fracture  History of CHF, COPD, diabetes, HTN, and CKD  Wound care follow-up for stage 3 pressure injury of the sacrum  Patient agreeable to assessment  Patient is alert and oriented x4, max assist of 1 to ambulate with walker while TLSO brace in place, continent of bowel and bladder (does report periods of urinary incontinence), EHOB waffle cushion in place on chair, pillows in use for heel elevation, and wedges at bed side for offloading while in bed  1  Mid sacrum stage 3 POA to rehab unit  Wound is beginning to heal, depth has decreased  Wound bed is aprox 10% yellow slough and 90% moist, pink tissue  Wound edges are attached and fragile  Elin-wound is intact, dry, blanchable pink  B/L heels intact, dry, no redness  B/L buttock is dry, intact, no redness  Educated patient on importance of frequent offloading of pressure via turning, repositioning, and weight-shifting  Verbalized understanding of plan of care  No induration, fluctuance, odor, warmth, redness, or purulence noted to the above noted wound  New dressings applied  Patient tolerated well, denies pain to the wound  Primary nurse aware of plan of care  See flow sheets for more detailed assessment findings  Will follow along  Skin care plans:  1  Cleanse mid sacrum with NSS, pat dry, and apply sacral alleyvn foam dressing  Change every other day and as needed for soilage/disldogement   Peel back every shift to check skin integrity  2  Apply skin nourishing cream the entire skin daily for moisture   3  Turn and reposition patient every  2 hours   4  Elevate heels off of bed with pillows to offload pressure   5  Apply EHOB waffle cushion to chair when OOB, if able   6  Allevyn foam to heels, fadia w/P, peel foam check skin integrity q-shift  Change q3d  7  Follow-up with the [facility] wound care center as an outpatient - please call [phone] for an appointment           Wound 02/21/21 Pressure Injury Sacrum (Active)   Wound Image   03/04/21 1034   Wound Description Dry;Pink;Clean 03/04/21 1034   Pressure Injury Stage 3 03/04/21 1034   Elin-wound Assessment Dry; Intact; Pink 03/04/21 1034   Wound Length (cm) 0 5 cm 03/04/21 1034   Wound Width (cm) 0 2 cm 03/04/21 1034   Wound Depth (cm) 0 1 cm 03/04/21 1034   Wound Surface Area (cm^2) 0 1 cm^2 03/04/21 1034   Wound Volume (cm^3) 0 01 cm^3 03/04/21 1034   Calculated Wound Volume (cm^3) 0 01 cm^3 03/04/21 1034   Change in Wound Size % 50 03/04/21 1034   Tunneling 0 cm 02/22/21 0819   Tunneling in depth located at 0 02/22/21 0819   Undermining 0 02/22/21 0819   Undermining is depth extending from 0 02/22/21 0819   Wound Site Closure None 03/04/21 1034   Drainage Amount None 03/04/21 1034   Drainage Description Serous 02/25/21 1245   Non-staged Wound Description Full thickness 02/24/21 0917   Treatments Cleansed;Site care 03/01/21 0830   Dressing Foam, Silicon (eg  Allevyn, etc) 03/04/21 1034   Wound packed? No 02/22/21 0819   Packing- # removed 0 02/22/21 0819   Packing- # inserted 0 02/22/21 0819   Dressing Changed Changed 03/04/21 1034   Patient Tolerance Tolerated well 03/04/21 1034   Dressing Status Clean;Dry; Intact 03/04/21 1034         Call or tigertext with any questions  Wound Care will continue to follow    Mamadou Montero RN BSN  Wound care  Patient examined with Stalin Lujan  no

## 2023-10-02 NOTE — PROGRESS NOTES
03/07/21 1230   Pain Assessment   Pain Assessment Tool 0-10   Pain Score No Pain   Restrictions/Precautions   Precautions Aspiration;Cognitive; Fall Risk;O2;Pain;Spinal precautions;Supervision on toilet/commode  (2LO2 NC)   Weight Bearing Restrictions No   ROM Restrictions Yes  (spinal)   Braces or Orthoses TLSO  (backpack, OOB)   Cognition   Overall Cognitive Status Impaired   Arousal/Participation Alert; Cooperative   Attention Attends with cues to redirect   Memory Decreased short term memory;Decreased recall of recent events   Following Commands Follows one step commands with increased time or repetition   Subjective   Subjective Pt agreeable to PT session, requesting to use bathroom before start   Sit to Stand   Type of Assistance Needed Physical assistance; Adaptive equipment   Amount of Physical Assistance Provided 25%-49%   Comment [facility]   Sit to Stand CARE Score 3   Bed-Chair Transfer   Type of Assistance Needed Physical assistance; Adaptive equipment   Amount of Physical Assistance Provided 25%-49%   Comment Marguerite reinoso/ RW A to problem solve through how to turn w/ O2 line   Chair/Bed-to-Chair Transfer CARE Score 3   Walk 10 Feet   Type of Assistance Needed Physical assistance; Adaptive equipment   Amount of Physical Assistance Provided Less than 25%   Comment Marguerite progreses to CG, A for O2 line   Walk 10 Feet CARE Score 3   Walk 50 Feet with Two Turns   Type of Assistance Needed Physical assistance; Adaptive equipment   Amount of Physical Assistance Provided Less than 25%   Comment 60'x3   Walk 50 Feet with Two Turns CARE Score 3   Walk 150 Feet   Comment Pt fatigues at 61'   Reason if not Attempted Safety concerns   Walk 150 Feet CARE Score 88   Ambulation   Does the patient walk? 2  Yes   Primary Mode of Locomotion Prior to Admission Walk   Distance Walked (feet) 60 ft  (x3)   Assist Device Roller Walker   Gait Pattern Inconsistant Lory; Slow Lory; Forward Flexion; Step Refill passed per [place], [facility] protocol.   Requested Prescriptions   Pending Prescriptions Disp Refills    METFORMIN 500 MG Oral Tab [Pharmacy Med Name: METFORMIN  MG TABLET] 180 tablet 1     Sig: TAKE 1 TABLET BY MOUTH TWICE A DAY WITH MEALS       Diabetes Medication Protocol Passed - 10/1/2023  7:23 AM        Passed - Last A1C < 7.5 and within past 6 months     Lab Results   Component Value Date    A1C 6.3 (H) 09/19/2023             Passed - In person appointment or virtual visit in the past 6 mos or appointment in next 3 mos     Recent Outpatient Visits              1 week ago Chronic pain of right ankle    [address] [facility], Jessica Cantrell MD    Office Visit    3 months ago Type 2 diabetes mellitus without complication, without long-term current use of insulin ([address]    [address], [address], Jessica Cantrell MD    Office Visit    7 months ago Right leg pain    [address], [address], Jessica Cantrell MD    Office Visit    7 months ago MCI (mild cognitive impairment)    [address], [address], [facility]    Office Visit    9 months ago Primary hypertension    [address] [facility], Jessica Cantrell MD    Office Visit                      Passed - EGFRCR or GFRAA > 50     GFR Evaluation  EGFRCR: 70 , resulted on 12/8/2022          Passed - GFR in the past 12 months           Recent Outpatient Visits              1 week ago Chronic pain of right ankle    [address] [facility], Jessica Cantrell MD    Office Visit    3 months ago Type 2 diabetes mellitus without complication, without long-term current use of insulin ([address]    [address], [address], Jessica Cantrell MD    Office Visit    7 months ago Right leg pain    [address], [address], Jessica Cantrell MD    Office Visit    7 months ago MCI (mild cognitive impairment)    [facility], [address], [place], [place]    Office Visit    9 months ago Primary hypertension    [address], [address], Rohan Rich MD    Office Visit to;Improper weight shift   Findings 2LO2 NC, SpO2 > 95%, HR 110s   Wheel 50 Feet with Two Turns   Reason if not Attempted Activity not applicable   Wheel 50 Feet with Two Turns CARE Score 9   Wheel 150 Feet   Reason if not Attempted Activity not applicable   Wheel 575 Feet CARE Score 9   Wheelchair mobility   Does the patient use a wheelchair? 0  No   Toilet Transfer   Type of Assistance Needed Physical assistance; Adaptive equipment   Amount of Physical Assistance Provided 25%-49%   Comment modA w/ BSC   Toilet Transfer CARE Score 3   Assessment   Treatment Assessment PT session focusing on repeated bouts of HH distance ambulation working on managing O2 line and problem solving through which way to turn to avoid getting tangled  Pt cont to req A, needing VC to manage O2 line properly and turn in appropriate direction  However, pt able to ambulate inc trials w/ HR staying ~110s (previously 130s) showing improvements in activity tolerance and endurance w/ HH distances  Pt tolerated entire session on 2LO2 NC, c/o not being able to breathe however SpO2 WNL thorughout  Pt cont to req acute rehab PT, d/c SNF vs home pending progress and familys ability to provide approrpriate and safe amount of A upon d/c  Family/Caregiver Present Son Batsheva Lord   Problem List Decreased strength;Decreased range of motion;Decreased endurance; Impaired balance;Decreased mobility; Decreased coordination;Decreased cognition;Decreased skin integrity;Orthopedic restrictions;Pain   Barriers to Discharge Inaccessible home environment;Decreased caregiver support   PT Barriers   Functional Limitation Car transfers; Ramp negotiation;Stair negotiation;Standing;Transfers; Walking; Wheelchair management   Plan   Treatment/Interventions Functional transfer training;LE strengthening/ROM; Elevations; Therapeutic exercise; Endurance training;Cognitive reorientation;Patient/family training;Equipment eval/education; Bed mobility;Gait training; Compensatory technique education   Progress Slow progress, decreased activity tolerance   Recommendation   PT Discharge Recommendation Other (Comment)  (SNF vs home pending familys ability to provide A)   Equipment Recommended Wheelchair;Walker   PT Therapy Minutes   PT Time In 1230   PT Time Out 1330   PT Total Time (minutes) 60   PT Mode of treatment - Individual (minutes) 60   PT Mode of treatment - Concurrent (minutes) 0   PT Mode of treatment - Group (minutes) 0   PT Mode of treatment - Co-treat (minutes) 0   PT Mode of Treatment - Total time(minutes) 60 minutes   PT Cumulative Minutes 680   Therapy Time missed   Time missed?  No

## 2024-09-13 NOTE — NURSING NOTE
Upper dentures were left in patients mouth and glasses and TSLO Brace were [place] with [place] [facility]  Initiate Treatment: Dermasmooth oil Detail Level: Zone Render In Strict Bullet Format?: No Initiate Treatment: Metronidazole cream: apply once in the morning